# Patient Record
Sex: FEMALE | Race: WHITE | NOT HISPANIC OR LATINO | Employment: FULL TIME | URBAN - METROPOLITAN AREA
[De-identification: names, ages, dates, MRNs, and addresses within clinical notes are randomized per-mention and may not be internally consistent; named-entity substitution may affect disease eponyms.]

---

## 2017-01-16 ENCOUNTER — TELEPHONE (OUTPATIENT)
Dept: RHEUMATOLOGY | Facility: CLINIC | Age: 37
End: 2017-01-16

## 2017-01-16 NOTE — TELEPHONE ENCOUNTER
Left VM for Suzy Fernando, Access Consultant at [phone] stating per Dr Cross patient should have PCP fill out the paperwork.  Rosanne Shields, CMA

## 2017-01-18 ENCOUNTER — MYC MEDICAL ADVICE (OUTPATIENT)
Dept: FAMILY MEDICINE | Facility: CLINIC | Age: 37
End: 2017-01-18

## 2017-01-18 NOTE — TELEPHONE ENCOUNTER
Routing to PCP.    Karena-Please review.  Would you like patient to schedule office visit to discuss accommodations?    Thank you!  ARYAN SheikhN, RN

## 2017-01-23 ENCOUNTER — OFFICE VISIT (OUTPATIENT)
Dept: FAMILY MEDICINE | Facility: CLINIC | Age: 37
End: 2017-01-23
Payer: COMMERCIAL

## 2017-01-23 VITALS
DIASTOLIC BLOOD PRESSURE: 86 MMHG | HEIGHT: 65 IN | HEART RATE: 81 BPM | TEMPERATURE: 97.9 F | BODY MASS INDEX: 48.67 KG/M2 | SYSTOLIC BLOOD PRESSURE: 120 MMHG | WEIGHT: 292.13 LBS | OXYGEN SATURATION: 97 % | RESPIRATION RATE: 18 BRPM

## 2017-01-23 DIAGNOSIS — E66.01 MORBID OBESITY DUE TO EXCESS CALORIES (H): ICD-10-CM

## 2017-01-23 DIAGNOSIS — M79.7 FIBROMYALGIA: Primary | ICD-10-CM

## 2017-01-23 DIAGNOSIS — F33.41 RECURRENT MAJOR DEPRESSIVE DISORDER, IN PARTIAL REMISSION (H): ICD-10-CM

## 2017-01-23 DIAGNOSIS — J01.90 ACUTE SINUSITIS WITH SYMPTOMS GREATER THAN 10 DAYS: ICD-10-CM

## 2017-01-23 PROCEDURE — 99214 OFFICE O/P EST MOD 30 MIN: CPT | Performed by: NURSE PRACTITIONER

## 2017-01-23 RX ORDER — BUPROPION HYDROCHLORIDE 300 MG/1
300 TABLET ORAL EVERY MORNING
Qty: 90 TABLET | Refills: 1 | Status: SHIPPED | OUTPATIENT
Start: 2017-01-23 | End: 2017-04-03

## 2017-01-23 NOTE — PROGRESS NOTES
SUBJECTIVE:                                                    Ashli Gamboa is a 37 year old female who presents to clinic today for the following health issues:    1. Patient stated she was diagnosed with fibromyalgia in December, and would like to discuss the medications that she's taking.     Overall body aches, joint pains, coinciding with depression, worried it is all in her head.  Thinking losing weight would help the pains but the weight and the pains are flaring her depression.  SAD.      Also c/o cough, runny nose, congestion malaise x 2 weeks      Problem list and histories reviewed & adjusted, as indicated.  Additional history: as documented    Patient Active Problem List   Diagnosis     Morbid obesity (H)     post traumatic Arthritis of big toe     Major depression, recurrent (H)     CARDIOVASCULAR SCREENING; LDL GOAL LESS THAN 160     Endometriosis     Fibromyalgia     Past Surgical History   Procedure Laterality Date     Abdomen surgery       laparoscopy X2     Laser co2 laparoscopic vaporization endometrium  7/10/2014     Procedure: LASER CO2 LAPAROSCOPIC VAPORIZATION ENDOMETRIUM;  Surgeon: Sabas Patel MD;  Location: [facility]     Laparoscopic lysis adhesions  7/10/2014     Procedure: LAPAROSCOPIC LYSIS ADHESIONS;  Surgeon: Sabas Patel MD;  Location: [facility]     Laser co2 laparoscopic vaporization endometrium N/A 6/30/2015     Procedure: LASER CO2 LAPAROSCOPIC VAPORIZATION ENDOMETRIUM;  Surgeon: Sabas Patel MD;  Location: [facility]     Laser co2 laparoscopy diagnostic, lysis adhesions, combined N/A 6/30/2015     Procedure: COMBINED LASER CO2 LAPAROSCOPY DIAGNOSTIC, LYSIS ADHESIONS;  Surgeon: Sabas Patel MD;  Location: [facility]     Dilate cervix, hysteroscopy, ablate endometrium novasure, combined N/A 11/17/2016     Procedure: COMBINED DILATE CERVIX, HYSTEROSCOPY, ABLATE ENDOMETRIUM NOVASURE;  Surgeon: Sabas Patel MD;  Location: [facility]       Social History   Substance Use  "Topics     Smoking status: Former Smoker     Smokeless tobacco: Never Used      Comment: stopped for about 6 months now as of 5/23/13     Alcohol Use: 0.0 oz/week     0 Standard drinks or equivalent per week      Comment: occas     Family History   Problem Relation Age of Onset     Depression Mother      CANCER Mother      Cervical      Other - See Comments Mother      Fibromyalgia     Arthritis Father      Rheumatoid Arthritis Father      CEREBROVASCULAR DISEASE Paternal Grandmother          Current Outpatient Prescriptions   Medication Sig Dispense Refill     buPROPion (WELLBUTRIN XL) 300 MG 24 hr tablet Take 1 tablet (300 mg) by mouth every morning 90 tablet 1     FLUoxetine (PROZAC) 20 MG capsule Take 1 capsule (20 mg) by mouth daily 90 capsule 1     amoxicillin-clavulanate (AUGMENTIN) 875-125 MG per tablet Take 1 tablet by mouth 2 times daily 20 tablet 0     gabapentin (NEURONTIN) 300 MG capsule Take 2 capsules (600 mg) by mouth 3 times daily 180 capsule 0     cyclobenzaprine (FLEXERIL) 5 MG tablet Take 1 tablet (5 mg) by mouth 3 times daily as needed for muscle spasms .  Initially only take at night. 90 tablet 2     ASPIRIN PO Take 325 mg by mouth daily as needed for moderate pain       traZODone (DESYREL) 50 MG tablet Take 1 tablet (50 mg) by mouth nightly as needed for sleep 30 tablet 0     etonogestrel-ethinyl estradiol (NUVARING) 0.12-0.015 MG/24HR vaginal ring Place 1 each vaginally every 28 days 4 each 4     No Known Allergies    ROS:  Constitutional, HEENT, cardiovascular, pulmonary, GI, , musculoskeletal, neuro, skin, endocrine and psych systems are negative, except as otherwise noted.    OBJECTIVE:                                                    /86 mmHg  Pulse 81  Temp(Src) 97.9  F (36.6  C) (Oral)  Resp 18  Ht 5' 4.5\" (1.638 m)  Wt 292 lb 2 oz (132.507 kg)  BMI 49.39 kg/m2  SpO2 97%  LMP  (LMP Unknown)  Breastfeeding? No  Body mass index is 49.39 kg/(m^2).  GENERAL: healthy, " alert and no distress  EYES: Eyes grossly normal to inspection, PERRL and conjunctivae and sclerae normal  HENT: Bilateral frontal and maxillary sinus tenderness, ear canals and TM's normal, nose and mouth without ulcers or lesions  NECK: no adenopathy, no asymmetry, masses, or scars and thyroid normal to palpation  RESP: lungs clear to auscultation - no rales, rhonchi or wheezes  CV: regular rate and rhythm, normal S1 S2, no S3 or S4, no murmur, click or rub, no peripheral edema and peripheral pulses strong  ABDOMEN: soft, nontender, no hepatosplenomegaly, no masses and bowel sounds normal  MS: no gross musculoskeletal defects noted, no edema  SKIN: no suspicious lesions or rashes  PSYCH: sad, tearful, mentation normal.    PHQ-9 SCORE 6/12/2015 12/30/2015 6/6/2016   Total Score 4 - -   Total Score MyChart - 2 (Minimal depression) -   Total Score - 2 4    today +13/27    Diagnostic Test Results:  none      ASSESSMENT/PLAN:                                                        ICD-10-CM    1. Fibromyalgia M79.7 PAIN MANAGEMENT EXTERNAL REFERRAL   2. Recurrent major depressive disorder, in partial remission (H) F33.41 buPROPion (WELLBUTRIN XL) 300 MG 24 hr tablet     FLUoxetine (PROZAC) 20 MG capsule   3. Acute sinusitis with symptoms greater than 10 days J01.90 amoxicillin-clavulanate (AUGMENTIN) 875-125 MG per tablet   4. Morbid obesity due to excess calories (H) E66.01      Will refer to pain team for eval and management of chronic pain    I discussed the potential side effects of antidepressant medications as well as the likelihood of worsening before improvement over the next few weeks.  I reemphasized the importance of a multi-armed approach towards the treatment of depression including regular exercise, adequate sleep, and a well-rounded, low-glycemic diet.  In addition, I emphasized the importance of ongoing development of her support network. If new or worsening symptoms, she will seek help immediately.    I  discussed the pathophysiology of sinus pressure/sinusitis and treatment options with emphasis on healty lifestyle and opening up natural drainage passages.  I discussed the risks and benefits of various over the counter and prescription treatments including short courses of decongestant nasal sprays.  Recheck if not improving as expected  push fluids, rest as able.  Elevate HOB, lots of handwashing.  Toss your toothbrush after 24 hours on the antibiotics.    Encouraged to work on diet and exercise.    See Patient Instructions    DANIELE Osorio CNP  [facility]

## 2017-01-23 NOTE — NURSING NOTE
"Chief Complaint   Patient presents with     Other     discuss fibromyalgia medications       Initial /86 mmHg  Pulse 81  Temp(Src) 97.9  F (36.6  C) (Oral)  Resp 18  Ht 5' 4.5\" (1.638 m)  Wt 292 lb 2 oz (132.507 kg)  BMI 49.39 kg/m2  SpO2 97%  LMP  (LMP Unknown)  Breastfeeding? No Estimated body mass index is 49.39 kg/(m^2) as calculated from the following:    Height as of this encounter: 5' 4.5\" (1.638 m).    Weight as of this encounter: 292 lb 2 oz (132.507 kg).  BP completed using cuff size: X-large    Mora Edwards MA      "

## 2017-02-01 ENCOUNTER — THERAPY VISIT (OUTPATIENT)
Dept: PHYSICAL THERAPY | Facility: CLINIC | Age: 37
End: 2017-02-01
Payer: COMMERCIAL

## 2017-02-01 DIAGNOSIS — R29.898 MUSCULAR DECONDITIONING: ICD-10-CM

## 2017-02-01 DIAGNOSIS — M79.7 FIBROMYALGIA: Primary | ICD-10-CM

## 2017-02-01 PROCEDURE — 97163 PT EVAL HIGH COMPLEX 45 MIN: CPT | Mod: GP | Performed by: PHYSICAL THERAPIST

## 2017-02-01 PROCEDURE — 97112 NEUROMUSCULAR REEDUCATION: CPT | Mod: GP | Performed by: PHYSICAL THERAPIST

## 2017-02-01 PROCEDURE — 97530 THERAPEUTIC ACTIVITIES: CPT | Mod: GP | Performed by: PHYSICAL THERAPIST

## 2017-02-01 NOTE — LETTER
[facility]  [address] [address]  [phone]    2017    Re: Ashli Gamboa   :   1980  MRN:  [id number]   REFERRING PHYSICIAN:   Radha Palm CNP    [facility]    Date of Initial Evaluation:  2017  Visits:  1  Rxs Used: 1  Reason for Referral:     Fibromyalgia  Muscular deconditioning    EVALUATION SUMMARY    Subjective:  Ashli Gamboa is a 37 year old female with deconditioning condition which occurred with other.      This is a chronic condition  Date of orders 17  PMH: Diagnosed with fibromyalgia in December, working on pain management and energy levels. Understands that weight management can help. 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 Endometriosis with 4 surgeries since 2013.  Greatest complaint is low back and upper back pain. General aching, migraines and fatigue.   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 Likes walking elliptical, swimming (not often though). Walks 10 minutes to office from car. Getting a sit>stand desk at work  Initially on gabapentin (6 wks no change), just started Lyrica this week. Flexeril at night.  Patient reports pain:  Cervical spine and lumbar spine.  Radiates to:  Low back and upper back.  Pain is described as aching and is constant and reported as 8/10.  Associated symptoms:  Loss of strength and loss of motion. Pain is worse in the P.M..  Exacerbated by: decreased activity, overactivity. Not enough sleep. and relieved by heat (epsom salt bath, relaxation techniques).  Since onset symptoms are unchanged.        General health as reported by patient is good.                               Objective:   Hip Evaluation  Hip Strength:  : 3+/5 glute med; 4/5 glute max B hip.  General Evaluation:  AROM:    Cervical/Thoracic:  Significant findings:  Pain with cervical flex and ext  Lumbopelvic:  Significant  findings: pain with low back ext, mod - SB B  Upper Extremity:  Significant findings:  Soreness with shoulder Abd B  Lower Extremity Strength:  Strength wnl lower extremity general: overactive glute/hip flexor with TrA set.  Re: Ashli Gamboa   :   1980    Posture:    Standing:   Rounded shoulders, forward head, lordosis increased, genu recurvatum and pes planus  Sitting:  Rounded shoulders and forward head    Assessment/Plan:    Patient is a 37 year old female with deconditioning d/t fibromyalgia.    Patient has the following significant findings with corresponding treatment plan.                Diagnosis 1:  Fibromyalgia  Pain -  self management, education and home program  Decreased ROM/flexibility - manual therapy, therapeutic exercise and home program  Decreased joint mobility - manual therapy, therapeutic exercise and home program  Decreased strength - therapeutic exercise, therapeutic activities and home program  Decreased proprioception - neuro re-education, gait training, therapeutic activities and home program  Impaired posture - neuro re-education, therapeutic activities and home program    Therapy Evaluation Codes:   1) History comprised of:   Personal factors that impact the plan of care:      Gender and Time since onset of symptoms.    Comorbidity factors that impact the plan of care are:      Depression, Fibromyalgia, Migraines/headaches and Overweight.     Medications impacting care: Anti-depressant and Muscle relaxant.  2) Examination of Body Systems comprised of:   Body structures and functions that impact the plan of care:      Lumbar spine, Pelvis and General conditioning.   Activity limitations that impact the plan of care are:      Bending, Driving, Stairs, Standing and Walking.  3) Clinical presentation characteristics are:   Unstable/Unpredictable.  4) Decision-Making    High complexity using standardized patient assessment instrument and/or measureable assessment of functional  outcome.  Cumulative Therapy Evaluation is: High complexity.  Previous and current functional limitations:  (See Goal Flow Sheet for this information)    Short term and Long term goals: (See Goal Flow Sheet for this information)   Communication ability:  Patient appears to be able to clearly communicate and understand verbal and written communication and follow directions correctly.  Treatment Explanation - The following has been discussed with the patient:   RX ordered/plan of care  Anticipated outcomes  Possible risks and side effects  This patient would benefit from PT intervention to resume normal activities.   Rehab potential is good.  Frequency:  1 X week, once daily  Duration:  for 8 weeks    Re: Ashli Gamboa   :   1980    Discharge Plan:  Achieve all LTG.  Independent in home treatment program.  Reach maximal therapeutic benefit.                  Thank you for your referral.    INQUIRIES  Therapist: Mili Browning, PT  [facility] PH[facility] THERAP[facility]  [address] [place] [address]  Phone: [phone]  Fax: [phone]

## 2017-02-06 PROBLEM — R29.898 MUSCULAR DECONDITIONING: Status: ACTIVE | Noted: 2017-02-06

## 2017-02-13 ENCOUNTER — PRE VISIT (OUTPATIENT)
Dept: SURGERY | Facility: CLINIC | Age: 37
End: 2017-02-13

## 2017-02-13 ASSESSMENT — ENCOUNTER SYMPTOMS
FEVER: 0
RESPIRATORY PAIN: 0
HOT FLASHES: 0
FATIGUE: 1
SNORES LOUDLY: 1
NIGHT SWEATS: 0
CHILLS: 0
HALLUCINATIONS: 0
DECREASED LIBIDO: 1
COUGH DISTURBING SLEEP: 0
WEIGHT GAIN: 0
DYSPNEA ON EXERTION: 0
POSTURAL DYSPNEA: 0
SHORTNESS OF BREATH: 0
COUGH: 0
WHEEZING: 0
HEMOPTYSIS: 0
WEIGHT LOSS: 0
POLYPHAGIA: 0
POLYDIPSIA: 0
ALTERED TEMPERATURE REGULATION: 0
DECREASED APPETITE: 0
INCREASED ENERGY: 0

## 2017-02-13 NOTE — TELEPHONE ENCOUNTER
1.  Date/reason for appt: 2/22/17 NBS   2.  Referring provider: CRISTIAN IRVIN   3.  Call to patient (Yes / No - short description): no  4.  Previous care at / records requested from: [facility]  5.  Other: Received email from referrals. Per email patient would like to be contacted through email. Sent email to patient to check if she has any outside records.

## 2017-02-16 NOTE — TELEPHONE ENCOUNTER
Patient emailed back, only seen at [facility]. [facility] received, will fax request.

## 2017-02-17 ENCOUNTER — TRANSFERRED RECORDS (OUTPATIENT)
Dept: HEALTH INFORMATION MANAGEMENT | Facility: CLINIC | Age: 37
End: 2017-02-17

## 2017-02-17 NOTE — TELEPHONE ENCOUNTER
Records received from [facility], will forward to clinic.  Included:   MRI Cspine on 12/10/12 unrelated to visit   Office notes on 2/14/13, 1/28/13, 12/10/12  Sleep study on 2/6/13- [facility]

## 2017-02-22 ENCOUNTER — OFFICE VISIT (OUTPATIENT)
Dept: SURGERY | Facility: CLINIC | Age: 37
End: 2017-02-22

## 2017-02-22 ENCOUNTER — ALLIED HEALTH/NURSE VISIT (OUTPATIENT)
Dept: SURGERY | Facility: CLINIC | Age: 37
End: 2017-02-22

## 2017-02-22 VITALS
WEIGHT: 291.8 LBS | HEART RATE: 104 BPM | HEIGHT: 64 IN | TEMPERATURE: 98.7 F | SYSTOLIC BLOOD PRESSURE: 154 MMHG | DIASTOLIC BLOOD PRESSURE: 88 MMHG | OXYGEN SATURATION: 97 % | BODY MASS INDEX: 49.82 KG/M2

## 2017-02-22 DIAGNOSIS — E66.01 MORBID OBESITY, UNSPECIFIED OBESITY TYPE (H): Primary | ICD-10-CM

## 2017-02-22 LAB
ALBUMIN SERPL-MCNC: 3 G/DL (ref 3.4–5)
ALP SERPL-CCNC: 72 U/L (ref 40–150)
ALT SERPL W P-5'-P-CCNC: 16 U/L (ref 0–50)
ANION GAP SERPL CALCULATED.3IONS-SCNC: 8 MMOL/L (ref 3–14)
AST SERPL W P-5'-P-CCNC: 11 U/L (ref 0–45)
BILIRUB SERPL-MCNC: 0.2 MG/DL (ref 0.2–1.3)
BUN SERPL-MCNC: 13 MG/DL (ref 7–30)
CALCIUM SERPL-MCNC: 8.5 MG/DL (ref 8.5–10.1)
CHLORIDE SERPL-SCNC: 108 MMOL/L (ref 94–109)
CO2 SERPL-SCNC: 25 MMOL/L (ref 20–32)
CREAT SERPL-MCNC: 0.82 MG/DL (ref 0.52–1.04)
DEPRECATED CALCIDIOL+CALCIFEROL SERPL-MC: 35 UG/L (ref 20–75)
ERYTHROCYTE [DISTWIDTH] IN BLOOD BY AUTOMATED COUNT: 13.6 % (ref 10–15)
GFR SERPL CREATININE-BSD FRML MDRD: 79 ML/MIN/1.7M2
GLUCOSE SERPL-MCNC: 114 MG/DL (ref 70–99)
HCT VFR BLD AUTO: 45.5 % (ref 35–47)
HGB BLD-MCNC: 14.2 G/DL (ref 11.7–15.7)
MCH RBC QN AUTO: 27.4 PG (ref 26.5–33)
MCHC RBC AUTO-ENTMCNC: 31.2 G/DL (ref 31.5–36.5)
MCV RBC AUTO: 88 FL (ref 78–100)
PLATELET # BLD AUTO: 328 10E9/L (ref 150–450)
POTASSIUM SERPL-SCNC: 4 MMOL/L (ref 3.4–5.3)
PROT SERPL-MCNC: 6.9 G/DL (ref 6.8–8.8)
PTH-INTACT SERPL-MCNC: 67 PG/ML (ref 12–72)
RBC # BLD AUTO: 5.19 10E12/L (ref 3.8–5.2)
SODIUM SERPL-SCNC: 141 MMOL/L (ref 133–144)
WBC # BLD AUTO: 7.3 10E9/L (ref 4–11)

## 2017-02-22 RX ORDER — PREGABALIN 100 MG
CAPSULE ORAL
Refills: 0 | COMMUNITY
Start: 2017-02-17 | End: 2017-03-24

## 2017-02-22 RX ORDER — PREGABALIN 75 MG
CAPSULE ORAL
Refills: 0 | COMMUNITY
Start: 2017-01-27 | End: 2017-03-24

## 2017-02-22 ASSESSMENT — ENCOUNTER SYMPTOMS
HYPERTENSION: 0
SWOLLEN GLANDS: 0
DEPRESSION: 0
CHILLS: 0
TREMORS: 0
JAUNDICE: 0
HEARTBURN: 0
POOR WOUND HEALING: 0
EYE WATERING: 0
SKIN CHANGES: 0
EYE PAIN: 0
BACK PAIN: 0
DIFFICULTY URINATING: 0
DYSPNEA ON EXERTION: 0
NUMBNESS: 0
BOWEL INCONTINENCE: 0
HALLUCINATIONS: 0
CONSTIPATION: 0
HOARSE VOICE: 0
DOUBLE VISION: 0
NAIL CHANGES: 0
MUSCLE CRAMPS: 0
NIGHT SWEATS: 0
BREAST MASS: 0
DIARRHEA: 0
LOSS OF CONSCIOUSNESS: 0
RECTAL PAIN: 0
SINUS CONGESTION: 0
TACHYCARDIA: 0
WEIGHT LOSS: 0
WEIGHT GAIN: 0
INCREASED ENERGY: 0
SNORES LOUDLY: 1
VOMITING: 0
POLYPHAGIA: 0
EXERCISE INTOLERANCE: 0
BREAST PAIN: 0
LIGHT-HEADEDNESS: 0
TROUBLE SWALLOWING: 0
ARTHRALGIAS: 0
JOINT SWELLING: 0
HEMATURIA: 0
NERVOUS/ANXIOUS: 0
SPEECH CHANGE: 0
MUSCLE WEAKNESS: 0
PARALYSIS: 0
INSOMNIA: 0
BLOATING: 0
BRUISES/BLEEDS EASILY: 0
COUGH DISTURBING SLEEP: 0
POSTURAL DYSPNEA: 0
DIZZINESS: 0
MEMORY LOSS: 0
DECREASED LIBIDO: 1
FATIGUE: 1
TINGLING: 0
CLAUDICATION: 0
SHORTNESS OF BREATH: 0
LEG PAIN: 0
PALPITATIONS: 0
SYNCOPE: 0
FEVER: 0
HYPOTENSION: 0
ORTHOPNEA: 0
NAUSEA: 0
DECREASED APPETITE: 0
SORE THROAT: 0
STIFFNESS: 0
SMELL DISTURBANCE: 0
FLANK PAIN: 0
RESPIRATORY PAIN: 0
LEG SWELLING: 0
SINUS PAIN: 0
NECK MASS: 0
POLYDIPSIA: 0
RECTAL BLEEDING: 0
DECREASED CONCENTRATION: 0
SEIZURES: 0
TASTE DISTURBANCE: 0
PANIC: 0
WEAKNESS: 0
ABDOMINAL PAIN: 0
EYE REDNESS: 0
DYSURIA: 0
WHEEZING: 0
SLEEP DISTURBANCES DUE TO BREATHING: 0
MYALGIAS: 0
EXTREMITY NUMBNESS: 0
DISTURBANCES IN COORDINATION: 0
ALTERED TEMPERATURE REGULATION: 0
COUGH: 0
EYE IRRITATION: 0
HEMOPTYSIS: 0
NECK PAIN: 0
HEADACHES: 0
HOT FLASHES: 0
BLOOD IN STOOL: 0

## 2017-02-22 NOTE — LETTER
"2017       RE: Ashli Gamboa  [address]  [place] [address]     Dear Colleague,    Thank you for referring your patient, Ashli Gamboa, to the [facility] SURGICAL WEIGHT MANAGEMENT at [facility]. Please see a copy of my visit note below.    New Bariatric Surgery Consultation Note    RE: Ashli Gamboa  MR#: [id number]  : 1980      Requesting provider: Karena Mo    Chief Complaint/Reason for visit: evaluation for possible weight loss surgery    Dear Chepe, Karena August,    I had the pleasure of seeing your patient, Ashli Gamboa, to evaluate her obesity and consider her for possible weight loss surgery. As you know, Ashli Gamboa is 37 year old.  She has a height of 5' 3.976\", a weight of 291 lbs 12.8 oz, and calculated Body mass index is 50.12 kg/(m^2)..      HISTORY OF PRESENT ILLNESS:  Weight Loss History Reviewed with Patient 2017   How long have you been overweight? Since early childhood   What is the most that you have ever weighed? 292   What is the most weight you have lost? 50   I have tried the following methods to lose weight: Watching Portions or Calories, Exercise, Atkins-type Diet (Low Carb/High Protein), Medications     CO-MORBIDITIES OF OBESITY INCLUDE:  I Have Reviewed The Following Co-Morbidities With The Patient 2017   I have the following co-morbidities associated with obesity: Back Pain   I have the following co-morbidities associated with obesity: Knee Pain     PAST MEDICAL HISTORY:  Past Medical History   Diagnosis Date     Arthritis      BIG TOE     Depressive disorder      I have been on and off medication for the last several years     Endometriosis      Obese      PAST SURGICAL HISTORY:  Past Surgical History   Procedure Laterality Date     Abdomen surgery       laparoscopy X2     Laser co2 laparoscopic vaporization endometrium  7/10/2014     Procedure: LASER CO2 LAPAROSCOPIC " VAPORIZATION ENDOMETRIUM;  Surgeon: Sabas Patel MD;  Location: [facility]     Laparoscopic lysis adhesions  7/10/2014     Procedure: LAPAROSCOPIC LYSIS ADHESIONS;  Surgeon: Sabas Patel MD;  Location: [facility]     Laser co2 laparoscopic vaporization endometrium N/A 6/30/2015     Procedure: LASER CO2 LAPAROSCOPIC VAPORIZATION ENDOMETRIUM;  Surgeon: Sabas Patel MD;  Location: [facility]     Laser co2 laparoscopy diagnostic, lysis adhesions, combined N/A 6/30/2015     Procedure: COMBINED LASER CO2 LAPAROSCOPY DIAGNOSTIC, LYSIS ADHESIONS;  Surgeon: Sabas Patel MD;  Location: [facility]     Dilate cervix, hysteroscopy, ablate endometrium novasure, combined N/A 11/17/2016     Procedure: COMBINED DILATE CERVIX, HYSTEROSCOPY, ABLATE ENDOMETRIUM NOVASURE;  Surgeon: Sabas Patel MD;  Location: [facility]     Gyn surgery  dates above     myomectomy x2, laparoscopy x1 (soon to be 2)       FAMILY HISTORY:   Family History   Problem Relation Age of Onset     Depression Mother      CANCER Mother      Cervical      Other - See Comments Mother      Fibromyalgia     Crohn Disease Mother      Obesity Mother      Ovarian Cancer Mother      Cervical Cancer     Arthritis Father      Rheumatoid Arthritis Father      Depression Father      CEREBROVASCULAR DISEASE Paternal Grandmother      Breast Cancer Maternal Grandmother      Depression Brother      Depression Sister      MENTAL ILLNESS Brother      schizophrenia and bipolar       SOCIAL HISTORY:   Social History Questions Reviewed With Patient 2/13/2017   Which best describes your employment status (select all that apply) I work full-time   If you work, 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 some college    Which best describes your marital status:    Use of some drugs can affect your ability to lose or gain weight.  Have you used any of the following in the past? prescription narcotics   Have you used any of the following in the past?  cigarettes   If you answered yes to the use of nicotine products above, what year did you quit?  2013   How often do you drink alcohol? Monthly or less   If you do drink alcohol, how many drinks might you have in a day? (one drink = 5 oz. wine, 1 can/bottle of beer, 1 shot liquor) 1 or 2   No hx of drug or alcohol abuse    PSYCHOLOGICAL HISTORY:   Psychological History Reviewed With Patient 2/13/2017   Have you ever attempted suicide? Never.   Have you had thoughts of suicide in the past year? No   Have you ever been hospitalized for mental illness or a suicide attempt? Never.   Do you have a history of chronic pain? Yes   Have you ever been diagnosed with fibromyalgia? Yes   Do you feel you have adequate support from family and/or friends before and after surgery? Yes   Diagnosed with fibromyalgia by rheumatologist in December 2016.  She tried gapapentin and didn't work.  She is now on lyrica which is helping.  Chronic abdominal pain due to endometriosis, resolved since last surgery in November 2016.    Review of Systems     Constitutional:  Positive for fatigue. Negative for fever, chills, weight loss, weight gain, decreased appetite, night sweats, recent stressors, height loss, post-operative complications, incisional pain, hallucinations, increased energy, hyperactivity and confused.   HENT:  Negative for ear pain, hearing loss, tinnitus, nosebleeds, trouble swallowing, hoarse voice, mouth sores, sore throat, ear discharge, tooth pain, gum tenderness, taste disturbance, smell disturbance, hearing aid, bleeding gums, dry mouth, sinus pain, sinus congestion and neck mass.    Eyes:  Negative for double vision, pain, redness, eye pain, decreased vision, eye watering, eye bulging, eye dryness, flashing lights, spots, floaters, strabismus, tunnel vision, jaundice and eye irritation.   Respiratory:   Positive for snores loudly. Negative for cough, hemoptysis, shortness of breath, wheezing, sleep disturbances due to  breathing, respiratory pain, dyspnea on exertion, cough disturbing sleep and postural dyspnea.    Cardiovascular:  Negative for chest pain, dyspnea on exertion, palpitations, orthopnea, claudication, leg swelling, fingers/toes turn blue, hypertension, hypotension, syncope, history of heart murmur, chest pain on exertion, chest pain at rest, pacemaker, few scattered varicosities, leg pain, sleep disturbances due to breathing, tachycardia, light-headedness, exercise intolerance and edema.   Gastrointestinal:  Negative for heartburn, nausea, vomiting, abdominal pain, diarrhea, constipation, blood in stool, melena, rectal pain, bloating, hemorrhoids, bowel incontinence, jaundice, rectal bleeding, coffee ground emesis and change in stool.   Genitourinary:  Positive for decreased libido. Negative for bladder incontinence, dysuria, urgency, hematuria, flank pain, vaginal discharge, difficulty urinating, genital sores, dyspareunia, nocturia, voiding less frequently, arousal difficulty, abnormal vaginal bleeding, excessive menstruation, menstrual changes, hot flashes, vaginal dryness and postmenopausal bleeding.   Musculoskeletal:  Negative for myalgias, back pain, joint swelling, arthralgias, stiffness, muscle cramps, neck pain, bone pain, muscle weakness and fracture.   Skin:  Negative for nail changes, itching, poor wound healing, rash, hair changes, skin changes, acne, warts, poor wound healing, scarring, flaky skin, Raynaud's phenomenon, sensitivity to sunlight and skin thickening.   Neurological:  Negative for dizziness, tingling, tremors, speech change, seizures, loss of consciousness, weakness, light-headedness, numbness, headaches, disturbances in coordination, extremity numbness, memory loss, difficulty walking and paralysis.   Endo/Heme:  Negative for anemia, swollen glands and bruises/bleeds easily.   Psychiatric/Behavioral:  Negative for depression, hallucinations, memory loss, decreased concentration, mood  "swings and panic attacks.    Breast:  Negative for breast discharge, breast mass, breast pain and nipple retraction.   Endocrine:  Negative for altered temperature regulation, polyphagia, polydipsia, unwanted hair growth and change in facial hair.    MEDICATIONS:  Current Outpatient Prescriptions   Medication     LYRICA 75 MG capsule     LYRICA 100 MG capsule     buPROPion (WELLBUTRIN XL) 300 MG 24 hr tablet     FLUoxetine (PROZAC) 20 MG capsule     cyclobenzaprine (FLEXERIL) 5 MG tablet     ASPIRIN PO     traZODone (DESYREL) 50 MG tablet     etonogestrel-ethinyl estradiol (NUVARING) 0.12-0.015 MG/24HR vaginal ring     No current facility-administered medications for this visit.      ALLERGIES:  No Known Allergies    /88  Pulse 104  Temp 98.7  F (37.1  C) (Oral)  Ht 5' 3.98\"  Wt 291 lb 12.8 oz  LMP  (LMP Unknown)  SpO2 97%  BMI 50.12 kg/m2    PHYSICAL EXAM:  Neurological: A & O x 3  Eyes: PERRL  ENT: mucous membranes moist  Skin: warm and dry  Musculoskeletal: gait intact  Respiratory: Respirations unlabored  Abdomen: obese soft NT ND    In summary, Ashli Gamboa is a morbidly obese 37 year old female, whose Body mass index is 50.12 kg/(m^2)..  She is interested in Gastric Sleeve with Dr. Schmidt.  She appears to be a possible candidate for weight loss surgery pending completion of the following pre-requisites.      See Dr Schmidt in 1 month for meet and greet visit due to chronic pain(fibromyalgia) and chronic abdominal pain(endometriosis-pain resolved since November 2016)    PRE-OPERATIVE WEIGHT LOSS SURGERY TASKLIST  Call Kaylee at [phone] for any questions regarding tasks on this list  Tentative surgery: Sleeve  Approximate Month and Year: May 2017    Surgeon: Roxana  Tentative Insurance Coverage: [employer]  Exclusions: none  _x___Seminar viewed.   _x___Research Consent Form signed.    _x___Registered on [employer].    _x___Received Decision Making Handout to read.   _x___Received information about " Support Group, 1st Wednesday of the month at the [facility], 6:30p - 8:00pm.  ____Letter of support from primary care provider.   Please complete before your 2nd dietician visit if possible.  ____Labs to be ordered today.  ____Lose 10 lbs prior to surgery from the initial consult weight of 291 lbs   ____Exercise goal: 20 mins., 5 days a week; increase as tolerated.   _x___Dietician visit at initial consult           ____Dietician visit month 2   ____Dietician visit month 3   ____Psychologist evaluation (If you would like to be scheduled with Dr Casanova, call [facility] at [phone])  ____Rheumatology clearance due to fibromyalgia.  The week before you meet with the surgeon, watch the following online classes and call DIANNA Page at [phone] to inform her that you watched them and to get any questions answered:  _____Before Your Weight Loss Surgery Online Class at [email or web address]  _____After Your Weight Loss Surgery Online Class at [email or web address]  _____See your surgeon Dr Schmidt in 1 month for meet and greet visit due to chronic pain(fibromyalgia) and chronic abdominal pain(endometriosis-pain resolved since November 2016)  _____After your visit with the surgeon, call [facility] at [phone] to finalize the surgery date and schedule your final appointments to be 3 weeks before your surgery date.     FINAL APPOINTMENTS  _____Weigh-in/Nurse Visit at Clinic 1E, 3 weeks before surgery.    Plan to be at your goal weight for this visit.  This can be done the same day you meet the surgeon if you are at your goal weight.   _____Pre-assessment Clinic, to be done 3 weeks before surgery. Meet with the anesthesia team. Your pre-operative history and physical can be done at this visit.    DISCLAIMER: Based on the evaluation results, the bariatric team decides whether and which bariatric surgery is the best option for you. Sometimes, even if you meet all of the pre-operative criteria, the bariatric team may still  determine that in their medical judgement surgery is not recommended because of the risks to you.    CONTACT INFORMATION  ______If questions prior to surgery, call Kaylee RN at [phone].   ______If questions about insurance or scheduling surgery, call [facility] at        [phone].  ______If questions after surgery and to schedule 1E clinic appts, please call our Call Center at [phone].  ______Fax: [phone] (preoperative documents, FMLA or return to work forms).          Sincerely,    Yu Evans PA-C    I spent a total of 30 minutes face to face with Ashli Gamboa during today's office visit.  Over 50% of this time was spent counseling the patient and/or coordinating care.

## 2017-02-22 NOTE — PROGRESS NOTES
"  New Bariatric Nutrition Consultation Note    Reason For Visit: Nutrition Assessment    Crystal VILMA Gamboa is a 37 year-old presenting today for new bariatric nutrition consult.  Pt is interested in laparoscopic sleeve gastrectomy.  This is pt's first of 3 RD visits required.      She is interested in having weight loss surgery for the following reasons:  Pt would like to improve overall health, physical abilities (fibromyalgia), and wellbeing.     Support System Reviewed With Patient 2/13/2017   Do you feel you have adequate support from family and/or friends before and after surgery? Yes       ANTHROPOMETRICS:    Height as of an earlier encounter on 2/22/17: 1.625 m (5' 3.98\").    Weight as of an earlier encounter on 2/22/17: 132.4 kg (291.8 lbs) with BMI of 50.12.    Required weight loss goal pre-op: -10 lbs from initial consult weight (goal weight 281.8 lbs or less before surgery)    Past Diet Attempts History: a self-imposed calorie restriction and Atkins and a Keto diet    Weight Loss Questions Reviewed With Patient 2/13/2017   How long have you been overweight? Since early childhood   What is the most weight you have lost? 50   Which of the following vitamin supplements do you take? (check all that apply) Multivitamin once per day, tumeric        NUTRITION HISTORY:  Food Allergies/Intolerances: none    Cravings: sugar    Triggers/cues causing extra eating: nothing in particular    Recall Diet Questions Reviewed With Patient 2/13/2017   Describe what you typically consume for breakfast (typical or most recent): oatmeal, cereal or yogurt   Describe what you typically consume for lunch (typical or most recent): soup, leftovers from dinner the night before   Describe what you typically consume for supper (typical or most recent): meat and veggie   Describe what you typically consume as snacks (typical or most recent): cheese stick, piece of fruit   In a typical day, how many glasses of water do you drink? (8 " oz. = 1 glass) 6   In a typical day, how many glasses of Juice do you drink? (8 oz. = 1 glass) 0   In a typical day, how many glasses of SUGAR pop/soda/tea/sports drink/protein shakes/drink mix drinks do you drink? (8 oz. = 1 glass) 0   In a typical day, how many glasses of DIET pop/soda/tea/sports drink/protein shakes/drink mix drinks do you drink? (8 oz. = 1 glass) 1   How often do you drink alcohol? Monthly or less   If you do drink alcohol, how many drinks might you have in a day? (one drink = 5 oz. wine, 1 can/bottle of beer, 1 shot liquor) 1 or 2   *Pt is willing to abstain from alcohol after surgery.     Dining Out History Reviewed With Patient 2/13/2017   How often do you dine out? Around once a week.   Where do you dine out? (select all that apply) sit-down restaurants   What types of food do you order when you dine out? burger or sandwich       Physical Activity Reviewed With Patient 2/13/2017   What types of exercise do you do? gym membership   How often do you exercise? 1 to 2 times per week   How much time do you spend exercising on the days you exercise? 1 to 14 minutes.   *Pt continues to work on building up her exercise, currently at 15 min 1-2 days/week.     NUTRITION DIAGNOSIS:  Obesity r/t long history of self-monitoring deficit and excessive energy intake aeb BMI >30.    INTERVENTION:  Intervention Provided/Education Provided on post-op diet guidelines, vitamins/minerals essential post-operatively, GI anatomy of bariatric surgeries, ways to help prepare for post-op diet guidelines pre-operatively, portion/calorie-control, mindful eating.  Provided pt with list of goals.      Questions Reviewed With Patient 2/13/2017   How ready are you to make changes regarding your weight? Number 1 = Not ready at all to make changes up to 10 = very ready. 9   How confident are you that you can change? 1 = Not confident that you will be successful making changes up to 10 = very confident. 8       Patient  "Understanding: good  Expected Compliance: good      GOALS:  Relating To Eating:   Eat slowly (20-30 minutes per meal), chewing foods well (25 chews per bite/applesauce consistency)   Focus on lean protein and non-starchy vegetables/whole fruit at each meal with no more than 1 cup of carbs or 2 slices of bread  (9\" plate method: 1/2 non-starchy vegetables and 1/4 lean protein and 1/4 carbs - no more than 1 c carbs/meal).   Record food intake prior to eating throughout the day.  May use MyFitnessPal.com or other phone application or write it in a notebook (food and amount).     Relating to beverages:  Separate fluids from meals by 30 minutes during and after eating.     Relating to activity:  Increase activity level.  Exercise 5 days/week for 20 minutes    Follow-Up: 1 month or PRN    Time spent with patient: 30 minutes.  Veronika Morrison RD, LD, CLT    "

## 2017-02-22 NOTE — PATIENT INSTRUCTIONS
See dietitian in 1 month  Labs today on first floor    PRE-OPERATIVE WEIGHT LOSS SURGERY TASKLIST  Call Kaylee at [phone] for any questions regarding tasks on this list  Tentative surgery: Sleeve  Approximate Month and Year: May 2017    Surgeon: Roxana Felipe Insurance Coverage: [employer]  Exclusions: none  _x___Seminar viewed.   _x___Research Consent Form signed.    _x___Registered on [employer].    _x___Received Decision Making Handout to read.   _x___Received information about Support Group, 1st Wednesday of the month at the [facility], 6:30p - 8:00pm.  ____Letter of support from primary care provider.   Please complete before your 2nd dietician visit if possible.  ____Labs to be ordered today.  ____Lose 10 lbs prior to surgery from the initial consult weight of 291 lbs   ____Exercise goal: 20 mins., 5 days a week; increase as tolerated.   _x___Dietician visit at initial consult           ____Dietician visit month 2   ____Dietician visit month 3   ____Psychologist evaluation (If you would like to be scheduled with Dr Casanova, nydia Cast at [phone])  ____Rheumatology clearance due to fibromyalgia.  The week before you meet with the surgeon, watch the following online classes and call DIANNA Page at [phone] to inform her that you watched them and to get any questions answered:  _____Before Your Weight Loss Surgery Online Class at [email or web address]  _____After Your Weight Loss Surgery Online Class at [email or web address]  _____See your surgeon Dr Schmidt in 1 month for meet and greet visit due to chronic pain(fibromyalgia) and chronic abdominal pain(endometriosis-pain resolved since November 2016)  _____After your visit with the surgeon, call [facility] at [phone] to finalize the surgery date and schedule your final appointments to be 3 weeks before your surgery date.     FINAL APPOINTMENTS  _____Weigh-in/Nurse Visit at Clinic 1E, 3 weeks before surgery.    Plan to be at your goal weight for  this visit.  This can be done the same day you meet the surgeon if you are at your goal weight.   _____Pre-assessment Clinic, to be done 3 weeks before surgery. Meet with the anesthesia team. Your pre-operative history and physical can be done at this visit.    DISCLAIMER: Based on the evaluation results, the bariatric team decides whether and which bariatric surgery is the best option for you. Sometimes, even if you meet all of the pre-operative criteria, the bariatric team may still determine that in their medical judgement surgery is not recommended because of the risks to you.    CONTACT INFORMATION  ______If questions prior to surgery, call DIANNA Page at [phone].   ______If questions about insurance or scheduling surgery, call Segun at        [phone].  ______If questions after surgery and to schedule 1E clinic appts, please call our Call Center at [phone].  ______Fax: [phone] (preoperative documents, FMLA or return to work forms).

## 2017-02-22 NOTE — PATIENT INSTRUCTIONS
"GOALS:  Relating To Eating:   Eat slowly (20-30 minutes per meal), chewing foods well (25 chews per bite/applesauce consistency)   Focus on lean protein and non-starchy vegetables/whole fruit at each meal with no more than 1 cup of carbs or 2 slices of bread  (9\" plate method: 1/2 non-starchy vegetables and 1/4 lean protein and 1/4 carbs - no more than 1 c carbs/meal).   Record food intake prior to eating throughout the day.  May use MyFitnessPal.com or other phone application or write it in a notebook (food and amount).     Relating to beverages:  Separate fluids from meals by 30 minutes during and after eating.     Relating to activity:  Increase activity level.  Exercise 5 days/week for 20 minutes  "

## 2017-02-22 NOTE — MR AVS SNAPSHOT
"                  MRN:[id number]                      After Visit Summary   2/22/2017    Ashli Gamboa    MRN: [id number]           Visit Information        Provider Department      2/22/2017 10:00 AM Veronika Morrison RD [facility]        Your next 10 appointments already scheduled     Feb 23, 2017  3:00 PM CST   New Sleep Patient with Uziel Salazar MD   [facility] ([facility])    [address]  [place] [address]   [phone]            Mar 01, 2017  9:15 AM CST   SHORT with Sabas Patel MD   [facility] ([facility])    [address]  [place] [address] [phone]              Care Instructions    GOALS:  Relating To Eating:   Eat slowly (20-30 minutes per meal), chewing foods well (25 chews per bite/applesauce consistency)   Focus on lean protein and non-starchy vegetables/whole fruit at each meal with no more than 1 cup of carbs or 2 slices of bread  (9\" plate method: 1/2 non-starchy vegetables and 1/4 lean protein and 1/4 carbs - no more than 1 c carbs/meal).   Record food intake prior to eating throughout the day.  May use MyFitnessPal.com or other phone application or write it in a notebook (food and amount).     Relating to beverages:  Separate fluids from meals by 30 minutes during and after eating.     Relating to activity:  Increase activity level.  Exercise 5 days/week for 20 minutes       [employer] Information     [employer] gives you secure access to your electronic health record. If you see a primary care provider, you can also send messages to your care team and make appointments. If you have questions, please call your primary care clinic.  If you do not have a primary care provider, please call [phone] and they will assist you.      [employer] is an electronic gateway that provides easy, online access to your medical records. With " Glenn, you can request a clinic appointment, read your test results, renew a prescription or communicate with your care team.     To access your existing account, please contact your [facility] or call [phone] for assistance.        Care EveryWhere ID     This is your Care EveryWhere ID. This could be used by other organizations to access your [place] medical records  [id number]

## 2017-02-22 NOTE — PROGRESS NOTES
"New Bariatric Surgery Consultation Note    RE: Ashli Gamboa  MR#: [id number]  : 1980      Requesting provider: Karena Mo    Chief Complaint/Reason for visit: evaluation for possible weight loss surgery    Dear Chepe, Karena August,    I had the pleasure of seeing your patient, Ashli Gamboa, to evaluate her obesity and consider her for possible weight loss surgery. As you know, Ashli Gamboa is 37 year old.  She has a height of 5' 3.976\", a weight of 291 lbs 12.8 oz, and calculated Body mass index is 50.12 kg/(m^2)..      HISTORY OF PRESENT ILLNESS:  Weight Loss History Reviewed with Patient 2017   How long have you been overweight? Since early childhood   What is the most that you have ever weighed? 292   What is the most weight you have lost? 50   I have tried the following methods to lose weight: Watching Portions or Calories, Exercise, Atkins-type Diet (Low Carb/High Protein), Medications     CO-MORBIDITIES OF OBESITY INCLUDE:  I Have Reviewed The Following Co-Morbidities With The Patient 2017   I have the following co-morbidities associated with obesity: Back Pain   I have the following co-morbidities associated with obesity: Knee Pain     PAST MEDICAL HISTORY:  Past Medical History   Diagnosis Date     Arthritis      BIG TOE     Depressive disorder      I have been on and off medication for the last several years     Endometriosis      Obese      PAST SURGICAL HISTORY:  Past Surgical History   Procedure Laterality Date     Abdomen surgery       laparoscopy X2     Laser co2 laparoscopic vaporization endometrium  7/10/2014     Procedure: LASER CO2 LAPAROSCOPIC VAPORIZATION ENDOMETRIUM;  Surgeon: Sabas Patel MD;  Location: [facility]     Laparoscopic lysis adhesions  7/10/2014     Procedure: LAPAROSCOPIC LYSIS ADHESIONS;  Surgeon: Sabas Patel MD;  Location: [facility]     Laser co2 laparoscopic vaporization endometrium N/A 2015     Procedure: LASER CO2 " LAPAROSCOPIC VAPORIZATION ENDOMETRIUM;  Surgeon: Sabas Patel MD;  Location: [facility]     Laser co2 laparoscopy diagnostic, lysis adhesions, combined N/A 6/30/2015     Procedure: COMBINED LASER CO2 LAPAROSCOPY DIAGNOSTIC, LYSIS ADHESIONS;  Surgeon: Sabas Patel MD;  Location: [facility]     Dilate cervix, hysteroscopy, ablate endometrium novasure, combined N/A 11/17/2016     Procedure: COMBINED DILATE CERVIX, HYSTEROSCOPY, ABLATE ENDOMETRIUM NOVASURE;  Surgeon: Sabas Patel MD;  Location: [facility]     Gyn surgery  dates above     myomectomy x2, laparoscopy x1 (soon to be 2)       FAMILY HISTORY:   Family History   Problem Relation Age of Onset     Depression Mother      CANCER Mother      Cervical      Other - See Comments Mother      Fibromyalgia     Crohn Disease Mother      Obesity Mother      Ovarian Cancer Mother      Cervical Cancer     Arthritis Father      Rheumatoid Arthritis Father      Depression Father      CEREBROVASCULAR DISEASE Paternal Grandmother      Breast Cancer Maternal Grandmother      Depression Brother      Depression Sister      MENTAL ILLNESS Brother      schizophrenia and bipolar       SOCIAL HISTORY:   Social History Questions Reviewed With Patient 2/13/2017   Which best describes your employment status (select all that apply) I work full-time   If you work, 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 some college    Which best describes your marital status:    Use of some drugs can affect your ability to lose or gain weight.  Have you used any of the following in the past? prescription narcotics   Have you used any of the following in the past? cigarettes   If you answered yes to the use of nicotine products above, what year did you quit?  2013   How often do you drink alcohol? Monthly or less   If you do drink alcohol, how many drinks might you have in a day? (one drink = 5 oz. wine, 1 can/bottle of beer, 1 shot liquor) 1 or 2   No hx of drug  or alcohol abuse    PSYCHOLOGICAL HISTORY:   Psychological History Reviewed With Patient 2/13/2017   Have you ever attempted suicide? Never.   Have you had thoughts of suicide in the past year? No   Have you ever been hospitalized for mental illness or a suicide attempt? Never.   Do you have a history of chronic pain? Yes   Have you ever been diagnosed with fibromyalgia? Yes   Do you feel you have adequate support from family and/or friends before and after surgery? Yes   Diagnosed with fibromyalgia by rheumatologist in December 2016.  She tried gapapentin and didn't work.  She is now on lyrica which is helping.  Chronic abdominal pain due to endometriosis, resolved since last surgery in November 2016.    Review of Systems     Constitutional:  Positive for fatigue. Negative for fever, chills, weight loss, weight gain, decreased appetite, night sweats, recent stressors, height loss, post-operative complications, incisional pain, hallucinations, increased energy, hyperactivity and confused.   HENT:  Negative for ear pain, hearing loss, tinnitus, nosebleeds, trouble swallowing, hoarse voice, mouth sores, sore throat, ear discharge, tooth pain, gum tenderness, taste disturbance, smell disturbance, hearing aid, bleeding gums, dry mouth, sinus pain, sinus congestion and neck mass.    Eyes:  Negative for double vision, pain, redness, eye pain, decreased vision, eye watering, eye bulging, eye dryness, flashing lights, spots, floaters, strabismus, tunnel vision, jaundice and eye irritation.   Respiratory:   Positive for snores loudly. Negative for cough, hemoptysis, shortness of breath, wheezing, sleep disturbances due to breathing, respiratory pain, dyspnea on exertion, cough disturbing sleep and postural dyspnea.    Cardiovascular:  Negative for chest pain, dyspnea on exertion, palpitations, orthopnea, claudication, leg swelling, fingers/toes turn blue, hypertension, hypotension, syncope, history of heart murmur, chest  pain on exertion, chest pain at rest, pacemaker, few scattered varicosities, leg pain, sleep disturbances due to breathing, tachycardia, light-headedness, exercise intolerance and edema.   Gastrointestinal:  Negative for heartburn, nausea, vomiting, abdominal pain, diarrhea, constipation, blood in stool, melena, rectal pain, bloating, hemorrhoids, bowel incontinence, jaundice, rectal bleeding, coffee ground emesis and change in stool.   Genitourinary:  Positive for decreased libido. Negative for bladder incontinence, dysuria, urgency, hematuria, flank pain, vaginal discharge, difficulty urinating, genital sores, dyspareunia, nocturia, voiding less frequently, arousal difficulty, abnormal vaginal bleeding, excessive menstruation, menstrual changes, hot flashes, vaginal dryness and postmenopausal bleeding.   Musculoskeletal:  Negative for myalgias, back pain, joint swelling, arthralgias, stiffness, muscle cramps, neck pain, bone pain, muscle weakness and fracture.   Skin:  Negative for nail changes, itching, poor wound healing, rash, hair changes, skin changes, acne, warts, poor wound healing, scarring, flaky skin, Raynaud's phenomenon, sensitivity to sunlight and skin thickening.   Neurological:  Negative for dizziness, tingling, tremors, speech change, seizures, loss of consciousness, weakness, light-headedness, numbness, headaches, disturbances in coordination, extremity numbness, memory loss, difficulty walking and paralysis.   Endo/Heme:  Negative for anemia, swollen glands and bruises/bleeds easily.   Psychiatric/Behavioral:  Negative for depression, hallucinations, memory loss, decreased concentration, mood swings and panic attacks.    Breast:  Negative for breast discharge, breast mass, breast pain and nipple retraction.   Endocrine:  Negative for altered temperature regulation, polyphagia, polydipsia, unwanted hair growth and change in facial hair.    MEDICATIONS:  Current Outpatient Prescriptions  "  Medication     LYRICA 75 MG capsule     LYRICA 100 MG capsule     buPROPion (WELLBUTRIN XL) 300 MG 24 hr tablet     FLUoxetine (PROZAC) 20 MG capsule     cyclobenzaprine (FLEXERIL) 5 MG tablet     ASPIRIN PO     traZODone (DESYREL) 50 MG tablet     etonogestrel-ethinyl estradiol (NUVARING) 0.12-0.015 MG/24HR vaginal ring     No current facility-administered medications for this visit.      ALLERGIES:  No Known Allergies    /88  Pulse 104  Temp 98.7  F (37.1  C) (Oral)  Ht 5' 3.98\"  Wt 291 lb 12.8 oz  LMP  (LMP Unknown)  SpO2 97%  BMI 50.12 kg/m2    PHYSICAL EXAM:  Neurological: A & O x 3  Eyes: PERRL  ENT: mucous membranes moist  Skin: warm and dry  Musculoskeletal: gait intact  Respiratory: Respirations unlabored  Abdomen: obese soft NT ND    In summary, Ashli Gamboa is a morbidly obese 37 year old female, whose Body mass index is 50.12 kg/(m^2)..  She is interested in Gastric Sleeve with Dr. Schmidt.  She appears to be a possible candidate for weight loss surgery pending completion of the following pre-requisites.      See Dr Schmidt in 1 month for meet and greet visit due to chronic pain(fibromyalgia) and chronic abdominal pain(endometriosis-pain resolved since November 2016)    PRE-OPERATIVE WEIGHT LOSS SURGERY TASKLIST  Call Kaylee at [phone] for any questions regarding tasks on this list  Tentative surgery: Sleeve  Approximate Month and Year: May 2017    Surgeon: Roxana  Tentative Insurance Coverage: [employer]  Exclusions: none  _x___Seminar viewed.   _x___Research Consent Form signed.    _x___Registered on [employer].    _x___Received Decision Making Handout to read.   _x___Received information about Support Group, 1st Wednesday of the month at the [facility], 6:30p - 8:00pm.  ____Letter of support from primary care provider.   Please complete before your 2nd dietician visit if possible.  ____Labs to be ordered today.  ____Lose 10 lbs prior to surgery from the initial consult weight of 291 lbs "   ____Exercise goal: 20 mins., 5 days a week; increase as tolerated.   _x___Dietician visit at initial consult           ____Dietician visit month 2   ____Dietician visit month 3   ____Psychologist evaluation (If you would like to be scheduled with Dr Casanova, call [facility] at [phone])  ____Rheumatology clearance due to fibromyalgia.  The week before you meet with the surgeon, watch the following online classes and call DIANNA Page at [phone] to inform her that you watched them and to get any questions answered:  _____Before Your Weight Loss Surgery Online Class at [employer]/[email or web address]  _____After Your Weight Loss Surgery Online Class at [employer]/afterwlsclass  _____See your surgeon Dr Schmidt in 1 month for meet and greet visit due to chronic pain(fibromyalgia) and chronic abdominal pain(endometriosis-pain resolved since November 2016)  _____After your visit with the surgeon, call Segun at [phone] to finalize the surgery date and schedule your final appointments to be 3 weeks before your surgery date.     FINAL APPOINTMENTS  _____Weigh-in/Nurse Visit at Clinic 1E, 3 weeks before surgery.    Plan to be at your goal weight for this visit.  This can be done the same day you meet the surgeon if you are at your goal weight.   _____Pre-assessment Clinic, to be done 3 weeks before surgery. Meet with the anesthesia team. Your pre-operative history and physical can be done at this visit.    DISCLAIMER: Based on the evaluation results, the bariatric team decides whether and which bariatric surgery is the best option for you. Sometimes, even if you meet all of the pre-operative criteria, the bariatric team may still determine that in their medical judgement surgery is not recommended because of the risks to you.    CONTACT INFORMATION  ______If questions prior to surgery, call DIANNA Page at [phone].   ______If questions about insurance or scheduling surgery, call Segun at        [phone].  ______If  questions after surgery and to schedule 1E clinic appts, please call our Call Center at [phone].  ______Fax: [phone] (preoperative documents, FMLA or return to work forms).          Sincerely,    Yu Evans PA-C    I spent a total of 30 minutes face to face with Ashli Gamboa during today's office visit.  Over 50% of this time was spent counseling the patient and/or coordinating care.

## 2017-02-22 NOTE — MR AVS SNAPSHOT
After Visit Summary   2/22/2017    Ashli Gamboa    MRN: [id number]           Patient Information     Date Of Birth          1980        Visit Information        Provider Department      2/22/2017 9:30 AM Yu Evans PA-C [facility]        Today's Diagnoses     Morbid obesity, unspecified obesity type (H)    -  1      Care Instructions    See dietitian in 1 month  Labs today on first floor    PRE-OPERATIVE WEIGHT LOSS SURGERY TASKLIST  Call Kaylee at [phone] for any questions regarding tasks on this list  Tentative surgery: Sleeve  Approximate Month and Year: May 2017    Surgeon: Roxana Felipe Insurance Coverage: [employer]  Exclusions: none  _x___Seminar viewed.   _x___Research Consent Form signed.    _x___Registered on [employer].    _x___Received Decision Making Handout to read.   _x___Received information about Support Group, 1st Wednesday of the month at the [facility], 6:30p - 8:00pm.  ____Letter of support from primary care provider.   Please complete before your 2nd dietician visit if possible.  ____Labs to be ordered today.  ____Lose 10 lbs prior to surgery from the initial consult weight of 291 lbs   ____Exercise goal: 20 mins., 5 days a week; increase as tolerated.   _x___Dietician visit at initial consult           ____Dietician visit month 2   ____Dietician visit month 3   ____Psychologist evaluation (If you would like to be scheduled with Dr Casanova, nydia Cast at [phone])  ____Rheumatology clearance due to fibromyalgia.  The week before you meet with the surgeon, watch the following online classes and call DIANNA Page at [phone] to inform her that you watched them and to get any questions answered:  _____Before Your Weight Loss Surgery Online Class at [email or web address]  _____After Your Weight Loss Surgery Online Class at [email or web address]  _____See your surgeon Dr Schmidt in 1 month for meet and greet visit due to  chronic pain(fibromyalgia) and chronic abdominal pain(endometriosis-pain resolved since November 2016)  _____After your visit with the surgeon, call Segun at [phone] to finalize the surgery date and schedule your final appointments to be 3 weeks before your surgery date.     FINAL APPOINTMENTS  _____Weigh-in/Nurse Visit at Clinic 1E, 3 weeks before surgery.    Plan to be at your goal weight for this visit.  This can be done the same day you meet the surgeon if you are at your goal weight.   _____Pre-assessment Clinic, to be done 3 weeks before surgery. Meet with the anesthesia team. Your pre-operative history and physical can be done at this visit.    DISCLAIMER: Based on the evaluation results, the bariatric team decides whether and which bariatric surgery is the best option for you. Sometimes, even if you meet all of the pre-operative criteria, the bariatric team may still determine that in their medical judgement surgery is not recommended because of the risks to you.    CONTACT INFORMATION  ______If questions prior to surgery, call DIANNA Page at [phone].   ______If questions about insurance or scheduling surgery, call Segun at        [phone].  ______If questions after surgery and to schedule 1E clinic appts, please call our Call Center at [phone].  ______Fax: [phone] (preoperative documents, FMLA or return to work forms).          Follow-ups after your visit        Your next 10 appointments already scheduled     Feb 22, 2017 10:00 AM CST   NUTRITION VISIT with Veronika Morrison RD   [facility] ([facility])    [address] [address] [phone]            Feb 23, 2017  3:00 PM CST   New Sleep Patient with Uziel Salazar MD   [facility] ([facility])    [address] [place] [address]   [phone]            Mar 01, 2017  9:15 AM CST  "  SHORT with Sabas Patel MD   [facility] ([facility])    [address] [address] [phone]              Who to contact     Please call your clinic at [phone] to:    Ask questions about your health    Make or cancel appointments    Discuss your medicines    Learn about your test results    Speak to your doctor   If you have compliments or concerns about an experience at your clinic, or if you wish to file a complaint, please contact [facility] Patient Relations at [phone] or email us at [email or web address]         Additional Information About Your Visit        [employer] Information     [employer] gives you secure access to your electronic health record. If you see a primary care provider, you can also send messages to your care team and make appointments. If you have questions, please call your primary care clinic.  If you do not have a primary care provider, please call [phone] and they will assist you.      [employer] is an electronic gateway that provides easy, online access to your medical records. With [employer], you can request a clinic appointment, read your test results, renew a prescription or communicate with your care team.     To access your existing account, please contact your [facility] or call [phone] for assistance.        Care EveryWhere ID     This is your Care EveryWhere ID. This could be used by other organizations to access your [place] medical records  [id number]        Your Vitals Were     Pulse Temperature Height Last Period Pulse Oximetry BMI (Body Mass Index)    104 98.7  F (37.1  C) (Oral) 5' 3.98\" (LMP Unknown) 97% 50.12 kg/m2       Blood Pressure from Last 3 Encounters:   02/22/17 154/88   01/23/17 120/86   12/07/16 130/86    Weight from Last 3 Encounters:   02/22/17 291 lb 12.8 oz   01/23/17 292 lb 2 oz   12/07/16 275 lb " 3.2 oz              We Performed the Following     CBC with platelets     Comprehensive metabolic panel     Parathyroid Hormone Intact     Vitamin D Deficiency        Primary Care Provider Office Phone # Fax #    DANIELE Osorio [facility] [phone]       [facility] [address]  [facility] [address]        Thank you!     Thank you for choosing [facility]  for your care. Our goal is always to provide you with excellent care. Hearing back from our patients is one way we can continue to improve our services. Please take a few minutes to complete the written survey that you may receive in the mail after your visit with us. Thank you!             Your Updated Medication List - Protect others around you: Learn how to safely use, store and throw away your medicines at www.disposemymeds.org.          This list is accurate as of: 2/22/17  9:39 AM.  Always use your most recent med list.                   Brand Name Dispense Instructions for use    ASPIRIN PO      Take 325 mg by mouth daily as needed for moderate pain       buPROPion 300 MG 24 hr tablet    WELLBUTRIN XL    90 tablet    Take 1 tablet (300 mg) by mouth every morning       cyclobenzaprine 5 MG tablet    FLEXERIL    90 tablet    Take 1 tablet (5 mg) by mouth 3 times daily as needed for muscle spasms .  Initially only take at night.       etonogestrel-ethinyl estradiol 0.12-0.015 MG/24HR vaginal ring    NUVARING    4 each    Place 1 each vaginally every 28 days       FLUoxetine 20 MG capsule    PROzac    90 capsule    Take 1 capsule (20 mg) by mouth daily       * LYRICA 75 MG capsule   Generic drug:  pregabalin          * LYRICA 100 MG capsule   Generic drug:  pregabalin          traZODone 50 MG tablet    DESYREL    30 tablet    Take 1 tablet (50 mg) by mouth nightly as needed for sleep       * Notice:  This list has 2 medication(s) that are the same as other medications prescribed for you. Read the  directions carefully, and ask your doctor or other care provider to review them with you.

## 2017-02-22 NOTE — NURSING NOTE
"(   Chief Complaint   Patient presents with     Consult     NBS    )    ( Weight: 291 lb 12.8 oz )  ( Height: 5' 3.98\" )  ( BMI (Calculated): 50.23 )  ( Seminar Weight: 291 lb 12.8 oz )  ( Seminar Wt Minus Current Wt (lbs): 0 )  (   )  (   )  (   )  (   )    ( BP: 154/88 )  ( Site: Arm, upper left )  ( Temp: 98.7  F (37.1  C) )  ( Temp src: Oral )  ( Pulse: 104 )  (   )  ( SpO2: 97 % )    (   Patient Active Problem List   Diagnosis     Morbid obesity (H)     post traumatic Arthritis of big toe     Major depression, recurrent (H)     CARDIOVASCULAR SCREENING; LDL GOAL LESS THAN 160     Endometriosis     Fibromyalgia     Muscular deconditioning    )  (   Current Outpatient Prescriptions   Medication Sig Dispense Refill     LYRICA 75 MG capsule   0     LYRICA 100 MG capsule   0     buPROPion (WELLBUTRIN XL) 300 MG 24 hr tablet Take 1 tablet (300 mg) by mouth every morning 90 tablet 1     FLUoxetine (PROZAC) 20 MG capsule Take 1 capsule (20 mg) by mouth daily 90 capsule 1     cyclobenzaprine (FLEXERIL) 5 MG tablet Take 1 tablet (5 mg) by mouth 3 times daily as needed for muscle spasms .  Initially only take at night. 90 tablet 2     ASPIRIN PO Take 325 mg by mouth daily as needed for moderate pain       traZODone (DESYREL) 50 MG tablet Take 1 tablet (50 mg) by mouth nightly as needed for sleep 30 tablet 0     etonogestrel-ethinyl estradiol (NUVARING) 0.12-0.015 MG/24HR vaginal ring Place 1 each vaginally every 28 days 4 each 4    )  ( Diabetes Eval:    )    ( Pain Eval:  Data Unavailable )    ( Wound Eval:       )    (   History   Smoking Status     Former Smoker     Packs/day: 0.50     Years: 5.00     Types: Cigarettes     Start date: 1/2/2007     Quit date: 11/1/2013   Smokeless Tobacco     Never Used     Comment: stopped for about 6 months now as of 5/23/13    )    ( Signed By:  Freddie Yanez; February 22, 2017; 9:25 AM )    "

## 2017-02-24 ENCOUNTER — OFFICE VISIT (OUTPATIENT)
Dept: SLEEP MEDICINE | Facility: CLINIC | Age: 37
End: 2017-02-24
Payer: COMMERCIAL

## 2017-02-24 VITALS
HEIGHT: 64 IN | DIASTOLIC BLOOD PRESSURE: 78 MMHG | TEMPERATURE: 98.1 F | BODY MASS INDEX: 50.02 KG/M2 | OXYGEN SATURATION: 97 % | WEIGHT: 293 LBS | SYSTOLIC BLOOD PRESSURE: 128 MMHG | HEART RATE: 87 BPM

## 2017-02-24 DIAGNOSIS — R06.83 SNORING: ICD-10-CM

## 2017-02-24 DIAGNOSIS — E66.01 OBESITY, MORBID, BMI 50 OR HIGHER (H): ICD-10-CM

## 2017-02-24 DIAGNOSIS — G47.30 OBSERVED SLEEP APNEA: Primary | ICD-10-CM

## 2017-02-24 DIAGNOSIS — G47.19 EXCESSIVE DAYTIME SLEEPINESS: ICD-10-CM

## 2017-02-24 PROCEDURE — 99244 OFF/OP CNSLTJ NEW/EST MOD 40: CPT | Performed by: INTERNAL MEDICINE

## 2017-02-24 NOTE — PATIENT INSTRUCTIONS

## 2017-02-24 NOTE — MR AVS SNAPSHOT
After Visit Summary   2/24/2017    Ashli Gamboa    MRN: [id number]           Patient Information     Date Of Birth          1980        Visit Information        Provider Department      2/24/2017 1:00 PM Uziel Salazar MD [facility] [place]        Today's Diagnoses     Observed sleep apnea    -  1    Snoring        Excessive daytime sleepiness        Obesity, morbid, BMI 50 or higher (H)          Care Instructions      Your BMI is Body mass index is 50.91 kg/(m^2).  Weight management is a personal decision.  If you are interested in exploring weight loss strategies, the following discussion covers the approaches that may be successful. Body mass index (BMI) is one way to tell whether you are at a healthy weight, overweight, or obese. It measures your weight in relation to your height.  A BMI of 18.5 to 24.9 is in the healthy range. A person with a BMI of 25 to 29.9 is considered overweight, and someone with a BMI of 30 or greater is considered obese. More than two-thirds of American adults are considered overweight or obese.  Being overweight or obese increases the risk for further weight gain. Excess weight may lead to heart disease and diabetes.  Creating and following plans for healthy eating and physical activity may help you improve your health.  Weight control is part of healthy lifestyle and includes exercise, emotional health, and healthy eating habits. Careful eating habits lifelong are the mainstay of weight control. Though there are significant health benefits from weight loss, long-term weight loss with diet alone may be very difficult to achieve- studies show long-term success with dietary management in less than 10% of people. Attaining a healthy weight may be especially difficult to achieve in those with severe obesity. In some cases, medications, devices and surgical management might be considered.  What can you do?  If you are overweight or obese and are  interested in methods for weight loss, you should discuss this with your provider.     Consider reducing daily calorie intake by 500 calories.     Keep a food journal.     Avoiding skipping meals, consider cutting portions instead.    Diet combined with exercise helps maintain muscle while optimizing fat loss. Strength training is particularly important for building and maintaining muscle mass. Exercise helps reduce stress, increase energy, and improves fitness. Increasing exercise without diet control, however, may not burn enough calories to loose weight.       Start walking three days a week 10-20 minutes at a time    Work towards walking thirty minutes five days a week     Eventually, increase the speed of your walking for 1-2 minutes at time    In addition, we recommend that you review healthy lifestyles and methods for weight loss available through the National Institutes of Health patient information sites:  http://win.niddk.nih.gov/publications/index.htm    And look into health and wellness programs that may be available through your health insurance provider, employer, local community center, or sheela club.    Weight management plan: Patient was referred to their PCP to discuss a diet and exercise plan.            Follow-ups after your visit        Your next 10 appointments already scheduled     Mar 01, 2017  9:15 AM CST   SHORT with Sabas Patel MD   [facility] ([facility])    [address]  [place] [address]   [phone]            Mar 08, 2017  8:30 PM CST   PSG DIAGNOSTIC W/TCM with BED 5 SH SLEEP   [facility] ([facility])    [address]  [place] [address]   [phone]            Mar 24, 2017 12:30 PM CDT   Return Sleep Patient with Uziel Salazar MD   [facility] ([facility])    [address]  [place]  [address]   [phone]            Mar 27, 2017  8:30 AM CDT   NUTRITION VISIT with Veronika Morrison RD   [facility] ([facility])    [address] [place] [id number] [phone]            Mar 27, 2017  9:20 AM CDT   (Arrive by 9:05 AM)   Pre Bariatric Surgery with Sam Schmidt MD   [facility] ([facility])    [address] [place] [id number] [phone]              Future tests that were ordered for you today     Open Future Orders        Priority Expected Expires Ordered    Comprehensive Sleep Study Routine  8/23/2017 2/24/2017            Who to contact     If you have questions or need follow up information about today's clinic visit or your schedule please contact [facility] directly at [phone].  Normal or non-critical lab and imaging results will be communicated to you by Showkickerhart, letter or phone within 4 business days after the clinic has received the results. If you do not hear from us within 7 days, please contact the clinic through FloTime or phone. If you have a critical or abnormal lab result, we will notify you by phone as soon as possible.  Submit refill requests through FloTime or call your pharmacy and they will forward the refill request to us. Please allow 3 business days for your refill to be completed.          Additional Information About Your Visit        [email or web address] Information     FloTime gives you secure access to your electronic health record. If you see a primary care provider, you can also send messages to your care team and make appointments. If you have questions, please call your primary care clinic.  If you do not have a primary care provider, please call [phone] and they will assist you.        Care EveryWhere ID     This is your Care EveryWhere ID. This could be used by other  "organizations to access your [facility] medical records  [id number]        Your Vitals Were     Pulse Temperature Height Pulse Oximetry BMI (Body Mass Index)       87 98.1  F (36.7  C) (Oral) 1.626 m (5' 4\") 97% 50.91 kg/m2        Blood Pressure from Last 3 Encounters:   02/24/17 128/78   02/22/17 154/88   01/23/17 120/86    Weight from Last 3 Encounters:   02/24/17 134.5 kg (296 lb 9.6 oz)   02/22/17 132.4 kg (291 lb 12.8 oz)   01/23/17 132.5 kg (292 lb 2 oz)               Primary Care Provider Office Phone # Fax #    Karena August DANIELE Mo [facility] [phone]       [address] [address]        Thank you!     Thank you for choosing [facility]  for your care. Our goal is always to provide you with excellent care. Hearing back from our patients is one way we can continue to improve our services. Please take a few minutes to complete the written survey that you may receive in the mail after your visit with us. Thank you!             Your Updated Medication List - Protect others around you: Learn how to safely use, store and throw away your medicines at www.disposemymeds.org.          This list is accurate as of: 2/24/17  2:04 PM.  Always use your most recent med list.                   Brand Name Dispense Instructions for use    ASPIRIN PO      Take 325 mg by mouth daily as needed for moderate pain       buPROPion 300 MG 24 hr tablet    WELLBUTRIN XL    90 tablet    Take 1 tablet (300 mg) by mouth every morning       cyclobenzaprine 5 MG tablet    FLEXERIL    90 tablet    Take 1 tablet (5 mg) by mouth 3 times daily as needed for muscle spasms .  Initially only take at night.       etonogestrel-ethinyl estradiol 0.12-0.015 MG/24HR vaginal ring    NUVARING    4 each    Place 1 each vaginally every 28 days       FLUoxetine 20 MG capsule    PROzac    90 capsule    Take 1 capsule (20 mg) by mouth daily       * LYRICA 75 MG capsule   Generic drug:  " pregabalin          * LYRICA 100 MG capsule   Generic drug:  pregabalin          traZODone 50 MG tablet    DESYREL    30 tablet    Take 1 tablet (50 mg) by mouth nightly as needed for sleep       * Notice:  This list has 2 medication(s) that are the same as other medications prescribed for you. Read the directions carefully, and ask your doctor or other care provider to review them with you.

## 2017-02-24 NOTE — NURSING NOTE
"Chief Complaint   Patient presents with     Sleep Problem     consult       Initial /78 (BP Location: Right arm, Patient Position: Chair, Cuff Size: Adult Large)  Pulse 87  Temp 98.1  F (36.7  C) (Oral)  Ht 1.626 m (5' 4\")  Wt 134.5 kg (296 lb 9.6 oz)  SpO2 97%  BMI 50.91 kg/m2 Estimated body mass index is 50.91 kg/(m^2) as calculated from the following:    Height as of this encounter: 1.626 m (5' 4\").    Weight as of this encounter: 134.5 kg (296 lb 9.6 oz).  Medication Reconciliation: complete   Neck circumference:32 cm  Ess:16    Aniyah Khoury MA  [facility]        "

## 2017-02-24 NOTE — PROGRESS NOTES
Sleep Consultation:    Date on this visit: 2/24/2017    Ashli Gamboa is sent by  Radha Palm MD for a sleep consultation regarding sleep apnea.    Primary Physician: Karena Mo     Chief complaint:  Snoring, witnessed apneas    Presenting history:     Ashli Gamboa is a 37 years old female with medical history significant for fibromyalgia and depression who has been referred from her pain clinic for  Sleep consultation. Patient gives a history of snoring and witnessed apneas that started 5 months ago. She has had weight gain during the last 6 months which seems to have precipitated these symptoms. Patient thinks that the onset of her symptoms coincided with having hysterectomy and polypectomy in October last year. Her  has witnessed apneas in her sleep.     Patient has a long standing history of daytime fatigue and non restorative sleep. According to her, she used to cope with psychological stress in her childhood by sleeping. She has recurrent depression which is managed by a combination of Bupropion and Fluoxetine.     She can fall asleep easily in sedentary situations. She rated her [place] score at 16. She takes a nap at mid day for half an hour which helps her to get through the rest of her day. She has one cup of coffee in the morning. She denies any impairment while driving or with her work because of drowsiness.     She had previous sleep evaluation through [facility], she saw Dr. Carlos Hein.     Previous sleep studies were reviewed. PSG from 2/5/2013 showed apnea hypopnea index of 1.5 per hour and RDI of 3.5 per hour.  There was 409 minutes of sleep with REM latency of 67.5 minutes. Lowest O2 saturation was 81%. There was no significant hypoxemia with only 0.3 minutes with saturation less than 89%. Patient weighed 250 lbs. No PLMs were reported.     An MSLT on next day showed mean sleep latency of less than 2 minutes and one SOREM.      She did not  have any further sleep follow up.     Patient denies any difficulty initiating or maintaining sleep. She goes to bed at 9 pm and falls asleep within a few minutes. She estimates 2-3 arousals in the night when she changes position and immediately returns to sleep. On weekdays, she wakes up between 5:30 am to 6 am. On weekends, she sleeps until 7 am.  She feels tired on waking up and occasionally has morning headaches, which occurs once a week.     She denies restless leg symptoms. She has a habit of soothing herself by moving her legs but denies the restless urge to move legs characteristic of restless legs. However, her  has made her aware of excessive leg movements in sleep. There is no history of dream enactment behavior or other parasomnias.     She experiences hypnagogic hallucinations intemittently. This includes experience of an object moving closer to her at sleep onset. She denies cataplexy or sleep paralysis.     She has one cup of coffee daily.     She denies nay significant shortness of breath.       Allergies:    No Known Allergies    Medications:    Current Outpatient Prescriptions   Medication Sig Dispense Refill     LYRICA 75 MG capsule   0     LYRICA 100 MG capsule   0     buPROPion (WELLBUTRIN XL) 300 MG 24 hr tablet Take 1 tablet (300 mg) by mouth every morning 90 tablet 1     FLUoxetine (PROZAC) 20 MG capsule Take 1 capsule (20 mg) by mouth daily 90 capsule 1     cyclobenzaprine (FLEXERIL) 5 MG tablet Take 1 tablet (5 mg) by mouth 3 times daily as needed for muscle spasms .  Initially only take at night. 90 tablet 2     ASPIRIN PO Take 325 mg by mouth daily as needed for moderate pain       traZODone (DESYREL) 50 MG tablet Take 1 tablet (50 mg) by mouth nightly as needed for sleep 30 tablet 0     etonogestrel-ethinyl estradiol (NUVARING) 0.12-0.015 MG/24HR vaginal ring Place 1 each vaginally every 28 days 4 each 4       Problem List:  Patient Active Problem List    Diagnosis Date Noted      Muscular deconditioning 02/06/2017     Priority: Medium     Fibromyalgia 12/07/2016     Priority: Medium     Endometriosis 06/10/2015     Priority: Medium     CARDIOVASCULAR SCREENING; LDL GOAL LESS THAN 160 12/26/2014     Priority: Medium     Major depression, recurrent (H) 11/26/2014     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     Morbid obesity (H) 11/27/2012        Past Medical/Surgical History:  Past Medical History   Diagnosis Date     Arthritis      BIG TOE     Depressive disorder 2007     I have been on and off medication for the last several years     Endometriosis      Obese      Past Surgical History   Procedure Laterality Date     Abdomen surgery       laparoscopy X2     Laser co2 laparoscopic vaporization endometrium  7/10/2014     Procedure: LASER CO2 LAPAROSCOPIC VAPORIZATION ENDOMETRIUM;  Surgeon: Sabas Patel MD;  Location: [facility]     Laparoscopic lysis adhesions  7/10/2014     Procedure: LAPAROSCOPIC LYSIS ADHESIONS;  Surgeon: Sabas Patel MD;  Location: [facility]     Laser co2 laparoscopic vaporization endometrium N/A 6/30/2015     Procedure: LASER CO2 LAPAROSCOPIC VAPORIZATION ENDOMETRIUM;  Surgeon: Sabas Patel MD;  Location: [facility]     Laser co2 laparoscopy diagnostic, lysis adhesions, combined N/A 6/30/2015     Procedure: COMBINED LASER CO2 LAPAROSCOPY DIAGNOSTIC, LYSIS ADHESIONS;  Surgeon: Sabas Patel MD;  Location: [facility]     Dilate cervix, hysteroscopy, ablate endometrium novasure, combined N/A 11/17/2016     Procedure: COMBINED DILATE CERVIX, HYSTEROSCOPY, ABLATE ENDOMETRIUM NOVASURE;  Surgeon: Sabas Patel MD;  Location: [facility]     Gyn surgery  dates above     myomectomy x2, laparoscopy x1 (soon to be 2)       Social History:  Social History     Social History     Marital status:      Spouse name: N/A     Number of children: N/A     Years of education: N/A     Occupational History           [employer]     Social History Main Topics      Smoking status: Former Smoker     Packs/day: 0.50     Years: 5.00     Types: Cigarettes     Start date: 1/2/2007     Quit date: 11/1/2013     Smokeless tobacco: Never Used      Comment: stopped for about 6 months now as of 5/23/13     Alcohol use 0.0 oz/week     0 Standard drinks or equivalent per week      Comment: occas     Drug use: No     Sexual activity: Yes     Partners: Male     Birth control/ protection: Inserts/Ring     Other Topics Concern     Parent/Sibling W/ Cabg, Mi Or Angioplasty Before 65f 55m? No     Social History Narrative       Family History:  Family History   Problem Relation Age of Onset     Depression Mother      CANCER Mother      Cervical      Other - See Comments Mother      Fibromyalgia     Crohn Disease Mother      Obesity Mother      Ovarian Cancer Mother      Cervical Cancer     Arthritis Father      Rheumatoid Arthritis Father      Depression Father      CEREBROVASCULAR DISEASE Paternal Grandmother      Breast Cancer Maternal Grandmother      Depression Brother      Depression Sister      MENTAL ILLNESS Brother      schizophrenia and bipolar       Review of Systems:  A complete review of systems reviewed by me is negative with the exeption of what has been mentioned in the history of present illness.  CONSTITUTIONAL: NEGATIVE for weight gain/loss, fever, chills, sweats or night sweats, drug allergies.  EYES: NEGATIVE for changes in vision, blind spots, double vision.  ENT: NEGATIVE for ear pain, sore throat, sinus pain, post-nasal drip, runny nose, bloody nose  CARDIAC: NEGATIVE for fast heartbeats or fluttering in chest, chest pain or pressure, breathlessness when lying flat, swollen legs or swollen feet.  NEUROLOGIC: NEGATIVE weakness or numbness in the arms or legs.  NEUROLOGIC:  POSITIVE for  headaches  DERMATOLOGIC: NEGATIVE for rashes, new moles or change in mole(s)  PULMONARY: NEGATIVE SOB at rest, SOB with activity, dry cough, productive cough, coughing up blood, wheezing or  "whistling when breathing.    GASTROINTESTINAL: NEGATIVE for nausea or vomitting, loose or watery stools, fat or grease in stools, constipation, abdominal pain, bowel movements black in color or blood noted.  GENITOURINARY: NEGATIVE for pain during urination, blood in urine, urinating more frequently than usual, irregular menstrual periods.  MUSCULOSKELETAL: NEGATIVE for muscle pain, bone or joint pain, swollen joints.  ENDOCRINE: NEGATIVE for increased thirst or urination, diabetes.  LYMPHATIC: NEGATIVE for swollen lymph nodes, lumps or bumps in the breasts or nipple discharge.    Physical Examination:  Vitals: /78 (BP Location: Right arm, Patient Position: Chair, Cuff Size: Adult Large)  Pulse 87  Temp 98.1  F (36.7  C) (Oral)  Ht 1.626 m (5' 4\")  Wt 134.5 kg (296 lb 9.6 oz)  SpO2 97%  BMI 50.91 kg/m2  BMI= Body mass index is 50.91 kg/(m^2).    Neck Cir (cm): 32 cm    [place] Total Score 2/24/2017   Total score - [place] 16       GENERAL APPEARANCE: healthy, alert and no distress  EYES: Eyes grossly normal to inspection, PERRL and conjunctivae and sclerae normal  HENT: ear canals and TM's normal and nose and mouth without ulcers or lesions  NECK: no adenopathy, no asymmetry, masses, or scars and thyroid normal to palpation  RESP: lungs clear to auscultation - no rales, rhonchi or wheezes  CV: regular rates and rhythm, normal S1 S2, no S3 or S4 and no murmur, click or rub  MS: extremities normal- no gross deformities noted  NEURO: Normal strength and tone, mentation intact and speech normal  PSYCH: mentation appears normal and affect normal/bright  Mallampati Class: IV.  Tonsillar Stage: 1  hidden by pillars.    Impression/Plan:    1. Probable  obstructive sleep apnea   2. To rule out sleep related hypoventilation   3. Excessive daytime sleepiness  4. Obesity   5. Depression     - Patient presents with syptoms of loud, frequent snoring, witnessed apneas, non restorative sleep and daytime sleepiness. Oropharynx " is narrow on examination. Although her previous PSG was negative, clinical situation has changed with 46 lbs weight gain since last study and emergence of snoring and witnessed apneas since last PSG.     - Patient is at risk for sleep related hypoventilation considering her weight. Baseline awake O2 saturation is normal today at 97% and last bicarbonate level was 25 on 2/22/2017. She has intermittent morning headache.    - An overnight PSG is recommended for evaluation of sleep disordered breathing. Transcutaneous CO2 monitoring is recommended for assessment of hypoventilation.    - If sleep disordered breathing or another sleep fragmenting condition like periodic limb movements is absent, will reassess her excessive daytime sleepiness. Depression can be a reason for fatigue and sleepiness. Her history indicates regular sleep wake hours and adequate nocturnal sleep. Possible sedating medications in her case includes Flexeril and pregabalin. There is no cataplexy or sleep paralysis. She has intermittent hypnagogic hallucinations.  A central diosrder of hypersomnia is a possibility but further testing with MSLT will need atleast 4 week period without Fluoxetine prior to the test. We will discuss appropriateness of further investigation depending on her PSG result.      Plan:    1. Split night PSG with TCM  2. Follow up after sleep study     Literature provided regarding sleep apnea.      She will follow up with me in approximately two weeks after her sleep study has been competed to review the results and discuss plan of care.       Polysomnography reviewed.  Obstructive sleep apnea reviewed.  Complications of untreated sleep apnea were reviewed.    I spent a total of 60 minutes with patient with chelsie Zhou in counseling regarding sleep apnea and daytime sleepiness.     CC: No ref. provider found

## 2017-03-05 ENCOUNTER — OFFICE VISIT (OUTPATIENT)
Dept: URGENT CARE | Facility: URGENT CARE | Age: 37
End: 2017-03-05
Payer: COMMERCIAL

## 2017-03-05 VITALS
BODY MASS INDEX: 47.8 KG/M2 | TEMPERATURE: 98.3 F | DIASTOLIC BLOOD PRESSURE: 82 MMHG | SYSTOLIC BLOOD PRESSURE: 138 MMHG | WEIGHT: 280 LBS | HEART RATE: 91 BPM | HEIGHT: 64 IN

## 2017-03-05 DIAGNOSIS — R07.0 THROAT PAIN: ICD-10-CM

## 2017-03-05 DIAGNOSIS — J11.1 INFLUENZA-LIKE ILLNESS: Primary | ICD-10-CM

## 2017-03-05 LAB
DEPRECATED S PYO AG THROAT QL EIA: NORMAL
FLUAV+FLUBV AG SPEC QL: NEGATIVE
FLUAV+FLUBV AG SPEC QL: NORMAL
MICRO REPORT STATUS: NORMAL
SPECIMEN SOURCE: NORMAL
SPECIMEN SOURCE: NORMAL

## 2017-03-05 PROCEDURE — 87804 INFLUENZA ASSAY W/OPTIC: CPT | Performed by: FAMILY MEDICINE

## 2017-03-05 PROCEDURE — 87880 STREP A ASSAY W/OPTIC: CPT | Performed by: FAMILY MEDICINE

## 2017-03-05 PROCEDURE — 87081 CULTURE SCREEN ONLY: CPT | Performed by: FAMILY MEDICINE

## 2017-03-05 PROCEDURE — 99213 OFFICE O/P EST LOW 20 MIN: CPT | Performed by: FAMILY MEDICINE

## 2017-03-05 RX ORDER — OSELTAMIVIR PHOSPHATE 75 MG/1
75 CAPSULE ORAL 2 TIMES DAILY
Qty: 10 CAPSULE | Refills: 0 | Status: SHIPPED | OUTPATIENT
Start: 2017-03-05 | End: 2017-03-24

## 2017-03-05 NOTE — MR AVS SNAPSHOT
After Visit Summary   3/5/2017    Ashli Gamboa    MRN: [id number]           Patient Information     Date Of Birth          1980        Visit Information        Provider Department      3/5/2017 10:45 AM Farhan Nam MD [facility]        Today's Diagnoses     Throat pain    -  1    Influenza-like illness          Care Instructions    Take full course of Tamiflu for presumed influenza.  We will contact you if the throat culture is positive for strep.  Okay to take ibuprofen 200 mg - 4 tablets (800 mg) every 8 hours as needed.  Okay to take tylenol 500 mg - 2 tablets (1000 mg) every 6-8 hours as needed, do not exceed 3000 mg in 24 hours.      Viral Pharyngitis (Sore Throat)    You (or your child, if your child is the patient) have pharyngitis (sore throat). This infection is caused by a virus. It can cause throat pain that is worse when swallowing, aching all over, headache, and fever. The infection may be spread by coughing, kissing, or touching others after touching your mouth or nose. Antibiotic medications do not work against viruses, so they are not used for treating this condition.  Home care    If your symptoms are severe, rest at home. Return to work or school when you feel well enough.     Drink plenty of fluids to avoid dehydration.    For children: Use acetaminophen for fever, fussiness or discomfort. In infants over six months of age, you may use ibuprofen instead of acetaminophen. (NOTE: If your child has chronic liver or kidney disease or ever had a stomach ulcer or GI bleeding, talk with your doctor before using these medicines.) (NOTE: Aspirin should never be used in anyone under 18 years of age who is ill with a fever. It may cause severe liver damage.)     For adults: You may use acetaminophen or ibuprofen to control pain or fever, unless another medicine was prescribed for this. (NOTE: If you have chronic liver or kidney disease or ever had a stomach  ulcer or GI bleeding, talk with your doctor before using these medicines.)    Throat lozenges or numbing throat sprays can help reduce pain. Gargling with warm salt water will also help reduce throat pain. For this, dissolve 1/2 teaspoon of salt in 1 glass of warm water. To help soothe a sore throat, children can sip on juice or a popsicle. Children 5 years and older can also suck on a lollipop or hard candy.    Avoid salty or spicy foods, which can be irritating to the throat.  Follow-up care  Follow up with your healthcare provider or our staff if you are not improving over the next week.  When to seek medical advice  Call your healthcare provider right away if any of these occur:    Fever as directed by your doctor.  For children, seek care if:    Your child is of any age and has repeated fevers above 104 F (40 C).    Your child is younger than 2 years of age and has a fever of 100.4 F (38 C) that continues for more than 1 day.    Your child is 2 years old or older and has a fever of 100.4 F (38 C) that continues for more than 3 days.    New or worsening ear pain, sinus pain, or headache    Painful lumps in the back of neck    Stiff neck    Lymph nodes are getting larger    Inability to swallow liquids, excessive drooling, or inability to open mouth wide due to throat pain    Signs of dehydration (very dark urine or no urine, sunken eyes, dizziness)    Trouble breathing or noisy breathing    Muffled voice    New rash    Child appears to be getting sicker    6015-7442 [employer]. [address] [place] [address]. All rights reserved. This information is not intended as a substitute for professional medical care. Always follow your healthcare professional's instructions.        Influenza  Influenza ( the flu ) is an infection that affects your respiratory tract (the mouth, nose, and lungs, and the passages between them). Unlike a cold, the flu can make you very ill. And it can lead to  pneumonia, a serious lung infection. For some people, especially older adults, young children, and people with certain chronic conditions, the flu can have serious complications and even be fatal.  What Are the Risk Factors for the Flu?     Viruses that cause influenza spread through the air in droplets when someone who has the flu coughs, sneezes, laughs, or talks.   Anyone can get the flu. But you re more likely to become infected if you:    Have a weakened immune system.    Work in a health care setting where you may be exposed to flu germs.    Live or work with someone who has the flu.    Haven t received an annual flu shot.  How Does the Flu Spread?  The flu is caused by viruses. The viruses spread through the air in droplets when someone who has the flu coughs, sneezes, laughs, or talks. You can become infected when you inhale these viruses directly. You can also become infected when you touch a surface on which the droplets have landed and then transfer the germs to your eyes, nose, or mouth. Touching used tissues, or sharing utensils, drinking glasses, or a toothbrush with an infected person can expose you to flu viruses, too.  What Are the Symptoms of the Flu?  Flu symptoms tend to come on quickly and may last a few days to a few weeks. They include:    Fever usually higher than 101 F  (38.3 C) and chills    Sore throat and headache    Dry cough    Runny nose    Tiredness and weakness    Muscle aches  Factors That Can Make Flu Worse  For some people, the flu can be very serious. The risk of complications is greater for:    Children under age 5.    Adults 65 years of age and older.    People with a chronic illness, such as diabetes or heart, kidney, or lung disease.    People who live in a nursing home or long-term care facility.   How Is the Flu Treated?  Influenza usually improves after 7 days or so. In some cases, your health care provider may prescribe an antiviral medication. This may help you get well  sooner. For the medication to help, you need to take it as soon as possible (ideally within 48 hours) after your symptoms start. If you develop pneumonia or other serious illness, hospital care may be needed.  Easing Flu Symptoms    Drink lots of fluids such as water, juice, and warm soup. A good rule is to drink enough so that you urinate your normal amount.    Get plenty of rest.    Ask your health care provider what to take for fever and pain.    Call your provider if your fever rises over 101 F (38.3 C) or you become dizzy, lightheaded, or short of breath.  Taking Steps to Protect Others    Wash your hands often, especially after coughing or sneezing. Or, clean your hands with an alcohol-based hand  containing at least 60 percent alcohol.    Cough or sneeze into a tissue. Then throw the tissue away and wash your hands. If you don t have a tissue, cough and sneeze into the crook of your elbow.    Stay home until at least 24 hours after you no longer have a fever or chills. Be sure the fever isn t being hidden by fever-reducing medication.    Don t share food, utensils, drinking glasses, or a toothbrush with others.    Ask your health care provider if others in your household should receive antiviral medication to help them avoid infection.  How Can the Flu Be Prevented?    One of the best ways to avoid the flu is to get a flu vaccination each year. Viruses that cause the flu change from year to year. For that reason, doctors recommend getting the flu vaccine each year, as soon as it's available in your area. The vaccine may be given as a shot or as a nasal spray. Your health care provider can tell you which vaccine is right for you.    Wash your hands often. Frequent handwashing is a proven way to help prevent infection.    Carry an alcohol-based hand gel containing at least 60 percent alcohol. Use it when you don t have access to soap and water. Then wash your hands as soon as you can.    Avoid touching  your eyes, nose, and mouth.    At home and work, clean phones, computer keyboards, and toys often with disinfectant wipes.    If possible, avoid close contact with others who have the flu or symptoms of the flu.  Handwashing Tips  Handwashing is one of the best ways to prevent many common infections. If you re caring for or visiting someone with the flu, wash your hands each time you enter and leave the room. Follow these steps:    Use warm water and plenty of soap. Rub your hands together well.    Clean the whole hand, under your nails, between your fingers, and up the wrists.    Wash for at least 15 seconds.    Rinse, letting the water run down your fingers, not up your wrists.    Dry your hands well. Use a paper towel to turn off the faucet and open the door.  Using Alcohol-Based Hand   Alcohol-based hand  are also a good choice. Use them when you don t have access to soap and water. Follow these steps:    Squeeze about a tablespoon of gel into the palm of one hand.    Rub your hands together briskly, cleaning the backs of your hands, the palms, between your fingers, and up the wrists.    Rub until the gel is gone and your hands are completely dry.  Preventing Influenza in Healthcare Settings  The flu is a special concern for people in hospitals and long-term care facilities. To help prevent the spread of flu, many hospitals and nursing homes take these steps:    Health care providers wash their hands or use an alcohol-based hand  before and after treating each patient.    People with the flu have private rooms and bathrooms or share a room with someone with the same infection.    High-risk patients who don t have the flu are encouraged to get the flu and pneumonia vaccines.    All health care workers are encouraged or required to get flu shots.        8027-6418 [employer]. [address] [place] [address], [place] [address]. All rights reserved. This information is not intended as a  substitute for professional medical care. Always follow your healthcare professional's instructions.              Follow-ups after your visit        Your next 10 appointments already scheduled     Mar 08, 2017  8:30 PM CST   PSG DIAGNOSTIC W/TCM with BED 5 SH SLEEP   [facility] [place] ([facility])    [address]  [facility] [address]   [phone]            Mar 24, 2017 12:30 PM CDT   Return Sleep Patient with Uziel Salazar MD   [facility] [place] ([facility])    [address]  [facility] [address]   [phone]            Mar 27, 2017  8:30 AM CDT   NUTRITION VISIT with Veronika Morrison RD   [facility] ([facility])    [address] [place] [address] [id number] [phone]            Mar 27, 2017  9:20 AM CDT   (Arrive by 9:05 AM)   Pre Bariatric Surgery with Sam Schmidt MD   [facility] ([facility])    [address] [place] [id number] [phone]              Who to contact     If you have questions or need follow up information about today's clinic visit or your schedule please contact [facility] directly at [phone].  Normal or non-critical lab and imaging results will be communicated to you by MyChart, letter or phone within 4 business days after the clinic has received the results. If you do not hear from us within 7 days, please contact the clinic through MyChart or phone. If you have a critical or abnormal lab result, we will notify you by phone as soon as possible.  Submit refill requests through [email or web address] or call your pharmacy and they will forward the refill request to us. Please allow 3 business days for your refill to be completed.          Additional Information About Your Visit        MyChart Information      "[employer] gives you secure access to your electronic health record. If you see a primary care provider, you can also send messages to your care team and make appointments. If you have questions, please call your primary care clinic.  If you do not have a primary care provider, please call [phone] and they will assist you.        Care EveryWhere ID     This is your Care EveryWhere ID. This could be used by other organizations to access your [place] medical records  [id number]        Your Vitals Were     Pulse Temperature Height BMI (Body Mass Index)          91 98.3  F (36.8  C) (Tympanic) 5' 4\" (1.626 m) 48.06 kg/m2         Blood Pressure from Last 3 Encounters:   03/05/17 138/82   02/24/17 128/78   02/22/17 154/88    Weight from Last 3 Encounters:   03/05/17 280 lb (127 kg)   02/24/17 296 lb 9.6 oz (134.5 kg)   02/22/17 291 lb 12.8 oz (132.4 kg)              We Performed the Following     Beta strep group A culture     Influenza A/B antigen     Strep, Rapid Screen          Today's Medication Changes          These changes are accurate as of: 3/5/17 12:44 PM.  If you have any questions, ask your nurse or doctor.               Start taking these medicines.        Dose/Directions    oseltamivir 75 MG capsule   Commonly known as:  TAMIFLU   Used for:  Influenza-like illness   Started by:  Farhan Nam MD        Dose:  75 mg   Take 1 capsule (75 mg) by mouth 2 times daily   Quantity:  10 capsule   Refills:  0            Where to get your medicines      These medications were sent to [id number] - [place], [place] - [address] AT [facility] [address]  [address], [place] [address]     Phone:  [phone]     oseltamivir 75 MG capsule                Primary Care Provider Office Phone # Fax #    Karena DANIELE Palmer [phone]       [facility] [address] [facility] [address]        Thank you!     Thank you for choosing [facility]  for your care. Our goal is always to provide you with excellent care. Hearing back from our patients is one way we can continue to improve our services. Please take a few minutes to complete the written survey that you may receive in the mail after your visit with us. Thank you!             Your Updated Medication List - Protect others around you: Learn how to safely use, store and throw away your medicines at www.disposemymeds.org.          This list is accurate as of: 3/5/17 12:44 PM.  Always use your most recent med list.                   Brand Name Dispense Instructions for use    ASPIRIN PO      Take 325 mg by mouth daily as needed for moderate pain       buPROPion 300 MG 24 hr tablet    WELLBUTRIN XL    90 tablet    Take 1 tablet (300 mg) by mouth every morning       cyclobenzaprine 5 MG tablet    FLEXERIL    90 tablet    Take 1 tablet (5 mg) by mouth 3 times daily as needed for muscle spasms .  Initially only take at night.       etonogestrel-ethinyl estradiol 0.12-0.015 MG/24HR vaginal ring    NUVARING    4 each    Place 1 each vaginally every 28 days       FLUoxetine 20 MG capsule    PROzac    90 capsule    Take 1 capsule (20 mg) by mouth daily       * LYRICA 75 MG capsule   Generic drug:  pregabalin          * LYRICA 100 MG capsule   Generic drug:  pregabalin          oseltamivir 75 MG capsule    TAMIFLU    10 capsule    Take 1 capsule (75 mg) by mouth 2 times daily       traZODone 50 MG tablet    DESYREL    30 tablet    Take 1 tablet (50 mg) by mouth nightly as needed for sleep       * Notice:  This list has 2 medication(s) that are the same as other medications prescribed for you. Read the directions carefully, and ask your doctor or other care provider to review them with you.

## 2017-03-05 NOTE — PROGRESS NOTES
SUBJECTIVE:   Ashli Gamboa is a 37 year old female presenting with a chief complaint of sore throat.  Woke up with sore throat, headache.  Mild cough, endorsed congestion.  Felt hot, cold, sweaty.  Patient did obtain flu vaccination this year.  Onset of symptoms was 2 day(s) ago.  Course of illness is worsening.    Severity moderate  Current and Associated symptoms: fatigue, headache, dizziness  Treatment measures tried include Fluids, OTC meds - theraflu, dayquil and Rest.  Predisposing factors include : close ill contacts.    Past Medical History   Diagnosis Date     Arthritis      BIG TOE     Depressive disorder 2007     I have been on and off medication for the last several years     Endometriosis      Obese      Current Outpatient Prescriptions   Medication Sig Dispense Refill     LYRICA 75 MG capsule   0     LYRICA 100 MG capsule   0     buPROPion (WELLBUTRIN XL) 300 MG 24 hr tablet Take 1 tablet (300 mg) by mouth every morning 90 tablet 1     FLUoxetine (PROZAC) 20 MG capsule Take 1 capsule (20 mg) by mouth daily 90 capsule 1     cyclobenzaprine (FLEXERIL) 5 MG tablet Take 1 tablet (5 mg) by mouth 3 times daily as needed for muscle spasms .  Initially only take at night. 90 tablet 2     ASPIRIN PO Take 325 mg by mouth daily as needed for moderate pain       traZODone (DESYREL) 50 MG tablet Take 1 tablet (50 mg) by mouth nightly as needed for sleep 30 tablet 0     etonogestrel-ethinyl estradiol (NUVARING) 0.12-0.015 MG/24HR vaginal ring Place 1 each vaginally every 28 days 4 each 4     Social History   Substance Use Topics     Smoking status: Former Smoker     Packs/day: 0.50     Years: 5.00     Types: Cigarettes     Start date: 1/2/2007     Quit date: 11/1/2013     Smokeless tobacco: Never Used      Comment: stopped for about 6 months now as of 5/23/13     Alcohol use 0.0 oz/week     0 Standard drinks or equivalent per week      Comment: occas       ROS:  CONSTITUTIONAL:POSITIVE  for chills, malaise,  "myalgias and sweats  INTEGUMENTARY/SKIN: NEGATIVE for worrisome rashes, moles or lesions  ENT/MOUTH: POSITIVE for nasal congestion, sinus pressure and sore throat  RESP:POSITIVE for cough-non productive  CV: NEGATIVE for chest pain, palpitations or peripheral edema  GI: NEGATIVE for nausea, abdominal pain, heartburn, or change in bowel habits  MUSCULOSKELETAL: NEGATIVE for significant arthralgias or myalgia    OBJECTIVE  :/82 (BP Location: Right arm, Patient Position: Chair, Cuff Size: Adult Large)  Pulse 91  Temp 98.3  F (36.8  C) (Tympanic)  Ht 5' 4\" (1.626 m)  Wt 280 lb (127 kg)  BMI 48.06 kg/m2  GENERAL APPEARANCE: healthy, alert and no distress  EYES: EOMI,  PERRL, conjunctiva clear  HENT: ear canals and TM's normal.  Nose and mouth without ulcers, erythema or lesions.  Mild sinus tenderness on percussion  NECK: supple, nontender, no lymphadenopathy  RESP: lungs clear to auscultation - no rales, rhonchi or wheezes  CV: regular rates and rhythm, normal S1 S2, no murmur noted  NEURO: Normal strength and tone, sensory exam grossly normal,  normal speech and mentation  SKIN: no suspicious lesions or rashes  PSYCH: mentation appears normal and affect normal/bright    Results for orders placed or performed in visit on 03/05/17   Strep, Rapid Screen   Result Value Ref Range    Specimen Description Throat     Rapid Strep A Screen       NEGATIVE: No Group A streptococcal antigen detected by immunoassay, await   culture report.      Micro Report Status FINAL 03/05/2017    Influenza A/B antigen   Result Value Ref Range    Influenza A/B Agn Specimen Nasopharyngeal     Influenza A Negative NEG    Influenza B  NEG     Negative   Test results must be correlated with clinical data. If necessary, results   should be confirmed by a molecular assay or viral culture.         ASSESSMENT/PLAN:  (R69) Influenza-like illness  (primary encounter diagnosis)  Plan: oseltamivir (TAMIFLU) 75 MG capsule            (R07.0) Throat " pain  Comment: viral  Plan: Strep, Rapid Screen, Beta strep group A         culture, Influenza A/B antigen            Reviewed labs, discussed symptomatic treatment, plenty of fluids and rest.  Discussed limitation of influenza screening, RX for Tamiflu given for possible influenza infection.  Will follow up on throat culture and treat if positive for strep.    Work excuse note given.  Return to clinic if no resolution of symptoms.    Farhan Nam MD  March 5, 2017 1:22 PM

## 2017-03-05 NOTE — LETTER
[facility]  [address] [facility] [address]  [phone]  Dept: [phone]      3/5/2017    Re: Ashli Gamboa      TO WHOM IT MAY CONCERN:    Ashli Gamboa  was seen on 3/5/2017.  Please excuse her from work 3/5 -3/7 due to illness.      Cordially        Farhan Nam MD  [facility]

## 2017-03-05 NOTE — NURSING NOTE
"Chief Complaint   Patient presents with     Urgent Care     Pharyngitis     c/o sore throat,HA,cough and congestion for 2 days       Initial /82 (BP Location: Right arm, Patient Position: Chair, Cuff Size: Adult Large)  Pulse 91  Temp 98.3  F (36.8  C) (Tympanic)  Ht 5' 4\" (1.626 m)  Wt 280 lb (127 kg)  BMI 48.06 kg/m2 Estimated body mass index is 48.06 kg/(m^2) as calculated from the following:    Height as of this encounter: 5' 4\" (1.626 m).    Weight as of this encounter: 280 lb (127 kg).  Medication Reconciliation: complete   Molly Montez MA    "

## 2017-03-05 NOTE — PATIENT INSTRUCTIONS
Take full course of Tamiflu for presumed influenza.  We will contact you if the throat culture is positive for strep.  Okay to take ibuprofen 200 mg - 4 tablets (800 mg) every 8 hours as needed.  Okay to take tylenol 500 mg - 2 tablets (1000 mg) every 6-8 hours as needed, do not exceed 3000 mg in 24 hours.      Viral Pharyngitis (Sore Throat)    You (or your child, if your child is the patient) have pharyngitis (sore throat). This infection is caused by a virus. It can cause throat pain that is worse when swallowing, aching all over, headache, and fever. The infection may be spread by coughing, kissing, or touching others after touching your mouth or nose. Antibiotic medications do not work against viruses, so they are not used for treating this condition.  Home care    If your symptoms are severe, rest at home. Return to work or school when you feel well enough.     Drink plenty of fluids to avoid dehydration.    For children: Use acetaminophen for fever, fussiness or discomfort. In infants over six months of age, you may use ibuprofen instead of acetaminophen. (NOTE: If your child has chronic liver or kidney disease or ever had a stomach ulcer or GI bleeding, talk with your doctor before using these medicines.) (NOTE: Aspirin should never be used in anyone under 18 years of age who is ill with a fever. It may cause severe liver damage.)     For adults: You may use acetaminophen or ibuprofen to control pain or fever, unless another medicine was prescribed for this. (NOTE: If you have chronic liver or kidney disease or ever had a stomach ulcer or GI bleeding, talk with your doctor before using these medicines.)    Throat lozenges or numbing throat sprays can help reduce pain. Gargling with warm salt water will also help reduce throat pain. For this, dissolve 1/2 teaspoon of salt in 1 glass of warm water. To help soothe a sore throat, children can sip on juice or a popsicle. Children 5 years and older can also suck  on a lollipop or hard candy.    Avoid salty or spicy foods, which can be irritating to the throat.  Follow-up care  Follow up with your healthcare provider or our staff if you are not improving over the next week.  When to seek medical advice  Call your healthcare provider right away if any of these occur:    Fever as directed by your doctor.  For children, seek care if:    Your child is of any age and has repeated fevers above 104 F (40 C).    Your child is younger than 2 years of age and has a fever of 100.4 F (38 C) that continues for more than 1 day.    Your child is 2 years old or older and has a fever of 100.4 F (38 C) that continues for more than 3 days.    New or worsening ear pain, sinus pain, or headache    Painful lumps in the back of neck    Stiff neck    Lymph nodes are getting larger    Inability to swallow liquids, excessive drooling, or inability to open mouth wide due to throat pain    Signs of dehydration (very dark urine or no urine, sunken eyes, dizziness)    Trouble breathing or noisy breathing    Muffled voice    New rash    Child appears to be getting sicker    1690-6892 [employer]. [address] [place] [address]. All rights reserved. This information is not intended as a substitute for professional medical care. Always follow your healthcare professional's instructions.        Influenza  Influenza ( the flu ) is an infection that affects your respiratory tract (the mouth, nose, and lungs, and the passages between them). Unlike a cold, the flu can make you very ill. And it can lead to pneumonia, a serious lung infection. For some people, especially older adults, young children, and people with certain chronic conditions, the flu can have serious complications and even be fatal.  What Are the Risk Factors for the Flu?     Viruses that cause influenza spread through the air in droplets when someone who has the flu coughs, sneezes, laughs, or talks.   Anyone can get the  flu. But you re more likely to become infected if you:    Have a weakened immune system.    Work in a health care setting where you may be exposed to flu germs.    Live or work with someone who has the flu.    Haven t received an annual flu shot.  How Does the Flu Spread?  The flu is caused by viruses. The viruses spread through the air in droplets when someone who has the flu coughs, sneezes, laughs, or talks. You can become infected when you inhale these viruses directly. You can also become infected when you touch a surface on which the droplets have landed and then transfer the germs to your eyes, nose, or mouth. Touching used tissues, or sharing utensils, drinking glasses, or a toothbrush with an infected person can expose you to flu viruses, too.  What Are the Symptoms of the Flu?  Flu symptoms tend to come on quickly and may last a few days to a few weeks. They include:    Fever usually higher than 101 F  (38.3 C) and chills    Sore throat and headache    Dry cough    Runny nose    Tiredness and weakness    Muscle aches  Factors That Can Make Flu Worse  For some people, the flu can be very serious. The risk of complications is greater for:    Children under age 5.    Adults 65 years of age and older.    People with a chronic illness, such as diabetes or heart, kidney, or lung disease.    People who live in a nursing home or long-term care facility.   How Is the Flu Treated?  Influenza usually improves after 7 days or so. In some cases, your health care provider may prescribe an antiviral medication. This may help you get well sooner. For the medication to help, you need to take it as soon as possible (ideally within 48 hours) after your symptoms start. If you develop pneumonia or other serious illness, hospital care may be needed.  Easing Flu Symptoms    Drink lots of fluids such as water, juice, and warm soup. A good rule is to drink enough so that you urinate your normal amount.    Get plenty of  rest.    Ask your health care provider what to take for fever and pain.    Call your provider if your fever rises over 101 F (38.3 C) or you become dizzy, lightheaded, or short of breath.  Taking Steps to Protect Others    Wash your hands often, especially after coughing or sneezing. Or, clean your hands with an alcohol-based hand  containing at least 60 percent alcohol.    Cough or sneeze into a tissue. Then throw the tissue away and wash your hands. If you don t have a tissue, cough and sneeze into the crook of your elbow.    Stay home until at least 24 hours after you no longer have a fever or chills. Be sure the fever isn t being hidden by fever-reducing medication.    Don t share food, utensils, drinking glasses, or a toothbrush with others.    Ask your health care provider if others in your household should receive antiviral medication to help them avoid infection.  How Can the Flu Be Prevented?    One of the best ways to avoid the flu is to get a flu vaccination each year. Viruses that cause the flu change from year to year. For that reason, doctors recommend getting the flu vaccine each year, as soon as it's available in your area. The vaccine may be given as a shot or as a nasal spray. Your health care provider can tell you which vaccine is right for you.    Wash your hands often. Frequent handwashing is a proven way to help prevent infection.    Carry an alcohol-based hand gel containing at least 60 percent alcohol. Use it when you don t have access to soap and water. Then wash your hands as soon as you can.    Avoid touching your eyes, nose, and mouth.    At home and work, clean phones, computer keyboards, and toys often with disinfectant wipes.    If possible, avoid close contact with others who have the flu or symptoms of the flu.  Handwashing Tips  Handwashing is one of the best ways to prevent many common infections. If you re caring for or visiting someone with the flu, wash your hands each  time you enter and leave the room. Follow these steps:    Use warm water and plenty of soap. Rub your hands together well.    Clean the whole hand, under your nails, between your fingers, and up the wrists.    Wash for at least 15 seconds.    Rinse, letting the water run down your fingers, not up your wrists.    Dry your hands well. Use a paper towel to turn off the faucet and open the door.  Using Alcohol-Based Hand   Alcohol-based hand  are also a good choice. Use them when you don t have access to soap and water. Follow these steps:    Squeeze about a tablespoon of gel into the palm of one hand.    Rub your hands together briskly, cleaning the backs of your hands, the palms, between your fingers, and up the wrists.    Rub until the gel is gone and your hands are completely dry.  Preventing Influenza in Healthcare Settings  The flu is a special concern for people in hospitals and long-term care facilities. To help prevent the spread of flu, many hospitals and nursing homes take these steps:    Health care providers wash their hands or use an alcohol-based hand  before and after treating each patient.    People with the flu have private rooms and bathrooms or share a room with someone with the same infection.    High-risk patients who don t have the flu are encouraged to get the flu and pneumonia vaccines.    All health care workers are encouraged or required to get flu shots.        5062-2068 [employer]. [address] [place] [address], [place] [address]. All rights reserved. This information is not intended as a substitute for professional medical care. Always follow your healthcare professional's instructions.

## 2017-03-07 LAB
BACTERIA SPEC CULT: NORMAL
MICRO REPORT STATUS: NORMAL
SPECIMEN SOURCE: NORMAL

## 2017-03-08 ENCOUNTER — THERAPY VISIT (OUTPATIENT)
Dept: SLEEP MEDICINE | Facility: CLINIC | Age: 37
End: 2017-03-08
Payer: COMMERCIAL

## 2017-03-08 DIAGNOSIS — E66.01 OBESITY, MORBID, BMI 50 OR HIGHER (H): ICD-10-CM

## 2017-03-08 DIAGNOSIS — G47.30 OBSERVED SLEEP APNEA: ICD-10-CM

## 2017-03-08 DIAGNOSIS — R06.83 SNORING: ICD-10-CM

## 2017-03-08 DIAGNOSIS — G47.19 EXCESSIVE DAYTIME SLEEPINESS: ICD-10-CM

## 2017-03-08 PROCEDURE — 95810 POLYSOM 6/> YRS 4/> PARAM: CPT | Performed by: INTERNAL MEDICINE

## 2017-03-08 NOTE — MR AVS SNAPSHOT
After Visit Summary   3/8/2017    Ashli Gamboa    MRN: [id number]           Patient Information     Date Of Birth          1980        Visit Information        Provider Department      3/8/2017 8:30 PM BED 5  SLEEP [facility]        Today's Diagnoses     Obesity, morbid, BMI 50 or higher (H)        Excessive daytime sleepiness        Observed sleep apnea        Snoring           Follow-ups after your visit        Your next 10 appointments already scheduled     Mar 24, 2017 12:30 PM CDT   Return Sleep Patient with Uziel Salazar MD   [facility] ([facility])    [address] [id number]   [phone]            Mar 27, 2017  8:30 AM CDT   NUTRITION VISIT with Veronika Morrison RD   [facility] ([facility])    [address] [place] [address] [id number] [phone]            Mar 27, 2017  9:20 AM CDT   (Arrive by 9:05 AM)   Pre Bariatric Surgery with Sam Schmidt MD   [facility] ([facility])    [address] [place] [address] [id number] [phone]              Who to contact     If you have questions or need follow up information about today's clinic visit or your schedule please contact [facility] directly at [phone].  Normal or non-critical lab and imaging results will be communicated to you by MyChart, letter or phone within 4 business days after the clinic has received the results. If you do not hear from us within 7 days, please contact the clinic through MyChart or phone. If you have a critical or abnormal lab result, we will notify you by phone as soon as possible.  Submit refill requests through [employer] or call your pharmacy and they will forward the refill request to us. Please allow 3 business days for your refill to be  completed.          Additional Information About Your Visit        e Health Accesshart Information     [employer] gives you secure access to your electronic health record. If you see a primary care provider, you can also send messages to your care team and make appointments. If you have questions, please call your primary care clinic.  If you do not have a primary care provider, please call [phone] and they will assist you.        Care EveryWhere ID     This is your Care EveryWhere ID. This could be used by other organizations to access your [facility] medical records  [id number]         Blood Pressure from Last 3 Encounters:   03/05/17 138/82   02/24/17 128/78   02/22/17 154/88    Weight from Last 3 Encounters:   03/05/17 127 kg (280 lb)   02/24/17 134.5 kg (296 lb 9.6 oz)   02/22/17 132.4 kg (291 lb 12.8 oz)              We Performed the Following     Comprehensive Sleep Study        Primary Care Provider Office Phone # Fax #    Karena DANIELE [facility] [phone]       [address] [address]        Thank you!     Thank you for choosing [facility]  for your care. Our goal is always to provide you with excellent care. Hearing back from our patients is one way we can continue to improve our services. Please take a few minutes to complete the written survey that you may receive in the mail after your visit with us. Thank you!             Your Updated Medication List - Protect others around you: Learn how to safely use, store and throw away your medicines at www.disposemymeds.org.          This list is accurate as of: 3/8/17 11:59 PM.  Always use your most recent med list.                   Brand Name Dispense Instructions for use    ASPIRIN PO      Take 325 mg by mouth daily as needed for moderate pain       buPROPion 300 MG 24 hr tablet    WELLBUTRIN XL    90 tablet    Take 1 tablet (300 mg) by mouth every morning       cyclobenzaprine 5 MG  tablet    FLEXERIL    90 tablet    Take 1 tablet (5 mg) by mouth 3 times daily as needed for muscle spasms .  Initially only take at night.       etonogestrel-ethinyl estradiol 0.12-0.015 MG/24HR vaginal ring    NUVARING    4 each    Place 1 each vaginally every 28 days       FLUoxetine 20 MG capsule    PROzac    90 capsule    Take 1 capsule (20 mg) by mouth daily       * LYRICA 75 MG capsule   Generic drug:  pregabalin          * LYRICA 100 MG capsule   Generic drug:  pregabalin          oseltamivir 75 MG capsule    TAMIFLU    10 capsule    Take 1 capsule (75 mg) by mouth 2 times daily       traZODone 50 MG tablet    DESYREL    30 tablet    Take 1 tablet (50 mg) by mouth nightly as needed for sleep       * Notice:  This list has 2 medication(s) that are the same as other medications prescribed for you. Read the directions carefully, and ask your doctor or other care provider to review them with you.

## 2017-03-09 NOTE — PROGRESS NOTES
Diagnostic PSG completed per provider order.  Patient did not meet the lab inital criteria for PAP therapy.

## 2017-03-13 NOTE — PROCEDURES
"SLEEP STUDY INTERPRETATION  POLYSOMNOGRAPHY REPORT      Patient: Ashli Gamboa  YOB: 1980  Study Date: 3/8/2017  MRN: [id number]  Referring Provider: MD Mo Sarah  Ordering Provider: MD Salazar Rakesh    Indications for Polysomnography: The patient is a 37 y old Female who is 5' 4\" and weighs 296.0 lbs.  Her BMI is 51.2, [place] sleepiness scale 16.0 and neck size is 39 cm.  Relevant medical history includes depression, fibromyalgia. A diagnostic polysomnogram was performed to evaluate for sleep apnea.    Polysomnogram Data:  A full night polysomnogram recorded the standard physiologic parameters including EEG, EOG, EMG, ECG, nasal and oral airflow.  Respiratory parameters of chest and abdominal movements were recorded with respiratory inductance plethysmography.  Oxygen saturation was recorded by pulse oximetry.      Sleep Architecture: Sleep was fragmented. Percentage of stage n3 and REM were low.   The total recording time of the polysomnogram was 432.6 minutes.  The total sleep time was 372.5 minutes.  Sleep latency was normal at 13.0 minutes without the use of a sleep aid.  REM latency was 289.0 minutes.  Arousal index was increased at 42.8 arousals per hour.  Sleep efficiency was normal at 86.1%.  Wake after sleep onset was 41.5 minutes.  The patient spent 19.2% of total sleep time in Stage N1, 60.3% in Stage N2, 10.1% in Stages N3, and 10.5% in REM.  Time in REM supine was 30.5 minutes.    Respiration: negative for sleep apnea, hypoxemia or hypoventilation.     Events - The polysomnogram revealed a presence of - obstructive, 3 central, and - mixed apneas resulting in an apnea index of 0.5 events per hour.  There were 10 hypopneas resulting in a hypopnea index of 1.6 events per hour.  The combined apnea/hypopnea index was 2.1 events per hour.  The REM AHI was 4.6 events per hour.  The supine AHI was 2.8 events per hour.  The RERA index was 2.7 events per hour.   The RDI was 4.8 events " per hour.    Snoring - was reported as moderate.    Respiratory rate and pattern - was notable for normal respiratory rate and pattern.    Sustained Sleep Associated Hypoventilation - Transcutaneous carbon dioxide monitoring was used; however significant hypoventilation was not present with a maximum change of 4 mmHg from baseline awake level of 38 mm Hg.    Sleep Associated Hypoxemia - (Greater than 5 minutes O2 sat below 89%) was not present.  Baseline oxygen saturation was 94.5%. Lowest oxygen saturation was 84.4%.  Time spent less than or equal to 88% was 0.1 minutes.  Time spent less than or equal to 89% was 0.1 minutes.  4.8 2.7 2.1     Movement Activity: There was no significant movement abnormality.     Periodic Limb Activity - There were 54 PLMs during the entire study. The PLM index was 8.7 movements per hour.  The PLM Arousal Index was 2.9 per hour.    REM EMG Activity - Excessive transient / sustained muscle activity was not present.    Nocturnal Behavior - Abnormal sleep related behaviors were not noted during / arising out of NREM / REM sleep.      Bruxism - None apparent.    Cardiac Summary: normal sinus rhythm.   The average pulse rate was 85.0 bpm.  The minimum pulse rate was 68.9 bpm while the maximum pulse rate was 119.7 bpm. The rhythm is normal sinus. Arrhythmias were not noted.    Assessment:     This sleep study is negative for clinically relevant sleep apnea, hypoxemia or hypoventilation. There was an adequate evaluation in both supine and non-supine sleep including supine REM.     There was an increased frequency of spontaneous arousals contributing to sleep fragmentation.      Recommendations:    Suggest optimizing sleep schedule and avoiding sleep deprivation.    Weight management.   Diagnostic Codes:     Repetitive Intrusions Into Sleep F51.8     _________________________________   chelsie cason MD 03/13/2017

## 2017-03-21 ENCOUNTER — MYC MEDICAL ADVICE (OUTPATIENT)
Dept: FAMILY MEDICINE | Facility: CLINIC | Age: 37
End: 2017-03-21

## 2017-03-22 ENCOUNTER — TRANSFERRED RECORDS (OUTPATIENT)
Dept: HEALTH INFORMATION MANAGEMENT | Facility: CLINIC | Age: 37
End: 2017-03-22

## 2017-03-24 ENCOUNTER — OFFICE VISIT (OUTPATIENT)
Dept: SLEEP MEDICINE | Facility: CLINIC | Age: 37
End: 2017-03-24
Payer: COMMERCIAL

## 2017-03-24 VITALS
OXYGEN SATURATION: 99 % | TEMPERATURE: 98.5 F | WEIGHT: 293 LBS | SYSTOLIC BLOOD PRESSURE: 138 MMHG | BODY MASS INDEX: 50.02 KG/M2 | DIASTOLIC BLOOD PRESSURE: 79 MMHG | HEART RATE: 79 BPM | HEIGHT: 64 IN

## 2017-03-24 DIAGNOSIS — G47.12 IDIOPATHIC HYPERSOMNIA WITHOUT LONG SLEEP TIME: Primary | ICD-10-CM

## 2017-03-24 PROCEDURE — 99214 OFFICE O/P EST MOD 30 MIN: CPT | Performed by: INTERNAL MEDICINE

## 2017-03-24 RX ORDER — MODAFINIL 200 MG/1
200 TABLET ORAL DAILY
Qty: 30 TABLET | Refills: 0 | Status: SHIPPED | OUTPATIENT
Start: 2017-03-24 | End: 2017-03-27

## 2017-03-24 ASSESSMENT — PAIN SCALES - GENERAL: PAINLEVEL: SEVERE PAIN (6)

## 2017-03-24 NOTE — NURSING NOTE
"Chief Complaint   Patient presents with     Sleep Problem     follow up       Initial /79 (BP Location: Right arm, Patient Position: Chair, Cuff Size: Adult Regular)  Pulse 79  Temp 98.5  F (36.9  C) (Oral)  Ht 1.626 m (5' 4\")  Wt 133.8 kg (295 lb)  SpO2 99%  BMI 50.64 kg/m2 Estimated body mass index is 50.64 kg/(m^2) as calculated from the following:    Height as of this encounter: 1.626 m (5' 4\").    Weight as of this encounter: 133.8 kg (295 lb).  Medication Reconciliation: complete     Aniyah Khoury MA  [facility]      "

## 2017-03-24 NOTE — PROGRESS NOTES
Sleep Study Follow-Up Visit:    Date on this visit: 3/24/2017    Ashli Gamboa comes in today for follow-up of her sleep study done on 3/8/2017 at the [facility] for possible sleep apnea and hypersomnia.    Sleep latency 13 minutes without Ambien.  REM achieved.   REM latency 289 minutes.  Sleep efficiency 86%. Total sleep time 372.5 minutes.    Sleep architecture:  Stage 1, 19.2% (5%), stage 2, 60.3% (45-55%), stage 3, 10% (15-20%), stage REM, 10.5% (20-25%).  AHI was 2.1, without significant desaturations. RDI 4.8.  REM RDI 4.6, consistent with no REM YESICA.  Supine RDI 2.8, consistent with no SUPINE YESICA.  Periodic Limb Movement Index 8.7/hour.  Arousal frequency was 42.8 per hour with spontaneous arousals at 31.1 per hour.     These findings were reviewed with patient.     Past medical/surgical history, family history, social history, medications and allergies were reviewed.      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 Hypersomnia   2. Sleep study negative for sleep apnea or movement disorders   3. Repetitive intrusions into sleep     - Patient presented with a histroy of excessive daytime sleepiness and previous MSLT in 2013 showing mean sleep latency less than 2 minutes and one SOREM.     - Patient's medical comorbidity includes depression and fibromyalgia. She is on Bupropion 300 mg and recently discontinued Fluoxetine.     - Patient maintains regular sleep schedules and adequate nocturnal sleep. Sleep schedules are from 9 pm to 5:30 am on weekdays and from  11 pm until 7:30 am on weekends. She reports excessive sleepiness despite adequate nocturnal sleep hours. [place] score is 16.     - We discussed management of daytime sleepiness. Depression and fibromyalgia can be contributing factors to poor sleep quality and tiredness, but the abnormal sleep latency in 2013 indicates to me tat a central disorder of hypersomnia is likley.     We discussed stimulant therapy including Modafinil/ Armodafinil or traditional stimulants. Side effects and adverse effects were discussed including but not limited to drug interaction with hormonal contraceptive, cardiac risk, arlette Johnsons' syndrome with Modafinil. She is on Nuvaring but will use barrier methods. We decided to start Modafinil and assess response.     Plan:     1. Modafinil 200 mg daily   2. Follow up faraz month to assess progress     She will follow up with me in about 1 month(s).     Twenty-five minutes spent with patient, all of which were spent face-to-face counseling, consulting, coordinating plan of care.      Uziel Salazar MD     CC: Karena Mo

## 2017-03-24 NOTE — MR AVS SNAPSHOT
After Visit Summary   3/24/2017    Ashli Gamboa    MRN: [id number]           Patient Information     Date Of Birth          1980        Visit Information        Provider Department      3/24/2017 12:30 PM Uziel Salazar MD [facility] [place]        Today's Diagnoses     Idiopathic hypersomnia without long sleep time    -  1      Care Instructions      Your BMI is Body mass index is 50.64 kg/(m^2).  Weight management is a personal decision.  If you are interested in exploring weight loss strategies, the following discussion covers the approaches that may be successful. Body mass index (BMI) is one way to tell whether you are at a healthy weight, overweight, or obese. It measures your weight in relation to your height.  A BMI of 18.5 to 24.9 is in the healthy range. A person with a BMI of 25 to 29.9 is considered overweight, and someone with a BMI of 30 or greater is considered obese. More than two-thirds of American adults are considered overweight or obese.  Being overweight or obese increases the risk for further weight gain. Excess weight may lead to heart disease and diabetes.  Creating and following plans for healthy eating and physical activity may help you improve your health.  Weight control is part of healthy lifestyle and includes exercise, emotional health, and healthy eating habits. Careful eating habits lifelong are the mainstay of weight control. Though there are significant health benefits from weight loss, long-term weight loss with diet alone may be very difficult to achieve- studies show long-term success with dietary management in less than 10% of people. Attaining a healthy weight may be especially difficult to achieve in those with severe obesity. In some cases, medications, devices and surgical management might be considered.  What can you do?  If you are overweight or obese and are interested in methods for weight loss, you should discuss this with your  provider.     Consider reducing daily calorie intake by 500 calories.     Keep a food journal.     Avoiding skipping meals, consider cutting portions instead.    Diet combined with exercise helps maintain muscle while optimizing fat loss. Strength training is particularly important for building and maintaining muscle mass. Exercise helps reduce stress, increase energy, and improves fitness. Increasing exercise without diet control, however, may not burn enough calories to loose weight.       Start walking three days a week 10-20 minutes at a time    Work towards walking thirty minutes five days a week     Eventually, increase the speed of your walking for 1-2 minutes at time    In addition, we recommend that you review healthy lifestyles and methods for weight loss available through the National Institutes of Health patient information sites:  http://win.niddk.nih.gov/publications/index.htm    And look into health and wellness programs that may be available through your health insurance provider, employer, local community center, or sheela club.    Weight management plan: Patient was referred to their PCP to discuss a diet and exercise plan.            Follow-ups after your visit        Your next 10 appointments already scheduled     Mar 27, 2017  8:30 AM CDT   NUTRITION VISIT with Veronika Morrison RD   [facility] ([facility] [place])    [address] [place] [address]   [phone]            Mar 27, 2017  9:20 AM CDT   (Arrive by 9:05 AM)   Pre Bariatric Surgery with Sam Schmidt MD   [facility] ([facility])    [address] [place] [address]   [phone]            Apr 08, 2017  9:30 AM CDT   (Arrive by 9:15 AM)   PSYCH EVALUATION with Suki Casanova, PhD [facility] ([facility])    [address] [place] [address]  "Se  3rd Floor  [facility] [address]   [phone]            Apr 24, 2017  9:30 AM CDT   Return Sleep Patient with Uziel Salazar MD   [facility] ([facility])    [address]  [facility] [id number] [phone]              Who to contact     If you have questions or need follow up information about today's clinic visit or your schedule please contact [facility] directly at [phone].  Normal or non-critical lab and imaging results will be communicated to you by Worksharehart, letter or phone within 4 business days after the clinic has received the results. If you do not hear from us within 7 days, please contact the clinic through [email or web address] or phone. If you have a critical or abnormal lab result, we will notify you by phone as soon as possible.  Submit refill requests through [employer] or call your pharmacy and they will forward the refill request to us. Please allow 3 business days for your refill to be completed.          Additional Information About Your Visit        Worksharehart Information     [employer] gives you secure access to your electronic health record. If you see a primary care provider, you can also send messages to your care team and make appointments. If you have questions, please call your primary care clinic.  If you do not have a primary care provider, please call [phone] and they will assist you.        Care EveryWhere ID     This is your Care EveryWhere ID. This could be used by other organizations to access your [place] medical records  [id number]        Your Vitals Were     Pulse Temperature Height Pulse Oximetry BMI (Body Mass Index)       79 98.5  F (36.9  C) (Oral) 1.626 m (5' 4\") 99% 50.64 kg/m2        Blood Pressure from Last 3 Encounters:   03/24/17 138/79   03/05/17 138/82   02/24/17 128/78    Weight from Last 3 Encounters:   03/24/17 133.8 kg (295 lb)   03/05/17 127 kg (280 lb)   02/24/17 134.5 kg (296 lb " 9.6 oz)              Today, you had the following     No orders found for display         Today's Medication Changes          These changes are accurate as of: 3/24/17  1:33 PM.  If you have any questions, ask your nurse or doctor.               Start taking these medicines.        Dose/Directions    modafinil 200 MG tablet   Commonly known as:  PROVIGIL   Used for:  Idiopathic hypersomnia without long sleep time        Dose:  200 mg   Take 1 tablet (200 mg) by mouth daily   Quantity:  30 tablet   Refills:  0         Stop taking these medicines if you haven't already. Please contact your care team if you have questions.     cyclobenzaprine 5 MG tablet   Commonly known as:  FLEXERIL           FLUoxetine 20 MG capsule   Commonly known as:  PROzac           LYRICA 100 MG capsule   Generic drug:  pregabalin           LYRICA 75 MG capsule   Generic drug:  pregabalin           oseltamivir 75 MG capsule   Commonly known as:  TAMIFLU           traZODone 50 MG tablet   Commonly known as:  DESYREL                Where to get your medicines      Some of these will need a paper prescription and others can be bought over the counter.  Ask your nurse if you have questions.     Bring a paper prescription for each of these medications     modafinil 200 MG tablet                Primary Care Provider Office Phone # Fax #    Karena DANIELE [facility] [phone]       [address] [address]        Thank you!     Thank you for choosing [facility]  for your care. Our goal is always to provide you with excellent care. Hearing back from our patients is one way we can continue to improve our services. Please take a few minutes to complete the written survey that you may receive in the mail after your visit with us. Thank you!             Your Updated Medication List - Protect others around you: Learn how to safely use, store and throw away your medicines at  www.disposemymeds.org.          This list is accurate as of: 3/24/17  1:33 PM.  Always use your most recent med list.                   Brand Name Dispense Instructions for use    ASPIRIN PO      Take 325 mg by mouth daily as needed for moderate pain       buPROPion 300 MG 24 hr tablet    WELLBUTRIN XL    90 tablet    Take 1 tablet (300 mg) by mouth every morning       etonogestrel-ethinyl estradiol 0.12-0.015 MG/24HR vaginal ring    NUVARING    4 each    Place 1 each vaginally every 28 days       modafinil 200 MG tablet    PROVIGIL    30 tablet    Take 1 tablet (200 mg) by mouth daily       TOPAMAX PO      Take 25 mg by mouth

## 2017-03-24 NOTE — PATIENT INSTRUCTIONS

## 2017-03-27 ENCOUNTER — TELEPHONE (OUTPATIENT)
Dept: SLEEP MEDICINE | Facility: CLINIC | Age: 37
End: 2017-03-27

## 2017-03-27 ENCOUNTER — ALLIED HEALTH/NURSE VISIT (OUTPATIENT)
Dept: SURGERY | Facility: CLINIC | Age: 37
End: 2017-03-27

## 2017-03-27 ENCOUNTER — OFFICE VISIT (OUTPATIENT)
Dept: SURGERY | Facility: CLINIC | Age: 37
End: 2017-03-27

## 2017-03-27 ENCOUNTER — CARE COORDINATION (OUTPATIENT)
Dept: SURGERY | Facility: CLINIC | Age: 37
End: 2017-03-27

## 2017-03-27 VITALS
WEIGHT: 292.6 LBS | BODY MASS INDEX: 49.95 KG/M2 | HEIGHT: 64 IN | OXYGEN SATURATION: 98 % | HEART RATE: 86 BPM | TEMPERATURE: 98.8 F | DIASTOLIC BLOOD PRESSURE: 87 MMHG | SYSTOLIC BLOOD PRESSURE: 141 MMHG

## 2017-03-27 VITALS — HEIGHT: 64 IN | WEIGHT: 292.7 LBS | BODY MASS INDEX: 49.97 KG/M2

## 2017-03-27 DIAGNOSIS — E66.01 MORBID OBESITY, UNSPECIFIED OBESITY TYPE (H): Primary | ICD-10-CM

## 2017-03-27 DIAGNOSIS — G47.12 IDIOPATHIC HYPERSOMNIA WITHOUT LONG SLEEP TIME: ICD-10-CM

## 2017-03-27 RX ORDER — MODAFINIL 200 MG/1
200 TABLET ORAL DAILY
Qty: 30 TABLET | Refills: 0 | Status: SHIPPED | OUTPATIENT
Start: 2017-03-27 | End: 2017-04-03

## 2017-03-27 ASSESSMENT — ENCOUNTER SYMPTOMS
SINUS CONGESTION: 0
TROUBLE SWALLOWING: 0
BREAST MASS: 0
TREMORS: 0
INSOMNIA: 0
BLOATING: 0
BACK PAIN: 0
DIFFICULTY URINATING: 0
SORE THROAT: 0
POOR WOUND HEALING: 0
SEIZURES: 0
SNORES LOUDLY: 0
DECREASED CONCENTRATION: 0
NUMBNESS: 0
DEPRESSION: 0
LEG SWELLING: 0
HALLUCINATIONS: 0
TACHYCARDIA: 0
VOMITING: 0
EYE PAIN: 0
CLAUDICATION: 0
DISTURBANCES IN COORDINATION: 0
PANIC: 0
TASTE DISTURBANCE: 0
ORTHOPNEA: 0
CHILLS: 0
SPEECH CHANGE: 0
SINUS PAIN: 0
COUGH DISTURBING SLEEP: 0
NECK PAIN: 0
POSTURAL DYSPNEA: 0
NAIL CHANGES: 0
ALTERED TEMPERATURE REGULATION: 0
LEG PAIN: 0
JAUNDICE: 0
CONSTIPATION: 0
MEMORY LOSS: 0
MYALGIAS: 0
FATIGUE: 0
SMELL DISTURBANCE: 0
EYE WATERING: 0
NECK MASS: 0
BREAST PAIN: 0
POLYDIPSIA: 0
HEMOPTYSIS: 0
COUGH: 0
EXTREMITY NUMBNESS: 0
DIARRHEA: 0
DYSPNEA ON EXERTION: 0
HYPERTENSION: 0
HYPOTENSION: 0
TINGLING: 0
DOUBLE VISION: 0
HEMATURIA: 0
DYSURIA: 0
NIGHT SWEATS: 0
HEARTBURN: 0
SKIN CHANGES: 0
WEAKNESS: 0
POLYPHAGIA: 0
NERVOUS/ANXIOUS: 0
FEVER: 0
PARALYSIS: 0
FLANK PAIN: 0
BLOOD IN STOOL: 0
SLEEP DISTURBANCES DUE TO BREATHING: 0
WHEEZING: 0
RECTAL PAIN: 0
DIZZINESS: 0
LIGHT-HEADEDNESS: 0
NAUSEA: 0
RECTAL BLEEDING: 0
DECREASED APPETITE: 0
MUSCLE WEAKNESS: 0
DECREASED LIBIDO: 0
BOWEL INCONTINENCE: 0
WEIGHT GAIN: 0
INCREASED ENERGY: 0
LOSS OF CONSCIOUSNESS: 0
JOINT SWELLING: 0
SWOLLEN GLANDS: 0
SPUTUM PRODUCTION: 0
RESPIRATORY PAIN: 0
EYE IRRITATION: 0
EYE REDNESS: 0
SYNCOPE: 0
HEADACHES: 0
HOARSE VOICE: 0
ABDOMINAL PAIN: 0
MUSCLE CRAMPS: 0
HOT FLASHES: 0
WEIGHT LOSS: 0
PALPITATIONS: 0
SHORTNESS OF BREATH: 0
STIFFNESS: 0
BRUISES/BLEEDS EASILY: 0
EXERCISE INTOLERANCE: 0
ARTHRALGIAS: 0

## 2017-03-27 ASSESSMENT — PAIN SCALES - GENERAL: PAINLEVEL: NO PAIN (0)

## 2017-03-27 NOTE — PROGRESS NOTES
"  Bariatric Nutrition Consultation Note    Reason For Visit: Nutrition Reassessment    Ashli Gamboa is a 37 year-old presenting today for bariatric nutrition consult.  Pt is interested in laparoscopic sleeve gastrectomy.  This is pt's second of 3 RD visits required.  Pt was referred by Yu THORNE) (2/22/17).    She is interested in having weight loss surgery for the following reasons:  Pt would like to improve overall health, physical abilities (fibromyalgia), and wellbeing.     ANTHROPOMETRICS:    Height as of an earlier encounter on 2/22/17: 1.625 m (5' 3.98\").    Weight as of an earlier encounter on 2/22/17: 132.4 kg (291.8 lbs) with BMI of 50.12.  Current Weight: 292.7 lbs     (Pt reported that she is weaning off of Lyrica which she had been on for fibromyalgia and also has the side effect of weight gain.  She will be starting topamax soon which can help both her fibromyalgia and weight issue.)    Required weight loss goal pre-op: -10 lbs from initial consult weight (goal weight 281.8 lbs or less before surgery)    Past Diet Attempts History: a self-imposed calorie restriction and Atkins and a Keto diet    NUTRITION HISTORY:  Food Allergies/Intolerances: none  Cravings: sugar  Triggers/cues causing extra eating: nothing in particular  Supplementation: MVI daily, tumeric    Progress with Previous Goals:   Relating To Eating:   Eat slowly (20-30 minutes per meal), chewing foods well (25 chews per bite/applesauce consistency)- Meeting; still working on making it a habit.    Focus on lean protein and non-starchy vegetables/whole fruit at each meal with no more than 1 cup of carbs or 2 slices of bread- Meeting.   (9\" plate method: 1/2 non-starchy vegetables and 1/4 lean protein and 1/4 carbs - no more than 1 c carbs/meal).   Record food intake prior to eating throughout the day.  May use MyFitnessPal.com or other phone application or write it in a notebook (food and amount). - Meeting most of the time " "via a phone ap.     Relating to beverages:  Separate fluids from meals by 30 minutes during and after eating. - Pt started to work on this.     Relating to activity:  Increase activity level.  Exercise 5 days/week for 20 minutes- Meeting all for but one week when she was sick.   She walks or does the elliptical at the gym 20-25 min 5 days/week.     NUTRITION DIAGNOSIS:  Obesity r/t long history of self-monitoring deficit and excessive energy intake aeb BMI >30.    INTERVENTION:  Intervention Provided/Education Provided:  Discussed potential reasoning for lack of weight loss. Reviewed previous goals, ways to help prepare for post-op diet guidelines pre-operatively, portion/calorie-control, and mindful eating.  Provided pt with list of goals.  Gave encouragement and support.       Patient Understanding: good  Expected Compliance: good      GOALS:  Relating To Eating:   Eat slowly (20-30 minutes per meal), chewing foods well (25 chews per bite/applesauce consistency)   Focus on lean protein and non-starchy vegetables/whole fruit at each meal with no more than 1 cup of carbs or 2 slices of bread  (9\" plate method: 1/2 non-starchy vegetables and 1/4 lean protein and 1/4 carbs - no more than 1 c carbs/meal).   Record food intake prior to eating throughout the day.  May use MyFitnessPal.com or other phone application.  Target of no more than 1600 Adarsh/day.      Relating to beverages:  Separate fluids from meals by 30 minutes during and after eating.     Relating to activity:  Increase activity level.  Exercise 5 days/week for 25 minutes (walking or elliptical).     Follow-Up: 1 month or PRN    Time spent with patient: 30 minutes.  Veronika Morrison, ASHISH, LD, CLT    "

## 2017-03-27 NOTE — PROGRESS NOTES
Reviewed tasklist with client.    PRE-OPERATIVE WEIGHT LOSS SURGERY TASKLIST  Call Kaylee at [phone] for any questions regarding tasks on this list  Tentative surgery: Sleeve  Approximate Month and Year: May 2017 or when all tasks are completed.  Surgeon: Roxana Felipe Insurance Coverage: [employer]  Exclusions: none  _x___Seminar viewed.   _x___Research Consent Form signed.   _x___Registered on [email or web address].   _x___Received Decision Making Handout to read.   _x___Received information about Support Group, 1st Wednesday of the month at [facility], 6:30p - 8:00pm.  ____Letter of support from primary care provider. 3/27/17 To call PCP to schedule an appointment.    _x__Labs to be ordered today. 2/22/17 in Epic.  ____Lose 10 lbs prior to surgery from the initial consult weight of 291 lbs. Goal = 281 lbs.   ____Exercise goal: 20 mins., 5 days a week; increase as tolerated.   _x__Dietician visit at initial consult - 2/27/17  _x__Dietician visit month 2 - 3/27/17  ____Dietician visit month 3   ____Psychologist evaluation - 4/8/17 appt with Dr Casanova.  _x___Rheumatology clearance due to fibromyalgia.  _x___PSG shows no sleep apnea. Beign followed for daytime sleepiness.  The week before you meet with the surgeon, watch the following online classes and call DIANNA Page at [phone] to inform her that you watched them and to get any questions answered:  3/27/17 Given instructions to watch online classes.  _____Before Your Weight Loss Surgery Online Class at [email or web address]  _____After Your Weight Loss Surgery Online Class at [email or web address]  _x____See your surgeon Dr Schmidt in 1 month for meet and greet visit due to chronic pain(fibromyalgia) and chronic abdominal pain(endometriosis-pain resolved since November 2016). 3/27/27 Cleared for sleep gastrectomy by Dr Schmidt. Informed she did not need to have another visit prior to the surgery date.  _____After your visit with the surgeon, call [facility] at  [phone] to finalize the surgery date and schedule your final appointments to be 3 weeks before your surgery date.      FINAL APPOINTMENTS  _____Weigh-in/Nurse Visit at Clinic 1E, 3 weeks before surgery.   Plan to be at your goal weight for this visit. This can be done the same day you meet the surgeon if you are at your goal weight.  _____Pre-assessment Clinic, to be done 3 weeks before surgery. Meet with the anesthesia team. Your pre-operative history and physical can be done at this visit.     DISCLAIMER: Based on the evaluation results, the bariatric team decides whether and which bariatric surgery is the best option for you. Sometimes, even if you meet all of the pre-operative criteria, the bariatric team may still determine that in their medical judgement surgery is not recommended because of the risks to you.     CONTACT INFORMATION  ______If questions prior to surgery, call DIANNA Page at [phone].   ______If questions about insurance or scheduling surgery, call [facility] at [phone].  ______If questions after surgery and to schedule 1E clinic appts, please call our Call Center at [phone].  ______Fax: [phone] (preoperative documents, FMLA or return to work forms).

## 2017-03-27 NOTE — NURSING NOTE
"(   Chief Complaint   Patient presents with     Follow Up For     bariatric surgery meet and grecathleen    )    ( Weight: 292 lb 9.6 oz )  ( Height: 5' 4\" )  ( BMI (Calculated): 50.33 )  (   )  (   )  (   )  (   )  (   )  (   )    ( BP: 141/87 )  (   )  ( Temp: 98.8  F (37.1  C) )  ( Temp src: Oral )  ( Pulse: 86 )  (   )  ( SpO2: 98 % )    (   Patient Active Problem List   Diagnosis     Morbid obesity (H)     post traumatic Arthritis of big toe     Major depression, recurrent (H)     CARDIOVASCULAR SCREENING; LDL GOAL LESS THAN 160     Endometriosis     Fibromyalgia     Muscular deconditioning    )  (   Current Outpatient Prescriptions   Medication Sig Dispense Refill     Topiramate (TOPAMAX PO) Take 25 mg by mouth       modafinil (PROVIGIL) 200 MG tablet Take 1 tablet (200 mg) by mouth daily 30 tablet 0     buPROPion (WELLBUTRIN XL) 300 MG 24 hr tablet Take 1 tablet (300 mg) by mouth every morning 90 tablet 1     ASPIRIN PO Take 325 mg by mouth daily as needed for moderate pain       etonogestrel-ethinyl estradiol (NUVARING) 0.12-0.015 MG/24HR vaginal ring Place 1 each vaginally every 28 days 4 each 4    )  ( Diabetes Eval:    )    ( Pain Eval:  No Pain (0) )    (  History   Smoking Status     Former Smoker     Packs/day: 0.50     Years: 5.00     Types: Cigarettes     Start date: 1/2/2007     Quit date: 11/1/2013   Smokeless Tobacco     Never Used     Comment: stopped for about 6 months now as of 5/23/13    )    ( Signed By:  Jazzy Tate; March 27, 2017; 9:35 AM )  "

## 2017-03-27 NOTE — PATIENT INSTRUCTIONS
"GOALS:  Relating To Eating:   Eat slowly (20-30 minutes per meal), chewing foods well (25 chews per bite/applesauce consistency)   Focus on lean protein and non-starchy vegetables/whole fruit at each meal with no more than 1 cup of carbs or 2 slices of bread  (9\" plate method: 1/2 non-starchy vegetables and 1/4 lean protein and 1/4 carbs - no more than 1 c carbs/meal).   Record food intake prior to eating throughout the day.  May use MyFitnessPal.com or other phone application.  Target of no more than 1600 Adarsh/day.      Relating to beverages:  Separate fluids from meals by 30 minutes during and after eating.     Relating to activity:  Increase activity level.  Exercise 5 days/week for 25 minutes (walking or elliptical).     Follow-up with Veronika (dietitian) in 1 month.   "

## 2017-03-27 NOTE — MR AVS SNAPSHOT
"                  MRN:[id number]                      After Visit Summary   3/27/2017    Ashli Gamboa    MRN: [id number]           Visit Information        Provider Department      3/27/2017 8:30 AM Veronika Morrison RD [facility]        Your next 10 appointments already scheduled     Mar 27, 2017  9:20 AM CDT   (Arrive by 9:05 AM)   Pre Bariatric Surgery with Sam Schmidt MD   [facility] ([facility])    [address] [place] [address]   [phone]            Apr 08, 2017  9:30 AM CDT   (Arrive by 9:15 AM)   PSYCH EVALUATION with Suki Casanova, PhD [facility]ology ([facility])    [address] [place] [address]   [phone]            Apr 24, 2017  9:30 AM CDT   Return Sleep Patient with Uziel Salazar MD   [facility] ([facility])    [address] [place] [address] [phone]              Care Instructions    GOALS:  Relating To Eating:   Eat slowly (20-30 minutes per meal), chewing foods well (25 chews per bite/applesauce consistency)   Focus on lean protein and non-starchy vegetables/whole fruit at each meal with no more than 1 cup of carbs or 2 slices of bread  (9\" plate method: 1/2 non-starchy vegetables and 1/4 lean protein and 1/4 carbs - no more than 1 c carbs/meal).   Record food intake prior to eating throughout the day.  May use MyFitnessPal.com or other phone application.  Target of no more than 1600 Adarsh/day.      Relating to beverages:  Separate fluids from meals by 30 minutes during and after eating.     Relating to activity:  Increase activity level.  Exercise 5 days/week for 25 minutes (walking or elliptical).     Follow-up with Veronika (dietitian) in 1 month.        Owtwarehart Information     [employer] gives you secure access to your " electronic health record. If you see a primary care provider, you can also send messages to your care team and make appointments. If you have questions, please call your primary care clinic.  If you do not have a primary care provider, please call [phone] and they will assist you.      [employer] is an electronic gateway that provides easy, online access to your medical records. With [employer], you can request a clinic appointment, read your test results, renew a prescription or communicate with your care team.     To access your existing account, please contact your [facility] or call [phone] for assistance.        Care EveryWhere ID     This is your Care EveryWhere ID. This could be used by other organizations to access your [place] medical records  [id number]

## 2017-03-27 NOTE — TELEPHONE ENCOUNTER
Called Pharmacy regarding a PA request for  MODAFINIL 200 MG TAB     Her Insurance has not gone through yet.

## 2017-03-27 NOTE — LETTER
3/27/2017       RE: Ashli Gamboa  [address] [place] [address]     Dear Colleague,    Thank you for referring your patient, Ashli Gamboa, to the [facility] at [facility]. Please see a copy of my visit note below.          Pre-Bariatric Surgery Note    Karena Mo    Date: 3/27/2017     RE: Ashli Gamboa    MR#: [id number]   : 1980   Date of Visit: Mar 27, 2017    REASON FOR VISIT: Preoperative evaluation for possible weight loss surgery    Dear Dr. Mo,    I had the pleasure of seeing your patient, Ashli Gamboa, in my preoperative bariatric clinic.    As you know, she is morbidly obese and considering weight loss surgery to treat obesity in association with her medical conditions of obesity.  Her consult weight was 292.  She has lost 0 pounds since her consult weight. She has not met her required pre-surgery weight.    Most recent weights:  Wt Readings from Last 4 Encounters:   17 292 lb 9.6 oz   17 292 lb 11.2 oz   17 295 lb   17 280 lb       Please refer to initial consult note from date 2017 for patient's weight history and co-morbidities.    Review of Systems     Constitutional:  Negative for fever, chills, weight loss, weight gain, fatigue, decreased appetite, night sweats, recent stressors, height gain, height loss, post-operative complications, incisional pain, hallucinations, increased energy, hyperactivity and confused.   HENT:  Negative for ear pain, hearing loss, tinnitus, nosebleeds, trouble swallowing, hoarse voice, mouth sores, sore throat, ear discharge, tooth pain, gum tenderness, taste disturbance, smell disturbance, hearing aid, bleeding gums, dry mouth, sinus pain, sinus congestion and neck mass.    Eyes:  Negative for double vision, pain, redness, eye pain, decreased vision, eye watering, eye bulging, eye dryness, flashing lights, spots, floaters,  strabismus, tunnel vision, jaundice and eye irritation.   Respiratory:   Negative for cough, hemoptysis, sputum production, shortness of breath, wheezing, sleep disturbances due to breathing, snores loudly, respiratory pain, dyspnea on exertion, cough disturbing sleep and postural dyspnea.    Cardiovascular:  Negative for chest pain, dyspnea on exertion, palpitations, orthopnea, claudication, leg swelling, fingers/toes turn blue, hypertension, hypotension, syncope, history of heart murmur, chest pain on exertion, chest pain at rest, pacemaker, few scattered varicosities, leg pain, sleep disturbances due to breathing, tachycardia, light-headedness, exercise intolerance and edema.   Gastrointestinal:  Negative for heartburn, nausea, vomiting, abdominal pain, diarrhea, constipation, blood in stool, melena, rectal pain, bloating, hemorrhoids, bowel incontinence, jaundice, rectal bleeding, coffee ground emesis and change in stool.   Genitourinary:  Negative for bladder incontinence, dysuria, urgency, hematuria, flank pain, vaginal discharge, difficulty urinating, genital sores, dyspareunia, decreased libido, nocturia, voiding less frequently, arousal difficulty, abnormal vaginal bleeding, excessive menstruation, menstrual changes, hot flashes, vaginal dryness and postmenopausal bleeding.   Musculoskeletal:  Negative for myalgias, back pain, joint swelling, arthralgias, stiffness, muscle cramps, neck pain, bone pain, muscle weakness and fracture.   Skin:  Negative for nail changes, itching, poor wound healing, rash, hair changes, skin changes, acne, warts, poor wound healing, scarring, flaky skin, Raynaud's phenomenon, sensitivity to sunlight and skin thickening.   Neurological:  Negative for dizziness, tingling, tremors, speech change, seizures, loss of consciousness, weakness, light-headedness, numbness, headaches, disturbances in coordination, extremity numbness, memory loss, difficulty walking and paralysis.    Endo/Heme:  Negative for anemia, swollen glands and bruises/bleeds easily.   Psychiatric/Behavioral:  Negative for depression, hallucinations, memory loss, decreased concentration, mood swings and panic attacks.    Breast:  Negative for breast discharge, breast mass, breast pain and nipple retraction.   Endocrine:  Negative for altered temperature regulation, polyphagia, polydipsia, unwanted hair growth and change in facial hair.      Past Medical History:   Diagnosis Date     Arthritis     BIG TOE     Depressive disorder 2007    I have been on and off medication for the last several years     Endometriosis      Obese        Past Surgical History:   Procedure Laterality Date     ABDOMEN SURGERY      laparoscopy X2     DILATE CERVIX, HYSTEROSCOPY, ABLATE ENDOMETRIUM NOVASURE, COMBINED N/A 11/17/2016    Procedure: COMBINED DILATE CERVIX, HYSTEROSCOPY, ABLATE ENDOMETRIUM NOVASURE;  Surgeon: Sabas Patel MD;  Location: [facility]     GYN SURGERY  dates above    myomectomy x2, laparoscopy x1 (soon to be 2)     LAPAROSCOPIC LYSIS ADHESIONS  7/10/2014    Procedure: LAPAROSCOPIC LYSIS ADHESIONS;  Surgeon: Sabas Patel MD;  Location: [facility]     LASER CO2 LAPAROSCOPIC VAPORIZATION ENDOMETRIUM  7/10/2014    Procedure: LASER CO2 LAPAROSCOPIC VAPORIZATION ENDOMETRIUM;  Surgeon: Sabas Patel MD;  Location: [facility]     LASER CO2 LAPAROSCOPIC VAPORIZATION ENDOMETRIUM N/A 6/30/2015    Procedure: LASER CO2 LAPAROSCOPIC VAPORIZATION ENDOMETRIUM;  Surgeon: Sabas Patel MD;  Location: [facility]     LASER CO2 LAPAROSCOPY DIAGNOSTIC, LYSIS ADHESIONS, COMBINED N/A 6/30/2015    Procedure: COMBINED LASER CO2 LAPAROSCOPY DIAGNOSTIC, LYSIS ADHESIONS;  Surgeon: Sabas Patel MD;  Location: [facility]       Current Outpatient Prescriptions   Medication     Topiramate (TOPAMAX PO)     modafinil (PROVIGIL) 200 MG tablet     buPROPion (WELLBUTRIN XL) 300 MG 24 hr tablet     ASPIRIN PO     etonogestrel-ethinyl estradiol (NUVARING)  "0.12-0.015 MG/24HR vaginal ring     No current facility-administered medications for this visit.        No Known Allergies    PHYSICAL EXAMINATION:  /87 (BP Location: Left arm, Patient Position: Chair, Cuff Size: Adult Large)  Pulse 86  Temp 98.8  F (37.1  C) (Oral)  Ht 5' 4\"  Wt 292 lb 9.6 oz  SpO2 98%  BMI 50.22 kg/m2   Body mass index is 50.22 kg/(m^2).   NAD NCAT  Respiratory: breathing unlabored  Abdomen: obese, soft/nt/nd    Special testing: not indicated    I reviewed the choice of surgery and she would like to undergo laparoscopic sleeve gastrectomy surgery.    I reviewed the risks of surgery related to the procedure.    Sleeve Gastrectomy: Risks and Side Effects    The complications or risks of surgery include but are not limited to: death, heart attack, infection in the surgical site (wound infection), abdomen (abscess), bladder (urinary tract infection), lungs (pneumonia), clots in legs (deep vein thrombosis) or lungs (pulmonary emboli),  injury to the bowels or other organs, bowel obstruction, hernia at the incision and gastrointestional bleeding.    More specific risks related to vertical sleeve gastrectomy were detailed at the bariatric informational seminar and include the following: leak at the vertical sleeve staple line, leak at the anastomoses,  nausea, vomiting, and dehydration for several months,  adhesions causing bowel obstruction, rapid weight loss causing a higher rate of gallstone formation during the first 6 months after surgery, decreased absorption of vitamins and protein because of the reduced stomach size, weight regain if inappropriate food intake occurs, stricture, injury to other organs, hernia,  and ulcers.       Side effects of bariatric surgery include but are not limited to: abdominal pain, cramping, bloating, constipation, nausea, vomiting, diarrhea, difficulty swallowing,  dehydration, hair loss, excess skin, protein, iron and vitamin deficiencies, heartburn, " transfer of addictions, increased anxiety and worsening depression.       I emphasized exercise and activity behavior along with appropriate food choice as the main foundation for weight loss with surgery providing surgical reinforcement of the appropriate behavior set.    PLAN:  No orders of the defined types were placed in this encounter.        Updated task list given to patient:        Review of general surgery weight loss process    1. Complete preoperative requirements, including weight loss.  Final weight check to confirm MANDATORY weight loss requirement must be documented on a clinic scale.    2. Discuss prior authorization with .    3. History and physical evaluation by PCP of PAC clinic within 30 days of surgery date, preoperative class, and weight check (weigh-in visit) to be scheduled by patient.  Pre-anesthesia clinic for risk evaluation to be scheduled by anesthesia clinic.    4. We cannot guarantee that patient will qualify for surgery unless all preoperative requirements are met, prior authorization from primary insurance company is granted, and insurance changes do not occur.    5. It is possible for patients to regain all weight after weight loss surgery unless they follow guidelines prescribed by our bariatric center.    6. All patients with gastrointestinal complaints after weight loss surgery must have complaints conveyed to the bariatric team for appropriate treatment.    7. Vitamin deficiencies may develop post-bariatric surgery and annual laboratory testing should be performed.    8. Persistent nausea/vomiting after bariatric surgery entails risk of thiamine deficiency and should be treated early.  Vitamin B12 deficiency may develop, especially after gastric bypass surgery and must be recognized.        If you have any questions about our plans please don't hesitate to contact me.    Sincerely,    Sam Schmidt MD    I spent a total of 15 minutes face to face with  Ashli Gamboa during today's office visit.  Over 50% of this time was spent counseling the patient and/or coordinating care.        Again, thank you for allowing me to participate in the care of your patient.      Sincerely,    Sam Schmidt MD

## 2017-03-27 NOTE — PROGRESS NOTES
Pre-Bariatric Surgery Note    Karena Mo    Date: 3/27/2017     RE: Ashli Gamboa    MR#: [id number]   : 1980   Date of Visit: Mar 27, 2017    REASON FOR VISIT: Preoperative evaluation for possible weight loss surgery    Dear Dr. Mo,    I had the pleasure of seeing your patient, Ashli Gamboa, in my preoperative bariatric clinic.    As you know, she is morbidly obese and considering weight loss surgery to treat obesity in association with her medical conditions of obesity.  Her consult weight was 292.  She has lost 0 pounds since her consult weight. She has not met her required pre-surgery weight.    Most recent weights:  Wt Readings from Last 4 Encounters:   17 292 lb 9.6 oz   17 292 lb 11.2 oz   17 295 lb   17 280 lb       Please refer to initial consult note from date 2017 for patient's weight history and co-morbidities.    Review of Systems     Constitutional:  Negative for fever, chills, weight loss, weight gain, fatigue, decreased appetite, night sweats, recent stressors, height gain, height loss, post-operative complications, incisional pain, hallucinations, increased energy, hyperactivity and confused.   HENT:  Negative for ear pain, hearing loss, tinnitus, nosebleeds, trouble swallowing, hoarse voice, mouth sores, sore throat, ear discharge, tooth pain, gum tenderness, taste disturbance, smell disturbance, hearing aid, bleeding gums, dry mouth, sinus pain, sinus congestion and neck mass.    Eyes:  Negative for double vision, pain, redness, eye pain, decreased vision, eye watering, eye bulging, eye dryness, flashing lights, spots, floaters, strabismus, tunnel vision, jaundice and eye irritation.   Respiratory:   Negative for cough, hemoptysis, sputum production, shortness of breath, wheezing, sleep disturbances due to breathing, snores loudly, respiratory pain, dyspnea on exertion, cough disturbing sleep and postural dyspnea.     Cardiovascular:  Negative for chest pain, dyspnea on exertion, palpitations, orthopnea, claudication, leg swelling, fingers/toes turn blue, hypertension, hypotension, syncope, history of heart murmur, chest pain on exertion, chest pain at rest, pacemaker, few scattered varicosities, leg pain, sleep disturbances due to breathing, tachycardia, light-headedness, exercise intolerance and edema.   Gastrointestinal:  Negative for heartburn, nausea, vomiting, abdominal pain, diarrhea, constipation, blood in stool, melena, rectal pain, bloating, hemorrhoids, bowel incontinence, jaundice, rectal bleeding, coffee ground emesis and change in stool.   Genitourinary:  Negative for bladder incontinence, dysuria, urgency, hematuria, flank pain, vaginal discharge, difficulty urinating, genital sores, dyspareunia, decreased libido, nocturia, voiding less frequently, arousal difficulty, abnormal vaginal bleeding, excessive menstruation, menstrual changes, hot flashes, vaginal dryness and postmenopausal bleeding.   Musculoskeletal:  Negative for myalgias, back pain, joint swelling, arthralgias, stiffness, muscle cramps, neck pain, bone pain, muscle weakness and fracture.   Skin:  Negative for nail changes, itching, poor wound healing, rash, hair changes, skin changes, acne, warts, poor wound healing, scarring, flaky skin, Raynaud's phenomenon, sensitivity to sunlight and skin thickening.   Neurological:  Negative for dizziness, tingling, tremors, speech change, seizures, loss of consciousness, weakness, light-headedness, numbness, headaches, disturbances in coordination, extremity numbness, memory loss, difficulty walking and paralysis.   Endo/Heme:  Negative for anemia, swollen glands and bruises/bleeds easily.   Psychiatric/Behavioral:  Negative for depression, hallucinations, memory loss, decreased concentration, mood swings and panic attacks.    Breast:  Negative for breast discharge, breast mass, breast pain and nipple  "retraction.   Endocrine:  Negative for altered temperature regulation, polyphagia, polydipsia, unwanted hair growth and change in facial hair.      Past Medical History:   Diagnosis Date     Arthritis     BIG TOE     Depressive disorder 2007    I have been on and off medication for the last several years     Endometriosis      Obese        Past Surgical History:   Procedure Laterality Date     ABDOMEN SURGERY      laparoscopy X2     DILATE CERVIX, HYSTEROSCOPY, ABLATE ENDOMETRIUM NOVASURE, COMBINED N/A 11/17/2016    Procedure: COMBINED DILATE CERVIX, HYSTEROSCOPY, ABLATE ENDOMETRIUM NOVASURE;  Surgeon: Sabas Patel MD;  Location: [facility]     GYN SURGERY  dates above    myomectomy x2, laparoscopy x1 (soon to be 2)     LAPAROSCOPIC LYSIS ADHESIONS  7/10/2014    Procedure: LAPAROSCOPIC LYSIS ADHESIONS;  Surgeon: Sabas Patel MD;  Location: [facility]     LASER CO2 LAPAROSCOPIC VAPORIZATION ENDOMETRIUM  7/10/2014    Procedure: LASER CO2 LAPAROSCOPIC VAPORIZATION ENDOMETRIUM;  Surgeon: Sabas Patel MD;  Location: [facility]     LASER CO2 LAPAROSCOPIC VAPORIZATION ENDOMETRIUM N/A 6/30/2015    Procedure: LASER CO2 LAPAROSCOPIC VAPORIZATION ENDOMETRIUM;  Surgeon: Sabas Patel MD;  Location: [facility]     LASER CO2 LAPAROSCOPY DIAGNOSTIC, LYSIS ADHESIONS, COMBINED N/A 6/30/2015    Procedure: COMBINED LASER CO2 LAPAROSCOPY DIAGNOSTIC, LYSIS ADHESIONS;  Surgeon: Sabas Patel MD;  Location: [facility]       Current Outpatient Prescriptions   Medication     Topiramate (TOPAMAX PO)     modafinil (PROVIGIL) 200 MG tablet     buPROPion (WELLBUTRIN XL) 300 MG 24 hr tablet     ASPIRIN PO     etonogestrel-ethinyl estradiol (NUVARING) 0.12-0.015 MG/24HR vaginal ring     No current facility-administered medications for this visit.        No Known Allergies    PHYSICAL EXAMINATION:  /87 (BP Location: Left arm, Patient Position: Chair, Cuff Size: Adult Large)  Pulse 86  Temp 98.8  F (37.1  C) (Oral)  Ht 5' 4\"  Wt 292 lb 9.6 " oz  SpO2 98%  BMI 50.22 kg/m2   Body mass index is 50.22 kg/(m^2).   NAD NCAT  Respiratory: breathing unlabored  Abdomen: obese, soft/nt/nd    Special testing: not indicated    I reviewed the choice of surgery and she would like to undergo laparoscopic sleeve gastrectomy surgery.    I reviewed the risks of surgery related to the procedure.    Sleeve Gastrectomy: Risks and Side Effects    The complications or risks of surgery include but are not limited to: death, heart attack, infection in the surgical site (wound infection), abdomen (abscess), bladder (urinary tract infection), lungs (pneumonia), clots in legs (deep vein thrombosis) or lungs (pulmonary emboli),  injury to the bowels or other organs, bowel obstruction, hernia at the incision and gastrointestional bleeding.    More specific risks related to vertical sleeve gastrectomy were detailed at the bariatric informational seminar and include the following: leak at the vertical sleeve staple line, leak at the anastomoses,  nausea, vomiting, and dehydration for several months,  adhesions causing bowel obstruction, rapid weight loss causing a higher rate of gallstone formation during the first 6 months after surgery, decreased absorption of vitamins and protein because of the reduced stomach size, weight regain if inappropriate food intake occurs, stricture, injury to other organs, hernia,  and ulcers.       Side effects of bariatric surgery include but are not limited to: abdominal pain, cramping, bloating, constipation, nausea, vomiting, diarrhea, difficulty swallowing,  dehydration, hair loss, excess skin, protein, iron and vitamin deficiencies, heartburn, transfer of addictions, increased anxiety and worsening depression.       I emphasized exercise and activity behavior along with appropriate food choice as the main foundation for weight loss with surgery providing surgical reinforcement of the appropriate behavior set.    PLAN:  No orders of the defined  types were placed in this encounter.        Updated task list given to patient:        Review of general surgery weight loss process    1. Complete preoperative requirements, including weight loss.  Final weight check to confirm MANDATORY weight loss requirement must be documented on a clinic scale.    2. Discuss prior authorization with .    3. History and physical evaluation by PCP of PAC clinic within 30 days of surgery date, preoperative class, and weight check (weigh-in visit) to be scheduled by patient.  Pre-anesthesia clinic for risk evaluation to be scheduled by anesthesia clinic.    4. We cannot guarantee that patient will qualify for surgery unless all preoperative requirements are met, prior authorization from primary insurance company is granted, and insurance changes do not occur.    5. It is possible for patients to regain all weight after weight loss surgery unless they follow guidelines prescribed by our bariatric center.    6. All patients with gastrointestinal complaints after weight loss surgery must have complaints conveyed to the bariatric team for appropriate treatment.    7. Vitamin deficiencies may develop post-bariatric surgery and annual laboratory testing should be performed.    8. Persistent nausea/vomiting after bariatric surgery entails risk of thiamine deficiency and should be treated early.  Vitamin B12 deficiency may develop, especially after gastric bypass surgery and must be recognized.        If you have any questions about our plans please don't hesitate to contact me.    Sincerely,    Sam Schmidt MD    I spent a total of 15 minutes face to face with Ashli Gamboa during today's office visit.  Over 50% of this time was spent counseling the patient and/or coordinating care.

## 2017-03-29 NOTE — TELEPHONE ENCOUNTER
Pt called wanting to know the status of the PA for the Modafinil 200 MG tab, she stated she spoke with the pharmacy and they stated they re-faxed the information to FAUSTO Castaneda. I did review the message below with patient, she stated she never received an updating phone call regarding the status of the PA. She [facility]BR at [phone]

## 2017-03-29 NOTE — TELEPHONE ENCOUNTER
Left a message for patient to contact sleep center. PA has been initiated and can take up to 3 days to process.

## 2017-03-29 NOTE — TELEPHONE ENCOUNTER
PA has been initiated, can take up to 3 business days for authorization. Call is out to patient with this information.

## 2017-04-03 ENCOUNTER — OFFICE VISIT (OUTPATIENT)
Dept: FAMILY MEDICINE | Facility: CLINIC | Age: 37
End: 2017-04-03
Payer: COMMERCIAL

## 2017-04-03 VITALS
HEART RATE: 98 BPM | OXYGEN SATURATION: 97 % | RESPIRATION RATE: 18 BRPM | HEIGHT: 64 IN | WEIGHT: 293 LBS | SYSTOLIC BLOOD PRESSURE: 113 MMHG | DIASTOLIC BLOOD PRESSURE: 75 MMHG | BODY MASS INDEX: 50.02 KG/M2 | TEMPERATURE: 98.4 F

## 2017-04-03 DIAGNOSIS — M79.7 FIBROMYALGIA: ICD-10-CM

## 2017-04-03 DIAGNOSIS — F33.41 RECURRENT MAJOR DEPRESSIVE DISORDER, IN PARTIAL REMISSION (H): ICD-10-CM

## 2017-04-03 DIAGNOSIS — E66.01 MORBID OBESITY DUE TO EXCESS CALORIES (H): Primary | ICD-10-CM

## 2017-04-03 PROCEDURE — 99214 OFFICE O/P EST MOD 30 MIN: CPT | Performed by: NURSE PRACTITIONER

## 2017-04-03 RX ORDER — BUPROPION HYDROCHLORIDE 300 MG/1
300 TABLET ORAL EVERY MORNING
Qty: 90 TABLET | Refills: 1 | Status: ON HOLD | OUTPATIENT
Start: 2017-04-03 | End: 2017-07-26

## 2017-04-03 NOTE — PROGRESS NOTES
SUBJECTIVE:                                                    Ashli Gamboa is a 37 year old female who presents to clinic today for the following health issues:  1. Patient state that she needs an approval letter for surgery for 06/2017 - bariatric surgery at the [facility]       Problem list and histories reviewed & adjusted, as indicated.  Additional history: as documented    Patient Active Problem List   Diagnosis     Morbid obesity (H)     post traumatic Arthritis of big toe     Major depression, recurrent (H)     CARDIOVASCULAR SCREENING; LDL GOAL LESS THAN 160     Endometriosis     Fibromyalgia     Muscular deconditioning     Past Surgical History:   Procedure Laterality Date     ABDOMEN SURGERY      laparoscopy X2     DILATE CERVIX, HYSTEROSCOPY, ABLATE ENDOMETRIUM NOVASURE, COMBINED N/A 11/17/2016    Procedure: COMBINED DILATE CERVIX, HYSTEROSCOPY, ABLATE ENDOMETRIUM NOVASURE;  Surgeon: Sabas Patel MD;  Location: [facility]     GYN SURGERY  dates above    myomectomy x2, laparoscopy x1 (soon to be 2)     LAPAROSCOPIC LYSIS ADHESIONS  7/10/2014    Procedure: LAPAROSCOPIC LYSIS ADHESIONS;  Surgeon: Sabas Patel MD;  Location: [facility]     LASER CO2 LAPAROSCOPIC VAPORIZATION ENDOMETRIUM  7/10/2014    Procedure: LASER CO2 LAPAROSCOPIC VAPORIZATION ENDOMETRIUM;  Surgeon: Sabas Patel MD;  Location: [facility]     LASER CO2 LAPAROSCOPIC VAPORIZATION ENDOMETRIUM N/A 6/30/2015    Procedure: LASER CO2 LAPAROSCOPIC VAPORIZATION ENDOMETRIUM;  Surgeon: Sabas Patel MD;  Location: [facility]     LASER CO2 LAPAROSCOPY DIAGNOSTIC, LYSIS ADHESIONS, COMBINED N/A 6/30/2015    Procedure: COMBINED LASER CO2 LAPAROSCOPY DIAGNOSTIC, LYSIS ADHESIONS;  Surgeon: Sabas Patel MD;  Location: [facility]       Social History   Substance Use Topics     Smoking status: Former Smoker     Packs/day: 0.50     Years: 5.00     Types: Cigarettes     Start date: 1/2/2007     Quit date: 11/1/2013     Smokeless tobacco:  "Never Used      Comment: stopped for about 6 months now as of 5/23/13     Alcohol use 0.0 oz/week     0 Standard drinks or equivalent per week      Comment: occas     Family History   Problem Relation Age of Onset     Depression Mother      CANCER Mother      Cervical      Other - See Comments Mother      Fibromyalgia     Crohn Disease Mother      Obesity Mother      Ovarian Cancer Mother      Cervical Cancer     Arthritis Father      Rheumatoid Arthritis Father      Depression Father      CEREBROVASCULAR DISEASE Paternal Grandmother      Breast Cancer Maternal Grandmother      Depression Brother      Depression Sister      MENTAL ILLNESS Brother      schizophrenia and bipolar         Current Outpatient Prescriptions   Medication Sig Dispense Refill     buPROPion (WELLBUTRIN XL) 300 MG 24 hr tablet Take 1 tablet (300 mg) by mouth every morning 90 tablet 1     Topiramate (TOPAMAX PO) Take 25 mg by mouth       ASPIRIN PO Take 325 mg by mouth daily as needed for moderate pain       etonogestrel-ethinyl estradiol (NUVARING) 0.12-0.015 MG/24HR vaginal ring Place 1 each vaginally every 28 days 4 each 4     [DISCONTINUED] buPROPion (WELLBUTRIN XL) 300 MG 24 hr tablet Take 1 tablet (300 mg) by mouth every morning 90 tablet 1     No Known Allergies    Reviewed and updated as needed this visit by clinical staff  Tobacco  Allergies  Meds  Problems  Med Hx  Surg Hx  Fam Hx  Soc Hx        Reviewed and updated as needed this visit by Provider  Allergies  Meds  Problems  Med Hx  Surg Hx  Fam Hx         ROS:  CONSTITUTIONAL:weight gain  E/M: NEGATIVE for ear, mouth and throat problems  R: NEGATIVE for significant cough or SOB  CV: NEGATIVE for chest pain, palpitations or peripheral edema    OBJECTIVE:                                                    /75 (BP Location: Left arm, Patient Position: Chair, Cuff Size: Adult Large)  Pulse 98  Temp 98.4  F (36.9  C) (Oral)  Resp 18  Ht 5' 4\" (1.626 m)  Wt 293 lb 6 " "oz (133.1 kg)  LMP  (LMP Unknown)  SpO2 97%  Breastfeeding? No  BMI 50.36 kg/m2  Body mass index is 50.36 kg/(m^2).  GENERAL: healthy, alert and no distress  SKIN: no suspicious lesions or rashes  PSYCH:   PHQ-9 SCORE 6/12/2015 12/30/2015 6/6/2016   Total Score 4 - -   Total Score MyChart - 2 (Minimal depression) -   Total Score - 2 4          ASSESSMENT/PLAN:                                                    BMI:   Estimated body mass index is 50.36 kg/(m^2) as calculated from the following:    Height as of this encounter: 5' 4\" (1.626 m).    Weight as of this encounter: 293 lb 6 oz (133.1 kg).   Weight management plan: Patient referred to endocrine and/or weight management specialty        ICD-10-CM    1. Morbid obesity due to excess calories (H) E66.01    2. Fibromyalgia M79.7    3. Recurrent major depressive disorder, in partial remission (H) F33.41 buPROPion (WELLBUTRIN XL) 300 MG 24 hr tablet     Letter written.  I support the plan for bariatric surgery.    I discussed the potential side effects of antidepressant medications as well as the likelihood of worsening before improvement over the next few weeks.  I reemphasized the importance of a multi-armed approach towards the treatment of depression including regular exercise, adequate sleep, and a well-rounded, low-glycemic diet.  In addition, I emphasized the importance of ongoing development of her support network. If new or worsening symptoms, she will seek help immediately.    See Patient Instructions    DANIELE Osorio [facility]  "

## 2017-04-03 NOTE — LETTER
[address] [address]  Phone: [phone]    April 3, 2017        Ashli Gamboa  [address]  [place] [address]          To whom it may concern:    RE: Ashli Gamboa    Patient was seen and treated today at our clinic.  She has been coming to [place] since 3/2013 and has always been morbidly obese.  She has been actively trying to lose weight since then and has tried weight watchers, atkins, keto diet, phentermine and topamax in addition to increasing activity.  In addition to obesity she has been treated for depression, fibromyalgia, and endometriosis.  She is current with all appropriate screenings.  She would benefit from bariatric surgery to aid in weight loss.  Please consider coverage for this procedure.      Please contact me for questions or concerns.      Sincerely,        DANIELE Osorio CNP

## 2017-04-03 NOTE — MR AVS SNAPSHOT
After Visit Summary   4/3/2017    Ashli Gamboa    MRN: [id number]           Patient Information     Date Of Birth          1980        Visit Information        Provider Department      4/3/2017 12:40 PM Karena Mo APRN CNP [facility]        Today's Diagnoses     Morbid obesity due to excess calories (H)    -  1    Fibromyalgia        Recurrent major depressive disorder, in partial remission (H)           Follow-ups after your visit        Your next 10 appointments already scheduled     Apr 05, 2017  1:15 PM CDT   (Arrive by 1:00 PM)   Pre Bariatric Surgery with Yu Evans PA-C   [facility] ([facility])    [address] [place] [id number]   [phone]            Apr 08, 2017  9:30 AM CDT   (Arrive by 9:15 AM)   PSYCH EVALUATION with Suki Casanova, PhD [facility] Neuropsychology ([facility])    [address] [place] [address]   [phone]            Apr 28, 2017 11:30 AM CDT   NUTRITION VISIT with Veronika Morrison RD   [facility] ([facility])    [address] [place] [id number] [phone]              Who to contact     If you have questions or need follow up information about today's clinic visit or your schedule please contact [facility] directly at [phone].  Normal or non-critical lab and imaging results will be communicated to you by MyChart, letter or phone within 4 business days after the clinic has received the results. If you do not hear from us within 7 days, please contact the clinic through MyChart or phone. If you have a critical or abnormal lab result, we will notify you by phone as soon as possible.  Submit refill requests through [employer] or call your pharmacy and they will forward the  "refill request to us. Please allow 3 business days for your refill to be completed.          Additional Information About Your Visit        [employer] Information     [employer] gives you secure access to your electronic health record. If you see a primary care provider, you can also send messages to your care team and make appointments. If you have questions, please call your primary care clinic.  If you do not have a primary care provider, please call [phone] and they will assist you.        Care EveryWhere ID     This is your Care EveryWhere ID. This could be used by other organizations to access your Junior medical records  [id number]        Your Vitals Were     Pulse Temperature Respirations Height Last Period Pulse Oximetry    98 98.4  F (36.9  C) (Oral) 18 5' 4\" (1.626 m) (LMP Unknown) 97%    Breastfeeding? BMI (Body Mass Index)                No 50.36 kg/m2           Blood Pressure from Last 3 Encounters:   04/03/17 113/75   03/27/17 141/87   03/24/17 138/79    Weight from Last 3 Encounters:   04/03/17 293 lb 6 oz (133.1 kg)   03/27/17 292 lb 9.6 oz (132.7 kg)   03/27/17 292 lb 11.2 oz (132.8 kg)              Today, you had the following     No orders found for display         Today's Medication Changes          These changes are accurate as of: 4/3/17  1:28 PM.  If you have any questions, ask your nurse or doctor.               Stop taking these medicines if you haven't already. Please contact your care team if you have questions.     modafinil 200 MG tablet   Commonly known as:  PROVIGIL   Stopped by:  Karena Mo APRN CNP                Where to get your medicines      These medications were sent to [id number] - [place], [place] - [address] AT [facility]  [address] [place] [address]     Phone:  [phone]     buPROPion 300 MG 24 hr tablet                Primary Care Provider Office Phone # Fax #    DANIELE Osorio [phone] "       [facility] [address]  [facility] [address]        Thank you!     Thank you for choosing [facility]  for your care. Our goal is always to provide you with excellent care. Hearing back from our patients is one way we can continue to improve our services. Please take a few minutes to complete the written survey that you may receive in the mail after your visit with us. Thank you!             Your Updated Medication List - Protect others around you: Learn how to safely use, store and throw away your medicines at www.disposemymeds.org.          This list is accurate as of: 4/3/17  1:28 PM.  Always use your most recent med list.                   Brand Name Dispense Instructions for use    ASPIRIN PO      Take 325 mg by mouth daily as needed for moderate pain       buPROPion 300 MG 24 hr tablet    WELLBUTRIN XL    90 tablet    Take 1 tablet (300 mg) by mouth every morning       etonogestrel-ethinyl estradiol 0.12-0.015 MG/24HR vaginal ring    NUVARING    4 each    Place 1 each vaginally every 28 days       TOPAMAX PO      Take 25 mg by mouth

## 2017-04-03 NOTE — LETTER
[address] [address]  Phone: [phone]    April 3, 2017        Ashli Gamboa  [address]  [place] [address]          To whom it may concern:    RE: Ashli Gamboa    Patient was seen and treated today at our clinic.  She is morbidly obesity and has been for several years.  She also has a diagnosis of fibromyalgia which limits her ability to exercise.  She would benefit from bariatric surgery to aid in her weight loss.  Please consider covering this in her treatment plan.      Please contact me for questions or concerns.      Sincerely,        DANIELE Osorio CNP

## 2017-04-03 NOTE — NURSING NOTE
"Chief Complaint   Patient presents with     Letter Request     need approval letter for surgery       Initial /75 (BP Location: Left arm, Patient Position: Chair, Cuff Size: Adult Large)  Pulse 98  Temp 98.4  F (36.9  C) (Oral)  Resp 18  Ht 5' 4\" (1.626 m)  Wt 293 lb 6 oz (133.1 kg)  LMP  (LMP Unknown)  SpO2 97%  Breastfeeding? No  BMI 50.36 kg/m2 Estimated body mass index is 50.36 kg/(m^2) as calculated from the following:    Height as of this encounter: 5' 4\" (1.626 m).    Weight as of this encounter: 293 lb 6 oz (133.1 kg).  Medication Reconciliation: complete     Mora Edwards MA      "

## 2017-04-04 ASSESSMENT — PATIENT HEALTH QUESTIONNAIRE - PHQ9: SUM OF ALL RESPONSES TO PHQ QUESTIONS 1-9: 7

## 2017-04-05 ENCOUNTER — OFFICE VISIT (OUTPATIENT)
Dept: SURGERY | Facility: CLINIC | Age: 37
End: 2017-04-05

## 2017-04-05 VITALS
TEMPERATURE: 99.1 F | HEART RATE: 87 BPM | BODY MASS INDEX: 49.83 KG/M2 | SYSTOLIC BLOOD PRESSURE: 151 MMHG | DIASTOLIC BLOOD PRESSURE: 98 MMHG | HEIGHT: 64 IN | OXYGEN SATURATION: 97 % | WEIGHT: 291.9 LBS

## 2017-04-05 DIAGNOSIS — E66.01 MORBID OBESITY, UNSPECIFIED OBESITY TYPE (H): Primary | ICD-10-CM

## 2017-04-05 RX ORDER — PHENTERMINE HYDROCHLORIDE 37.5 MG/1
0.5 TABLET ORAL EVERY MORNING
Qty: 15 TABLET | Refills: 1 | Status: SHIPPED | OUTPATIENT
Start: 2017-04-05 | End: 2017-06-29

## 2017-04-05 NOTE — PATIENT INSTRUCTIONS
See RD in April  See Yu Evans in 1 month for f/u phentermine  Blood pressure check in 1 week  Added pain clinic clearance to task list    PRE-OPERATIVE WEIGHT LOSS SURGERY TASKLIST  Call Kaylee at [phone] for any questions regarding tasks on this list  Tentative surgery: Sleeve  Approximate Month and Year: May 2017 or when all tasks are completed.  Surgeon: Roxana Fitzgeraldative Insurance Coverage: [employer]  Exclusions: none  _x___Seminar viewed.   _x___Research Consent Form signed.   _x___Registered on [employer].   _x___Received Decision Making Handout to read.   _x___Received information about Support Group, 1st Wednesday of the month at the [facility], 6:30p - 8:00pm.  _x___Letter of support from primary care provider. 3/27/17 To call PCP to schedule an appointment.     _x__Labs to be ordered today. 2/22/17 in Epic.  ____Lose 10 lbs prior to surgery from the initial consult weight of 291 lbs. Goal = 281 lbs.   ____Exercise goal: 20 mins., 5 days a week; increase as tolerated.   _x__Dietician visit at initial consult - 2/27/17  _x__Dietician visit month 2 - 3/27/17  ___Dietician visit month 3   ____Psychologist evaluation - 4/8/17 appt with Dr Casanova.  _x___Rheumatology clearance due to fibromyalgia.  _x___PSG shows no sleep apnea. Beign followed for daytime sleepiness.  ____Letter of clearance from Pain clinic  The week before you meet with the surgeon, watch the following online classes and call DIANNA Page at [phone] to inform her that you watched them and to get any questions answered: 3/27/17 Given instructions to watch online classes.  _____Before Your Weight Loss Surgery Online Class at [email or web address]  _____After Your Weight Loss Surgery Online Class at [email or web address]  _x____See your surgeon Dr Schmidt in 1 month for meet and greet visit due to chronic pain(fibromyalgia) and chronic abdominal pain(endometriosis-pain resolved since November 2016). 3/27/27 Cleared for sleep  gastrectomy by Dr Schmidt. Informed she did not need to have another visit prior to the surgery date.  _____Final PBS appt with Dr Schmidt when weight loss goal made.  _____After your visit with the surgeon, call [facility] at [phone] to finalize the surgery date and schedule your final appointments to be 3 weeks before your surgery date.       FINAL APPOINTMENTS  _____Weigh-in/Nurse Visit at Clinic 1E, 3 weeks before surgery.   Plan to be at your goal weight for this visit. This can be done the same day you meet the surgeon if you are at your goal weight.  _____Pre-assessment Clinic, to be done 3 weeks before surgery. Meet with the anesthesia team. Your pre-operative history and physical can be done at this visit.      DISCLAIMER: Based on the evaluation results, the bariatric team decides whether and which bariatric surgery is the best option for you. Sometimes, even if you meet all of the pre-operative criteria, the bariatric team may still determine that in their medical judgement surgery is not recommended because of the risks to you.      CONTACT INFORMATION  ______If questions prior to surgery, call DIANNA Page at [phone].   ______If questions about insurance or scheduling surgery, call [facility] at [phone].  ______If questions after surgery and to schedule 1E clinic appts, please call our Call Center at [phone].  ______Fax: [phone] (preoperative documents, FMLA or return to work forms).

## 2017-04-05 NOTE — PROGRESS NOTES
Pre-Bariatric Surgery Note    Karena Mo    Date: 2017     RE: Ashli Gamboa    MR#: [id number]   : 1980   Date of Visit: 2017    REASON FOR VISIT: Preoperative evaluation for possible weight loss surgery    Dear Norberto Dasah Mariangel,    I had the pleasure of seeing your patient, Ashli Gamboa, in my preoperative bariatric clinic.    As you know, she is morbidly obese and considering weight loss surgery to treat obesity in association with her medical conditions of obesity.  Her consult weight was 291.  She has lost 0 pounds since her consult weight. She has not met her required pre-surgery weight.    Started Topiramate last month by pain clinic and is taking 50mg BID.  She is tolerating it.      She is interested in starting phentermine.  She has tolerated this in the past.     Most recent weights:  Wt Readings from Last 4 Encounters:   17 132.4 kg (291 lb 14.4 oz)   17 133.1 kg (293 lb 6 oz)   17 132.7 kg (292 lb 9.6 oz)   17 132.8 kg (292 lb 11.2 oz)       Please refer to initial consult note from date 17 for patient's weight history and co-morbidities.    ROS    Past Medical History:   Diagnosis Date     Arthritis     BIG TOE     Depressive disorder     I have been on and off medication for the last several years     Endometriosis      Obese        Past Surgical History:   Procedure Laterality Date     ABDOMEN SURGERY      laparoscopy X2     DILATE CERVIX, HYSTEROSCOPY, ABLATE ENDOMETRIUM NOVASURE, COMBINED N/A 2016    Procedure: COMBINED DILATE CERVIX, HYSTEROSCOPY, ABLATE ENDOMETRIUM NOVASURE;  Surgeon: Sabas Patel MD;  Location: [facility]     GYN SURGERY  dates above    myomectomy x2, laparoscopy x1 (soon to be 2)     LAPAROSCOPIC LYSIS ADHESIONS  7/10/2014    Procedure: LAPAROSCOPIC LYSIS ADHESIONS;  Surgeon: Sabas Patel MD;  Location: [facility]     LASER CO2 LAPAROSCOPIC VAPORIZATION ENDOMETRIUM  7/10/2014    Procedure: LASER  "CO2 LAPAROSCOPIC VAPORIZATION ENDOMETRIUM;  Surgeon: Sabas Paetl MD;  Location: [facility]     LASER CO2 LAPAROSCOPIC VAPORIZATION ENDOMETRIUM N/A 6/30/2015    Procedure: LASER CO2 LAPAROSCOPIC VAPORIZATION ENDOMETRIUM;  Surgeon: Sabas Patel MD;  Location: [facility]     LASER CO2 LAPAROSCOPY DIAGNOSTIC, LYSIS ADHESIONS, COMBINED N/A 6/30/2015    Procedure: COMBINED LASER CO2 LAPAROSCOPY DIAGNOSTIC, LYSIS ADHESIONS;  Surgeon: Sabas Patel MD;  Location: [facility]       Current Outpatient Prescriptions   Medication     buPROPion (WELLBUTRIN XL) 300 MG 24 hr tablet     Topiramate (TOPAMAX PO)     ASPIRIN PO     etonogestrel-ethinyl estradiol (NUVARING) 0.12-0.015 MG/24HR vaginal ring     No current facility-administered medications for this visit.        No Known Allergies    PHYSICAL EXAMINATION:  BP (!) 153/91  Pulse 86  Temp 99.1  F (37.3  C) (Oral)  Ht 1.626 m (5' 4\")  Wt 132.4 kg (291 lb 14.4 oz)  LMP  (LMP Unknown)  SpO2 97%  BMI 50.1 kg/m2   Body mass index is 50.1 kg/(m^2).   NAD NCAT  Respiratory: breathing unlabored  Abdomen: obese, soft/nt/nd    I emphasized exercise and activity behavior along with appropriate food choice as the main foundation for weight loss with surgery providing surgical reinforcement of the appropriate behavior set.    PLAN:    Planning of sleeve gastrectomy.  Working on losing 10 lbs.  Tolerating topiramate.  Would like to start phentermine.   Blood pressure check in 1 week after starting phentermine.    MEDICATION STARTED AT THIS APPOINTMENT    We are starting Phentermine. Take one tablet in the morning.  Call the nurse at [phone] if you have any questions or concerns. (Do not stop taking it if you don't think it's working. For some people it works without them knowing it.)    Phentermine is being prescribed because you identified hunger as one of the main causes for your extra weight.      Our patients on Phentermine find that they:    >feel less hunger    >find it " easier to push the plate away   >have an easier time eating less    For some of our patients, these feelings are very real and immediate. For other patients, the feelings are less obvious. They don't feel much of a change but find they've lost weight. Like all weight loss medications, Phentermine  works best when you help it work. This means:  1. Having less tempting high calorie (fattening) food around the house or office. (For people with strong cravings this is very important.)   2. Staying away from situations or people that may trigger your cravings .   3. Eating out only one time or less each week.  4. Eating your meals at a table with the TV or computer off.    Side-effects. Phentermine is generally well tolerated. The main side-effects we see are feelings of racing pulse or rapid heart beat. Some people can get an elevated blood pressure. Because of this we may have you come back within a week or so of starting the medication for a blood pressure check.         In order to get refills of this or any medication we prescribe you must be seen in the medical weight mgmt clinic every 2-3 months. Please have your pharmacy fax a refill request to [phone].    PRE-OPERATIVE WEIGHT LOSS SURGERY TASKLIST  Call Kaylee at [phone] for any questions regarding tasks on this list  Tentative surgery: Sleeve  Approximate Month and Year: May 2017 or when all tasks are completed.  Surgeon: Roxana Felipe Insurance Coverage: [employer]  Exclusions: none  _x___Seminar viewed.   _x___Research Consent Form signed.   _x___Registered on [employer].   _x___Received Decision Making Handout to read.   _x___Received information about Support Group, 1st Wednesday of the month at the [facility], 6:30p - 8:00pm.  _x___Letter of support from primary care provider. 3/27/17 To call PCP to schedule an appointment.     _x__Labs to be ordered today. 2/22/17 in Epic.  ____Lose 10 lbs prior to surgery from the initial consult weight of 291  lbs. Goal = 281 lbs.   ____Exercise goal: 20 mins., 5 days a week; increase as tolerated.   _x__Dietician visit at initial consult - 2/27/17  _x__Dietician visit month 2 - 3/27/17  ___Dietician visit month 3   ____Psychologist evaluation - 4/8/17 appt with Dr Casanova.  _x___Rheumatology clearance due to fibromyalgia.  _x___PSG shows no sleep apnea. Beign followed for daytime sleepiness.  ____Letter of clearance from pain clinic  The week before you meet with the surgeon, watch the following online classes and call DIANNA Page at [phone] to inform her that you watched them and to get any questions answered: 3/27/17 Given instructions to watch online classes.  _____Before Your Weight Loss Surgery Online Class at [facility]/beforewlsclass  _____After Your Weight Loss Surgery Online Class at [facility]/afterwlsclass  _x____See your surgeon Dr Schmidt in 1 month for meet and greet visit due to chronic pain(fibromyalgia) and chronic abdominal pain(endometriosis-pain resolved since November 2016). 3/27/27 Cleared for sleep gastrectomy by Dr Schmidt. Informed she did not need to have another visit prior to the surgery date.  _____Final PBS appt with Dr Schmidt when weight loss goal made.  _____After your visit with the surgeon, call [facility] at [phone] to finalize the surgery date and schedule your final appointments to be 3 weeks before your surgery date.       FINAL APPOINTMENTS  _____Weigh-in/Nurse Visit at Clinic 1E, 3 weeks before surgery.   Plan to be at your goal weight for this visit. This can be done the same day you meet the surgeon if you are at your goal weight.  _____Pre-assessment Clinic, to be done 3 weeks before surgery. Meet with the anesthesia team. Your pre-operative history and physical can be done at this visit.      DISCLAIMER: Based on the evaluation results, the bariatric team decides whether and which bariatric surgery is the best option for you. Sometimes, even if you meet all of the pre-operative  criteria, the bariatric team may still determine that in their medical judgement surgery is not recommended because of the risks to you.      CONTACT INFORMATION  ______If questions prior to surgery, call DIANNA Page at [phone].   ______If questions about insurance or scheduling surgery, call [facility] at [phone].  ______If questions after surgery and to schedule 1E clinic appts, please call our Call Center at [phone].  ______Fax: [phone] (preoperative documents, FMLA or return to work forms).        If you have any questions about our plans please don't hesitate to contact me.    Sincerely,    Yu Evans PA-C    I spent a total of 20 minutes face to face with Ashli Gamboa during today's office visit.  Over 50% of this time was spent counseling the patient and/or coordinating care.

## 2017-04-05 NOTE — NURSING NOTE
"(   Chief Complaint   Patient presents with     RECHECK     Pt having problems losing weight    )    ( Weight: 291 lb 14.4 oz )  ( Height: 5' 4\" )  ( BMI (Calculated): 50.21 )  ( Initial Weight: 291 lb 12.8 oz )  ( Cumulative weight loss (lbs): -0.1 )  ( Last Visits Weight: 292 lb 9.6 oz )  ( Wt change since last visit (lbs): -0.7 )  (   )  (   )    ( BP: (!) 153/91 )  ( Site: Arm, upper left )  ( Temp: 99.1  F (37.3  C) )  ( Temp src: Oral )  ( Pulse: 86 )  (   )  ( SpO2: 97 % )    (   Patient Active Problem List   Diagnosis     Morbid obesity (H)     post traumatic Arthritis of big toe     Major depression, recurrent (H)     CARDIOVASCULAR SCREENING; LDL GOAL LESS THAN 160     Endometriosis     Fibromyalgia     Muscular deconditioning    )  (   Current Outpatient Prescriptions   Medication Sig Dispense Refill     buPROPion (WELLBUTRIN XL) 300 MG 24 hr tablet Take 1 tablet (300 mg) by mouth every morning 90 tablet 1     Topiramate (TOPAMAX PO) Take 25 mg by mouth       ASPIRIN PO Take 325 mg by mouth daily as needed for moderate pain       etonogestrel-ethinyl estradiol (NUVARING) 0.12-0.015 MG/24HR vaginal ring Place 1 each vaginally every 28 days 4 each 4    )  ( Diabetes Eval:    )    ( Pain Eval:  Data Unavailable )    ( Wound Eval:       )    (   History   Smoking Status     Former Smoker     Packs/day: 0.50     Years: 5.00     Types: Cigarettes     Start date: 1/2/2007     Quit date: 11/1/2013   Smokeless Tobacco     Never Used     Comment: stopped for about 6 months now as of 5/23/13    )    ( Signed By:  Freddie Yanez; April 5, 2017; 1:19 PM )    "

## 2017-04-05 NOTE — MR AVS SNAPSHOT
After Visit Summary   4/5/2017    Ashli Gamboa    MRN: [id number]           Patient Information     Date Of Birth          1980        Visit Information        Provider Department      4/5/2017 1:15 PM Yu Evans PA-C [facility]        Today's Diagnoses     Morbid obesity, unspecified obesity type (H)    -  1      Care Instructions    See RD in April  See Yu Evans in 1 month for f/u phentermine  Blood pressure check in 1 week  Added pain clinic clearance to task list    PRE-OPERATIVE WEIGHT LOSS SURGERY TASKLIST  Call Kaylee at [phone] for any questions regarding tasks on this list  Tentative surgery: Sleeve  Approximate Month and Year: May 2017 or when all tasks are completed.  Surgeon: Roxana Felipe Insurance Coverage: [employer]  Exclusions: none  _x___Seminar viewed.   _x___Research Consent Form signed.   _x___Registered on [employer].   _x___Received Decision Making Handout to read.   _x___Received information about Support Group, 1st Wednesday of the month at the [facility], 6:30p - 8:00pm.  _x___Letter of support from primary care provider. 3/27/17 To call PCP to schedule an appointment.     _x__Labs to be ordered today. 2/22/17 in Epic.  ____Lose 10 lbs prior to surgery from the initial consult weight of 291 lbs. Goal = 281 lbs.   ____Exercise goal: 20 mins., 5 days a week; increase as tolerated.   _x__Dietician visit at initial consult - 2/27/17  _x__Dietician visit month 2 - 3/27/17  ___Dietician visit month 3   ____Psychologist evaluation - 4/8/17 appt with Dr Casanova.  _x___Rheumatology clearance due to fibromyalgia.  _x___PSG shows no sleep apnea. Beign followed for daytime sleepiness.  ____Letter of clearance from Pain clinic  The week before you meet with the surgeon, watch the following online classes and call DIANNA Page at [phone] to inform her that you watched them and to get any questions answered: 3/27/17 Given  instructions to watch online classes.  _____Before Your Weight Loss Surgery Online Class at [email or web address]  _____After Your Weight Loss Surgery Online Class at [email or web address]  _x____See your surgeon Dr Schmidt in 1 month for meet and greet visit due to chronic pain(fibromyalgia) and chronic abdominal pain(endometriosis-pain resolved since November 2016). 3/27/27 Cleared for sleep gastrectomy by Dr Schmidt. Informed she did not need to have another visit prior to the surgery date.  _____Final PBS appt with Dr Schmidt when weight loss goal made.  _____After your visit with the surgeon, call [facility] at [phone] to finalize the surgery date and schedule your final appointments to be 3 weeks before your surgery date.       FINAL APPOINTMENTS  _____Weigh-in/Nurse Visit at Clinic 1E, 3 weeks before surgery.   Plan to be at your goal weight for this visit. This can be done the same day you meet the surgeon if you are at your goal weight.  _____Pre-assessment Clinic, to be done 3 weeks before surgery. Meet with the anesthesia team. Your pre-operative history and physical can be done at this visit.      DISCLAIMER: Based on the evaluation results, the bariatric team decides whether and which bariatric surgery is the best option for you. Sometimes, even if you meet all of the pre-operative criteria, the bariatric team may still determine that in their medical judgement surgery is not recommended because of the risks to you.      CONTACT INFORMATION  ______If questions prior to surgery, call DIANNA Page at [phone].   ______If questions about insurance or scheduling surgery, call [facility] at [phone].  ______If questions after surgery and to schedule 1E clinic appts, please call our Call Center at [phone].  ______Fax: [phone] (preoperative documents, FMLA or return to work forms).                Follow-ups after your visit        Follow-up notes from your care team     Return in 4 weeks (on  5/3/2017).      Your next 10 appointments already scheduled     Apr 08, 2017  9:30 AM CDT   (Arrive by 9:15 AM)   PSYCH EVALUATION with Suki Casanova, PhD [facility] ([facility])    [address] [facility] [address] [phone]            Apr 28, 2017 11:30 AM CDT   NUTRITION VISIT with Veronika Morrison RD   [facility] ([facility])    [address]  [address] [facility] [address] [phone]              Who to contact     Please call your clinic at [phone] to:    Ask questions about your health    Make or cancel appointments    Discuss your medicines    Learn about your test results    Speak to your doctor   If you have compliments or concerns about an experience at your clinic, or if you wish to file a complaint, please contact [facility] Patient Relations at [phone] or email us at [email or web address]         Additional Information About Your Visit        [email or web address] Information     Tagged gives you secure access to your electronic health record. If you see a primary care provider, you can also send messages to your care team and make appointments. If you have questions, please call your primary care clinic.  If you do not have a primary care provider, please call [phone] and they will assist you.      Tagged is an electronic gateway that provides easy, online access to your medical records. With Tagged, you can request a clinic appointment, read your test results, renew a prescription or communicate with your care team.     To access your existing account, please contact your [facility] or call [phone] for assistance.        Care EveryWhere ID     This is your Care EveryWhere ID. This could be used by other organizations to access your [place] medical records  [id number]    "     Your Vitals Were     Pulse Temperature Height Last Period Pulse Oximetry BMI (Body Mass Index)    86 99.1  F (37.3  C) (Oral) 5' 4\" (LMP Unknown) 97% 50.1 kg/m2       Blood Pressure from Last 3 Encounters:   04/05/17 (!) 153/91   04/03/17 113/75   03/27/17 141/87    Weight from Last 3 Encounters:   04/05/17 291 lb 14.4 oz   04/03/17 293 lb 6 oz   03/27/17 292 lb 9.6 oz              Today, you had the following     No orders found for display         Today's Medication Changes          These changes are accurate as of: 4/5/17  1:57 PM.  If you have any questions, ask your nurse or doctor.               Start taking these medicines.        Dose/Directions    phentermine 37.5 MG tablet   Commonly known as:  ADIPEX-P   Used for:  Morbid obesity, unspecified obesity type (H)   Started by:  Yu Evans PA-C        Dose:  0.5 tablet   Take 0.5 tablets (18.75 mg) by mouth every morning   Quantity:  15 tablet   Refills:  1            Where to get your medicines      Some of these will need a paper prescription and others can be bought over the counter.  Ask your nurse if you have questions.     Bring a paper prescription for each of these medications     phentermine 37.5 MG tablet                Primary Care Provider Office Phone # Fax #    Karena August DANIELE Mo [facility] [phone]       [address] [address]        Thank you!     Thank you for choosing [facility]  for your care. Our goal is always to provide you with excellent care. Hearing back from our patients is one way we can continue to improve our services. Please take a few minutes to complete the written survey that you may receive in the mail after your visit with us. Thank you!             Your Updated Medication List - Protect others around you: Learn how to safely use, store and throw away your medicines at www.disposemymeds.org.          This list is accurate as " of: 4/5/17  1:57 PM.  Always use your most recent med list.                   Brand Name Dispense Instructions for use    ASPIRIN PO      Take 325 mg by mouth daily as needed for moderate pain       buPROPion 300 MG 24 hr tablet    WELLBUTRIN XL    90 tablet    Take 1 tablet (300 mg) by mouth every morning       etonogestrel-ethinyl estradiol 0.12-0.015 MG/24HR vaginal ring    NUVARING    4 each    Place 1 each vaginally every 28 days       phentermine 37.5 MG tablet    ADIPEX-P    15 tablet    Take 0.5 tablets (18.75 mg) by mouth every morning       TOPAMAX PO      Take 25 mg by mouth

## 2017-04-05 NOTE — LETTER
2017       RE: Ashli Gamboa  [address]  [place] [address]     Dear Colleague,    Thank you for referring your patient, Ashli Gamboa, to the [facility] SURGICAL WEIGHT MANAGEMENT at [facility]. Please see a copy of my visit note below.    Pre-Bariatric Surgery Note    Karena Mo    Date: 2017     RE: Ashli Gamboa    MR#: [id number]   : 1980   Date of Visit: 2017    REASON FOR VISIT: Preoperative evaluation for possible weight loss surgery    Dear Karena Das,    I had the pleasure of seeing your patient, Ashli Gamboa, in my preoperative bariatric clinic.    As you know, she is morbidly obese and considering weight loss surgery to treat obesity in association with her medical conditions of obesity.  Her consult weight was 291.  She has lost 0 pounds since her consult weight. She has not met her required pre-surgery weight.    Started Topiramate last month by pain clinic and is taking 50mg BID.  She is tolerating it.      She is interested in starting phentermine.  She has tolerated this in the past.     Most recent weights:  Wt Readings from Last 4 Encounters:   17 132.4 kg (291 lb 14.4 oz)   17 133.1 kg (293 lb 6 oz)   17 132.7 kg (292 lb 9.6 oz)   17 132.8 kg (292 lb 11.2 oz)       Please refer to initial consult note from date 17 for patient's weight history and co-morbidities.    ROS    Past Medical History:   Diagnosis Date     Arthritis     BIG TOE     Depressive disorder     I have been on and off medication for the last several years     Endometriosis      Obese        Past Surgical History:   Procedure Laterality Date     ABDOMEN SURGERY      laparoscopy X2     DILATE CERVIX, HYSTEROSCOPY, ABLATE ENDOMETRIUM NOVASURE, COMBINED N/A 2016    Procedure: COMBINED DILATE CERVIX, HYSTEROSCOPY, ABLATE ENDOMETRIUM NOVASURE;  Surgeon: Sabas Patel MD;  Location:  "[facility]     GYN SURGERY  dates above    myomectomy x2, laparoscopy x1 (soon to be 2)     LAPAROSCOPIC LYSIS ADHESIONS  7/10/2014    Procedure: LAPAROSCOPIC LYSIS ADHESIONS;  Surgeon: Sabas Patel MD;  Location: [facility]     LASER CO2 LAPAROSCOPIC VAPORIZATION ENDOMETRIUM  7/10/2014    Procedure: LASER CO2 LAPAROSCOPIC VAPORIZATION ENDOMETRIUM;  Surgeon: Sabas Patel MD;  Location: [facility]     LASER CO2 LAPAROSCOPIC VAPORIZATION ENDOMETRIUM N/A 6/30/2015    Procedure: LASER CO2 LAPAROSCOPIC VAPORIZATION ENDOMETRIUM;  Surgeon: Sabas Patel MD;  Location: [facility]     LASER CO2 LAPAROSCOPY DIAGNOSTIC, LYSIS ADHESIONS, COMBINED N/A 6/30/2015    Procedure: COMBINED LASER CO2 LAPAROSCOPY DIAGNOSTIC, LYSIS ADHESIONS;  Surgeon: Sabas Patel MD;  Location: [facility]       Current Outpatient Prescriptions   Medication     buPROPion (WELLBUTRIN XL) 300 MG 24 hr tablet     Topiramate (TOPAMAX PO)     ASPIRIN PO     etonogestrel-ethinyl estradiol (NUVARING) 0.12-0.015 MG/24HR vaginal ring     No current facility-administered medications for this visit.        No Known Allergies    PHYSICAL EXAMINATION:  BP (!) 153/91  Pulse 86  Temp 99.1  F (37.3  C) (Oral)  Ht 1.626 m (5' 4\")  Wt 132.4 kg (291 lb 14.4 oz)  LMP  (LMP Unknown)  SpO2 97%  BMI 50.1 kg/m2   Body mass index is 50.1 kg/(m^2).   NAD NCAT  Respiratory: breathing unlabored  Abdomen: obese, soft/nt/nd    I emphasized exercise and activity behavior along with appropriate food choice as the main foundation for weight loss with surgery providing surgical reinforcement of the appropriate behavior set.    PLAN:    Planning of sleeve gastrectomy.  Working on losing 10 lbs.  Tolerating topiramate.  Would like to start phentermine.   Blood pressure check in 1 week after starting phentermine.    MEDICATION STARTED AT THIS APPOINTMENT    We are starting Phentermine. Take one tablet in the morning.  Call the nurse at [phone] if you have any questions or concerns. " (Do not stop taking it if you don't think it's working. For some people it works without them knowing it.)    Phentermine is being prescribed because you identified hunger as one of the main causes for your extra weight.      Our patients on Phentermine find that they:    >feel less hunger    >find it easier to push the plate away   >have an easier time eating less    For some of our patients, these feelings are very real and immediate. For other patients, the feelings are less obvious. They don't feel much of a change but find they've lost weight. Like all weight loss medications, Phentermine  works best when you help it work. This means:  1. Having less tempting high calorie (fattening) food around the house or office. (For people with strong cravings this is very important.)   2. Staying away from situations or people that may trigger your cravings .   3. Eating out only one time or less each week.  4. Eating your meals at a table with the TV or computer off.    Side-effects. Phentermine is generally well tolerated. The main side-effects we see are feelings of racing pulse or rapid heart beat. Some people can get an elevated blood pressure. Because of this we may have you come back within a week or so of starting the medication for a blood pressure check.         In order to get refills of this or any medication we prescribe you must be seen in the medical weight mgmt clinic every 2-3 months. Please have your pharmacy fax a refill request to [phone].    PRE-OPERATIVE WEIGHT LOSS SURGERY TASKLIST  Call Kaylee at [phone] for any questions regarding tasks on this list  Tentative surgery: Sleeve  Approximate Month and Year: May 2017 or when all tasks are completed.  Surgeon: Roxana Felipe Insurance Coverage: [employer]  Exclusions: none  _x___Seminar viewed.   _x___Research Consent Form signed.   _x___Registered on [employer]   _x___Received Decision Making Handout to read.   _x___Received information about  Support Group, 1st Wednesday of the month at the [facility], 6:30p - 8:00pm.  _x___Letter of support from primary care provider. 3/27/17 To call PCP to schedule an appointment.     _x__Labs to be ordered today. 2/22/17 in Epic.  ____Lose 10 lbs prior to surgery from the initial consult weight of 291 lbs. Goal = 281 lbs.   ____Exercise goal: 20 mins., 5 days a week; increase as tolerated.   _x__Dietician visit at initial consult - 2/27/17  _x__Dietician visit month 2 - 3/27/17  ___Dietician visit month 3   ____Psychologist evaluation - 4/8/17 appt with Dr Casanova.  _x___Rheumatology clearance due to fibromyalgia.  _x___PSG shows no sleep apnea. Beign followed for daytime sleepiness.  ____Letter of clearance from pain clinic  The week before you meet with the surgeon, watch the following online classes and call DIANNA Page at [phone] to inform her that you watched them and to get any questions answered: 3/27/17 Given instructions to watch online classes.  _____Before Your Weight Loss Surgery Online Class at [email or web address]  _____After Your Weight Loss Surgery Online Class at [email or web address]  _x____See your surgeon Dr Schmidt in 1 month for meet and greet visit due to chronic pain(fibromyalgia) and chronic abdominal pain(endometriosis-pain resolved since November 2016). 3/27/27 Cleared for sleep gastrectomy by Dr Schmidt. Informed she did not need to have another visit prior to the surgery date.  _____Final PBS appt with Dr Schmidt when weight loss goal made.  _____After your visit with the surgeon, call Segun at [phone] to finalize the surgery date and schedule your final appointments to be 3 weeks before your surgery date.       FINAL APPOINTMENTS  _____Weigh-in/Nurse Visit at Clinic 1E, 3 weeks before surgery.   Plan to be at your goal weight for this visit. This can be done the same day you meet the surgeon if you are at your goal weight.  _____Pre-assessment Clinic, to be done 3 weeks  before surgery. Meet with the anesthesia team. Your pre-operative history and physical can be done at this visit.      DISCLAIMER: Based on the evaluation results, the bariatric team decides whether and which bariatric surgery is the best option for you. Sometimes, even if you meet all of the pre-operative criteria, the bariatric team may still determine that in their medical judgement surgery is not recommended because of the risks to you.      CONTACT INFORMATION  ______If questions prior to surgery, call DIANNA Page at [phone].   ______If questions about insurance or scheduling surgery, call Segun at [phone].  ______If questions after surgery and to schedule 1E clinic appts, please call our Call Center at [phone].  ______Fax: [phone] (preoperative documents, FMLA or return to work forms).        If you have any questions about our plans please don't hesitate to contact me.    Sincerely,    Yu Evans PA-C    I spent a total of 20 minutes face to face with Ashli Gamboa during today's office visit.  Over 50% of this time was spent counseling the patient and/or coordinating care.

## 2017-04-14 ENCOUNTER — TELEPHONE (OUTPATIENT)
Dept: SLEEP MEDICINE | Facility: CLINIC | Age: 37
End: 2017-04-14

## 2017-04-14 NOTE — TELEPHONE ENCOUNTER
Insurance coverage is denied for Modafinil. Voice mail left with patient to discuss further plans.

## 2017-04-21 ENCOUNTER — TRANSFERRED RECORDS (OUTPATIENT)
Dept: HEALTH INFORMATION MANAGEMENT | Facility: CLINIC | Age: 37
End: 2017-04-21

## 2017-04-25 ENCOUNTER — CARE COORDINATION (OUTPATIENT)
Dept: SURGERY | Facility: CLINIC | Age: 37
End: 2017-04-25

## 2017-04-25 NOTE — PROGRESS NOTES
Tasklist reviewed.  PRE-OPERATIVE WEIGHT LOSS SURGERY TASKLIST  Call Kaylee at [phone] for any questions regarding tasks on this list  Tentative surgery: Sleeve  Approximate Month and Year: June 2017 or when all tasks are completed.  Surgeon: Roxana Felipe Insurance Coverage: [employer]  Exclusions: none  _x___Seminar viewed.   _x___Research Consent Form signed.   _x___Registered on [email or web address].   _x___Received Decision Making Handout to read.   _x___Received information about Support Group, 1st Wednesday of the month at [facility], 6:30p - 8:00pm.  _x___Letter of support from primary care provider. 4/3/17 Letter in Epic from DANIELE Osorio CNP     _x__Labs to be ordered today. 2/22/17 in Epic.  ____Lose 10 lbs prior to surgery from the initial consult weight of 291 lbs. Goal = 281 lbs.   ____Exercise goal: 20 mins., 5 days a week; increase as tolerated.   _x__Dietician visit at initial consult - 2/27/17  _x__Dietician visit month 2 - 3/27/17  ____Dietician visit month 3 - 4/28/17  ____Dietician visits ongoing until at goal weight.  ____Psychologist evaluation - 5/2//17 appt with Dr Casanova.  _x__Rheumatology clearance due to fibromyalgia.  _x__PSG shows no sleep apnea. Being followed for daytime sleepiness.  ____Pain Clinic (added 4/5/17)  ____Meet with Yu Evans PA-C for medical weight management. Appt 4/28/17.  The week before you meet with the surgeon, watch the following online classes and call DIANNA Page at [phone] to inform her that you watched them and to get any questions answered: 3/27/17 Given instructions to watch online classes.  _____Before Your Weight Loss Surgery Online Class at [email or web address]  _____After Your Weight Loss Surgery Online Class at [email or web address]  _x____See your surgeon Dr Schmidt in 1 month for meet and greet visit due to chronic pain(fibromyalgia) and chronic abdominal pain(endometriosis-pain resolved since November 2016). 3/27/27 Cleared  for sleep gastrectomy by Dr Schmidt. Informed she did not need to have another visit prior to the surgery date.  _____After your visit with the surgeon, call Segun at [phone] to finalize the surgery date and schedule your final appointments to be 3 weeks before your surgery date.       FINAL APPOINTMENTS  _____Weigh-in/Nurse Visit at Clinic 1E, 3 weeks before surgery.   Plan to be at your goal weight for this visit. This can be done the same day you meet the surgeon if you are at your goal weight.  _____Pre-assessment Clinic, to be done 3 weeks before surgery. Meet with the anesthesia team. Your pre-operative history and physical can be done at this visit.      DISCLAIMER: Based on the evaluation results, the bariatric team decides whether and which bariatric surgery is the best option for you. Sometimes, even if you meet all of the pre-operative criteria, the bariatric team may still determine that in their medical judgement surgery is not recommended because of the risks to you.      CONTACT INFORMATION  ______If questions prior to surgery, call DIANNA Page at [phone].   ______If questions about insurance or scheduling surgery, call Segun at [phone].  ______If questions after surgery and to schedule 1E clinic appts, please call our Call Center at [phone].  ______Fax: [phone] (preoperative documents, FMLA or return to work forms).

## 2017-05-01 ENCOUNTER — MEDICAL CORRESPONDENCE (OUTPATIENT)
Dept: HEALTH INFORMATION MANAGEMENT | Facility: CLINIC | Age: 37
End: 2017-05-01

## 2017-05-02 ENCOUNTER — OFFICE VISIT (OUTPATIENT)
Dept: NEUROPSYCHOLOGY | Facility: CLINIC | Age: 37
End: 2017-05-02

## 2017-05-02 DIAGNOSIS — E66.01 MORBID OBESITY DUE TO EXCESS CALORIES (H): ICD-10-CM

## 2017-05-02 DIAGNOSIS — F54 PSYCHOLOGICAL FACTORS AFFECTING MEDICAL CONDITION: Primary | ICD-10-CM

## 2017-05-02 NOTE — MR AVS SNAPSHOT
After Visit Summary   5/2/2017    Ashli Gamboa    MRN: [id number]           Patient Information     Date Of Birth          1980        Visit Information        Provider Department      5/2/2017 1:00 PM Suki Casanova, PhD [facility] Neuropsychology        Today's Diagnoses     Psychological factors affecting medical condition    -  1    Morbid obesity due to excess calories (H)           Follow-ups after your visit        Your next 10 appointments already scheduled     Jun 05, 2017  8:30 AM CDT   NUTRITION VISIT with Veronika Morrison RD   [facility] ([facility])    [address] [place] [id number] [phone]              Who to contact     Please call your clinic at [phone] to:    Ask questions about your health    Make or cancel appointments    Discuss your medicines    Learn about your test results    Speak to your doctor   If you have compliments or concerns about an experience at your clinic, or if you wish to file a complaint, please contact [facility] Patient Relations at [phone] or email us at [email or web address]         Additional Information About Your Visit        MyChart Information     [employer] gives you secure access to your electronic health record. If you see a primary care provider, you can also send messages to your care team and make appointments. If you have questions, please call your primary care clinic.  If you do not have a primary care provider, please call [phone] and they will assist you.      [employer] is an electronic gateway that provides easy, online access to your medical records. With [employer], you can request a clinic appointment, read your test results, renew a prescription or communicate with your care team.     To access your existing account, please contact your [facility] or call  [phone] for assistance.        Care EveryWhere ID     This is your Care EveryWhere ID. This could be used by other organizations to access your [facility] medical records  [id number]        Your Vitals Were     Last Period                   (LMP Unknown)            Blood Pressure from Last 3 Encounters:   04/05/17 (!) 151/98   04/03/17 113/75   03/27/17 141/87    Weight from Last 3 Encounters:   04/05/17 132.4 kg (291 lb 14.4 oz)   04/03/17 133.1 kg (293 lb 6 oz)   03/27/17 132.7 kg (292 lb 9.6 oz)              We Performed the Following     [id number] TEST BY PSYCHOLOGIST/MD, PER HR     C PSYCHIATRIC DIAGNOSTIC EVALUATION        Primary Care Provider Office Phone # Fax #    Karena DANIELE Palmer [phone]       [address] [address]        Thank you!     Thank you for choosing [facility] NEUROPSYCHOLOGY  for your care. Our goal is always to provide you with excellent care. Hearing back from our patients is one way we can continue to improve our services. Please take a few minutes to complete the written survey that you may receive in the mail after your visit with us. Thank you!             Your Updated Medication List - Protect others around you: Learn how to safely use, store and throw away your medicines at www.disposemymeds.org.          This list is accurate as of: 5/2/17 11:59 PM.  Always use your most recent med list.                   Brand Name Dispense Instructions for use    ASPIRIN PO      Take 325 mg by mouth daily as needed for moderate pain       buPROPion 300 MG 24 hr tablet    WELLBUTRIN XL    90 tablet    Take 1 tablet (300 mg) by mouth every morning       etonogestrel-ethinyl estradiol 0.12-0.015 MG/24HR vaginal ring    NUVARING    4 each    Place 1 each vaginally every 28 days       phentermine 37.5 MG tablet    ADIPEX-P    15 tablet    Take 0.5 tablets (18.75 mg) by mouth every morning       TOPAMAX PO      Take 25  mg by mouth
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
## 2017-06-08 ENCOUNTER — CARE COORDINATION (OUTPATIENT)
Dept: SURGERY | Facility: CLINIC | Age: 37
End: 2017-06-08

## 2017-06-08 NOTE — PROGRESS NOTES
Left message for client to call coordinator to review tasklist.    PRE-OPERATIVE WEIGHT LOSS SURGERY TASKLIST  Call Kaylee at [phone] for any questions regarding tasks on this list  Tentative surgery: Sleeve  Approximate Month and Year: June 2017 or when all tasks are completed.  Surgeon: Roxana Felipe Insurance Coverage: [employer]  Exclusions: none  _x___Seminar viewed.   _x___Research Consent Form signed.   _x___Registered on [employer].   _x___Received Decision Making Handout to read.   _x___Received information about Support Group, 1st Wednesday of the month at the [facility], 6:30p - 8:00pm.  _x___Letter of support from primary care provider. 4/3/17 Letter in Epic from DANIELE Osorio CNP      _x__Labs to be ordered today. 2/22/17 in Epic.  ____Lose 10 lbs prior to surgery from the initial consult weight of 291 lbs. Goal = 281 lbs.   ____Exercise goal: 20 mins., 5 days a week; increase as tolerated.   _x__Dietician visit at initial consult - 2/27/17  _x__Dietician visit month 2 - 3/27/17  ____Dietician visit month 3 - 4/28/17  ____Dietician visits ongoing until at goal weight.  _x___Psychologist evaluation - 5/2/17 appt with Dr Casanova - cleared.  _x__Rheumatology clearance due to fibromyalgia.  _x__PSG shows no sleep apnea. Being followed for daytime sleepiness.  ____Pain Clinic (added 4/5/17) - 4/21/17 pain clinic note from Radha Palm CNP stating she will send a letter after she gets a letter from patient indicating what we need from her.  ____Meet with Yu Evans PA-C for medical weight management. Appt 4/28/17.  The week before you meet with the surgeon, watch the following online classes and call DIANNA Paeg at [phone] to inform her that you watched them and to get any questions answered: 3/27/17 Given instructions to watch online classes.  _____Before Your Weight Loss Surgery Online Class at [email or web address]  _____After Your Weight Loss Surgery Online Class at  [email or web address]  _x____See your surgeon Dr Schmidt in 1 month for meet and greet visit due to chronic pain(fibromyalgia) and chronic abdominal pain(endometriosis-pain resolved since November 2016). 3/27/27 Cleared for sleep gastrectomy by Dr Schmidt. Informed she did not need to have another visit prior to the surgery date.  _____After your visit with the surgeon, call Segun at [phone] to finalize the surgery date and schedule your final appointments to be 3 weeks before your surgery date.       FINAL APPOINTMENTS  _____Weigh-in/Nurse Visit at Clinic 1E, 3 weeks before surgery.   Plan to be at your goal weight for this visit. This can be done the same day you meet the surgeon if you are at your goal weight.  _____Pre-assessment Clinic, to be done 3 weeks before surgery. Meet with the anesthesia team. Your pre-operative history and physical can be done at this visit.      DISCLAIMER: Based on the evaluation results, the bariatric team decides whether and which bariatric surgery is the best option for you. Sometimes, even if you meet all of the pre-operative criteria, the bariatric team may still determine that in their medical judgement surgery is not recommended because of the risks to you.      CONTACT INFORMATION  ______If questions prior to surgery, call DIANNA Page at [phone].   ______If questions about insurance or scheduling surgery, call Segun at [phone].  ______If questions after surgery and to schedule 1E clinic appts, please call our Call Center at [phone].  ______Fax: [phone] (preoperative documents, FMLA or return to work forms).

## 2017-06-15 ENCOUNTER — CARE COORDINATION (OUTPATIENT)
Dept: SURGERY | Facility: CLINIC | Age: 37
End: 2017-06-15

## 2017-06-15 NOTE — PROGRESS NOTES
Reviewed tasklist, scheduled PBS and RD visits.     PRE-OPERATIVE WEIGHT LOSS SURGERY TASKLIST  Call Kaylee at [phone] for any questions regarding tasks on this list  Tentative surgery: Sleeve  Approximate Month and Year: July 2017  Surgeon: Roxana Felipe Insurance Coverage: [employer]  Exclusions: none  _x___Seminar viewed.   _x___Research Consent Form signed.   _x___Registered on [employer].   _x___Received Decision Making Handout to read.   _x___Received information about Support Group, 1st Wednesday of the month at [facility], 6:30p - 8:00pm.  _x___Letter of support from primary care provider. 4/3/17 Letter in Epic from DANIELE Osorio CNP      _x__Labs to be ordered today. 2/22/17 in Epic.  ____Lose 10 lbs prior to surgery from the initial consult weight of 291 lbs. Goal = 281 lbs.   Exercise goal: 20 mins., 5 days a week; increase as tolerated.   _x__Dietician visit at initial consult - 2/27/17  _x__Dietician visit month 2 - 3/27/17  ____Dietician visit month 3 - 6/29/17 appt  ____Dietician visits ongoing until at goal weight.  _x___Psychologist evaluation - 5/2/17 appt with Dr Casanova - cleared.  _x__Rheumatology clearance due to fibromyalgia.  _x__PSG shows no sleep apnea. Being followed for daytime sleepiness.  _x___Pain Clinic (added 4/5/17) - 4/21/17 pain clinic note from Radha Plam CNP stating she will send a letter after she gets a letter from patient indicating what we need from her. Letter dated 5/1/17.  _x___Meet with Yu Evans PA-C for medical weight management. Appt 4/5/17.  The week before you meet with the surgeon, watch the following online classes and call DIANNA Page at [phone] to inform her that you watched them and to get any questions answered: 3/27/17 & 6/15/17 Given instructions to watch online classes.  _x____Before Your Weight Loss Surgery Online Class at [email or web address]  _x____After Your Weight Loss Surgery Online Class at  [email or web address]  _x____See your surgeon Dr Schmidt in 1 month for meet and greet visit due to chronic pain(fibromyalgia) and chronic abdominal pain(endometriosis-pain resolved since November 2016). 3/27/27 Cleared for sleep gastrectomy by Dr Schmidt. Informed she did not need to have another visit prior to the surgery date.  Set up another PBS visit to review pain clinic note and have nurse teaching done.  _____After your visit with the surgeon, call Segun at [phone] to finalize the surgery date and schedule your final appointments to be 3 weeks before your surgery date.       FINAL APPOINTMENTS  _____Weigh-in/Nurse Visit at Clinic 1E, 3 weeks before surgery.   Plan to be at your goal weight for this visit. This can be done the same day you meet the surgeon if you are at your goal weight.  _____Pre-assessment Clinic, to be done 3 weeks before surgery. Meet with the anesthesia team. Your pre-operative history and physical can be done at this visit.      DISCLAIMER: Based on the evaluation results, the bariatric team decides whether and which bariatric surgery is the best option for you. Sometimes, even if you meet all of the pre-operative criteria, the bariatric team may still determine that in their medical judgement surgery is not recommended because of the risks to you.      CONTACT INFORMATION  ______If questions prior to surgery, call DIANNA Page at [phone].   ______If questions about insurance or scheduling surgery, call Segun at [phone].  ______If questions after surgery and to schedule 1E clinic appts, please call our Call Center at [phone].  ______Fax: [phone] (preoperative documents, FMLA or return to work forms).

## 2017-06-29 ENCOUNTER — ALLIED HEALTH/NURSE VISIT (OUTPATIENT)
Dept: SURGERY | Facility: CLINIC | Age: 37
End: 2017-06-29

## 2017-06-29 ENCOUNTER — OFFICE VISIT (OUTPATIENT)
Dept: SURGERY | Facility: CLINIC | Age: 37
End: 2017-06-29

## 2017-06-29 VITALS
HEIGHT: 64 IN | HEART RATE: 87 BPM | DIASTOLIC BLOOD PRESSURE: 89 MMHG | OXYGEN SATURATION: 99 % | WEIGHT: 281.7 LBS | TEMPERATURE: 98.8 F | BODY MASS INDEX: 48.09 KG/M2 | SYSTOLIC BLOOD PRESSURE: 139 MMHG

## 2017-06-29 DIAGNOSIS — E66.01 MORBID OBESITY DUE TO EXCESS CALORIES (H): Primary | ICD-10-CM

## 2017-06-29 NOTE — NURSING NOTE
This patient is having Sleeve by Dr. Schmidt.    The following handouts were reviewed with the patient :  Before Your Surgery, Patient Checklist, Weight Loss Surgery Pre-operative Class, Preop Recommendations Quick Reference Guide, History and Physical, Medications to Avoid, Shower or Bathing Before Surgery, Bowel Preparation, Powerful Choices and [place] Advance Health Care Directive.  Questions were addressed and understanding of content was verbalized.  Contact information was provided.    Advised to contact Segun for appt with PAC and surgery date

## 2017-06-29 NOTE — PATIENT INSTRUCTIONS
GOALS:    1. Follow the bariatric post-op diet advancement schedule:  - Bariatric clear liquid diet through post-op day 1 (while in the hospital).  - Bariatric low-fat full liquid diet on post-op days 2 through 13 (2 weeks).  2. Sip on 48-64 oz (or greater) fluids daily, recording intake to help stay on-track.  - Drink at least 2 oz of fluid every 30 min.  Protein shakes:  200 calorie or less  At least 20 gm protein  Less than 10 gm sugar    Yareli Ibrahim RD, LD

## 2017-06-29 NOTE — NURSING NOTE
"(   Chief Complaint   Patient presents with     RECHECK     PBS, needs nurse teaching    )    ( Weight: 281 lb 11.2 oz )  ( Height: 5' 3.98\" )  ( BMI (Calculated): 48.49 )  ( Initial Weight: 291 lb 12.8 oz )  ( Cumulative weight loss (lbs): 10.1 )  ( Last Visits Weight: 291 lb 14.4 oz )  ( Wt change since last visit (lbs): -10.2 )  (   )  (   )    ( BP: 139/89 )  (   )  ( Temp: 98.8  F (37.1  C) )  ( Temp src: Oral )  ( Pulse: 87 )  (   )  ( SpO2: 99 % )    (   Patient Active Problem List   Diagnosis     Morbid obesity (H)     post traumatic Arthritis of big toe     Major depression, recurrent (H)     CARDIOVASCULAR SCREENING; LDL GOAL LESS THAN 160     Endometriosis     Fibromyalgia     Muscular deconditioning    )  (   Current Outpatient Prescriptions   Medication Sig Dispense Refill     NUVARING 0.12-0.015 MG/24HR vaginal ring PLACE 1 RING VAGINALLY EVERY 28 DAYS AS DIRECTED 1 each 0     buPROPion (WELLBUTRIN XL) 300 MG 24 hr tablet Take 1 tablet (300 mg) by mouth every morning 90 tablet 1     Topiramate (TOPAMAX PO) Take 200 mg by mouth daily        ASPIRIN PO Take 325 mg by mouth daily as needed for moderate pain      )  ( Diabetes Eval:    )    ( Pain Eval:  Data Unavailable )    ( Wound Eval:       )    (   History   Smoking Status     Former Smoker     Packs/day: 0.50     Years: 5.00     Types: Cigarettes     Start date: 1/2/2007     Quit date: 11/1/2013   Smokeless Tobacco     Never Used     Comment: stopped for about 6 months now as of 5/23/13    )    ( Signed By:  Freddie Yanez; June 29, 2017; 1:18 PM )    "

## 2017-06-29 NOTE — LETTER
"6/29/2017       RE: Ashli Gamboa  [address] [place] [address]     Dear Colleague,    Thank you for referring your patient, Ashli Gamboa, to the [facility] SURGICAL WEIGHT MANAGEMENT at [facility]. Please see a copy of my visit note below.    Dear Karena Das,    Referring provider: Karena Mo  I was asked to see the patient regarding obesity by the referring provider above.    I had the pleasure of meeting with your patient Ashli Gamboa in our weight loss surgery office.  This patient is a 37 year old year old female who has been undergoing our thorough preoperative screening process in anticipation of potential bariatric surgery.    At initial evaluation we recorded Ashli Gamboa's   and Initial Weight: 291 lb 12.8 oz.  The patient has been unsuccessful with other methods of permanent weight loss and suffers from multiple weight related medical conditions.  Due to lack of success in achieving weight loss through other methods, she is interested in undergoing bariatric surgery.    PREVIOUS WEIGHT LOSS ATTEMPTS:  Previously noted.  Watching Portions or Calories, Exercise, Atkins-type Diet (Low Carb/High Protein), Medications    Has used topiramate during this preoperative timeframe.    CO-MORBIDITIES OF OBESITY INCLUDE:     2/13/2017   I have the following co-morbidities associated with obesity: Back Pain       VITALS:  /89  Pulse 87  Temp 98.8  F (37.1  C) (Oral)  Ht 5' 3.98\"  Wt 281 lb 11.2 oz  SpO2 99%  BMI 48.38 kg/m2    PE:  GENERAL: Alert and oriented x3. NAD  HEENT exam: Sclerae not icteric. Hearing good. Head normocephalic and atraumatic.   CARDIOVASCULAR: No JVD  RESPIRATORY: Breathing unlabored  GASTROINTESTINAL: Obese  LOWER EXTREMITIES: no deformities  MUSCULOSKELETAL: Normal gait, Moves all 4 extremities equal and strong  NEUROLOGIC: no gross defect  SKIN: warm and dry to touch     In summary, she has " undergone an appropriate medical evaluation, dietitian evaluation, as well as psychologic screening. The patient appears to be an appropriate candidate for bariatric surgery.    In the office today, I discussed the vertical sleeve gastrectomy surgery.  Risks, benefits and anticipated outcomes were outlined including the risk of death, staple line leak (1-2%), PE, DVT, ulcer, worsening GERD, N/V, stricture, hernia, wound infection, weight regain, and vitamin deficiencies. This patient has a good chance of sustaining permanent weight loss due to this procedure.  This should also allow improvement if not resolution of his/her weight related medical conditions.    At present we are going to present your patient's file for prior authorization to insurance.  Pending prior authorization, I anticipate a surgical date in the near future.  We will keep you updated on any progress.  If you have questions regarding care please feel free to contact me.       Sincerely,  Sam Schmidt    Please route or send letter to:  Primary Care Provider (PCP) and Referring Provider    ROS

## 2017-06-29 NOTE — MR AVS SNAPSHOT
MRN:[id number]                      After Visit Summary   6/29/2017    Ashli Gamboa    MRN: [id number]           Visit Information        Provider Department      6/29/2017 2:00 PM Essie Jon [facility] Surgical Weight Management        Your next 10 appointments already scheduled     Jul 05, 2017  1:00 PM CDT   PHYSICAL with Sabas Patel MD   [facility] ([facility])    [address] [place] [id number] [phone]              Care Instructions    GOALS:    1. Follow the bariatric post-op diet advancement schedule:  - Bariatric clear liquid diet through post-op day 1 (while in the hospital).  - Bariatric low-fat full liquid diet on post-op days 2 through 13 (2 weeks).  2. Sip on 48-64 oz (or greater) fluids daily, recording intake to help stay on-track.  - Drink at least 2 oz of fluid every 30 min.  Protein shakes:  200 calorie or less  At least 20 gm protein  Less than 10 gm sugar    Yareli Ibrahim RD, LD           [employer] Information     [employer] gives you secure access to your electronic health record. If you see a primary care provider, you can also send messages to your care team and make appointments. If you have questions, please call your primary care clinic.  If you do not have a primary care provider, please call [phone] and they will assist you.      [employer] is an electronic gateway that provides easy, online access to your medical records. With [employer], you can request a clinic appointment, read your test results, renew a prescription or communicate with your care team.     To access your existing account, please contact your [facility] or call [phone] for assistance.        Care EveryWhere ID     This is your Care EveryWhere ID. This could be used by other organizations to access your [place] medical records  [id number]        Equal Access to Services     FRANCO  GAAR : Hadii catina Wong, waaxda luqadaha, qaybta kaalmada sung, lynne parmar. So [facility] [phone].    ATENCIÓN: Si habla español, tiene a flynn disposición servicios gratuitos de asistencia lingüística. Llame al [phone].    We comply with applicable federal civil rights laws and [place] laws. We do not discriminate on the basis of race, color, national origin, age, disability sex, sexual orientation or gender identity.

## 2017-06-29 NOTE — PROGRESS NOTES
"  Bariatric Nutrition Consultation Note    Reason For Visit: Nutrition Reassessment    Ashli Gamboa is a 37 year-old presenting today for bariatric nutrition consult.  Pt is interested in laparoscopic sleeve gastrectomy.  This is pt's third of 3 RD visits required.  Pt was referred by Dr Schmidt(6/29/17).    She is interested in having weight loss surgery for the following reasons:  Pt would like to improve overall health, physical abilities (fibromyalgia), and wellbeing.     ANTHROPOMETRICS:    Height as of an earlier encounter on 2/22/17: 1.625 m (5' 3.98\").    Weight as of an earlier encounter on 2/22/17: 132.4 kg (291.8 lbs) with BMI of 50.12.  Current Weight: 292.7 lbs 281.7 lbs (-10.1 lbs from initial consult, - 11 lbs from last RD visit)      (Pt reported that she is weaning off of Lyrica which she had been on for fibromyalgia and also has the side effect of weight gain.  She will be starting topamax soon which can help both her fibromyalgia and weight issue.)    Required weight loss goal pre-op: -10 lbs from initial consult weight (goal weight 281.8 lbs or less before surgery)    Past Diet Attempts History: a self-imposed calorie restriction and Atkins and a Keto diet    NUTRITION HISTORY:  Food Allergies/Intolerances: none  Cravings: sugar  Triggers/cues causing extra eating: nothing in particular  Supplementation: MVI daily, tumeric    Progress with Previous Goals:   Relating To Eating:   Eat slowly (20-30 minutes per meal), chewing foods well (25 chews per bite/applesauce consistency)- Meeting   Focus on lean protein and non-starchy vegetables/whole fruit at each meal with no more than 1 cup of carbs or 2 slices of bread- Meeting.   (9\" plate method: 1/2 non-starchy vegetables and 1/4 lean protein and 1/4 carbs - no more than 1 c carbs/meal).   Record food intake prior to eating throughout the day.  May use MyFitnessPal.com or other phone application or write it in a notebook (food and amount). - " Meeting    Relating to beverages:  Separate fluids from meals by 30 minutes during and after eating. - Meeting     Relating to activity:  Increase activity level.  Exercise 5 days/week for 20 minutes- Not able to exercise everyday but most days.    NUTRITION DIAGNOSIS:  Food- and Nutrition-related knowledge deficit r/t lack of prior exposure to information AEB pt unable to verbalize main points of bariatric clear and low-fat full liquid diet.    INTERVENTION:  Provided instruction on bariatric clear and low-fat full liquid diets.  Provided the following handouts: Diet Guidelines for Bariatric Surgery, Sources of Protein, Keeping Track of Your Fluids, list of recommended vitamin/mineral supplementation after SG surgery and RD contact information.    Patient Understanding: good  Expected Compliance: good      GOALS:    1. Follow the bariatric post-op diet advancement schedule:  - Bariatric clear liquid diet through post-op day 1 (while in the hospital).  - Bariatric low-fat full liquid diet on post-op days 2 through 13 (2 weeks).  2. Sip on 48-64 oz (or greater) fluids daily, recording intake to help stay on-track.  - Drink at least 2 oz of fluid every 30 min.  Protein shakes:  200 calorie or less  At least 20 gm protein  Less than 10 gm sugar    Follow-Up: 1 month or PRN    Time spent with patient: 15 minutes.  Yareli Ibrahim, RD, LD

## 2017-06-29 NOTE — MR AVS SNAPSHOT
After Visit Summary   6/29/2017    Ashli Gamboa    MRN: [id number]           Patient Information     Date Of Birth          1980        Visit Information        Provider Department      6/29/2017 1:20 PM Sam Schmidt MD [facility]        Today's Diagnoses     Morbid obesity due to excess calories (H)    -  1       Follow-ups after your visit        Additional Services     PAC Visit Referral (For [facility] Only)       Does this visit require an Anesthesia consult?  Yes - Evaluate for medical necessity related to one of the following conditions:  Morbid Obesity    H&P done by:  PAC      Please be aware that coverage of these services is subject to the terms and limitations of your health insurance plan.  Call member services at your health plan with any benefit or coverage questions.      Please bring the following to your appointment:  >>   Any x-rays, CTs or MRIs which have been performed.  Contact the facility where they were done to arrange for  prior to your scheduled appointment.  Any new CT, MRI or other procedures ordered by your specialist must be performed at a [facility] facility or coordinated by your clinic's referral office.    >>   List of current medications  >>   This referral request   >>   Any documents/labs given to you for this referral                  Follow-up notes from your care team     Return if symptoms worsen or fail to improve.      Your next 10 appointments already scheduled     Jul 05, 2017  1:00 PM CDT   PHYSICAL with Sabas Patel MD   [facility] ([facility])    [address] [id number] [phone]              Who to contact     Please call your clinic at [phone] to:    Ask questions about your health    Make or cancel appointments    Discuss your medicines    Learn about your test results    Speak to your doctor   If you have compliments  "or concerns about an experience at your clinic, or if you wish to file a complaint, please contact [facility] Patient Relations at [phone] or email us at [email or web address]         Additional Information About Your Visit        MyChart Information     getupphart gives you secure access to your electronic health record. If you see a primary care provider, you can also send messages to your care team and make appointments. If you have questions, please call your primary care clinic.  If you do not have a primary care provider, please call [phone] and they will assist you.      Pinocular is an electronic gateway that provides easy, online access to your medical records. With Pinocular, you can request a clinic appointment, read your test results, renew a prescription or communicate with your care team.     To access your existing account, please contact your [facility] Clinic or call [phone] for assistance.        Care EveryWhere ID     This is your Care EveryWhere ID. This could be used by other organizations to access your [facility] medical records  [id number]        Your Vitals Were     Pulse Temperature Height Pulse Oximetry BMI (Body Mass Index)       87 98.8  F (37.1  C) (Oral) 5' 3.98\" 99% 48.38 kg/m2        Blood Pressure from Last 3 Encounters:   06/29/17 139/89   04/05/17 (!) 151/98   04/03/17 113/75    Weight from Last 3 Encounters:   06/29/17 281 lb 11.2 oz   04/05/17 291 lb 14.4 oz   04/03/17 293 lb 6 oz              We Performed the Following     PAC Visit Referral (For [facility] Only)          Today's Medication Changes          These changes are accurate as of: 6/29/17  2:27 PM.  If you have any questions, ask your nurse or doctor.               Stop taking these medicines if you haven't already. Please contact your care team if you have questions.     phentermine 37.5 MG tablet   Commonly known as:  ADIPEX-P   Stopped by:  Roxana, " Sam Jacobs MD                    Primary Care Provider Office Phone # Fax #    Karena Mo, DANIELE [phone]       [facility] [address] [facility] [address]        Equal Access to Services     FRANCO OLMSTEAD : Hadii catina moura hadmaryo Soomaali, waaxda luqadaha, qaybta kaalmada adeegyada, waxay idiin hayrachn fernie barcenaspatreynaldo parmar. So [facility] [phone].    ATENCIÓN: Si habla español, tiene a flynn disposición servicios gratuitos de asistencia lingüística. Llame al [phone].    We comply with applicable federal civil rights laws and [place] laws. We do not discriminate on the basis of race, color, national origin, age, disability sex, sexual orientation or gender identity.            Thank you!     Thank you for choosing [facility]  for your care. Our goal is always to provide you with excellent care. Hearing back from our patients is one way we can continue to improve our services. Please take a few minutes to complete the written survey that you may receive in the mail after your visit with us. Thank you!             Your Updated Medication List - Protect others around you: Learn how to safely use, store and throw away your medicines at www.disposemymeds.org.          This list is accurate as of: 6/29/17  2:27 PM.  Always use your most recent med list.                   Brand Name Dispense Instructions for use Diagnosis    ASPIRIN PO      Take 325 mg by mouth daily as needed for moderate pain        buPROPion 300 MG 24 hr tablet    WELLBUTRIN XL    90 tablet    Take 1 tablet (300 mg) by mouth every morning    Recurrent major depressive disorder, in partial remission (H)       NUVARING 0.12-0.015 MG/24HR vaginal ring   Generic drug:  etonogestrel-ethinyl estradiol     1 each    PLACE 1 RING VAGINALLY EVERY 28 DAYS AS DIRECTED    Encounter for surveillance of contraceptives       TOPAMAX PO      Take 200 mg by mouth daily

## 2017-06-30 DIAGNOSIS — Z98.84 BARIATRIC SURGERY STATUS: Primary | ICD-10-CM

## 2017-07-05 ENCOUNTER — RESULT FOLLOW UP (OUTPATIENT)
Dept: OBGYN | Facility: CLINIC | Age: 37
End: 2017-07-05

## 2017-07-05 ENCOUNTER — OFFICE VISIT (OUTPATIENT)
Dept: OBGYN | Facility: CLINIC | Age: 37
End: 2017-07-05
Payer: COMMERCIAL

## 2017-07-05 VITALS
HEIGHT: 64 IN | BODY MASS INDEX: 48.28 KG/M2 | SYSTOLIC BLOOD PRESSURE: 128 MMHG | WEIGHT: 282.8 LBS | DIASTOLIC BLOOD PRESSURE: 82 MMHG | HEART RATE: 84 BPM

## 2017-07-05 DIAGNOSIS — Z30.44 ENCOUNTER FOR SURVEILLANCE OF VAGINAL RING HORMONAL CONTRACEPTIVE DEVICE: ICD-10-CM

## 2017-07-05 DIAGNOSIS — Z01.419 ENCOUNTER FOR GYNECOLOGICAL EXAMINATION WITHOUT ABNORMAL FINDING: Primary | ICD-10-CM

## 2017-07-05 DIAGNOSIS — Z11.51 SCREENING FOR HUMAN PAPILLOMAVIRUS: ICD-10-CM

## 2017-07-05 DIAGNOSIS — R87.810 CERVICAL HIGH RISK HPV (HUMAN PAPILLOMAVIRUS) TEST POSITIVE: ICD-10-CM

## 2017-07-05 PROCEDURE — 88175 CYTOPATH C/V AUTO FLUID REDO: CPT | Performed by: OBSTETRICS & GYNECOLOGY

## 2017-07-05 PROCEDURE — 99395 PREV VISIT EST AGE 18-39: CPT | Performed by: OBSTETRICS & GYNECOLOGY

## 2017-07-05 RX ORDER — ETONOGESTREL AND ETHINYL ESTRADIOL VAGINAL RING .015; .12 MG/D; MG/D
RING VAGINAL
Qty: 1 EACH | Refills: 3 | Status: SHIPPED | OUTPATIENT
Start: 2017-07-05 | End: 2017-11-28

## 2017-07-05 ASSESSMENT — ANXIETY QUESTIONNAIRES
6. BECOMING EASILY ANNOYED OR IRRITABLE: SEVERAL DAYS
IF YOU CHECKED OFF ANY PROBLEMS ON THIS QUESTIONNAIRE, HOW DIFFICULT HAVE THESE PROBLEMS MADE IT FOR YOU TO DO YOUR WORK, TAKE CARE OF THINGS AT HOME, OR GET ALONG WITH OTHER PEOPLE: NOT DIFFICULT AT ALL
1. FEELING NERVOUS, ANXIOUS, OR ON EDGE: SEVERAL DAYS
2. NOT BEING ABLE TO STOP OR CONTROL WORRYING: NOT AT ALL
3. WORRYING TOO MUCH ABOUT DIFFERENT THINGS: NOT AT ALL
GAD7 TOTAL SCORE: 3
7. FEELING AFRAID AS IF SOMETHING AWFUL MIGHT HAPPEN: NOT AT ALL
5. BEING SO RESTLESS THAT IT IS HARD TO SIT STILL: NOT AT ALL

## 2017-07-05 ASSESSMENT — PATIENT HEALTH QUESTIONNAIRE - PHQ9: 5. POOR APPETITE OR OVEREATING: SEVERAL DAYS

## 2017-07-05 NOTE — LETTER
October 9, 2018      Ashli Gamboa  [address] [place] [address]    Dear ,      At [facility], your health and wellness is our primary concern. That is why we are following up on a colposcopy from 10/18/17. Your provider had recommended that you have a Pap smear and HPV test completed by 10/18/18. Our records do not show that this has been scheduled.    It is important to complete the follow up that your provider has suggested for you to ensure that there are no worsening changes which may, over time, develop into cancer.      Please contact our office at  [phone] to schedule an appointment for a Pap smear and HPV test at your earliest convenience. If you have questions or concerns, please call the clinic and we will be happy to assist you.    If you have completed the tests outside of [facility], please have the results forwarded to our office. We will update the chart for your primary Physician to review before your next annual physical.     Thank you for choosing [facility]!    Sincerely,      Sabas Patel MD/elza

## 2017-07-05 NOTE — LETTER
October 6, 2017      Ashli Bahenajimbo  [address] [place] [address]    Dear MsPriteshKimberlydominick,      At [facility], your health and wellness is our primary concern. That is why we are following up on a positive high risk HPV test from 07/05/17. Your provider had recommended that you have a Colposcopy completed by 10/05/17. Our records do not show that this has been done.      It is important to complete the follow up that your provider has suggested for you to ensure that there are no worsening changes which may, over time, develop into cancer.      Please contact our office at  [phone] to schedule an appointment for a Colposcopy at your earliest convenience. If you have questions or concerns, please call the clinic and we will be happy to assist you.    If you have completed the tests outside of [facility], please have the results forwarded to our office. We will update the chart for your primary Physician to review before your next annual physical.     Thank you for choosing [facility]!    Sincerely,      Sabas Patel MD/[facility]

## 2017-07-05 NOTE — MR AVS SNAPSHOT
After Visit Summary   7/5/2017    Ashli Gamboa    MRN: [id number]           Patient Information     Date Of Birth          1980        Visit Information        Provider Department      7/5/2017 1:00 PM Sabas Patel MD [facility] [place]        Today's Diagnoses     Encounter for gynecological examination without abnormal finding    -  1    Encounter for surveillance of vaginal ring hormonal contraceptive device           Follow-ups after your visit        Your next 10 appointments already scheduled     Jul 19, 2017  7:30 AM CDT   (Arrive by 7:15 AM)   PAC EVALUATION with U[facility] Pac [facility] 5   [facility] ([facility])    [address] [address]   [phone]            Jul 19, 2017  8:30 AM CDT   (Arrive by 8:15 AM)   PAC RN ASSESSMENT with Uc Pac Rn   [facility] ([facility])    [address] [place] [address]   [phone]            Jul 19, 2017  9:00 AM CDT   (Arrive by 8:45 AM)   PAC Anesthesia Consult with  Pac Anesthesiologist   [facility] ([facility])    [address] [place] [id number] [phone]            Jul 25, 2017   Procedure with Sam Schmidt MD   [facility] [place], [facility] Day [facility] (--)    [address] [facility] [address]   [phone]            Aug 03, 2017  7:00 AM CDT   NUTRITION VISIT with Meaghan Glass RD   [facility] ([facility])    [address] [place] [address]   [phone]            Aug 03, 2017  7:40 AM CDT   (Arrive by 7:25 AM)   RETURN BARIATRIC SURGERY with Sam Schmidt MD   [facility] ([facility])    [address] [place] [address]  [phone]            Aug 31, 2017  7:00 AM CDT   NUTRITION VISIT with Meaghan Glass RD   [facility] ([facility])    [address] [place] [address]   [phone]            Aug 31, 2017  7:40 AM CDT   (Arrive by 7:25 AM)   RETURN BARIATRIC SURGERY with Sam Schmidt MD   [facility] ([facility])    [address] [place] [id number] [phone]              Who to contact     If you have questions or need follow up information about today's clinic visit or your schedule please contact [facility] directly at [phone].  Normal or non-critical lab and imaging results will be communicated to you by [employer], letter or phone within 4 business days after the clinic has received the results. If you do not hear from us within 7 days, please contact the clinic through [employer] or phone. If you have a critical or abnormal lab result, we will notify you by phone as soon as possible.  Submit refill requests through [employer] or call your pharmacy and they will forward the refill request to us. Please allow 3 business days for your refill to be completed.          Additional Information About Your Visit        [employer] Information     [employer] gives you secure access to your electronic health record. If you see a primary care provider, you can also send messages to your care team and make appointments. If you have questions, please call your primary care clinic.  If you do not have a primary care provider, please call [phone] and they will assist you.        Care EveryWhere ID     This is your Care EveryWhere ID. This could be used by other organizations to access your [facility] medical records  [id number]        Your Vitals Were     Pulse Height BMI (Body Mass Index)             84 5' 4\" (1.626 m) 48.54 kg/m2          Blood " Pressure from Last 3 Encounters:   07/05/17 128/82   06/29/17 139/89   04/05/17 (!) 151/98    Weight from Last 3 Encounters:   07/05/17 282 lb 12.8 oz (128.3 kg)   06/29/17 281 lb 11.2 oz (127.8 kg)   04/05/17 291 lb 14.4 oz (132.4 kg)              We Performed the Following     Pap imaged thin layer screen reflex to HPV if ASCUS - recommended age 25 - 29 years          Today's Medication Changes          These changes are accurate as of: 7/5/17  1:33 PM.  If you have any questions, ask your nurse or doctor.               These medicines have changed or have updated prescriptions.        Dose/Directions    etonogestrel-ethinyl estradiol 0.12-0.015 MG/24HR vaginal ring   Commonly known as:  NUVARING   This may have changed:  See the new instructions.   Used for:  Encounter for surveillance of vaginal ring hormonal contraceptive device   Changed by:  Sabas Patel MD        PLACE 1 RING VAGINALLY EVERY 28 DAYS AS DIRECTED   Quantity:  1 each   Refills:  3            Where to get your medicines      These medications were sent to [id number] - [place], [place] - [address] AT [facility]  [address], [place] [address]     Phone:  [phone]     etonogestrel-ethinyl estradiol 0.12-0.015 MG/24HR vaginal ring                Primary Care Provider Office Phone # Fax #    Karena DANIELE [facility] [phone]       [facility] [address] [place] [address]        Equal Access to Services     [facility].S. [facility] HospitalOLEGARIO AH: Hadii catina moura hadasho Soomaali, waaxda luqadaha, qaybta kaalmada adeegyada, lynne parmar. So [facility] [phone].    ATENCIÓN: Si habla español, tiene a flynn disposición servicios gratuitos de asistencia lingüística. Llame al [phone].    We comply with applicable federal civil rights laws and [place] laws. We do not discriminate on the basis of race, color, national origin, age, disability sex, sexual  orientation or gender identity.            Thank you!     Thank you for choosing [facility]  for your care. Our goal is always to provide you with excellent care. Hearing back from our patients is one way we can continue to improve our services. Please take a few minutes to complete the written survey that you may receive in the mail after your visit with us. Thank you!             Your Updated Medication List - Protect others around you: Learn how to safely use, store and throw away your medicines at www.disposemymeds.org.          This list is accurate as of: 7/5/17  1:33 PM.  Always use your most recent med list.                   Brand Name Dispense Instructions for use Diagnosis    ASPIRIN PO      Take 325 mg by mouth daily as needed for moderate pain        buPROPion 300 MG 24 hr tablet    WELLBUTRIN XL    90 tablet    Take 1 tablet (300 mg) by mouth every morning    Recurrent major depressive disorder, in partial remission (H)       etonogestrel-ethinyl estradiol 0.12-0.015 MG/24HR vaginal ring    NUVARING    1 each    PLACE 1 RING VAGINALLY EVERY 28 DAYS AS DIRECTED    Encounter for surveillance of vaginal ring hormonal contraceptive device       TOPAMAX PO      Take 200 mg by mouth daily

## 2017-07-05 NOTE — PROGRESS NOTES
Ashli is a 37 year old No obstetric history on file. female who presents for annual exam.     Besides routine health maintenance, she has no other health concerns today .    HPI: The patient is seen for annual exam. She has done very well since her surgery in December and has no pelvic pain. She is stable on maneuvering suppression.  The patient's PCP is DANIELE Osorio CNP.        GYNECOLOGIC HISTORY:    No LMP recorded. Patient is not currently having periods (Reason: Birth Control).  Her current contraception method is: Nuvaring.  She  reports that she quit smoking about 3 years ago. Her smoking use included Cigarettes. She started smoking about 10 years ago. She has a 2.50 pack-year smoking history. She has never used smokeless tobacco.    Patient is sexually active.  STD testing offered?  Declined  Last PHQ-9 score on record =   PHQ-9 SCORE 7/5/2017   Total Score MyChart -   Total Score 3     Last GAD7 score on record =   ALIDA-7 SCORE 7/5/2017   Total Score -   Total Score 3     Alcohol Score = 0    HEALTH MAINTENANCE:  Cholesterol: 11/26/14   Total= 202, Triglycerides=148, HDL=51, FTL=438, TSH=12/7/16-2.07 -has done with pcp  Last Mammo: NA  Pap: 6/10/15 neg, HPV HR +  Colonoscopy: NA  Dexa:  never    Health maintenance updated:  yes    HISTORY:  Obstetric History     No data available          Patient Active Problem List   Diagnosis     Morbid obesity (H)     post traumatic Arthritis of big toe     Major depression, recurrent (H)     CARDIOVASCULAR SCREENING; LDL GOAL LESS THAN 160     Endometriosis     Fibromyalgia     Muscular deconditioning     Past Surgical History:   Procedure Laterality Date     ABDOMEN SURGERY      laparoscopy X2     DILATE CERVIX, HYSTEROSCOPY, ABLATE ENDOMETRIUM NOVASURE, COMBINED N/A 11/17/2016    Procedure: COMBINED DILATE CERVIX, HYSTEROSCOPY, ABLATE ENDOMETRIUM NOVASURE;  Surgeon: Sabas Patel MD;  Location: [facility]     GYN SURGERY  dates above    myomectomy x2,  laparoscopy x1 (soon to be 2)     LAPAROSCOPIC LYSIS ADHESIONS  7/10/2014    Procedure: LAPAROSCOPIC LYSIS ADHESIONS;  Surgeon: Sabas Patel MD;  Location: [facility]     LASER CO2 LAPAROSCOPIC VAPORIZATION ENDOMETRIUM  7/10/2014    Procedure: LASER CO2 LAPAROSCOPIC VAPORIZATION ENDOMETRIUM;  Surgeon: Sabas Patel MD;  Location: [facility]     LASER CO2 LAPAROSCOPIC VAPORIZATION ENDOMETRIUM N/A 6/30/2015    Procedure: LASER CO2 LAPAROSCOPIC VAPORIZATION ENDOMETRIUM;  Surgeon: Sabas Patel MD;  Location: [facility]     LASER CO2 LAPAROSCOPY DIAGNOSTIC, LYSIS ADHESIONS, COMBINED N/A 6/30/2015    Procedure: COMBINED LASER CO2 LAPAROSCOPY DIAGNOSTIC, LYSIS ADHESIONS;  Surgeon: Sabas Paetl MD;  Location: [facility]      Social History   Substance Use Topics     Smoking status: Former Smoker     Packs/day: 0.50     Years: 5.00     Types: Cigarettes     Start date: 1/2/2007     Quit date: 11/1/2013     Smokeless tobacco: Never Used      Comment: stopped for about 6 months now as of 5/23/13     Alcohol use 0.0 oz/week     0 Standard drinks or equivalent per week      Comment: occas      Problem (# of Occurrences) Relation (Name,Age of Onset)    Arthritis (1) Father    Breast Cancer (1) Maternal Grandmother    CANCER (1) Mother: Cervical     CEREBROVASCULAR DISEASE (1) Paternal Grandmother    Crohn Disease (1) Mother    Depression (4) Mother, Father, Brother, Sister    MENTAL ILLNESS (1) Brother: schizophrenia and bipolar    Obesity (1) Mother    Other - See Comments (1) Mother: Fibromyalgia    Ovarian Cancer (1) Mother: Cervical Cancer    Rheumatoid Arthritis (1) Father            Current Outpatient Prescriptions   Medication Sig     NUVARING 0.12-0.015 MG/24HR vaginal ring PLACE 1 RING VAGINALLY EVERY 28 DAYS AS DIRECTED     buPROPion (WELLBUTRIN XL) 300 MG 24 hr tablet Take 1 tablet (300 mg) by mouth every morning     Topiramate (TOPAMAX PO) Take 200 mg by mouth daily      ASPIRIN PO Take 325 mg by mouth daily as needed  "for moderate pain     No current facility-administered medications for this visit.      No Known Allergies    Past medical, surgical, social and family histories were reviewed and updated in EPIC.    ROS:   12 point review of systems negative other than symptoms noted below.    EXAM:  /82  Pulse 84  Ht 5' 4\" (1.626 m)  Wt 282 lb 12.8 oz (128.3 kg)  BMI 48.54 kg/m2   BMI: Body mass index is 48.54 kg/(m^2).    PHYSICAL EXAM:  Constitutional:  Appearance: Well nourished, well developed, alert, in no acute distress  Neck:  Lymph Nodes:  No lymphadenopathy present    Thyroid:  Gland size normal, nontender, no nodules or masses present  on palpation  Chest:  Respiratory Effort:  Breathing unlabored  Cardiovascular:    Heart: Auscultation:  Regular rate, normal rhythm, no murmurs present  Breasts: Inspection of Breasts:  No lymphadenopathy present    Palpation of Breasts and Axillae:  No masses present on palpation, no  breast tenderness    Axillary Lymph Nodes:  No lymphadenopathy present  Gastrointestinal:   Abdominal Examination:  Abdomen nontender to palpation, tone normal without rigidity or guarding, no masses present, umbilicus without lesions   Liver and Spleen:  No hepatomegaly present, liver nontender to palpation    Hernias:  No hernias present  Lymphatic: Lymph Nodes:  No other lymphadenopathy present  Skin:  General Inspection:  No rashes present, no lesions present, no areas of  discoloration    Genitalia and Groin:  No rashes present, no lesions present, no areas of  discoloration, no masses present  Neurologic/Psychiatric:    Mental Status:  Oriented X3     Pelvic Exam:  External Genitalia:     Normal appearance for age, no discharge present, no tenderness present, no inflammatory lesions present, color normal  Vagina:     Normal vaginal vault without central or paravaginal defects, no discharge present, no inflammatory lesions present, no masses present  Bladder:     Nontender to " palpation  Urethra:   Urethral Body:  Urethra palpation normal, urethra structural support normal   Urethral Meatus:  No erythema or lesions present  Cervix:     Appearance healthy, no lesions present, nontender to palpation, no bleeding present  Uterus:     Uterus: firm, normal sized and nontender, midplane in position.   Adnexa:     No adnexal tenderness present, no adnexal masses present  Perineum:     Perineum within normal limits, no evidence of trauma, no rashes or skin lesions present  Anus:     Anus within normal limits, no hemorrhoids present  Inguinal Lymph Nodes:     No lymphadenopathy present  Pubic Hair:     Normal pubic hair distribution for age  Genitalia and Groin:     No rashes present, no lesions present, no areas of discoloration, no masses present    COUNSELING:   Reviewed preventive health counseling, as reflected in patient instructions       Regular exercise       Healthy diet/nutrition    BMI: Body mass index is 48.54 kg/(m^2).  Weight management plan: Discussed healthy diet and exercise guidelines and patient will follow up in 12 months in clinic to re-evaluate.    ASSESSMENT:  37 year old female with satisfactory annual exam.    ICD-10-CM    1. Encounter for gynecological examination without abnormal finding Z01.419 Pap imaged thin layer screen reflex to HPV if ASCUS - recommended age 25 - 29 years       PLAN: We will convey the patient's Pap smear results. If she has no further pain problems we will see her in 1 year. We refilled her NuvaRing.      Sabas Patel MD

## 2017-07-06 ASSESSMENT — PATIENT HEALTH QUESTIONNAIRE - PHQ9: SUM OF ALL RESPONSES TO PHQ QUESTIONS 1-9: 3

## 2017-07-06 ASSESSMENT — ANXIETY QUESTIONNAIRES: GAD7 TOTAL SCORE: 3

## 2017-07-07 LAB
COPATH REPORT: NORMAL
PAP: NORMAL

## 2017-07-17 PROCEDURE — 87624 HPV HI-RISK TYP POOLED RSLT: CPT | Performed by: OBSTETRICS & GYNECOLOGY

## 2017-07-18 LAB
FINAL DIAGNOSIS: ABNORMAL
HPV HR 12 DNA CVX QL NAA+PROBE: POSITIVE
HPV16 DNA SPEC QL NAA+PROBE: NEGATIVE
HPV18 DNA SPEC QL NAA+PROBE: NEGATIVE
SPECIMEN DESCRIPTION: ABNORMAL

## 2017-07-19 ENCOUNTER — ANESTHESIA EVENT (OUTPATIENT)
Dept: SURGERY | Facility: CLINIC | Age: 37
DRG: 620 | End: 2017-07-19
Payer: COMMERCIAL

## 2017-07-19 ENCOUNTER — OFFICE VISIT (OUTPATIENT)
Dept: SURGERY | Facility: CLINIC | Age: 37
End: 2017-07-19

## 2017-07-19 ENCOUNTER — ALLIED HEALTH/NURSE VISIT (OUTPATIENT)
Dept: SURGERY | Facility: CLINIC | Age: 37
End: 2017-07-19

## 2017-07-19 VITALS
WEIGHT: 284.5 LBS | TEMPERATURE: 98.2 F | BODY MASS INDEX: 48.57 KG/M2 | SYSTOLIC BLOOD PRESSURE: 139 MMHG | OXYGEN SATURATION: 98 % | HEIGHT: 64 IN | HEART RATE: 72 BPM | DIASTOLIC BLOOD PRESSURE: 82 MMHG | RESPIRATION RATE: 16 BRPM

## 2017-07-19 DIAGNOSIS — Z01.818 PRE-OP EXAMINATION: Primary | ICD-10-CM

## 2017-07-19 DIAGNOSIS — E66.01 MORBID OBESITY, UNSPECIFIED OBESITY TYPE (H): ICD-10-CM

## 2017-07-19 LAB
ALBUMIN UR-MCNC: NEGATIVE MG/DL
ANION GAP SERPL CALCULATED.3IONS-SCNC: 7 MMOL/L (ref 3–14)
APPEARANCE UR: ABNORMAL
BILIRUB UR QL STRIP: NEGATIVE
BUN SERPL-MCNC: 12 MG/DL (ref 7–30)
CALCIUM SERPL-MCNC: 8.6 MG/DL (ref 8.5–10.1)
CHLORIDE SERPL-SCNC: 112 MMOL/L (ref 94–109)
CO2 SERPL-SCNC: 23 MMOL/L (ref 20–32)
COLOR UR AUTO: YELLOW
CREAT SERPL-MCNC: 1.06 MG/DL (ref 0.52–1.04)
ERYTHROCYTE [DISTWIDTH] IN BLOOD BY AUTOMATED COUNT: 14 % (ref 10–15)
GFR SERPL CREATININE-BSD FRML MDRD: 58 ML/MIN/1.7M2
GLUCOSE SERPL-MCNC: 103 MG/DL (ref 70–99)
GLUCOSE UR STRIP-MCNC: NEGATIVE MG/DL
HCT VFR BLD AUTO: 44.3 % (ref 35–47)
HGB BLD-MCNC: 13.7 G/DL (ref 11.7–15.7)
HGB UR QL STRIP: NEGATIVE
KETONES UR STRIP-MCNC: NEGATIVE MG/DL
LEUKOCYTE ESTERASE UR QL STRIP: ABNORMAL
MCH RBC QN AUTO: 27.7 PG (ref 26.5–33)
MCHC RBC AUTO-ENTMCNC: 30.9 G/DL (ref 31.5–36.5)
MCV RBC AUTO: 90 FL (ref 78–100)
MUCOUS THREADS #/AREA URNS LPF: PRESENT /LPF
NITRATE UR QL: NEGATIVE
PH UR STRIP: 6 PH (ref 5–7)
PLATELET # BLD AUTO: 288 10E9/L (ref 150–450)
POTASSIUM SERPL-SCNC: 3.7 MMOL/L (ref 3.4–5.3)
RBC # BLD AUTO: 4.95 10E12/L (ref 3.8–5.2)
RBC #/AREA URNS AUTO: 2 /HPF (ref 0–2)
SODIUM SERPL-SCNC: 142 MMOL/L (ref 133–144)
SP GR UR STRIP: 1.02 (ref 1–1.03)
SQUAMOUS #/AREA URNS AUTO: 2 /HPF (ref 0–1)
URN SPEC COLLECT METH UR: ABNORMAL
UROBILINOGEN UR STRIP-MCNC: 0 MG/DL (ref 0–2)
WBC # BLD AUTO: 8.4 10E9/L (ref 4–11)
WBC #/AREA URNS AUTO: 2 /HPF (ref 0–2)

## 2017-07-19 ASSESSMENT — LIFESTYLE VARIABLES: TOBACCO_USE: 1

## 2017-07-19 NOTE — H&P
Pre-Operative H & P     CC:  Preoperative exam to assess for increased cardiopulmonary risk while undergoing surgery and anesthesia.    Date of Encounter: 7/19/2017  Primary Care Physician:  Karena Mo  Ashli Gamboa is a 37 year old female who presents for pre-operative H & P in preparation for  Laparoscopic Sleeve Gastrectomy on 7/25/2017 with Dr. Schmidt at [facility] under general anesthesia.  Ms. Gamboa was seen in consultation the past February in the Bariatric Surgery Clinic with goal weight 281 pounds.  The above procedure was recommended.    PAC referral for risk assessment and optimization of ansthessia with comorbid conditions of:  fibromyalgia, obesity, endometriosis, arthritis and depression.     Ms. Gamboa presents to PAC clinic and denies any cardiopulmonary history or symptoms.  She deneis any recent fever, chills, cough or sore throat or any change in her bowel or bladder functions.  She would like to proceed with above surgical intervention.    History is obtained from the patient and electronic health record.     Past Medical History  Past Medical History:   Diagnosis Date     Arthritis     BIG TOE     Depressive disorder 2007    I have been on and off medication for the last several years     Endometriosis      Obese        Past Surgical History  Past Surgical History:   Procedure Laterality Date     ABDOMEN SURGERY      laparoscopy X2     DILATE CERVIX, HYSTEROSCOPY, ABLATE ENDOMETRIUM NOVASURE, COMBINED N/A 11/17/2016    Procedure: COMBINED DILATE CERVIX, HYSTEROSCOPY, ABLATE ENDOMETRIUM NOVASURE;  Surgeon: Sabas Patel MD;  Location: [facility]     GYN SURGERY  dates above    myomectomy x2, laparoscopy x1 (soon to be 2)     LAPAROSCOPIC LYSIS ADHESIONS  7/10/2014    Procedure: LAPAROSCOPIC LYSIS ADHESIONS;  Surgeon: Sabas Patel MD;  Location: [facility]     LASER CO2 LAPAROSCOPIC VAPORIZATION ENDOMETRIUM  7/10/2014     Procedure: LASER CO2 LAPAROSCOPIC VAPORIZATION ENDOMETRIUM;  Surgeon: Sabas Patel MD;  Location: [facility]     LASER CO2 LAPAROSCOPIC VAPORIZATION ENDOMETRIUM N/A 6/30/2015    Procedure: LASER CO2 LAPAROSCOPIC VAPORIZATION ENDOMETRIUM;  Surgeon: Sabas Patel MD;  Location: [facility]     LASER CO2 LAPAROSCOPY DIAGNOSTIC, LYSIS ADHESIONS, COMBINED N/A 6/30/2015    Procedure: COMBINED LASER CO2 LAPAROSCOPY DIAGNOSTIC, LYSIS ADHESIONS;  Surgeon: Sabas Patel MD;  Location: [facility]       Hx of Blood transfusions/reactions: denies     Hx of abnormal bleeding or anti-platelet use: denies    Menstrual history: No LMP recorded. Patient is not currently having periods (Reason: Birth Control).    Steroid use in the last year: injection into right great toe    Personal or FH with difficulty with Anesthesia:  denies    Prior to Admission Medications  Current Outpatient Prescriptions   Medication Sig Dispense Refill     etonogestrel-ethinyl estradiol (NUVARING) 0.12-0.015 MG/24HR vaginal ring PLACE 1 RING VAGINALLY EVERY 28 DAYS AS DIRECTED 1 each 3     buPROPion (WELLBUTRIN XL) 300 MG 24 hr tablet Take 1 tablet (300 mg) by mouth every morning 90 tablet 1     Topiramate (TOPAMAX PO) Take 200 mg by mouth every morning          Allergies  No Known Allergies    Social History  Social History     Social History     Marital status:      Spouse name: Sid     Number of children: 0     Years of education: N/A     Occupational History           [employer]     Social History Main Topics     Smoking status: Former Smoker     Packs/day: 0.50     Years: 5.00     Types: Cigarettes     Start date: 1/2/2007     Quit date: 11/1/2013     Smokeless tobacco: Never Used      Comment: stopped for about 6 months now as of 5/23/13     Alcohol use 0.0 oz/week     0 Standard drinks or equivalent per week      Comment: occas     Drug use: No     Sexual activity: Yes     Partners: Male     Birth control/ protection:  "Inserts/Ring     Other Topics Concern     Parent/Sibling W/ Cabg, Mi Or Angioplasty Before 65f 55m? No     Social History Narrative       Family History  Family History   Problem Relation Age of Onset     Depression Mother      CANCER Mother      Cervical      Other - See Comments Mother      Fibromyalgia     Crohn Disease Mother      Obesity Mother      Ovarian Cancer Mother      Cervical Cancer     Arthritis Father      Rheumatoid Arthritis Father      Depression Father      CEREBROVASCULAR DISEASE Paternal Grandmother      Breast Cancer Maternal Grandmother      Depression Brother      Depression Sister      MENTAL ILLNESS Brother      schizophrenia and bipolar       ROS/MED HX    ENT/Pulmonary:     (+)YESICA risk factors obese, tobacco use (Quit 2013.  Smoked from 8257-1727 <1 ppd), Past use asthma , . .   (-) sleep apnea   Neurologic:     (+)other neuro Fibromyalgia >>> using topamax    Cardiovascular:  - neg cardiovascular ROS   (+) ----. : . . . :. . No previous cardiac testing       METS/Exercise Tolerance: Comment: Work-out 2 times per week.   >4 METS   Hematologic:         Musculoskeletal: Comment: H/o right great toe fracture with subsequent arthritis.  Has had steroid injections.        GI/Hepatic:  - neg GI/hepatic ROS       Renal/Genitourinary:  - ROS Renal section negative       Endo:     (+) Obesity (BMI 48.64), .      Psychiatric:     (+) psychiatric history depression      Infectious Disease:         Malignancy:      - no malignancy   Other: Comment: Endometriosis, s/p multiple lap laser surgeries.    Chronic pain r/t fibromyalgia.   (+) Possibly pregnant LMP: Nuvaring - does not get period., C-spine cleared: N/A, H/O Chronic Pain,no other significant disability        Temp: 98.2  F (36.8  C) Temp src: Oral BP: 139/82 Pulse: 72   Resp: 16 SpO2: 98 %         284 lbs 8 oz  5' 4\"   Body mass index is 48.83 kg/(m^2).       Physical Exam  Constitutional: Awake, alert, cooperative, no apparent distress, and " appears stated age.  Eyes: Pupils equal, round and reactive to light, extra ocular muscles intact, sclera clear, conjunctiva normal.  HENT: Normocephalic, oral pharynx with moist mucus membranes,Dentition in fair repair with missing left bottom second molar.   . No goiter appreciated.   Respiratory: Clear to auscultation bilaterally, no crackles or wheezing.  Cardiovascular: Regular rate and rhythm, normal S1 and S2, and no murmur noted.  Carotids +2, no bruits. No edema. Palpable pulses to radial  DP and PT arteries.   GI: Normal bowel sounds, soft, non-distended, non-tender, no masses palpated, no hepatosplenomegaly.  Difficult exam given obese abdomen.  Lymph/Hematologic: No cervical lymphadenopathy and no supraclavicular lymphadenopathy.  Genitourinary:  na  Skin: Warm and dry.  No rashes at anticipated surgical site.   Musculoskeletal: Full ROM of neck. There is no redness, warmth, or swelling of the joints. Gross motor strength is normal.    Neurologic: Awake, alert, oriented to name, place and time. Cranial nerves II-XII are grossly intact. Gait is normal.   Neuropsychiatric: Calm, cooperative. Normal affect.     Labs: (personally reviewed)  Lab Results   Component Value Date    WBC 8.4 07/19/2017     Lab Results   Component Value Date    RBC 4.95 07/19/2017     Lab Results   Component Value Date    HGB 13.7 07/19/2017     Lab Results   Component Value Date    HCT 44.3 07/19/2017     Lab Results   Component Value Date    MCV 90 07/19/2017     Lab Results   Component Value Date    MCH 27.7 07/19/2017     Lab Results   Component Value Date    MCHC 30.9 07/19/2017     Lab Results   Component Value Date    RDW 14.0 07/19/2017     Lab Results   Component Value Date     07/19/2017       Last Basic Metabolic Panel:  Lab Results   Component Value Date     07/19/2017      Lab Results   Component Value Date    POTASSIUM 3.7 07/19/2017     Lab Results   Component Value Date    CHLORIDE 112 07/19/2017      Lab Results   Component Value Date    KWABENA 8.6 07/19/2017     Lab Results   Component Value Date    CO2 23 07/19/2017     Lab Results   Component Value Date    BUN 12 07/19/2017     Lab Results   Component Value Date    CR 1.06 07/19/2017     Lab Results   Component Value Date     07/19/2017       Color Urine (no units)   Date Value   07/19/2017 Yellow     Appearance Urine (no units)   Date Value   07/19/2017 Slightly Cloudy     Glucose Urine (mg/dL)   Date Value   07/19/2017 Negative     Bilirubin Urine (no units)   Date Value   07/19/2017 Negative     Ketones Urine (mg/dL)   Date Value   07/19/2017 Negative     Specific Gravity Urine (no units)   Date Value   07/19/2017 1.017     pH Urine (pH)   Date Value   07/19/2017 6.0     Protein Albumin Urine (mg/dL)   Date Value   07/19/2017 Negative     Nitrite Urine (no units)   Date Value   07/19/2017 Negative     Leukocyte Esterase Urine (no units)   Date Value   07/19/2017 Trace (A)         ASSESSMENT and PLAN  Ashli Gamboa is a 37 year old female scheduled to undergo Laparoscopic Sleeve Gastrectomy on 7/25/2017 with Dr. Schmidt at [facility] under general anesthesia.     She has the following specific operative considerations:     Cardiology - RCRI : No serious cardiac risks.  0.4 % risk of major adverse cardiac event. METS >4.  Denies any cardiopulmonary history or sympotms.  Pulmonary - Quit smoking 2013.  Smoked for ~5 years < 1 PPD.         - YESICA # of risks 2/8 = low risk  Hematology - VTE risk:  0.5%  GI - Risk of PONV score = 2.  If > 2, anti-emetic intervention recommended.        - Morbid obesity, BMI >45.  Goal weight 281.  Today 284 lbs.  Spoke with Yu Evans from bariatrics and they will have patient come back this Friday for weigh in.  Renal - Cr 1.06 and GFR 58 Recommend close monitoring of fluid balance and electrolytes throughout the perioperative period.  Avoid nephrotoxic  medication.  Musculoskeletal - Recommend careful positioning given body habitus       - arthritis in right great toe, last steroid injection > 9 month ago.  Neuro - Fibromyalgia, take Topamax DOS       - Depression, take Wellbutrin DOS  Gyn - endometriosis, s/p multiple lap laser surgeries.  Nuvaring      - Anesthesia considerations:  Refer to PAC assessment in anesthesia records  - Airway:  Appears feasible    Arrival time, NPO, shower and medication instructions provided by nursing staff today.  Preparing For Your Surgery handout given.  Patient was discussed with Dr Lamb. I spent 20 minutes face to face with patient, assessing, examining, and educating.      DANIELE Thibodeaux CNP  Preoperative Assessment Center  [facility]  Clinic and Surgery Center  Phone: [phone]  Fax: [phone]

## 2017-07-19 NOTE — MR AVS SNAPSHOT
After Visit Summary   2017    Ashli Gamboa    MRN: [id number]           Patient Information     Date Of Birth          1980        Visit Information        Provider Department      2017 8:30 AM Rn, [facility]        Care Instructions    Preparing for Your Surgery      Name:  Ashli Gamboa   MRN:  [id number]   :  1980   Today's Date:  2017     Arriving for surgery:  Surgery date:  17  Surgery time:  10:40 a.m.  Arrival time:  08:40 a.m.  Please come to:       [facility] [address]  [address] [place]  [address]    -   parking is available in front of the hospital from 5:15 am to 8:00 pm    -  Stop at the Information Desk in the lobby    -   Inform the information person that you are here for surgery. An escort to 3c will be provided. If you would not like an escort, please proceed to 3C on the 3rd floor. [phone]     What can I eat or drink?  -  Follow Bowel Prep per Dr. Schmidt's Office    Which medicines can I take? (only with sips of water with pills 3 hours prior to surgery)  Last sip at 0740 a.m.  -  Do NOT take these medications in the morning, the day of surgery:          -  Please take these medications the day of surgery:      Wellbutrin, Topamax    How do I prepare myself?  -  Take two showers: one the night before surgery; and one the morning of surgery.         Use Scrubcare or Hibiclens to wash from neck down.  You may use your own shampoo and conditioner. No other hair products.   -  Do NOT use lotion, powder, deodorant, or antiperspirant the day of your surgery.  -  Do NOT wear any makeup, fingernail polish or jewelry.  -Do not bring your own medications to the hospital, except for inhalers and eye drops.  -  Bring your ID and insurance card.    Questions or Concerns:  If you have questions or concerns, please call the  Preoperative Assessment Center,  Monday-Friday 7AM-7PM:  [phone]          AFTER YOUR SURGERY  Breathing exercises   Breathing exercises help you recover faster. Take deep breaths and let the air out slowly. This will:     Help you wake up after surgery.    Help prevent complications like pneumonia.  Preventing complications will help you go home sooner.   We may give you a breathing device (incentive spirometer) to encourage you to breathe deeply.   Nausea and vomiting   You may feel sick to your stomach after surgery; if so, let your nurse know.    Pain control:  After surgery, you may have pain. Our goal is to help you manage your pain. Pain medicine will help you feel comfortable enough to do activities that will help you heal.  These activities may include breathing exercises, walking and physical therapy.   To help your health care team treat your pain we will ask: 1) If you have pain  2) where it is located 3) describe your pain in your words  Methods of pain control include medications given by mouth, vein or by nerve block for some surgeries.  We may give you a pain control pump that will:  1) Deliver the medicine through a tube placed in your vein  2) Control the amount of medicine you receive  3) Allow you to push a button to deliver a dose of pain medicine  Sequential Compression Device (SCD) or Pneumo Boots:  You may need to wear SCD S on your legs or feet. These are wraps connected to a machine that pumps in air and releases it. The repeated pumping helps prevent blood clots from forming.           Follow-ups after your visit        Your next 10 appointments already scheduled     Jul 19, 2017  8:30 AM CDT   (Arrive by 8:15 AM)   PAC RN ASSESSMENT with Suman Pac Rn   [facility] ([facility])    [address] [place] [id number] [phone]            Jul 19, 2017  9:00 AM CDT   (Arrive by 8:45 AM)   PAC Anesthesia Consult with Suman Pac Anesthesiologist   [facility] ([facility])    [address] [place] [address]  4th [facility] [address]   [phone]            Jul 19, 2017  9:15 AM CDT   LAB with  LAB   [facility] ([facility])    [address] [place] [address]   [phone]           Patient must bring picture ID.  Patient should be prepared to give a urine specimen  Please do not eat 10-12 hours before your appointment if you are coming in fasting for labs on lipids, cholesterol, or glucose (sugar).  Pregnant women should follow their Care Team instructions. Water with medications is okay. Do not drink coffee or other fluids.   If you have concerns about taking  your medications, please ask at office or if scheduling via [employer], send a message by clicking on Secure Messaging, Message Your Care Team.            Jul 25, 2017   Procedure with Sam Schmidt MD   [facility], [place], Same Day Surgery (--)    [address] [facility] [address]   [phone]            Aug 03, 2017  7:00 AM CDT   NUTRITION VISIT with Meaghan Glass RD   [facility] ([facility])    [address] [place] [address]   [phone]            Aug 03, 2017  7:40 AM CDT   (Arrive by 7:25 AM)   RETURN BARIATRIC SURGERY with Sam Schmidt MD   [facility] ([facility])    [address] [place] [address]   [phone]            Aug 31, 2017  7:00 AM CDT   NUTRITION VISIT with Meaghan Glass RD   [facility] ([facility])    [address] [place] [address]   [phone]            Aug 31, 2017  7:40 AM CDT   (Arrive by 7:25 AM)   RETURN BARIATRIC SURGERY with Sam Schmidt MD   [facility] ([facility]  [place])    [address] [facility]  [address] [facility] [address] [phone]              Future tests that were ordered for you today     Open Future Orders        Priority Expected Expires Ordered    UA reflex to Microscopic and Culture Routine 7/19/2017 8/18/2017 7/19/2017    ABO/Rh type and screen Routine 7/19/2017 8/18/2017 7/19/2017    Basic metabolic panel Routine 7/19/2017 8/18/2017 7/19/2017    CBC with platelets Routine 7/19/2017 8/18/2017 7/19/2017            Who to contact     Please call your clinic at [phone] to:    Ask questions about your health    Make or cancel appointments    Discuss your medicines    Learn about your test results    Speak to your doctor   If you have compliments or concerns about an experience at your clinic, or if you wish to file a complaint, please contact [facility] Patient Relations at [phone] or email us at [email or web address]         Additional Information About Your Visit        [employer] Information     [employer] gives you secure access to your electronic health record. If you see a primary care provider, you can also send messages to your care team and make appointments. If you have questions, please call your primary care clinic.  If you do not have a primary care provider, please call [phone] and they will assist you.      [employer] is an electronic gateway that provides easy, online access to your medical records. With [employer], you can request a clinic appointment, read your test results, renew a prescription or communicate with your care team.     To access your existing account, please contact your [facility] or call [phone] for assistance.        Care EveryWhere ID     This is your Care EveryWhere ID. This could be used by other organizations to access your [place] medical records  [id number]         Blood Pressure from Last 3 Encounters:   07/19/17 139/82   07/05/17  128/82   06/29/17 139/89    Weight from Last 3 Encounters:   07/19/17 129 kg (284 lb 8 oz)   07/05/17 128.3 kg (282 lb 12.8 oz)   06/29/17 127.8 kg (281 lb 11.2 oz)              Today, you had the following     No orders found for display       Primary Care Provider Office Phone # Fax #    Karena Mo, APRN [facility] [phone]       [facility] [address] [place] [address]        Equal Access to Services     [facility] Center[facility]LEGARIO : Hadii aad ku hadasho Soomaali, waaxda luqadaha, qaybta kaalmada adesherice, lynne warren . So [facility] [phone].    ATENCIÓN: Si habla español, tiene a flynn disposición servicios gratuitos de asistencia lingüística. [facility] [phone].    We comply with applicable federal civil rights laws and [place] laws. We do not discriminate on the basis of race, color, national origin, age, disability sex, sexual orientation or gender identity.            Thank you!     Thank you for choosing [facility]  for your care. Our goal is always to provide you with excellent care. Hearing back from our patients is one way we can continue to improve our services. Please take a few minutes to complete the written survey that you may receive in the mail after your visit with us. Thank you!             Your Updated Medication List - Protect others around you: Learn how to safely use, store and throw away your medicines at www.disposemymeds.org.          This list is accurate as of: 7/19/17  8:20 AM.  Always use your most recent med list.                   Brand Name Dispense Instructions for use Diagnosis    buPROPion 300 MG 24 hr tablet    WELLBUTRIN XL    90 tablet    Take 1 tablet (300 mg) by mouth every morning    Recurrent major depressive disorder, in partial remission (H)       etonogestrel-ethinyl estradiol 0.12-0.015 MG/24HR vaginal ring    NUVARING    1 each    PLACE 1 RING VAGINALLY EVERY 28 DAYS AS DIRECTED     Encounter for surveillance of vaginal ring hormonal contraceptive device       TOPAMAX PO      Take 200 mg by mouth every morning

## 2017-07-19 NOTE — ANESTHESIA PREPROCEDURE EVALUATION
Anesthesia Evaluation     . Pt has had prior anesthetic. Type: General    No history of anesthetic complications          ROS/MED HX    ENT/Pulmonary:     (+)YESICA risk factors obese, tobacco use (Quit 2013.  Smoked from 2302-3191 <1 ppd), Past use asthma , . .   (-) sleep apnea   Neurologic:     (+)other neuro Fibromyalgia >>> using topamax    Cardiovascular:  - neg cardiovascular ROS   (+) ----. : . . . :. . No previous cardiac testing       METS/Exercise Tolerance: Comment: Work-out 2 times per week.   >4 METS   Hematologic:         Musculoskeletal: Comment: H/o right great toe fracture with subsequent arthritis.  Has had steroid injections.        GI/Hepatic:  - neg GI/hepatic ROS       Renal/Genitourinary:  - ROS Renal section negative       Endo:     (+) Obesity (BMI 48.64), .      Psychiatric:     (+) psychiatric history depression      Infectious Disease:         Malignancy:      - no malignancy   Other: Comment: Endometriosis, s/p multiple lap laser surgeries.    Chronic pain r/t fibromyalgia.   (+) Possibly pregnant LMP: Nuvaring - does not get period., C-spine cleared: N/A, H/O Chronic Pain,no other significant disability                    Physical Exam      Airway   Mallampati: I  TM distance: >3 FB  Neck ROM: full    Dental   (+) missing  Comment: Dentition in fair repair with missing left bottom second molar.    Cardiovascular   Rhythm and rate: regular and normal      Pulmonary    breath sounds clear to auscultation    Other findings: Lab Results      Component                Value               Date                      WBC                      8.4                 07/19/2017            Lab Results      Component                Value               Date                      RBC                      4.95                07/19/2017            Lab Results      Component                Value               Date                      HGB                      13.7                07/19/2017            Lab  Results      Component                Value               Date                      HCT                      44.3                07/19/2017            Lab Results      Component                Value               Date                      MCV                      90                  07/19/2017            Lab Results      Component                Value               Date                      MCH                      27.7                07/19/2017            Lab Results      Component                Value               Date                      MCHC                     30.9                07/19/2017            Lab Results      Component                Value               Date                      RDW                      14.0                07/19/2017            Lab Results      Component                Value               Date                      PLT                      288                 07/19/2017              Last Basic Metabolic Panel:  Lab Results      Component                Value               Date                      NA                       142                 07/19/2017             Lab Results      Component                Value               Date                      POTASSIUM                3.7                 07/19/2017            Lab Results      Component                Value               Date                      CHLORIDE                 112                 07/19/2017            Lab Results      Component                Value               Date                      KWABENA                      8.6                 07/19/2017            Lab Results      Component                Value               Date                      CO2                      23                  07/19/2017            Lab Results      Component                Value               Date                      BUN                      12                  07/19/2017            Lab Results      Component                Value                Date                      CR                       1.06                07/19/2017            Lab Results      Component                Value               Date                      GLC                      103                 07/19/2017              Color Urine (no units)       Date                     Value                 07/19/2017               Yellow           ----------  Appearance Urine (no units)       Date                     Value                 07/19/2017               Slightly Cloudy  ----------  Glucose Urine (mg/dL)       Date                     Value                 07/19/2017               Negative         ----------  Bilirubin Urine (no units)       Date                     Value                 07/19/2017               Negative         ----------  Ketones Urine (mg/dL)       Date                     Value                 07/19/2017               Negative         ----------  Specific Gravity Urine (no units)       Date                     Value                 07/19/2017               1.017            ----------  pH Urine (pH)       Date                     Value                 07/19/2017               6.0              ----------  Protein Albumin Urine (mg/dL)       Date                     Value                 07/19/2017               Negative         ----------  Nitrite Urine (no units)       Date                     Value                 07/19/2017               Negative         ----------  Leukocyte Esterase Urine (no units)       Date                     Value                 07/19/2017               Trace (A)        ----------      For further details of assessment, testing, and physical exam please see H and P completed on same date.           PAC Discussion and Assessment    ASA Classification: 3  Case is suitable for: [place]  Anesthetic techniques and relevant risks discussed: GA  Invasive monitoring and risk discussed:   Types:   Possibility and Risk of blood transfusion discussed:  Yes  NPO instructions given:   Additional anesthetic preparation and risks discussed:   Needs early admission to pre-op area:   Other:     PAC Resident/NP Anesthesia Assessment:  Ashli Gamboa is a 37 year old female scheduled for Laparoscopic Sleeve Gastrectomy on 7/25/2017 with Dr. Schmidt at [facility] under general anesthesia.  Ms. Gamboa was seen in consultation the past February in the Bariatric Surgery Clinic with goal weight 281 pounds.  The above procedure was recommended.    PAC referral for risk assessment and optimization of ansthessia with comorbid conditions of:  fibromyalgia, obesity, endometriosis, arthritis and depression.     Ms. Gamboa presents to PAC clinic and denies any cardiopulmonary history or symptoms.  She deneis any recent fever, chills, cough or sore throat or any change in her bowel or bladder functions.  She would like to proceed with above surgical intervention.    She has the following specific operative considerations:     Cardiology - RCRI : No serious cardiac risks.  0.4 % risk of major adverse cardiac event. METS >4.  Denies any cardiopulmonary history or sympotms.  Pulmonary - Quit smoking 2013.  Smoked for ~5 years < 1 PPD.         - YESICA # of risks 2/8 = low risk  Hematology - VTE risk:  0.5%  GI - Risk of PONV score = 2.  If > 2, anti-emetic intervention recommended.        - Morbid obesity, BMI >45.  Goal weight 281.  Today 284 lbs.  Spoke with Yu Evans from bariatrics and they will have patient come back this Friday for weigh in.  Renal - Cr 1.06 and GFR 58 Recommend close monitoring of fluid balance and electrolytes throughout the perioperative period.  Avoid nephrotoxic medication.  Musculoskeletal - Recommend careful positioning given body habitus       - arthritis in right great toe, last steroid injection > 9 month ago.  Neuro - Fibromyalgia, take Topamax DOS       - Depression, take Wellbutrin DOS  Gyn -  "endometriosis, s/p multiple lap laser surgeries.  Nuvaring      - Anesthesia considerations:  Refer to PAC assessment in anesthesia records  - Airway:  Prior anesthesia records indicate \"easy\" intubation.    Arrival time, NPO, shower and medication instructions provided by nursing staff today.  Preparing For Your Surgery handout given.  Patient was discussed with Dr Lamb. I spent 20 minutes face to face with patient, assessing, examining, and educating.      Reviewed and Signed by PAC Mid-Level Provider/Resident  Mid-Level Provider/Resident: Molly SANABRIA CNP  Date: 7/19/2017  Time: 8:05    Attending Anesthesiologist Anesthesia Assessment:  I saw the patient and discussed with the LAUREN as above.  Patient currently medically optimized for the proposed procedure.   Final anesthetic plan and recommendations to be made by the attending anesthesiologist on the day of surgery.   Multiple previous anesthetics without any reported issues. Airway appears feasible and she has been intubated without difficulty per the previous anesthetic records. Questions answered.     Reviewed and Signed by PAC Anesthesiologist  Anesthesiologist: Nirmal  Date: 7/19/17  Time: 0844  Pass/Fail: Pass  Disposition:     PAC Pharmacist Assessment:        Pharmacist:   Date:   Time:      Anesthesia Plan      History & Physical Review  History and physical reviewed and following examination; no interval change.    ASA Status:  3 .    NPO Status:  > 8 hours    Plan for General and ETT with Propofol induction. Maintenance will be Balanced.    PONV prophylaxis:  Ondansetron (or other 5HT-3), Aprepitant, Dexamethasone or Solumedrol and Scopolamine patch     Procedure:   LAPAROSCOPIC GASTRIC SLEEVE (N/A Abdomen) Laparoscopic Sleeve Gastrectomy      Anesthesia type: General    Pre-op diagnosis: Obesity     [facility]    Surgeon: Sam Schmidt MD    284 lbs 8 oz  5' 4\"   Body mass index is 48.83 kg/(m^2).    NPO after " "MN    Plan:MICHELLE Nieves notes.      Postoperative Care  Postoperative pain management:  IV analgesics.      Consents  Anesthetic plan, risks, benefits and alternatives discussed with:  Patient..        Ashli Gamboa [[id number]]  Female - 37 year old - 80  LAPAROSCOPIC GASTRIC SLEEVE (N/A Abdomen)       Preprocedure Vitals      BP:   132/85   Pulse:      Resp:   16   SpO2:   98   Temp:   37.2  C (99  F)   Height:   1.626 m (5' 4\")  (17)   Weight:   125 kg (275 lb 9.2 oz)  (17)   BMI:   47.4   IBW:   54.7 kg (120 lb 10.7 oz)   Last edited 17 08 by RAI   Currently displaying vitals information from multiple entries within 15 minutes of most recent vitals.          Allergies      No Known Allergies       Prescription Medications         Last Taken Last Updated     etonogestrel-ethinyl estradiol (NUVARING) 0.12-0.015 MG/24HR vaginal ring    17 0918     buPROPion (WELLBUTRIN XL) 300 MG 24 hr tablet    17 0918     Topiramate (TOPAMAX PO)    17 0918       Hematology Labs        WBC        8.4 17 0902       Electrolyte Labs        K+    Na+        3.7 17 0902 142 17 0902       Other Labs        No relevant labs found        Substance History      Smoking Status: Former Smoker - 2.5 pack years     Quit Smokin13     Smokeless Tobacco Status: Never Used     Alcohol use: Yes; 0.0 oz per week     Drug use: No       Facility Administered Medications      enoxaparin (LOVENOX) injection 40 mg    ceFAZolin (ANCEF) intermittent infusion 3 g (pre-mix)        ceFAZolin (ANCEF) 1 g vial to attach to  ml bag for ADULT or 50 ml bag for PEDS    scopolamine (TRANSDERM) 1 MG/3DAYS 72 hr patch 1 patch        lidocaine 1 % 1 mL    lidocaine (LMX4) kit        sodium chloride (PF) 0.9% PF flush 3 mL    sodium chloride (PF) 0.9% PF flush 3 mL        lactated ringers infusion    aprepitant (EMEND) capsule 40 mg          Anesthesia Family " History      Arthritis     Father     Breast Cancer     Maternal Grandmother     CANCER     Mother     CEREBROVASCULAR DISEASE     Paternal Grandmother     Crohn Disease     Mother     Depression     Mother, Father, Sister, Brother     MENTAL ILLNESS     Brother     Obesity     Mother     Other - See Comments     Mother     Ovarian Cancer     Mother     Rheumatoid Arthritis     Father       Problem List      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     Major depression, recurrent (H) CARDIOVASCULAR SCREENING; LDL GOAL LESS THAN 160     Endometriosis Fibromyalgia     Muscular deconditioning Cervical high risk HPV (human papillomavirus) test positive       Medical History        Endometriosis Obese     Arthritis Depressive disorder     Cervical high risk HPV (human papillomavirus) test positive Cervical high risk HPV (human papillomavirus) test positive       Surgical History      ABDOMEN SURGERY LASER CO2 LAPAROSCOPIC VAPORIZATION ENDOMETRIUM     LAPAROSCOPIC LYSIS ADHESIONS LASER CO2 LAPAROSCOPIC VAPORIZATION ENDOMETRIUM     LASER CO2 LAPAROSCOPY DIAGNOSTIC, LYSIS ADHESIONS, COMBINED DILATE CERVIX, HYSTEROSCOPY, ABLATE ENDOMETRIUM NOVASURE, COMBINED     GYN SURGERY        Obstetric History      The patient has not been asked about pregnancy.              H&P  Encounter Date: 7/19/2017  Molly Vicente, APRN CNP   Nurse Practitioner   Expand All Collapse All    []Hide copied text    Pre-Operative H & P      CC:  Preoperative exam to assess for increased cardiopulmonary risk while undergoing surgery and anesthesia.     Date of Encounter: 7/19/2017  Primary Care Physician:  Karena Mo     HPI  Ashli Gamboa is a 37 year old female who presents for pre-operative H & P in preparation for  Laparoscopic Sleeve Gastrectomy on 7/25/2017 with Dr. Schmidt at [facility] under general anesthesia.  Ms. Gamboa was seen in consultation the past February in  the Bariatric Surgery Clinic with goal weight 281 pounds.  The above procedure was recommended.     PAC referral for risk assessment and optimization of ansthessia with comorbid conditions of:  fibromyalgia, obesity, endometriosis, arthritis and depression.      Ms. Gamboa presents to PAC clinic and denies any cardiopulmonary history or symptoms.  She deneis any recent fever, chills, cough or sore throat or any change in her bowel or bladder functions.  She would like to proceed with above surgical intervention.     History is obtained from the patient and electronic health record.      Past Medical History   Past Medical History         Past Medical History:   Diagnosis Date     Arthritis       BIG TOE     Depressive disorder 2007     I have been on and off medication for the last several years     Endometriosis       Obese              Past Surgical History   Past Surgical History          Past Surgical History:   Procedure Laterality Date     ABDOMEN SURGERY         laparoscopy X2     DILATE CERVIX, HYSTEROSCOPY, ABLATE ENDOMETRIUM NOVASURE, COMBINED N/A 11/17/2016     Procedure: COMBINED DILATE CERVIX, HYSTEROSCOPY, ABLATE ENDOMETRIUM NOVASURE;  Surgeon: Sabas Patel MD;  Location: [facility]     GYN SURGERY   dates above     myomectomy x2, laparoscopy x1 (soon to be 2)     LAPAROSCOPIC LYSIS ADHESIONS   7/10/2014     Procedure: LAPAROSCOPIC LYSIS ADHESIONS;  Surgeon: Sabas Patel MD;  Location: [facility]     LASER CO2 LAPAROSCOPIC VAPORIZATION ENDOMETRIUM   7/10/2014     Procedure: LASER CO2 LAPAROSCOPIC VAPORIZATION ENDOMETRIUM;  Surgeon: Sabas Patel MD;  Location: [facility]     LASER CO2 LAPAROSCOPIC VAPORIZATION ENDOMETRIUM N/A 6/30/2015     Procedure: LASER CO2 LAPAROSCOPIC VAPORIZATION ENDOMETRIUM;  Surgeon: Sabas Patel MD;  Location: [facility]     LASER CO2 LAPAROSCOPY DIAGNOSTIC, LYSIS ADHESIONS, COMBINED N/A 6/30/2015     Procedure: COMBINED LASER CO2 LAPAROSCOPY DIAGNOSTIC, LYSIS ADHESIONS;   Surgeon: Sabas Patel MD;  Location: [facility]            Hx of Blood transfusions/reactions: denies      Hx of abnormal bleeding or anti-platelet use: denies     Menstrual history: No LMP recorded. Patient is not currently having periods (Reason: Birth Control).     Steroid use in the last year: injection into right great toe     Personal or FH with difficulty with Anesthesia:  denies     Prior to Admission Medications   Active Medications           Current Outpatient Prescriptions   Medication Sig Dispense Refill     etonogestrel-ethinyl estradiol (NUVARING) 0.12-0.015 MG/24HR vaginal ring PLACE 1 RING VAGINALLY EVERY 28 DAYS AS DIRECTED 1 each 3     buPROPion (WELLBUTRIN XL) 300 MG 24 hr tablet Take 1 tablet (300 mg) by mouth every morning 90 tablet 1     Topiramate (TOPAMAX PO) Take 200 mg by mouth every morning                 Allergies  No Known Allergies     Social History   Social History    Social History            Social History     Marital status:        Spouse name: Sid     Number of children: 0     Years of education: N/A            Occupational History               [employer]              Social History Main Topics     Smoking status: Former Smoker       Packs/day: 0.50       Years: 5.00       Types: Cigarettes       Start date: 1/2/2007       Quit date: 11/1/2013     Smokeless tobacco: Never Used         Comment: stopped for about 6 months now as of 5/23/13     Alcohol use 0.0 oz/week        0 Standard drinks or equivalent per week          Comment: occas     Drug use: No     Sexual activity: Yes       Partners: Male       Birth control/ protection: Inserts/Ring           Other Topics Concern     Parent/Sibling W/ Cabg, Mi Or Angioplasty Before 65f 55m? No      Social History Narrative            Family History   Family History           Family History   Problem Relation Age of Onset     Depression Mother       CANCER Mother         Cervical      Other - See Comments  "Mother         Fibromyalgia     Crohn Disease Mother       Obesity Mother       Ovarian Cancer Mother         Cervical Cancer     Arthritis Father       Rheumatoid Arthritis Father       Depression Father       CEREBROVASCULAR DISEASE Paternal Grandmother       Breast Cancer Maternal Grandmother       Depression Brother       Depression Sister       MENTAL ILLNESS Brother         schizophrenia and bipolar            ROS/MED HX     ENT/Pulmonary:     (+)YESICA risk factors obese, tobacco use (Quit 2013.  Smoked from 3888-0035 <1 ppd), Past use asthma , . .   (-) sleep apnea   Neurologic:     (+)other neuro Fibromyalgia >>> using topamax    Cardiovascular:  - neg cardiovascular ROS   (+) ----. : . . . :. . No previous cardiac testing     METS/Exercise Tolerance: Comment: Work-out 2 times per week.   >4 METS   Hematologic:       Musculoskeletal: Comment: H/o right great toe fracture with subsequent arthritis.  Has had steroid injections.      GI/Hepatic:  - neg GI/hepatic ROS     Renal/Genitourinary:  - ROS Renal section negative     Endo:     (+) Obesity (BMI 48.64), .    Psychiatric:     (+) psychiatric history depression    Infectious Disease:       Malignancy:       - no malignancy   Other: Comment: Endometriosis, s/p multiple lap laser surgeries.    Chronic pain r/t fibromyalgia.   (+) Possibly pregnant LMP: Nuvaring - does not get period., C-spine cleared: N/A, H/O Chronic Pain,no other significant disability       Temp: 98.2  F (36.8  C) Temp src: Oral BP: 139/82 Pulse: 72   Resp: 16 SpO2: 98 %          284 lbs 8 oz  5' 4\"   Body mass index is 48.83 kg/(m^2).         Physical Exam  Constitutional: Awake, alert, cooperative, no apparent distress, and appears stated age.  Eyes: Pupils equal, round and reactive to light, extra ocular muscles intact, sclera clear, conjunctiva normal.  HENT: Normocephalic, oral pharynx with moist mucus membranes,Dentition in fair repair with missing left bottom second molar.   . No " goiter appreciated.   Respiratory: Clear to auscultation bilaterally, no crackles or wheezing.  Cardiovascular: Regular rate and rhythm, normal S1 and S2, and no murmur noted.  Carotids +2, no bruits. No edema. Palpable pulses to radial  DP and PT arteries.   GI: Normal bowel sounds, soft, non-distended, non-tender, no masses palpated, no hepatosplenomegaly.  Difficult exam given obese abdomen.  Lymph/Hematologic: No cervical lymphadenopathy and no supraclavicular lymphadenopathy.  Genitourinary:  na  Skin: Warm and dry.  No rashes at anticipated surgical site.   Musculoskeletal: Full ROM of neck. There is no redness, warmth, or swelling of the joints. Gross motor strength is normal.    Neurologic: Awake, alert, oriented to name, place and time. Cranial nerves II-XII are grossly intact. Gait is normal.   Neuropsychiatric: Calm, cooperative. Normal affect.      Labs: (personally reviewed)        Lab Results   Component Value Date     WBC 8.4 07/19/2017            Lab Results   Component Value Date     RBC 4.95 07/19/2017            Lab Results   Component Value Date     HGB 13.7 07/19/2017      Lab Results   Component Value Date     HCT 44.3 07/19/2017            Lab Results   Component Value Date     MCV 90 07/19/2017            Lab Results   Component Value Date     MCH 27.7 07/19/2017            Lab Results   Component Value Date     MCHC 30.9 07/19/2017            Lab Results   Component Value Date     RDW 14.0 07/19/2017            Lab Results   Component Value Date      07/19/2017         Last Basic Metabolic Panel:        Lab Results   Component Value Date      07/19/2017             Lab Results   Component Value Date     POTASSIUM 3.7 07/19/2017            Lab Results   Component Value Date     CHLORIDE 112 07/19/2017            Lab Results   Component Value Date     KWABENA 8.6 07/19/2017            Lab Results   Component Value Date     CO2 23 07/19/2017            Lab Results   Component Value  Date     BUN 12 07/19/2017            Lab Results   Component Value Date     CR 1.06 07/19/2017            Lab Results   Component Value Date      07/19/2017             Color Urine (no units)   Date Value   07/19/2017 Yellow          Appearance Urine (no units)   Date Value   07/19/2017 Slightly Cloudy          Glucose Urine (mg/dL)   Date Value   07/19/2017 Negative          Bilirubin Urine (no units)   Date Value   07/19/2017 Negative          Ketones Urine (mg/dL)   Date Value   07/19/2017 Negative          Specific Gravity Urine (no units)   Date Value   07/19/2017 1.017          pH Urine (pH)   Date Value   07/19/2017 6.0          Protein Albumin Urine (mg/dL)   Date Value   07/19/2017 Negative          Nitrite Urine (no units)   Date Value   07/19/2017 Negative          Leukocyte Esterase Urine (no units)   Date Value   07/19/2017 Trace (A)            ASSESSMENT and PLAN  Ashli Gamboa is a 37 year old female scheduled to undergo Laparoscopic Sleeve Gastrectomy on 7/25/2017 with Dr. Schmidt at [facility] under general anesthesia.      She has the following specific operative considerations:      Cardiology - RCRI : No serious cardiac risks.  0.4 % risk of major adverse cardiac event. METS >4.  Denies any cardiopulmonary history or sympotms.  Pulmonary - Quit smoking 2013.  Smoked for ~5 years < 1 PPD.         - YESICA # of risks 2/8 = low risk  Hematology - VTE risk:  0.5%  GI - Risk of PONV score = 2.  If > 2, anti-emetic intervention recommended.        - Morbid obesity, BMI >45.  Goal weight 281.  Today 284 lbs.  Spoke with Yu Evans from bariatrics and they will have patient come back this Friday for weigh in.  Renal - Cr 1.06 and GFR 58 Recommend close monitoring of fluid balance and electrolytes throughout the perioperative period.  Avoid nephrotoxic medication.  Musculoskeletal - Recommend careful positioning given body habitus       - arthritis in  right great toe, last steroid injection > 9 month ago.  Neuro - Fibromyalgia, take Topamax DOS       - Depression, take Wellbutrin DOS  Gyn - endometriosis, s/p multiple lap laser surgeries.  Nuvaring       - Anesthesia considerations:  Refer to PAC assessment in anesthesia records  - Airway:  Appears feasible     Arrival time, NPO, shower and medication instructions provided by nursing staff today.  Preparing For Your Surgery handout given.  Patient was discussed with Dr Lamb. I spent 20 minutes face to face with patient, assessing, examining, and educating.        DANIELE Thibodeaux CNP  Preoperative Assessment Center  [facility]  Clinic and Surgery Center  Phone: [phone]  Fax: [phone]      Electronically signed by Molly Vicente APRN CNP at 7/19/2017 11:21 AM                     .

## 2017-07-19 NOTE — PATIENT INSTRUCTIONS
Preparing for Your Surgery      Name:  Ashli Gamboa   MRN:  [id number]   :  1980   Today's Date:  2017     Arriving for surgery:  Surgery date:  17  Surgery time:  10:40 a.m.  Arrival time:  08:40 a.m.  Please come to:       [facility] [address]  [address] [place]  [address]    -   parking is available in front of the hospital from 5:15 am to 8:00 pm    -  Stop at the Information Desk in the lobby    -   Inform the information person that you are here for surgery. An escort to 3c will be provided. If you would not like an escort, please proceed to 3C on the 3rd floor. [phone]     What can I eat or drink?  -  Follow Bowel Prep per Dr. Schmidt's Office    Which medicines can I take? (only with sips of water with pills 3 hours prior to surgery)  Last sip at 0740 a.m.  -  Do NOT take these medications in the morning, the day of surgery:          -  Please take these medications the day of surgery:      Wellbutrin, Topamax    How do I prepare myself?  -  Take two showers: one the night before surgery; and one the morning of surgery.         Use Scrubcare or Hibiclens to wash from neck down.  You may use your own shampoo and conditioner. No other hair products.   -  Do NOT use lotion, powder, deodorant, or antiperspirant the day of your surgery.  -  Do NOT wear any makeup, fingernail polish or jewelry.  -Do not bring your own medications to the hospital, except for inhalers and eye drops.  -  Bring your ID and insurance card.    Questions or Concerns:  If you have questions or concerns, please call the  Preoperative Assessment Center, Monday-Friday 7AM-7PM:  [phone]          AFTER YOUR SURGERY  Breathing exercises   Breathing exercises help you recover faster. Take deep breaths and let the air out slowly. This will:     Help you wake up after surgery.    Help prevent complications like pneumonia.  Preventing complications will help you go  home sooner.   We may give you a breathing device (incentive spirometer) to encourage you to breathe deeply.   Nausea and vomiting   You may feel sick to your stomach after surgery; if so, let your nurse know.    Pain control:  After surgery, you may have pain. Our goal is to help you manage your pain. Pain medicine will help you feel comfortable enough to do activities that will help you heal.  These activities may include breathing exercises, walking and physical therapy.   To help your health care team treat your pain we will ask: 1) If you have pain  2) where it is located 3) describe your pain in your words  Methods of pain control include medications given by mouth, vein or by nerve block for some surgeries.  We may give you a pain control pump that will:  1) Deliver the medicine through a tube placed in your vein  2) Control the amount of medicine you receive  3) Allow you to push a button to deliver a dose of pain medicine  Sequential Compression Device (SCD) or Pneumo Boots:  You may need to wear SCD S on your legs or feet. These are wraps connected to a machine that pumps in air and releases it. The repeated pumping helps prevent blood clots from forming.

## 2017-07-19 NOTE — OR NURSING
PAC visit Date 7/19/2017   Patient Goal Weight 281.8  Actual Weight 284.8   Met goal weight  No      Inbasket Message sent to Kaylee Turpin RN on 7/19/17.  Patient is aware that she will need a repeat visit for weight.

## 2017-07-21 ENCOUNTER — CARE COORDINATION (OUTPATIENT)
Dept: SURGERY | Facility: CLINIC | Age: 37
End: 2017-07-21

## 2017-07-21 ENCOUNTER — ALLIED HEALTH/NURSE VISIT (OUTPATIENT)
Dept: SURGERY | Facility: CLINIC | Age: 37
End: 2017-07-21

## 2017-07-21 VITALS — BODY MASS INDEX: 47.72 KG/M2 | WEIGHT: 279.5 LBS | HEIGHT: 64 IN

## 2017-07-21 DIAGNOSIS — E66.01 MORBID OBESITY (H): Primary | ICD-10-CM

## 2017-07-21 NOTE — MR AVS SNAPSHOT
After Visit Summary   7/21/2017    Ashli Gamboa    MRN: [id number]           Patient Information     Date Of Birth          1980        Visit Information        Provider Department      7/21/2017 7:30 AM Nurse,  Surgery General Surgery        Today's Diagnoses     Morbid obesity (H)    -  1       Follow-ups after your visit        Your next 10 appointments already scheduled     Jul 25, 2017   Procedure with Sam Schmidt MD   [facility], [place], Same Day Surgery (--)    [address] [facility] [address]   [phone]            Aug 03, 2017  7:00 AM CDT   NUTRITION VISIT with Meaghan Glass RD   [facility] Surgical Weight Management ([facility])    [address] [id number] [phone]            Aug 03, 2017  7:40 AM CDT   (Arrive by 7:25 AM)   RETURN BARIATRIC SURGERY with Sam Schmidt MD   [facility] Surgical Weight Management ([facility])    [address] [place] [id number] [phone]            Aug 31, 2017  7:00 AM CDT   NUTRITION VISIT with Meaghan Glass RD   [facility] Surgical Weight Management ([facility])    [address] [place] [id number] [phone]            Aug 31, 2017  7:40 AM CDT   (Arrive by 7:25 AM)   RETURN BARIATRIC SURGERY with Sam Schmidt MD   [facility] Surgical Weight Management ([facility])    [address] [place] [id number] [phone]              Who to contact     Please call your clinic at [phone] to:    Ask questions about your health    Make or cancel appointments    Discuss your medicines    Learn about your test results    Speak to your doctor   If you have compliments or concerns about an experience at your clinic, or if you wish to file a complaint, please contact [facility] Patient  "Relations at [phone] or email us at [email or web address]         Additional Information About Your Visit        Envest[employer]art Information     Sherpany gives you secure access to your electronic health record. If you see a primary care provider, you can also send messages to your care team and make appointments. If you have questions, please call your primary care clinic.  If you do not have a primary care provider, please call [phone] and they will assist you.      Sherpany is an electronic gateway that provides easy, online access to your medical records. With [employer], you can request a clinic appointment, read your test results, renew a prescription or communicate with your care team.     To access your existing account, please contact your [facility] or call [phone] for assistance.        Care EveryWhere ID     This is your Care EveryWhere ID. This could be used by other organizations to access your [facility] medical records  [id number]        Your Vitals Were     Height BMI (Body Mass Index)                5' 4\" 47.98 kg/m2           Blood Pressure from Last 3 Encounters:   07/19/17 139/82   07/05/17 128/82   06/29/17 139/89    Weight from Last 3 Encounters:   07/21/17 279 lb 8 oz   07/19/17 284 lb 8 oz   07/05/17 282 lb 12.8 oz              Today, you had the following     No orders found for display       Primary Care Provider Office Phone # Fax #    Karena August DANIELE Mo [facility] [phone]       [address] [address]        Equal Access to Services     FRANCO OLMSTEAD : Hadii aad ku hadasho Soomaali, waaxda luqadaha, qaybta kaalmada adeegyalynne barcenas . So [facility] [phone].    ATENCIÓN: Si habla español, tiene a flynn disposición servicios gratuitos de asistencia lingüística. Llame al [phone].    We comply with applicable federal civil rights laws and [place] laws. " We do not discriminate on the basis of race, color, national origin, age, disability sex, sexual orientation or gender identity.            Thank you!     Thank you for choosing GENERAL SURGERY  for your care. Our goal is always to provide you with excellent care. Hearing back from our patients is one way we can continue to improve our services. Please take a few minutes to complete the written survey that you may receive in the mail after your visit with us. Thank you!             Your Updated Medication List - Protect others around you: Learn how to safely use, store and throw away your medicines at www.disposemymeds.org.          This list is accurate as of: 7/21/17  8:19 AM.  Always use your most recent med list.                   Brand Name Dispense Instructions for use Diagnosis    buPROPion 300 MG 24 hr tablet    WELLBUTRIN XL    90 tablet    Take 1 tablet (300 mg) by mouth every morning    Recurrent major depressive disorder, in partial remission (H)       etonogestrel-ethinyl estradiol 0.12-0.015 MG/24HR vaginal ring    NUVARING    1 each    PLACE 1 RING VAGINALLY EVERY 28 DAYS AS DIRECTED    Encounter for surveillance of vaginal ring hormonal contraceptive device       TOPAMAX PO      Take 200 mg by mouth every morning

## 2017-07-24 PROBLEM — R87.810 CERVICAL HIGH RISK HPV (HUMAN PAPILLOMAVIRUS) TEST POSITIVE: Status: ACTIVE | Noted: 2017-07-05

## 2017-07-24 NOTE — PROGRESS NOTES
2012, 2013, 2014 all NIL paps.  6/10/15 NIL pap, + HR HPV (not 16 or 18). Plan: cotest in 1 year.  7/5/17 NIL pap, + HR HPV (not 16 or 18). Plan: colp  10/06/17 [place] reminder letter sent. ([facility])   10/18/17 Colpo.  Bx-normal. NIL pap.  Plan: cotest in 1 year (s)  10/9/18 Cotest reminder letter sent ([facility])  11/12/18 Pt had hysterectomy 1/4/18. Routed to RN to review and advise f/u. ([facility])  1/4/18 total hysterectomy including cervix.  Benign.  Pap no longer indicated.

## 2017-07-25 ENCOUNTER — ANESTHESIA (OUTPATIENT)
Dept: SURGERY | Facility: CLINIC | Age: 37
DRG: 620 | End: 2017-07-25
Payer: COMMERCIAL

## 2017-07-25 ENCOUNTER — HOSPITAL ENCOUNTER (INPATIENT)
Facility: CLINIC | Age: 37
LOS: 1 days | Discharge: HOME | DRG: 620 | End: 2017-07-26
Attending: SURGERY | Admitting: SURGERY
Payer: COMMERCIAL

## 2017-07-25 DIAGNOSIS — Z98.84 S/P LAPAROSCOPIC SLEEVE GASTRECTOMY: Primary | ICD-10-CM

## 2017-07-25 LAB
ABO + RH BLD: NORMAL
ABO + RH BLD: NORMAL
BLD GP AB SCN SERPL QL: NORMAL
BLOOD BANK CMNT PATIENT-IMP: NORMAL
BLOOD BANK CMNT PATIENT-IMP: NORMAL
HCG UR QL: NEGATIVE
SPECIMEN EXP DATE BLD: NORMAL

## 2017-07-25 PROCEDURE — 0DB64Z3 EXCISION OF STOMACH, PERCUTANEOUS ENDOSCOPIC APPROACH, VERTICAL: ICD-10-PCS | Performed by: SURGERY

## 2017-07-25 PROCEDURE — 25000128 H RX IP 250 OP 636: Performed by: PHYSICIAN ASSISTANT

## 2017-07-25 PROCEDURE — 36000062 ZZH SURGERY LEVEL 4 1ST 30 MIN - UMMC: Performed by: SURGERY

## 2017-07-25 PROCEDURE — 27210794 ZZH OR GENERAL SUPPLY STERILE: Performed by: SURGERY

## 2017-07-25 PROCEDURE — 25000128 H RX IP 250 OP 636

## 2017-07-25 PROCEDURE — 25000125 ZZHC RX 250: Performed by: ANESTHESIOLOGY

## 2017-07-25 PROCEDURE — 12000008 ZZH R&B INTERMEDIATE UMMC

## 2017-07-25 PROCEDURE — 40000170 ZZH STATISTIC PRE-PROCEDURE ASSESSMENT II: Performed by: SURGERY

## 2017-07-25 PROCEDURE — 37000008 ZZH ANESTHESIA TECHNICAL FEE, 1ST 30 MIN: Performed by: SURGERY

## 2017-07-25 PROCEDURE — 25000132 ZZH RX MED GY IP 250 OP 250 PS 637

## 2017-07-25 PROCEDURE — 71000014 ZZH RECOVERY PHASE 1 LEVEL 2 FIRST HR: Performed by: SURGERY

## 2017-07-25 PROCEDURE — 36000064 ZZH SURGERY LEVEL 4 EA 15 ADDTL MIN - UMMC: Performed by: SURGERY

## 2017-07-25 PROCEDURE — 25000125 ZZHC RX 250: Performed by: SURGERY

## 2017-07-25 PROCEDURE — 37000009 ZZH ANESTHESIA TECHNICAL FEE, EACH ADDTL 15 MIN: Performed by: SURGERY

## 2017-07-25 PROCEDURE — 25000131 ZZH RX MED GY IP 250 OP 636 PS 637: Performed by: ANESTHESIOLOGY

## 2017-07-25 PROCEDURE — C9399 UNCLASSIFIED DRUGS OR BIOLOG: HCPCS | Performed by: NURSE ANESTHETIST, CERTIFIED REGISTERED

## 2017-07-25 PROCEDURE — 25000128 H RX IP 250 OP 636: Performed by: NURSE ANESTHETIST, CERTIFIED REGISTERED

## 2017-07-25 PROCEDURE — 40000141 ZZH STATISTIC PERIPHERAL IV START W/O US GUIDANCE

## 2017-07-25 PROCEDURE — 25000128 H RX IP 250 OP 636: Performed by: ANESTHESIOLOGY

## 2017-07-25 PROCEDURE — 81025 URINE PREGNANCY TEST: CPT | Performed by: ANESTHESIOLOGY

## 2017-07-25 PROCEDURE — S0020 INJECTION, BUPIVICAINE HYDRO: HCPCS | Performed by: SURGERY

## 2017-07-25 PROCEDURE — 88305 TISSUE EXAM BY PATHOLOGIST: CPT | Performed by: SURGERY

## 2017-07-25 PROCEDURE — 25000125 ZZHC RX 250: Performed by: NURSE ANESTHETIST, CERTIFIED REGISTERED

## 2017-07-25 PROCEDURE — 71000015 ZZH RECOVERY PHASE 1 LEVEL 2 EA ADDTL HR: Performed by: SURGERY

## 2017-07-25 PROCEDURE — 25000564 ZZH DESFLURANE, EA 15 MIN: Performed by: SURGERY

## 2017-07-25 RX ORDER — ACETAMINOPHEN 325 MG/1
975 TABLET ORAL EVERY 6 HOURS
Status: DISCONTINUED | OUTPATIENT
Start: 2017-07-25 | End: 2017-07-25

## 2017-07-25 RX ORDER — FENTANYL CITRATE 50 UG/ML
INJECTION, SOLUTION INTRAMUSCULAR; INTRAVENOUS PRN
Status: DISCONTINUED | OUTPATIENT
Start: 2017-07-25 | End: 2017-07-25

## 2017-07-25 RX ORDER — FENTANYL CITRATE 50 UG/ML
25-50 INJECTION, SOLUTION INTRAMUSCULAR; INTRAVENOUS
Status: DISCONTINUED | OUTPATIENT
Start: 2017-07-25 | End: 2017-07-25 | Stop reason: HOSPADM

## 2017-07-25 RX ORDER — HYDROMORPHONE HYDROCHLORIDE 1 MG/ML
.3-.5 INJECTION, SOLUTION INTRAMUSCULAR; INTRAVENOUS; SUBCUTANEOUS EVERY 10 MIN PRN
Status: DISCONTINUED | OUTPATIENT
Start: 2017-07-25 | End: 2017-07-25 | Stop reason: HOSPADM

## 2017-07-25 RX ORDER — SODIUM CHLORIDE, SODIUM LACTATE, POTASSIUM CHLORIDE, CALCIUM CHLORIDE 600; 310; 30; 20 MG/100ML; MG/100ML; MG/100ML; MG/100ML
INJECTION, SOLUTION INTRAVENOUS CONTINUOUS
Status: DISCONTINUED | OUTPATIENT
Start: 2017-07-25 | End: 2017-07-25 | Stop reason: HOSPADM

## 2017-07-25 RX ORDER — CEFAZOLIN SODIUM 1 G/3ML
1 INJECTION, POWDER, FOR SOLUTION INTRAMUSCULAR; INTRAVENOUS SEE ADMIN INSTRUCTIONS
Status: DISCONTINUED | OUTPATIENT
Start: 2017-07-25 | End: 2017-07-25 | Stop reason: HOSPADM

## 2017-07-25 RX ORDER — LIDOCAINE HYDROCHLORIDE 20 MG/ML
INJECTION, SOLUTION INFILTRATION; PERINEURAL PRN
Status: DISCONTINUED | OUTPATIENT
Start: 2017-07-25 | End: 2017-07-25

## 2017-07-25 RX ORDER — ONDANSETRON 2 MG/ML
INJECTION INTRAMUSCULAR; INTRAVENOUS PRN
Status: DISCONTINUED | OUTPATIENT
Start: 2017-07-25 | End: 2017-07-25

## 2017-07-25 RX ORDER — HYDROMORPHONE HCL/0.9% NACL/PF 0.2MG/0.2
.2-.4 SYRINGE (ML) INTRAVENOUS
Status: DISCONTINUED | OUTPATIENT
Start: 2017-07-25 | End: 2017-07-26

## 2017-07-25 RX ORDER — ONDANSETRON 4 MG/1
4 TABLET, ORALLY DISINTEGRATING ORAL EVERY 30 MIN PRN
Status: DISCONTINUED | OUTPATIENT
Start: 2017-07-25 | End: 2017-07-25 | Stop reason: HOSPADM

## 2017-07-25 RX ORDER — SCOLOPAMINE TRANSDERMAL SYSTEM 1 MG/1
1 PATCH, EXTENDED RELEASE TRANSDERMAL ONCE
Status: COMPLETED | OUTPATIENT
Start: 2017-07-25 | End: 2017-07-25

## 2017-07-25 RX ORDER — NALOXONE HYDROCHLORIDE 0.4 MG/ML
.1-.4 INJECTION, SOLUTION INTRAMUSCULAR; INTRAVENOUS; SUBCUTANEOUS
Status: DISCONTINUED | OUTPATIENT
Start: 2017-07-25 | End: 2017-07-26 | Stop reason: HOSPADM

## 2017-07-25 RX ORDER — SODIUM CHLORIDE, SODIUM LACTATE, POTASSIUM CHLORIDE, CALCIUM CHLORIDE 600; 310; 30; 20 MG/100ML; MG/100ML; MG/100ML; MG/100ML
INJECTION, SOLUTION INTRAVENOUS CONTINUOUS
Status: DISCONTINUED | OUTPATIENT
Start: 2017-07-25 | End: 2017-07-26

## 2017-07-25 RX ORDER — PROCHLORPERAZINE MALEATE 5 MG
5-10 TABLET ORAL EVERY 6 HOURS PRN
Status: DISCONTINUED | OUTPATIENT
Start: 2017-07-25 | End: 2017-07-26 | Stop reason: HOSPADM

## 2017-07-25 RX ORDER — MEPERIDINE HYDROCHLORIDE 25 MG/ML
12.5 INJECTION INTRAMUSCULAR; INTRAVENOUS; SUBCUTANEOUS
Status: DISCONTINUED | OUTPATIENT
Start: 2017-07-25 | End: 2017-07-25 | Stop reason: HOSPADM

## 2017-07-25 RX ORDER — LIDOCAINE 40 MG/G
CREAM TOPICAL
Status: DISCONTINUED | OUTPATIENT
Start: 2017-07-25 | End: 2017-07-25 | Stop reason: HOSPADM

## 2017-07-25 RX ORDER — BUPROPION HYDROCHLORIDE 300 MG/1
300 TABLET ORAL EVERY MORNING
Status: DISCONTINUED | OUTPATIENT
Start: 2017-07-26 | End: 2017-07-25

## 2017-07-25 RX ORDER — CEFAZOLIN SODIUM 1 G/50ML
3 SOLUTION INTRAVENOUS
Status: COMPLETED | OUTPATIENT
Start: 2017-07-25 | End: 2017-07-25

## 2017-07-25 RX ORDER — LIDOCAINE 40 MG/G
CREAM TOPICAL
Status: DISCONTINUED | OUTPATIENT
Start: 2017-07-25 | End: 2017-07-26 | Stop reason: HOSPADM

## 2017-07-25 RX ORDER — AMOXICILLIN 250 MG
1-2 CAPSULE ORAL 2 TIMES DAILY
Status: DISCONTINUED | OUTPATIENT
Start: 2017-07-25 | End: 2017-07-26 | Stop reason: HOSPADM

## 2017-07-25 RX ORDER — ONDANSETRON 4 MG/1
4 TABLET, ORALLY DISINTEGRATING ORAL EVERY 6 HOURS PRN
Status: DISCONTINUED | OUTPATIENT
Start: 2017-07-25 | End: 2017-07-26 | Stop reason: HOSPADM

## 2017-07-25 RX ORDER — DEXAMETHASONE SODIUM PHOSPHATE 4 MG/ML
INJECTION, SOLUTION INTRA-ARTICULAR; INTRALESIONAL; INTRAMUSCULAR; INTRAVENOUS; SOFT TISSUE PRN
Status: DISCONTINUED | OUTPATIENT
Start: 2017-07-25 | End: 2017-07-25

## 2017-07-25 RX ORDER — DIPHENHYDRAMINE HYDROCHLORIDE 50 MG/ML
12.5 INJECTION INTRAMUSCULAR; INTRAVENOUS ONCE
Status: COMPLETED | OUTPATIENT
Start: 2017-07-25 | End: 2017-07-25

## 2017-07-25 RX ORDER — SODIUM CHLORIDE, SODIUM LACTATE, POTASSIUM CHLORIDE, CALCIUM CHLORIDE 600; 310; 30; 20 MG/100ML; MG/100ML; MG/100ML; MG/100ML
INJECTION, SOLUTION INTRAVENOUS CONTINUOUS PRN
Status: DISCONTINUED | OUTPATIENT
Start: 2017-07-25 | End: 2017-07-25

## 2017-07-25 RX ORDER — NALOXONE HYDROCHLORIDE 0.4 MG/ML
.1-.4 INJECTION, SOLUTION INTRAMUSCULAR; INTRAVENOUS; SUBCUTANEOUS
Status: DISCONTINUED | OUTPATIENT
Start: 2017-07-25 | End: 2017-07-25 | Stop reason: HOSPADM

## 2017-07-25 RX ORDER — TOPIRAMATE 200 MG/1
200 TABLET, FILM COATED ORAL EVERY MORNING
Status: DISCONTINUED | OUTPATIENT
Start: 2017-07-26 | End: 2017-07-26

## 2017-07-25 RX ORDER — BUPIVACAINE HYDROCHLORIDE 2.5 MG/ML
INJECTION, SOLUTION EPIDURAL; INFILTRATION; INTRACAUDAL PRN
Status: DISCONTINUED | OUTPATIENT
Start: 2017-07-25 | End: 2017-07-25 | Stop reason: HOSPADM

## 2017-07-25 RX ORDER — ONDANSETRON 2 MG/ML
4 INJECTION INTRAMUSCULAR; INTRAVENOUS EVERY 6 HOURS PRN
Status: DISCONTINUED | OUTPATIENT
Start: 2017-07-25 | End: 2017-07-26 | Stop reason: HOSPADM

## 2017-07-25 RX ORDER — PROPOFOL 10 MG/ML
INJECTION, EMULSION INTRAVENOUS PRN
Status: DISCONTINUED | OUTPATIENT
Start: 2017-07-25 | End: 2017-07-25

## 2017-07-25 RX ORDER — ONDANSETRON 2 MG/ML
4 INJECTION INTRAMUSCULAR; INTRAVENOUS EVERY 30 MIN PRN
Status: DISCONTINUED | OUTPATIENT
Start: 2017-07-25 | End: 2017-07-25 | Stop reason: HOSPADM

## 2017-07-25 RX ORDER — APREPITANT 40 MG/1
40 CAPSULE ORAL
Status: COMPLETED | OUTPATIENT
Start: 2017-07-25 | End: 2017-07-25

## 2017-07-25 RX ORDER — BUPROPION HYDROCHLORIDE 100 MG/1
100 TABLET ORAL 3 TIMES DAILY
Status: DISCONTINUED | OUTPATIENT
Start: 2017-07-26 | End: 2017-07-26 | Stop reason: HOSPADM

## 2017-07-25 RX ADMIN — ONDANSETRON 4 MG: 2 INJECTION INTRAMUSCULAR; INTRAVENOUS at 13:16

## 2017-07-25 RX ADMIN — ONDANSETRON 4 MG: 2 INJECTION INTRAMUSCULAR; INTRAVENOUS at 12:35

## 2017-07-25 RX ADMIN — SODIUM CHLORIDE, POTASSIUM CHLORIDE, SODIUM LACTATE AND CALCIUM CHLORIDE: 600; 310; 30; 20 INJECTION, SOLUTION INTRAVENOUS at 13:40

## 2017-07-25 RX ADMIN — ACETAMINOPHEN 975 MG: 325 SOLUTION ORAL at 15:05

## 2017-07-25 RX ADMIN — FENTANYL CITRATE 25 MCG: 50 INJECTION, SOLUTION INTRAMUSCULAR; INTRAVENOUS at 13:14

## 2017-07-25 RX ADMIN — PROCHLORPERAZINE EDISYLATE 5 MG: 5 INJECTION INTRAMUSCULAR; INTRAVENOUS at 13:49

## 2017-07-25 RX ADMIN — MIDAZOLAM HYDROCHLORIDE 2 MG: 1 INJECTION, SOLUTION INTRAMUSCULAR; INTRAVENOUS at 11:11

## 2017-07-25 RX ADMIN — SUGAMMADEX 200 MG: 100 INJECTION, SOLUTION INTRAVENOUS at 12:41

## 2017-07-25 RX ADMIN — APREPITANT 40 MG: 40 CAPSULE ORAL at 10:00

## 2017-07-25 RX ADMIN — Medication 0.4 MG: at 17:52

## 2017-07-25 RX ADMIN — ROCURONIUM BROMIDE 10 MG: 10 INJECTION INTRAVENOUS at 12:16

## 2017-07-25 RX ADMIN — ACETAMINOPHEN 975 MG: 325 SOLUTION ORAL at 20:21

## 2017-07-25 RX ADMIN — DEXAMETHASONE SODIUM PHOSPHATE 6 MG: 4 INJECTION, SOLUTION INTRA-ARTICULAR; INTRALESIONAL; INTRAMUSCULAR; INTRAVENOUS; SOFT TISSUE at 11:46

## 2017-07-25 RX ADMIN — FENTANYL CITRATE 50 MCG: 50 INJECTION, SOLUTION INTRAMUSCULAR; INTRAVENOUS at 12:45

## 2017-07-25 RX ADMIN — FENTANYL CITRATE 50 MCG: 50 INJECTION, SOLUTION INTRAMUSCULAR; INTRAVENOUS at 12:02

## 2017-07-25 RX ADMIN — SODIUM CHLORIDE, POTASSIUM CHLORIDE, SODIUM LACTATE AND CALCIUM CHLORIDE: 600; 310; 30; 20 INJECTION, SOLUTION INTRAVENOUS at 11:11

## 2017-07-25 RX ADMIN — SENNOSIDES AND DOCUSATE SODIUM 1 TABLET: 8.6; 5 TABLET ORAL at 20:21

## 2017-07-25 RX ADMIN — SCOPOLAMINE 1 PATCH: 1 PATCH, EXTENDED RELEASE TRANSDERMAL at 10:00

## 2017-07-25 RX ADMIN — ROCURONIUM BROMIDE 50 MG: 10 INJECTION INTRAVENOUS at 11:24

## 2017-07-25 RX ADMIN — LIDOCAINE HYDROCHLORIDE 100 MG: 20 INJECTION, SOLUTION INFILTRATION; PERINEURAL at 11:24

## 2017-07-25 RX ADMIN — PROCHLORPERAZINE EDISYLATE 5 MG: 5 INJECTION INTRAMUSCULAR; INTRAVENOUS at 13:33

## 2017-07-25 RX ADMIN — PROPOFOL 200 MG: 10 INJECTION, EMULSION INTRAVENOUS at 11:24

## 2017-07-25 RX ADMIN — Medication 3 G: at 11:38

## 2017-07-25 RX ADMIN — FENTANYL CITRATE 100 MCG: 50 INJECTION, SOLUTION INTRAMUSCULAR; INTRAVENOUS at 11:21

## 2017-07-25 RX ADMIN — DIPHENHYDRAMINE HYDROCHLORIDE 12.5 MG: 50 INJECTION, SOLUTION INTRAMUSCULAR; INTRAVENOUS at 14:08

## 2017-07-25 NOTE — OP NOTE
[facility], [place]    Operative Note    Pre-operative diagnosis: Obesity    Post-operative diagnosis Same   Procedure: Procedure(s):  Laparoscopic Sleeve Gastrectomy - Wound Class: II-Clean Contaminated   Surgeon: Surgeon(s) and Role:     * Sam Schmidt MD - Primary     * Whit Kraft MD - Resident - Assisting   Anesthesia: General    Estimated blood loss: 10cc   Drains: None   Specimens: * No specimens in log *   Findings: None.   Complications: None.   Implants: None.         BOUGIE SIZE: 40 FR  DISTANCE FROM PYLORUS: 10 CM  STAPLE LINE REINFORCEMENT: Clips applied for hemostasis  COMORBIDITIES:   Past Medical History:   Diagnosis Date     Arthritis     BIG TOE     Cervical high risk HPV (human papillomavirus) test positive 2017     Cervical high risk HPV (human papillomavirus) test positive 06/10/2015     Depressive disorder     I have been on and off medication for the last several years     Endometriosis      Obese        INDICATIONS FOR PROCEDURE  Crystal VILMA Gamboa is a 37 year old female who is morbidly obese.  Numerous weight loss attempts without surgery have been without success.     After understanding the risks and benefits of proceeding with a laparoscopic vertical sleeve gastrectomy, she agreed to an operation as outlined by me.    I reviewed the risks of surgery with Ashli Gamboa.    These include, but are not limited to, death, myocardial infarction, pneumonia, urinary tract infection, deep venous thrombosis with or without pulmonary embolus, abdominal infection from bowel injury or abscess, bowel obstruction, wound infection, and bleeding.    More specific risks related to vertical sleeve gastrectomy were detailed at the bariatric informational seminar and include the followin.) leak at the vertical sleeve staple line, 2.) stricture in the sleeve, 3.) nausea, vomiting, and dehydration for several months, 4.) adhesions causing  "bowel obstruction, 5.) rapid weight loss causing a higher rate of gallstone formation during the first 6 months after surgery, 6.) decreased absorption of vitamins because of the reduced stomach size, 7.) weight regain if inappropriate food intake occurs.    The BMI that we are treating this patient for was measured at the initial consultation visit in our bariatric program and it was: 50.1 kg/m2 (as calculated just below).      The initial consult height, weight, and BMI are as follows:    Height: 5' 4''  Weight: 292 lb  BMI: 50.1       Our weight loss surgery program requires weight loss prior to bariatric surgery and currently the height, weight, and BMI are as follows:    Height: 162.6 cm (5' 4\"), Weight: 125 kg (275 lb 9.2 oz), and currently the Body mass index is 47.3 kg/(m^2).        OPERATIVE PROCEDURE:     Ashli Gamboa was brought to the operating room and prepared in routine fashion. Under the benefits of general anesthesia, a left upper quadrant Veress needle was inserted and pneumoperitoneum was established using carbon dioxide gas to a maximum pressure of 15 mmHg. A total of five ports were placed into the abdomen.     A liver retractor was placed through the rightmost port and this provided a view of the upper stomach. The operation was started by dividing the short gastric vessels off the greater curvature of the stomach. This dissection was carried up to the angle of His, and ultrasonic dissector was used for hemostasis.     A bougie (size noted above) was passed into the stomach and I used four purple loads of the Endo HARRY stapler device to create a vertical sleeve gastrectomy with the bougie as a template. The bougie was removed.    The sleeve gastrectomy specimen (partial gastrectomy) was now removed from the abdomen through the 15 mm port.     Hemostasis was secured, and the liver retractor and all ports were removed from the abdomen under direct visualization.     All needle and sponge " counts were correct x2 at the end of the operation, and I was present for all critical components of the procedure.     Skin incisions were closed using skin staples, and sterile dressings were placed.           Attending Surgeon:  Sam Schmidt MD  Surgery  [phone] (hospital )  [phone] (clinic nurses)

## 2017-07-25 NOTE — PROGRESS NOTES
Dr. Villatoro at bedside. Gave verbal order for 4mg of decadron for pt's nausea. Gave dose at 1308.

## 2017-07-25 NOTE — ANESTHESIA POSTPROCEDURE EVALUATION
Patient: Crystal A Scepaniak    Procedure(s):  Laparoscopic Sleeve Gastrectomy - Wound Class: II-Clean Contaminated    Diagnosis:Obesity   Diagnosis Additional Information: No value filed.    Anesthesia Type:  General, ETT    Note:  Anesthesia Post Evaluation    Patient location during evaluation: PACU  Patient participation: Able to fully participate in evaluation  Level of consciousness: awake and alert  Pain management: adequate  Airway patency: patent  Cardiovascular status: acceptable  Respiratory status: acceptable  Hydration status: acceptable  PONV: none     Anesthetic complications: None          Last vitals:  Vitals:    07/25/17 1300 07/25/17 1315 07/25/17 1330   BP: 148/90 (!) 151/98 (!) 159/97   Resp: 16 16 17   Temp: 36.5  C (97.7  F) 36.6  C (97.8  F) 36.6  C (97.8  F)   SpO2: 100% 99% 99%         Electronically Signed By: Toan Iniguez MD  July 25, 2017  1:39 PM

## 2017-07-25 NOTE — PHARMACY-CONSULT NOTE
Bariatric Consult    Medications evaluated as requested per Bariatric Consult. Medications available as liquid formulations have been dispensed when possible.    The following changes have been made based on the Bariatric Medication Management Policy. bupropion  mg tablet switched to bupropion 100 mg immediate release tablets po three times daily.    The pharmacist will continue to follow as new medications are ordered.

## 2017-07-25 NOTE — ANESTHESIA CARE TRANSFER NOTE
Patient: Crystal A Scepaniak    Procedure(s):  Laparoscopic Sleeve Gastrectomy - Wound Class: II-Clean Contaminated    Diagnosis: Obesity   Diagnosis Additional Information: No value filed.    Anesthesia Type:   General, ETT     Note:  Airway :Face Mask  Patient transferred to:PACU  Comments: VSS. Denies pain. Report given to PACU RN.      Vitals: (Last set prior to Anesthesia Care Transfer)    CRNA VITALS  7/25/2017 1219 - 7/25/2017 1253      7/25/2017             Pulse: 103    Ht Rate: (!)  0    SpO2: 98 %    Resp Rate (set): 10                Electronically Signed By: DANIELE Pillai CRNA  July 25, 2017  12:53 PM

## 2017-07-25 NOTE — PLAN OF CARE
Problem: Goal Outcome Summary  Goal: Goal Outcome Summary  Outcome: No Change  Vitals:     07/25/17 1415 07/25/17 1432 07/25/17 1445 07/25/17 1500   BP: 139/86   139/67 116/56   BP Location:     Left arm Left arm   Resp: 16 19 20   Temp:     96.8  F (36  C)     TempSrc:     Oral     SpO2: 97% 97% 97% 96%   Weight:           Height:             Post-op lap sleeve arrived to floor at 1440. O2 sats in 90s on 1L NC. Pt denies pain and nausea. Lap sites CDI. Lung sounds diminished. Bowel sounds hypoactive. Up to bathroom with assist of 1. Voided 350 ml. LR running at 75 cc/hr.  at bedside. Continue with POC.

## 2017-07-25 NOTE — IP AVS SNAPSHOT
[address] [address]    Phone:  [phone]                                       After Visit Summary   7/25/2017    Ashli Gamboa    MRN: [id number]           After Visit Summary Signature Page     I have received my discharge instructions, and my questions have been answered. I have discussed any challenges I see with this plan with the nurse or doctor.    ..........................................................................................................................................  Patient/Patient Representative Signature      ..........................................................................................................................................  Patient Representative Print Name and Relationship to Patient    ..................................................               ................................................  Date                                            Time    ..........................................................................................................................................  Reviewed by Signature/Title    ...................................................              ..............................................  Date                                                            Time

## 2017-07-25 NOTE — PROGRESS NOTES
Called [facility] because of pt's nausea. Pt has been given compazine, zofran, decadron and IV fluid bolus. (see flowsheets and MAR) 12.5 mg of benadryl IV given per verbal order.

## 2017-07-25 NOTE — IP AVS SNAPSHOT
MRN:[id number]                      After Visit Summary   7/25/2017    Ashli Gamboa    MRN: [id number]           Thank you!     Thank you for choosing [facility] for your care. Our goal is always to provide you with excellent care. Hearing back from our patients is one way we can continue to improve our services. Please take a few minutes to complete the written survey that you may receive in the mail after you visit with us. Thank you!        Patient Information     Date Of Birth          1980        Designated Caregiver       Most Recent Value    Caregiver    Will someone help with your care after discharge? yes    Name of designated caregiver Sid    Phone number of caregiver see chart    Caregiver address see chart      About your hospital stay     You were admitted on:  July 25, 2017 You last received care in the:  [address] [facility] [facility]    You were discharged on:  July 26, 2017        Reason for your hospital stay       Weight loss surgery ( lap sleeve gastrectomy)                  Who to Call     For medical emergencies, please call 911.  For non-urgent questions about your medical care, please call your primary care provider or clinic, [phone]  For questions related to your surgery, please call your surgery clinic        Attending Provider     Provider Specialty    Sam Schmidt MD Surgery       Primary Care Provider Office Phone # Fax #    Karena August DANIELE Mo [facility] [phone]      After Care Instructions     Discharge Instructions       Diet on discharge  full liquids two weeks after surgery.    No lifting >10 pounds for 4-6 weeks. Discuss with your surgeon at follow up appointment  May shower starting postoperative day #1 but no scrubbing incisions. No bathing or soaking incisions for 2 weeks or until incisions completely healed.  Wound care: Keep clean and dry. Steri strips or glue will fall off on their own.   After surgery it is ok to swallow  medications smaller than 1/4 inch(size of pencil eraser) for all procedures.  If medication is larger than 1/4 inch then it will need to be crushed, cut or in liquid form for 1-2 months after surgery. This can be discussed with surgeon team at the 1 month follow up appointment and will depend on patient tolerance.  If you still have your gallbladder you will be given a prescription called Actigall or Ursodiol in a capsule form to prevent gallstones during rapid weight loss.  This capsule can be opened and put into your liquids or food (when your diet progresses). You will need to take this medications for 6 months after surgery.  Follow-up with your surgeon team in 1-2 weeks. If this appointment was not previous made for you please call [phone] and choose option #1 to schedule your follow-up appointment.   You will receive a call from our clinic nurse after surgery.  If you have any questions and would like to speak with a nurse please call [phone] and choose option #3 to speak with a triage nurse.  Call [phone] and ask to speak with surgery resident if you are having troubles in the evenings, at night, or on weekends. Please call if you experience increasing abdominal pain, nausea, vomiting, increasing drainage from your wounds, chills, or fever >101.5  Take stool softener while taking narcotic pain medication.  No driving for at least 12 hours after taking narcotic pain medication.  Appointments located at [facility] clinic:  [address]  [place] [address]                  Follow-up Appointments     Adult [facility] Follow-up and recommended labs and tests       Follow up with Dr. Schmidt , at (location with clinic name or city) Bariatric Surgery clinic, within 1-2 weeks  to evaluate after surgery. No follow up labs or test are needed.    Appointments on [place] and/or [facility] (with [facility] or [facility] provider or service). Call [phone] if you haven't heard regarding  these appointments within 7 days of discharge.                  Your next 10 appointments already scheduled     Aug 03, 2017  7:00 AM CDT   NUTRITION VISIT with Meaghan Glass RD   [facility] Surgical Weight Management ([facility])    [address] [place] [address]   [phone]            Aug 03, 2017  7:40 AM CDT   (Arrive by 7:25 AM)   RETURN BARIATRIC SURGERY with Sam Schmidt MD   [facility] Surgical [facility] Management ([facility])    [address] [place] [address]   [phone]            Aug 31, 2017  7:00 AM CDT   NUTRITION VISIT with Meaghan Glass RD   [facility] Surgical Weight Management ([facility])    [address] [place] [address]   [phone]            Aug 31, 2017  7:40 AM CDT   (Arrive by 7:25 AM)   RETURN BARIATRIC SURGERY with Sam Schmidt MD   [facility] Surgical [facility] Management ([facility])    [address] [place] [address]   [phone]              Additional Information     If you use hormonal birth control (such as the pill, patch, ring or implants): You'll need a second form of birth control for 7 days (condoms, a diaphragm or contraceptive foam). While in the hospital, you received a medicine called Bridion. Your normal birth control will not work as well for a week after taking this medicine.          Pending Results     Date and Time Order Name Status Description    7/26/2017 0904 EKG 12-lead, complete Preliminary     7/25/2017 1238 Surgical pathology exam In process             Statement of Approval     Ordered          07/26/17 1328  I have reviewed and agree with all the recommendations and orders detailed in this document.  EFFECTIVE NOW     Approved and electronically signed by:  Joseph Moore MD             Admission Information     Date & Time Provider  "Department Dept. Phone    7/25/2017 Sam Schmidt MD [address] [facility] [phone]      Your Vitals Were     Blood Pressure Temperature Respirations Height Weight Pulse Oximetry    154/60 (BP Location: Left arm) 98.4  F (36.9  C) (Oral) 16 1.626 m (5' 4\") 125 kg (275 lb 9.2 oz) 96%    BMI (Body Mass Index)                   47.3 kg/m2           [employer] Information     [employer] gives you secure access to your electronic health record. If you see a primary care provider, you can also send messages to your care team and make appointments. If you have questions, please call your primary care clinic.  If you do not have a primary care provider, please call [phone] and they will assist you.        Care EveryWhere ID     This is your Care EveryWhere ID. This could be used by other organizations to access your [facility] medical records  [id number]        Equal Access to Services     FRANCO OLMSTEAD AH: Hanna kelly Socarlos, wacarine lamb, qaybta kaalmada sung, lynne parmar. So [facility] [phone].    ATENCIÓN: Si habla español, tiene a flynn disposición servicios gratuitos de asistencia lingüística. Llame al [phone].    We comply with applicable federal civil rights laws and [place] laws. We do not discriminate on the basis of race, color, national origin, age, disability sex, sexual orientation or gender identity.               Review of your medicines      START taking        Dose / Directions    acetaminophen 325 MG tablet   Commonly known as:  TYLENOL        Dose:  325 mg   Take 1 tablet (325 mg) by mouth every 6 hours as needed for mild pain   Quantity:  30 tablet   Refills:  0       buPROPion 100 MG tablet   Commonly known as:  WELLBUTRIN   Replaces:  buPROPion 300 MG 24 hr tablet        Dose:  100 mg   Take 1 tablet (100 mg) by mouth 3 times daily   Quantity:  90 tablet   Refills:  1       ondansetron 4 MG ODT tab   Commonly known as:  ZOFRAN-ODT        " Dose:  4 mg   Take 1 tablet (4 mg) by mouth every 6 hours as needed for nausea or vomiting   Quantity:  20 tablet   Refills:  0       oxyCODONE 5 MG IR tablet   Commonly known as:  ROXICODONE        Dose:  5-10 mg   Take 1-2 tablets (5-10 mg) by mouth every 4 hours as needed for moderate to severe pain   Quantity:  30 tablet   Refills:  0         CONTINUE these medicines which have NOT CHANGED        Dose / Directions    etonogestrel-ethinyl estradiol 0.12-0.015 MG/24HR vaginal ring   Commonly known as:  NUVARING   Used for:  Encounter for surveillance of vaginal ring hormonal contraceptive device        PLACE 1 RING VAGINALLY EVERY 28 DAYS AS DIRECTED   Quantity:  1 each   Refills:  3         STOP taking     buPROPion 300 MG 24 hr tablet   Commonly known as:  WELLBUTRIN XL   Replaced by:  buPROPion 100 MG tablet           TOPAMAX PO                Where to get your medicines      These medications were sent to [facility] [place], [place] - [address]  [address] [address]     Phone:  [phone]     ondansetron 4 MG ODT tab         Some of these will need a paper prescription and others can be bought over the counter. Ask your nurse if you have questions.     Bring a paper prescription for each of these medications     buPROPion 100 MG tablet    oxyCODONE 5 MG IR tablet       You don't need a prescription for these medications     acetaminophen 325 MG tablet                Protect others around you: Learn how to safely use, store and throw away your medicines at www.disposemymeds.org.             Medication List: This is a list of all your medications and when to take them. Check marks below indicate your daily home schedule. Keep this list as a reference.      Medications           Morning Afternoon Evening Bedtime As Needed    acetaminophen 325 MG tablet   Commonly known as:  TYLENOL   Take 1 tablet (325 mg) by mouth every 6 hours as needed for mild pain                                 buPROPion 100 MG tablet   Commonly known as:  WELLBUTRIN   Take 1 tablet (100 mg) by mouth 3 times daily   Last time this was given:  100 mg on 7/26/2017  1:25 PM                                etonogestrel-ethinyl estradiol 0.12-0.015 MG/24HR vaginal ring   Commonly known as:  NUVARING   PLACE 1 RING VAGINALLY EVERY 28 DAYS AS DIRECTED                                ondansetron 4 MG ODT tab   Commonly known as:  ZOFRAN-ODT   Take 1 tablet (4 mg) by mouth every 6 hours as needed for nausea or vomiting                                oxyCODONE 5 MG IR tablet   Commonly known as:  ROXICODONE   Take 1-2 tablets (5-10 mg) by mouth every 4 hours as needed for moderate to severe pain

## 2017-07-25 NOTE — PROGRESS NOTES
Clinical Nutrition Services: Nutrition education: To evaluate and educate patient as clinically indicated post-op gastric surgery (POD 1)    Nutrition consult received for evaluation and education post-bariatric surgery. Pt already received bariatric clear and low-fat full liquid diet education at pre-op clinic appointment. Will follow-up with further diet progression education at pt's one week post-op clinic appointment or prn.     RD will continue to follow per nutrition protocols.    Barbie Gonzalez RD, LD   7B RD Pager: [phone]

## 2017-07-25 NOTE — LETTER
Transition Communication Hand-off for Care Transitions to Next Level of Care Provider    Name: Ashli Gamboa  MRN #: [id number]  Primary Care Provider: Karena Mo     Primary Clinic: [address] [address]     Reason for Hospitalization:  Obesity   S/P laparoscopic sleeve gastrectomy  Admit Date/Time: 7/25/2017  8:29 AM  Discharge Date: 7/26/2017  Payor Source: Payor: MEDICA / Plan: MEDICA ESSENTIAL / Product Type: Indemnity /          Reason for Communication Hand-off Referral: Other continuity of care    Discharge Plan:  Discharged to home with plan for clinic f/u       Follow-up plan:  Future Appointments  Date Time Provider Department Center   8/3/2017 7:00 AM Meaghan Glass RD [facility]   8/3/2017 7:40 AM Sam Schmidt MD [facility]   8/31/2017 7:00 AM Meaghan Glass RD [facility]   8/31/2017 7:40 AM Sam Schmidt MD [facility]       Ashley Tavera, RNCC  [phone]    AVS/Discharge Summary is the source of truth; this is a helpful guide for improved communication of patient story

## 2017-07-26 VITALS
OXYGEN SATURATION: 96 % | RESPIRATION RATE: 16 BRPM | HEIGHT: 64 IN | DIASTOLIC BLOOD PRESSURE: 60 MMHG | BODY MASS INDEX: 47.05 KG/M2 | WEIGHT: 275.57 LBS | SYSTOLIC BLOOD PRESSURE: 154 MMHG | TEMPERATURE: 98.4 F

## 2017-07-26 LAB
COPATH REPORT: NORMAL
GLUCOSE BLDC GLUCOMTR-MCNC: 95 MG/DL (ref 70–99)

## 2017-07-26 PROCEDURE — 93005 ELECTROCARDIOGRAM TRACING: CPT

## 2017-07-26 PROCEDURE — 25000132 ZZH RX MED GY IP 250 OP 250 PS 637

## 2017-07-26 PROCEDURE — 25000128 H RX IP 250 OP 636

## 2017-07-26 PROCEDURE — 93010 ELECTROCARDIOGRAM REPORT: CPT | Performed by: INTERNAL MEDICINE

## 2017-07-26 PROCEDURE — 00000146 ZZHCL STATISTIC GLUCOSE BY METER IP

## 2017-07-26 PROCEDURE — 25000132 ZZH RX MED GY IP 250 OP 250 PS 637: Performed by: SURGERY

## 2017-07-26 RX ORDER — OXYCODONE HYDROCHLORIDE 5 MG/1
5 TABLET ORAL EVERY 4 HOURS PRN
Qty: 30 TABLET | Refills: 0 | Status: SHIPPED | OUTPATIENT
Start: 2017-07-26 | End: 2017-07-26

## 2017-07-26 RX ORDER — BUPROPION HYDROCHLORIDE 100 MG/1
100 TABLET ORAL 3 TIMES DAILY
Qty: 90 TABLET | Refills: 1 | Status: SHIPPED | OUTPATIENT
Start: 2017-07-26 | End: 2017-07-26

## 2017-07-26 RX ORDER — ONDANSETRON 4 MG/1
4 TABLET, ORALLY DISINTEGRATING ORAL EVERY 6 HOURS PRN
Qty: 20 TABLET | Refills: 0 | Status: SHIPPED | OUTPATIENT
Start: 2017-07-26 | End: 2017-10-18

## 2017-07-26 RX ORDER — ACETAMINOPHEN 325 MG/1
325 TABLET ORAL EVERY 6 HOURS PRN
Qty: 30 TABLET | Refills: 0 | COMMUNITY
Start: 2017-07-26 | End: 2017-10-18

## 2017-07-26 RX ORDER — OXYCODONE HYDROCHLORIDE 5 MG/1
5-10 TABLET ORAL EVERY 4 HOURS PRN
Status: DISCONTINUED | OUTPATIENT
Start: 2017-07-26 | End: 2017-07-26 | Stop reason: HOSPADM

## 2017-07-26 RX ORDER — OXYCODONE HYDROCHLORIDE 5 MG/1
5-10 TABLET ORAL EVERY 4 HOURS PRN
Qty: 30 TABLET | Refills: 0 | Status: SHIPPED | OUTPATIENT
Start: 2017-07-26 | End: 2017-10-18

## 2017-07-26 RX ORDER — BUPROPION HYDROCHLORIDE 100 MG/1
100 TABLET ORAL 3 TIMES DAILY
Qty: 90 TABLET | Refills: 2 | Status: ON HOLD | OUTPATIENT
Start: 2017-07-26 | End: 2018-01-04

## 2017-07-26 RX ADMIN — SENNOSIDES AND DOCUSATE SODIUM 2 TABLET: 8.6; 5 TABLET ORAL at 08:05

## 2017-07-26 RX ADMIN — BUPROPION HYDROCHLORIDE 100 MG: 100 TABLET, FILM COATED ORAL at 08:05

## 2017-07-26 RX ADMIN — SODIUM CHLORIDE, POTASSIUM CHLORIDE, SODIUM LACTATE AND CALCIUM CHLORIDE: 600; 310; 30; 20 INJECTION, SOLUTION INTRAVENOUS at 01:43

## 2017-07-26 RX ADMIN — ACETAMINOPHEN 975 MG: 325 SOLUTION ORAL at 08:06

## 2017-07-26 RX ADMIN — ACETAMINOPHEN 975 MG: 325 SOLUTION ORAL at 13:27

## 2017-07-26 RX ADMIN — ACETAMINOPHEN 975 MG: 325 SOLUTION ORAL at 01:43

## 2017-07-26 RX ADMIN — TOPIRAMATE 200 MG: 200 TABLET ORAL at 08:06

## 2017-07-26 RX ADMIN — BUPROPION HYDROCHLORIDE 100 MG: 100 TABLET, FILM COATED ORAL at 13:25

## 2017-07-26 ASSESSMENT — PAIN DESCRIPTION - DESCRIPTORS: DESCRIPTORS: PRESSURE

## 2017-07-26 NOTE — PLAN OF CARE
Problem: Goal Outcome Summary  Goal: Goal Outcome Summary  Outcome: No Change  Afebrile,VSS,Sats 95-98% 1l. Iv dilaudid x1 for pain.  Abdomen rounded, soft, tender. +BS -flatus. Denies nausea tolerating clears. Lap sites with primapore cdi.  Voiding spont. Ambulating on unit. Cont with poc

## 2017-07-26 NOTE — DISCHARGE SUMMARY
MIS Surgery Discharge Summary    Ashli Gamboa MRN# [id number]   YOB: 1980 Age: 37 year old     Date of Admission:  7/25/2017  Date of Discharge::  7/26/17  Admitting Physician:  Sam Schmidt MD  Discharge Physician:  [unfilled]  Primary Care Physician:        Karena Mo          Admission Diagnoses:   Obesity   S/P laparoscopic sleeve gastrectomy  Patient Active Problem List   Diagnosis     Morbid obesity (H)     post traumatic Arthritis of big toe     Major depression, recurrent (H)     CARDIOVASCULAR SCREENING; LDL GOAL LESS THAN 160     Endometriosis     Fibromyalgia     Muscular deconditioning     Cervical high risk HPV (human papillomavirus) test positive     S/P laparoscopic sleeve gastrectomy             Discharge Diagnosis:     Patient Active Problem List   Diagnosis     Morbid obesity (H)     post traumatic Arthritis of big toe     Major depression, recurrent (H)     CARDIOVASCULAR SCREENING; LDL GOAL LESS THAN 160     Endometriosis     Fibromyalgia     Muscular deconditioning     Cervical high risk HPV (human papillomavirus) test positive     S/P laparoscopic sleeve gastrectomy          Procedures:   laparoscopic sleeve gastrectomy (7/25/17)        Non-operative procedures:   None performed          Consultations:   NUTRITION SERVICES ADULT IP CONSULT  PHARMACY BARIATRIC IP CONSULT  RESPIRATORY CARE IP CONSULT         Medications Prior to Admission:     Prescriptions Prior to Admission   Medication Sig Dispense Refill Last Dose     buPROPion (WELLBUTRIN XL) 300 MG 24 hr tablet Take 1 tablet (300 mg) by mouth every morning 90 tablet 1 7/25/2017 at 0630     Topiramate (TOPAMAX PO) Take 200 mg by mouth every morning    7/25/2017 at 0630     etonogestrel-ethinyl estradiol (NUVARING) 0.12-0.015 MG/24HR vaginal ring PLACE 1 RING VAGINALLY EVERY 28 DAYS AS DIRECTED 1 each 3 7/21/2017 at 0630            Discharge Medications:      Ashli Gamboa   Home Medication  "Instructions MARIBEL:[id number]    Printed on:07/26/17 1010   Medication Information                      buPROPion (WELLBUTRIN XL) 300 MG 24 hr tablet  Take 1 tablet (300 mg) by mouth every morning             etonogestrel-ethinyl estradiol (NUVARING) 0.12-0.015 MG/24HR vaginal ring  PLACE 1 RING VAGINALLY EVERY 28 DAYS AS DIRECTED             Topiramate (TOPAMAX PO)  Take 200 mg by mouth every morning                        Day of Discharge Exam   /71 (BP Location: Left arm)  Temp 97.3  F (36.3  C) (Oral)  Resp 8  Ht 1.626 m (5' 4\")  Wt 125 kg (275 lb 9.2 oz)  SpO2 97%  BMI 47.3 kg/m2  General: Awake, alert, NAD  Cardio: RRR  Chest: NLB on RA  Abd: Soft, non-distended, appropriately TTP, port hole incisions c/d/i, approximated with steri strips  Ext: WWP          Brief History of Illness:   Ashli Gamboa is a 37 year old female who is morbidly obese.  Numerous weight loss attempts without surgery have been without success. She presented for laparoscopic sleeve gastrectomy which was performed on 7/25/17           Hospital Course:   Postoperatively the patient was transferred to the general floor for further cares. On 7/26/17 (POD1), they were felt to meet discharge criteria and were discharged to home with appropriate instructions and follow up. They were tolerating a bariatric full liquid diet, had full return of bowel and bladder function, and were ambulating independently. The patient acknowledged understanding and were in agreement with the plan.         Antibiotics Prescribed at Discharge:   None prescribed           Imaging Studies:   None performed         Final Pathology Result:   Pending at time of discharge         Discharge Instructions and Follow-Up:   No discharge procedures on file.        Home Health Care:   Not needed           Discharge Disposition:   Discharged to home      Condition at discharge: Stable    Joseph Moore MD  General Surgery Resident, PGY1  Pager # " [phone]  10:13 AM, 7/26/2017

## 2017-07-26 NOTE — PLAN OF CARE
Problem: Goal Outcome Summary  Goal: Goal Outcome Summary  Vitals:     07/25/17 1831 07/25/17 1931 07/25/17 2201 07/26/17 0351   BP: 119/67 135/68 123/56 117/56   BP Location: Left arm Left arm Left arm Left arm   Resp:   20 20 20   Temp:   97.5  F (36.4  C) 97.5  F (36.4  C) 97.9  F (36.6  C)   TempSrc:   Oral Oral Oral   SpO2: 93% 93% 94% 96%   Weight:           Height:           Patient is on capnograghy monitoring; VSS. Weaned form O2. Clear LS, used IS. Hypoactive BS, (-) flatus, abdomen is soft non tender. 5x lap sites covered with primapore, marked blood shadow. Voiding spontaneously. Tolerates clear. MIVF at 75 ml/hr. Pain is managed with scheduled Tylenol 975 mg po, have not asked for any PRN pain med. Is able to sleep. Continue poc.

## 2017-07-26 NOTE — PLAN OF CARE
"Problem: Goal Outcome Summary  Goal: Goal Outcome Summary  Outcome: Adequate for Discharge Date Met:  07/26/17  /60 (BP Location: Left arm)  Temp 98.4  F (36.9  C) (Oral)  Resp 16  Ht 1.626 m (5' 4\")  Wt 125 kg (275 lb 9.2 oz)  SpO2 96%  BMI 47.3 kg/m2     A&Ox4. HTN, EKG Sinus Bradley with short NC, checked for slow HR, OVSS on RA. Denies pain, pressure to upper chest, which is expected after this procedure. Patient has been cleared for discharge. Ambulating in halls today. Tolerating a full liquid diet. Diet and activity orders are in Gastric Sleeve procedural teaching information. Pleasant and cooperative with care.       "

## 2017-07-27 ENCOUNTER — CARE COORDINATION (OUTPATIENT)
Dept: CARE COORDINATION | Facility: CLINIC | Age: 37
End: 2017-07-27

## 2017-07-27 ENCOUNTER — TELEPHONE (OUTPATIENT)
Dept: FAMILY MEDICINE | Facility: CLINIC | Age: 37
End: 2017-07-27

## 2017-07-27 ENCOUNTER — CARE COORDINATION (OUTPATIENT)
Dept: SURGERY | Facility: CLINIC | Age: 37
End: 2017-07-27

## 2017-07-27 LAB — INTERPRETATION ECG - MUSE: NORMAL

## 2017-07-27 NOTE — TELEPHONE ENCOUNTER
Please call for In Patient follow up  Chief Complaint: Obesity , S/P Laparoscopic Sleeve Gastrectomy

## 2017-07-27 NOTE — PROGRESS NOTES
Bariatric Surgery Post op Call      72 Hour Post-Op Follow Up:     Ms. Ashli Gamboa is a 37 year old female who underwent laparoscopic gastric sleeve on 7/25/17 with Dr. Schmidt for a history of Obesity.  Spoke with Patient.    Support  Patient able to care for self independently    Coughing/Deep Breathing: yes  Pain rating: no pain medications needed, just tylenol.    (If patient needs additional pain medication and Tylenol is not contraindicated, then ok  advise patient to take ES Tylenol 2 tablets every 6 hours while symptoms last, not to exceed 6 caplets in 24 hours).     Leg swelling: no  Nausea/Vomiting: some nausea, no vomiting  Passing flatus: yes  Having BM s: yes  Are you taking your chewable multivitamin: yes this am  Activity Level: short walks  Fluid Intake: 48-64 oz  Concerns: no.     Staples removed prior to discharge: yes     Taking any weight loss medications prior to surgery? no     Activity/Restrictions  Patient following lifting restrictions.    Activity/Restrictions  Patient following lifting restrictions. and Following restrictions outlined by surgeon.     Whom and When to Call  Patient acknowledges understanding of how to manage any medication changes and when to seek medical care.      Patient advised that if after hour medical concerns arise to please call [phone] and choose option 4 to speak to the physician on call.      Confirm with patient their follow-up appointment date and time. 8/3     Time spent with patient: 10 min

## 2017-07-27 NOTE — TELEPHONE ENCOUNTER
ED / Discharge Outreach Protocol    Patient Contact    Attempt # 1    Was call answered?  No.  Left message on voicemail with information to call me back.  Rosanne Garcia RN

## 2017-07-27 NOTE — PROGRESS NOTES
"[facility]  \"Hello, my name is Veronika Franks , and I am calling from the [facility].  I want to check in and see how you are doing, after leaving the hospital.  You may also receive a call from your Care Coordinator (care team), but I want to make sure you don t have any urgent needs.  I have a couple questions to review with you:     Post-Discharge Outreach                                                    Ashli Gamboa is a 37 year old female     Follow-up Appointments           Adult [facility] Follow-up and recommended labs and tests         Follow up with Dr. Schmidt , at (location with clinic name or city) Bariatric Surgery clinic, within 1-2 weeks  to evaluate after surgery. No follow up labs or test are needed.     Appointments on [place] and/or [place] campus (with [facility] or [facility] provider or service). Call [phone] if you haven't heard regarding these appointments within 7 days of discharge.                        Your next 10 appointments already scheduled            Aug 03, 2017  7:00 AM CDT   NUTRITION VISIT with Meaghan Glass RD   [facility] [place])     [address] [place] [address]   [phone]                  Aug 03, 2017  7:40 AM CDT   (Arrive by 7:25 AM)   RETURN BARIATRIC SURGERY with Sam Schmidt MD   [facility] [place])     [address] [place] [address]   [phone]                  Aug 31, 2017  7:00 AM CDT   NUTRITION VISIT with Meaghan Glass RD   [facility] [place])     [address] [place] [address]   [phone]                  Aug 31, 2017  7:40 AM CDT   (Arrive by 7:25 AM)   RETURN BARIATRIC SURGERY with Sam Schmidt MD   [facility] ([facility])     [address]  4th Floor  [facility] [id number] [phone]              Care Team:    Patient Care Team       Relationship Specialty Notifications Start End    Karena Mo APRN CNP PCP - General Nurse Practitioner  1/30/15     Phone: [phone] Fax: [phone]         [address] [address]    Yu Evans PA-C Physician Assistant Physician Assistant  2/10/17     Phone: [phone] Fax: [phone]         [address] [address]    Veronika Morrison RD Registered Dietitian Dietitian, Registered  2/10/17     Phone: [phone] Fax: [phone]         [address] [address]    Nay Turpin RN Nurse Coordinator Bariatric  2/28/17     Comment:  fax [phone]    Phone: [phone] Pager: [phone]         [facility] [address]    Sam Schmidt MD Surgeon Surgery  4/7/17     Phone: [phone] Fax: [phone]         [address] [address]            Transition of Care Review                                                      Did you have a surgery or procedure during your hospital visit? Yes   If yes, do you have any of the following:     Signs of infection:  NO    Pain:  No     Pain Scale (0-10) 0/10     Location: NA    Wound/incision concerns? NO    Do you have all of your medications/refills?  Yes    Are you having any side effects or questions about your medication(s)? No    Do you have any new or worsening symptoms?  No    Do you have any future appointments scheduled?   Yes             Plan                                                      Thanks for your time.  Your Care Coordinator may follow-up within the next couple days.  In the meantime if you have questions, concerns or problems call your care team.        Veronika Franks

## 2017-07-27 NOTE — PROGRESS NOTES
Clinic Care Coordination Contact  [facility]/Voicemail    Referral Source: CTS  Clinical Data: Care Coordinator Outreach  Outreach attempted x 1.  Left message on voicemail with call back information and requested return call.  Plan:  Care Coordinator will try to reach patient again in 1-2 business days.  Love Dave RN Clinic Care Coordinator  [facility] [phone]

## 2017-07-28 NOTE — PROGRESS NOTES
Clinic Care Coordination Contact  OUTREACH    Referral Information:  Referral Source: CTS  Reason for Contact: post op   Care Conference: No     Universal Utilization:   ED Visits in last year: 0  Hospital visits in last year: 2  Last PCP appointment: 04/03/17        Multiple Providers or Specialists: Bariatric medicine, surgery, GYN, Sleep Medicine    Clinical Concerns:  Current Medical Concerns: pt states that she is doing well and is watching what she is drinking and using bariatric jerome for reminders every half hour to drink 2 oz  Pt has understanding of what is normal and usual post op symptoms   Pt has an RN CC that works closely with her at the Bariatric clinic and she is an excellent resource for her  Pt is only taking tylenol for pain and is neg for fever or chills  Pt is following the full liquid diet as recommended   Pt has no questions at this time   Current Behavioral Concerns: none at this time  Education Provided to patient: pt to call Bariatric Clinic if further questions or concerns     Clinical Pathway: None    Medication Management:  Current Outpatient Prescriptions   Medication     ondansetron (ZOFRAN-ODT) 4 MG ODT tab     acetaminophen (TYLENOL) 325 MG tablet     oxyCODONE (ROXICODONE) 5 MG IR tablet     buPROPion (WELLBUTRIN) 100 MG tablet     etonogestrel-ethinyl estradiol (NUVARING) 0.12-0.015 MG/24HR vaginal ring     No current facility-administered medications for this visit.           Functional Status:  Mobility Status: Independent     Transportation: pt is able to drive independently without difficulty           Psychosocial:  Current living arrangement:: I live in a private home  Financial/Insurance: [employer]       Resources and Interventions:  Current Resources:  ;  Family and friends        Advanced Care Plans/Directives on file:: No        Goals:                  Barriers: none at this time  Strengths: pt has very positive attitude and is eager to do well  Patient/Caregiver  understanding: yes           Plan:   RN CC reviewed EMR and Care Coordination is no longer needed at this time  Pt closed to RN CC at this time  Love Dave RN Clinic Care Coordinator  [facility] [facility] [phone]                  Discharge Summaries  Cosign Needed   Date of Service: 7/26/2017 10:13 AM  Creation Time: 7/26/2017 10:10 AM  Joseph Moore MD   Surgery   Expand All Collapse All    []Hide copied text  MIS Surgery Discharge Summary     Ashli Gamboa MRN# [id number]   YOB: 1980 Age: 37 year old      Date of Admission:                                      7/25/2017  Date of Discharge::                                     7/26/17  Admitting Physician:                                   Sam Schmidt MD  Discharge Physician:                                  [unfilled]  Primary Care Physician:        Karena Mo           Admission Diagnoses:   Obesity   S/P laparoscopic sleeve gastrectomy      Patient Active Problem List   Diagnosis     Morbid obesity (H)     post traumatic Arthritis of big toe     Major depression, recurrent (H)     CARDIOVASCULAR SCREENING; LDL GOAL LESS THAN 160     Endometriosis     Fibromyalgia     Muscular deconditioning     Cervical high risk HPV (human papillomavirus) test positive     S/P laparoscopic sleeve gastrectomy              Discharge Diagnosis:          Patient Active Problem List   Diagnosis     Morbid obesity (H)     post traumatic Arthritis of big toe     Major depression, recurrent (H)     CARDIOVASCULAR SCREENING; LDL GOAL LESS THAN 160     Endometriosis     Fibromyalgia     Muscular deconditioning     Cervical high risk HPV (human papillomavirus) test positive     S/P laparoscopic sleeve gastrectomy           Procedures:   laparoscopic sleeve gastrectomy (7/25/17)         Non-operative procedures:   None performed           Consultations:   NUTRITION SERVICES ADULT IP  "CONSULT  PHARMACY BARIATRIC IP CONSULT  RESPIRATORY CARE IP CONSULT          Medications Prior to Admission:       Prescriptions Prior to Admission    Prescriptions Prior to Admission   Medication Sig Dispense Refill Last Dose     buPROPion (WELLBUTRIN XL) 300 MG 24 hr tablet Take 1 tablet (300 mg) by mouth every morning 90 tablet 1 7/25/2017 at 0630     Topiramate (TOPAMAX PO) Take 200 mg by mouth every morning      7/25/2017 at 0630     etonogestrel-ethinyl estradiol (NUVARING) 0.12-0.015 MG/24HR vaginal ring PLACE 1 RING VAGINALLY EVERY 28 DAYS AS DIRECTED 1 each 3 7/21/2017 at 0630                 Discharge Medications:                    [place], Crystal A   Home Medication Instructions MARIBEL:[id number]     Printed on:07/26/17 1010   Medication Information                                       buPROPion (WELLBUTRIN XL) 300 MG 24 hr tablet  Take 1 tablet (300 mg) by mouth every morning                   etonogestrel-ethinyl estradiol (NUVARING) 0.12-0.015 MG/24HR vaginal ring  PLACE 1 RING VAGINALLY EVERY 28 DAYS AS DIRECTED                   Topiramate (TOPAMAX PO)  Take 200 mg by mouth every morning                                Day of Discharge Exam   /71 (BP Location: Left arm)  Temp 97.3  F (36.3  C) (Oral)  Resp 8  Ht 1.626 m (5' 4\")  Wt 125 kg (275 lb 9.2 oz)  SpO2 97%  BMI 47.3 kg/m2  General: Awake, alert, NAD  Cardio: RRR  Chest: NLB on RA  Abd: Soft, non-distended, appropriately TTP, port hole incisions c/d/i, approximated with steri strips  Ext: WWP           Brief History of Illness:   Ashli Gamboa is a 37 year old female who is morbidly obese.  Numerous weight loss attempts without surgery have been without success. She presented for laparoscopic sleeve gastrectomy which was performed on 7/25/17           Hospital Course:   Postoperatively the patient was transferred to the general floor for further cares. On 7/26/17 (POD1), they were felt to meet discharge criteria and were " discharged to home with appropriate instructions and follow up. They were tolerating a bariatric full liquid diet, had full return of bowel and bladder function, and were ambulating independently. The patient acknowledged understanding and were in agreement with the plan.          Antibiotics Prescribed at Discharge:   None prescribed             Imaging Studies:   None performed          Final Pathology Result:   Pending at time of discharge          Discharge Instructions and Follow-Up:   No discharge procedures on file.             Home Health Care:    Not needed               Discharge Disposition:    Discharged to home      Condition at discharge: Stable     Joseph Moore MD  General Surgery Resident, PGY1  Pager # [phone]  10:13 AM, 7/26/2017

## 2017-07-28 NOTE — TELEPHONE ENCOUNTER
ED / Discharge Outreach Protocol    Patient Contact    Attempt # 2    Was call answered?  No.  Left message on voicemail with information to call me back.    Jacquelin Chen RN

## 2017-08-02 ENCOUNTER — OFFICE VISIT (OUTPATIENT)
Dept: FAMILY MEDICINE | Facility: CLINIC | Age: 37
End: 2017-08-02
Payer: COMMERCIAL

## 2017-08-02 VITALS
WEIGHT: 267 LBS | OXYGEN SATURATION: 95 % | HEART RATE: 99 BPM | TEMPERATURE: 98.6 F | RESPIRATION RATE: 18 BRPM | BODY MASS INDEX: 45.83 KG/M2

## 2017-08-02 DIAGNOSIS — Z98.84 STATUS POST BARIATRIC SURGERY: Primary | ICD-10-CM

## 2017-08-02 PROCEDURE — 99213 OFFICE O/P EST LOW 20 MIN: CPT | Performed by: NURSE PRACTITIONER

## 2017-08-02 NOTE — NURSING NOTE
"Chief Complaint   Patient presents with     Surgical Followup     vertical sleeve       Initial Pulse 99  Temp 98.6  F (37  C) (Oral)  Resp 18  Wt 267 lb (121.1 kg)  SpO2 95%  BMI 45.83 kg/m2 Estimated body mass index is 45.83 kg/(m^2) as calculated from the following:    Height as of 7/25/17: 5' 4\" (1.626 m).    Weight as of this encounter: 267 lb (121.1 kg).  Medication Reconciliation: complete     Ashleigh Pires CMA      "

## 2017-08-02 NOTE — PROGRESS NOTES
SUBJECTIVE:                                                    Ashli Gamboa is a 37 year old female who presents to clinic today for the following health issues:          Hospital Follow-up Visit:    Hospital/Nursing Home/IP Rehab Facility: [facility]  Date of Admission: 07/25/17  Date of Discharge: 07/26/17  Reason(s) for Admission: S/P laparoscopic sleeve gastrectomy               Problems taking medications regularly:  None       Medication changes since discharge: pain medication d/c       Problems adhering to non-medication therapy:  None    Summary of hospitalization:  [facility] discharge summary reviewed  Diagnostic Tests/Treatments reviewed.  Follow up needed: PCP in 1 week and surgeon 2 weeks  Other Healthcare Providers Involved in Patient s Care:         None  Update since discharge: improved.     Post Discharge Medication Reconciliation: discharge medications reconciled, continue medications without change.  Plan of care communicated with patient     Coding guidelines for this visit:  Type of Medical   Decision Making Face-to-Face Visit       within 7 Days of discharge Face-to-Face Visit        within 14 days of discharge   Moderate Complexity 60068 [id number]   High Complexity 65448 01120          Problem list and histories reviewed & adjusted, as indicated.  Additional history: as documented    Reviewed and updated as needed this visit by clinical staff  Tobacco  Allergies  Meds  Problems  Med Hx  Surg Hx  Fam Hx  Soc Hx        Reviewed and updated as needed this visit by Provider  Tobacco  Allergies  Meds  Problems  Med Hx  Surg Hx  Fam Hx  Soc Hx          ROS:  Constitutional, HEENT, cardiovascular, pulmonary, gi and gu systems are negative, except as otherwise noted.      OBJECTIVE:   Pulse 99  Temp 98.6  F (37  C) (Oral)  Resp 18  Wt 267 lb (121.1 kg)  SpO2 95%  BMI 45.83 kg/m2  Body mass index is 45.83 kg/(m^2).  GENERAL: healthy, alert and no  distress  RESP: lungs clear to auscultation - no rales, rhonchi or wheezes  CV: regular rate and rhythm, normal S1 S2, no S3 or S4, no murmur, click or rub, no peripheral edema and peripheral pulses strong  ABDOMEN: 5 well approximated incisions. soft, nontender, no hepatosplenomegaly, no masses and bowel sounds normal  MS: no gross musculoskeletal defects noted, no edema  SKIN: no suspicious lesions or rashes    Diagnostic Test Results:  none     ASSESSMENT/PLAN:       ICD-10-CM    1. Status post bariatric surgery Z98.84      Doing well. No pain.  Incisions approximating.   Tolerating 2 ounces liquids every 1/2 hour.  still following full liquid diet.  F/u with surgeon tomorrow.    On preop creatinine was slightly elevated.  Will plan to recheck this when she is eating more food and drinking more liquids.      See Patient Instructions    DANIELE Osorio [facility]

## 2017-08-02 NOTE — MR AVS SNAPSHOT
After Visit Summary   8/2/2017    Ashli Gamboa    MRN: [id number]           Patient Information     Date Of Birth          1980        Visit Information        Provider Department      8/2/2017 2:00 PM Karena Mo APRN CNP [facility]        Today's Diagnoses     Status post bariatric surgery    -  1       Follow-ups after your visit        Your next 10 appointments already scheduled     Aug 03, 2017  7:00 AM CDT   NUTRITION VISIT with Meaghan Glass RD   [facility] ([facility])    [address] [place] [address]   [phone]            Aug 03, 2017  7:40 AM CDT   (Arrive by 7:25 AM)   RETURN BARIATRIC SURGERY with Sam Schmidt MD   [facility] ([facility])    [address] [place] [address]   [phone]            Aug 31, 2017  7:00 AM CDT   NUTRITION VISIT with Meaghan Glass RD   [facility] ([facility])    [address] [place] [address]   [phone]            Aug 31, 2017  7:40 AM CDT   (Arrive by 7:25 AM)   RETURN BARIATRIC SURGERY with Sam Schmidt MD   [facility] ([facility])    [address] [place] [address] [phone]              Who to contact     If you have questions or need follow up information about today's clinic visit or your schedule please contact [facility] directly at [phone].  Normal or non-critical lab and imaging results will be communicated to you by MyChart, letter or phone within 4 business days after the clinic has received the results. If you do not hear from us within 7 days, please contact the clinic through MyChart or phone. If you have a critical or abnormal lab result, we  will notify you by phone as soon as possible.  Submit refill requests through [employer] or call your pharmacy and they will forward the refill request to us. Please allow 3 business days for your refill to be completed.          Additional Information About Your Visit        [employer] Information     [employer] gives you secure access to your electronic health record. If you see a primary care provider, you can also send messages to your care team and make appointments. If you have questions, please call your primary care clinic.  If you do not have a primary care provider, please call [phone] and they will assist you.        Care EveryWhere ID     This is your Care EveryWhere ID. This could be used by other organizations to access your [facility] medical records  [id number]        Your Vitals Were     Pulse Temperature Respirations Pulse Oximetry BMI (Body Mass Index)       99 98.6  F (37  C) (Oral) 18 95% 45.83 kg/m2        Blood Pressure from Last 3 Encounters:   07/26/17 154/60   07/19/17 139/82   07/05/17 128/82    Weight from Last 3 Encounters:   08/02/17 267 lb (121.1 kg)   07/25/17 275 lb 9.2 oz (125 kg)   07/21/17 279 lb 8 oz (126.8 kg)              Today, you had the following     No orders found for display       Primary Care Provider Office Phone # Fax #    Karena Mo APRGWEN [facility] [phone]       [facility] [address] [facility] [address] [facility] [address]        Equal Access to Services     FRANCO OLMSTEAD : Hadii catina ku hadasho Soomaali, waaxda luqadaha, qaybta kaalmada adeegyada, lynne moran adegricel warren . So [facility] [phone].    ATENCIÓN: Si habla marcus, tiene a flynn disposición servicios gratuitos de asistencia lingüística. Llame al [phone].    We comply with applicable federal civil rights laws and [place] laws. We do not discriminate on the basis of race, color, national origin, age, disability sex, sexual orientation or gender identity.             Thank you!     Thank you for choosing [facility]  for your care. Our goal is always to provide you with excellent care. Hearing back from our patients is one way we can continue to improve our services. Please take a few minutes to complete the written survey that you may receive in the mail after your visit with us. Thank you!             Your Updated Medication List - Protect others around you: Learn how to safely use, store and throw away your medicines at www.disposemymeds.org.          This list is accurate as of: 8/2/17  2:51 PM.  Always use your most recent med list.                   Brand Name Dispense Instructions for use Diagnosis    acetaminophen 325 MG tablet    TYLENOL    30 tablet    Take 1 tablet (325 mg) by mouth every 6 hours as needed for mild pain    S/P laparoscopic sleeve gastrectomy       buPROPion 100 MG tablet    WELLBUTRIN    90 tablet    Take 1 tablet (100 mg) by mouth 3 times daily    S/P laparoscopic sleeve gastrectomy       etonogestrel-ethinyl estradiol 0.12-0.015 MG/24HR vaginal ring    NUVARING    1 each    PLACE 1 RING VAGINALLY EVERY 28 DAYS AS DIRECTED    Encounter for surveillance of vaginal ring hormonal contraceptive device       ondansetron 4 MG ODT tab    ZOFRAN-ODT    20 tablet    Take 1 tablet (4 mg) by mouth every 6 hours as needed for nausea or vomiting    S/P laparoscopic sleeve gastrectomy       oxyCODONE 5 MG IR tablet    ROXICODONE    30 tablet    Take 1-2 tablets (5-10 mg) by mouth every 4 hours as needed for moderate to severe pain    S/P laparoscopic sleeve gastrectomy

## 2017-08-03 ENCOUNTER — OFFICE VISIT (OUTPATIENT)
Dept: SURGERY | Facility: CLINIC | Age: 37
End: 2017-08-03

## 2017-08-03 ENCOUNTER — ALLIED HEALTH/NURSE VISIT (OUTPATIENT)
Dept: SURGERY | Facility: CLINIC | Age: 37
End: 2017-08-03

## 2017-08-03 VITALS
TEMPERATURE: 98.7 F | HEIGHT: 64 IN | DIASTOLIC BLOOD PRESSURE: 74 MMHG | BODY MASS INDEX: 45.48 KG/M2 | SYSTOLIC BLOOD PRESSURE: 126 MMHG | OXYGEN SATURATION: 98 % | HEART RATE: 67 BPM | WEIGHT: 266.4 LBS

## 2017-08-03 DIAGNOSIS — K31.83 ACHLORHYDRIA, GASTRIC: Primary | ICD-10-CM

## 2017-08-03 NOTE — PROGRESS NOTES
"Nutrition Assessment  Reason For Visit:  Ashli Gamboa is a 37 year old female presenting today for nutrition follow-up, 1 week s/p SG with Dr. Schmidt. Pt referred by Dr. Schmidt (8/3/17).    Anthropometrics  Initial Consult Weight: 291.8 lbs  Day of Surgery Weight (7/25/17): 275.6 lbs  Current Weight: 266.4 lbs  Weight loss: -25.4 lbs from initial consult; -9.2 lbs from day of surgery    Nutrition History  Current Vitamins/Minerals: MVI with iron    Pt reports consuming and tolerating bariatric clear and low-fat full liquid diets. Fluid intake appears adequate, consuming 48-64 oz/day. Pt is consuming some protein shakes (2 shakes since surgery).    Nutrition Prescription:  Grams Protein: 60 (minimum)  Amount of Fluid: 48-64 oz    Nutrition Diagnosis  Food and nutrition-related knowledge deficit r/t lack of prior exposure to diet advancements beyond bariatric low-fat full liquid diet aeb pt unable to verbalize understanding of bariatric pureed and soft diets.    Intervention  Intervention At Appointment:  Materials/education provided on bariatric pureed and soft diets, protein intake, fluid intake, eating pace, portion control, avoiding excess sugar and fat, and recommended vitamin/mineral supplements. Gave encouragement and support. Provided pt with \"Pureed Pleasures\" handout, list of goals, and RD contact information.     Patient Understanding: Good  Expected Compliance: Good    Goals:  1) Follow bariatric low-fat full liquid diet through day 13 post-op, then to progress to pureed diet x 2 weeks (start on August 8th).  If tolerating, may advance on day 29 post-op to bariatric soft diet (start on August 22nd).   2) Work towards 60 gm protein/day.  3) Consume 48-64 oz fluids daily- between meals.  4) Eat slowly (>20 min/meal), chewing well to smooth consistency once on the bariatric soft diet.  5) Limit portions to 1/2 cup/meal.  6) Continue chewable/liquid multivitamin/minerals twice daily.    Follow-Up: 3 " weeks or prn    Time spent with patient: 30 minutes    Meaghan Glass RD, LD  Pager: [phone]

## 2017-08-03 NOTE — NURSING NOTE
"(   Chief Complaint   Patient presents with     Surgical Followup     1 wk post op LSG 7/25/17    )    ( Weight: 266 lb 6.4 oz )  ( Height: 5' 3.98\" )  ( BMI (Calculated): 45.86 )  ( Initial Weight: 291 lb 12.8 oz )  ( Cumulative weight loss (lbs): 25.4 )  ( Last Visits Weight: 281 lb 11.2 oz )  ( Wt change since last visit (lbs): -15.3 )  (   )  (   )    ( BP: 126/74 )  (   )  ( Temp: 98.7  F (37.1  C) )  ( Temp src: Oral )  ( Pulse: 67 )  (   )  ( SpO2: 98 % )    (   Patient Active Problem List   Diagnosis     Morbid obesity (H)     post traumatic Arthritis of big toe     Major depression, recurrent (H)     CARDIOVASCULAR SCREENING; LDL GOAL LESS THAN 160     Endometriosis     Fibromyalgia     Muscular deconditioning     Cervical high risk HPV (human papillomavirus) test positive     S/P laparoscopic sleeve gastrectomy    )  (   Current Outpatient Prescriptions   Medication Sig Dispense Refill     Topiramate (TOPAMAX PO) Take 200 mg by mouth daily       acetaminophen (TYLENOL) 325 MG tablet Take 1 tablet (325 mg) by mouth every 6 hours as needed for mild pain 30 tablet 0     ondansetron (ZOFRAN-ODT) 4 MG ODT tab Take 1 tablet (4 mg) by mouth every 6 hours as needed for nausea or vomiting 20 tablet 0     oxyCODONE (ROXICODONE) 5 MG IR tablet Take 1-2 tablets (5-10 mg) by mouth every 4 hours as needed for moderate to severe pain (Patient not taking: Reported on 8/3/2017) 30 tablet 0     buPROPion (WELLBUTRIN) 100 MG tablet Take 1 tablet (100 mg) by mouth 3 times daily 90 tablet 2     etonogestrel-ethinyl estradiol (NUVARING) 0.12-0.015 MG/24HR vaginal ring PLACE 1 RING VAGINALLY EVERY 28 DAYS AS DIRECTED 1 each 3    )  ( Diabetes Eval:    )    ( Pain Eval:  Data Unavailable )    ( Wound Eval:       )    (   History   Smoking Status     Former Smoker     Packs/day: 0.50     Years: 5.00     Types: Cigarettes     Start date: 1/2/2007     Quit date: 11/1/2013   Smokeless Tobacco     Never Used     Comment: stopped for " about 6 months now as of 5/23/13    )    ( Signed By:  Freddie Yanez; August 3, 2017; 7:49 AM )

## 2017-08-03 NOTE — PATIENT INSTRUCTIONS
Goals:  1) Follow bariatric low-fat full liquid diet through day 13 post-op, then to progress to pureed diet x 2 weeks (start on August 8th).  If tolerating, may advance on day 29 post-op to bariatric soft diet (start on August 22nd).   2) Work towards 60 gm protein/day.  3) Consume 48-64 oz fluids daily- between meals.  4) Eat slowly (>20 min/meal), chewing well to smooth consistency once on the bariatric soft diet.  5) Limit portions to 1/2 cup/meal.  6) Continue chewable/liquid multivitamin/minerals twice daily.    Stephani Glass RD, LD  Voicemail: [phone]  Appointments: [phone]

## 2017-08-03 NOTE — MR AVS SNAPSHOT
After Visit Summary   8/3/2017    Ashli Gamboa    MRN: [id number]           Patient Information     Date Of Birth          1980        Visit Information        Provider Department      8/3/2017 7:40 AM Sam Schmidt MD [facility]        Today's Diagnoses     Achlorhydria, gastric    -  1       Follow-ups after your visit        Follow-up notes from your care team     Return in about 4 weeks (around 8/31/2017).      Your next 10 appointments already scheduled     Aug 31, 2017  7:00 AM CDT   NUTRITION VISIT with Meaghan Glass RD   [facility] ([facility])    [address] [place] [id number] [phone]            Aug 31, 2017  7:40 AM CDT   (Arrive by 7:25 AM)   RETURN BARIATRIC SURGERY with Sam Schmidt MD   [facility] ([facility])    [address] [place] [id number] [phone]              Who to contact     Please call your clinic at [phone] to:    Ask questions about your health    Make or cancel appointments    Discuss your medicines    Learn about your test results    Speak to your doctor   If you have compliments or concerns about an experience at your clinic, or if you wish to file a complaint, please contact [facility] Patient Relations at [phone] or email us at [email or web address]         Additional Information About Your Visit        Contractor [place] Information     [employer] gives you secure access to your electronic health record. If you see a primary care provider, you can also send messages to your care team and make appointments. If you have questions, please call your primary care clinic.  If you do not have a primary care provider, please call [phone] and they will assist you.      [employer] is an electronic gateway that provides easy,  "online access to your medical records. With [employer], you can request a clinic appointment, read your test results, renew a prescription or communicate with your care team.     To access your existing account, please contact your [facility] or call [phone] for assistance.        Care EveryWhere ID     This is your Care EveryWhere ID. This could be used by other organizations to access your [place] medical records  [id number]        Your Vitals Were     Pulse Temperature Height Pulse Oximetry BMI (Body Mass Index)       67 98.7  F (37.1  C) (Oral) 5' 3.98\" 98% 45.76 kg/m2        Blood Pressure from Last 3 Encounters:   08/03/17 126/74   07/26/17 154/60   07/19/17 139/82    Weight from Last 3 Encounters:   08/03/17 266 lb 6.4 oz   08/02/17 267 lb   07/25/17 275 lb 9.2 oz              Today, you had the following     No orders found for display       Primary Care Provider Office Phone # Fax #    Karena DANIELE Palmer [facility] [phone]       [facility] [address] [facility] [address]        Equal Access to Services     SHILA OLMSTEAD : Hadii aad ku hadasho Sodanielleali, waaxda luqadaha, qaybta kaalmada adeegyada, lynne garcian fernie warren . So [facility] [phone].    ATENCIÓN: Si habla español, tiene a flynn disposición servicios gratuitos de asistencia lingüística. Llame al [phone].    We comply with applicable federal civil rights laws and [place] laws. We do not discriminate on the basis of race, color, national origin, age, disability sex, sexual orientation or gender identity.            Thank you!     Thank you for choosing [facility] SURGICAL WEIGHT MANAGEMENT  for your care. Our goal is always to provide you with excellent care. Hearing back from our patients is one way we can continue to improve our services. Please take a few minutes to complete the written survey that you may receive in the mail after your visit with us. " Thank you!             Your Updated Medication List - Protect others around you: Learn how to safely use, store and throw away your medicines at www.disposemymeds.org.          This list is accurate as of: 8/3/17  8:35 AM.  Always use your most recent med list.                   Brand Name Dispense Instructions for use Diagnosis    acetaminophen 325 MG tablet    TYLENOL    30 tablet    Take 1 tablet (325 mg) by mouth every 6 hours as needed for mild pain    S/P laparoscopic sleeve gastrectomy       buPROPion 100 MG tablet    WELLBUTRIN    90 tablet    Take 1 tablet (100 mg) by mouth 3 times daily    S/P laparoscopic sleeve gastrectomy       etonogestrel-ethinyl estradiol 0.12-0.015 MG/24HR vaginal ring    NUVARING    1 each    PLACE 1 RING VAGINALLY EVERY 28 DAYS AS DIRECTED    Encounter for surveillance of vaginal ring hormonal contraceptive device       ondansetron 4 MG ODT tab    ZOFRAN-ODT    20 tablet    Take 1 tablet (4 mg) by mouth every 6 hours as needed for nausea or vomiting    S/P laparoscopic sleeve gastrectomy       oxyCODONE 5 MG IR tablet    ROXICODONE    30 tablet    Take 1-2 tablets (5-10 mg) by mouth every 4 hours as needed for moderate to severe pain    S/P laparoscopic sleeve gastrectomy       TOPAMAX PO      Take 200 mg by mouth daily

## 2017-08-03 NOTE — PROGRESS NOTES
"Postoperative bariatric surgery visit.    Patient underwent sleeve gastrectomy 9 days ago.      Tolerating liquids: Yes, tolerating bariatric full liquid diet well  Lightheadedness: None other than once when she pushed herself too hard while exercising  Abdominal pain: No pain, some nausea relieved with zofran, no vomiting  Bowel movements: Once every other day which is less frequent than before surgery but she does not feel constipated, stools are somewhat loose  Fevers/shakes/chills: None    /74  Pulse 67  Temp 98.7  F (37.1  C) (Oral)  Ht 1.625 m (5' 3.98\")  Wt 120.8 kg (266 lb 6.4 oz)  SpO2 98%  BMI 45.76 kg/m2  NAD  Overall looks well; feels she is recovering well from surgery and is returning to work this week.  Incisions c/d/i; scabbed over, no warmth or erythema    Plan:  1. RD visit today.  2. Start vitamin supplements per RD directions.  3. Advance diet per RD directions.  4. Follow-up: 4 weeks.    Scribe Disclosure:   I, Elizabeth Boyce, am serving as a scribe; to document services personally performed by Dr. Schmidt- -based on data collection and the provider's statements to me.     Provider Disclosure:  I agree with above History, Review of Systems, Physical exam and Plan.  I have reviewed the content of the documentation and have edited it as needed. I have personally performed the services documented here and the documentation accurately represents those services and the decisions I have made.      Electronically signed by:    Sam Schmidt MD  Surgery  [phone] (hospital )  [phone] (clinic nurses)                  Sam Schmidt MD  Surgery  [phone] (hospital )  [phone] (clinic nurses)                "

## 2017-08-03 NOTE — MR AVS SNAPSHOT
MRN:[id number]                      After Visit Summary   8/3/2017    Ashli Gamboa    MRN: [id number]           Visit Information        Provider Department      8/3/2017 7:00 AM Meaghan Glass RD [facility]        Your next 10 appointments already scheduled     Aug 31, 2017  7:00 AM CDT   NUTRITION VISIT with Meaghan Glass RD   [facility] ([facility] [place])    [address] [place] [id number] [phone]            Aug 31, 2017  7:40 AM CDT   (Arrive by 7:25 AM)   RETURN BARIATRIC SURGERY with Sam Schmidt MD   [facility] ([facility])    [address] [place] [id number] [phone]              Care Instructions    Goals:  1) Follow bariatric low-fat full liquid diet through day 13 post-op, then to progress to pureed diet x 2 weeks (start on August 8th).  If tolerating, may advance on day 29 post-op to bariatric soft diet (start on August 22nd).   2) Work towards 60 gm protein/day.  3) Consume 48-64 oz fluids daily- between meals.  4) Eat slowly (>20 min/meal), chewing well to smooth consistency once on the bariatric soft diet.  5) Limit portions to 1/2 cup/meal.  6) Continue chewable/liquid multivitamin/minerals twice daily.    Stephani Glass RD, LD  Voicemail: [phone]  Appointments: [phone]           [employer] Information     [employer] gives you secure access to your electronic health record. If you see a primary care provider, you can also send messages to your care team and make appointments. If you have questions, please call your primary care clinic.  If you do not have a primary care provider, please call [phone] and they will assist you.      [employer] is an electronic gateway that provides easy, online access to your medical records. With [employer], you can request a clinic appointment,  read your test results, renew a prescription or communicate with your care team.     To access your existing account, please contact your [facility] or call [phone] for assistance.        Care EveryWhere ID     This is your Care EveryWhere ID. This could be used by other organizations to access your [place] medical records  [id number]        Equal Access to Services     FRANCO OLMSTEAD : Hanna Wong, horace lamb, lynne venegas. So [facility] [phone].    ATENCIÓN: Si habla español, tiene a flynn disposición servicios gratuitos de asistencia lingüística. Llame al [phone].    We comply with applicable federal civil rights laws and [place] laws. We do not discriminate on the basis of race, color, national origin, age, disability sex, sexual orientation or gender identity.

## 2017-08-03 NOTE — LETTER
"8/3/2017       RE: Ashli Gamboa  [address] [place] [address]     Dear Colleague,    Thank you for referring your patient, Ashli Gamboa, to the [facility] SURGICAL WEIGHT MANAGEMENT at [facility]. Please see a copy of my visit note below.    Postoperative bariatric surgery visit.    Patient underwent sleeve gastrectomy 9 days ago.      Tolerating liquids: Yes, tolerating bariatric full liquid diet well  Lightheadedness: None other than once when she pushed herself too hard while exercising  Abdominal pain: No pain, some nausea relieved with zofran, no vomiting  Bowel movements: Once every other day which is less frequent than before surgery but she does not feel constipated, stools are somewhat loose  Fevers/shakes/chills: None    /74  Pulse 67  Temp 98.7  F (37.1  C) (Oral)  Ht 1.625 m (5' 3.98\")  Wt 120.8 kg (266 lb 6.4 oz)  SpO2 98%  BMI 45.76 kg/m2  NAD  Overall looks well; feels she is recovering well from surgery and is returning to work this week.  Incisions c/d/i; scabbed over, no warmth or erythema    Plan:  1. RD visit today.  2. Start vitamin supplements per RD directions.  3. Advance diet per RD directions.  4. Follow-up: 4 weeks.    Scribe Disclosure:   I, Elizabeth Boyce, am serving as a scribe; to document services personally performed by Dr. Schmidt- -based on data collection and the provider's statements to me.     Provider Disclosure:  I agree with above History, Review of Systems, Physical exam and Plan.  I have reviewed the content of the documentation and have edited it as needed. I have personally performed the services documented here and the documentation accurately represents those services and the decisions I have made.      Electronically signed by:    Sam Schmidt MD  Surgery  [phone] (hospital )  [phone] (clinic nurses)                  Sam Schmidt MD  Surgery  [phone] (hospital " )  [phone] (clinic nurses)

## 2017-08-31 ENCOUNTER — ALLIED HEALTH/NURSE VISIT (OUTPATIENT)
Dept: SURGERY | Facility: CLINIC | Age: 37
End: 2017-08-31

## 2017-08-31 ENCOUNTER — OFFICE VISIT (OUTPATIENT)
Dept: SURGERY | Facility: CLINIC | Age: 37
End: 2017-08-31

## 2017-08-31 VITALS
OXYGEN SATURATION: 97 % | BODY MASS INDEX: 43.24 KG/M2 | HEART RATE: 87 BPM | WEIGHT: 253.3 LBS | HEIGHT: 64 IN | SYSTOLIC BLOOD PRESSURE: 131 MMHG | TEMPERATURE: 98.5 F | DIASTOLIC BLOOD PRESSURE: 87 MMHG

## 2017-08-31 DIAGNOSIS — K31.83 ACHLORHYDRIA, GASTRIC: Primary | ICD-10-CM

## 2017-08-31 ASSESSMENT — PAIN SCALES - GENERAL: PAINLEVEL: NO PAIN (0)

## 2017-08-31 NOTE — PATIENT INSTRUCTIONS
Goals:  1) Follow soft diet for 3 weeks, then to progress to bariatric regular diet.   2) Consume 60 grams of protein/day.  3) Sip on 48-64 oz of fluids/day- between meals only.  4) Eat slowly (>20 min/meal), chewing foods well (to applesauce-like consistency).  5) Limit portions to 1/2 cup/meal.  6)Take the following after a Sleeve Gastrectomy:    Multivitamin/minerals: adult dose 2 times daily    Calcium Citrate containing vitamin D: 500 mg 3 times daily or 600 mg 2 times daily    Vitamin B12: sublingual form of at least 500 mcg daily or injection of 1000 mcg monthly     Vitamin D3: 2,000 International Units     Stephani Glass RD, LD  Voicemail: [phone]  Appointments: [phone]

## 2017-08-31 NOTE — PROGRESS NOTES
"Postoperative bariatric surgery visit.    Patient underwent sleeve gastrectomy 4 weeks ago.      Tolerating liquids: yes  Lightheadedness: none  Abdominal pain: none  Bowel movements: normal  Fevers/shakes/chills: none    /87 (BP Location: Left arm, Patient Position: Chair, Cuff Size: Adult Large)  Pulse 87  Temp 98.5  F (36.9  C)  Ht 5' 4\"  Wt 253 lb 4.8 oz  SpO2 97%  BMI 43.48 kg/m2  NAD  Overall looks great  Incisions c/d/i    Plan:  1. RD visit today.  2. Start vitamin supplements per RD directions.  3. Advance diet per RD directions.  4. Follow-up: 8-12 weeks.    Sam Schmidt MD  Surgery  [phone] (hospital )  [phone] (clinic nurses)                "

## 2017-08-31 NOTE — NURSING NOTE
"(   Chief Complaint   Patient presents with     RECHECK     bariatric    )    ( Weight: 253 lb 4.8 oz )  ( Height: 5' 4\" )  ( BMI (Calculated): 43.57 )  ( Initial Weight: 253 lb 4.8 oz )  (   )  (   )  (   )  ( Waist Circumference (cm): 51 cm )  (   )    ( BP: 131/87 )  (   )  ( Temp: 98.5  F (36.9  C) )  (   )  ( Pulse: 87 )  (   )  ( SpO2: 97 % )    (   Patient Active Problem List   Diagnosis     Morbid obesity (H)     post traumatic Arthritis of big toe     Major depression, recurrent (H)     CARDIOVASCULAR SCREENING; LDL GOAL LESS THAN 160     Endometriosis     Fibromyalgia     Muscular deconditioning     Cervical high risk HPV (human papillomavirus) test positive     S/P laparoscopic sleeve gastrectomy    )  (   Current Outpatient Prescriptions   Medication Sig Dispense Refill     Topiramate (TOPAMAX PO) Take 200 mg by mouth daily       ondansetron (ZOFRAN-ODT) 4 MG ODT tab Take 1 tablet (4 mg) by mouth every 6 hours as needed for nausea or vomiting 20 tablet 0     acetaminophen (TYLENOL) 325 MG tablet Take 1 tablet (325 mg) by mouth every 6 hours as needed for mild pain 30 tablet 0     oxyCODONE (ROXICODONE) 5 MG IR tablet Take 1-2 tablets (5-10 mg) by mouth every 4 hours as needed for moderate to severe pain (Patient not taking: Reported on 8/3/2017) 30 tablet 0     buPROPion (WELLBUTRIN) 100 MG tablet Take 1 tablet (100 mg) by mouth 3 times daily 90 tablet 2     etonogestrel-ethinyl estradiol (NUVARING) 0.12-0.015 MG/24HR vaginal ring PLACE 1 RING VAGINALLY EVERY 28 DAYS AS DIRECTED 1 each 3    )  ( Diabetes Eval:    )    ( Pain Eval:  No Pain (0) )    ( Wound Eval:       )    (   History   Smoking Status     Former Smoker     Packs/day: 0.50     Years: 5.00     Types: Cigarettes     Start date: 1/2/2007     Quit date: 11/1/2013   Smokeless Tobacco     Never Used     Comment: stopped for about 6 months now as of 5/23/13    )    ( Signed By:  Luana Antonio; August 31, 2017; 7:25 AM )  "

## 2017-08-31 NOTE — LETTER
"8/31/2017     RE: Ashli Gamboa  [address] [place] [address]     Dear Colleague,    Thank you for referring your patient, Ashli Gamboa, to the [facility] SURG[facility] MANAGEMENT at [facility]. Please see a copy of my visit note below.    Postoperative bariatric surgery visit.    Patient underwent sleeve gastrectomy 4 weeks ago.      Tolerating liquids: yes  Lightheadedness: none  Abdominal pain: none  Bowel movements: normal  Fevers/shakes/chills: none    /87 (BP Location: Left arm, Patient Position: Chair, Cuff Size: Adult Large)  Pulse 87  Temp 98.5  F (36.9  C)  Ht 5' 4\"  Wt 253 lb 4.8 oz  SpO2 97%  BMI 43.48 kg/m2  NAD  Overall looks great  Incisions c/d/i    Plan:  1. RD visit today.  2. Start vitamin supplements per RD directions.  3. Advance diet per RD directions.  4. Follow-up: 8-12 weeks.    Sam Schmidt MD  Surgery  [phone] (hospital )  [phone] (clinic nurses)  "

## 2017-08-31 NOTE — MR AVS SNAPSHOT
MRN:[id number]                      After Visit Summary   8/31/2017    Ashli Gamboa    MRN: [id number]           Visit Information        Provider Department      8/31/2017 7:00 AM Meaghan Glass RD [facility]        Care Instructions    Goals:  1) Follow soft diet for 3 weeks, then to progress to bariatric regular diet.   2) Consume 60 grams of protein/day.  3) Sip on 48-64 oz of fluids/day- between meals only.  4) Eat slowly (>20 min/meal), chewing foods well (to applesauce-like consistency).  5) Limit portions to 1/2 cup/meal.  6)Take the following after a Sleeve Gastrectomy:    Multivitamin/minerals: adult dose 2 times daily    Calcium Citrate containing vitamin D: 500 mg 3 times daily or 600 mg 2 times daily    Vitamin B12: sublingual form of at least 500 mcg daily or injection of 1000 mcg monthly     Vitamin D3: 2,000 International Units     Stephani Glass RD, LD  Voicemail: [phone]  Appointments: [phone]           [employer] Information     [employer] gives you secure access to your electronic health record. If you see a primary care provider, you can also send messages to your care team and make appointments. If you have questions, please call your primary care clinic.  If you do not have a primary care provider, please call [phone] and they will assist you.      [employer] is an electronic gateway that provides easy, online access to your medical records. With [employer], you can request a clinic appointment, read your test results, renew a prescription or communicate with your care team.     To access your existing account, please contact your [facility] or call [phone] for assistance.        Care EveryWhere ID     This is your Care EveryWhere ID. This could be used by other organizations to access your [place] medical records  [id number]        Equal Access to Services     FRANCO OLMSTEAD : Hanna kelly  Marvin, horace lamb, pasquale kaalmanarinder almaraz, lynne parmar. So [facility] [phone].    ATENCIÓN: Si habla español, tiene a flynn disposición servicios gratuitos de asistencia lingüística. Llame al [phone].    We comply with applicable federal civil rights laws and [place] laws. We do not discriminate on the basis of race, color, national origin, age, disability sex, sexual orientation or gender identity.

## 2017-08-31 NOTE — MR AVS SNAPSHOT
After Visit Summary   8/31/2017    Ashli Gamboa    MRN: [id number]           Patient Information     Date Of Birth          1980        Visit Information        Provider Department      8/31/2017 7:40 AM Sam Schmidt MD [facility]        Today's Diagnoses     Achlorhydria, gastric    -  1       Follow-ups after your visit        Your next 10 appointments already scheduled     Nov 30, 2017 12:40 PM CST   (Arrive by 12:25 PM)   RETURN BARIATRIC SURGERY with Sam Schmidt MD   [facility] ([facility])    [address] [place] [id number] [phone]            Nov 30, 2017  1:30 PM CST   NUTRITION VISIT with Meaghan Glass RD   [facility] ([facility])    [address] [place] [address] [phone]              Who to contact     Please call your clinic at [phone] to:    Ask questions about your health    Make or cancel appointments    Discuss your medicines    Learn about your test results    Speak to your doctor   If you have compliments or concerns about an experience at your clinic, or if you wish to file a complaint, please contact [facility] Patient Relations at [phone] or email us at [email or web address]         Additional Information About Your Visit        MyChart Information     Wave - Private Location App gives you secure access to your electronic health record. If you see a primary care provider, you can also send messages to your care team and make appointments. If you have questions, please call your primary care clinic.  If you do not have a primary care provider, please call [phone] and they will assist you.      Wave - Private Location App is an electronic gateway that provides easy, online access to your medical records. With Wave - Private Location App, you can request a clinic  "appointment, read your test results, renew a prescription or communicate with your care team.     To access your existing account, please contact your [facility] or call [phone] for assistance.        Care EveryWhere ID     This is your Care EveryWhere ID. This could be used by other organizations to access your [place] medical records  [id number]        Your Vitals Were     Pulse Temperature Height Pulse Oximetry BMI (Body Mass Index)       87 98.5  F (36.9  C) 5' 4\" 97% 43.48 kg/m2        Blood Pressure from Last 3 Encounters:   08/31/17 131/87   08/03/17 126/74   07/26/17 154/60    Weight from Last 3 Encounters:   08/31/17 253 lb 4.8 oz   08/03/17 266 lb 6.4 oz   08/02/17 267 lb              We Performed the Following     Comprehensive metabolic panel [LAB17]     Ferritin [LAB68]     Hemoglobin      Parathyroid Hormone Intact [ERM765]     Vitamin B12 [LAB67]     Vitamin D [ORR110]        Primary Care Provider Office Phone # Fax #    Karena DANIELE [facility] [phone] [address] [facility] [address]        Equal Access to Services     FRANCO OLMSTEAD : Hadii aad ku hadasho Sodanielleali, waaxda luqadaha, qaybta kaalmada adegricelyada, lynne parmar. So [facility] [phone].    ATENCIÓN: Si habla español, tiene a flynn disposición servicios gratuitos de asistencia lingüística. Llame al [phone].    We comply with applicable federal civil rights laws and [place] laws. We do not discriminate on the basis of race, color, national origin, age, disability sex, sexual orientation or gender identity.            Thank you!     Thank you for choosing [facility]  for your care. Our goal is always to provide you with excellent care. Hearing back from our patients is one way we can continue to improve our services. Please take a few minutes to complete the written survey that you may receive in the mail after " your visit with us. Thank you!             Your Updated Medication List - Protect others around you: Learn how to safely use, store and throw away your medicines at www.disposemymeds.org.          This list is accurate as of: 8/31/17  8:08 AM.  Always use your most recent med list.                   Brand Name Dispense Instructions for use Diagnosis    acetaminophen 325 MG tablet    TYLENOL    30 tablet    Take 1 tablet (325 mg) by mouth every 6 hours as needed for mild pain    S/P laparoscopic sleeve gastrectomy       buPROPion 100 MG tablet    WELLBUTRIN    90 tablet    Take 1 tablet (100 mg) by mouth 3 times daily    S/P laparoscopic sleeve gastrectomy       etonogestrel-ethinyl estradiol 0.12-0.015 MG/24HR vaginal ring    NUVARING    1 each    PLACE 1 RING VAGINALLY EVERY 28 DAYS AS DIRECTED    Encounter for surveillance of vaginal ring hormonal contraceptive device       ondansetron 4 MG ODT tab    ZOFRAN-ODT    20 tablet    Take 1 tablet (4 mg) by mouth every 6 hours as needed for nausea or vomiting    S/P laparoscopic sleeve gastrectomy       oxyCODONE 5 MG IR tablet    ROXICODONE    30 tablet    Take 1-2 tablets (5-10 mg) by mouth every 4 hours as needed for moderate to severe pain    S/P laparoscopic sleeve gastrectomy       TOPAMAX PO      Take 200 mg by mouth daily

## 2017-08-31 NOTE — PROGRESS NOTES
Nutrition Reassessment  Reason For Visit:  Ashli Gamboa is a 37 year old female presenting today for nutrition follow-up, 1 month s/p SG. Pt referred by Dr. Schmidt (8/31/17).    Anthropometrics:  Initial Consult Weight: 291.8 lbs  Day of Surgery Weight (7/25/17): 275.6 lbs  Current Weight: 253.3 lbs  Weight loss: 38.5 lbs from initial consult; -22.3 lbs from day of surgery    Nutrition History:  Current Vitamins/Minerals: MVI/minerals BID    Progress with Previous Goals:  1) Follow bariatric low-fat full liquid diet through day 13 post-op, then to progress to pureed diet x 2 weeks (start on August 8th).  If tolerating, may advance on day 29 post-op to bariatric soft diet (start on August 22nd). - Met.   2) Work towards 60 gm protein/day. - Met and continues. Pt is using protein shakes on day when protein intake is low.   3) Consume 48-64 oz fluids daily- between meals. - Met and continues.   4) Eat slowly (>20 min/meal), chewing well to smooth consistency once on the bariatric soft diet. - Met and continues.   5) Limit portions to 1/2 cup/meal. - Met and continues.   6) Continue chewable/liquid multivitamin/minerals twice daily. - Met and continues.     Nutrition Prescription:  Grams Protein: 60 (minimum)  Amount of Fluid: 48-64 oz    Nutrition Diagnosis  Previous: Food and nutrition-related knowledge deficit r/t lack of prior exposure to diet advancements beyond bariatric low-fat full liquid diet aeb pt unable to verbalize understanding of bariatric pureed and soft diets. - resolved.    Current: Food and nutrition-related knowledge deficit r/t lack of prior exposure to diet advancements beyond bariatric pureed diet aeb pt unable to verbalize full understanding of bariatric soft and regular consistency diets.    Intervention  Materials/Education provided on bariatric soft and regular consistency diets, protein intake, fluid intake, eating pace, chewing foods well, portion control, sugar/fat intake, and  recommended vitamin/mineral supplements. Gave encouragement and support. Provided pt with list of goals and RD contact information.     Patient Understanding: Good  Expected Compliance: Good    Goals:  1) Follow soft diet for 3 weeks, then to progress to bariatric regular diet.   2) Consume 60 grams of protein/day.  3) Sip on 48-64 oz of fluids/day- between meals only.  4) Eat slowly (>20 min/meal), chewing foods well (to applesauce-like consistency).  5) Limit portions to 1/2 cup/meal.  6)Take the following after a Sleeve Gastrectomy:    Multivitamin/minerals: adult dose 2 times daily    Calcium Citrate containing vitamin D: 500 mg 3 times daily or 600 mg 2 times daily    Vitamin B12: sublingual form of at least 500 mcg daily or injection of 1000 mcg monthly     Vitamin D3: 2,000 International Units          Follow-Up: prn    Time spent with patient: 30 minutes    Meaghan Glass RD, LD  Pager: [phone]

## 2017-09-07 ENCOUNTER — TELEPHONE (OUTPATIENT)
Dept: FAMILY MEDICINE | Facility: CLINIC | Age: 37
End: 2017-09-07

## 2017-09-07 ENCOUNTER — OFFICE VISIT (OUTPATIENT)
Dept: PODIATRY | Facility: CLINIC | Age: 37
End: 2017-09-07
Payer: COMMERCIAL

## 2017-09-07 VITALS — WEIGHT: 253 LBS | HEIGHT: 64 IN | RESPIRATION RATE: 18 BRPM | BODY MASS INDEX: 43.19 KG/M2

## 2017-09-07 DIAGNOSIS — M25.571 ARTHRALGIA OF RIGHT FOOT: Primary | ICD-10-CM

## 2017-09-07 DIAGNOSIS — M20.5X1 HALLUX LIMITUS, RIGHT: ICD-10-CM

## 2017-09-07 PROCEDURE — 99213 OFFICE O/P EST LOW 20 MIN: CPT | Performed by: PODIATRIST

## 2017-09-07 NOTE — PATIENT INSTRUCTIONS
[facility] LOCATIONS     MONDAY / FRIDAY - [place] WEDNESDAY - [place]   [address] [place] [address] [place] [address]   [phone] / [phone] / [phone]       THURSDAY - [place] SCHEDULE SURGERY: [phone]   [address] APPOINTMENTS: [phone]   [place] [address] AFTER HOURS: [phone]   [phone] / [phone] BILLING QUESTIONS: [phone]      [place] [facility] LOCATIONS  [facility]  [address]  [place] [address]  Phone: [phone]  Fax: [phone] [facility]  [address]  [place] [address]  Phone: [phone]   Fax: [phone]   [facility]  [address]  [place] [address]  Phone: [phone]  Fax: [phone] [facility]  [address]  [place] [address]  Phone: [phone]   Fax: [phone]   [facility]   [address]  [place] [address]  Phone: [phone]   Fax: [phone] * Please call any location listed to make an appointment for a casting/fitting. Your referral was sent to their central office and they will all have the order on file.     [place] [facility] SCHEDULING  They should be calling you within 24 hours to schedule your scan.  If not, please call the location discussed at your appointment.    1) [facility]:       [phone]      [address] [place] [place] [address]    2) [facility]:      [phone] [address]      [place] [address]    3) [facility]:       [phone]      [facility] [address] [address]    * We will call you with the results. Please allow 24-48 hours for the results to be read and final.            FLAT FEET     Flatfoot is often a complex disorder, with diverse symptoms and varying degrees of  deformity and disability. There are several types of flatfoot, all of which have one characteristic in common: partial or total collapse (loss) of the arch.  Other characteristics shared by most types of flatfoot include:   Toe drift,  in which the toes and front part of the foot point outward   The heel tilts toward the outside and the ankle appears to turn in   A tight Achilles tendon, which causes the heel to lift off the ground earlier when walking and may make the problem worse   Bunions and hammertoes may develop as a result of a flatfoot.   Flexible Flatfoot  Flexible flatfoot is one of the most common types of flatfoot. It typically begins in childhood or adolescence and continues into adulthood. It usually occurs in both feet and progresses in severity throughout the adult years. As the deformity worsens, the soft tissues (tendons and ligaments) of the arch may stretch or tear and can become inflamed.  The term  flexible  means that while the foot is flat when standing (weight-bearing), the arch returns when not standing.  Symptoms  Pain in the heel, arch, ankle, or along the outside of the foot    Rolled-in  ankle (over-pronation)   Pain along the shin bone (shin splint)   General aching or fatigue in the foot or leg   Low back, hip or knee pain.   Diagnosis  In diagnosing flatfoot, the foot and ankle surgeon examines the foot and observes how it looks when you stand and sit. X-rays are usually taken to determine the severity of the disorder. If you are diagnosed with flexible flatfoot but you don t have any symptoms, your surgeon will explain what you might expect in the future.  Non-surgical Treatment  If you experience symptoms with flexible flatfoot, the surgeon may recommend non-surgical treatment options, including:  Activity modifications. Cut down on activities that bring you pain and avoid prolonged walking and standing to give your arches a rest.   Weight loss. If you are overweight, try to lose  weight. Putting too much weight on your arches may aggravate your symptoms.   Orthotic devices. Your foot and ankle surgeon can provide you with custom orthotic devices for your shoes to give more support to the arches.   Immobilization. In some cases, it may be necessary to use a walking cast or to completely avoid weight-bearing.   Medications. Nonsteroidal anti-inflammatory drugs (NSAIDs), such as ibuprofen, help reduce pain and inflammation.   Physical therapy. Ultrasound therapy or other physical therapy modalities may be used to provide temporary relief.   Shoe modifications. Wearing shoes that support the arches is important for anyone who has flatfoot.   When is Surgery Necessary?  In some patients whose pain is not adequately relieved by other treatments, surgery may be considered. A variety of surgical techniques is available to correct flexible flatfoot, and one or a combination of procedures may be required to relieve the symptoms and improve foot function.  In selecting the procedure or combination of procedures for your particular case, the foot and ankle surgeon will take into consideration the extent of your deformity based on the x-ray findings, your age, your activity level, and other factors. The length of the recovery period will vary, depending on the procedure or procedures performed.    DEGENERATIVE ARTHRITIS OF THE BIG TOE JOINT   (hallux limitus/hallux rigidus)   Arthritis of the joint at the base of the big toe (metatarsophalangeal joint) has several causes. Usually it results from repetitive trauma to the joint, secondary to abnormal foot mechanics. Often it is hereditary. However, a one-time traumatic event can lead to arthritis. The condition doesn't improve with time, and even with treatment, can worsen. The cartilage wears out, joint surfaces are no longer smooth, bone rubs on bone, inflammation occurs with pain, and eventually bone spurs and loose fragments might develop.   The joint  "is often painful with activity, worse with flimsy shoes or walking barefoot, and it slowly progresses over time. A person might notice the toe \"locking up\" with walking. There often is an obvious, and irritating, bony bump on top of the foot. Shoes might be uncomfortable. In some people the pain is so bothersome that recreational activities sometimes even normal daily activities are difficult to perform.   The pain from this arthritis is likely a combination of joint jamming, cartilage loss and inflammation, and irritation from shoes rubbing on the bump. Sometimes other parts of the foot, leg, or back hurt from altering one's walk to compensate for the painful jOint.     Ways to help a person live with the discomfort include wearing a good, supportive shoe with a rigid, rocker-type bottom. An example is a hiking boot. A rigid sole minimizes bending of the joint, and therefore, joint motion and pain. Shoes with a high toe box allow for less rubbing on the bump. Avoiding barefoot walking, sandals, flip-flops and slippers usually helps.   Sometimes an insert or orthotic provides symptom relief. This might make shoe fit more difficult. Pads over the bump and occasionally injections into the joint provide relief.   Surgery for this condition is aimed towards alleviating pain. It does not cure the arthritis nor does it guarantee better joint motion. Depending on the condition of the metatarsophalangeal joint, there are several surgiqal options:    1.  Cutting off the bony bump(s) and cleaning the joint    2.  Loosening the joint up by making cuts in the first metatarsal bone or the big toe bone and removing a small section of bone.    3.  Repositioning bone to minimize jamming of the joint.    4.  In severe cases, the joint is fused. By fusing the joint, it will never bend again. This resolves the pain, because it's the movement of a worn out jOint that causes pain. Oftentimes the operation involves a combination of these " procedures and. requires the use of screws, pins, and/or a small surgical plate.     Healing after surgery requires about six weeks of protection. This allows the bone to heal. Maximum recovery takes about one year. The scar tissue and joint structures require this amount of time to finish the healing process. Expect stiffness, swelling and numbness during that time frame.   Surgery for arthritis of the metatarsophalangeal joint does involve side effects. Some side effects are predictable and others are less common but do occur. A scar will be visible and could be irritated by shoes. The shoe may rub on the screw or internal pin requiring surgical removal of these fixation devices. The screw and pin would likely be left in place for a full year. The first toe may remain stiff after surgery. The amount of stiffness is variable. Most people never regain normal motion of the first toe. This is due to scar tissue inherent to any surgery, in addition to the cumUlative effects of arthritis. Sometimes the big toe drifts to one side or the other. Joint fusion is one option to correct an unstable, drifting toe. This procedure straightens the toe, however, no motion remains.   All surgical procedures involve risk of infection, numbness, pain, delayed bone healing, osteotomy (bone cut) dislocation, blood clots, continued foot pain, etc. Arthritic joint surgery is quite complex and should not be taken lightly.    Any skin incision can lead to infection. Deep infection might involve the bone and thus repeat surgery and six weeks of IV antibiotics. Scar tissue can cause nerve pain or numbness. his is generally temporary but can be permanent. We do not have treatments that cure nerve problems. Second toe pain could be related to altered mechanics and pressure transferred to the second toe. Delayed bone healing would lengthen the healing time. Some bones simply do not heal. This requires repeat surgery, electronic bone stimulation  and/or extended protection. Smokers have an approximate 20% chance of poor bone healing. This is double that of a non-smoker. The bone cut may displace. This may need to be repaired with a second operation. Displacement can cause joint malalignment. Immobility after surgery can cause a blood clot in the legs and lungs. This could result in death.   Foot pain is complex. Most feet hurt for more than one reason. Operating on the arthritic   big toe joint will not necessarily create a pain free foot. Appropriate shoes, healthy body weight, avoidance of bare foot walking and moderation of activity will always be   necessary to enjoy foot comfort. Arthritis is incurable even with surgery.     Surgery for this type of arthritis is nevertheless quite successful. Most surgical patients are pleased with their foot following surgery. Many of the issues described above can be controlled by taking proper care of your foot during the healing process.   Cosmetic bump surgery is discouraged for the reasons listed above. A bump and joint that is comfortable when wearing appropriate shoes should simply be treated with appropriate shoes.   Your surgeon would be happy to fully describe any of the above issues. You should pursue a full understanding of the operation, recovery process and any potential problems that could develop.       Body Mass Index (BMI)  Many things can cause foot and ankle problems. Foot structure, activity level, foot mechanics and injuries are common causes of pain.  One very important issue that often goes unmentioned, is body weight.  Extra weight can cause increased stress on muscles, ligaments, bones and tendons.  Sometimes just a few extra pounds is all it takes to put one over her/his threshold. Without reducing that stress, it can be difficult to alleviate pain. Some people are uncomfortable addressing this issue, but we feel it is important for you to think about it. As Foot &  Ankle specialists, our job  is addressing the lower extremity problem and possible causes. Regarding extra body weight, we encourage patients to discuss diet and weight management plans with their primary care doctors. It is this team approach that gives you the best opportunity for pain relief and getting you back on your feet.

## 2017-09-07 NOTE — MR AVS SNAPSHOT
After Visit Summary   9/7/2017    Ashli Gamboa    MRN: [id number]           Patient Information     Date Of Birth          1980        Visit Information        Provider Department      9/7/2017 8:30 AM Kian Gilmore DPM [facility]        Care Instructions    DR. GILMORE'S CLINIC LOCATIONS     [facility] W[facility]   [address] [place] [address] [place] [address]   [phone] / [phone] / [phone]       [facility]: [phone]   [address] APPOINTMENTS: [phone]   [place] [address] AFTER HOURS: [phone]   [phone] / [phone] BILLING QUESTIONS: [phone]      [facility] LOCATIONS  [facility]  [address]  [place] [address]  Phone: [phone]  Fax: [phone] [facility]  [address]  [place] [address]  Phone: [phone]   Fax: [phone]   [facility]  [address]  [place] [address]  Phone: [phone]  Fax: [phone] [facility]  [address]  [place] [address]  Phone: [phone]   Fax: [phone]   [facility]   [address]  [place] [address]  Phone: [phone]   Fax: [phone] * Please call any location listed to make an appointment for a casting/fitting. Your referral was sent to their central office and they will all have the order on file.     [facility] SCHEDULING  They should be calling you within 24 hours to schedule your scan.  If not, please call the location discussed at your appointment.    1) [facility]:       [phone]      [address] [place] [address]    2) [facility]:      [phone] [address] [address]    3)  [facility]:       [phone]      [address] [place] [address]    * We will call you with the results. Please allow 24-48 hours for the results to be read and final.            FLAT FEET     Flatfoot is often a complex disorder, with diverse symptoms and varying degrees of deformity and disability. There are several types of flatfoot, all of which have one characteristic in common: partial or total collapse (loss) of the arch.  Other characteristics shared by most types of flatfoot include:   Toe drift,  in which the toes and front part of the foot point outward   The heel tilts toward the outside and the ankle appears to turn in   A tight Achilles tendon, which causes the heel to lift off the ground earlier when walking and may make the problem worse   Bunions and hammertoes may develop as a result of a flatfoot.   Flexible Flatfoot  Flexible flatfoot is one of the most common types of flatfoot. It typically begins in childhood or adolescence and continues into adulthood. It usually occurs in both feet and progresses in severity throughout the adult years. As the deformity worsens, the soft tissues (tendons and ligaments) of the arch may stretch or tear and can become inflamed.  The term  flexible  means that while the foot is flat when standing (weight-bearing), the arch returns when not standing.  Symptoms  Pain in the heel, arch, ankle, or along the outside of the foot    Rolled-in  ankle (over-pronation)   Pain along the shin bone (shin splint)   General aching or fatigue in the foot or leg   Low back, hip or knee pain.   Diagnosis  In diagnosing flatfoot, the foot and ankle surgeon examines the foot and observes how it looks when you stand and sit. X-rays are usually taken to determine the severity of the disorder. If you are diagnosed with flexible flatfoot but you don t have any symptoms, your surgeon will explain what you might expect in the future.  Non-surgical  Treatment  If you experience symptoms with flexible flatfoot, the surgeon may recommend non-surgical treatment options, including:  Activity modifications. Cut down on activities that bring you pain and avoid prolonged walking and standing to give your arches a rest.   Weight loss. If you are overweight, try to lose weight. Putting too much weight on your arches may aggravate your symptoms.   Orthotic devices. Your foot and ankle surgeon can provide you with custom orthotic devices for your shoes to give more support to the arches.   Immobilization. In some cases, it may be necessary to use a walking cast or to completely avoid weight-bearing.   Medications. Nonsteroidal anti-inflammatory drugs (NSAIDs), such as ibuprofen, help reduce pain and inflammation.   Physical therapy. Ultrasound therapy or other physical therapy modalities may be used to provide temporary relief.   Shoe modifications. Wearing shoes that support the arches is important for anyone who has flatfoot.   When is Surgery Necessary?  In some patients whose pain is not adequately relieved by other treatments, surgery may be considered. A variety of surgical techniques is available to correct flexible flatfoot, and one or a combination of procedures may be required to relieve the symptoms and improve foot function.  In selecting the procedure or combination of procedures for your particular case, the foot and ankle surgeon will take into consideration the extent of your deformity based on the x-ray findings, your age, your activity level, and other factors. The length of the recovery period will vary, depending on the procedure or procedures performed.    DEGENERATIVE ARTHRITIS OF THE BIG TOE JOINT   (hallux limitus/hallux rigidus)   Arthritis of the joint at the base of the big toe (metatarsophalangeal joint) has several causes. Usually it results from repetitive trauma to the joint, secondary to abnormal foot mechanics. Often it is hereditary.  "However, a one-time traumatic event can lead to arthritis. The condition doesn't improve with time, and even with treatment, can worsen. The cartilage wears out, joint surfaces are no longer smooth, bone rubs on bone, inflammation occurs with pain, and eventually bone spurs and loose fragments might develop.   The joint is often painful with activity, worse with flimsy shoes or walking barefoot, and it slowly progresses over time. A person might notice the toe \"locking up\" with walking. There often is an obvious, and irritating, bony bump on top of the foot. Shoes might be uncomfortable. In some people the pain is so bothersome that recreational activities sometimes even normal daily activities are difficult to perform.   The pain from this arthritis is likely a combination of joint jamming, cartilage loss and inflammation, and irritation from shoes rubbing on the bump. Sometimes other parts of the foot, leg, or back hurt from altering one's walk to compensate for the painful jOint.     Ways to help a person live with the discomfort include wearing a good, supportive shoe with a rigid, rocker-type bottom. An example is a hiking boot. A rigid sole minimizes bending of the joint, and therefore, joint motion and pain. Shoes with a high toe box allow for less rubbing on the bump. Avoiding barefoot walking, sandals, flip-flops and slippers usually helps.   Sometimes an insert or orthotic provides symptom relief. This might make shoe fit more difficult. Pads over the bump and occasionally injections into the joint provide relief.   Surgery for this condition is aimed towards alleviating pain. It does not cure the arthritis nor does it guarantee better joint motion. Depending on the condition of the metatarsophalangeal joint, there are several surgiqal options:    1.  Cutting off the bony bump(s) and cleaning the joint    2.  Loosening the joint up by making cuts in the first metatarsal bone or the big toe bone and " removing a small section of bone.    3.  Repositioning bone to minimize jamming of the joint.    4.  In severe cases, the joint is fused. By fusing the joint, it will never bend again. This resolves the pain, because it's the movement of a worn out jOint that causes pain. Oftentimes the operation involves a combination of these procedures and. requires the use of screws, pins, and/or a small surgical plate.     Healing after surgery requires about six weeks of protection. This allows the bone to heal. Maximum recovery takes about one year. The scar tissue and joint structures require this amount of time to finish the healing process. Expect stiffness, swelling and numbness during that time frame.   Surgery for arthritis of the metatarsophalangeal joint does involve side effects. Some side effects are predictable and others are less common but do occur. A scar will be visible and could be irritated by shoes. The shoe may rub on the screw or internal pin requiring surgical removal of these fixation devices. The screw and pin would likely be left in place for a full year. The first toe may remain stiff after surgery. The amount of stiffness is variable. Most people never regain normal motion of the first toe. This is due to scar tissue inherent to any surgery, in addition to the cumUlative effects of arthritis. Sometimes the big toe drifts to one side or the other. Joint fusion is one option to correct an unstable, drifting toe. This procedure straightens the toe, however, no motion remains.   All surgical procedures involve risk of infection, numbness, pain, delayed bone healing, osteotomy (bone cut) dislocation, blood clots, continued foot pain, etc. Arthritic joint surgery is quite complex and should not be taken lightly.    Any skin incision can lead to infection. Deep infection might involve the bone and thus repeat surgery and six weeks of IV antibiotics. Scar tissue can cause nerve pain or numbness. his is  generally temporary but can be permanent. We do not have treatments that cure nerve problems. Second toe pain could be related to altered mechanics and pressure transferred to the second toe. Delayed bone healing would lengthen the healing time. Some bones simply do not heal. This requires repeat surgery, electronic bone stimulation and/or extended protection. Smokers have an approximate 20% chance of poor bone healing. This is double that of a non-smoker. The bone cut may displace. This may need to be repaired with a second operation. Displacement can cause joint malalignment. Immobility after surgery can cause a blood clot in the legs and lungs. This could result in death.   Foot pain is complex. Most feet hurt for more than one reason. Operating on the arthritic   big toe joint will not necessarily create a pain free foot. Appropriate shoes, healthy body weight, avoidance of bare foot walking and moderation of activity will always be   necessary to enjoy foot comfort. Arthritis is incurable even with surgery.     Surgery for this type of arthritis is nevertheless quite successful. Most surgical patients are pleased with their foot following surgery. Many of the issues described above can be controlled by taking proper care of your foot during the healing process.   Cosmetic bump surgery is discouraged for the reasons listed above. A bump and joint that is comfortable when wearing appropriate shoes should simply be treated with appropriate shoes.   Your surgeon would be happy to fully describe any of the above issues. You should pursue a full understanding of the operation, recovery process and any potential problems that could develop.       Body Mass Index (BMI)  Many things can cause foot and ankle problems. Foot structure, activity level, foot mechanics and injuries are common causes of pain.  One very important issue that often goes unmentioned, is body weight.  Extra weight can cause increased stress on  muscles, ligaments, bones and tendons.  Sometimes just a few extra pounds is all it takes to put one over her/his threshold. Without reducing that stress, it can be difficult to alleviate pain. Some people are uncomfortable addressing this issue, but we feel it is important for you to think about it. As Foot &  Ankle specialists, our job is addressing the lower extremity problem and possible causes. Regarding extra body weight, we encourage patients to discuss diet and weight management plans with their primary care doctors. It is this team approach that gives you the best opportunity for pain relief and getting you back on your feet.                Follow-ups after your visit        Your next 10 appointments already scheduled     Nov 30, 2017 12:40 PM CST   (Arrive by 12:25 PM)   RETURN BARIATRIC SURGERY with Sam Schmidt MD   [facility] ([facility])    [address] [place] [address] [phone]            Nov 30, 2017  1:30 PM CST   NUTRITION VISIT with Meaghan Glass RD   [facility] ([facility])    [address] [place] [address] [phone]              Who to contact     If you have questions or need follow up information about today's clinic visit or your schedule please contact [facility] directly at [phone].  Normal or non-critical lab and imaging results will be communicated to you by MyChart, letter or phone within 4 business days after the clinic has received the results. If you do not hear from us within 7 days, please contact the clinic through MyChart or phone. If you have a critical or abnormal lab result, we will notify you by phone as soon as possible.  Submit refill requests through [employer] or call your pharmacy and they will forward the refill request to us. Please allow 3 business days for your refill to be  "completed.          Additional Information About Your Visit        [facility] Information     [facility] gives you secure access to your electronic health record. If you see a primary care provider, you can also send messages to your care team and make appointments. If you have questions, please call your primary care clinic.  If you do not have a primary care provider, please call [phone] and they will assist you.        Care EveryWhere ID     This is your Care EveryWhere ID. This could be used by other organizations to access your [place] medical records  [id number]        Your Vitals Were     Respirations Height BMI (Body Mass Index)             18 5' 4\" (1.626 m) 43.43 kg/m2          Blood Pressure from Last 3 Encounters:   08/31/17 131/87   08/03/17 126/74   07/26/17 154/60    Weight from Last 3 Encounters:   09/07/17 253 lb (114.8 kg)   08/31/17 253 lb 4.8 oz (114.9 kg)   08/03/17 266 lb 6.4 oz (120.8 kg)              Today, you had the following     No orders found for display       Primary Care Provider Office Phone # Fax #    Karena August DANIELE [facility] [phone] [address] [address]        Equal Access to Services     FRANCO OLMSTEAD AH: Hadii aad ku hadasho Soomaali, waaxda luqadaha, qaybta kaalmada adeegyada, waxay idiin hayrachn fernie medina lashala . So [facility] [phone].    ATENCIÓN: Si habla español, tiene a flynn disposición servicios gratuitos de asistencia lingüística. Llame al [phone].    We comply with applicable federal civil rights laws and [place] laws. We do not discriminate on the basis of race, color, national origin, age, disability sex, sexual orientation or gender identity.            Thank you!     Thank you for choosing [facility]  for your care. Our goal is always to provide you with excellent care. Hearing back from our patients is one way we can continue to improve our services. Please take a few minutes to complete the " written survey that you may receive in the mail after your visit with us. Thank you!             Your Updated Medication List - Protect others around you: Learn how to safely use, store and throw away your medicines at www.disposemymeds.org.          This list is accurate as of: 9/7/17  8:46 AM.  Always use your most recent med list.                   Brand Name Dispense Instructions for use Diagnosis    acetaminophen 325 MG tablet    TYLENOL    30 tablet    Take 1 tablet (325 mg) by mouth every 6 hours as needed for mild pain    S/P laparoscopic sleeve gastrectomy       buPROPion 100 MG tablet    WELLBUTRIN    90 tablet    Take 1 tablet (100 mg) by mouth 3 times daily    S/P laparoscopic sleeve gastrectomy       etonogestrel-ethinyl estradiol 0.12-0.015 MG/24HR vaginal ring    NUVARING    1 each    PLACE 1 RING VAGINALLY EVERY 28 DAYS AS DIRECTED    Encounter for surveillance of vaginal ring hormonal contraceptive device       ondansetron 4 MG ODT tab    ZOFRAN-ODT    20 tablet    Take 1 tablet (4 mg) by mouth every 6 hours as needed for nausea or vomiting    S/P laparoscopic sleeve gastrectomy       oxyCODONE 5 MG IR tablet    ROXICODONE    30 tablet    Take 1-2 tablets (5-10 mg) by mouth every 4 hours as needed for moderate to severe pain    S/P laparoscopic sleeve gastrectomy       TOPAMAX PO      Take 200 mg by mouth daily

## 2017-09-07 NOTE — NURSING NOTE
"Chief Complaint   Patient presents with     Foot Problems     Right hallux joint pain x 5 years, seen for this last year, swelling       Initial Resp 18  Ht 5' 4\" (1.626 m)  Wt 253 lb (114.8 kg)  BMI 43.43 kg/m2 Estimated body mass index is 43.43 kg/(m^2) as calculated from the following:    Height as of this encounter: 5' 4\" (1.626 m).    Weight as of this encounter: 253 lb (114.8 kg).  Medication Reconciliation: complete  "

## 2017-09-07 NOTE — PROGRESS NOTES
"  ASSESSMENT/PLAN:  Encounter Diagnoses   Name Primary?     Arthralgia of right foot Yes     Hallux limitus, right      (M25.571) Arthralgia of right foot  (primary encounter diagnosis)  Plan: XR Joint Injection Small Right    The cause and nature of hallux limitus/1st metatarsophalangeal joint degenerative joint disease was discussed.  An informational handout was provided.      Conservative cares were reviewed:  1) Rigid-soled, supportive shoes to externally splint the joint;  2)  Avoidance of barefoot walking   3)  Activity modification  4) antiinflammatory measures such as ice, NSAIDS, and injection therapy.    We also reviewed surgical intervention. If pain continues to progress and limits activities, a 1st metatarsophalangeal joint fusion is an option.     Body mass index is 43.43 kg/(m^2).    Weight management plan: Patient was referred to their PCP to discuss a diet and exercise plan.      Kian Clement DPM, FACFAS, MS    [place] [facility] of Podiatry/Foot & Ankle Surgery      ____________________________________________________________________    HPI:         Chief Complaint: worsening pain, right big toe  Onset of problem: 5 years  Pain/ discomfort is described as:  Deep ache, throbbing  Ratin/10   Frequency:  constant    The pain is made worse with \"lots of walking\"  Previous treatment:I evaluated her for this a year ago. 1st metatarsophalangeal joint arthritis,  thought to be secondary to a fracture.  An image guided steroid injection provided almost a year of relief.    Recent bariatric surgery and 40# weight loss to date.  Increased activity and more pain. She hoped that weight loss would relieve her symptoms.    *  Past Medical History:   Diagnosis Date     Arthritis     BIG TOE     Cervical high risk HPV (human papillomavirus) test positive 2017     Cervical high risk HPV (human papillomavirus) test positive 06/10/2015     Depressive disorder     I have been on and off medication for " "the last several years     Endometriosis      Obese      EXAM:    Vitals: Resp 18  Ht 5' 4\" (1.626 m)  Wt 253 lb (114.8 kg)  BMI 43.43 kg/m2  BMI: Body mass index is 43.43 kg/(m^2).  Height: 5' 4\"    Constitutional/ general:  Pt is in no apparent distress, appears well-nourished.  Cooperative with history and physical exam.      Vascular:  Pedal pulses are palpable bilaterally for both the DP and PT arteries.  CFT < 3 sec.  No edema.  Pedal hair growth noted.      Neuro:  Alert and oriented x 3. Coordinated gait.  Light touch sensation is intact to the L4, L5, S1 distributions. No obvious deficits.  No evidence of neurological-based weakness, spasticity, or contracture in the lower extremities.      Derm: Normal texture and turgor.  No erythema, ecchymosis, or cyanosis.  No open lesions.      Musculoskeletal:    Lower extremity muscle strength is normal.  Patient is ambulatory without an assistive device or brace .  No gross deformities.  Pain on palpation: around the right hallux interphalangeal joint.  Pain with ROM. Today this is some note limitation in right hallux dorsiflexion.     Previous Radiographic Exam:  X-Ray Findings:  I personally reviewed the films.  Appears to be an old oblique fracture through the proximal phalange, right hallux.  This likely allowed for some displacement involving some of the head of the bone/articular surface.    Kian Clement DPM, FACMADISON, MS    [place] Department of Podiatry/Foot & Ankle Surgery              "

## 2017-09-07 NOTE — LETTER
"    2017         RE: Ashli Gamboa  [address] [place] [address]        Dear Colleague,    Thank you for referring your patient, Ashli Gamboa, to the [facility]. Please see a copy of my visit note below.      ASSESSMENT/PLAN:  Encounter Diagnoses   Name Primary?     Arthralgia of right foot Yes     Hallux limitus, right      (M25.571) Arthralgia of right foot  (primary encounter diagnosis)  Plan: XR Joint Injection Small Right    The cause and nature of hallux limitus/1st metatarsophalangeal joint degenerative joint disease was discussed.  An informational handout was provided.      Conservative cares were reviewed:  1) Rigid-soled, supportive shoes to externally splint the joint;  2)  Avoidance of barefoot walking   3)  Activity modification  4) antiinflammatory measures such as ice, NSAIDS, and injection therapy.    We also reviewed surgical intervention. If pain continues to progress and limits activities, a 1st metatarsophalangeal joint fusion is an option.     Body mass index is 43.43 kg/(m^2).    Weight management plan: Patient was referred to their PCP to discuss a diet and exercise plan.      Kian Clement DPM, FACFAS, MS    [place] [facility]/Foot & Ankle Surgery      ____________________________________________________________________    HPI:         Chief Complaint: worsening pain, right big toe  Onset of problem: 5 years  Pain/ discomfort is described as:  Deep ache, throbbing  Ratin/10   Frequency:  constant    The pain is made worse with \"lots of walking\"  Previous treatment:I evaluated her for this a year ago. 1st metatarsophalangeal joint arthritis,  thought to be secondary to a fracture.  An image guided steroid injection provided almost a year of relief.    Recent bariatric surgery and 40# weight loss to date.  Increased activity and more pain. She hoped that weight loss would relieve her symptoms.    *  Past Medical History:   Diagnosis " "Date     Arthritis     BIG TOE     Cervical high risk HPV (human papillomavirus) test positive 07/05/2017     Cervical high risk HPV (human papillomavirus) test positive 06/10/2015     Depressive disorder 2007    I have been on and off medication for the last several years     Endometriosis      Obese      EXAM:    Vitals: Resp 18  Ht 5' 4\" (1.626 m)  Wt 253 lb (114.8 kg)  BMI 43.43 kg/m2  BMI: Body mass index is 43.43 kg/(m^2).  Height: 5' 4\"    Constitutional/ general:  Pt is in no apparent distress, appears well-nourished.  Cooperative with history and physical exam.      Vascular:  Pedal pulses are palpable bilaterally for both the DP and PT arteries.  CFT < 3 sec.  No edema.  Pedal hair growth noted.      Neuro:  Alert and oriented x 3. Coordinated gait.  Light touch sensation is intact to the L4, L5, S1 distributions. No obvious deficits.  No evidence of neurological-based weakness, spasticity, or contracture in the lower extremities.      Derm: Normal texture and turgor.  No erythema, ecchymosis, or cyanosis.  No open lesions.      Musculoskeletal:    Lower extremity muscle strength is normal.  Patient is ambulatory without an assistive device or brace .  No gross deformities.  Pain on palpation: around the right hallux interphalangeal joint.  Pain with ROM. Today this is some note limitation in right hallux dorsiflexion.     Previous Radiographic Exam:  X-Ray Findings:  I personally reviewed the films.  Appears to be an old oblique fracture through the proximal phalange, right hallux.  This likely allowed for some displacement involving some of the head of the bone/articular surface.    Kian Clement DPM, FACFAS, MS    [place] [facility] Pod[facility]/Foot & Ankle Surgery                Again, thank you for allowing me to participate in the care of your patient.        Sincerely,        Kian Clement DPM    "

## 2017-09-07 NOTE — TELEPHONE ENCOUNTER
Reason for Call:  Other     Detailed comments: Patient is trying schedule an appt at the [facility]. They need orders faxed to them, will call back with fax number.    Phone Number Patient can be reached at: Home number on file [phone] (home)    Best Time: anytime    Can we leave a detailed message on this number? Not Applicable    Call taken on 9/7/2017 at 10:14 AM by Jeanie Whitley

## 2017-10-18 ENCOUNTER — APPOINTMENT (OUTPATIENT)
Dept: OBGYN | Facility: CLINIC | Age: 37
End: 2017-10-18
Payer: COMMERCIAL

## 2017-10-18 ENCOUNTER — OFFICE VISIT (OUTPATIENT)
Dept: OBGYN | Facility: CLINIC | Age: 37
End: 2017-10-18
Payer: COMMERCIAL

## 2017-10-18 ENCOUNTER — DOCUMENTATION ONLY (OUTPATIENT)
Dept: ORTHOPEDICS | Facility: CLINIC | Age: 37
End: 2017-10-18

## 2017-10-18 VITALS
SYSTOLIC BLOOD PRESSURE: 110 MMHG | BODY MASS INDEX: 38.93 KG/M2 | HEART RATE: 82 BPM | HEIGHT: 64 IN | DIASTOLIC BLOOD PRESSURE: 70 MMHG | WEIGHT: 228 LBS

## 2017-10-18 DIAGNOSIS — R87.810 CERVICAL HIGH RISK HPV (HUMAN PAPILLOMAVIRUS) TEST POSITIVE: Primary | ICD-10-CM

## 2017-10-18 PROCEDURE — 88305 TISSUE EXAM BY PATHOLOGIST: CPT | Performed by: OBSTETRICS & GYNECOLOGY

## 2017-10-18 PROCEDURE — 88175 CYTOPATH C/V AUTO FLUID REDO: CPT | Performed by: OBSTETRICS & GYNECOLOGY

## 2017-10-18 PROCEDURE — 57455 BIOPSY OF CERVIX W/SCOPE: CPT | Performed by: OBSTETRICS & GYNECOLOGY

## 2017-10-18 NOTE — PROGRESS NOTES
INDICATIONS:                                                    Is a pregnancy test required: No.  Was a consent obtained?  Yes    Ashli Gamboa, is a 37 year old female, who had a recent wnl pap.  HPV positive.  Yes prior history of abnormal pap. Here today for colposcopy. Discussed indication, risks of infection and bleeding.    Her last pap was   Lab Results   Component Value Date    PAP NIL 07/05/2017    .    PROCEDURE:                                                      Cervix is stained with acetic acid and viewed colposcopically. Squamocolumnar junction is visualized in it's entirity. visible lesion(s) at 6 o'clock . Biopsy done Yes. Endocervical curretage Not Done         POST PROCEDURE:                                                      IMPRESSION: jerri    PLAN : Await the results of the biopsies. She  tolerated the procedure well. There were no complications. Patient was discharged in stable condition.    Patient advised to call the clinic if excessive bleeding, pelvic pain, or fever.     Follow-up plan based on pathology results.    Sbaas Patel MD

## 2017-10-18 NOTE — MR AVS SNAPSHOT
After Visit Summary   10/18/2017    Ashli Gamboa    MRN: [id number]           Patient Information     Date Of Birth          1980        Visit Information        Provider Department      10/18/2017 10:45 AM Sabas Patel MD [facility]        Today's Diagnoses     Cervical high risk HPV (human papillomavirus) test positive    -  1       Follow-ups after your visit        Your next 10 appointments already scheduled     Nov 30, 2017 12:40 PM CST   (Arrive by 12:25 PM)   RETURN BARIATRIC SURGERY with Sam Schmidt MD   [facility] ([facility])    [address] [place] [id number] [phone]            Nov 30, 2017  1:30 PM CST   NUTRITION VISIT with Meaghan Glass RD   [facility] ([facility])    [address] [place] [id number] [phone]              Who to contact     If you have questions or need follow up information about today's clinic visit or your schedule please contact [facility] directly at [phone].  Normal or non-critical lab and imaging results will be communicated to you by MyChart, letter or phone within 4 business days after the clinic has received the results. If you do not hear from us within 7 days, please contact the clinic through [email or web address] or phone. If you have a critical or abnormal lab result, we will notify you by phone as soon as possible.  Submit refill requests through [employer] or call your pharmacy and they will forward the refill request to us. Please allow 3 business days for your refill to be completed.          Additional Information About Your Visit        MyChart Information     [employer] gives you secure access to your electronic health record. If you see a primary care provider, you can also send messages to your care team and make appointments. If  "you have questions, please call your primary care clinic.  If you do not have a primary care provider, please call [phone] and they will assist you.        Care EveryWhere ID     This is your Care EveryWhere ID. This could be used by other organizations to access your [place] medical records  [id number]        Your Vitals Were     Pulse Height BMI (Body Mass Index)             82 5' 4\" (1.626 m) 39.14 kg/m2          Blood Pressure from Last 3 Encounters:   10/18/17 110/70   08/31/17 131/87   08/03/17 126/74    Weight from Last 3 Encounters:   10/18/17 228 lb (103.4 kg)   09/07/17 253 lb (114.8 kg)   08/31/17 253 lb 4.8 oz (114.9 kg)              We Performed the Following     COLPOSCOPY CERVIX/UPPER VAGINA W BX CERVIX     PAP imaged thin layer, diagnostic     Surgical pathology exam        Primary Care Provider Office Phone # Fax #    Karena Aragonlett, APRN [facility] [phone] [address] [facility] [address]        Equal Access to Services     [facility] AYSHA : Hadii aad ku hadasho Soomaali, waaxda luqadaha, qaybta kaalmada sung, lynne warren . So [facility] [phone].    ATENCIÓN: Si habla español, tiene a flynn disposición servicios gratuitos de asistencia lingüística. [facility] [phone].    We comply with applicable federal civil rights laws and [place] laws. We do not discriminate on the basis of race, color, national origin, age, disability, sex, sexual orientation, or gender identity.            Thank you!     Thank you for choosing [facility]  for your care. Our goal is always to provide you with excellent care. Hearing back from our patients is one way we can continue to improve our services. Please take a few minutes to complete the written survey that you may receive in the mail after your visit with us. Thank you!             Your Updated Medication List - Protect others around you: Learn how to safely use, store and " throw away your medicines at www.disposemymeds.org.          This list is accurate as of: 10/18/17 11:10 AM.  Always use your most recent med list.                   Brand Name Dispense Instructions for use Diagnosis    buPROPion 100 MG tablet    WELLBUTRIN    90 tablet    Take 1 tablet (100 mg) by mouth 3 times daily    S/P laparoscopic sleeve gastrectomy       etonogestrel-ethinyl estradiol 0.12-0.015 MG/24HR vaginal ring    NUVARING    1 each    PLACE 1 RING VAGINALLY EVERY 28 DAYS AS DIRECTED    Encounter for surveillance of vaginal ring hormonal contraceptive device       TOPAMAX PO      Take 200 mg by mouth daily

## 2017-10-20 LAB
COPATH REPORT: NORMAL
COPATH REPORT: NORMAL
PAP: NORMAL

## 2017-10-25 ENCOUNTER — TELEPHONE (OUTPATIENT)
Dept: NURSING | Facility: CLINIC | Age: 37
End: 2017-10-25

## 2017-10-25 NOTE — TELEPHONE ENCOUNTER
Pt requested surgical path results. Pt informed. Pt also states she is having some brownish discharge. Has to wear a pad, changing once per day. Pt informed this is normal.

## 2017-11-05 DIAGNOSIS — Z30.44 ENCOUNTER FOR SURVEILLANCE OF VAGINAL RING HORMONAL CONTRACEPTIVE DEVICE: ICD-10-CM

## 2017-11-07 RX ORDER — ETONOGESTREL/ETHINYL ESTRADIOL .12-.015MG
RING, VAGINAL VAGINAL
Refills: 0 | OUTPATIENT
Start: 2017-11-07

## 2017-11-07 NOTE — TELEPHONE ENCOUNTER
nuvaring 0.12-0.015      Last Written Prescription Date:  7/5/17  Last Fill Quantity: 1,   # refills: 3  Last Office Visit with [facility] primary care provider:  7/5/17  Future Office visit: none    Refill request too soon, Rx denied.

## 2017-11-16 DIAGNOSIS — Z30.44 ENCOUNTER FOR SURVEILLANCE OF VAGINAL RING HORMONAL CONTRACEPTIVE DEVICE: ICD-10-CM

## 2017-11-16 RX ORDER — ETONOGESTREL AND ETHINYL ESTRADIOL VAGINAL RING .015; .12 MG/D; MG/D
RING VAGINAL
Qty: 1 EACH | Refills: 3 | OUTPATIENT
Start: 2017-11-16

## 2017-11-16 NOTE — TELEPHONE ENCOUNTER
Fax received from pharmacy: [id number] - [place], [place] - [address] AT [facility] [address]    etonogestrel-ethinyl estradiol (NUVARING) 0.12-0.015 MG/24HR vaginal ring        Last Written Prescription Date: 7/5/17  Last Fill Quantity: 1,  # refills: 3   Last Office Visit with G, P or [facility] prescribing provider: 7/5/17 (Annual)

## 2017-11-27 DIAGNOSIS — Z30.44 ENCOUNTER FOR SURVEILLANCE OF VAGINAL RING HORMONAL CONTRACEPTIVE DEVICE: ICD-10-CM

## 2017-11-27 RX ORDER — ETONOGESTREL/ETHINYL ESTRADIOL .12-.015MG
RING, VAGINAL VAGINAL
Refills: 0 | Status: CANCELLED | OUTPATIENT
Start: 2017-11-27

## 2017-11-28 ENCOUNTER — TELEPHONE (OUTPATIENT)
Dept: OBGYN | Facility: CLINIC | Age: 37
End: 2017-11-28

## 2017-11-28 ENCOUNTER — NURSE TRIAGE (OUTPATIENT)
Dept: NURSING | Facility: CLINIC | Age: 37
End: 2017-11-28

## 2017-11-28 DIAGNOSIS — Z30.44 ENCOUNTER FOR SURVEILLANCE OF VAGINAL RING HORMONAL CONTRACEPTIVE DEVICE: ICD-10-CM

## 2017-11-28 RX ORDER — ETONOGESTREL AND ETHINYL ESTRADIOL VAGINAL RING .015; .12 MG/D; MG/D
RING VAGINAL
Qty: 3 EACH | Refills: 2 | Status: ON HOLD | OUTPATIENT
Start: 2017-11-28 | End: 2018-01-04

## 2017-11-28 NOTE — TELEPHONE ENCOUNTER
Ashli is requesting a refill for Nuva Ring and has been pending for three weeks.    Ashli states that she use to receive 3 at a time.  Please phone Ashli at   [phone].

## 2017-11-28 NOTE — TELEPHONE ENCOUNTER
Clinic Action Needed:  Yes, callback  [facility] Triage Call  Presenting Problem:    Ashli is requesting a refill for Nuva Ring and has been pending for three weeks.    Ashli states that she use to receive 3 at a time.  Please phone Ashli at   [phone].      Routed to:  RN Pool  Please be sure to close this encounter once this patient's issue/question has been addressed.    Leonora Hernandez RN/[facility]

## 2017-11-28 NOTE — TELEPHONE ENCOUNTER
Rx was sent for year supply on 7/5/17, pharmacy was only dispensing one month at a time. Rx sent with dispense amount of 3 for 3 rings instead of one box to avoid confusion.   LM informing pt.

## 2017-11-30 ENCOUNTER — ALLIED HEALTH/NURSE VISIT (OUTPATIENT)
Dept: SURGERY | Facility: CLINIC | Age: 37
End: 2017-11-30

## 2017-11-30 ENCOUNTER — OFFICE VISIT (OUTPATIENT)
Dept: SURGERY | Facility: CLINIC | Age: 37
End: 2017-11-30

## 2017-11-30 VITALS
SYSTOLIC BLOOD PRESSURE: 146 MMHG | HEART RATE: 81 BPM | WEIGHT: 203.7 LBS | OXYGEN SATURATION: 99 % | HEIGHT: 64 IN | BODY MASS INDEX: 34.78 KG/M2 | DIASTOLIC BLOOD PRESSURE: 85 MMHG

## 2017-11-30 DIAGNOSIS — Z98.84 S/P LAPAROSCOPIC SLEEVE GASTRECTOMY: Primary | ICD-10-CM

## 2017-11-30 DIAGNOSIS — K31.83 ACHLORHYDRIA: ICD-10-CM

## 2017-11-30 LAB
ALBUMIN SERPL-MCNC: 3.3 G/DL (ref 3.4–5)
ALP SERPL-CCNC: 66 U/L (ref 40–150)
ALT SERPL W P-5'-P-CCNC: 98 U/L (ref 0–50)
ANION GAP SERPL CALCULATED.3IONS-SCNC: 13 MMOL/L (ref 3–14)
AST SERPL W P-5'-P-CCNC: 38 U/L (ref 0–45)
BILIRUB SERPL-MCNC: 0.5 MG/DL (ref 0.2–1.3)
BUN SERPL-MCNC: 9 MG/DL (ref 7–30)
CALCIUM SERPL-MCNC: 9 MG/DL (ref 8.5–10.1)
CHLORIDE SERPL-SCNC: 104 MMOL/L (ref 94–109)
CHOLEST SERPL-MCNC: 167 MG/DL
CO2 SERPL-SCNC: 22 MMOL/L (ref 20–32)
CREAT SERPL-MCNC: 0.81 MG/DL (ref 0.52–1.04)
DEPRECATED CALCIDIOL+CALCIFEROL SERPL-MC: 38 UG/L (ref 20–75)
FERRITIN SERPL-MCNC: 90 NG/ML (ref 12–150)
GFR SERPL CREATININE-BSD FRML MDRD: 79 ML/MIN/1.7M2
GLUCOSE SERPL-MCNC: 69 MG/DL (ref 70–99)
HBA1C MFR BLD: 4.6 % (ref 4.3–6)
HDLC SERPL-MCNC: 47 MG/DL
HGB BLD-MCNC: 14.4 G/DL (ref 11.7–15.7)
LDLC SERPL CALC-MCNC: 97 MG/DL
NONHDLC SERPL-MCNC: 120 MG/DL
POTASSIUM SERPL-SCNC: 3.3 MMOL/L (ref 3.4–5.3)
PREALB SERPL IA-MCNC: 15 MG/DL (ref 15–45)
PROT SERPL-MCNC: 6.6 G/DL (ref 6.8–8.8)
PTH-INTACT SERPL-MCNC: 20 PG/ML (ref 12–72)
SODIUM SERPL-SCNC: 139 MMOL/L (ref 133–144)
TRIGL SERPL-MCNC: 118 MG/DL
TSH SERPL DL<=0.005 MIU/L-ACNC: 1.58 MU/L (ref 0.4–4)
VIT B12 SERPL-MCNC: 1096 PG/ML (ref 193–986)

## 2017-11-30 ASSESSMENT — PAIN SCALES - GENERAL: PAINLEVEL: NO PAIN (0)

## 2017-11-30 NOTE — PROGRESS NOTES
Nutrition Reassessment  Reason For Visit:  Ashli Gamboa is a 37 year old female presenting today for nutrition follow-up, ~4 month s/p SG with Dr. Schmidt. Pt referred by Dr. Schmidt (11/30/17).    Anthropometrics:  Initial Consult Weight: 291.8 lbs  Day of Surgery Weight (7/25/17): 275.6 lbs  Current Weight: 203.7 lbs   Weight loss: -49.6 lbs since 1 month post-op; -88.1 lbs from initial consult; -71.9 lbs from day of surgery    Nutrition History:  Current Vitamins/Minerals: MVI/minerals BID    *Pt reports feeling fatigued and cold.     Progress with Previous Goals:  1) Follow soft diet for 3 weeks, then to progress to bariatric regular diet. - Met.   2) Consume 60 grams of protein/day. - Met and continues.   3) Sip on 48-64 oz of fluids/day- between meals only. - Met and continues.   4) Eat slowly (>20 min/meal), chewing foods well (to applesauce-like consistency). - Met and continues.   5) Limit portions to 1/2 cup/meal. - Met and continues.   6)Take the following after a Sleeve Gastrectomy: - Continues. Pt is having a hard time with taking vitamins because she gets to full even when taking them several times a day. She is interested in trying vitamin patches.     Multivitamin/minerals: adult dose 2 times daily    Calcium Citrate containing vitamin D: 500 mg 3 times daily or 600 mg 2 times daily    Vitamin B12: sublingual form of at least 500 mcg daily or injection of 1000 mcg monthly     Vitamin D3: 2,000 International Units     Nutrition Prescription:  Grams Protein: 60 (minimum)  Amount of Fluid: 48-64 oz    Nutrition Diagnosis  Previous: Food and nutrition-related knowledge deficit r/t lack of prior exposure to diet advancements beyond bariatric pureed diet aeb pt unable to verbalize full understanding of bariatric soft and regular consistency diets. - resolved.    Current: Obesity r/t long history of self-monitoring deficit and excessive energy intake aeb BMI >30.    Intervention  Materials/Education  provided: Reviewed goals. Praised pt on progress with goals and weight loss. Discussed vitamin patches and warned pt that there is very little evidence on vitamin patches keeping vitamin/mineral levels at normal levels after bariatric surgery. Pt is willing to see if vitamin patches work for her because she prefers not to take her vitamins orally. Pt is having labs drawn today and would like labs drawn at 6 month post-op visit as well. Gave encouragement and support. Provided pt with list of goals and RD contact information.     Patient Understanding: Good  Expected Compliance: Good    Goals:  1) Follow soft diet for 3 weeks, then to progress to bariatric regular diet.   2) Consume 60 grams of protein/day.  3) Sip on 48-64 oz of fluids/day- between meals only.  4) Eat slowly (>20 min/meal), chewing foods well (to applesauce-like consistency).  5) Limit portions to 1/2 cup/meal.  6)Take the following after a Sleeve Gastrectomy:    Multivitamin/minerals: adult dose 2 times daily    Calcium Citrate containing vitamin D: 500 mg 3 times daily or 600 mg 2 times daily    Vitamin B12: sublingual form of at least 500 mcg daily or injection of 1000 mcg monthly     Vitamin D3: 2,000 International Units   *Okay to try vitamin Patches.        Follow-Up: prn    Time spent with patient: 30 minutes    Meaghan Glass RD, LD  Pager: [phone]

## 2017-11-30 NOTE — PROGRESS NOTES
Return Bariatric Surgery Note    RE: Ashli Gamboa  MR#: [id number]  : 1980  VISIT DATE: 2017      I had the pleasure of seeing your patient, Ashli Gamboa, in my post-bariatric surgery assessment clinic.    CHIEF COMPLAINT: Post-bariatric surgery follow-up    HISTORY OF PRESENT ILLNESS:  Questions Regarding Prior Weight Loss Surgery Reviewed With Patient 2017   I had the following weight loss procedure: Sleeve Gastrectomy   What year was your surgery? 2017   How has your weight changed since your last visit? I have lost weight   Are you currently taking any weight loss medications? No   Do you currently have any of the following: None of the above   Have you been to the Emergency room since your last visit with us? No   Were you in the hospital since your last visit with us? No   Do you have any concerns today? cold and loose skin       Weight History:     2017   What is your highest lifetime weight? 290   What is your lowest weight since surgery? (In pounds) 201     Initial Weight: 114.9 kg (253 lb 4.8 oz)  Current Weight: Weight: 92.4 kg (203 lb 11.2 oz)  Cumulative weight loss (lbs): 49.6  Last Visits Weight: 114.9 kg (253 lb 4.8 oz)    Questions Regarding Co-Morbidities and Health Concerns Reviewed With Patient 2017   Pre-diabetes: Never   Diabetes II: Never   High Blood Pressure: Never   High cholesterol: Never   Heartburn/Reflux: Never   Are you taking daily medication for heartburn, acid reflux, or GERD (acid reflux disease)? -   Sleep apnea: Never   PCOS: Never   Back pain: Improved   Joint pain: Improved   Lower leg swelling: Never       Eating Habits 2017   How many meals do you eat per day? 3   Do you snack between meals? No   How much food are you eating at each meal? 1/2 cup to 1 cup   Are you able to separate your meals and liquids by at least 30 minutes? Yes   Are you able to avoid liquid calories? Sometimes       Exercise Questions Reviewed With  "Patient 11/30/2017   How often do you exercise? 1 to 2 times per week   What is the duration of your exercise (in minutes)? 15 Minutes   What types of exercise do you do? gym membership   What keeps you from being more active?  Lack of Time       Social History:      11/30/2017   Are you smoking? No   Are you drinking alcohol? No       Medications:  Current Outpatient Prescriptions   Medication     etonogestrel-ethinyl estradiol (NUVARING) 0.12-0.015 MG/24HR vaginal ring     Topiramate (TOPAMAX PO)     buPROPion (WELLBUTRIN) 100 MG tablet     No current facility-administered medications for this visit.          11/30/2017   Do you avoid NSAIDs such as (Ibuprofen, Aleve, Naproxen, Advil)?   Yes       ROS:  GI:      11/30/2017   Vomiting: No   Diarrhea: No   Constipation: No   Swallowing trouble: No   Abdominal pain: No   Heartburn: No   Rash in skin folds: Yes   Depression: No   Stress urinary incontinence No     Skin:   BAR RBS ROS - SKIN 11/30/2017   Rash in skin folds: Yes     Psych:      11/30/2017   Depression: No   Anxiety: No     Female Only:   BAR RBS ROS - FEMALE ONLY 11/30/2017   Female only: Loss of menstrual cycles, Irregular menstrual cycles       LABS/IMAGING/MEDICAL RECORDS REVIEW: N/A    PHYSICAL EXAMINATION:  /85 (BP Location: Left arm, Patient Position: Chair, Cuff Size: Adult Regular)  Pulse 81  Ht 1.626 m (5' 4\")  Wt 92.4 kg (203 lb 11.2 oz)  SpO2 99%  BMI 34.97 kg/m2   Abd benign    ASSESSMENT AND PLAN:      1. 3 months status post laparoscopic gastric sleeve  2. Obesity class II current BMI: Body mass index is 34.97 kg/(m^2).  3. Post surgical malabsorption:   Labs ordered per protocol.   Follow food plan per dietitian recommendations.   Continue taking recommended post-op vitamins.  4. Return to clinic in 3 months.    Sincerely,    Sam Schmidt MD  Surgery  [phone] (hospital )  [phone] (clinic nurses)    I spent a total of 15 minutes face to face with Ashli " during today's office visit. Over 50% of this time was spent counseling the patient and/or coordinating care.

## 2017-11-30 NOTE — PATIENT INSTRUCTIONS
Goals:  1) Follow soft diet for 3 weeks, then to progress to bariatric regular diet.   2) Consume 60 grams of protein/day.  3) Sip on 48-64 oz of fluids/day- between meals only.  4) Eat slowly (>20 min/meal), chewing foods well (to applesauce-like consistency).  5) Limit portions to 1/2 cup/meal.  6)Take the following after a Sleeve Gastrectomy:    Multivitamin/minerals: adult dose 2 times daily    Calcium Citrate containing vitamin D: 500 mg 3 times daily or 600 mg 2 times daily    Vitamin B12: sublingual form of at least 500 mcg daily or injection of 1000 mcg monthly     Vitamin D3: 2,000 International Units   *Okay to try vitamin Patches.     Stephani Glass RD, LD  Voicemail: [phone]  Appointments: [phone]

## 2017-11-30 NOTE — NURSING NOTE
"Chief Complaint   Patient presents with     RECHECK     F/u       Vitals:    11/30/17 1233   BP: 146/85   BP Location: Left arm   Patient Position: Chair   Cuff Size: Adult Regular   Pulse: 81   SpO2: 99%   Weight: 203 lb 11.2 oz   Height: 5' 4\"       Body mass index is 34.97 kg/(m^2).      Chasity Singh                          "

## 2017-11-30 NOTE — MR AVS SNAPSHOT
MRN:[id number]                      After Visit Summary   11/30/2017    Ashli Gamboa    MRN: [id number]           Visit Information        Provider Department      11/30/2017 1:30 PM Meaghan Glass RD [facility]        Your next 10 appointments already scheduled     Dec 06, 2017  4:30 PM CST   SHORT with Sabas Patel MD   [facility] ([facility])    [address] [place] [id number] [phone]              Care Instructions    Goals:  1) Follow soft diet for 3 weeks, then to progress to bariatric regular diet.   2) Consume 60 grams of protein/day.  3) Sip on 48-64 oz of fluids/day- between meals only.  4) Eat slowly (>20 min/meal), chewing foods well (to applesauce-like consistency).  5) Limit portions to 1/2 cup/meal.  6)Take the following after a Sleeve Gastrectomy:    Multivitamin/minerals: adult dose 2 times daily    Calcium Citrate containing vitamin D: 500 mg 3 times daily or 600 mg 2 times daily    Vitamin B12: sublingual form of at least 500 mcg daily or injection of 1000 mcg monthly     Vitamin D3: 2,000 International Units   *Okay to try vitamin Patches.     Stephani Glass RD, LD  Voicemail: [phone]  Appointments: [phone]           [employer] Information     [employer] gives you secure access to your electronic health record. If you see a primary care provider, you can also send messages to your care team and make appointments. If you have questions, please call your primary care clinic.  If you do not have a primary care provider, please call [phone] and they will assist you.      [employer] is an electronic gateway that provides easy, online access to your medical records. With [employer], you can request a clinic appointment, read your test results, renew a prescription or communicate with your care team.     To access your existing account, please contact your [facility]  [place] [facility] or call [phone] for assistance.        Care EveryWhere ID     This is your Care EveryWhere ID. This could be used by other organizations to access your [place] medical records  [id number]        Equal Access to Services     FRANCO OLMSTEAD : Hanna Wong, wacarine lamb, qanigel kaalmanarinder almaraz, lynne parmar. So [facility] [phone].    ATENCIÓN: Si habla español, tiene a flynn disposición servicios gratuitos de asistencia lingüística. Llame al [phone].    We comply with applicable federal civil rights laws and [place] laws. We do not discriminate on the basis of race, color, national origin, age, disability, sex, sexual orientation, or gender identity.

## 2017-11-30 NOTE — LETTER
2017       RE: Ashli Gamboa  [address] [place] [address]     Dear Colleague,    Thank you for referring your patient, Ashli Gamboa, to the [facility] SURGICAL WEIGHT MANAGEMENT at [facility]. Please see a copy of my visit note below.        Return Bariatric Surgery Note    RE: Ashli Gamboa  MR#: [id number]  : 1980  VISIT DATE: 2017      I had the pleasure of seeing your patient, Ashli Gamboa, in my post-bariatric surgery assessment clinic.    CHIEF COMPLAINT: Post-bariatric surgery follow-up    HISTORY OF PRESENT ILLNESS:  Questions Regarding Prior Weight Loss Surgery Reviewed With Patient 2017   I had the following weight loss procedure: Sleeve Gastrectomy   What year was your surgery? 2017   How has your weight changed since your last visit? I have lost weight   Are you currently taking any weight loss medications? No   Do you currently have any of the following: None of the above   Have you been to the Emergency room since your last visit with us? No   Were you in the hospital since your last visit with us? No   Do you have any concerns today? cold and loose skin       Weight History:     2017   What is your highest lifetime weight? 290   What is your lowest weight since surgery? (In pounds) 201     Initial Weight: 114.9 kg (253 lb 4.8 oz)  Current Weight: Weight: 92.4 kg (203 lb 11.2 oz)  Cumulative weight loss (lbs): 49.6  Last Visits Weight: 114.9 kg (253 lb 4.8 oz)    Questions Regarding Co-Morbidities and Health Concerns Reviewed With Patient 2017   Pre-diabetes: Never   Diabetes II: Never   High Blood Pressure: Never   High cholesterol: Never   Heartburn/Reflux: Never   Are you taking daily medication for heartburn, acid reflux, or GERD (acid reflux disease)? -   Sleep apnea: Never   PCOS: Never   Back pain: Improved   Joint pain: Improved   Lower leg swelling: Never       Eating Habits  "11/30/2017   How many meals do you eat per day? 3   Do you snack between meals? No   How much food are you eating at each meal? 1/2 cup to 1 cup   Are you able to separate your meals and liquids by at least 30 minutes? Yes   Are you able to avoid liquid calories? Sometimes       Exercise Questions Reviewed With Patient 11/30/2017   How often do you exercise? 1 to 2 times per week   What is the duration of your exercise (in minutes)? 15 Minutes   What types of exercise do you do? gym membership   What keeps you from being more active?  Lack of Time       Social History:      11/30/2017   Are you smoking? No   Are you drinking alcohol? No       Medications:  Current Outpatient Prescriptions   Medication     etonogestrel-ethinyl estradiol (NUVARING) 0.12-0.015 MG/24HR vaginal ring     Topiramate (TOPAMAX PO)     buPROPion (WELLBUTRIN) 100 MG tablet     No current facility-administered medications for this visit.          11/30/2017   Do you avoid NSAIDs such as (Ibuprofen, Aleve, Naproxen, Advil)?   Yes       ROS:  GI:      11/30/2017   Vomiting: No   Diarrhea: No   Constipation: No   Swallowing trouble: No   Abdominal pain: No   Heartburn: No   Rash in skin folds: Yes   Depression: No   Stress urinary incontinence No     Skin:   BAR RBS ROS - SKIN 11/30/2017   Rash in skin folds: Yes     Psych:      11/30/2017   Depression: No   Anxiety: No     Female Only:   BAR RBS ROS - FEMALE ONLY 11/30/2017   Female only: Loss of menstrual cycles, Irregular menstrual cycles       LABS/IMAGING/MEDICAL RECORDS REVIEW: N/A    PHYSICAL EXAMINATION:  /85 (BP Location: Left arm, Patient Position: Chair, Cuff Size: Adult Regular)  Pulse 81  Ht 1.626 m (5' 4\")  Wt 92.4 kg (203 lb 11.2 oz)  SpO2 99%  BMI 34.97 kg/m2   Abd benign    ASSESSMENT AND PLAN:      1. 3 months status post laparoscopic gastric sleeve  2. Obesity class II current BMI: Body mass index is 34.97 kg/(m^2).  3. Post surgical malabsorption:   Labs ordered per " protocol.   Follow food plan per dietitian recommendations.   Continue taking recommended post-op vitamins.  4. Return to clinic in 3 months.    Sincerely,    Sam Schmidt MD  Surgery  [phone] (hospital )  [phone] (clinic nurses)    I spent a total of 15 minutes face to face with Crystal during today's office visit. Over 50% of this time was spent counseling the patient and/or coordinating care.    Again, thank you for allowing me to participate in the care of your patient.      Sincerely,    Sam Schmidt MD

## 2017-11-30 NOTE — MR AVS SNAPSHOT
After Visit Summary   11/30/2017    Ashli Gamboa    MRN: [id number]           Patient Information     Date Of Birth          1980        Visit Information        Provider Department      11/30/2017 12:40 PM Sam Schmidt MD [facility] Surgical Weight Management        Today's Diagnoses     S/P laparoscopic sleeve gastrectomy    -  1    Achlorhydria           Follow-ups after your visit        Follow-up notes from your care team     Return in about 3 months (around 2/28/2018).      Your next 10 appointments already scheduled     Nov 30, 2017  1:30 PM CST   NUTRITION VISIT with ASHISH Almendarez [facility] Surgical Weight Management ([facility])    [address] [address] [phone]              Who to contact     Please call your clinic at [phone] to:    Ask questions about your health    Make or cancel appointments    Discuss your medicines    Learn about your test results    Speak to your doctor   If you have compliments or concerns about an experience at your clinic, or if you wish to file a complaint, please contact [facility] Patient Relations at [phone] or email us at [email or web address]         Additional Information About Your Visit        MyChart Information     DoubleUp gives you secure access to your electronic health record. If you see a primary care provider, you can also send messages to your care team and make appointments. If you have questions, please call your primary care clinic.  If you do not have a primary care provider, please call [phone] and they will assist you.      DoubleUp is an electronic gateway that provides easy, online access to your medical records. With DoubleUp, you can request a clinic appointment, read your test results, renew a prescription or communicate with your care team.     To access your existing account, please contact your  "[facility] or call [phone] for assistance.        Care EveryWhere ID     This is your Care EveryWhere ID. This could be used by other organizations to access your [place] medical records  [id number]        Your Vitals Were     Pulse Height Pulse Oximetry BMI (Body Mass Index)          81 5' 4\" 99% 34.97 kg/m2         Blood Pressure from Last 3 Encounters:   11/30/17 146/85   10/18/17 110/70   08/31/17 131/87    Weight from Last 3 Encounters:   11/30/17 203 lb 11.2 oz   10/18/17 228 lb   09/07/17 253 lb              We Performed the Following     Comprehensive metabolic panel [LAB17]     Ferritin [LAB68]     Hemoglobin A1c [LAB90]     Hemoglobin [ECQ911]     Lipid Profile [LAB18]     Parathyroid Hormone Intact [MVF034]     Prealbumin [BHQ318]     TSH     Vitamin A [CGS886]     Vitamin B1 (Thiamine) [DUQ271]     Vitamin B12 [LAB67]     Vitamin D [BGK358]        Primary Care Provider Office Phone # Fax #    Karena DANIELE [facility] [phone] [address] [facility] [address]        Equal Access to Services     FRANCO OLMSTEAD : Hadii aad ku hadasho Soomaali, waaxda luqadaha, qaybta kaalmada adeegyada, lynne parmar. So [facility] [phone].    ATENCIÓN: Si habla español, tiene a flynn disposición servicios gratuitos de asistencia lingüística. Genaro al [phone].    We comply with applicable federal civil rights laws and [place] laws. We do not discriminate on the basis of race, color, national origin, age, disability, sex, sexual orientation, or gender identity.            Thank you!     Thank you for choosing [facility] SURGICAL WEIGHT MANAGEMENT  for your care. Our goal is always to provide you with excellent care. Hearing back from our patients is one way we can continue to improve our services. Please take a few minutes to complete the written survey that you may receive in the mail after your visit with us. Thank you!   "           Your Updated Medication List - Protect others around you: Learn how to safely use, store and throw away your medicines at www.disposemymeds.org.          This list is accurate as of: 11/30/17  1:13 PM.  Always use your most recent med list.                   Brand Name Dispense Instructions for use Diagnosis    buPROPion 100 MG tablet    WELLBUTRIN    90 tablet    Take 1 tablet (100 mg) by mouth 3 times daily    S/P laparoscopic sleeve gastrectomy       etonogestrel-ethinyl estradiol 0.12-0.015 MG/24HR vaginal ring    NUVARING    3 each    PLACE 1 RING VAGINALLY EVERY 28 DAYS AS DIRECTED    Encounter for surveillance of vaginal ring hormonal contraceptive device       TOPAMAX PO      Take 200 mg by mouth daily

## 2017-12-01 ENCOUNTER — TRANSFERRED RECORDS (OUTPATIENT)
Dept: HEALTH INFORMATION MANAGEMENT | Facility: CLINIC | Age: 37
End: 2017-12-01

## 2017-12-04 LAB
ANNOTATION COMMENT IMP: NORMAL
RETINYL PALMITATE SERPL-MCNC: <0.02 MG/L (ref 0–0.1)
VIT A SERPL-MCNC: 0.39 MG/L (ref 0.3–1.2)
VIT B1 BLD-MCNC: 69 NMOL/L (ref 70–180)

## 2017-12-06 ENCOUNTER — OFFICE VISIT (OUTPATIENT)
Dept: OBGYN | Facility: CLINIC | Age: 37
End: 2017-12-06
Payer: COMMERCIAL

## 2017-12-06 ENCOUNTER — CARE COORDINATION (OUTPATIENT)
Dept: SURGERY | Facility: CLINIC | Age: 37
End: 2017-12-06

## 2017-12-06 VITALS
DIASTOLIC BLOOD PRESSURE: 70 MMHG | WEIGHT: 201.8 LBS | SYSTOLIC BLOOD PRESSURE: 112 MMHG | BODY MASS INDEX: 34.45 KG/M2 | HEIGHT: 64 IN

## 2017-12-06 DIAGNOSIS — M25.559 PAIN IN JOINT INVOLVING PELVIC REGION AND THIGH, UNSPECIFIED LATERALITY: Primary | ICD-10-CM

## 2017-12-06 PROCEDURE — 99213 OFFICE O/P EST LOW 20 MIN: CPT | Performed by: OBSTETRICS & GYNECOLOGY

## 2017-12-06 NOTE — MR AVS SNAPSHOT
After Visit Summary   12/6/2017    Ashli Gamboa    MRN: [id number]           Patient Information     Date Of Birth          1980        Visit Information        Provider Department      12/6/2017 4:30 PM Sabas Patel MD [facility]        Today's Diagnoses     Pain in joint involving pelvic region and thigh, unspecified laterality    -  1       Follow-ups after your visit        Future tests that were ordered for you today     Open Future Orders        Priority Expected Expires Ordered    US Transvaginal Non OB Routine  12/7/2018 12/6/2017            Who to contact     If you have questions or need follow up information about today's clinic visit or your schedule please contact [facility] directly at [phone].  Normal or non-critical lab and imaging results will be communicated to you by MedAvailhart, letter or phone within 4 business days after the clinic has received the results. If you do not hear from us within 7 days, please contact the clinic through MedAvailhart or phone. If you have a critical or abnormal lab result, we will notify you by phone as soon as possible.  Submit refill requests through [employer] or call your pharmacy and they will forward the refill request to us. Please allow 3 business days for your refill to be completed.          Additional Information About Your Visit        MyChart Information     [employer] gives you secure access to your electronic health record. If you see a primary care provider, you can also send messages to your care team and make appointments. If you have questions, please call your primary care clinic.  If you do not have a primary care provider, please call [phone] and they will assist you.        Care EveryWhere ID     This is your Care EveryWhere ID. This could be used by other organizations to access your [place] medical records  [id number]        Your Vitals Were     Height BMI (Body Mass  "Index)                5' 4\" (1.626 m) 34.64 kg/m2           Blood Pressure from Last 3 Encounters:   12/06/17 112/70   11/30/17 146/85   10/18/17 110/70    Weight from Last 3 Encounters:   12/06/17 201 lb 12.8 oz (91.5 kg)   11/30/17 203 lb 11.2 oz (92.4 kg)   10/18/17 228 lb (103.4 kg)               Primary Care Provider Office Phone # Fax #    Karena Mo, APRN [facility] [phone] [address] [facility] [address]        Equal Access to Services     FRANCO OLMSTEAD : Hadii catina Wong, waaxda adonis, qaybta kaalmada sung, lynne warren . So [facility] [phone].    ATENCIÓN: Si habla español, tiene a flynn disposición servicios gratuitos de asistencia lingüística. Llame al [phone].    We comply with applicable federal civil rights laws and [place] laws. We do not discriminate on the basis of race, color, national origin, age, disability, sex, sexual orientation, or gender identity.            Thank you!     Thank you for choosing [facility] [place]  for your care. Our goal is always to provide you with excellent care. Hearing back from our patients is one way we can continue to improve our services. Please take a few minutes to complete the written survey that you may receive in the mail after your visit with us. Thank you!             Your Updated Medication List - Protect others around you: Learn how to safely use, store and throw away your medicines at www.disposemymeds.org.          This list is accurate as of: 12/6/17  4:52 PM.  Always use your most recent med list.                   Brand Name Dispense Instructions for use Diagnosis    buPROPion 100 MG tablet    WELLBUTRIN    90 tablet    Take 1 tablet (100 mg) by mouth 3 times daily    S/P laparoscopic sleeve gastrectomy       etonogestrel-ethinyl estradiol 0.12-0.015 MG/24HR vaginal ring    NUVARING    3 each    PLACE 1 RING VAGINALLY EVERY 28 DAYS AS DIRECTED    " Encounter for surveillance of vaginal ring hormonal contraceptive device       MULTIPLE VITAMIN PO           TOPAMAX PO      Take 200 mg by mouth daily

## 2017-12-06 NOTE — PROGRESS NOTES
Pt wondering about results, there are some highs and lows that are concerning to her. Wondering what to due. Has been dealing with some light headiness. Some times feels like she needs a little sugar. Has been eating 3 meals and drinking plenty of water.     She is going to start vitamin patch next few days. She also wanted to let you know she is always cooled.     Patient notified per Yu DUFFY -     B12 high is ok.  Did she just have B12 injection? - she did not due injection but she tool her sublingual    B1 mild low. Start taking B Complex vitamin daily.  Is she vomiting? - no voimting    Protein level mildly low: increase protein intake in diet.     ALT mild elevation: Just recheck with PCP in 1-2 months.     Pt will follow up with primary. And started patch vitamin today.

## 2017-12-06 NOTE — PROGRESS NOTES
SUBJECTIVE:                                                   Ashli Gamboa is a 37 year old female who presents to clinic today for the following health issue(s):  Patient presents with:  Abdominal Pain: patient states in the last few months she has been having painful intercourse as well as abdominal pain and pressure        HPI: The patient is seen today for persistent and severe pelvic pain with intercourse. She is on the NuvaRing for continuous suppression. She had a gastric bypass earlier this year and has lost 90 pounds. She does not cycle because of the NuvaRing. There is no position that she can have intercourse and will without having severe pain.      No LMP recorded. Patient is not currently having periods (Reason: Birth Control)..   Patient is sexually active, No obstetric history on file..  Using Nuvaring for contraception.    reports that she quit smoking about 4 years ago. Her smoking use included Cigarettes. She started smoking about 10 years ago. She has a 2.50 pack-year smoking history. She has never used smokeless tobacco.      STD testing offered?  Declined    Health maintenance updated:  yes    Today's PHQ-2 Score:   PHQ-2 ( 1999 Pf[employer]) 4/3/2017   Q1: Little interest or pleasure in doing things 1   Q2: Feeling down, depressed or hopeless 1   PHQ-2 Score 2   Q1: Little interest or pleasure in doing things -   Q2: Feeling down, depressed or hopeless -   PHQ-2 Score -     Today's PHQ-9 Score:   PHQ-9 SCORE 7/5/2017   Total Score -   Total Score MyChart -   Total Score 3     Today's ALIDA-7 Score:   ALIDA-7 SCORE 7/5/2017   Total Score -   Total Score 3       Problem list and histories reviewed & adjusted, as indicated.  Additional history: as documented.    Patient Active Problem List   Diagnosis     Morbid obesity (H)     post traumatic Arthritis of big toe     Major depression, recurrent (H)     CARDIOVASCULAR SCREENING; LDL GOAL LESS THAN 160     Endometriosis     Fibromyalgia      Muscular deconditioning     Cervical high risk HPV (human papillomavirus) test positive     S/P laparoscopic sleeve gastrectomy     Past Surgical History:   Procedure Laterality Date     ABDOMEN SURGERY      laparoscopy X2     DILATE CERVIX, HYSTEROSCOPY, ABLATE ENDOMETRIUM NOVASURE, COMBINED N/A 11/17/2016    Procedure: COMBINED DILATE CERVIX, HYSTEROSCOPY, ABLATE ENDOMETRIUM NOVASURE;  Surgeon: Sabas Patel MD;  Location: [facility]     GYN SURGERY  dates above    myomectomy x2, laparoscopy x1 (soon to be 2)     LAPAROSCOPIC GASTRIC SLEEVE N/A 7/25/2017    Procedure: LAPAROSCOPIC GASTRIC SLEEVE;  Laparoscopic Sleeve Gastrectomy;  Surgeon: Sam Schmidt MD;  Location: [facility] OR     LAPAROSCOPIC LYSIS ADHESIONS  7/10/2014    Procedure: LAPAROSCOPIC LYSIS ADHESIONS;  Surgeon: Sabas Patel MD;  Location: [facility]     LASER CO2 LAPAROSCOPIC VAPORIZATION ENDOMETRIUM  7/10/2014    Procedure: LASER CO2 LAPAROSCOPIC VAPORIZATION ENDOMETRIUM;  Surgeon: Sabas Patel MD;  Location: [facility]     LASER CO2 LAPAROSCOPIC VAPORIZATION ENDOMETRIUM N/A 6/30/2015    Procedure: LASER CO2 LAPAROSCOPIC VAPORIZATION ENDOMETRIUM;  Surgeon: Sabas Patel MD;  Location: [facility]     LASER CO2 LAPAROSCOPY DIAGNOSTIC, LYSIS ADHESIONS, COMBINED N/A 6/30/2015    Procedure: COMBINED LASER CO2 LAPAROSCOPY DIAGNOSTIC, LYSIS ADHESIONS;  Surgeon: Sabas Patel MD;  Location: [facility]      Social History   Substance Use Topics     Smoking status: Former Smoker     Packs/day: 0.50     Years: 5.00     Types: Cigarettes     Start date: 1/2/2007     Quit date: 11/1/2013     Smokeless tobacco: Never Used      Comment: stopped for about 6 months now as of 5/23/13     Alcohol use 0.0 oz/week     0 Standard drinks or equivalent per week      Comment: occas      Problem (# of Occurrences) Relation (Name,Age of Onset)    Arthritis (1) Father    Breast Cancer (1) Maternal Grandmother    CANCER (1) Mother: Cervical     CEREBROVASCULAR DISEASE (1)  "Paternal Grandmother    Crohn Disease (1) Mother    Depression (4) Mother, Father, Brother, Sister    MENTAL ILLNESS (1) Brother: schizophrenia and bipolar    Obesity (1) Mother    Other - See Comments (1) Mother: Fibromyalgia    Ovarian Cancer (1) Mother: Cervical Cancer    Rheumatoid Arthritis (1) Father            Current Outpatient Prescriptions   Medication Sig     MULTIPLE VITAMIN PO      etonogestrel-ethinyl estradiol (NUVARING) 0.12-0.015 MG/24HR vaginal ring PLACE 1 RING VAGINALLY EVERY 28 DAYS AS DIRECTED     Topiramate (TOPAMAX PO) Take 200 mg by mouth daily     buPROPion (WELLBUTRIN) 100 MG tablet Take 1 tablet (100 mg) by mouth 3 times daily     No current facility-administered medications for this visit.      No Known Allergies    ROS:  12 point review of systems negative other than symptoms noted below.  Genitourinary: Painful Urination and Pelvic Pain    OBJECTIVE:     /70  Ht 5' 4\" (1.626 m)  Wt 201 lb 12.8 oz (91.5 kg)  BMI 34.64 kg/m2  Body mass index is 34.64 kg/(m^2).    Exam:  Constitutional:  Appearance: Well nourished, well developed alert, in no acute distress  Gastrointestinal:  Abdominal Examination:  Abdomen nontender to palpation, tone normal without rigidity or guarding, no masses present, umbilicus without lesions; Liver/Spleen:  No hepatomegaly present, liver nontender to palpation; Hernias:  No hernias present  Lymphatic: Lymph Nodes:  No other lymphadenopathy present  Skin:General Inspection:  No rashes present, no lesions present, no areas of discoloration; Genitalia and Groin:  No rashes present, no lesions present, no areas of discoloration, no masses present.  Neurologic/Psychiatric:  Mental Status:  Oriented X3   Pelvic Exam:  External Genitalia:     Normal appearance for age, no discharge present, no tenderness present, no inflammatory lesions present, color normal  Vagina:     Normal vaginal vault without central or paravaginal defects, no discharge present, no " inflammatory lesions present, no masses present  Bladder:     Nontender to palpation  Urethra:   Urethral Body:  Urethra palpation normal, urethra structural support normal   Urethral Meatus:  No erythema or lesions present  Cervix:     Appearance healthy, no lesions present, nontender to palpation, no bleeding present  Uterus:     Uterus: firm, normal sized and nontender, midplane in position.   Adnexa:     right adnexal tenderness present, no adnexal masses present  Perineum:     Perineum within normal limits, no evidence of trauma, no rashes or skin lesions present  Anus:     Anus within normal limits, no hemorrhoids present  Inguinal Lymph Nodes:     No lymphadenopathy present  Pubic Hair:     Normal pubic hair distribution for age  Genitalia and Groin:     No rashes present, no lesions present, no areas of discoloration, no masses present       In-Clinic Test Results:      ASSESSMENT/PLAN:                                                        37-year-old patient with pelvic pain and dyspareunia. I do not feel any masses or nodularity behind her cervix. Her NuvaRing is correctly placed. She doubly does have some tenderness. We will order an ultrasound and see her back to same day to discuss workup.          Sabas Patel MD  [facility] [place]

## 2017-12-07 ENCOUNTER — OFFICE VISIT (OUTPATIENT)
Dept: OBGYN | Facility: CLINIC | Age: 37
End: 2017-12-07
Payer: COMMERCIAL

## 2017-12-07 ENCOUNTER — RADIANT APPOINTMENT (OUTPATIENT)
Dept: ULTRASOUND IMAGING | Facility: CLINIC | Age: 37
End: 2017-12-07
Payer: COMMERCIAL

## 2017-12-07 VITALS — WEIGHT: 201 LBS | BODY MASS INDEX: 34.5 KG/M2

## 2017-12-07 DIAGNOSIS — M25.559 PAIN IN JOINT INVOLVING PELVIC REGION AND THIGH, UNSPECIFIED LATERALITY: ICD-10-CM

## 2017-12-07 DIAGNOSIS — M25.559 PAIN IN JOINT INVOLVING PELVIC REGION AND THIGH, UNSPECIFIED LATERALITY: Primary | ICD-10-CM

## 2017-12-07 PROCEDURE — 76830 TRANSVAGINAL US NON-OB: CPT | Performed by: OBSTETRICS & GYNECOLOGY

## 2017-12-07 PROCEDURE — 99214 OFFICE O/P EST MOD 30 MIN: CPT | Performed by: OBSTETRICS & GYNECOLOGY

## 2017-12-07 NOTE — PROGRESS NOTES
SUBJECTIVE:                                                   Ashli Gamboa is a 37 year old female who presents to clinic today for the following health issue(s):  Patient presents with:  Ultrasound: follow up ultrasound        HPI: The patient is a 37-year-old who is seen in follow-up of severe dyspareunia to the point of total avoidance.  She has a past history of endometriosis with at least 3 surgical procedures.  She has had an endometrial ablation and has no childbearing desire at this time.  She is here for ultrasound today.      No LMP recorded. Patient is not currently having periods (Reason: Birth Control)..   Patient is sexually active, No obstetric history on file..  Using NuvaRing for contraception.    reports that she quit smoking about 4 years ago. Her smoking use included Cigarettes. She started smoking about 10 years ago. She has a 2.50 pack-year smoking history. She has never used smokeless tobacco.      STD testing offered?  Declined    Health maintenance updated:  yes    Today's PHQ-2 Score:   PHQ-2 ( 1999 Pfizer) 4/3/2017   Q1: Little interest or pleasure in doing things 1   Q2: Feeling down, depressed or hopeless 1   PHQ-2 Score 2   Q1: Little interest or pleasure in doing things -   Q2: Feeling down, depressed or hopeless -   PHQ-2 Score -     Today's PHQ-9 Score:   PHQ-9 SCORE 7/5/2017   Total Score -   Total Score MyChart -   Total Score 3     Today's ALIDA-7 Score:   ALIDA-7 SCORE 7/5/2017   Total Score -   Total Score 3       Problem list and histories reviewed & adjusted, as indicated.  Additional history: as documented.    Patient Active Problem List   Diagnosis     Morbid obesity (H)     post traumatic Arthritis of big toe     Major depression, recurrent (H)     CARDIOVASCULAR SCREENING; LDL GOAL LESS THAN 160     Endometriosis     Fibromyalgia     Muscular deconditioning     Cervical high risk HPV (human papillomavirus) test positive     S/P laparoscopic sleeve gastrectomy      Past Surgical History:   Procedure Laterality Date     ABDOMEN SURGERY      laparoscopy X2     DILATE CERVIX, HYSTEROSCOPY, ABLATE ENDOMETRIUM NOVASURE, COMBINED N/A 11/17/2016    Procedure: COMBINED DILATE CERVIX, HYSTEROSCOPY, ABLATE ENDOMETRIUM NOVASURE;  Surgeon: Sabas Patel MD;  Location: [facility]     GYN SURGERY  dates above    myomectomy x2, laparoscopy x1 (soon to be 2)     LAPAROSCOPIC GASTRIC SLEEVE N/A 7/25/2017    Procedure: LAPAROSCOPIC GASTRIC SLEEVE;  Laparoscopic Sleeve Gastrectomy;  Surgeon: Sam Schmidt MD;  Location[facility]     LAPAROSCOPIC LYSIS ADHESIONS  7/10/2014    Procedure: LAPAROSCOPIC LYSIS ADHESIONS;  Surgeon: Sabas Patel MD;  Location: [facility]     LASER CO2 LAPAROSCOPIC VAPORIZATION ENDOMETRIUM  7/10/2014    Procedure: LASER CO2 LAPAROSCOPIC VAPORIZATION ENDOMETRIUM;  Surgeon: Sabas Patel MD;  Location: [facility]     LASER CO2 LAPAROSCOPIC VAPORIZATION ENDOMETRIUM N/A 6/30/2015    Procedure: LASER CO2 LAPAROSCOPIC VAPORIZATION ENDOMETRIUM;  Surgeon: Sabas Patel MD;  Location: [facility]     LASER CO2 LAPAROSCOPY DIAGNOSTIC, LYSIS ADHESIONS, COMBINED N/A 6/30/2015    Procedure: COMBINED LASER CO2 LAPAROSCOPY DIAGNOSTIC, LYSIS ADHESIONS;  Surgeon: Sabas Patel MD;  Location: [facility]      Social History   Substance Use Topics     Smoking status: Former Smoker     Packs/day: 0.50     Years: 5.00     Types: Cigarettes     Start date: 1/2/2007     Quit date: 11/1/2013     Smokeless tobacco: Never Used     Alcohol use 0.0 oz/week     0 Standard drinks or equivalent per week      Comment: occas      Problem (# of Occurrences) Relation (Name,Age of Onset)    Arthritis (1) Father    Breast Cancer (1) Maternal Grandmother    CANCER (1) Mother: Cervical     CEREBROVASCULAR DISEASE (1) Paternal Grandmother    Crohn Disease (1) Mother    Depression (4) Mother, Father, Brother, Sister    MENTAL ILLNESS (1) Brother: schizophrenia and bipolar    Obesity (1) Mother    Other - See  Comments (1) Mother: Fibromyalgia    Ovarian Cancer (1) Mother: Cervical Cancer    Rheumatoid Arthritis (1) Father            Current Outpatient Prescriptions   Medication Sig     MULTIPLE VITAMIN PO      etonogestrel-ethinyl estradiol (NUVARING) 0.12-0.015 MG/24HR vaginal ring PLACE 1 RING VAGINALLY EVERY 28 DAYS AS DIRECTED     Topiramate (TOPAMAX PO) Take 200 mg by mouth daily     buPROPion (WELLBUTRIN) 100 MG tablet Take 1 tablet (100 mg) by mouth 3 times daily     No current facility-administered medications for this visit.      No Known Allergies    ROS:  12 point review of systems negative other than symptoms noted below.    OBJECTIVE:     Breastfeeding? No  There is no height or weight on file to calculate BMI.    Exam:  Constitutional:  Appearance: Well nourished, well developed alert, in no acute distress  No Pelvic Exam performed     In-Clinic Test Results:  Results for orders placed or performed in visit on 12/07/17   US Transvaginal Non OB    Narrative    US Transvaginal Non OB    Order #: [id number] Accession #: [id number]         Study Notes        Sharan Woodall on 12/7/2017  2:38 PM     Gynecological Ultrasound Report  Pelvic U/S - Transvaginal    [facility]  Referring Provider: DR HERNANDEZ  Sonographer:  SHARAN WOODALL  Indication: painful intercourse  LMP: No LMP recorded. Patient is not currently having periods (Reason:   Birth Control).  History: myomectomies and polypectomy, endometrial ablation  Gynecological Ultrasonography:   Uterus: mildly retroverted  Size: 5.98 x 4.15 x 4.23cm  Findings: Appears normal.   Endometrium: Cavity Thickness: 9.16 (a/p)   Findings: Minimal intracavity free fluid with no mass identified. Clear   free fluid at cervix but no mass noted. Vaginal canal appears normal.  Right Ovary: 2.44 x 0.86 x 1.31cm   Left Ovary: 2.12 x 0.89 x 1.13cm  Cul de Sac/Pouch of Reese: no ff      Impression: Relatively normal pelvic architecture.     SONOGRAPHER  NOTES TO READING PROVIDER:   Normal pelvic ultrasound.                Discussed here       ASSESSMENT/PLAN:                                                        The patient most likely has adenomyosis and recurrent endometriosis with pelvic adhesive disease.  We have discussed the surgical intervention and at this time I would strongly recommend an LAVH after cleaning up the pelvis.  We would try to retain both ovaries but at least one to retain hormonal support.  The patient is well aware of the risks and complications of the procedure and the disability from work.          Sabas Patel MD  [facility] [place]

## 2017-12-07 NOTE — MR AVS SNAPSHOT
After Visit Summary   12/7/2017    Ashli Gamboa    MRN: [id number]           Patient Information     Date Of Birth          1980        Visit Information        Provider Department      12/7/2017 3:00 PM Sabas Patel MD [facility] [place]        Today's Diagnoses     Pain in joint involving pelvic region and thigh, unspecified laterality    -  1       Follow-ups after your visit        Who to contact     If you have questions or need follow up information about today's clinic visit or your schedule please contact [facility] [place] directly at [phone].  Normal or non-critical lab and imaging results will be communicated to you by [employer], letter or phone within 4 business days after the clinic has received the results. If you do not hear from us within 7 days, please contact the clinic through [employer] or phone. If you have a critical or abnormal lab result, we will notify you by phone as soon as possible.  Submit refill requests through [employer] or call your pharmacy and they will forward the refill request to us. Please allow 3 business days for your refill to be completed.          Additional Information About Your Visit        MyChart Information     [employer] gives you secure access to your electronic health record. If you see a primary care provider, you can also send messages to your care team and make appointments. If you have questions, please call your primary care clinic.  If you do not have a primary care provider, please call [phone] and they will assist you.        Care EveryWhere ID     This is your Care EveryWhere ID. This could be used by other organizations to access your [place] medical records  [id number]        Your Vitals Were     Breastfeeding? BMI (Body Mass Index)                No 34.5 kg/m2           Blood Pressure from Last 3 Encounters:   12/06/17 112/70   11/30/17 146/85   10/18/17 110/70    Weight from Last 3 Encounters:    12/07/17 201 lb (91.2 kg)   12/06/17 201 lb 12.8 oz (91.5 kg)   11/30/17 203 lb 11.2 oz (92.4 kg)              Today, you had the following     No orders found for display       Primary Care Provider Office Phone # Fax #    DANIELE Osorio [phone] [address] [facility] [address]        Equal Access to Services     FRANCO OLMSTEAD : Hadii aad ku hadasho Soomaali, waaxda luqadaha, qaybta kaalmada adeegyada, waxay idiin hayaan adeeg kharash la'giancarlo . So [facility] [phone].    ATENCIÓN: Si habla español, tiene a flynn disposición servicios gratuitos de asistencia lingüística. [facility] [phone].    We comply with applicable federal civil rights laws and [place] laws. We do not discriminate on the basis of race, color, national origin, age, disability, sex, sexual orientation, or gender identity.            Thank you!     Thank you for choosing [facility] [place]  for your care. Our goal is always to provide you with excellent care. Hearing back from our patients is one way we can continue to improve our services. Please take a few minutes to complete the written survey that you may receive in the mail after your visit with us. Thank you!             Your Updated Medication List - Protect others around you: Learn how to safely use, store and throw away your medicines at www.disposemymeds.org.          This list is accurate as of: 12/7/17  3:18 PM.  Always use your most recent med list.                   Brand Name Dispense Instructions for use Diagnosis    buPROPion 100 MG tablet    WELLBUTRIN    90 tablet    Take 1 tablet (100 mg) by mouth 3 times daily    S/P laparoscopic sleeve gastrectomy       etonogestrel-ethinyl estradiol 0.12-0.015 MG/24HR vaginal ring    NUVARING    3 each    PLACE 1 RING VAGINALLY EVERY 28 DAYS AS DIRECTED    Encounter for surveillance of vaginal ring hormonal contraceptive device       MULTIPLE VITAMIN PO           TOPAMAX PO       Take 200 mg by mouth daily

## 2017-12-08 ENCOUNTER — TELEPHONE (OUTPATIENT)
Dept: OBGYN | Facility: CLINIC | Age: 37
End: 2017-12-08

## 2017-12-08 NOTE — TELEPHONE ENCOUNTER
FROM [facility] AS NO SX REQUEST IN PT CHART    LSC C02 LASER LAVH BS  DX Dyspareunia HX Endometriosis    Patient surgery scheduled on 1/4/2018 at 7:20am Check in 5:30am  Location for surgery to performed:   Surgery Outpatient in a bed  Scheduled by Johanna 12/8/2017     Information Packet given :Yes: MAILED 12/8/2017    CPT codes given: Yes    42743   78388     Consents signed? N/A  Rep Informed :N/A    PREOP DATE :  12/26/2017  In Epic :Yes    On Spreadsheet :Yes    On Calendar EB  :Yes    In [place] Calendar RADHA  :No    Assist NONE   Assist in Epic NA  Assist Notified as needed :No     Aniyah Oropeza  Surgery Scheduler

## 2017-12-08 NOTE — TELEPHONE ENCOUNTER
LVM for pt that time has changed.    SX TIME 10:10am ARRIVAL TIME 8:10AM    LVM for pt requested CB to confirm    Aniyah Oropeza  Surgery Scheduler

## 2017-12-22 DIAGNOSIS — F33.41 RECURRENT MAJOR DEPRESSIVE DISORDER, IN PARTIAL REMISSION (H): ICD-10-CM

## 2017-12-22 NOTE — TELEPHONE ENCOUNTER
Requested Prescriptions   Pending Prescriptions Disp Refills     buPROPion (WELLBUTRIN XL) 300 MG 24 hr tablet [Pharmacy Med Name: BUPROPION XL 300MG TABLETS]  Last Written Prescription Date:  7/26/17  Last Fill Quantity: 90 tablet,  # refills: 2   Last Office Visit with [facility], [facility] or [facility] prescribing provider:  8/2/17   Future Office Visit:    Next 5 appointments (look out 90 days)     Dec 26, 2017  2:15 PM CST   SHORT with Sabas Patel MD   [facility]    [address] [address]   [phone]                90 tablet 0     Sig: TAKE 1 TABLET(300 MG) BY MOUTH EVERY MORNING    SSRIs Protocol Failed    12/22/2017 11:02 AM  PHQ-9 SCORE 6/6/2016 4/3/2017 7/5/2017   Total Score - - -   Total Score MyChart - - -   Total Score 4 7 3     ALIDA-7 SCORE 12/30/2015 7/5/2017   Total Score 1 (minimal anxiety) -   Total Score 1 3          Failed - Medication is NOT Bupropion    If the medication is Bupropion (Wellbutrin), and the patient is taking for smoking cessation; OK to refill.         Passed - PHQ-9 score less than 5 in past 6 months    The PHQ-9 criteria is meant to fail. It requires a PHQ-9 score review         Passed - Patient is age 18 or older       Passed - No active pregnancy on record       Passed - No positive pregnancy test in last 12 months       Passed - Recent (6 mo) or future visit with authorizing provider's specialty    Patient had office visit in the last 6 months or has a visit in the next 30 days with authorizing provider.  See chart review.

## 2017-12-26 ENCOUNTER — OFFICE VISIT (OUTPATIENT)
Dept: OBGYN | Facility: CLINIC | Age: 37
End: 2017-12-26
Payer: COMMERCIAL

## 2017-12-26 VITALS — SYSTOLIC BLOOD PRESSURE: 108 MMHG | WEIGHT: 190 LBS | BODY MASS INDEX: 32.61 KG/M2 | DIASTOLIC BLOOD PRESSURE: 74 MMHG

## 2017-12-26 DIAGNOSIS — N80.9 ENDOMETRIOSIS: Primary | ICD-10-CM

## 2017-12-26 PROCEDURE — 99214 OFFICE O/P EST MOD 30 MIN: CPT | Performed by: OBSTETRICS & GYNECOLOGY

## 2017-12-26 NOTE — PROGRESS NOTES
SUBJECTIVE:                                                   Ashli Gamboa is a 37 year old female who presents to clinic today for the following health issue(s):  Patient presents with:  Pre-Op Exam      HPI: The patient is seen at this time for preoperative clearance for laparoscopy with CO2 laser, laparoscopic-assisted vaginal hysterectomy, possible bilateral salpingectomy, possible right oophorectomy.  The patient is healthy at this time and has had no colds or flus.  She is well appraised of the risks and complications of the surgery including general anesthesia blood loss infection injury to bowel bladder ureters and major vessels.      No LMP recorded. Patient is not currently having periods (Reason: Birth Control)..   Patient is sexually active, No obstetric history on file..  Using NuvaRing for contraception.    reports that she quit smoking about 4 years ago. Her smoking use included Cigarettes. She started smoking about 10 years ago. She has a 2.50 pack-year smoking history. She has never used smokeless tobacco.      STD testing offered?  Declined    Health maintenance updated:  yes    Today's PHQ-2 Score:   PHQ-2 ( 1999 Pfizer) 4/3/2017   Q1: Little interest or pleasure in doing things 1   Q2: Feeling down, depressed or hopeless 1   PHQ-2 Score 2   Q1: Little interest or pleasure in doing things -   Q2: Feeling down, depressed or hopeless -   PHQ-2 Score -     Today's PHQ-9 Score:   PHQ-9 SCORE 7/5/2017   Total Score -   Total Score MyChart -   Total Score 3     Today's ALIDA-7 Score:   ALIDA-7 SCORE 7/5/2017   Total Score -   Total Score 3       Problem list and histories reviewed & adjusted, as indicated.  Additional history: as documented.    Patient Active Problem List   Diagnosis     Morbid obesity (H)     post traumatic Arthritis of big toe     Major depression, recurrent (H)     CARDIOVASCULAR SCREENING; LDL GOAL LESS THAN 160     Endometriosis     Fibromyalgia     Muscular deconditioning      Cervical high risk HPV (human papillomavirus) test positive     S/P laparoscopic sleeve gastrectomy     Past Surgical History:   Procedure Laterality Date     ABDOMEN SURGERY      laparoscopy X2     DILATE CERVIX, HYSTEROSCOPY, ABLATE ENDOMETRIUM NOVASURE, COMBINED N/A 11/17/2016    Procedure: COMBINED DILATE CERVIX, HYSTEROSCOPY, ABLATE ENDOMETRIUM NOVASURE;  Surgeon: Sabas Patel MD;  Location: [facility]     GYN SURGERY  dates above    myomectomy x2, laparoscopy x1 (soon to be 2)     LAPAROSCOPIC GASTRIC SLEEVE N/A 7/25/2017    Procedure: LAPAROSCOPIC GASTRIC SLEEVE;  Laparoscopic Sleeve Gastrectomy;  Surgeon: Sam Schmidt MD;  Location: [facility]     LAPAROSCOPIC LYSIS ADHESIONS  7/10/2014    Procedure: LAPAROSCOPIC LYSIS ADHESIONS;  Surgeon: Sabas Patel MD;  Location: [facility]     LASER CO2 LAPAROSCOPIC VAPORIZATION ENDOMETRIUM  7/10/2014    Procedure: LASER CO2 LAPAROSCOPIC VAPORIZATION ENDOMETRIUM;  Surgeon: Sabas Patel MD;  Location: [facility]     LASER CO2 LAPAROSCOPIC VAPORIZATION ENDOMETRIUM N/A 6/30/2015    Procedure: LASER CO2 LAPAROSCOPIC VAPORIZATION ENDOMETRIUM;  Surgeon: Sabas Patel MD;  Location: [facility]     LASER CO2 LAPAROSCOPY DIAGNOSTIC, LYSIS ADHESIONS, COMBINED N/A 6/30/2015    Procedure: COMBINED LASER CO2 LAPAROSCOPY DIAGNOSTIC, LYSIS ADHESIONS;  Surgeon: Sabas Patel MD;  Location: [facility]      Social History   Substance Use Topics     Smoking status: Former Smoker     Packs/day: 0.50     Years: 5.00     Types: Cigarettes     Start date: 1/2/2007     Quit date: 11/1/2013     Smokeless tobacco: Never Used     Alcohol use 0.0 oz/week     0 Standard drinks or equivalent per week      Comment: occas      Problem (# of Occurrences) Relation (Name,Age of Onset)    Arthritis (1) Father    Breast Cancer (1) Maternal Grandmother    CANCER (1) Mother: Cervical     CEREBROVASCULAR DISEASE (1) Paternal Grandmother    Crohn Disease (1) Mother    Depression (4) Mother, Father,  Brother, Sister    MENTAL ILLNESS (1) Brother: schizophrenia and bipolar    Obesity (1) Mother    Other - See Comments (1) Mother: Fibromyalgia    Ovarian Cancer (1) Mother: Cervical Cancer    Rheumatoid Arthritis (1) Father            Current Outpatient Prescriptions   Medication Sig     vitamin B complex with vitamin C (VITAMIN  B COMPLEX) TABS tablet Take 1 tablet by mouth daily     MULTIPLE VITAMIN PO      etonogestrel-ethinyl estradiol (NUVARING) 0.12-0.015 MG/24HR vaginal ring PLACE 1 RING VAGINALLY EVERY 28 DAYS AS DIRECTED     Topiramate (TOPAMAX PO) Take 200 mg by mouth daily     buPROPion (WELLBUTRIN) 100 MG tablet Take 1 tablet (100 mg) by mouth 3 times daily     No current facility-administered medications for this visit.      No Known Allergies    ROS:  12 point review of systems negative other than symptoms noted below.  Genitourinary: Painful [place] and Pelvic Pain    OBJECTIVE:     Breastfeeding? No  There is no height or weight on file to calculate BMI.    Exam:  Constitutional:  Appearance: Well nourished, well developed alert, in no acute distress  Chest:  Respiratory Effort:  Breathing unlabored  Cardiovascular: Heart: Auscultation:  Regular rate, normal rhythm, no murmurs present  Gastrointestinal:  Abdominal Examination:  Abdomen nontender to palpation, tone normal without rigidity or guarding, no masses present, umbilicus without lesions; Liver/Spleen:  No hepatomegaly present, liver nontender to palpation; Hernias:  No hernias present  Lymphatic: Lymph Nodes:  No other lymphadenopathy present  Skin:General Inspection:  No rashes present, no lesions present, no areas of discoloration; Genitalia and Groin:  No rashes present, no lesions present, no areas of discoloration, no masses present.  Neurologic/Psychiatric:  Mental Status:  Oriented X3   No Pelvic Exam performed     In-Clinic Test Results:      ASSESSMENT/PLAN:                                                      The patient is  cleared for general anesthesia for laparoscopy with CO2 laser, lap assisted vaginal hysterectomy, possible bilateral salpingectomy, possible right oophorectomy.          Sabas Patel MD  [facility] [place]

## 2017-12-26 NOTE — MR AVS SNAPSHOT
After Visit Summary   12/26/2017    Ashli Gamboa    MRN: [id number]           Patient Information     Date Of Birth          1980        Visit Information        Provider Department      12/26/2017 2:15 PM Sabas Patel MD [facility] [place]        Today's Diagnoses     Endometriosis    -  1       Follow-ups after your visit        Your next 10 appointments already scheduled     Jan 04, 2018   Procedure with Sabas Patel MD   [facility] (--)    [address]  [place] [address] [phone]              Who to contact     If you have questions or need follow up information about today's clinic visit or your schedule please [facility] at [phone].  Normal or non-critical lab and imaging results will be communicated to you by MyChart, letter or phone within 4 business days after the clinic has received the results. If you do not hear from us within 7 days, please contact the clinic through [email or web address] or phone. If you have a critical or abnormal lab result, we will notify you by phone as soon as possible.  Submit refill requests through [employer] or call your pharmacy and they will forward the refill request to us. Please allow 3 business days for your refill to be completed.          Additional Information About Your Visit        MyChart Information     [employer] gives you secure access to your electronic health record. If you see a primary care provider, you can also send messages to your care team and make appointments. If you have questions, please call your primary care clinic.  If you do not have a primary care provider, please call [phone] and they will assist you.        Care EveryWhere ID     This is your Care EveryWhere ID. This could be used by other organizations to access your [place] medical records  [id number]        Your Vitals Were     Breastfeeding? BMI (Body Mass Index)                 No 32.61 kg/m2           Blood Pressure from Last 3 Encounters:   12/26/17 108/74   12/06/17 112/70   11/30/17 146/85    Weight from Last 3 Encounters:   12/26/17 190 lb (86.2 kg)   12/07/17 201 lb (91.2 kg)   12/06/17 201 lb 12.8 oz (91.5 kg)              Today, you had the following     No orders found for display       Primary Care Provider Office Phone # Fax #    Karena Mo, APRN [phone] [address] [facility] [address]        Equal Access to Services     [facility] Hospital[facility]IO : Hadii catina Wong, wachinada adonis, qaybta kaalmada sung, lynne warren . So [facility] [phone].    ATENCIÓN: Si habla español, tiene a flynn disposición servicios gratuitos de asistencia lingüística. [facility] [phone].    We comply with applicable federal civil rights laws and [place] laws. We do not discriminate on the basis of race, color, national origin, age, disability, sex, sexual orientation, or gender identity.            Thank you!     Thank you for choosing [facility] [place]  for your care. Our goal is always to provide you with excellent care. Hearing back from our patients is one way we can continue to improve our services. Please take a few minutes to complete the written survey that you may receive in the mail after your visit with us. Thank you!             Your Updated Medication List - Protect others around you: Learn how to safely use, store and throw away your medicines at www.disposemymeds.org.          This list is accurate as of: 12/26/17  2:34 PM.  Always use your most recent med list.                   Brand Name Dispense Instructions for use Diagnosis    buPROPion 100 MG tablet    WELLBUTRIN    90 tablet    Take 1 tablet (100 mg) by mouth 3 times daily    S/P laparoscopic sleeve gastrectomy       etonogestrel-ethinyl estradiol 0.12-0.015 MG/24HR vaginal ring    NUVARING    3 each    PLACE 1 RING  VAGINALLY EVERY 28 DAYS AS DIRECTED    Encounter for surveillance of vaginal ring hormonal contraceptive device       MULTIPLE VITAMIN PO           TOPAMAX PO      Take 200 mg by mouth daily        vitamin B complex with vitamin C Tabs tablet      Take 1 tablet by mouth daily

## 2017-12-27 ENCOUNTER — TELEPHONE (OUTPATIENT)
Dept: NURSING | Facility: CLINIC | Age: 37
End: 2017-12-27

## 2017-12-27 RX ORDER — BUPROPION HYDROCHLORIDE 300 MG/1
TABLET ORAL
Qty: 90 TABLET | Refills: 0 | Status: ON HOLD | OUTPATIENT
Start: 2017-12-27 | End: 2018-01-04

## 2017-12-27 NOTE — TELEPHONE ENCOUNTER
Cinthia GUZMAN from [facility] called and stated that they last question on the FMLA paper work was not completed. They are looking for an answer to the last  Question that addresses frequency and duration of time off if pt should have another flare up. Cinthia will fax copy of [facility] paper work and needs only the last question answered. Routing to Tamara Villagran. Please review

## 2017-12-27 NOTE — TELEPHONE ENCOUNTER
Routing refill request to provider for review/approval because:  Medication last prescribed by provider writer does not recognize as [facility] provider, defer to PCP for authorization    Karena-Please sign if agree.    MA-Please contact patient to complete PHQ-9.    Thank you!  ARYAN BunchN, RN                PHQ-9 SCORE 6/6/2016 4/3/2017 7/5/2017   Total Score - - -   Total Score MyChart - - -   Total Score 4 7 3

## 2017-12-27 NOTE — TELEPHONE ENCOUNTER
Sent RightSignature[employer] message to pt with attached questionnaire. Also lm on  for call back.       Norbert Millan MA

## 2017-12-29 NOTE — H&P (VIEW-ONLY)
SUBJECTIVE:                                                   Ashli Gamboa is a 37 year old female who presents to clinic today for the following health issue(s):  Patient presents with:  Pre-Op Exam      HPI: The patient is seen at this time for preoperative clearance for laparoscopy with CO2 laser, laparoscopic-assisted vaginal hysterectomy, possible bilateral salpingectomy, possible right oophorectomy.  The patient is healthy at this time and has had no colds or flus.  She is well appraised of the risks and complications of the surgery including general anesthesia blood loss infection injury to bowel bladder ureters and major vessels.      No LMP recorded. Patient is not currently having periods (Reason: Birth Control)..   Patient is sexually active, No obstetric history on file..  Using NuvaRing for contraception.    reports that she quit smoking about 4 years ago. Her smoking use included Cigarettes. She started smoking about 10 years ago. She has a 2.50 pack-year smoking history. She has never used smokeless tobacco.      STD testing offered?  Declined    Health maintenance updated:  yes    Today's PHQ-2 Score:   PHQ-2 ( 1999 [employer]) 4/3/2017   Q1: Little interest or pleasure in doing things 1   Q2: Feeling down, depressed or hopeless 1   PHQ-2 Score 2   Q1: Little interest or pleasure in doing things -   Q2: Feeling down, depressed or hopeless -   PHQ-2 Score -     Today's PHQ-9 Score:   PHQ-9 SCORE 7/5/2017   Total Score -   Total Score MyChart -   Total Score 3     Today's ALIDA-7 Score:   ALIDA-7 SCORE 7/5/2017   Total Score -   Total Score 3       Problem list and histories reviewed & adjusted, as indicated.  Additional history: as documented.    Patient Active Problem List   Diagnosis     Morbid obesity (H)     post traumatic Arthritis of big toe     Major depression, recurrent (H)     CARDIOVASCULAR SCREENING; LDL GOAL LESS THAN 160     Endometriosis     Fibromyalgia     Muscular deconditioning      Cervical high risk HPV (human papillomavirus) test positive     S/P laparoscopic sleeve gastrectomy     Past Surgical History:   Procedure Laterality Date     ABDOMEN SURGERY      laparoscopy X2     DILATE CERVIX, HYSTEROSCOPY, ABLATE ENDOMETRIUM NOVASURE, COMBINED N/A 11/17/2016    Procedure: COMBINED DILATE CERVIX, HYSTEROSCOPY, ABLATE ENDOMETRIUM NOVASURE;  Surgeon: Sabas Patel MD;  Location: [facility]     GYN SURGERY  dates above    myomectomy x2, laparoscopy x1 (soon to be 2)     LAPAROSCOPIC GASTRIC SLEEVE N/A 7/25/2017    Procedure: LAPAROSCOPIC GASTRIC SLEEVE;  Laparoscopic Sleeve Gastrectomy;  Surgeon: Sam Schmidt MD;  Location: [facility]     LAPAROSCOPIC LYSIS ADHESIONS  7/10/2014    Procedure: LAPAROSCOPIC LYSIS ADHESIONS;  Surgeon: Sabas Patel MD;  Location: [facility]     LASER CO2 LAPAROSCOPIC VAPORIZATION ENDOMETRIUM  7/10/2014    Procedure: LASER CO2 LAPAROSCOPIC VAPORIZATION ENDOMETRIUM;  Surgeon: Sabas Patel MD;  Location: [facility]     LASER CO2 LAPAROSCOPIC VAPORIZATION ENDOMETRIUM N/A 6/30/2015    Procedure: LASER CO2 LAPAROSCOPIC VAPORIZATION ENDOMETRIUM;  Surgeon: Sabas Patel MD;  Location: [facility]     LASER CO2 LAPAROSCOPY DIAGNOSTIC, LYSIS ADHESIONS, COMBINED N/A 6/30/2015    Procedure: COMBINED LASER CO2 LAPAROSCOPY DIAGNOSTIC, LYSIS ADHESIONS;  Surgeon: Sabas Patel MD;  Location: [facility]      Social History   Substance Use Topics     Smoking status: Former Smoker     Packs/day: 0.50     Years: 5.00     Types: Cigarettes     Start date: 1/2/2007     Quit date: 11/1/2013     Smokeless tobacco: Never Used     Alcohol use 0.0 oz/week     0 Standard drinks or equivalent per week      Comment: occas      Problem (# of Occurrences) Relation (Name,Age of Onset)    Arthritis (1) Father    Breast Cancer (1) Maternal Grandmother    CANCER (1) Mother: Cervical     CEREBROVASCULAR DISEASE (1) Paternal Grandmother    Crohn Disease (1) Mother    Depression (4) Mother, Father,  Brother, Sister    MENTAL ILLNESS (1) Brother: schizophrenia and bipolar    Obesity (1) Mother    Other - See Comments (1) Mother: Fibromyalgia    Ovarian Cancer (1) Mother: Cervical Cancer    Rheumatoid Arthritis (1) Father            Current Outpatient Prescriptions   Medication Sig     vitamin B complex with vitamin C (VITAMIN  B COMPLEX) TABS tablet Take 1 tablet by mouth daily     MULTIPLE VITAMIN PO      etonogestrel-ethinyl estradiol (NUVARING) 0.12-0.015 MG/24HR vaginal ring PLACE 1 RING VAGINALLY EVERY 28 DAYS AS DIRECTED     Topiramate (TOPAMAX PO) Take 200 mg by mouth daily     buPROPion (WELLBUTRIN) 100 MG tablet Take 1 tablet (100 mg) by mouth 3 times daily     No current facility-administered medications for this visit.      No Known Allergies    ROS:  12 point review of systems negative other than symptoms noted below.  Genitourinary: Painful [place] and Pelvic Pain    OBJECTIVE:     Breastfeeding? No  There is no height or weight on file to calculate BMI.    Exam:  Constitutional:  Appearance: Well nourished, well developed alert, in no acute distress  Chest:  Respiratory Effort:  Breathing unlabored  Cardiovascular: Heart: Auscultation:  Regular rate, normal rhythm, no murmurs present  Gastrointestinal:  Abdominal Examination:  Abdomen nontender to palpation, tone normal without rigidity or guarding, no masses present, umbilicus without lesions; Liver/Spleen:  No hepatomegaly present, liver nontender to palpation; Hernias:  No hernias present  Lymphatic: Lymph Nodes:  No other lymphadenopathy present  Skin:General Inspection:  No rashes present, no lesions present, no areas of discoloration; Genitalia and Groin:  No rashes present, no lesions present, no areas of discoloration, no masses present.  Neurologic/Psychiatric:  Mental Status:  Oriented X3   No Pelvic Exam performed     In-Clinic Test Results:      ASSESSMENT/PLAN:                                                      The patient is  cleared for general anesthesia for laparoscopy with CO2 laser, lap assisted vaginal hysterectomy, possible bilateral salpingectomy, possible right oophorectomy.          Sabas Patel MD  [facility] [place]

## 2018-01-02 NOTE — TELEPHONE ENCOUNTER
Called and spoke with pt. Requests to have sent through Simplebooklet and will complete that way.     Sent with attached questionnaire.      Norbert Millan MA

## 2018-01-04 ENCOUNTER — ANESTHESIA (OUTPATIENT)
Dept: SURGERY | Facility: CLINIC | Age: 38
End: 2018-01-04
Payer: COMMERCIAL

## 2018-01-04 ENCOUNTER — SURGERY (OUTPATIENT)
Age: 38
End: 2018-01-04
Payer: COMMERCIAL

## 2018-01-04 ENCOUNTER — HOSPITAL ENCOUNTER (OUTPATIENT)
Facility: CLINIC | Age: 38
Discharge: HOME | End: 2018-01-06
Attending: OBSTETRICS & GYNECOLOGY | Admitting: OBSTETRICS & GYNECOLOGY
Payer: COMMERCIAL

## 2018-01-04 ENCOUNTER — ANESTHESIA EVENT (OUTPATIENT)
Dept: SURGERY | Facility: CLINIC | Age: 38
End: 2018-01-04
Payer: COMMERCIAL

## 2018-01-04 DIAGNOSIS — N80.9 ENDOMETRIOSIS: Primary | ICD-10-CM

## 2018-01-04 LAB — B-HCG SERPL-ACNC: <1 IU/L (ref 0–5)

## 2018-01-04 PROCEDURE — 71000013 ZZH RECOVERY PHASE 1 LEVEL 1 EA ADDTL HR: Performed by: OBSTETRICS & GYNECOLOGY

## 2018-01-04 PROCEDURE — 27210995 ZZH RX 272: Performed by: OBSTETRICS & GYNECOLOGY

## 2018-01-04 PROCEDURE — 58662 LAPAROSCOPY EXCISE LESIONS: CPT | Performed by: OBSTETRICS & GYNECOLOGY

## 2018-01-04 PROCEDURE — 40000936 ZZH STATISTIC OUTPATIENT (NON-OBS) NIGHT

## 2018-01-04 PROCEDURE — 27210794 ZZH OR GENERAL SUPPLY STERILE: Performed by: OBSTETRICS & GYNECOLOGY

## 2018-01-04 PROCEDURE — 37000009 ZZH ANESTHESIA TECHNICAL FEE, EACH ADDTL 15 MIN: Performed by: OBSTETRICS & GYNECOLOGY

## 2018-01-04 PROCEDURE — 25000128 H RX IP 250 OP 636: Performed by: OBSTETRICS & GYNECOLOGY

## 2018-01-04 PROCEDURE — 25000125 ZZHC RX 250: Performed by: OBSTETRICS & GYNECOLOGY

## 2018-01-04 PROCEDURE — 36000093 ZZH SURGERY LEVEL 4 1ST 30 MIN: Performed by: OBSTETRICS & GYNECOLOGY

## 2018-01-04 PROCEDURE — 84702 CHORIONIC GONADOTROPIN TEST: CPT | Performed by: OBSTETRICS & GYNECOLOGY

## 2018-01-04 PROCEDURE — 25000128 H RX IP 250 OP 636: Performed by: ANESTHESIOLOGY

## 2018-01-04 PROCEDURE — 25000132 ZZH RX MED GY IP 250 OP 250 PS 637: Performed by: OBSTETRICS & GYNECOLOGY

## 2018-01-04 PROCEDURE — 88307 TISSUE EXAM BY PATHOLOGIST: CPT | Mod: 26 | Performed by: OBSTETRICS & GYNECOLOGY

## 2018-01-04 PROCEDURE — 36415 COLL VENOUS BLD VENIPUNCTURE: CPT | Performed by: OBSTETRICS & GYNECOLOGY

## 2018-01-04 PROCEDURE — 71000012 ZZH RECOVERY PHASE 1 LEVEL 1 FIRST HR: Performed by: OBSTETRICS & GYNECOLOGY

## 2018-01-04 PROCEDURE — 37000008 ZZH ANESTHESIA TECHNICAL FEE, 1ST 30 MIN: Performed by: OBSTETRICS & GYNECOLOGY

## 2018-01-04 PROCEDURE — 25800025 ZZH RX 258: Performed by: OBSTETRICS & GYNECOLOGY

## 2018-01-04 PROCEDURE — 40000935 ZZH STATISTIC OUTPATIENT (NON-OBS) EVE

## 2018-01-04 PROCEDURE — 25000566 ZZH SEVOFLURANE, EA 15 MIN: Performed by: OBSTETRICS & GYNECOLOGY

## 2018-01-04 PROCEDURE — 58552 LAPARO-VAG HYST INCL T/O: CPT | Performed by: OBSTETRICS & GYNECOLOGY

## 2018-01-04 PROCEDURE — 25000125 ZZHC RX 250: Performed by: NURSE ANESTHETIST, CERTIFIED REGISTERED

## 2018-01-04 PROCEDURE — 88307 TISSUE EXAM BY PATHOLOGIST: CPT | Performed by: OBSTETRICS & GYNECOLOGY

## 2018-01-04 PROCEDURE — 25000128 H RX IP 250 OP 636: Performed by: NURSE ANESTHETIST, CERTIFIED REGISTERED

## 2018-01-04 PROCEDURE — 40000934 ZZH STATISTIC OUTPATIENT (NON-OBS) DAY

## 2018-01-04 PROCEDURE — 36000063 ZZH SURGERY LEVEL 4 EA 15 ADDTL MIN: Performed by: OBSTETRICS & GYNECOLOGY

## 2018-01-04 PROCEDURE — 40000170 ZZH STATISTIC PRE-PROCEDURE ASSESSMENT II: Performed by: OBSTETRICS & GYNECOLOGY

## 2018-01-04 RX ORDER — GLYCOPYRROLATE 0.2 MG/ML
INJECTION, SOLUTION INTRAMUSCULAR; INTRAVENOUS PRN
Status: DISCONTINUED | OUTPATIENT
Start: 2018-01-04 | End: 2018-01-04

## 2018-01-04 RX ORDER — ONDANSETRON 2 MG/ML
4 INJECTION INTRAMUSCULAR; INTRAVENOUS EVERY 6 HOURS PRN
Status: DISCONTINUED | OUTPATIENT
Start: 2018-01-04 | End: 2018-01-06 | Stop reason: HOSPADM

## 2018-01-04 RX ORDER — SODIUM CHLORIDE, SODIUM LACTATE, POTASSIUM CHLORIDE, CALCIUM CHLORIDE 600; 310; 30; 20 MG/100ML; MG/100ML; MG/100ML; MG/100ML
INJECTION, SOLUTION INTRAVENOUS CONTINUOUS PRN
Status: DISCONTINUED | OUTPATIENT
Start: 2018-01-04 | End: 2018-01-04

## 2018-01-04 RX ORDER — DEXAMETHASONE SODIUM PHOSPHATE 4 MG/ML
INJECTION, SOLUTION INTRA-ARTICULAR; INTRALESIONAL; INTRAMUSCULAR; INTRAVENOUS; SOFT TISSUE PRN
Status: DISCONTINUED | OUTPATIENT
Start: 2018-01-04 | End: 2018-01-04

## 2018-01-04 RX ORDER — SODIUM CHLORIDE, SODIUM LACTATE, POTASSIUM CHLORIDE, CALCIUM CHLORIDE 600; 310; 30; 20 MG/100ML; MG/100ML; MG/100ML; MG/100ML
INJECTION, SOLUTION INTRAVENOUS CONTINUOUS
Status: DISCONTINUED | OUTPATIENT
Start: 2018-01-04 | End: 2018-01-04 | Stop reason: HOSPADM

## 2018-01-04 RX ORDER — PROPOFOL 10 MG/ML
INJECTION, EMULSION INTRAVENOUS PRN
Status: DISCONTINUED | OUTPATIENT
Start: 2018-01-04 | End: 2018-01-04

## 2018-01-04 RX ORDER — LIDOCAINE HYDROCHLORIDE 20 MG/ML
INJECTION, SOLUTION INFILTRATION; PERINEURAL PRN
Status: DISCONTINUED | OUTPATIENT
Start: 2018-01-04 | End: 2018-01-04

## 2018-01-04 RX ORDER — CEFAZOLIN SODIUM 1 G
1 VIAL (EA) INJECTION SEE ADMIN INSTRUCTIONS
Status: DISCONTINUED | OUTPATIENT
Start: 2018-01-04 | End: 2018-01-04 | Stop reason: HOSPADM

## 2018-01-04 RX ORDER — NALOXONE HYDROCHLORIDE 0.4 MG/ML
.1-.4 INJECTION, SOLUTION INTRAMUSCULAR; INTRAVENOUS; SUBCUTANEOUS
Status: DISCONTINUED | OUTPATIENT
Start: 2018-01-04 | End: 2018-01-04 | Stop reason: HOSPADM

## 2018-01-04 RX ORDER — ONDANSETRON 2 MG/ML
4 INJECTION INTRAMUSCULAR; INTRAVENOUS EVERY 30 MIN PRN
Status: DISCONTINUED | OUTPATIENT
Start: 2018-01-04 | End: 2018-01-04 | Stop reason: HOSPADM

## 2018-01-04 RX ORDER — CEFAZOLIN SODIUM 2 G/100ML
2 INJECTION, SOLUTION INTRAVENOUS EVERY 8 HOURS
Status: COMPLETED | OUTPATIENT
Start: 2018-01-04 | End: 2018-01-05

## 2018-01-04 RX ORDER — BUPIVACAINE HYDROCHLORIDE 2.5 MG/ML
INJECTION, SOLUTION INFILTRATION; PERINEURAL PRN
Status: DISCONTINUED | OUTPATIENT
Start: 2018-01-04 | End: 2018-01-04 | Stop reason: HOSPADM

## 2018-01-04 RX ORDER — SENNOSIDES 8.6 MG
8.6 TABLET ORAL 2 TIMES DAILY PRN
Status: DISCONTINUED | OUTPATIENT
Start: 2018-01-04 | End: 2018-01-06 | Stop reason: HOSPADM

## 2018-01-04 RX ORDER — ETONOGESTREL AND ETHINYL ESTRADIOL VAGINAL RING .015; .12 MG/D; MG/D
1 RING VAGINAL
COMMUNITY
End: 2018-03-21

## 2018-01-04 RX ORDER — ONDANSETRON 4 MG/1
4 TABLET, ORALLY DISINTEGRATING ORAL EVERY 6 HOURS PRN
Status: DISCONTINUED | OUTPATIENT
Start: 2018-01-04 | End: 2018-01-06 | Stop reason: HOSPADM

## 2018-01-04 RX ORDER — PROPOFOL 10 MG/ML
INJECTION, EMULSION INTRAVENOUS CONTINUOUS PRN
Status: DISCONTINUED | OUTPATIENT
Start: 2018-01-04 | End: 2018-01-04

## 2018-01-04 RX ORDER — ONDANSETRON 2 MG/ML
INJECTION INTRAMUSCULAR; INTRAVENOUS PRN
Status: DISCONTINUED | OUTPATIENT
Start: 2018-01-04 | End: 2018-01-04

## 2018-01-04 RX ORDER — NALOXONE HYDROCHLORIDE 0.4 MG/ML
.1-.4 INJECTION, SOLUTION INTRAMUSCULAR; INTRAVENOUS; SUBCUTANEOUS
Status: DISCONTINUED | OUTPATIENT
Start: 2018-01-04 | End: 2018-01-06 | Stop reason: HOSPADM

## 2018-01-04 RX ORDER — OXYCODONE AND ACETAMINOPHEN 5; 325 MG/1; MG/1
1-2 TABLET ORAL EVERY 4 HOURS PRN
Qty: 36 TABLET | Refills: 0 | Status: SHIPPED | OUTPATIENT
Start: 2018-01-04 | End: 2018-01-18

## 2018-01-04 RX ORDER — ONDANSETRON 4 MG/1
4 TABLET, ORALLY DISINTEGRATING ORAL EVERY 30 MIN PRN
Status: DISCONTINUED | OUTPATIENT
Start: 2018-01-04 | End: 2018-01-04 | Stop reason: HOSPADM

## 2018-01-04 RX ORDER — LIDOCAINE HYDROCHLORIDE AND EPINEPHRINE 10; 10 MG/ML; UG/ML
INJECTION, SOLUTION INFILTRATION; PERINEURAL
Status: DISCONTINUED
Start: 2018-01-04 | End: 2018-01-04 | Stop reason: HOSPADM

## 2018-01-04 RX ORDER — CEFAZOLIN SODIUM 2 G/100ML
2 INJECTION, SOLUTION INTRAVENOUS
Status: COMPLETED | OUTPATIENT
Start: 2018-01-04 | End: 2018-01-04

## 2018-01-04 RX ORDER — OXYCODONE AND ACETAMINOPHEN 5; 325 MG/1; MG/1
1-2 TABLET ORAL EVERY 4 HOURS PRN
Status: DISCONTINUED | OUTPATIENT
Start: 2018-01-04 | End: 2018-01-06 | Stop reason: HOSPADM

## 2018-01-04 RX ORDER — ACETAMINOPHEN 325 MG/1
650 TABLET ORAL EVERY 6 HOURS PRN
Status: DISCONTINUED | OUTPATIENT
Start: 2018-01-04 | End: 2018-01-06 | Stop reason: HOSPADM

## 2018-01-04 RX ORDER — LIDOCAINE HYDROCHLORIDE AND EPINEPHRINE 10; 10 MG/ML; UG/ML
INJECTION, SOLUTION INFILTRATION; PERINEURAL PRN
Status: DISCONTINUED | OUTPATIENT
Start: 2018-01-04 | End: 2018-01-04 | Stop reason: HOSPADM

## 2018-01-04 RX ORDER — FENTANYL CITRATE 50 UG/ML
25-50 INJECTION, SOLUTION INTRAMUSCULAR; INTRAVENOUS
Status: DISCONTINUED | OUTPATIENT
Start: 2018-01-04 | End: 2018-01-04 | Stop reason: HOSPADM

## 2018-01-04 RX ORDER — LABETALOL HYDROCHLORIDE 5 MG/ML
INJECTION, SOLUTION INTRAVENOUS PRN
Status: DISCONTINUED | OUTPATIENT
Start: 2018-01-04 | End: 2018-01-04

## 2018-01-04 RX ORDER — NEOSTIGMINE METHYLSULFATE 1 MG/ML
VIAL (ML) INJECTION PRN
Status: DISCONTINUED | OUTPATIENT
Start: 2018-01-04 | End: 2018-01-04

## 2018-01-04 RX ORDER — HYDRALAZINE HYDROCHLORIDE 20 MG/ML
INJECTION INTRAMUSCULAR; INTRAVENOUS PRN
Status: DISCONTINUED | OUTPATIENT
Start: 2018-01-04 | End: 2018-01-04

## 2018-01-04 RX ORDER — KETOROLAC TROMETHAMINE 30 MG/ML
INJECTION, SOLUTION INTRAMUSCULAR; INTRAVENOUS PRN
Status: DISCONTINUED | OUTPATIENT
Start: 2018-01-04 | End: 2018-01-04

## 2018-01-04 RX ORDER — HYDROMORPHONE HYDROCHLORIDE 1 MG/ML
.3-.5 INJECTION, SOLUTION INTRAMUSCULAR; INTRAVENOUS; SUBCUTANEOUS EVERY 10 MIN PRN
Status: DISCONTINUED | OUTPATIENT
Start: 2018-01-04 | End: 2018-01-04 | Stop reason: HOSPADM

## 2018-01-04 RX ORDER — MAGNESIUM HYDROXIDE 1200 MG/15ML
LIQUID ORAL PRN
Status: DISCONTINUED | OUTPATIENT
Start: 2018-01-04 | End: 2018-01-04 | Stop reason: HOSPADM

## 2018-01-04 RX ORDER — FENTANYL CITRATE 50 UG/ML
INJECTION, SOLUTION INTRAMUSCULAR; INTRAVENOUS PRN
Status: DISCONTINUED | OUTPATIENT
Start: 2018-01-04 | End: 2018-01-04

## 2018-01-04 RX ORDER — PHYSOSTIGMINE SALICYLATE 1 MG/ML
1.2 INJECTION INTRAVENOUS
Status: DISCONTINUED | OUTPATIENT
Start: 2018-01-04 | End: 2018-01-04 | Stop reason: HOSPADM

## 2018-01-04 RX ORDER — FENTANYL CITRATE 50 UG/ML
25-50 INJECTION, SOLUTION INTRAMUSCULAR; INTRAVENOUS EVERY 5 MIN PRN
Status: DISCONTINUED | OUTPATIENT
Start: 2018-01-04 | End: 2018-01-04 | Stop reason: HOSPADM

## 2018-01-04 RX ORDER — MEPERIDINE HYDROCHLORIDE 25 MG/ML
12.5 INJECTION INTRAMUSCULAR; INTRAVENOUS; SUBCUTANEOUS
Status: DISCONTINUED | OUTPATIENT
Start: 2018-01-04 | End: 2018-01-04 | Stop reason: HOSPADM

## 2018-01-04 RX ORDER — PROCHLORPERAZINE MALEATE 5 MG
10 TABLET ORAL EVERY 6 HOURS PRN
Status: DISCONTINUED | OUTPATIENT
Start: 2018-01-04 | End: 2018-01-06 | Stop reason: HOSPADM

## 2018-01-04 RX ORDER — HYDROMORPHONE HYDROCHLORIDE 1 MG/ML
0.2 INJECTION, SOLUTION INTRAMUSCULAR; INTRAVENOUS; SUBCUTANEOUS
Status: DISCONTINUED | OUTPATIENT
Start: 2018-01-04 | End: 2018-01-06 | Stop reason: HOSPADM

## 2018-01-04 RX ADMIN — PROPOFOL 200 MCG/KG/MIN: 10 INJECTION, EMULSION INTRAVENOUS at 10:02

## 2018-01-04 RX ADMIN — DEXTROSE AND SODIUM CHLORIDE: 5; 450 INJECTION, SOLUTION INTRAVENOUS at 13:33

## 2018-01-04 RX ADMIN — Medication 0.2 MG: at 21:41

## 2018-01-04 RX ADMIN — FENTANYL CITRATE 50 MCG: 50 INJECTION, SOLUTION INTRAMUSCULAR; INTRAVENOUS at 10:02

## 2018-01-04 RX ADMIN — DEXAMETHASONE SODIUM PHOSPHATE 4 MG: 4 INJECTION, SOLUTION INTRA-ARTICULAR; INTRALESIONAL; INTRAMUSCULAR; INTRAVENOUS; SOFT TISSUE at 10:11

## 2018-01-04 RX ADMIN — OXYCODONE HYDROCHLORIDE AND ACETAMINOPHEN 2 TABLET: 5; 325 TABLET ORAL at 18:06

## 2018-01-04 RX ADMIN — LABETALOL HYDROCHLORIDE 10 MG: 5 INJECTION, SOLUTION INTRAVENOUS at 10:33

## 2018-01-04 RX ADMIN — CEFAZOLIN SODIUM 2 G: 2 INJECTION, SOLUTION INTRAVENOUS at 17:17

## 2018-01-04 RX ADMIN — HYDRALAZINE HYDROCHLORIDE 20 MG: 20 INJECTION INTRAMUSCULAR; INTRAVENOUS at 10:35

## 2018-01-04 RX ADMIN — LIDOCAINE HYDROCHLORIDE,EPINEPHRINE BITARTRATE 10 ML: 10; .01 INJECTION, SOLUTION INFILTRATION; PERINEURAL at 10:57

## 2018-01-04 RX ADMIN — CEFAZOLIN SODIUM 2 G: 2 INJECTION, SOLUTION INTRAVENOUS at 10:07

## 2018-01-04 RX ADMIN — SODIUM CHLORIDE 50 ML: 900 IRRIGANT IRRIGATION at 11:18

## 2018-01-04 RX ADMIN — HEPARIN SODIUM 1000 ML: 1000 INJECTION, SOLUTION INTRAVENOUS; SUBCUTANEOUS at 11:43

## 2018-01-04 RX ADMIN — SODIUM CHLORIDE, POTASSIUM CHLORIDE, SODIUM LACTATE AND CALCIUM CHLORIDE: 600; 310; 30; 20 INJECTION, SOLUTION INTRAVENOUS at 10:01

## 2018-01-04 RX ADMIN — LIDOCAINE HYDROCHLORIDE 70 MG: 20 INJECTION, SOLUTION INFILTRATION; PERINEURAL at 10:02

## 2018-01-04 RX ADMIN — NEOSTIGMINE METHYLSULFATE 4 MG: 1 INJECTION INTRAMUSCULAR; INTRAVENOUS; SUBCUTANEOUS at 11:32

## 2018-01-04 RX ADMIN — FENTANYL CITRATE 50 MCG: 50 INJECTION, SOLUTION INTRAMUSCULAR; INTRAVENOUS at 10:22

## 2018-01-04 RX ADMIN — MIDAZOLAM 2 MG: 1 INJECTION INTRAMUSCULAR; INTRAVENOUS at 10:02

## 2018-01-04 RX ADMIN — ONDANSETRON 4 MG: 2 INJECTION INTRAMUSCULAR; INTRAVENOUS at 11:16

## 2018-01-04 RX ADMIN — KETOROLAC TROMETHAMINE 30 MG: 30 INJECTION, SOLUTION INTRAMUSCULAR at 11:32

## 2018-01-04 RX ADMIN — PHENYLEPHRINE HYDROCHLORIDE 50 MCG: 10 INJECTION INTRAVENOUS at 11:14

## 2018-01-04 RX ADMIN — LABETALOL HYDROCHLORIDE 20 MG: 5 INJECTION, SOLUTION INTRAVENOUS at 10:35

## 2018-01-04 RX ADMIN — Medication 0.2 MG: at 15:08

## 2018-01-04 RX ADMIN — ONDANSETRON 4 MG: 2 INJECTION INTRAMUSCULAR; INTRAVENOUS at 12:32

## 2018-01-04 RX ADMIN — DEXMEDETOMIDINE HYDROCHLORIDE 8 MCG: 100 INJECTION, SOLUTION INTRAVENOUS at 10:12

## 2018-01-04 RX ADMIN — ROCURONIUM BROMIDE 30 MG: 10 INJECTION INTRAVENOUS at 10:02

## 2018-01-04 RX ADMIN — ROCURONIUM BROMIDE 10 MG: 10 INJECTION INTRAVENOUS at 10:33

## 2018-01-04 RX ADMIN — GLYCOPYRROLATE 0.6 MG: 0.2 INJECTION, SOLUTION INTRAMUSCULAR; INTRAVENOUS at 11:32

## 2018-01-04 RX ADMIN — OXYCODONE HYDROCHLORIDE AND ACETAMINOPHEN 2 TABLET: 5; 325 TABLET ORAL at 22:28

## 2018-01-04 RX ADMIN — Medication 0.2 MG: at 17:15

## 2018-01-04 RX ADMIN — PHENYLEPHRINE HYDROCHLORIDE 50 MCG: 10 INJECTION INTRAVENOUS at 11:10

## 2018-01-04 RX ADMIN — FENTANYL CITRATE 50 MCG: 50 INJECTION, SOLUTION INTRAMUSCULAR; INTRAVENOUS at 10:09

## 2018-01-04 RX ADMIN — PROPOFOL 200 MG: 10 INJECTION, EMULSION INTRAVENOUS at 10:02

## 2018-01-04 RX ADMIN — SODIUM CHLORIDE, POTASSIUM CHLORIDE, SODIUM LACTATE AND CALCIUM CHLORIDE: 600; 310; 30; 20 INJECTION, SOLUTION INTRAVENOUS at 11:21

## 2018-01-04 RX ADMIN — FENTANYL CITRATE 50 MCG: 50 INJECTION, SOLUTION INTRAMUSCULAR; INTRAVENOUS at 10:26

## 2018-01-04 RX ADMIN — DEXMEDETOMIDINE HYDROCHLORIDE 12 MCG: 100 INJECTION, SOLUTION INTRAVENOUS at 10:17

## 2018-01-04 RX ADMIN — Medication 0.5 MG: at 12:57

## 2018-01-04 RX ADMIN — Medication 0.2 MG: at 19:24

## 2018-01-04 RX ADMIN — BUPIVACAINE HYDROCHLORIDE 10 ML: 2.5 INJECTION, SOLUTION EPIDURAL; INFILTRATION; INTRACAUDAL; PERINEURAL at 11:43

## 2018-01-04 RX ADMIN — PHENYLEPHRINE HYDROCHLORIDE 100 MCG: 10 INJECTION INTRAVENOUS at 11:39

## 2018-01-04 RX ADMIN — LABETALOL HYDROCHLORIDE 10 MG: 5 INJECTION, SOLUTION INTRAVENOUS at 10:29

## 2018-01-04 RX ADMIN — OXYCODONE HYDROCHLORIDE AND ACETAMINOPHEN 1 TABLET: 5; 325 TABLET ORAL at 13:46

## 2018-01-04 NOTE — PLAN OF CARE
Problem: Patient Care Overview  Goal: Plan of Care/Patient Progress Review  Outcome: Improving  A&Ox4, drowsy. VSS on RA. Capno machine did not come to unit w/ pt, on continuous pulse ox. Pain 6/10, managed w/ 1 tab Percocet, declined ice. CLD ADAT. Denies nausea, n/t, dizziness, SOB. CMS intact. Bedrest and dangle feet at bedside until POD1. Ray patent, d/c POD1 at 0600.  in room w/ pt. IVF infusing. Continue to monitor.

## 2018-01-04 NOTE — IP AVS SNAPSHOT
[facility]    [address] [place] [id number]    Phone:  [phone]                                       After Visit Summary   1/4/2018    Ashli Gamboa    MRN: [id number]           After Visit Summary Signature Page     I have received my discharge instructions, and my questions have been answered. I have discussed any challenges I see with this plan with the nurse or doctor.    ..........................................................................................................................................  Patient/Patient Representative Signature      ..........................................................................................................................................  Patient Representative Print Name and Relationship to Patient    ..................................................               ................................................  Date                                            Time    ..........................................................................................................................................  Reviewed by Signature/Title    ...................................................              ..............................................  Date                                                            Time

## 2018-01-04 NOTE — ANESTHESIA CARE TRANSFER NOTE
Patient: Ashli Gamboa    Procedure(s):  LAPAROSCOPY WITH CO2 LASER LYSIS OF ADHESIONS AND VAPORIZATION OF ENDOMETRIOSIS, CYSTOTOMY LEFT OVARY, LAPAROSCOPIC VAGINAL HYSTERECTOMY WITH BILATERAL SALPINGECTOMY  - Wound Class: II-Clean Contaminated   - Wound Class: II-Clean Contaminated   - Wound Class: II-Clean Contaminated    Diagnosis: DYPURNIA AND HISTORY OF ENDOMETRIOSIS   Diagnosis Additional Information: No value filed.    Anesthesia Type:   General     Note:  Airway :Face Mask and Oral Airway  Patient transferred to:PACU  Comments: Pt exhibits spont resps, all monitors and alarms on in pacu, report given to RN, vss.Handoff Report: Identifed the Patient, Identified the Reponsible Provider, Reviewed the pertinent medical history, Discussed the surgical course, Reviewed Intra-OP anesthesia mangement and issues during anesthesia, Set expectations for post-procedure period and Allowed opportunity for questions and acknowledgement of understanding      Vitals: (Last set prior to Anesthesia Care Transfer)    CRNA VITALS  1/4/2018 1118 - 1/4/2018 1153      1/4/2018             Pulse: 73    SpO2: 98 %    Resp Rate (observed): 11    Resp Rate (set): 10                Electronically Signed By: DANIELE Chapin CRNA  January 4, 2018  11:53 AM

## 2018-01-04 NOTE — PROGRESS NOTES
Admission medication history interview status for the 1/4/2018  admission is complete. See EPIC admission navigator for prior to admission medications     Medication history source reliability:Good    Medication history interview source(s):Patient    Medication history resources (including written lists, pill bottles, clinic record):None    Primary pharmacy.[facility]    Additional medication history information not noted on PTA med list :None    Time spent in this activity: 45 minutes    Prior to Admission medications    Medication Sig Last Dose Taking? Auth Provider   BuPROPion HCl (WELLBUTRIN XL PO) Take 300 mg by mouth every morning 1/3/2018 at am Yes Reported, Patient   etonogestrel-ethinyl estradiol (NUVARING) 0.12-0.015 MG/24HR vaginal ring Place 1 each vaginally every 28 days IN PLACE Yes Reported, Patient   Misc Natural Products (DANDELION ROOT PO) Take 400 mg by mouth daily 1/3/2018 at AM Yes Reported, Patient   vitamin B complex with vitamin C (VITAMIN  B COMPLEX) TABS tablet Take 1 tablet by mouth daily 1/3/2018 at AM Yes Reported, Patient   MULTIPLE VITAMIN PO Take 1 patch by mouth daily PatchMD - Multivitamin Patch 1/3/2018 at Unknown time Yes Reported, Patient   Topiramate (TOPAMAX PO) Take 100 mg by mouth 2 times daily  1/3/2018 at PM Yes Reported, Patient

## 2018-01-04 NOTE — IP AVS SNAPSHOT
MRN:[id number]                      After Visit Summary   1/4/2018    Ashli Gamboa    MRN: [id number]           Thank you!     Thank you for choosing [facility] for your care. Our goal is always to provide you with excellent care. Hearing back from our patients is one way we can continue to improve our services. Please take a few minutes to complete the written survey that you may receive in the mail after you visit with us. Thank you!        Patient Information     Date Of Birth          1980        Designated Caregiver       Most Recent Value    Caregiver    Will someone help with your care after discharge? yes    Name of designated caregiver Sid/    Phone number of caregiver see demographics    Caregiver address see demographics      About your hospital stay     You were admitted on:  January 4, 2018 You last received care in the:  [facility]    You were discharged on:  January 6, 2018       Who to Call     For medical emergencies, please call 911.  For non-urgent questions about your medical care, please call your primary care provider or clinic, [phone]  For questions related to your surgery, please call your surgery clinic        Attending Provider     Provider Specialty    Sabas Patel MD OB/Gyn       Primary Care Provider Office Phone # Fax #    Karena MoDANIELE [facility] [phone]      After Care Instructions     Discharge Instructions       Patient to follow up with appointment in 2 weeks                  Further instructions from your care team       Same Day Surgery Discharge Instructions for  Sedation and General Anesthesia       It's not unusual to feel dizzy, light-headed or faint for up to 24 hours after surgery or while taking pain medication.  If you have these symptoms: sit for a few minutes before standing and have someone assist you when you get up to walk or use the bathroom.      You should rest and relax for  "the next 24 hours. We recommend you make arrangements to have an adult stay with you for at least 24 hours after your discharge.  Avoid hazardous and strenuous activity.      DO NOT DRIVE any vehicle or operate mechanical equipment for 24 hours following the end of your surgery.  Even though you may feel normal, your reactions may be affected by the medication you have received.      Do not drink alcoholic beverages for 24 hours following surgery.       Slowly progress to your regular diet as you feel able. It's not unusual to feel nauseated and/or vomit after receiving anesthesia.  If you develop these symptoms, drink clear liquids (apple juice, ginger ale, broth, 7-up, etc. ) until you feel better.  If your nausea and vomiting persists for 24 hours, please notify your surgeon.        All narcotic pain medications, along with inactivity and anesthesia, can cause constipation. Drinking plenty of liquids and increasing fiber intake will help.      For any questions of a medical nature, call your surgeon.      Do not make important decisions for 24 hours.      If you had general anesthesia, you may have a sore throat for a couple of days related to the breathing tube used during surgery.  You may use Cepacol lozenges to help with this discomfort.  If it worsens or if you develop a fever, contact your surgeon.       If you feel your pain is not well managed with the pain medications prescribed by your surgeon, please contact your surgeon's office to let them know so they can address your concerns.           Pending Results     No orders found from 1/2/2018 to 1/5/2018.            Admission Information     Date & Time Provider Department Dept. Phone    1/4/2018 Sabas Patel MD [facility] [phone]      Your Vitals Were     Blood Pressure Temperature Respirations Height Weight       118/69 (BP Location: Right arm) 98  F (36.7  C) (Oral) 16 1.626 m (5' 4\") 85 kg (187 lb 4.8 oz)     Pulse Oximetry " BMI (Body Mass Index)                98% 32.15 kg/m2          [employer] Information     [facility] gives you secure access to your electronic health record. If you see a primary care provider, you can also send messages to your care team and make appointments. If you have questions, please call your primary care clinic.  If you do not have a primary care provider, please call [phone] and they will assist you.        Care EveryWhere ID     This is your Care EveryWhere ID. This could be used by other organizations to access your [facility] medical records  [id number]        Equal Access to Services     [facility]: Hadmargo Wong, horace lamb, pasquale almaraz, lynne warren . So [facility] [phone].    ATENCIÓN: Si habla español, tiene a flynn disposición servicios gratuitos de asistencia lingüística. [facility] [phone].    We comply with applicable federal civil rights laws and [place] laws. We do not discriminate on the basis of race, color, national origin, age, disability, sex, sexual orientation, or gender identity.               Review of your medicines      START taking        Dose / Directions    oxyCODONE-acetaminophen 5-325 MG per tablet   Commonly known as:  PERCOCET   Used for:  Endometriosis        Dose:  1-2 tablet   Take 1-2 tablets by mouth every 4 hours as needed for pain (moderate to severe)   Quantity:  36 tablet   Refills:  0         CONTINUE these medicines which have NOT CHANGED        Dose / Directions    DANDELION ROOT PO        Dose:  400 mg   Take 400 mg by mouth daily   Refills:  0       etonogestrel-ethinyl estradiol 0.12-0.015 MG/24HR vaginal ring   Commonly known as:  NUVARING        Dose:  1 each   Place 1 each vaginally every 28 days   Refills:  0       MULTIPLE VITAMIN PO        Dose:  1 patch   Take 1 patch by mouth daily PatchMD - Multivitamin Patch   Refills:  0       TOPAMAX PO        Dose:  100 mg   Take 100 mg by mouth 2  times daily   Refills:  0       vitamin B complex with vitamin C Tabs tablet        Dose:  1 tablet   Take 1 tablet by mouth daily   Refills:  0       WELLBUTRIN XL PO        Dose:  300 mg   Take 300 mg by mouth every morning   Refills:  0            Where to get your medicines      Some of these will need a paper prescription and others can be bought over the counter. Ask your nurse if you have questions.     Bring a paper prescription for each of these medications     oxyCODONE-acetaminophen 5-325 MG per tablet                Protect others around you: Learn how to safely use, store and throw away your medicines at www.disposemymeds.org.             Medication List: This is a list of all your medications and when to take them. Check marks below indicate your daily home schedule. Keep this list as a reference.      Medications           Morning Afternoon Evening Bedtime As Needed    DANDELION ROOT PO   Take 400 mg by mouth daily                                etonogestrel-ethinyl estradiol 0.12-0.015 MG/24HR vaginal ring   Commonly known as:  NUVARING   Place 1 each vaginally every 28 days                                MULTIPLE VITAMIN PO   Take 1 patch by mouth daily PatchMD - Multivitamin Patch                                   oxyCODONE-acetaminophen 5-325 MG per tablet   Commonly known as:  PERCOCET   Take 1-2 tablets by mouth every 4 hours as needed for pain (moderate to severe)   Last time this was given:  2 tablets on 1/6/2018  8:08 AM                            Able to take next dose on 1/6 at 12 PM       TOPAMAX PO   Take 100 mg by mouth 2 times daily   Last time this was given:  100 mg on 1/6/2018  7:51 AM                                      vitamin B complex with vitamin C Tabs tablet   Take 1 tablet by mouth daily   Last time this was given:  1 tablet on 1/6/2018  7:51 AM                                WELLBUTRIN XL PO   Take 300 mg by mouth every morning   Last time this was given:  300 mg on  1/6/2018  7:51 AM

## 2018-01-04 NOTE — BRIEF OP NOTE
OP NOTE:   Laparoscopy, Co2 laser lysis of extensive pelvic adhesions, laser vaporization of widespread pelvic endometriosis, laparoscopic assisted vaginal hysterectomy, bilateral salpingectomy, enterocele repair     ebl 20 cc    [place]

## 2018-01-04 NOTE — ANESTHESIA PREPROCEDURE EVALUATION
Anesthesia Evaluation     . Pt has had prior anesthetic. Type: General           ROS/MED HX    ENT/Pulmonary:       Neurologic:       Cardiovascular:         METS/Exercise Tolerance:     Hematologic:         Musculoskeletal:   (+) arthritis, , , -       GI/Hepatic:         Renal/Genitourinary:         Endo:     (+) Obesity, .      Psychiatric:     (+) psychiatric history depression      Infectious Disease:         Malignancy:         Other:                                    Anesthesia Plan      History & Physical Review  History and physical reviewed and following examination; no interval change.    ASA Status:  3 .        Plan for General with Intravenous induction. Maintenance will be TIVA.    PONV prophylaxis:  Ondansetron (or other 5HT-3) and Dexamethasone or Solumedrol  Propofol, Zofran, and decadron      Postoperative Care  Postoperative pain management:  IV analgesics and Oral pain medications.      Consents  Anesthetic plan, risks, benefits and alternatives discussed with:  Patient..                          .

## 2018-01-04 NOTE — OR NURSING
Report called to Cindy Matta RN.  Pt to room via cart with NA in escort.  All belongings sent with pt.  Family mumtaz notified of transfer.

## 2018-01-04 NOTE — ANESTHESIA POSTPROCEDURE EVALUATION
Patient: Ashli Gamboa    Procedure(s):  LAPAROSCOPY WITH CO2 LASER LYSIS OF ADHESIONS AND VAPORIZATION OF ENDOMETRIOSIS, CYSTOTOMY LEFT OVARY, LAPAROSCOPIC VAGINAL HYSTERECTOMY WITH BILATERAL SALPINGECTOMY  - Wound Class: II-Clean Contaminated   - Wound Class: II-Clean Contaminated   - Wound Class: II-Clean Contaminated    Diagnosis:DYPURNIA AND HISTORY OF ENDOMETRIOSIS   Diagnosis Additional Information: No value filed.    Anesthesia Type:  General    Note:  Anesthesia Post Evaluation    Patient location during evaluation: PACU  Patient participation: Able to fully participate in evaluation  Level of consciousness: awake  Pain management: adequate  Airway patency: patent  Cardiovascular status: acceptable  Respiratory status: acceptable  Hydration status: acceptable  PONV: controlled     Anesthetic complications: None          Last vitals:  Vitals:    01/04/18 0844 01/04/18 1150   BP: 138/65 125/74   Resp: 16 16   Temp: 36.4  C (97.5  F) 36.1  C (96.9  F)   SpO2: 98% 98%         Electronically Signed By: Rohit Mccallum MD  January 4, 2018  11:59 AM

## 2018-01-05 LAB
COPATH REPORT: NORMAL
GLUCOSE BLDC GLUCOMTR-MCNC: 76 MG/DL (ref 70–99)
HGB BLD-MCNC: 12.2 G/DL (ref 11.7–15.7)

## 2018-01-05 PROCEDURE — 82962 GLUCOSE BLOOD TEST: CPT

## 2018-01-05 PROCEDURE — 25000128 H RX IP 250 OP 636: Performed by: OBSTETRICS & GYNECOLOGY

## 2018-01-05 PROCEDURE — 36415 COLL VENOUS BLD VENIPUNCTURE: CPT | Performed by: OBSTETRICS & GYNECOLOGY

## 2018-01-05 PROCEDURE — 25800025 ZZH RX 258: Performed by: OBSTETRICS & GYNECOLOGY

## 2018-01-05 PROCEDURE — 40000935 ZZH STATISTIC OUTPATIENT (NON-OBS) EVE

## 2018-01-05 PROCEDURE — 85018 HEMOGLOBIN: CPT | Performed by: OBSTETRICS & GYNECOLOGY

## 2018-01-05 PROCEDURE — 25000132 ZZH RX MED GY IP 250 OP 250 PS 637: Performed by: OBSTETRICS & GYNECOLOGY

## 2018-01-05 PROCEDURE — 40000934 ZZH STATISTIC OUTPATIENT (NON-OBS) DAY

## 2018-01-05 RX ORDER — ETONOGESTREL AND ETHINYL ESTRADIOL VAGINAL RING .015; .12 MG/D; MG/D
1 RING VAGINAL
Status: DISCONTINUED | OUTPATIENT
Start: 2018-01-05 | End: 2018-01-06 | Stop reason: HOSPADM

## 2018-01-05 RX ORDER — BUPROPION HYDROCHLORIDE 150 MG/1
300 TABLET ORAL EVERY MORNING
Status: DISCONTINUED | OUTPATIENT
Start: 2018-01-05 | End: 2018-01-06 | Stop reason: HOSPADM

## 2018-01-05 RX ORDER — TOPIRAMATE 50 MG/1
100 TABLET, FILM COATED ORAL 2 TIMES DAILY
Status: DISCONTINUED | OUTPATIENT
Start: 2018-01-05 | End: 2018-01-06 | Stop reason: HOSPADM

## 2018-01-05 RX ADMIN — TOPIRAMATE 100 MG: 50 TABLET ORAL at 08:28

## 2018-01-05 RX ADMIN — B-COMPLEX W/ C & FOLIC ACID TAB 1 TABLET: TAB at 10:56

## 2018-01-05 RX ADMIN — BUPROPION HYDROCHLORIDE 300 MG: 150 TABLET, FILM COATED, EXTENDED RELEASE ORAL at 08:28

## 2018-01-05 RX ADMIN — SENNOSIDES 8.6 MG: 8.6 TABLET, FILM COATED ORAL at 20:04

## 2018-01-05 RX ADMIN — DEXTROSE AND SODIUM CHLORIDE: 5; 450 INJECTION, SOLUTION INTRAVENOUS at 10:20

## 2018-01-05 RX ADMIN — OXYCODONE HYDROCHLORIDE AND ACETAMINOPHEN 2 TABLET: 5; 325 TABLET ORAL at 06:02

## 2018-01-05 RX ADMIN — CEFAZOLIN SODIUM 2 G: 2 INJECTION, SOLUTION INTRAVENOUS at 02:35

## 2018-01-05 RX ADMIN — OXYCODONE HYDROCHLORIDE AND ACETAMINOPHEN 2 TABLET: 5; 325 TABLET ORAL at 18:52

## 2018-01-05 RX ADMIN — TOPIRAMATE 100 MG: 50 TABLET ORAL at 20:04

## 2018-01-05 RX ADMIN — OXYCODONE HYDROCHLORIDE AND ACETAMINOPHEN 2 TABLET: 5; 325 TABLET ORAL at 23:59

## 2018-01-05 RX ADMIN — OXYCODONE HYDROCHLORIDE AND ACETAMINOPHEN 2 TABLET: 5; 325 TABLET ORAL at 10:02

## 2018-01-05 RX ADMIN — Medication 0.2 MG: at 08:29

## 2018-01-05 RX ADMIN — DEXTROSE AND SODIUM CHLORIDE 100 ML/HR: 5; 450 INJECTION, SOLUTION INTRAVENOUS at 20:03

## 2018-01-05 RX ADMIN — Medication 0.2 MG: at 13:48

## 2018-01-05 RX ADMIN — Medication 0.2 MG: at 11:15

## 2018-01-05 RX ADMIN — OXYCODONE HYDROCHLORIDE AND ACETAMINOPHEN 2 TABLET: 5; 325 TABLET ORAL at 02:35

## 2018-01-05 RX ADMIN — OXYCODONE HYDROCHLORIDE AND ACETAMINOPHEN 2 TABLET: 5; 325 TABLET ORAL at 14:51

## 2018-01-05 RX ADMIN — Medication 0.2 MG: at 00:29

## 2018-01-05 NOTE — OP NOTE
DATE OF PROCEDURE:  01/04/2018      PREOPERATIVE DIAGNOSIS:  Chronic pelvic pain, pelvic adhesive disease, endometriosis.      POSTOPERATIVE DIAGNOSIS:  Chronic pelvic pain, pelvic adhesive disease, endometriosis.      PROCEDURE:  Diagnostic laparoscopy, laser lysis of extensive pelvic adhesions, laser vaporization of extensive endometriosis, bilateral salpingectomy, laparoscopic-assisted vaginal hysterectomy, enterocele repair.      OPERATIVE FINDINGS:  The patient had extensive scarring throughout the pelvis with obliteration of the entire posterior cul-de-sac with dense adherence of the sigmoid colon to the posterior aspect of the uterus.  There was endometriosis on both ovaries, but this was surface and was treated without difficulty.  There was anterior and posterior cul-de-sac and sidewall disease that was vaporized away.  Ureters were visualized throughout the case well inferior to the line of resection and vaporization.      OPERATIVE PROCEDURE:  After general anesthesia was induced, Ashli Harrispaniak was placed in the dorsal lithotomy position and prepped and draped in the usual fashion.  A Ray catheter was placed.  A uterine manipulator was placed.      Through a subumbilical incision, the Veress needle was placed and 3 liters of CO2 were insufflated.  The laparoscope, trocar and sheath were placed without incident.  Two 5 mm lower quadrant trocars were placed.  The laser scope was brought in and the extensive adherence of the bowel to both adnexae and the posterior cul-de-sac were carefully taken down with direct laser lysis  This allowed us to fully visualize both ovaries and treat the surface disease.  The mesosalpinx, round ligaments and broad ligaments on both sides were doubly cauterized and cut.  There was extensive anterior cul-de-sac scarring that was taken down with the laser scope at 15 medina.  This allowed us to lift the bladder off the cervix.      We then went below and circumscribed around  the cervix.  The posterior cul-de-sac was sharply entered.  The uterosacral ligaments were clamped, cut, tied and held for enterocele repair.  The uterus with tubes was removed and sent to the pathologist.  A modified Michael technique was used to cross the uterosacral ligaments and suspend the vagina.  The apex was closed with interrupted 2-0 Vicryl sutures.      Second-look laparoscopy from above showed no arterial bleeding.  There was some minor venous bleeding and 15 gram amp of Christopher was used.  Hemostasis was excellent.  At this time, all gas was exhausted and instruments were removed.  The incisions were closed with 4-0 Vicryl and Steri-Strips.  The estimated blood loss was 20 mL.  The patient will be observed overnight and discharged tomorrow if stable.         NAVEEN HERNANDEZ JR, MD             D: 2018 11:50   T: 2018 20:33   MT: AFIA#[id number]      Name:     BANDAR LEWIS   MRN:      [id number]        Account:        [id number]   :      1980           Procedure Date: 2018      Document: [id number]

## 2018-01-05 NOTE — PROGRESS NOTES
GYN !  Pt in considerable pain and distressed by nursing care this am. Incisions ok, hgb stable at 12.4  Plan- continue adequate IV pain control and switch to po when stable. Home later today or in am.  RTC 2 weeks   29-Jan-2017

## 2018-01-05 NOTE — PLAN OF CARE
Problem: Patient Care Overview  Goal: Plan of Care/Patient Progress Review  Outcome: Improving  A&O.  VSS, BP on soft side.  RA.  CMS intact.  Lap sites CDI.  Tolerating diet.  Dangled at bedside with assist of 1.  Ray patent and intact.  Pain managed with percocet and IV dilaudid.  Continue to monitor.

## 2018-01-05 NOTE — PLAN OF CARE
Problem: Patient Care Overview  Goal: Plan of Care/Patient Progress Review  Outcome: Improving  A&O.  VSS, BP on soft side.  RA.  CMS intact.  Lap sites CDI.  Tolerating diet.  Dangled at bedside with assist of 1 previous shift. Ray removed at 0600 in the morning. PRN Dilaudid and percocet administered for continuous pain 7/10. IV TKO. Continue to monitor.

## 2018-01-05 NOTE — PLAN OF CARE
Problem: Patient Care Overview  Goal: Plan of Care/Patient Progress Review  Outcome: Improving  A&Ox4. VSS. CMS intact. 3 lap sites CDI, steri strips and band aids on, ice applied. Tolerating regular diet. SBA to bathroom otherwise bedrest. Voiding adequately. Pain managed w/ IV dilaudid and percocet, 7/10 down to 5-6/10.  in room w/ pt. Denies n/t, nausea, dizziness, SOB. Continue to monitor.

## 2018-01-06 VITALS
OXYGEN SATURATION: 98 % | TEMPERATURE: 98 F | RESPIRATION RATE: 16 BRPM | BODY MASS INDEX: 31.98 KG/M2 | HEIGHT: 64 IN | WEIGHT: 187.3 LBS | DIASTOLIC BLOOD PRESSURE: 69 MMHG | SYSTOLIC BLOOD PRESSURE: 118 MMHG

## 2018-01-06 LAB — GLUCOSE BLDC GLUCOMTR-MCNC: 82 MG/DL (ref 70–99)

## 2018-01-06 PROCEDURE — 40000934 ZZH STATISTIC OUTPATIENT (NON-OBS) DAY

## 2018-01-06 PROCEDURE — 25000132 ZZH RX MED GY IP 250 OP 250 PS 637: Performed by: OBSTETRICS & GYNECOLOGY

## 2018-01-06 PROCEDURE — 82962 GLUCOSE BLOOD TEST: CPT

## 2018-01-06 RX ADMIN — TOPIRAMATE 100 MG: 50 TABLET ORAL at 07:51

## 2018-01-06 RX ADMIN — B-COMPLEX W/ C & FOLIC ACID TAB 1 TABLET: TAB at 07:51

## 2018-01-06 RX ADMIN — OXYCODONE HYDROCHLORIDE AND ACETAMINOPHEN 2 TABLET: 5; 325 TABLET ORAL at 08:08

## 2018-01-06 RX ADMIN — OXYCODONE HYDROCHLORIDE AND ACETAMINOPHEN 2 TABLET: 5; 325 TABLET ORAL at 04:08

## 2018-01-06 RX ADMIN — BUPROPION HYDROCHLORIDE 300 MG: 150 TABLET, FILM COATED, EXTENDED RELEASE ORAL at 07:51

## 2018-01-06 NOTE — PLAN OF CARE
Patient discharged at 10:30 AM to Discharged to home  IV was discontinued. Pain at time of discharge was 0/10. Belongings returned to patient.  Discharge instructions and medications reviewed with patient.  Patient verbalized understanding and all questions were answered.  Prescriptions given to patient.  At time of discharge, patient condition was stable and left the unit in a wheelchair escorted by GERSON.

## 2018-01-06 NOTE — PLAN OF CARE
Problem: Patient Care Overview  Goal: Discharge Needs Assessment  Outcome: Improving  Alert and oriented, vss. Stab sites dry and intact. Cont with pain but relieved with oral. Tolerating oral. Up with sba. Voiding without diff.  Bowel sounds active

## 2018-01-06 NOTE — PLAN OF CARE
Problem: Patient Care Overview  Goal: Plan of Care/Patient Progress Review  Outcome: Improving    A&O. CMS intact. Bowel sounds hypoactive but present. VSS. Dressing lap sites CDI. Up with SBA. Voiding adequately. C/o abdominal pain, decreased with Percocet. Discharge pending today.

## 2018-01-06 NOTE — PLAN OF CARE
Problem: Patient Care Overview  Goal: Plan of Care/Patient Progress Review  Outcome: Improving  A&Ox4, VSS on RA. C/o pain managed by PRN percocet q4 hours. 3 lap Incisions CDI. Hypoactive bowel sounds. Up SBA. Reg diet, voiding adequately. IV running at 25/hr. Plan to DC in the AM if pain better controlled; pt states it is much better than yesterday. Will continue to monitor.

## 2018-01-18 ENCOUNTER — OFFICE VISIT (OUTPATIENT)
Dept: OBGYN | Facility: CLINIC | Age: 38
End: 2018-01-18
Payer: COMMERCIAL

## 2018-01-18 VITALS
SYSTOLIC BLOOD PRESSURE: 102 MMHG | BODY MASS INDEX: 31.92 KG/M2 | DIASTOLIC BLOOD PRESSURE: 68 MMHG | HEIGHT: 64 IN | WEIGHT: 187 LBS | HEART RATE: 68 BPM

## 2018-01-18 DIAGNOSIS — Z48.816 AFTERCARE FOLLOWING SURGERY OF THE GENITOURINARY SYSTEM: Primary | ICD-10-CM

## 2018-01-18 DIAGNOSIS — R30.0 DYSURIA: Primary | ICD-10-CM

## 2018-01-18 LAB
ALBUMIN UR-MCNC: ABNORMAL MG/DL
APPEARANCE UR: CLEAR
BILIRUB UR QL STRIP: ABNORMAL
COLOR UR AUTO: YELLOW
GLUCOSE UR STRIP-MCNC: NEGATIVE MG/DL
HGB BLD-MCNC: 14.8 G/DL (ref 11.7–15.7)
HGB UR QL STRIP: ABNORMAL
KETONES UR STRIP-MCNC: ABNORMAL MG/DL
LEUKOCYTE ESTERASE UR QL STRIP: ABNORMAL
NITRATE UR QL: NEGATIVE
PH UR STRIP: 6 PH (ref 5–7)
SOURCE: ABNORMAL
SP GR UR STRIP: >1.03 (ref 1–1.03)
UROBILINOGEN UR STRIP-ACNC: 1 EU/DL (ref 0.2–1)

## 2018-01-18 PROCEDURE — 36415 COLL VENOUS BLD VENIPUNCTURE: CPT | Performed by: OBSTETRICS & GYNECOLOGY

## 2018-01-18 PROCEDURE — 87086 URINE CULTURE/COLONY COUNT: CPT | Performed by: OBSTETRICS & GYNECOLOGY

## 2018-01-18 PROCEDURE — 99024 POSTOP FOLLOW-UP VISIT: CPT | Performed by: OBSTETRICS & GYNECOLOGY

## 2018-01-18 PROCEDURE — 81003 URINALYSIS AUTO W/O SCOPE: CPT | Performed by: OBSTETRICS & GYNECOLOGY

## 2018-01-18 PROCEDURE — 85018 HEMOGLOBIN: CPT | Performed by: OBSTETRICS & GYNECOLOGY

## 2018-01-18 RX ORDER — NITROFURANTOIN 25; 75 MG/1; MG/1
100 CAPSULE ORAL 2 TIMES DAILY
Qty: 14 CAPSULE | Refills: 0 | Status: SHIPPED | OUTPATIENT
Start: 2018-01-18 | End: 2018-03-21

## 2018-01-18 NOTE — PROGRESS NOTES
The patient is seen at this time for postoperative check after laparoscopic right salpingo-oophorectomy vaginal hysterectomy with enterocele repair.  She had a fairly rough first 24 hours and stayed 1 extra day.  She is feeling well and has had no bleeding.  Her incision sites are healing well at this time.  She is concerned by some dysuria and we will look at a urine today.  Her pathology was benign with adenomyosis and endometriosis.

## 2018-01-18 NOTE — MR AVS SNAPSHOT
"              After Visit Summary   1/18/2018    Ashli Gamboa    MRN: [id number]           Patient Information     Date Of Birth          1980        Visit Information        Provider Department      1/18/2018 4:00 PM Sabas Patel MD [facility]        Today's Diagnoses     Dysuria    -  1       Follow-ups after your visit        Who to contact     If you have questions or need follow up information about today's clinic visit or your schedule please contact [facility] directly at [phone].  Normal or non-critical lab and imaging results will be communicated to you by AcademixDirecthart, letter or phone within 4 business days after the clinic has received the results. If you do not hear from us within 7 days, please contact the clinic through Objective Logisticst or phone. If you have a critical or abnormal lab result, we will notify you by phone as soon as possible.  Submit refill requests through Zipano or call your pharmacy and they will forward the refill request to us. Please allow 3 business days for your refill to be completed.          Additional Information About Your Visit        MyChart Information     Zipano gives you secure access to your electronic health record. If you see a primary care provider, you can also send messages to your care team and make appointments. If you have questions, please call your primary care clinic.  If you do not have a primary care provider, please call [phone] and they will assist you.        Care EveryWhere ID     This is your Care EveryWhere ID. This could be used by other organizations to access your [place] medical records  [id number]        Your Vitals Were     Pulse Height BMI (Body Mass Index)             68 5' 4\" (1.626 m) 32.1 kg/m2          Blood Pressure from Last 3 Encounters:   01/18/18 102/68   01/06/18 118/69   12/26/17 108/74    Weight from Last 3 Encounters:   01/18/18 187 lb (84.8 kg)   01/04/18 187 lb 4.8 " oz (85 kg)   12/26/17 190 lb (86.2 kg)              We Performed the Following     UA without Microscopic     Urine Culture Aerobic Bacterial          Today's Medication Changes          These changes are accurate as of: 1/18/18  4:33 PM.  If you have any questions, ask your nurse or doctor.               Start taking these medicines.        Dose/Directions    nitroFURantoin (macrocrystal-monohydrate) 100 MG capsule   Commonly known as:  MACROBID   Used for:  Dysuria   Started by:  Sabas Patel MD        Dose:  100 mg   Take 1 capsule (100 mg) by mouth 2 times daily   Quantity:  14 capsule   Refills:  0            Where to get your medicines      These medications were sent to [id number] - [place], [place] - [address] AT [facility]  [address], [place] [address]     Phone:  [phone]     nitroFURantoin (macrocrystal-monohydrate) 100 MG capsule                Primary Care Provider Office Phone # Fax #    Karena DANIELE [facility] [phone] [address] [facility] [address]        Equal Access to Services     [facility]: Hadii aad ku hadasho Soomaali, waaxda luqadaha, qaybta kaalmada adeegyada, lynne del rosario haygiancarlo warren . So [facility] [phone].    ATENCIÓN: Si habla español, tiene a flynn disposición servicios gratuitos de asistencia lingüística. Llame al [phone].    We comply with applicable federal civil rights laws and [place] laws. We do not discriminate on the basis of race, color, national origin, age, disability, sex, sexual orientation, or gender identity.            Thank you!     Thank you for choosing [facility]  for your care. Our goal is always to provide you with excellent care. Hearing back from our patients is one way we can continue to improve our services. Please take a few minutes to complete the written survey that you may receive in the mail after your visit with us. Thank  you!             Your Updated Medication List - Protect others around you: Learn how to safely use, store and throw away your medicines at www.disposemymeds.org.          This list is accurate as of: 1/18/18  4:33 PM.  Always use your most recent med list.                   Brand Name Dispense Instructions for use Diagnosis    DANDELION ROOT PO      Take 400 mg by mouth daily        etonogestrel-ethinyl estradiol 0.12-0.015 MG/24HR vaginal ring    NUVARING     Place 1 each vaginally every 28 days        MULTIPLE VITAMIN PO      Take 1 patch by mouth daily PatchMD - Multivitamin Patch        nitroFURantoin (macrocrystal-monohydrate) 100 MG capsule    MACROBID    14 capsule    Take 1 capsule (100 mg) by mouth 2 times daily    Dysuria       TOPAMAX PO      Take 100 mg by mouth 2 times daily        vitamin B complex with vitamin C Tabs tablet      Take 1 tablet by mouth daily        WELLBUTRIN XL PO      Take 300 mg by mouth every morning

## 2018-01-20 LAB
BACTERIA SPEC CULT: NORMAL
SPECIMEN SOURCE: NORMAL

## 2018-02-22 ENCOUNTER — MYC MEDICAL ADVICE (OUTPATIENT)
Dept: PODIATRY | Facility: CLINIC | Age: 38
End: 2018-02-22

## 2018-02-22 DIAGNOSIS — M20.5X1 HALLUX LIMITUS, RIGHT: Primary | ICD-10-CM

## 2018-02-22 DIAGNOSIS — M25.571 PAIN IN JOINT OF RIGHT FOOT: ICD-10-CM

## 2018-02-26 NOTE — TELEPHONE ENCOUNTER
Image-guided steroid injection ordered.  I was not able to specify location.  I will have my MA notify patient and provided contact phone number for [facility].     Dr. Clement

## 2018-03-09 ENCOUNTER — RADIANT APPOINTMENT (OUTPATIENT)
Dept: GENERAL RADIOLOGY | Facility: CLINIC | Age: 38
End: 2018-03-09
Attending: PODIATRIST
Payer: COMMERCIAL

## 2018-03-09 DIAGNOSIS — M20.5X1 HALLUX LIMITUS, RIGHT: ICD-10-CM

## 2018-03-09 DIAGNOSIS — M25.571 PAIN IN JOINT OF RIGHT FOOT: ICD-10-CM

## 2018-03-09 RX ORDER — LIDOCAINE HYDROCHLORIDE 10 MG/ML
30 INJECTION, SOLUTION EPIDURAL; INFILTRATION; INTRACAUDAL; PERINEURAL ONCE
Status: ACTIVE | OUTPATIENT
Start: 2018-03-09

## 2018-03-09 RX ORDER — IOPAMIDOL 408 MG/ML
20 INJECTION, SOLUTION INTRATHECAL ONCE
Status: ACTIVE | OUTPATIENT
Start: 2018-03-09

## 2018-03-09 RX ORDER — BUPIVACAINE HYDROCHLORIDE 2.5 MG/ML
10 INJECTION, SOLUTION EPIDURAL; INFILTRATION; INTRACAUDAL ONCE
Status: ACTIVE | OUTPATIENT
Start: 2018-03-09

## 2018-03-09 RX ORDER — TRIAMCINOLONE ACETONIDE 40 MG/ML
40 INJECTION, SUSPENSION INTRA-ARTICULAR; INTRAMUSCULAR ONCE
Status: ACTIVE | OUTPATIENT
Start: 2018-03-09

## 2018-03-16 ENCOUNTER — TRANSFERRED RECORDS (OUTPATIENT)
Dept: HEALTH INFORMATION MANAGEMENT | Facility: CLINIC | Age: 38
End: 2018-03-16

## 2018-03-16 LAB — PHQ9 SCORE: 2

## 2018-03-21 ENCOUNTER — OFFICE VISIT (OUTPATIENT)
Dept: OBGYN | Facility: CLINIC | Age: 38
End: 2018-03-21
Payer: COMMERCIAL

## 2018-03-21 VITALS
HEART RATE: 72 BPM | HEIGHT: 64 IN | BODY MASS INDEX: 31.92 KG/M2 | SYSTOLIC BLOOD PRESSURE: 110 MMHG | DIASTOLIC BLOOD PRESSURE: 74 MMHG | WEIGHT: 187 LBS

## 2018-03-21 DIAGNOSIS — N80.9 ENDOMETRIOSIS: Primary | ICD-10-CM

## 2018-03-21 PROCEDURE — 99024 POSTOP FOLLOW-UP VISIT: CPT | Performed by: OBSTETRICS & GYNECOLOGY

## 2018-03-21 RX ORDER — ETONOGESTREL AND ETHINYL ESTRADIOL VAGINAL RING .015; .12 MG/D; MG/D
1 RING VAGINAL
Qty: 4 EACH | Refills: 3 | Status: ON HOLD | OUTPATIENT
Start: 2018-03-21 | End: 2019-02-14

## 2018-03-21 NOTE — MR AVS SNAPSHOT
After Visit Summary   3/21/2018    Ashli Gamboa    MRN: [id number]           Patient Information     Date Of Birth          1980        Visit Information        Provider Department      3/21/2018 4:30 PM Sabas Patel MD [facility]        Today's Diagnoses     Endometriosis    -  1       Follow-ups after your visit        Your next 10 appointments already scheduled     Apr 12, 2018  2:00 PM CDT   (Arrive by 1:45 PM)   NUTRITION VISIT with Meaghan Glass RD   [facility] ([facility])    [address] [place] [id number] [phone]            Apr 12, 2018  2:20 PM CDT   (Arrive by 2:05 PM)   RETURN BARIATRIC SURGERY with Sam Schmidt MD   [facility] ([facility])    [address] [place] [address] [phone]              Who to contact     If you have questions or need follow up information about today's clinic visit or your schedule please contact [facility] directly at [phone].  Normal or non-critical lab and imaging results will be communicated to you by MyChart, letter or phone within 4 business days after the clinic has received the results. If you do not hear from us within 7 days, please contact the clinic through Cogniticshart or phone. If you have a critical or abnormal lab result, we will notify you by phone as soon as possible.  Submit refill requests through Dwolla or call your pharmacy and they will forward the refill request to us. Please allow 3 business days for your refill to be completed.          Additional Information About Your Visit        MyChart Information     Dwolla gives you secure access to your electronic health record. If you see a primary care provider, you can also send messages to your care team and make appointments. If you have questions, please  "call your primary care clinic.  If you do not have a primary care provider, please call [phone] and they will assist you.        Care EveryWhere ID     This is your Care EveryWhere ID. This could be used by other organizations to access your [place] medical records  [id number]        Your Vitals Were     Pulse Height BMI (Body Mass Index)             72 5' 4\" (1.626 m) 32.1 kg/m2          Blood Pressure from Last 3 Encounters:   03/21/18 110/74   01/18/18 102/68   01/06/18 118/69    Weight from Last 3 Encounters:   03/21/18 187 lb (84.8 kg)   01/18/18 187 lb (84.8 kg)   01/04/18 187 lb 4.8 oz (85 kg)              Today, you had the following     No orders found for display         Where to get your medicines      These medications were sent to [id number] - [place], [place] - [address] AT [facility]  [place]  [address], [place] [address]     Phone:  [phone]     etonogestrel-ethinyl estradiol 0.12-0.015 MG/24HR vaginal ring          Primary Care Provider Office Phone # Fax #    Karena August DANIELE [facility] [phone] [address] [facility] [address]        Equal Access to Services     [facility] AH: Hadii catina moura hadmaryo Socarlos, waaxda luqadaha, qaybta kaalmada lynne almaraz. So [facility] [phone].    ATENCIÓN: Si habla español, tiene a flynn disposición servicios gratuitos de asistencia lingüística. Genaro al [phone].    We comply with applicable federal civil rights laws and [place] laws. We do not discriminate on the basis of race, color, national origin, age, disability, sex, sexual orientation, or gender identity.            Thank you!     Thank you for choosing [facility]  for your care. Our goal is always to provide you with excellent care. Hearing back from our patients is one way we can continue to improve our services. Please take a few minutes to complete the written survey " that you may receive in the mail after your visit with us. Thank you!             Your Updated Medication List - Protect others around you: Learn how to safely use, store and throw away your medicines at www.disposemymeds.org.          This list is accurate as of 3/21/18  4:36 PM.  Always use your most recent med list.                   Brand Name Dispense Instructions for use Diagnosis    DANDELION ROOT PO      Take 400 mg by mouth daily        etonogestrel-ethinyl estradiol 0.12-0.015 MG/24HR vaginal ring    NUVARING    4 each    Place 1 each vaginally every 21 days    Endometriosis       MULTIPLE VITAMIN PO      Take 1 patch by mouth daily PatchMD - Multivitamin Patch        TOPAMAX PO      Take 100 mg by mouth 2 times daily        vitamin B complex with vitamin C Tabs tablet      Take 1 tablet by mouth daily        WELLBUTRIN XL PO      Take 300 mg by mouth every morning

## 2018-03-21 NOTE — PROGRESS NOTES
SUBJECTIVE:                                                   Ashli Gamboa is a 38 year old female who presents to clinic today for the following health issue(s):  Patient presents with:  Surgical Followup: 2 month recheck to [facility]      HPI: The patient is seen at this time in follow-up of an [facility] for severe pain and history of endometriosis.  She has no complaints at this time.  She has normal bowel and bladder function sexual function and no pain with physical activity.  She is using her nuvaring.      No LMP recorded. Patient is not currently having periods (Reason: Birth Control)..   Patient is sexually active, .  Using hysterectomy for contraception.    reports that she quit smoking about 4 years ago. Her smoking use included Cigarettes. She started smoking about 11 years ago. She has a 2.50 pack-year smoking history. She has never used smokeless tobacco.    STD testing offered?  Declined    Health maintenance updated:  yes    Today's PHQ-2 Score:   PHQ-2 (  [employer]) 4/3/2017   Q1: Little interest or pleasure in doing things 1   Q2: Feeling down, depressed or hopeless 1   PHQ-2 Score 2   Q1: Little interest or pleasure in doing things -   Q2: Feeling down, depressed or hopeless -   PHQ-2 Score -     Today's PHQ-9 Score:   PHQ-9 SCORE 2018   Total Score -   Total Score MyChart 2 (Minimal depression)   Total Score 2     Today's ALIDA-7 Score:   ALIDA-7 SCORE 2017   Total Score -   Total Score 3       Problem list and histories reviewed & adjusted, as indicated.  Additional history: as documented.    Patient Active Problem List   Diagnosis     Morbid obesity (H)     post traumatic Arthritis of big toe     Major depression, recurrent (H)     CARDIOVASCULAR SCREENING; LDL GOAL LESS THAN 160     Endometriosis     Fibromyalgia     Muscular deconditioning     Cervical high risk HPV (human papillomavirus) test positive     S/P laparoscopic sleeve gastrectomy     Past Surgical History:    Procedure Laterality Date     ABDOMEN SURGERY      laparoscopy X2     DILATE CERVIX, HYSTEROSCOPY, ABLATE ENDOMETRIUM NOVASURE, COMBINED N/A 11/17/2016    Procedure: COMBINED DILATE CERVIX, HYSTEROSCOPY, ABLATE ENDOMETRIUM NOVASURE;  Surgeon: Sabas Patel MD;  Location: [facility]     GYN SURGERY  dates above    myomectomy x2, laparoscopy x2     LAPAROSCOPIC ASSISTED HYSTERECTOMY VAGINAL N/A 1/4/2018    Procedure: LAPAROSCOPIC ASSISTED HYSTERECTOMY VAGINAL;;  Surgeon: Sabas Patel MD;  Location: [facility]     LAPAROSCOPIC GASTRIC SLEEVE N/A 7/25/2017    Procedure: LAPAROSCOPIC GASTRIC SLEEVE;  Laparoscopic Sleeve Gastrectomy;  Surgeon: Sam Schmidt MD;  Location: [facility]     LAPAROSCOPIC LYSIS ADHESIONS  7/10/2014    Procedure: LAPAROSCOPIC LYSIS ADHESIONS;  Surgeon: Sabas Patel MD;  Location: [facility]     LAPAROSCOPIC SALPINGECTOMY Bilateral 1/4/2018    Procedure: LAPAROSCOPIC SALPINGECTOMY;;  Surgeon: Sabas Patel MD;  Location: [facility]     LASER CO2 LAPAROSCOPIC VAPORIZATION ENDOMETRIUM  7/10/2014    Procedure: LASER CO2 LAPAROSCOPIC VAPORIZATION ENDOMETRIUM;  Surgeon: Sabas Patel MD;  Location: [facility]     LASER CO2 LAPAROSCOPIC VAPORIZATION ENDOMETRIUM N/A 6/30/2015    Procedure: LASER CO2 LAPAROSCOPIC VAPORIZATION ENDOMETRIUM;  Surgeon: Sabas Patel MD;  Location: [facility]     LASER CO2 LAPAROSCOPIC VAPORIZATION ENDOMETRIUM, LYSIS ADHESIONS N/A 1/4/2018    Procedure: LASER CO2 LAPAROSCOPIC VAPORIZATION ENDOMETRIUM, LYSIS ADHESIONS;  LAPAROSCOPY WITH CO2 LASER LYSIS OF ADHESIONS AND VAPORIZATION OF ENDOMETRIOSIS, CYSTOTOMY LEFT OVARY, LAPAROSCOPIC VAGINAL HYSTERECTOMY WITH BILATERAL SALPINGECTOMY ;  Surgeon: Sabas Patel MD;  Location: [facility]     LASER CO2 LAPAROSCOPY DIAGNOSTIC, LYSIS ADHESIONS, COMBINED N/A 6/30/2015    Procedure: COMBINED LASER CO2 LAPAROSCOPY DIAGNOSTIC, LYSIS ADHESIONS;  Surgeon: Sabas Patel MD;  Location: [facility]      Social History   Substance Use Topics      "Smoking status: Former Smoker     Packs/day: 0.50     Years: 5.00     Types: Cigarettes     Start date: 1/2/2007     Quit date: 11/1/2013     Smokeless tobacco: Never Used     Alcohol use 0.0 oz/week     0 Standard drinks or equivalent per week      Comment: occas      Problem (# of Occurrences) Relation (Name,Age of Onset)    Arthritis (1) Father    Breast Cancer (1) Maternal Grandmother    CANCER (1) Mother: Cervical     CEREBROVASCULAR DISEASE (1) Paternal Grandmother    Crohn Disease (1) Mother    Depression (4) Mother, Father, Brother, Sister    MENTAL ILLNESS (1) Brother: schizophrenia and bipolar    Obesity (1) Mother    Other - See Comments (1) Mother: Fibromyalgia    Ovarian Cancer (1) Mother: Cervical Cancer    Rheumatoid Arthritis (1) Father            Current Outpatient Prescriptions   Medication Sig     BuPROPion HCl (WELLBUTRIN XL PO) Take 300 mg by mouth every morning     etonogestrel-ethinyl estradiol (NUVARING) 0.12-0.015 MG/24HR vaginal ring Place 1 each vaginally every 28 days     Misc Natural Products (DANDELION ROOT PO) Take 400 mg by mouth daily     vitamin B complex with vitamin C (VITAMIN  B COMPLEX) TABS tablet Take 1 tablet by mouth daily     MULTIPLE VITAMIN PO Take 1 patch by mouth daily PatchMD - Multivitamin Patch     Topiramate (TOPAMAX PO) Take 100 mg by mouth 2 times daily      No current facility-administered medications for this visit.      Facility-Administered Medications Ordered in Other Visits   Medication     iopamidol (ISOVUE-M 200) solution 20 mL     lidocaine (PF) (XYLOCAINE) 1 % injection 30 mL     triamcinolone acetonide (KENALOG-40) injection 40 mg     BUPivacaine 0.25%     No Known Allergies    OBJECTIVE:     /74  Pulse 72  Ht 5' 4\" (1.626 m)  Wt 187 lb (84.8 kg)  BMI 32.1 kg/m2  Body mass index is 32.1 kg/(m^2).    Exam:  Constitutional:  Appearance: Well nourished, well developed alert, in no acute distress  Gastrointestinal:  Abdominal Examination:  Abdomen " nontender to palpation, tone normal without rigidity or guarding, no masses present, umbilicus without lesions; Liver/Spleen:  No hepatomegaly present, liver nontender to palpation; Hernias:  No hernias present  Lymphatic: Lymph Nodes:  No other lymphadenopathy present  Skin:General Inspection:  No rashes present, no lesions present, no areas of discoloration; Genitalia and Groin:  No rashes present, no lesions present, no areas of discoloration, no masses present.  Neurologic/Psychiatric:  Mental Status:  Oriented X3   Pelvic Exam:  External Genitalia:     Normal appearance for age, no discharge present, no tenderness present, no inflammatory lesions present, color normal  Vagina:     Normal vaginal vault without central or paravaginal defects, no discharge present, no inflammatory lesions present, no masses present  Bladder:     Nontender to palpation  Urethra:   Urethral Body:  Urethra palpation normal, urethra structural support normal   Urethral Meatus:  No erythema or lesions present  Cervix:     Surgically absent  Uterus:     Surgically absent  Adnexa:     Surgically absent  Perineum:     Perineum within normal limits, no evidence of trauma, no rashes or skin lesions present  Anus:     Anus within normal limits, no hemorrhoids present  Inguinal Lymph Nodes:     No lymphadenopathy present     In-Clinic Test Results:      ASSESSMENT/PLAN:                                                    Patient with excellent outcome of surgery.  We will modify her NuvaRing since she gets continuous suppression.      Sabas Patel MD  [facility]

## 2018-04-12 ENCOUNTER — OFFICE VISIT (OUTPATIENT)
Dept: SURGERY | Facility: CLINIC | Age: 38
End: 2018-04-12
Payer: COMMERCIAL

## 2018-04-12 ENCOUNTER — ALLIED HEALTH/NURSE VISIT (OUTPATIENT)
Dept: SURGERY | Facility: CLINIC | Age: 38
End: 2018-04-12
Payer: COMMERCIAL

## 2018-04-12 VITALS
HEIGHT: 64 IN | SYSTOLIC BLOOD PRESSURE: 126 MMHG | OXYGEN SATURATION: 93 % | WEIGHT: 153.9 LBS | BODY MASS INDEX: 26.27 KG/M2 | DIASTOLIC BLOOD PRESSURE: 76 MMHG | HEART RATE: 81 BPM

## 2018-04-12 DIAGNOSIS — K31.83 ACHLORHYDRIA, GASTRIC: Primary | ICD-10-CM

## 2018-04-12 DIAGNOSIS — K31.83 ACHLORHYDRIA, GASTRIC: ICD-10-CM

## 2018-04-12 LAB
ALBUMIN SERPL-MCNC: 3.4 G/DL (ref 3.4–5)
ALP SERPL-CCNC: 60 U/L (ref 40–150)
ALT SERPL W P-5'-P-CCNC: 28 U/L (ref 0–50)
ANION GAP SERPL CALCULATED.3IONS-SCNC: 7 MMOL/L (ref 3–14)
AST SERPL W P-5'-P-CCNC: 9 U/L (ref 0–45)
BILIRUB SERPL-MCNC: 0.3 MG/DL (ref 0.2–1.3)
BUN SERPL-MCNC: 13 MG/DL (ref 7–30)
CALCIUM SERPL-MCNC: 9 MG/DL (ref 8.5–10.1)
CHLORIDE SERPL-SCNC: 111 MMOL/L (ref 94–109)
CO2 SERPL-SCNC: 23 MMOL/L (ref 20–32)
CREAT SERPL-MCNC: 0.98 MG/DL (ref 0.52–1.04)
GFR SERPL CREATININE-BSD FRML MDRD: 64 ML/MIN/1.7M2
GLUCOSE SERPL-MCNC: 84 MG/DL (ref 70–99)
POTASSIUM SERPL-SCNC: 3.8 MMOL/L (ref 3.4–5.3)
PROT SERPL-MCNC: 6.4 G/DL (ref 6.8–8.8)
SODIUM SERPL-SCNC: 140 MMOL/L (ref 133–144)

## 2018-04-12 ASSESSMENT — PAIN SCALES - GENERAL: PAINLEVEL: NO PAIN (0)

## 2018-04-12 NOTE — PROGRESS NOTES
Return Bariatric Surgery Note    RE: Ashli Gamboa  MR#: [id number]  : 1980  VISIT DATE: 2018    Dear Dr. Mo,    I had the pleasure of seeing your patient, Ashli Gamboa, in my post-bariatric surgery assessment clinic.    CHIEF COMPLAINT: Post-bariatric surgery follow-up    HISTORY OF PRESENT ILLNESS:  Questions Regarding Prior Weight Loss Surgery Reviewed With Patient 2018   I had the following weight loss procedure: Sleeve Gastrectomy   What year was your surgery? 2017   How has your weight changed since your last visit? I have lost weight   Are you currently taking any weight loss medications? No   Do you currently have any of the following: None of the above   Have you been to the Emergency room since your last visit with us? No   Were you in the hospital since your last visit with us? Yes   Do you have any concerns today? no       Weight History:     2018   What is your highest lifetime weight? 295   What is your lowest weight since surgery? (In pounds) 150     Initial Weight: 253 lb 4.8 oz  Current Weight: Weight: 153 lb 14.4 oz  Cumulative weight loss (lbs): 99.4  Last Visits Weight: 187 lb    Questions Regarding Co-Morbidities and Health Concerns Reviewed With Patient 2018   Pre-diabetes: Never   Diabetes II: Never   High Blood Pressure: Never   High cholesterol: Never   Heartburn/Reflux: Never   Are you taking daily medication for heartburn, acid reflux, or GERD (acid reflux disease)? -   Sleep apnea: Never   PCOS: Never   Back pain: Improved   Joint pain: Stayed the same   Lower leg swelling: Never       Eating Habits 2018   How many meals do you eat per day? 3   Do you snack between meals? No   How much food are you eating at each meal? 1/2 cup to 1 cup   Are you able to separate your meals and liquids by at least 30 minutes? Yes   Are you able to avoid liquid calories? Yes       Exercise Questions Reviewed With Patient 2018   How often do you  "exercise? 1 to 2 times per week   What is the duration of your exercise (in minutes)? 30 Minutes   What types of exercise do you do? gym membership   What keeps you from being more active?  Lack of Time       Social History:      4/12/2018   Are you smoking? No   Are you drinking alcohol? No       Medications:  Current Outpatient Prescriptions   Medication     etonogestrel-ethinyl estradiol (NUVARING) 0.12-0.015 MG/24HR vaginal ring     BuPROPion HCl (WELLBUTRIN XL PO)     Misc Natural Products (DANDELION ROOT PO)     vitamin B complex with vitamin C (VITAMIN  B COMPLEX) TABS tablet     MULTIPLE VITAMIN PO     Topiramate (TOPAMAX PO)     No current facility-administered medications for this visit.      Facility-Administered Medications Ordered in Other Visits   Medication     iopamidol (ISOVUE-M 200) solution 20 mL     lidocaine (PF) (XYLOCAINE) 1 % injection 30 mL     triamcinolone acetonide (KENALOG-40) injection 40 mg     BUPivacaine 0.25%         4/12/2018   Do you avoid NSAIDs such as (Ibuprofen, Aleve, Naproxen, Advil)?   Yes       ROS:  GI:      4/12/2018   Vomiting: No   Diarrhea: No   Constipation: No   Swallowing trouble: No   Abdominal pain: No   Heartburn: No   Rash in skin folds: No   Depression: No   Stress urinary incontinence No     Skin:   BAR RBS ROS - SKIN 4/12/2018   Rash in skin folds: No     Psych:      4/12/2018   Depression: No   Anxiety: No     Female Only:   BAR RBS ROS - FEMALE ONLY 4/12/2018   Female only: None of the above       LABS/IMAGING/MEDICAL RECORDS REVIEW: reordered CMP/albumin check.    PHYSICAL EXAMINATION:  /76 (BP Location: Left arm, Patient Position: Chair, Cuff Size: Adult Regular)  Pulse 81  Ht 5' 4\"  Wt 153 lb 14.4 oz  SpO2 93%  BMI 26.42 kg/m2   NAD.  Breathing unlabored.    Some excess skin.    ASSESSMENT AND PLAN:      1. 8 months status laparoscopic gastric sleeve  2. Morbid Obesity historical now current BMI: Body mass index is 26.42 kg/(m^2).  3. Post " surgical malabsorption:   Labs ordered per protocol.   Follow food plan per dietitian recommendations.   Continue taking recommended post-op vitamins.  4. Return to clinic in 6 months.    Sincerely,    Sam Schmidt MD    I spent a total of 15 minutes face to face with Ashli during today's office visit. Over 50% of this time was spent counseling the patient and/or coordinating care.

## 2018-04-12 NOTE — NURSING NOTE
"(   Chief Complaint   Patient presents with     RECHECK     RBS    )    ( Weight: 153 lb 14.4 oz )  ( Height: 5' 4\" )  ( BMI (Calculated): 26.47 )  ( Initial Weight: 253 lb 4.8 oz )  ( Cumulative weight loss (lbs): 99.4 )  ( Last Visits Weight: 187 lb )  ( Wt change since last visit (lbs): -33.1 )  (   )  (   )    ( BP: 126/76 )  (   )  (   )  (   )  ( Pulse: 81 )  (   )  ( SpO2: 93 % )    (   Patient Active Problem List   Diagnosis     Morbid obesity (H)     post traumatic Arthritis of big toe     Major depression, recurrent (H)     CARDIOVASCULAR SCREENING; LDL GOAL LESS THAN 160     Endometriosis     Fibromyalgia     Muscular deconditioning     Cervical high risk HPV (human papillomavirus) test positive     S/P laparoscopic sleeve gastrectomy    )  (   Current Outpatient Prescriptions   Medication Sig Dispense Refill     etonogestrel-ethinyl estradiol (NUVARING) 0.12-0.015 MG/24HR vaginal ring Place 1 each vaginally every 21 days 4 each 3     BuPROPion HCl (WELLBUTRIN XL PO) Take 300 mg by mouth every morning       Misc Natural Products (DANDELION ROOT PO) Take 400 mg by mouth daily       vitamin B complex with vitamin C (VITAMIN  B COMPLEX) TABS tablet Take 1 tablet by mouth daily       MULTIPLE VITAMIN PO Take 1 patch by mouth daily PatchMD - Multivitamin Patch       Topiramate (TOPAMAX PO) Take 100 mg by mouth 2 times daily       )  ( Diabetes Eval:    )    ( Pain Eval:  No Pain (0) )    ( Wound Eval:       )    (   History   Smoking Status     Former Smoker     Packs/day: 0.50     Years: 5.00     Types: Cigarettes     Start date: 1/2/2007     Quit date: 11/1/2013   Smokeless Tobacco     Never Used    )    ( Signed By:  Chasity Singh; April 12, 2018; 2:10 PM )    "

## 2018-04-12 NOTE — PROGRESS NOTES
Nutrition Reassessment  Reason For Visit:  Ashli Gamboa is a 37 year old female presenting today for nutrition follow-up, ~4 month s/p SG with Dr. Schmidt. Pt referred by Dr. Schmidt (11/30/17).    Anthropometrics:  Initial Consult Weight: 291.8 lbs  Day of Surgery Weight (7/25/17): 275.6 lbs  Current Weight: 203.7 lbs   Weight loss: -49.6 lbs since 1 month post-op; -88.1 lbs from initial consult; -71.9 lbs from day of surgery    Nutrition History:  Current Vitamins/Minerals: MVI/minerals BID, dandelion root, B-complex     *Pt reports feeling fatigued and cold.     Progress with Previous Goals:  1) Follow soft diet for 3 weeks, then to progress to bariatric regular diet.   2) Consume 60 grams of protein/day. Meeting   3) Sip on 48-64 oz of fluids/day- between meals only.  4) Eat slowly (>20 min/meal), chewing foods well (to applesauce-like consistency).  5) Limit portions to 1/2 cup/meal. Meeting  Execising. No concerns.   6)Take the following after a Sleeve Gastrectomy:    Multivitamin/minerals: adult dose 2 times daily    Calcium Citrate containing vitamin D: 500 mg 3 times daily or 600 mg 2 times daily    Vitamin B12: sublingual form of at least 500 mcg daily or injection of 1000 mcg monthly     Vitamin D3: 2,000 International Units   *Okay to try vitamin Patches. - Was to the point of light headed without vitamins, so going well. Labs getting checked today. Patch MD (multivitamin plus)     Nutrition Prescription:  Grams Protein: 60 (minimum)  Amount of Fluid: 48-64 oz    Nutrition Diagnosis  Previous: Food and nutrition-related knowledge deficit r/t lack of prior exposure to diet advancements beyond bariatric pureed diet aeb pt unable to verbalize full understanding of bariatric soft and regular consistency diets. - resolved.    Current: Obesity r/t long history of self-monitoring deficit and excessive energy intake aeb BMI >30.    Intervention  Materials/Education provided: Reviewed goals. Praised pt on  progress with goals and weight loss. Discussed vitamin patches and warned pt that there is very little evidence on vitamin patches keeping vitamin/mineral levels at normal levels after bariatric surgery. Pt is willing to see if vitamin patches work for her because she prefers not to take her vitamins orally. Pt is having labs drawn today and would like labs drawn at 6 month post-op visit as well. Gave encouragement and support. Provided pt with list of goals and RD contact information.     Patient Understanding: Good  Expected Compliance: Good    Goals:  1) Follow soft diet for 3 weeks, then to progress to bariatric regular diet.   2) Consume 60 grams of protein/day.  3) Sip on 48-64 oz of fluids/day- between meals only.  4) Eat slowly (>20 min/meal), chewing foods well (to applesauce-like consistency).  5) Limit portions to 1/2 cup/meal.  6)Take the following after a Sleeve Gastrectomy:    Multivitamin/minerals: adult dose 2 times daily    Calcium Citrate containing vitamin D: 500 mg 3 times daily or 600 mg 2 times daily    Vitamin B12: sublingual form of at least 500 mcg daily or injection of 1000 mcg monthly     Vitamin D3: 2,000 International Units   *Okay to try vitamin Patches.        Follow-Up: prn    Time spent with patient: 30 minutes    Meaghan Glass RD, LD  Pager: [phone]

## 2018-04-12 NOTE — MR AVS SNAPSHOT
MRN:[id number]                      After Visit Summary   4/12/2018    Ahsli Gamboa    MRN: [id number]           Visit Information        Provider Department      4/12/2018 2:00 PM Yareli Ibrahim RD M [facility] Surgical Weight Management        Your next 10 appointments already scheduled     Sep 17, 2018  4:30 PM CDT   SHORT with Sabas Patel MD   [facility] ([facility])    [address] [place] [id number] [phone]              MyChart Information     [employer] gives you secure access to your electronic health record. If you see a primary care provider, you can also send messages to your care team and make appointments. If you have questions, please call your primary care clinic.  If you do not have a primary care provider, please call [phone] and they will assist you.      [employer] is an electronic gateway that provides easy, online access to your medical records. With [employer], you can request a clinic appointment, read your test results, renew a prescription or communicate with your care team.     To access your existing account, please contact your [facility] or call [phone] for assistance.        Care EveryWhere ID     This is your Care EveryWhere ID. This could be used by other organizations to access your [place] medical records  [id number]        Equal Access to Services     FRANCO OLMSTEAD : Hadmargo lainezo Socarlos, waaxda luqadaha, qaybta kaalmada adeegyada, lynne parmar. So [facility] [phone].    ATENCIÓN: Si habla español, tiene a flynn disposición servicios gratuitos de asistencia lingüística. Llame al [phone].    We comply with applicable federal civil rights laws and [place] laws. We do not discriminate on the basis of race, color, national origin, age, disability, sex, sexual orientation, or gender identity.

## 2018-04-12 NOTE — MR AVS SNAPSHOT
After Visit Summary   4/12/2018    Ashli Gamboa    MRN: [id number]           Patient Information     Date Of Birth          1980        Visit Information        Provider Department      4/12/2018 2:20 PM Sam Schmidt MD [facility] Surgical Weight Management        Today's Diagnoses     Achlorhydria, gastric    -  1       Follow-ups after your visit        Follow-up notes from your care team     Return in about 6 months (around 10/12/2018).      Your next 10 appointments already scheduled     Sep 17, 2018  4:30 PM CDT   [facility]   [facility]    [address] [address] [phone]              Who to contact     Please call your clinic at [phone] to:    Ask questions about your health    Make or cancel appointments    Discuss your medicines    Learn about your test results    Speak to your doctor            Additional Information About Your Visit        ZENThart Information     [employer] gives you secure access to your electronic health record. If you see a primary care provider, you can also send messages to your care team and make appointments. If you have questions, please call your primary care clinic.  If you do not have a primary care provider, please call [phone] and they will assist you.      [employer] is an electronic gateway that provides easy, online access to your medical records. With [employer], you can request a clinic appointment, read your test results, renew a prescription or communicate with your care team.     To access your existing account, please contact your [facility] or call [phone] for assistance.        Care EveryWhere ID     This is your Care EveryWhere ID. This could be used by other organizations to access your [place] medical records  [id number]        Your Vitals Were     Pulse Height Pulse Oximetry  "BMI (Body Mass Index)          81 5' 4\" 93% 26.42 kg/m2         Blood Pressure from Last 3 Encounters:   04/12/18 126/76   03/21/18 110/74   01/18/18 102/68    Weight from Last 3 Encounters:   04/12/18 153 lb 14.4 oz   03/21/18 187 lb   01/18/18 187 lb               Primary Care Provider Office Phone # Fax #    DANIELE Osorio [facility] [phone] [address] [facility] [address]        Equal Access to Services     [facility]: Hadii aad ku hadasho Soomaali, waaxda luqadaha, qaybta kaalmada adeegyanarinder, lynne warren . So [facility] [phone].    ATENCIÓN: Si habla español, tiene a flynn disposición servicios gratuitos de asistencia lingüística. [facility] [phone].    We comply with applicable federal civil rights laws and [place] laws. We do not discriminate on the basis of race, color, national origin, age, disability, sex, sexual orientation, or gender identity.            Thank you!     Thank you for choosing [facility] SURGICAL WEIGHT MANAGEMENT  for your care. Our goal is always to provide you with excellent care. Hearing back from our patients is one way we can continue to improve our services. Please take a few minutes to complete the written survey that you may receive in the mail after your visit with us. Thank you!             Your Updated Medication List - Protect others around you: Learn how to safely use, store and throw away your medicines at www.disposemymeds.org.          This list is accurate as of 4/12/18 11:59 PM.  Always use your most recent med list.                   Brand Name Dispense Instructions for use Diagnosis    DANDELION ROOT PO      Take 400 mg by mouth daily        etonogestrel-ethinyl estradiol 0.12-0.015 MG/24HR vaginal ring    NUVARING    4 each    Place 1 each vaginally every 21 days    Endometriosis       MULTIPLE VITAMIN PO      Take 1 patch by mouth daily PatchMD - Multivitamin Patch        TOPAMAX PO      Take 100 mg " by mouth 2 times daily        vitamin B complex with vitamin C Tabs tablet      Take 1 tablet by mouth daily        WELLBUTRIN XL PO      Take 300 mg by mouth every morning

## 2018-04-12 NOTE — PROGRESS NOTES
Nutrition Reassessment  Reason For Visit:  Ashli Gamboa is a 37 year old female presenting today for nutrition follow-up, ~6 month s/p SG with Dr. Schmidt. Pt referred by Dr. Schmidt (11/30/17).    Anthropometrics:  Initial Consult Weight: 291.8 lbs  Day of Surgery Weight (7/25/17): 275.6 lbs  Current Weight: 153.9 lbs   Weight loss: -137.9 lbs from initial consult; -121.7 lbs from day of surgery    Nutrition History:  Current Vitamins/Minerals: MVI/minerals BID    *Pt reports feeling fatigued and cold.     Progress with Previous Goals:  1) Follow soft diet for 3 weeks, then to progress to bariatric regular diet. - Met    2) Consume 60 grams of protein/day. - Met    3) Sip on 48-64 oz of fluids/day- between meals only. - Met    4) Eat slowly (>20 min/meal), chewing foods well (to applesauce-like consistency). - Met    5) Limit portions to 1/2 cup/meal. - Met    6)Take the following after a Sleeve Gastrectomy: - Using vitamin patches as opposed to oral supplements. Labs today pending.     Multivitamin/minerals: adult dose 2 times daily    Calcium Citrate containing vitamin D: 500 mg 3 times daily or 600 mg 2 times daily    Vitamin B12: sublingual form of at least 500 mcg daily or injection of 1000 mcg monthly     Vitamin D3: 2,000 International Units   *Okay to try vitamin Patches.     Nutrition Prescription:  Grams Protein: 60 (minimum)  Amount of Fluid: 48-64 oz     Nutrition Diagnosis  Previous: Food and nutrition-related knowledge deficit r/t lack of prior exposure to diet advancements beyond bariatric pureed diet aeb pt unable to verbalize full understanding of bariatric soft and regular consistency diets. - resolved.    Current: Obesity r/t long history of self-monitoring deficit and excessive energy intake aeb BMI >30.    Intervention  Materials/Education provided: Reviewed goals. Praised pt on progress with goals and weight loss. Discussed vitamin patches and warned pt that there is very little evidence  on vitamin patches keeping vitamin/mineral levels at normal levels after bariatric surgery. Pt is willing to see if vitamin patches work for her because she prefers not to take her vitamins orally. Pt is having labs drawn today.  Discussed herbal supplements, discouraged used d/t unknown effects on organs. Gave encouragement and support. Provided pt with list of goals and RD contact information.     Patient Understanding: Good  Expected Compliance: Good    Goals:  1) Follow soft diet for 3 weeks, then to progress to bariatric regular diet.   2) Consume 60 grams of protein/day.  3) Sip on 48-64 oz of fluids/day- between meals only.  4) Eat slowly (>20 min/meal), chewing foods well (to applesauce-like consistency).  5) Limit portions to 1/2 cup/meal.  6)Take the following after a Sleeve Gastrectomy:    Multivitamin/minerals: adult dose 2 times daily    Calcium Citrate containing vitamin D: 500 mg 3 times daily or 600 mg 2 times daily    Vitamin B12: sublingual form of at least 500 mcg daily or injection of 1000 mcg monthly     Vitamin D3: 2,000 International Units   *Okay to try vitamin Patches.        Follow-Up: prn    Time spent with patient: 15 minutes    Bettye Siddiqui RD, LD

## 2018-04-12 NOTE — LETTER
2018       RE: Ashli Gamboa  [address] [place] [address]     Dear Colleague,    Thank you for referring your patient, Ashli Gamboa, to the [facility] SURGICAL WEIGHT MANAGEMENT at [facility]. Please see a copy of my visit note below.        Return Bariatric Surgery Note    RE: Ashli Gamboa  MR#: [id number]  : 1980  VISIT DATE: 2018    Dear Dr. Mo,    I had the pleasure of seeing your patient, Ashli Gamboa, in my post-bariatric surgery assessment clinic.    CHIEF COMPLAINT: Post-bariatric surgery follow-up    HISTORY OF PRESENT ILLNESS:  Questions Regarding Prior Weight Loss Surgery Reviewed With Patient 2018   I had the following weight loss procedure: Sleeve Gastrectomy   What year was your surgery? 2017   How has your weight changed since your last visit? I have lost weight   Are you currently taking any weight loss medications? No   Do you currently have any of the following: None of the above   Have you been to the Emergency room since your last visit with us? No   Were you in the hospital since your last visit with us? Yes   Do you have any concerns today? no       Weight History:     2018   What is your highest lifetime weight? 295   What is your lowest weight since surgery? (In pounds) 150     Initial Weight: 253 lb 4.8 oz  Current Weight: Weight: 153 lb 14.4 oz  Cumulative weight loss (lbs): 99.4  Last Visits Weight: 187 lb    Questions Regarding Co-Morbidities and Health Concerns Reviewed With Patient 2018   Pre-diabetes: Never   Diabetes II: Never   High Blood Pressure: Never   High cholesterol: Never   Heartburn/Reflux: Never   Are you taking daily medication for heartburn, acid reflux, or GERD (acid reflux disease)? -   Sleep apnea: Never   PCOS: Never   Back pain: Improved   Joint pain: Stayed the same   Lower leg swelling: Never       Eating Habits 2018   How many meals do you  eat per day? 3   Do you snack between meals? No   How much food are you eating at each meal? 1/2 cup to 1 cup   Are you able to separate your meals and liquids by at least 30 minutes? Yes   Are you able to avoid liquid calories? Yes       Exercise Questions Reviewed With Patient 4/12/2018   How often do you exercise? 1 to 2 times per week   What is the duration of your exercise (in minutes)? 30 Minutes   What types of exercise do you do? gym membership   What keeps you from being more active?  Lack of Time       Social History:      4/12/2018   Are you smoking? No   Are you drinking alcohol? No       Medications:  Current Outpatient Prescriptions   Medication     etonogestrel-ethinyl estradiol (NUVARING) 0.12-0.015 MG/24HR vaginal ring     BuPROPion HCl (WELLBUTRIN XL PO)     Misc Natural Products (DANDELION ROOT PO)     vitamin B complex with vitamin C (VITAMIN  B COMPLEX) TABS tablet     MULTIPLE VITAMIN PO     Topiramate (TOPAMAX PO)     No current facility-administered medications for this visit.      Facility-Administered Medications Ordered in Other Visits   Medication     iopamidol (ISOVUE-M 200) solution 20 mL     lidocaine (PF) (XYLOCAINE) 1 % injection 30 mL     triamcinolone acetonide (KENALOG-40) injection 40 mg     BUPivacaine 0.25%         4/12/2018   Do you avoid NSAIDs such as (Ibuprofen, Aleve, Naproxen, Advil)?   Yes       ROS:  GI:      4/12/2018   Vomiting: No   Diarrhea: No   Constipation: No   Swallowing trouble: No   Abdominal pain: No   Heartburn: No   Rash in skin folds: No   Depression: No   Stress urinary incontinence No     Skin:   BAR RBS ROS - SKIN 4/12/2018   Rash in skin folds: No     Psych:      4/12/2018   Depression: No   Anxiety: No     Female Only:   BAR RBS ROS - FEMALE ONLY 4/12/2018   Female only: None of the above       LABS/IMAGING/MEDICAL RECORDS REVIEW: reordered CMP/albumin check.    PHYSICAL EXAMINATION:  /76 (BP Location: Left arm, Patient Position: Chair, Cuff  "Size: Adult Regular)  Pulse 81  Ht 5' 4\"  Wt 153 lb 14.4 oz  SpO2 93%  BMI 26.42 kg/m2   NAD.  Breathing unlabored.    Some excess skin.    ASSESSMENT AND PLAN:      1. 8 months status laparoscopic gastric sleeve  2. Morbid Obesity historical now current BMI: Body mass index is 26.42 kg/(m^2).  3. Post surgical malabsorption:   Labs ordered per protocol.   Follow food plan per dietitian recommendations.   Continue taking recommended post-op vitamins.  4. Return to clinic in 6 months.    I spent a total of 15 minutes face to face with Ashli during today's office visit. Over 50% of this time was spent counseling the patient and/or coordinating care.    Again, thank you for allowing me to participate in the care of your patient.      Sincerely,    Sam Schmidt MD      "

## 2018-06-06 DIAGNOSIS — F33.41 RECURRENT MAJOR DEPRESSIVE DISORDER, IN PARTIAL REMISSION (H): ICD-10-CM

## 2018-06-06 NOTE — TELEPHONE ENCOUNTER
"Requested Prescriptions   Pending Prescriptions Disp Refills     buPROPion (WELLBUTRIN XL) 300 MG 24 hr tablet [Pharmacy Med Name: BUPROPION XL 300MG TABLETS]  HISTORICAL  Last Office Visit: 8/2/2017   Future Office Visit:    Next 5 appointments (look out 90 days)     Jun 08, 2018  9:00 AM CDT   Glenn Kilgore with DANIELE Osorio CNP   [facility]    [address] [place] [id number] [phone]                90 tablet 0     Sig: TAKE 1 TABLET(300 MG) BY MOUTH EVERY MORNING    SSRIs Protocol Failed    6/6/2018  3:38 AM       Failed - No positive pregnancy test in last 12 months       Failed - Recent (6 mo) or future (30 days) visit within the authorizing provider's specialty    Patient had office visit in the last 6 months or has a visit in the next 30 days with authorizing provider or within the authorizing provider's specialty.  See \"Patient Info\" tab in inbasket, or \"Choose Columns\" in Meds & Orders section of the refill encounter.           Passed - PHQ-9 score less than 5 in past 6 months    Please review last PHQ-9 score.   PHQ-9 SCORE 4/3/2017 7/5/2017 1/2/2018   Total Score - - -   Total Score MyChart - - 2 (Minimal depression)   Total Score 7 3 2     ALIDA-7 SCORE 12/30/2015 7/5/2017   Total Score 1 (minimal anxiety) -   Total Score 1 3          Passed - Medication is Bupropion    If the medication is Bupropion (Wellbutrin), and the patient is taking for smoking cessation; OK to refill.         Passed - Patient is age 18 or older       Passed - No active pregnancy on record          "

## 2018-06-08 ENCOUNTER — OFFICE VISIT (OUTPATIENT)
Dept: FAMILY MEDICINE | Facility: CLINIC | Age: 38
End: 2018-06-08
Payer: COMMERCIAL

## 2018-06-08 ENCOUNTER — RADIANT APPOINTMENT (OUTPATIENT)
Dept: GENERAL RADIOLOGY | Facility: CLINIC | Age: 38
End: 2018-06-08
Attending: NURSE PRACTITIONER
Payer: COMMERCIAL

## 2018-06-08 VITALS
TEMPERATURE: 98.8 F | BODY MASS INDEX: 24.1 KG/M2 | DIASTOLIC BLOOD PRESSURE: 64 MMHG | RESPIRATION RATE: 18 BRPM | WEIGHT: 140.4 LBS | HEART RATE: 80 BPM | SYSTOLIC BLOOD PRESSURE: 104 MMHG

## 2018-06-08 DIAGNOSIS — M53.3 PAIN IN THE COCCYX: ICD-10-CM

## 2018-06-08 DIAGNOSIS — M53.3 PAIN IN THE COCCYX: Primary | ICD-10-CM

## 2018-06-08 DIAGNOSIS — F33.41 RECURRENT MAJOR DEPRESSIVE DISORDER, IN PARTIAL REMISSION (H): ICD-10-CM

## 2018-06-08 PROCEDURE — 99214 OFFICE O/P EST MOD 30 MIN: CPT | Performed by: NURSE PRACTITIONER

## 2018-06-08 PROCEDURE — 72220 X-RAY EXAM SACRUM TAILBONE: CPT

## 2018-06-08 RX ORDER — BUPROPION HYDROCHLORIDE 300 MG/1
300 TABLET ORAL EVERY MORNING
Qty: 90 TABLET | Refills: 3 | Status: SHIPPED | OUTPATIENT
Start: 2018-06-08 | End: 2019-06-27

## 2018-06-08 NOTE — LETTER
My Depression Action Plan  Name: Ashli Gamboa   Date of Birth 1980  Date: 6/8/2018    My doctor: Karena Mo   My clinic: [address] [id number] [phone]          GREEN    ZONE   Good Control    What it looks like:     Things are going generally well. You have normal up s and down s. You may even feel depressed from time to time, but bad moods usually last less than a day.   What you need to do:  1. Continue to care for yourself (see self care plan)  2. Check your depression survival kit and update it as needed  3. Follow your physician s recommendations including any medication.  4. Do not stop taking medication unless you consult with your physician first.           YELLOW         ZONE Getting Worse    What it looks like:     Depression is starting to interfere with your life.     It may be hard to get out of bed; you may be starting to isolate yourself from others.    Symptoms of depression are starting to last most all day and this has happened for several days.     You may have suicidal thoughts but they are not constant.   What you need to do:     1. Call your care team, your response to treatment will improve if you keep your care team informed of your progress. Yellow periods are signs an adjustment may need to be made.     2. Continue your self-care, even if you have to fake it!    3. Talk to someone in your support network    4. Open up your depression survival kit           RED    ZONE Medical Alert - Get Help    What it looks like:     Depression is seriously interfering with your life.     You may experience these or other symptoms: You can t get out of bed most days, can t work or engage in other necessary activities, you have trouble taking care of basic hygiene, or basic responsibilities, thoughts of suicide or death that will not go away, self-injurious behavior.     What you need to do:  1. Call your care  team and request a same-day appointment. If they are not available (weekends or after hours) call your local crisis line, emergency room or 911.            Depression Self Care Plan / Survival Kit    Self-Care for Depression  Here s the deal. Your body and mind are really not as separate as most people think.  What you do and think affects how you feel and how you feel influences what you do and think. This means if you do things that people who feel good do, it will help you feel better.  Sometimes this is all it takes.  There is also a place for medication and therapy depending on how severe your depression is, so be sure to consult with your medical provider and/ or Behavioral Health Consultant if your symptoms are worsening or not improving.     In order to better manage my stress, I will:    Exercise  Get some form of exercise, every day. This will help reduce pain and release endorphins, the  feel good  chemicals in your brain. This is almost as good as taking antidepressants!  This is not the same as joining a gym and then never going! (they count on that by the way ) It can be as simple as just going for a walk or doing some gardening, anything that will get you moving.      Hygiene   Maintain good hygiene (Get out of bed in the morning, Make your bed, Brush your teeth, Take a shower, and Get dressed like you were going to work, even if you are unemployed).  If your clothes don't fit try to get ones that do.    Diet  I will strive to eat foods that are good for me, drink plenty of water, and avoid excessive sugar, caffeine, alcohol, and other mood-altering substances.  Some foods that are helpful in depression are: complex carbohydrates, B vitamins, flaxseed, fish or fish oil, fresh fruits and vegetables.    Psychotherapy  I agree to participate in Individual Therapy (if recommended).    Medication  If prescribed medications, I agree to take them.  Missing doses can result in serious side effects.  I  understand that drinking alcohol, or other illicit drug use, may cause potential side effects.  I will not stop my medication abruptly without first discussing it with my provider.    Staying Connected With Others  I will stay in touch with my friends, family members, and my primary care provider/team.    Use your imagination  Be creative.  We all have a creative side; it doesn t matter if it s oil painting, sand castles, or mud pies! This will also kick up the endorphins.    Witness Beauty  (AKA stop and smell the roses) Take a look outside, even in mid-winter. Notice colors, textures. Watch the squirrels and birds.     Service to others  Be of service to others.  There is always someone else in need.  By helping others we can  get out of ourselves  and remember the really important things.  This also provides opportunities for practicing all the other parts of the program.    Humor  Laugh and be silly!  Adjust your TV habits for less news and crime-drama and more comedy.    Control your stress  Try breathing deep, massage therapy, biofeedback, and meditation. Find time to relax each day.     My support system    Clinic Contact:  Phone number:    Contact 1:  Phone number:    Contact 2:  Phone number:    Ep[employer]/:  Phone number:    Therapist:  Phone number:    Local crisis center:    Phone number:    Other community support:  Phone number:

## 2018-06-08 NOTE — MR AVS SNAPSHOT
After Visit Summary   6/8/2018    Ashli Gamboa    MRN: [id number]           Patient Information     Date Of Birth          1980        Visit Information        Provider Department      6/8/2018 9:00 AM Karena Mo APRN CNP [facility]        Today's Diagnoses     Pain in the coccyx    -  1    Recurrent major depressive disorder, in partial remission (H)           Follow-ups after your visit        Your next 10 appointments already scheduled     Sep 17, 2018  4:30 PM CDT   SHORT with Sabas Patel MD   [facility] ([facility])    [address] [address] [phone]              Who to contact     If you have questions or need follow up information about today's clinic visit or your schedule please contact [facility] directly at [phone].  Normal or non-critical lab and imaging results will be communicated to you by MyChart, letter or phone within 4 business days after the clinic has received the results. If you do not hear from us within 7 days, please contact the clinic through [employer] or phone. If you have a critical or abnormal lab result, we will notify you by phone as soon as possible.  Submit refill requests through [employer] or call your pharmacy and they will forward the refill request to us. Please allow 3 business days for your refill to be completed.          Additional Information About Your Visit        MyChart Information     [employer] gives you secure access to your electronic health record. If you see a primary care provider, you can also send messages to your care team and make appointments. If you have questions, please call your primary care clinic.  If you do not have a primary care provider, please call [phone] and they will assist you.        Care EveryWhere ID     This is your Care EveryWhere ID. This could be used by other  organizations to access your [place] medical records  [id number]        Your Vitals Were     Pulse Temperature Respirations BMI (Body Mass Index)          80 98.8  F (37.1  C) (Oral) 18 24.1 kg/m2         Blood Pressure from Last 3 Encounters:   06/08/18 104/64   04/12/18 126/76   03/21/18 110/74    Weight from Last 3 Encounters:   06/08/18 140 lb 6.4 oz (63.7 kg)   04/12/18 153 lb 14.4 oz (69.8 kg)   03/21/18 187 lb (84.8 kg)              We Performed the Following     DEPRESSION ACTION PLAN (DAP)          Today's Medication Changes          These changes are accurate as of 6/8/18  3:19 PM.  If you have any questions, ask your nurse or doctor.               These medicines have changed or have updated prescriptions.        Dose/Directions    buPROPion 300 MG 24 hr tablet   Commonly known as:  WELLBUTRIN XL   This may have changed:  medication strength   Used for:  Recurrent major depressive disorder, in partial remission (H)   Changed by:  Karena Mo APRN CNP        Dose:  300 mg   Take 1 tablet (300 mg) by mouth every morning   Quantity:  90 tablet   Refills:  3            Where to get your medicines      These medications were sent to [facility] - [place], [place] - [address] AT [facility]  [address], [place] [address]     Phone:  [phone]     buPROPion 300 MG 24 hr tablet                Primary Care Provider Office Phone # Fax #    DANIELE Osorio [phone] [address] [facility] [address]        Equal Access to Services     SHILA OLMSTEAD AH: Hadmargo Wong, wachinada luqadaha, qaybta kaalmalynne pleitez. So [facility] [phone].    ATENCIÓN: Si habla español, tiene a flynn disposición servicios gratuitos de asistencia lingüística. Genaro al [phone].    We comply with applicable federal civil rights laws and [place] laws. We do not discriminate on the basis of  race, color, national origin, age, disability, sex, sexual orientation, or gender identity.            Thank you!     Thank you for choosing [facility]  for your care. Our goal is always to provide you with excellent care. Hearing back from our patients is one way we can continue to improve our services. Please take a few minutes to complete the written survey that you may receive in the mail after your visit with us. Thank you!             Your Updated Medication List - Protect others around you: Learn how to safely use, store and throw away your medicines at www.disposemymeds.org.          This list is accurate as of 6/8/18  3:19 PM.  Always use your most recent med list.                   Brand Name Dispense Instructions for use Diagnosis    buPROPion 300 MG 24 hr tablet    WELLBUTRIN XL    90 tablet    Take 1 tablet (300 mg) by mouth every morning    Recurrent major depressive disorder, in partial remission (H)       DANDELION ROOT PO      Take 400 mg by mouth daily        etonogestrel-ethinyl estradiol 0.12-0.015 MG/24HR vaginal ring    NUVARING    4 each    Place 1 each vaginally every 21 days    Endometriosis       MULTIPLE VITAMIN PO      Take 1 patch by mouth daily PatchMD - Multivitamin Patch        TOPAMAX PO      Take 100 mg by mouth 2 times daily        vitamin B complex with vitamin C Tabs tablet      Take 1 tablet by mouth daily

## 2018-06-08 NOTE — PROGRESS NOTES
SUBJECTIVE:   Ashli Gamboa is a 38 year old female who presents to clinic today for the following health issues:      Tailbone       Duration: 6 weeks    Description  Location: tailbone    Intensity:  severe    Accompanying signs and symptoms: none    History  Previous similar problem: no   Previous evaluation:  none    Precipitating or alleviating factors:  Trauma or overuse: no   Aggravating factors include: sitting    Therapies tried and outcome: ice and Ibuprofen    No specific injury or trauma.    Rating pain +5-9/10.    Constant dull ache and severe sharp pains with specific movements.    Has lost 140# in the last year intentionally post gastric sleeve surgery.    Really working on eating healthy and being more active.    Feeling really good about herself and her body.    No bruising or redness.    No fevers.      Also needs a refill of wellbutrin.    Mood stable or even improved.    Getting better with weight loss and increased activity= less fibromyalgia pain.    Wants to stay on for now.        Reviewed and updated as needed this visit by clinical staff  Tobacco  Allergies  Meds  Problems  Med Hx  Surg Hx  Fam Hx  Soc Hx        Reviewed and updated as needed this visit by Provider  Tobacco  Allergies  Meds  Problems  Med Hx  Surg Hx  Fam Hx  Soc Hx          ROS:  Constitutional, HEENT, cardiovascular, pulmonary, GI, , musculoskeletal, neuro, skin, endocrine and psych systems are negative, except as otherwise noted.    OBJECTIVE:     /64  Pulse 80  Temp 98.8  F (37.1  C) (Oral)  Resp 18  Wt 140 lb 6.4 oz (63.7 kg)  BMI 24.1 kg/m2  Body mass index is 24.1 kg/(m^2).  GENERAL: healthy, alert and no distress  RESP: lungs clear to auscultation - no rales, rhonchi or wheezes  CV: regular rate and rhythm, normal S1 S2, no S3 or S4, no murmur, click or rub, no peripheral edema and peripheral pulses strong  ABDOMEN: soft, nontender, no hepatosplenomegaly, no masses and bowel  sounds normal  MS: coccyx pain.  no edema, peripheral pulses normal and spine exam shows ROM is normal  SKIN: no suspicious lesions or rashes  PHQ-9 SCORE 7/5/2017 1/2/2018 6/8/2018   Total Score - - -   Total Score MyChart - 2 (Minimal depression) -   Total Score 3 2 1         Xray interpreted as negative in the clinic.  Pending radiologist review.        ASSESSMENT/PLAN:       ICD-10-CM    1. Pain in the coccyx M53.3 XR Sacrum and Coccyx 2 Views   2. Recurrent major depressive disorder, in partial remission (H) F33.41 buPROPion (WELLBUTRIN XL) 300 MG 24 hr tablet     DEPRESSION ACTION PLAN (DAP)     nothing abnormal on xray.  Will trial using a wedge when she is sleeping.    If pains persist may trial chiropractic or orthopedics if worsening.      Refill wellbutrin.  Mood improving with improved lifestyle.    Will stay on for now.  Refill and f/u in 1 year.    Sooner if worsening.      See Patient Instructions    DANIELE Osorio CNP  [facility]

## 2018-06-09 ASSESSMENT — PATIENT HEALTH QUESTIONNAIRE - PHQ9: SUM OF ALL RESPONSES TO PHQ QUESTIONS 1-9: 1

## 2018-06-11 RX ORDER — BUPROPION HYDROCHLORIDE 300 MG/1
TABLET ORAL
Qty: 90 TABLET | Refills: 0 | OUTPATIENT
Start: 2018-06-11

## 2018-07-04 DIAGNOSIS — H91.90 HEARING LOSS: Primary | ICD-10-CM

## 2018-07-05 ENCOUNTER — MYC MEDICAL ADVICE (OUTPATIENT)
Dept: FAMILY MEDICINE | Facility: CLINIC | Age: 38
End: 2018-07-05

## 2018-07-05 DIAGNOSIS — H91.92 LEFT EAR HEARING LOSS: ICD-10-CM

## 2018-07-05 DIAGNOSIS — M53.3 PAIN IN THE COCCYX: ICD-10-CM

## 2018-07-05 DIAGNOSIS — H91.90 HEARING LOSS: Primary | ICD-10-CM

## 2018-07-05 NOTE — TELEPHONE ENCOUNTER
FUTURE VISIT INFORMATION      FUTURE VISIT INFORMATION:    Date: 7-9-18    Time:     Location:   REFERRAL INFORMATION:    Referring provider:  self    Referring providers clinic:      Reason for visit/diagnosis  hearing loss     RECORDS REQUESTED FROM:       Clinic name Comments Records Status Imaging Status   [facility]                                      RECORDS STATUS

## 2018-07-06 NOTE — TELEPHONE ENCOUNTER
Karena Mo review:  Has multiple requests.  PT outside of network?  Will send to Leonora Thompson to discuss with her.    Can you sign off on DME?     Will need to get a fax number .  Rosanne Garcia RN

## 2018-07-06 NOTE — TELEPHONE ENCOUNTER
Spoke with pt, whose insurance is [employer] and explained that [facility] is out of the [facility] network. Advised pt that Heather Heck,PT, DPT at [facility] specializes in pelvic floor pain. Please have Karena Mo place a physical therapy Order to [facility] for tailbone pain. Pt already has appointment number [phone].    Leonora Thompson [place] Referral Rep

## 2018-07-09 ENCOUNTER — OFFICE VISIT (OUTPATIENT)
Dept: AUDIOLOGY | Facility: CLINIC | Age: 38
End: 2018-07-09
Payer: COMMERCIAL

## 2018-07-09 ENCOUNTER — OFFICE VISIT (OUTPATIENT)
Dept: OTOLARYNGOLOGY | Facility: CLINIC | Age: 38
End: 2018-07-09
Payer: COMMERCIAL

## 2018-07-09 ENCOUNTER — PRE VISIT (OUTPATIENT)
Dept: OTOLARYNGOLOGY | Facility: CLINIC | Age: 38
End: 2018-07-09

## 2018-07-09 VITALS — BODY MASS INDEX: 23.56 KG/M2 | WEIGHT: 138 LBS | HEIGHT: 64 IN

## 2018-07-09 DIAGNOSIS — H90.3 ASYMMETRICAL SENSORINEURAL HEARING LOSS: Primary | ICD-10-CM

## 2018-07-09 DIAGNOSIS — H91.90 HEARING LOSS: ICD-10-CM

## 2018-07-09 DIAGNOSIS — H90.3 SENSORINEURAL HEARING LOSS, BILATERAL: Primary | ICD-10-CM

## 2018-07-09 ASSESSMENT — PAIN SCALES - GENERAL: PAINLEVEL: NO PAIN (0)

## 2018-07-09 NOTE — MR AVS SNAPSHOT
After Visit Summary   7/9/2018    Ashli Gamboa    MRN: [id number]           Patient Information     Date Of Birth          1980        Visit Information        Provider Department      7/9/2018 9:30 AM Mabel Astudillo, [facility]        Today's Diagnoses     Sensorineural hearing loss, bilateral    -  1    Hearing loss           Follow-ups after your visit        Your next 10 appointments already scheduled     Jul 13, 2018  7:45 PM CDT   (Arrive by 7:30 PM)   MR BRAIN W/O & W CONTRAST with [id number]   [facility] [place] [facility] ([facility] [place])    [address] [address] [phone]           Take your medicines as usual, unless your doctor tells you not to. Bring a list of your current medicines to your exam (including vitamins, minerals and over-the-counter drugs).  You may or may not receive intravenous (IV) contrast for this exam pending the discretion of the Radiologist.  You do not need to do anything special to prepare.  The MRI machine uses a strong magnet. Please wear clothes without metal (snaps, zippers). A sweatsuit works well, or we may give you a hospital gown.  Please remove any body piercings and hair extensions before you arrive. You will also remove watches, jewelry, hairpins, wallets, dentures, partial dental plates and hearing aids. You may wear contact lenses, and you may be able to wear your rings. We have a safe place to keep your personal items, but it is safer to leave them at home.  **IMPORTANT** THE INSTRUCTIONS BELOW ARE ONLY FOR THOSE PATIENTS WHO HAVE BEEN PRESCRIBED SEDATION OR GENERAL ANESTHESIA DURING THEIR MRI PROCEDURE:  IF YOUR DOCTOR PRESCRIBED ORAL SEDATION (take medicine to help you relax during your exam):   You must get the medicine from your doctor (oral medication) before you arrive. Bring the medicine to the exam. Do not take it at home. You ll be told when to take it upon  arriving for your exam.   Arrive one hour early. Bring someone who can take you home after the test. Your medicine will make you sleepy. After the exam, you may not drive, take a bus or take a taxi by yourself.  IF YOUR DOCTOR PRESCRIBED IV SEDATION:   Arrive one hour early. Bring someone who can take you home after the test. Your medicine will make you sleepy. After the exam, you may not drive, take a bus or take a taxi by yourself.   No eating 6 hours before your exam. You may have clear liquids up until 4 hours before your exam. (Clear liquids include water, clear tea, black coffee and fruit juice without pulp.)  IF YOUR DOCTOR PRESCRIBED ANESTHESIA (be asleep for your exam):   Arrive 1 1/2 hours early. Bring someone who can take you home after the test. You may not drive, take a bus or take a taxi by yourself.   No eating 8 hours before your exam. You may have clear liquids up until 4 hours before your exam. (Clear liquids include water, clear tea, black coffee and fruit juice without pulp.)   You will spend four to five hours in the recovery room.  Please call the Imaging Department at your exam site with any questions.            Jul 19, 2018  7:25 AM CDT   BERONICA For Women Only with Carolina Womack PT   [place] [facility]    [address] [place] [address] [address] [address]   [phone]            Jul 26, 2018  1:30 PM CDT   (Arrive by 1:15 PM)   NUTRITION VISIT with Yareli Ibrahim RD   [facility] ([facility] [place])    [address] [place] [id number] [phone]            Jul 26, 2018  2:20 PM CDT   (Arrive by 2:05 PM)   RETURN BARIATRIC SURGERY with Sam Schmidt MD   [facility] ([facility] [place])    [address] [place] [id number] [phone]            Jul 27, 2018  6:45 AM CDT   BERONICA For  Women Only with Gomez Do Alanna, PT   [place] [facility] [place] [facility] ([facility] [place]  )    [address]  [place] [address] [phone]            Aug 02, 2018  7:00 AM CDT   BERONICA For Women Only with Gomez Do Alanna, PT   [place] [facility] [place] [facility] ([facility] [place]  )    [address]  [place] [address] [phone]            Sep 17, 2018  4:30 PM CDT   SHORT with Sabas Patel MD   [facility] [place] ([facility] [place])    [address]  [address]  [place] [id number] [phone]              Future tests that were ordered for you today     Open Future Orders        Priority Expected Expires Ordered    MRI Temporal Bone/IAC With and W/O Contrast Routine  7/9/2019 7/9/2018            Who to contact     Please call your clinic at [phone] to:    Ask questions about your health    Make or cancel appointments    Discuss your medicines    Learn about your test results    Speak to your doctor            Additional Information About Your Visit        Paylocity Information     [employer]ity gives you secure access to your electronic health record. If you see a primary care provider, you can also send messages to your care team and make appointments. If you have questions, please call your primary care clinic.  If you do not have a primary care provider, please call [phone] and they will assist you.      [employer]ity is an electronic gateway that provides easy, online access to your medical records. With [employer], you can request a clinic appointment, read your test results, renew a prescription or communicate with your care team.     To access your existing account, please contact your [facility] or call [phone] for assistance.        Care EveryWhere ID     This is your Care EveryWhere ID. This could be used by other organizations to access your [place]  medical records  [id number]         Blood Pressure from Last 3 Encounters:   06/08/18 104/64   04/12/18 126/76   03/21/18 110/74    Weight from Last 3 Encounters:   07/09/18 62.6 kg (138 lb)   06/08/18 63.7 kg (140 lb 6.4 oz)   04/12/18 69.8 kg (153 lb 14.4 oz)              We Performed the Following     AUDIOGRAM/TYMPANOGRAM - INTERFACE     AUDIOLOGY ADULT REFERRAL     [facility] ([address])     Tymps / Reflex   (00004)        Primary Care Provider Office Phone # Fax #    DANIELE Osorio [facility] [phone] [address] [facility] [address] [facility] [address]        Equal Access to Services     FRANCO OLMSTEAD : Hadii aad ku hadasho Soomaali, waaxda luqadaha, qaybta kaalmada adeegyada, lynne warren . So [facility] [phone].    ATENCIÓN: Si habla español, tiene a flynn disposición servicios gratuitos de asistencia lingüística. Genaro al [phone].    We comply with applicable federal civil rights laws and [place] laws. We do not discriminate on the basis of race, color, national origin, age, disability, sex, sexual orientation, or gender identity.            Thank you!     Thank you for choosing [facility]UDIOLOG[facility]  for your care. Our goal is always to provide you with excellent care. Hearing back from our patients is one way we can continue to improve our services. Please take a few minutes to complete the written survey that you may receive in the mail after your visit with us. Thank you!             Your Updated Medication List - Protect others around you: Learn how to safely use, store and throw away your medicines at www.disposemymeds.org.          This list is accurate as of 7/9/18 11:59 PM.  Always use your most recent med list.                   Brand Name Dispense Instructions for use Diagnosis    buPROPion 300 MG 24 hr tablet    WELLBUTRIN XL    90 tablet    Take 1 tablet (300 mg) by mouth every morning    Recurrent major depressive  disorder, in partial remission (H)       DANDELION ROOT PO      Take 400 mg by mouth daily        etonogestrel-ethinyl estradiol 0.12-0.015 MG/24HR vaginal ring    NUVARING    4 each    Place 1 each vaginally every 21 days    Endometriosis       MULTIPLE VITAMIN PO      Take 1 patch by mouth daily PatchMD - Multivitamin Patch        TOPAMAX PO      Take 100 mg by mouth 2 times daily        vitamin B complex with vitamin C Tabs tablet      Take 1 tablet by mouth daily

## 2018-07-09 NOTE — PROGRESS NOTES
Ashli Gamboa is a new patient to our clinic.  She is a 38 year old female being seen for hearing loss.     Patient reports 1 year history of hearing loss. She first noticed difficulty discerning what  and coworkers were saying, sometimes misinterpreting their words. The left ear seemed to be more affected than the right. The patient feels that the hearing deficit has progressed mildly. Her hearing troubles are worsened when being in environments with high background noise. She is able to use a phone on both ears still. She reports some mild intermittent left ear pain. Due to her symptoms, patient did seek an audiology evaluation where she was told she had L sided hearing loss. She has tried on some hearing aids since then and had significant improvement with one of the devices.    The patient has fibromyalgia and therefore has a fair amount of chronic pain including headaches, but no significant changes in this. Her history is notable for a couple ear infections as a child, but no prior ear surgeries. No significant noise exposure or use of ototoxic medications. No family history of hearing loss.     She otherwise denies tinnitus, otorrhea, sinus infections, vision changes and dizziness.     Past Medical History:   Diagnosis Date     Arthritis     BIG TOE     Cervical high risk HPV (human papillomavirus) test positive 07/05/2017     Cervical high risk HPV (human papillomavirus) test positive 06/10/2015     Depressive disorder 2007    I have been on and off medication for the last several years     Endometriosis      Hearing problem      Obese      Sensorineural hearing loss        Past Surgical History:   Procedure Laterality Date     ABDOMEN SURGERY      laparoscopy X2     DILATE CERVIX, HYSTEROSCOPY, ABLATE ENDOMETRIUM NOVASURE, COMBINED N/A 11/17/2016    Procedure: COMBINED DILATE CERVIX, HYSTEROSCOPY, ABLATE ENDOMETRIUM NOVASURE;  Surgeon: Sabas Patel MD;  Location: [facility]     GYN SURGERY   dates above    myomectomy x2, laparoscopy x2     LAPAROSCOPIC ASSISTED HYSTERECTOMY VAGINAL N/A 1/4/2018    Procedure: LAPAROSCOPIC ASSISTED HYSTERECTOMY VAGINAL;;  Surgeon: Sabas Patel MD;  Location: [facility]     LAPAROSCOPIC GASTRIC SLEEVE N/A 7/25/2017    Procedure: LAPAROSCOPIC GASTRIC SLEEVE;  Laparoscopic Sleeve Gastrectomy;  Surgeon: Sam Schmidt MD;  Location: [facility]     LAPAROSCOPIC LYSIS ADHESIONS  7/10/2014    Procedure: LAPAROSCOPIC LYSIS ADHESIONS;  Surgeon: Sabas Patel MD;  Location: [facility]     LAPAROSCOPIC SALPINGECTOMY Bilateral 1/4/2018    Procedure: LAPAROSCOPIC SALPINGECTOMY;;  Surgeon: Sabas Patel MD;  Location: [facility]     LASER CO2 LAPAROSCOPIC VAPORIZATION ENDOMETRIUM  7/10/2014    Procedure: LASER CO2 LAPAROSCOPIC VAPORIZATION ENDOMETRIUM;  Surgeon: Sabas Patel MD;  Location: [facility]     LASER CO2 LAPAROSCOPIC VAPORIZATION ENDOMETRIUM N/A 6/30/2015    Procedure: LASER CO2 LAPAROSCOPIC VAPORIZATION ENDOMETRIUM;  Surgeon: Sabas Patel MD;  Location: [facility]     LASER CO2 LAPAROSCOPIC VAPORIZATION ENDOMETRIUM, LYSIS ADHESIONS N/A 1/4/2018    Procedure: LASER CO2 LAPAROSCOPIC VAPORIZATION ENDOMETRIUM, LYSIS ADHESIONS;  LAPAROSCOPY WITH CO2 LASER LYSIS OF ADHESIONS AND VAPORIZATION OF ENDOMETRIOSIS, CYSTOTOMY LEFT OVARY, LAPAROSCOPIC VAGINAL HYSTERECTOMY WITH BILATERAL SALPINGECTOMY ;  Surgeon: Sabas Patel MD;  Location: [facility]     LASER CO2 LAPAROSCOPY DIAGNOSTIC, LYSIS ADHESIONS, COMBINED N/A 6/30/2015    Procedure: COMBINED LASER CO2 LAPAROSCOPY DIAGNOSTIC, LYSIS ADHESIONS;  Surgeon: Sabas Patel MD;  Location: [facility]       Family History   Problem Relation Age of Onset     Depression Mother      Cancer Mother      Cervical      Other - See Comments Mother      Fibromyalgia     Crohn Disease Mother      Obesity Mother      Ovarian Cancer Mother      Cervical Cancer     Substance Abuse Mother      Stomach Problem Mother      Arthritis Father      Rheumatoid  Arthritis Father      Depression Father      Substance Abuse Father      Cerebrovascular Disease Paternal Grandmother      Breast Cancer Maternal Grandmother      Cancer Maternal Grandmother      Depression Brother      Depression Sister      Mental Illness Brother      schizophrenia and bipolar     Substance Abuse Brother      Stomach Problem Sister      Stomach Problem Brother        Social History   Substance Use Topics     Smoking status: Former Smoker     Packs/day: 0.50     Years: 5.00     Types: Cigarettes     Start date: 1/2/2007     Quit date: 11/1/2013     Smokeless tobacco: Never Used     Alcohol use 0.0 oz/week      Comment: occas   She works as a  on campus and is in the same building as the [facility] interpreters and transcription providers whom she asked for advice.    Patient Supplied Answers to Review of Systems   ENT ROS 7/3/2018   Ears, Nose, Throat Hearing loss, Ear pain   The remainder of the 10 point review of systems is otherwise negative.    Physical examination:  Constitutional:  In no acute distress, appears stated age  Eyes:  Extraocular movements intact, no spontaneous nystagmus  Ears:  Both ears examined under the microscope.  Ear canals are intact without debris bilaterally. TM's are clear without erythema, bulging or perforation, middle ears well aerated.  Respiratory:  No increased work of breathing, wheezing or stridor  Musculoskeletal:  Good upper extremity strength  Skin:  No rashes on the head and neck  Neurologic:  House Brackman 1/6 bilaterally, ambulating normally  Psychiatric:  Alert, normal affect, answering questions appropriately    Audiogram:    Mild SNHL right ear and mild/moderate SNHL left ear downsloping to moderate in the high frequencies, 92% speech discrimination bilaterally. Reflexes and tympanometry normal bilaterally.     Assessment and plan:    35 yo female with a 1-year history of mild SNHL left>right. Due to the asymmetry, will obtain MRI to  investigate for a retrocochlear loss. Her history is otherwise rather unremarkable without obvious causes of SNHL. If the MRI is normal, patient is cleared for hearing aid evaluation.    I, Fabien Torres, am acting as a scribe to document the services performed by Dr. Priya Rosa MD. All information contained within the note was discussed with the provider and reviewed prior to being entered into the medical record.     The documentation recorded by the medical student acting as scribe accurately reflects the services I personally performed and the decisions made by me.

## 2018-07-09 NOTE — TELEPHONE ENCOUNTER
"Messaged pt regarding hearing aide referral. MRI scheduled this Friday. \"Due to the asymmetry, will obtain MRI to investigate for schwannoma. Her history is otherwise rather unremarkable without obvious causes of SNHL. If the MRI is negative, patient will follow-up for hearing aid evaluation and she can determine whether or not she would like to start using a device.\"  "

## 2018-07-09 NOTE — PATIENT INSTRUCTIONS
You will have a MRI done, you will be called with the results.   Please call our clinic for any questions,concerns,or worsening symptoms.      Clinic #[phone]       Option 3  for the triage nurse.

## 2018-07-09 NOTE — LETTER
7/9/2018      RE: Ashli Gamboa  [address] [place] [address]       Ashli Gamboa is a new patient to our clinic.  She is a 38 year old female being seen for hearing loss.     Patient reports 1 year history of hearing loss. She first noticed difficulty discerning what  and coworkers were saying, sometimes misinterpreting their words. The left ear seemed to be more affected than the right. The patient feels that the hearing deficit has progressed mildly. Her hearing troubles are worsened when being in environments with high background noise. She is able to use a phone on both ears still. She reports some mild intermittent left ear pain. Due to her symptoms, patient did seek an audiology evaluation where she was told she had L sided hearing loss. She has tried on some hearing aids since then and had significant improvement with one of the devices.    The patient has fibromyalgia and therefore has a fair amount of chronic pain including headaches, but no significant changes in this. Her history is notable for a couple ear infections as a child, but no prior ear surgeries. No significant noise exposure or use of ototoxic medications. No family history of hearing loss.     She otherwise denies tinnitus, otorrhea, sinus infections, vision changes and dizziness.     Past Medical History:   Diagnosis Date     Arthritis     BIG TOE     Cervical high risk HPV (human papillomavirus) test positive 07/05/2017     Cervical high risk HPV (human papillomavirus) test positive 06/10/2015     Depressive disorder 2007    I have been on and off medication for the last several years     Endometriosis      Hearing problem      Obese      Sensorineural hearing loss        Past Surgical History:   Procedure Laterality Date     ABDOMEN SURGERY      laparoscopy X2     DILATE CERVIX, HYSTEROSCOPY, ABLATE ENDOMETRIUM [place], COMBINED N/A 11/17/2016    Procedure: COMBINED DILATE CERVIX, HYSTEROSCOPY, ABLATE  ENDOMETRIUM NOVASURE;  Surgeon: Sabas Patel MD;  Location: [facility]     GYN SURGERY  dates above    myomectomy x2, laparoscopy x2     LAPAROSCOPIC ASSISTED HYSTERECTOMY VAGINAL N/A 1/4/2018    Procedure: LAPAROSCOPIC ASSISTED HYSTERECTOMY VAGINAL;;  Surgeon: Sabas Patel MD;  Location: [facility]     LAPAROSCOPIC GASTRIC SLEEVE N/A 7/25/2017    Procedure: LAPAROSCOPIC GASTRIC SLEEVE;  Laparoscopic Sleeve Gastrectomy;  Surgeon: Sam Schmidt MD;  Location: [facility]     LAPAROSCOPIC LYSIS ADHESIONS  7/10/2014    Procedure: LAPAROSCOPIC LYSIS ADHESIONS;  Surgeon: Sabas Patel MD;  Location: [facility]     LAPAROSCOPIC SALPINGECTOMY Bilateral 1/4/2018    Procedure: LAPAROSCOPIC SALPINGECTOMY;;  Surgeon: Sabas Patel MD;  Location: [facility]     LASER CO2 LAPAROSCOPIC VAPORIZATION ENDOMETRIUM  7/10/2014    Procedure: LASER CO2 LAPAROSCOPIC VAPORIZATION ENDOMETRIUM;  Surgeon: Sabas Patel MD;  Location: [facility]     LASER CO2 LAPAROSCOPIC VAPORIZATION ENDOMETRIUM N/A 6/30/2015    Procedure: LASER CO2 LAPAROSCOPIC VAPORIZATION ENDOMETRIUM;  Surgeon: Sabas Patel MD;  Location: [facility]     LASER CO2 LAPAROSCOPIC VAPORIZATION ENDOMETRIUM, LYSIS ADHESIONS N/A 1/4/2018    Procedure: LASER CO2 LAPAROSCOPIC VAPORIZATION ENDOMETRIUM, LYSIS ADHESIONS;  LAPAROSCOPY WITH CO2 LASER LYSIS OF ADHESIONS AND VAPORIZATION OF ENDOMETRIOSIS, CYSTOTOMY LEFT OVARY, LAPAROSCOPIC VAGINAL HYSTERECTOMY WITH BILATERAL SALPINGECTOMY ;  Surgeon: Sabas Patel MD;  Location: [facility]     LASER CO2 LAPAROSCOPY DIAGNOSTIC, LYSIS ADHESIONS, COMBINED N/A 6/30/2015    Procedure: COMBINED LASER CO2 LAPAROSCOPY DIAGNOSTIC, LYSIS ADHESIONS;  Surgeon: Sabas Patel MD;  Location: [facility]       Family History   Problem Relation Age of Onset     Depression Mother      Cancer Mother      Cervical      Other - See Comments Mother      Fibromyalgia     Crohn Disease Mother      Obesity Mother      Ovarian Cancer Mother      Cervical Cancer      Substance Abuse Mother      Stomach Problem Mother      Arthritis Father      Rheumatoid Arthritis Father      Depression Father      Substance Abuse Father      Cerebrovascular Disease Paternal Grandmother      Breast Cancer Maternal Grandmother      Cancer Maternal Grandmother      Depression Brother      Depression Sister      Mental Illness Brother      schizophrenia and bipolar     Substance Abuse Brother      Stomach Problem Sister      Stomach Problem Brother        Social History   Substance Use Topics     Smoking status: Former Smoker     Packs/day: 0.50     Years: 5.00     Types: Cigarettes     Start date: 1/2/2007     Quit date: 11/1/2013     Smokeless tobacco: Never Used     Alcohol use 0.0 oz/week      Comment: occas   She works as a  on campus and is in the same building as the [employer] interpreters and transcription providers whom she asked for advice.    Patient Supplied Answers to Review of Systems   ENT ROS 7/3/2018   Ears, Nose, Throat Hearing loss, Ear pain   The remainder of the 10 point review of systems is otherwise negative.    Physical examination:  Constitutional:  In no acute distress, appears stated age  Eyes:  Extraocular movements intact, no spontaneous nystagmus  Ears:  Both ears examined under the microscope.  Ear canals are intact without debris bilaterally. TM's are clear without erythema, bulging or perforation, middle ears well aerated.  Respiratory:  No increased work of breathing, wheezing or stridor  Musculoskeletal:  Good upper extremity strength  Skin:  No rashes on the head and neck  Neurologic:  House Brackman 1/6 bilaterally, ambulating normally  Psychiatric:  Alert, normal affect, answering questions appropriately    Audiogram:    Mild SNHL right ear and mild/moderate SNHL left ear downsloping to moderate in the high frequencies, 92% speech discrimination bilaterally. Reflexes and tympanometry normal bilaterally.     Assessment and plan:    33 yo female  with a 1-year history of mild SNHL left>right. Due to the asymmetry, will obtain MRI to investigate for a retrocochlear loss. Her history is otherwise rather unremarkable without obvious causes of SNHL. If the MRI is normal, patient is cleared for hearing aid evaluation.    I, Fabien Torres, am acting as a scribe to document the services performed by Dr. Priya Rosa MD. All information contained within the note was discussed with the provider and reviewed prior to being entered into the medical record.     The documentation recorded by the medical student acting as scribe accurately reflects the services I personally performed and the decisions made by me.      Priya Rosa MD

## 2018-07-09 NOTE — MR AVS SNAPSHOT
After Visit Summary   7/9/2018    Ashli Gamboa    MRN: [id number]           Patient Information     Date Of Birth          1980        Visit Information        Provider Department      7/9/2018 10:00 AM Priya Rosa MD [facility] Ear Nose and Throat        Today's Diagnoses     Asymmetrical sensorineural hearing loss    -  1      Care Instructions    You will have a MRI done, you will be called with the results.   Please call our clinic for any questions,concerns,or worsening symptoms.      Clinic #[phone]       Option 3  for the triage nurse.          Follow-ups after your visit        Your next 10 appointments already scheduled     Jul 13, 2018  7:45 PM CDT   (Arrive by 7:30 PM)   MR BRAIN W/O & W CONTRAST with [id number]   [facility] MRI ([facility])    [address] [place] [id number] [phone]           Take your medicines as usual, unless your doctor tells you not to. Bring a list of your current medicines to your exam (including vitamins, minerals and over-the-counter drugs).  You may or may not receive intravenous (IV) contrast for this exam pending the discretion of the Radiologist.  You do not need to do anything special to prepare.  The MRI machine uses a strong magnet. Please wear clothes without metal (snaps, zippers). A sweatsuit works well, or we may give you a hospital gown.  Please remove any body piercings and hair extensions before you arrive. You will also remove watches, jewelry, hairpins, wallets, dentures, partial dental plates and hearing aids. You may wear contact lenses, and you may be able to wear your rings. We have a safe place to keep your personal items, but it is safer to leave them at home.  **IMPORTANT** THE INSTRUCTIONS BELOW ARE ONLY FOR THOSE PATIENTS WHO HAVE BEEN PRESCRIBED SEDATION OR GENERAL ANESTHESIA DURING THEIR MRI PROCEDURE:  IF YOUR DOCTOR PRESCRIBED ORAL SEDATION (take  medicine to help you relax during your exam):   You must get the medicine from your doctor (oral medication) before you arrive. Bring the medicine to the exam. Do not take it at home. You ll be told when to take it upon arriving for your exam.   Arrive one hour early. Bring someone who can take you home after the test. Your medicine will make you sleepy. After the exam, you may not drive, take a bus or take a taxi by yourself.  IF YOUR DOCTOR PRESCRIBED IV SEDATION:   Arrive one hour early. Bring someone who can take you home after the test. Your medicine will make you sleepy. After the exam, you may not drive, take a bus or take a taxi by yourself.   No eating 6 hours before your exam. You may have clear liquids up until 4 hours before your exam. (Clear liquids include water, clear tea, black coffee and fruit juice without pulp.)  IF YOUR DOCTOR PRESCRIBED ANESTHESIA (be asleep for your exam):   Arrive 1 1/2 hours early. Bring someone who can take you home after the test. You may not drive, take a bus or take a taxi by yourself.   No eating 8 hours before your exam. You may have clear liquids up until 4 hours before your exam. (Clear liquids include water, clear tea, black coffee and fruit juice without pulp.)   You will spend four to five hours in the recovery room.  Please call the Imaging Department at your exam site with any questions.            Jul 26, 2018  1:30 PM CDT   (Arrive by 1:15 PM)   NUTRITION VISIT with ASHISH Cervantes [facility] ([facility])    [address] [place] [address]   [phone]            Jul 26, 2018  2:20 PM CDT   (Arrive by 2:05 PM)   RETURN BARIATRIC SURGERY with MD SAADIA Ferro [facility] ([facility])    [address] [place] [address]   [phone]            Sep 17, 2018  4:30 PM CDT   SHORT with Sabas ZEE  "MD Amanda   [facility] ([facility])    [address] [address] [phone]              Future tests that were ordered for you today     Open Future Orders        Priority Expected Expires Ordered    MRI Temporal Bone/IAC With and W/O Contrast Routine  7/9/2019 7/9/2018            Who to contact     Please call your clinic at [phone] to:    Ask questions about your health    Make or cancel appointments    Discuss your medicines    Learn about your test results    Speak to your doctor            Additional Information About Your Visit        [employer] Information     [employer] gives you secure access to your electronic health record. If you see a primary care provider, you can also send messages to your care team and make appointments. If you have questions, please call your primary care clinic.  If you do not have a primary care provider, please call [phone] and they will assist you.      Web[employer]age is an electronic gateway that provides easy, online access to your medical records. With Web[employer], you can request a clinic appointment, read your test results, renew a prescription or communicate with your care team.     To access your existing account, please contact your [facility] or call [phone] for assistance.        Care EveryWhere ID     This is your Care EveryWhere ID. This could be used by other organizations to access your [place] medical records  [id number]        Your Vitals Were     Height BMI (Body Mass Index)                1.626 m (5' 4.02\") 23.68 kg/m2           Blood Pressure from Last 3 Encounters:   06/08/18 104/64   04/12/18 126/76   03/21/18 110/74    Weight from Last 3 Encounters:   07/09/18 62.6 kg (138 lb)   06/08/18 63.7 kg (140 lb 6.4 oz)   04/12/18 69.8 kg (153 lb 14.4 oz)               Primary Care Provider Office Phone # Fax #    DANIELE Osorio [phone] " [phone]       [address] [facility] [address]        Equal Access to Services     FRANCO OLMSTEAD : Hadii aad ku hadmaryraul Kimberlicarlos, wachinada sridevidannyha, qaangelicata kajuneda ferniekai, lynne del rosario josealia barcenaspatreynaldo parmar. So [facility] [phone].    ATENCIÓN: Si habla español, tiene a flynn disposición servicios gratuitos de asistencia lingüística. Llame al [phone].    We comply with applicable federal civil rights laws and [place] laws. We do not discriminate on the basis of race, color, national origin, age, disability, sex, sexual orientation, or gender identity.            Thank you!     Thank you for choosing [facility] E[facility]  for your care. Our goal is always to provide you with excellent care. Hearing back from our patients is one way we can continue to improve our services. Please take a few minutes to complete the written survey that you may receive in the mail after your visit with us. Thank you!             Your Updated Medication List - Protect others around you: Learn how to safely use, store and throw away your medicines at www.disposemymeds.org.          This list is accurate as of 7/9/18 11:00 AM.  Always use your most recent med list.                   Brand Name Dispense Instructions for use Diagnosis    buPROPion 300 MG 24 hr tablet    WELLBUTRIN XL    90 tablet    Take 1 tablet (300 mg) by mouth every morning    Recurrent major depressive disorder, in partial remission (H)       DANDELION ROOT PO      Take 400 mg by mouth daily        etonogestrel-ethinyl estradiol 0.12-0.015 MG/24HR vaginal ring    NUVARING    4 each    Place 1 each vaginally every 21 days    Endometriosis       MULTIPLE VITAMIN PO      Take 1 patch by mouth daily PatchMD - Multivitamin Patch        TOPAMAX PO      Take 100 mg by mouth 2 times daily        vitamin B complex with vitamin C Tabs tablet      Take 1 tablet by mouth daily

## 2018-07-10 ENCOUNTER — THERAPY VISIT (OUTPATIENT)
Dept: PHYSICAL THERAPY | Facility: CLINIC | Age: 38
End: 2018-07-10
Payer: COMMERCIAL

## 2018-07-10 DIAGNOSIS — M99.05 PELVIC SOMATIC DYSFUNCTION: Primary | ICD-10-CM

## 2018-07-10 DIAGNOSIS — N94.10 DYSPAREUNIA, FEMALE: ICD-10-CM

## 2018-07-10 DIAGNOSIS — M53.3 PAIN IN THE COCCYX: ICD-10-CM

## 2018-07-10 PROCEDURE — 97530 THERAPEUTIC ACTIVITIES: CPT | Mod: GP | Performed by: PHYSICAL THERAPIST

## 2018-07-10 PROCEDURE — 97161 PT EVAL LOW COMPLEX 20 MIN: CPT | Mod: GP | Performed by: PHYSICAL THERAPIST

## 2018-07-10 PROCEDURE — 97110 THERAPEUTIC EXERCISES: CPT | Mod: GP | Performed by: PHYSICAL THERAPIST

## 2018-07-10 PROCEDURE — 97140 MANUAL THERAPY 1/> REGIONS: CPT | Mod: GP | Performed by: PHYSICAL THERAPIST

## 2018-07-10 NOTE — PROGRESS NOTES
[facility] Initial Evaluation  Subjective:  Patient is a 38 year old female presenting with rehab pelvic hpi.   Ashli Gamboa is a 38 year old female with a pelvic dysfunction and other condition.  Condition occurred with:  After surgery.  Condition occurred: other.  This is a chronic condition  Pt complains of tailbone pain with sitting, adjusting positions in sitting and moving in bed.  Pt denies any low back pain or radicular symptoms.  She also has history of pain with intercourse prior to surgery.  Pt had surgery in July 2017 for Gastric sleeve, has lossed 165 lbs.  Pt had hysterectomy in January 2018 and since then has complained of tailbone pain.  Pt has had several laproscopic surgeries and myomectomies due to endometriosis.  Her goals for therapy are to reduce tailbone pain and decrease pain with intercourse.  Pt denies any incontinence, denies difficulty emptying or starting a stream or weak/slow stream.  She can wait several hours between voids and denies any urgency.  She wakes up 0-1 time per night to void.  Pt water intake is about 3-4 glasses per day, intake of caffeine 1 cup per day.  She eats 3 small meals due to her gastric surgery.  Pt does complains of 0-1 BM per day, stool can be soft formed/hard averaging about type 3 on the [place] scale.  She does admit to straining a bit to have a BM.  Pt is sexually active, has pain with initial penetration and deep penetration.  Pt has not been pregnant of given birth.  She feels safe at home.  .    Patient reports pain:  Coccyx.  Radiates to:  Coccyx.  Pain is described as sharp and shooting and is constant and reported as 7/10.   Pain is the same all the time.  Symptoms are exacerbated by intercourse (sitting) and relieved by nothing.  Since onset symptoms are unchanged.  Special tests:  X-ray.  Previous treatment includes chiropractic.    General health as reported by patient is good.                                               Objective:  Standing Alignment:        Lumbar:  Lordosis decr                           Lumbar/SI Evaluation  ROM:    AROM Lumbar:   Flexion:        WNL  Ext:                    Decreased curve reversal for extension   Side Bend:        Left:     Right:   Rotation:           Left:     Right:   Side Glide:        Left:     Right:                     Lumbar Palpation:  Palpation (lumbar): tenderness over sacrum bilaterally, tender coccyx external.  No tenderness internal anal exam.  Decreased mobilty of coccyx into extension.  Tenderness present at Left:    Piriformis  Tenderness present at Right: Piriformis                                Pelvic Dysfunction Evaluation:        Flexibility:    Tightness present at:Piriformis and Gluteals    Abdominal Wall:  Abdominal wall pelvic: decreased general fascial mobility of abdomen, no pain to palpation.        Pelvic Clock Exam:          Levator ANI:  ++        External Assessment:    Skin Condition:  Normal        Introitus:  Normal  Muscle Contraction/Perineal Mobility:  Elevation and urogential triangle descent  Internal Assessment:      Contraction/Grade:  Fair squeeze, definite lift (3)          SEMG Biofeedback:    Equipment:  MR20    Suraface electrode placement--Perianal:  Yes  Baseline EMG PM:  1.0 uV, steady, did have difficulty bringing down to relaxation after contraction                                 General     ROS    Assessment/Plan:    Patient is a 38 year old female with coccyx pain/dyspareunia complaints.    Patient has the following significant findings with corresponding treatment plan.                Diagnosis 1:  Coccyx pain/pelvic floor dysfunction  Pain -  manual therapy, self management, education, directional preference exercise and home program  Decreased ROM/flexibility - manual therapy, therapeutic exercise, therapeutic activity and home program  Decreased strength - therapeutic exercise, therapeutic activities and home program  Impaired  muscle performance - biofeedback, neuro re-education and home program  Decreased function - therapeutic activities and home program  Impaired posture - neuro re-education, therapeutic activities and home program    Therapy Evaluation Codes:   1) History comprised of:   Personal factors that impact the plan of care:      Time since onset of symptoms.    Comorbidity factors that impact the plan of care are:      Depression, Fibromyalgia, Migraines/headaches and Overweight.     Medications impacting care: Anti-depressant.  2) Examination of Body Systems comprised of:   Body structures and functions that impact the plan of care:      Pelvis.   Activity limitations that impact the plan of care are:      Sitting and Edroy.  3) Clinical presentation characteristics are:   Stable/Uncomplicated.  4) Decision-Making    Low complexity using standardized patient assessment instrument and/or measureable assessment of functional outcome.  Cumulative Therapy Evaluation is: Low complexity.    Previous and current functional limitations:  (See Goal Flow Sheet for this information)    Short term and Long term goals: (See Goal Flow Sheet for this information)     Communication ability:  Patient appears to be able to clearly communicate and understand verbal and written communication and follow directions correctly.  Treatment Explanation - The following has been discussed with the patient:   RX ordered/plan of care  Anticipated outcomes  Possible risks and side effects  This patient would benefit from PT intervention to resume normal activities.   Rehab potential is good.    Frequency:  1 X week, once daily  Duration:  for 6 weeks tapering to 2 x a month x 1 month  Discharge Plan:  Achieve all LTG.  Independent in home treatment program.  Reach maximal therapeutic benefit.    Please refer to the daily flowsheet for treatment today, total treatment time and time spent performing 1:1 timed codes.

## 2018-07-10 NOTE — PROGRESS NOTES
AUDIOLOGY REPORT    SUMMARY: Audiology visit completed. See audiogram for results.      RECOMMENDATIONS: Follow-up with ENT.    Trevor Wells, [facility]  Licensed Audiologist  [place] License #[id number]

## 2018-07-10 NOTE — PROGRESS NOTES
[facility] Initial Evaluation  Subjective:  Patient is a 38 year old female presenting with rehab left ankle/foot hpi.                                      Pertinent medical history includes:  Overweight, fibromyalgia, depression, migraines/headaches and other (Endometriosis).    Other surgeries include:  Other (Gastric sleeve, hyster, several laperoscopys).  Current medications:  Anti-depressants, anti-seizure medication and other (Nuvaring).  Current occupation is [employer].  Employment status: Computer work.                                  Objective:  System    Physical Exam    General     ROS    Assessment/Plan:

## 2018-07-13 ENCOUNTER — RADIANT APPOINTMENT (OUTPATIENT)
Dept: MRI IMAGING | Facility: CLINIC | Age: 38
End: 2018-07-13
Attending: OTOLARYNGOLOGY
Payer: COMMERCIAL

## 2018-07-13 DIAGNOSIS — H90.3 ASYMMETRICAL SENSORINEURAL HEARING LOSS: ICD-10-CM

## 2018-07-13 RX ORDER — GADOBUTROL 604.72 MG/ML
7.5 INJECTION INTRAVENOUS ONCE
Status: COMPLETED | OUTPATIENT
Start: 2018-07-13 | End: 2018-07-13

## 2018-07-13 RX ADMIN — GADOBUTROL 6 ML: 604.72 INJECTION INTRAVENOUS at 10:36

## 2018-07-16 ENCOUNTER — TELEPHONE (OUTPATIENT)
Dept: OTOLARYNGOLOGY | Facility: CLINIC | Age: 38
End: 2018-07-16

## 2018-07-16 NOTE — TELEPHONE ENCOUNTER
Karena Mo CNP  Pt requesting order for bilateral hearing aids- please place  Please see pt my chart message and t/e from ENT    Needs faxed to number in my chart- please forward to MA/ TC to fax.    Thanks!     Ashley Zelaya RN

## 2018-07-16 NOTE — TELEPHONE ENCOUNTER
Left message with patient regarding MRI results. No abnormalities found to account for asymmetric hearing loss, may proceed with hearing aid evaluation. Call back number left on message for any further questions or concerns.    Princess Fletcher RN, BSN.  7/16/2018 9:17 AM

## 2018-07-20 NOTE — TELEPHONE ENCOUNTER
Per Yamileth Barton, Provider  for [facility], Fidel Barber at [facility] will be part of [facility] once partnership is completed. Therefore, an insurance referral was faxed ([phone]) today to [facility] for hearing aid.    Leonora Thompson, [facility] Referral Rep

## 2018-07-26 ENCOUNTER — ALLIED HEALTH/NURSE VISIT (OUTPATIENT)
Dept: SURGERY | Facility: CLINIC | Age: 38
End: 2018-07-26
Payer: COMMERCIAL

## 2018-07-26 ENCOUNTER — OFFICE VISIT (OUTPATIENT)
Dept: SURGERY | Facility: CLINIC | Age: 38
End: 2018-07-26
Payer: COMMERCIAL

## 2018-07-26 VITALS
HEART RATE: 70 BPM | WEIGHT: 135.7 LBS | OXYGEN SATURATION: 99 % | SYSTOLIC BLOOD PRESSURE: 116 MMHG | HEIGHT: 64 IN | TEMPERATURE: 98.7 F | BODY MASS INDEX: 23.17 KG/M2 | DIASTOLIC BLOOD PRESSURE: 73 MMHG

## 2018-07-26 DIAGNOSIS — K31.83 ACHLORHYDRIA, GASTRIC: Primary | ICD-10-CM

## 2018-07-26 ASSESSMENT — PAIN SCALES - GENERAL: PAINLEVEL: NO PAIN (0)

## 2018-07-26 NOTE — PROGRESS NOTES
Nutrition Reassessment  Reason For Visit:  Ashli Gamboa is a 37 year old female presenting today for nutrition follow-up, 12 months s/p SG with Dr. Schmidt. Pt referred by Dr. Schmidt (11/30/17).    Anthropometrics:  Initial Consult Weight: 291.8 lbs  Day of Surgery Weight (7/25/17): 275.6 lbs  Current Weight: 135.7 lbs   Weight loss: -156.1 lbs from initial consult; -139.9 lbs from day of surgery    Nutrition History:  Current Vitamins/Minerals: MVI/minerals BID    *Pt reports feeling fatigued and cold.     Progress with Previous Goals:  1) Follow bariatric regular diet. - Met    2) Consume 60 grams of protein/day. - Met    3) Sip on 48-64 oz of fluids/day- between meals only. - Met    4) Eat slowly (>20 min/meal), chewing foods well (to applesauce-like consistency). - Met    5) Limit portions to 1/2 cup/meal. - ~1 cup  6)Take the following after a Sleeve Gastrectomy: - Using vitamin patches      Multivitamin/minerals: adult dose 2 times daily    Calcium Citrate containing vitamin D: 500 mg 3 times daily or 600 mg 2 times daily    Vitamin B12: sublingual form of at least 500 mcg daily or injection of 1000 mcg monthly     Vitamin D3: 2,000 International Units     Nutrition Prescription:  Grams Protein: 60 (minimum)  Amount of Fluid: 48-64 oz     Nutrition Diagnosis  Previous: Obesity r/t long history of self-monitoring deficit and excessive energy intake aeb BMI >30. - resolved.    Current: Food and nutrition-related knowledge deficit r/t lack of prior exposure to diet instruction beyond 12 months s/p SG as evidenced by Pt seeking further guidance from RD on diet instruction beyond 12 months s/p SG.    Intervention  Materials/Education provided: Reviewed goals. Praised pt on progress with goals and weight loss. Patient reported meals are around 1 cup and she continues to focus on protein first at meals.  Patient reported regular exercise.  Reviewed vitamin and mineral supplements, she continues to use a patch  multivitamin but is considering switching now that it is easier to take medications.  Gave encouragement and support. Provided pt with list of goals and RD contact information.     Patient Understanding: Good  Expected Compliance: Good    Goals:  1) Follow bariatric regular diet.   2) Consume 60 grams of protein/day.  3) Sip on 48-64 oz of fluids/day- between meals only.  4) Eat slowly (>20 min/meal), chewing foods well (to applesauce-like consistency).  5) Limit portions to 1 cup/meal.  6)Take the following after a Sleeve Gastrectomy:    Multivitamin/minerals: adult dose 2 times daily    Calcium Citrate containing vitamin D: 500 mg 3 times daily or 600 mg 2 times daily    Vitamin B12: sublingual form of at least 500 mcg daily or injection of 1000 mcg monthly     Vitamin D3: 2,000 International Units        Follow-Up: prn    Time spent with patient: 30 minutes    Yareli Ibrahim RD, LD

## 2018-07-26 NOTE — NURSING NOTE
"(   Chief Complaint   Patient presents with     RECHECK     Annual follow up    )    ( Weight: 135 lb 11.2 oz )  ( Height: 5' 4.02\" )  ( BMI (Calculated): 23.33 )  ( Initial Weight: 253 lb 4.8 oz )  ( Cumulative weight loss (lbs): 117.6 )  ( Last Visits Weight: 153 lb 14.4 oz )  ( Wt change since last visit (lbs): -18.2 )  (   )  (   )    ( BP: 116/73 )  (   )  ( Temp: 98.7  F (37.1  C) )  ( Temp src: Oral )  ( Pulse: 70 )  (   )  ( SpO2: 99 % )    (   Patient Active Problem List   Diagnosis     Morbid obesity (H)     post traumatic Arthritis of big toe     Major depression, recurrent (H)     CARDIOVASCULAR SCREENING; LDL GOAL LESS THAN 160     Endometriosis     Fibromyalgia     Muscular deconditioning     Cervical high risk HPV (human papillomavirus) test positive     S/P laparoscopic sleeve gastrectomy     Pelvic somatic dysfunction     Pain in the coccyx     Dyspareunia, female    )  (   Current Outpatient Prescriptions   Medication Sig Dispense Refill     buPROPion (WELLBUTRIN XL) 300 MG 24 hr tablet Take 1 tablet (300 mg) by mouth every morning 90 tablet 3     etonogestrel-ethinyl estradiol (NUVARING) 0.12-0.015 MG/24HR vaginal ring Place 1 each vaginally every 21 days 4 each 3     Misc Natural Products (DANDELION ROOT PO) Take 400 mg by mouth daily       MULTIPLE VITAMIN PO Take 1 patch by mouth daily PatchMD - Multivitamin Patch       Topiramate (TOPAMAX PO) Take 100 mg by mouth 2 times daily        vitamin B complex with vitamin C (VITAMIN  B COMPLEX) TABS tablet Take 1 tablet by mouth daily      )  ( Diabetes Eval:    )    ( Pain Eval:  No Pain (0) )    ( Wound Eval:       )    (   History   Smoking Status     Former Smoker     Packs/day: 0.50     Years: 5.00     Types: Cigarettes     Start date: 1/2/2007     Quit date: 11/1/2013   Smokeless Tobacco     Never Used    )    ( Signed By:  Freddie Yanez; July 26, 2018; 2:06 PM )    "

## 2018-07-26 NOTE — LETTER
2018     RE: Ashli Gamboa  [address] [place] [address]     Dear Colleague,    Thank you for referring your patient, Ashli Gamboa, to the [facility] SURGICAL WEIGHT MANAGEMENT at [facility]. Please see a copy of my visit note below.    Return Bariatric Surgery Note    RE: Ashli Gamboa  MR#: [id number]  : 1980  VISIT DATE: 2018    Dear Dr. Mo,    I had the pleasure of seeing your patient, Ashli Gamboa, in my post-bariatric surgery assessment clinic.    CHIEF COMPLAINT: Post-bariatric surgery follow-up; 12 months post-sleeve.    ~160 lbs weight loss.  Overall this weight loss has been healthy for her.  She feels strong.  She is a rule follower.  She has had no problems with diarrhea.    No adverse symptoms.  Exercises a lot.    HISTORY OF PRESENT ILLNESS:  Questions Regarding Prior Weight Loss Surgery Reviewed With Patient 2018   I had the following weight loss procedure: Sleeve Gastrectomy   What year was your surgery? 2017   How has your weight changed since your last visit? I have lost weight   Are you currently taking any weight loss medications? No   Do you currently have any of the following: None of the above   Have you been to the Emergency room since your last visit with us? No   Were you in the hospital since your last visit with us? No   Do you have any concerns today? none     Weight History:     2018   What is your highest lifetime weight? 295   What is your lowest weight since surgery? (In pounds) 133     Initial Weight: 253 lb 4.8 oz  Current Weight: Weight: 135 lb 11.2 oz  Cumulative weight loss (lbs): 117.6  Last Visits Weight: 153 lb 14.4 oz    Questions Regarding Co-Morbidities and Health Concerns Reviewed With Patient 2018   Pre-diabetes: Never   Diabetes II: Never   High Blood Pressure: Never   High cholesterol: Never   Heartburn/Reflux: Never   Are you taking daily medication  for heartburn, acid reflux, or GERD (acid reflux disease)? -   Sleep apnea: Never   PCOS: Never   Back pain: Never   Joint pain: Improved   Lower leg swelling: Never       Eating Habits 7/26/2018   How many meals do you eat per day? 3   Do you snack between meals? No   How much food are you eating at each meal? 1/2 cup to 1 cup   Are you able to separate your meals and liquids by at least 30 minutes? Yes   Are you able to avoid liquid calories? Yes       Exercise Questions Reviewed With Patient 7/26/2018   How often do you exercise? 1 to 2 times per week   What is the duration of your exercise (in minutes)? 20 Minutes   What types of exercise do you do? gym membership   What keeps you from being more active?  Lack of Time       Social History:      7/26/2018   Are you smoking? No   Are you drinking alcohol? Yes   How much alcohol? 1-2 PER MONTH       Medications:  Current Outpatient Prescriptions   Medication     buPROPion (WELLBUTRIN XL) 300 MG 24 hr tablet     etonogestrel-ethinyl estradiol (NUVARING) 0.12-0.015 MG/24HR vaginal ring     Misc Natural Products (DANDELION ROOT PO)     MULTIPLE VITAMIN PO     Topiramate (TOPAMAX PO)     vitamin B complex with vitamin C (VITAMIN  B COMPLEX) TABS tablet     No current facility-administered medications for this visit.      Facility-Administered Medications Ordered in Other Visits   Medication     BUPivacaine 0.25%     iopamidol (ISOVUE-M 200) solution 20 mL     lidocaine (PF) (XYLOCAINE) 1 % injection 30 mL     triamcinolone acetonide (KENALOG-40) injection 40 mg         7/26/2018   Do you avoid NSAIDs such as (Ibuprofen, Aleve, Naproxen, Advil)?   Yes       ROS:  GI:      7/26/2018   Vomiting: No   Diarrhea: No   Constipation: No   Swallowing trouble: No   Abdominal pain: No   Heartburn: No   Rash in skin folds: No   Depression: Yes   Stress urinary incontinence No     Skin:   BAR RBS ROS - SKIN 7/26/2018   Rash in skin folds: No     Psych:      7/26/2018   Depression:  "Yes   Anxiety: No     Female Only:   BAR RBS ROS - FEMALE ONLY 7/26/2018   Female only: None of the above       LABS/IMAGING/MEDICAL RECORDS REVIEW: reviewed from April.  No issues of concern.    PHYSICAL EXAMINATION:  /73  Pulse 70  Temp 98.7  F (37.1  C) (Oral)  Ht 5' 4.02\"  Wt 135 lb 11.2 oz  SpO2 99%  BMI 23.28 kg/m2   NAD  Breathing unlabored.    ASSESSMENT AND PLAN:      1. 12 months status laparoscopic gastric sleeve  2. Morbid Obesity historical; currently has achlorhydria. current BMI: Body mass index is 23.28 kg/(m^2).  3. Post surgical malabsorption:   Labs ordered per protocol.   Follow food plan per dietitian recommendations.   Continue taking recommended post-op vitamins.  4. Return to clinic in 12 months.    Sincerely,    Sam Schmidt MD    I spent a total of 15 minutes face to face with Crystal during today's office visit. Over 50% of this time was spent counseling the patient and/or coordinating care.  "

## 2018-08-09 DIAGNOSIS — K31.83 ACHLORHYDRIA, GASTRIC: ICD-10-CM

## 2018-08-09 LAB
ALBUMIN SERPL-MCNC: 3.2 G/DL (ref 3.4–5)
ALP SERPL-CCNC: 57 U/L (ref 40–150)
ALT SERPL W P-5'-P-CCNC: 19 U/L (ref 0–50)
ANION GAP SERPL CALCULATED.3IONS-SCNC: 9 MMOL/L (ref 3–14)
AST SERPL W P-5'-P-CCNC: 9 U/L (ref 0–45)
BILIRUB SERPL-MCNC: 0.2 MG/DL (ref 0.2–1.3)
BUN SERPL-MCNC: 14 MG/DL (ref 7–30)
CALCIUM SERPL-MCNC: 8.8 MG/DL (ref 8.5–10.1)
CHLORIDE SERPL-SCNC: 112 MMOL/L (ref 94–109)
CO2 SERPL-SCNC: 22 MMOL/L (ref 20–32)
CREAT SERPL-MCNC: 1.09 MG/DL (ref 0.52–1.04)
FERRITIN SERPL-MCNC: 56 NG/ML (ref 12–150)
GFR SERPL CREATININE-BSD FRML MDRD: 56 ML/MIN/1.7M2
GLUCOSE SERPL-MCNC: 88 MG/DL (ref 70–99)
HGB BLD-MCNC: 13.7 G/DL (ref 11.7–15.7)
POTASSIUM SERPL-SCNC: 4.1 MMOL/L (ref 3.4–5.3)
PROT SERPL-MCNC: 6.7 G/DL (ref 6.8–8.8)
PTH-INTACT SERPL-MCNC: 24 PG/ML (ref 18–80)
SODIUM SERPL-SCNC: 144 MMOL/L (ref 133–144)
VIT B12 SERPL-MCNC: 487 PG/ML (ref 193–986)

## 2018-08-09 NOTE — PROGRESS NOTES
Return Bariatric Surgery Note    RE: Ashli Gamboa  MR#: [id number]  : 1980  VISIT DATE: 2018    Dear Dr. Mo,    I had the pleasure of seeing your patient, Ashli Gamboa, in my post-bariatric surgery assessment clinic.    CHIEF COMPLAINT: Post-bariatric surgery follow-up; 12 months post-sleeve.    ~160 lbs weight loss.  Overall this weight loss has been healthy for her.  She feels strong.  She is a rule follower.  She has had no problems with diarrhea.    No adverse symptoms.  Exercises a lot.    HISTORY OF PRESENT ILLNESS:  Questions Regarding Prior Weight Loss Surgery Reviewed With Patient 2018   I had the following weight loss procedure: Sleeve Gastrectomy   What year was your surgery? 2017   How has your weight changed since your last visit? I have lost weight   Are you currently taking any weight loss medications? No   Do you currently have any of the following: None of the above   Have you been to the Emergency room since your last visit with us? No   Were you in the hospital since your last visit with us? No   Do you have any concerns today? none       Weight History:     2018   What is your highest lifetime weight? 295   What is your lowest weight since surgery? (In pounds) 133     Initial Weight: 253 lb 4.8 oz  Current Weight: Weight: 135 lb 11.2 oz  Cumulative weight loss (lbs): 117.6  Last Visits Weight: 153 lb 14.4 oz    Questions Regarding Co-Morbidities and Health Concerns Reviewed With Patient 2018   Pre-diabetes: Never   Diabetes II: Never   High Blood Pressure: Never   High cholesterol: Never   Heartburn/Reflux: Never   Are you taking daily medication for heartburn, acid reflux, or GERD (acid reflux disease)? -   Sleep apnea: Never   PCOS: Never   Back pain: Never   Joint pain: Improved   Lower leg swelling: Never       Eating Habits 2018   How many meals do you eat per day? 3   Do you snack between meals? No   How much food are you eating at  "each meal? 1/2 cup to 1 cup   Are you able to separate your meals and liquids by at least 30 minutes? Yes   Are you able to avoid liquid calories? Yes       Exercise Questions Reviewed With Patient 7/26/2018   How often do you exercise? 1 to 2 times per week   What is the duration of your exercise (in minutes)? 20 Minutes   What types of exercise do you do? gym membership   What keeps you from being more active?  Lack of Time       Social History:      7/26/2018   Are you smoking? No   Are you drinking alcohol? Yes   How much alcohol? 1-2 PER MONTH       Medications:  Current Outpatient Prescriptions   Medication     buPROPion (WELLBUTRIN XL) 300 MG 24 hr tablet     etonogestrel-ethinyl estradiol (NUVARING) 0.12-0.015 MG/24HR vaginal ring     Misc Natural Products (DANDELION ROOT PO)     MULTIPLE VITAMIN PO     Topiramate (TOPAMAX PO)     vitamin B complex with vitamin C (VITAMIN  B COMPLEX) TABS tablet     No current facility-administered medications for this visit.      Facility-Administered Medications Ordered in Other Visits   Medication     BUPivacaine 0.25%     iopamidol (ISOVUE-M 200) solution 20 mL     lidocaine (PF) (XYLOCAINE) 1 % injection 30 mL     triamcinolone acetonide (KENALOG-40) injection 40 mg         7/26/2018   Do you avoid NSAIDs such as (Ibuprofen, Aleve, Naproxen, Advil)?   Yes       ROS:  GI:      7/26/2018   Vomiting: No   Diarrhea: No   Constipation: No   Swallowing trouble: No   Abdominal pain: No   Heartburn: No   Rash in skin folds: No   Depression: Yes   Stress urinary incontinence No     Skin:   BAR RBS ROS - SKIN 7/26/2018   Rash in skin folds: No     Psych:      7/26/2018   Depression: Yes   Anxiety: No     Female Only:   BAR RBS ROS - FEMALE ONLY 7/26/2018   Female only: None of the above       LABS/IMAGING/MEDICAL RECORDS REVIEW: reviewed from April.  No issues of concern.    PHYSICAL EXAMINATION:  /73  Pulse 70  Temp 98.7  F (37.1  C) (Oral)  Ht 5' 4.02\"  Wt 135 lb 11.2 " oz  SpO2 99%  BMI 23.28 kg/m2   NAD  Breathing unlabored.    ASSESSMENT AND PLAN:      1. 12 months status laparoscopic gastric sleeve  2. Morbid Obesity historical; currently has achlorhydria. current BMI: Body mass index is 23.28 kg/(m^2).  3. Post surgical malabsorption:   Labs ordered per protocol.   Follow food plan per dietitian recommendations.   Continue taking recommended post-op vitamins.  4. Return to clinic in 12 months.    Sincerely,    Sam Schmidt MD    I spent a total of 15 minutes face to face with Ashli during today's office visit. Over 50% of this time was spent counseling the patient and/or coordinating care.

## 2018-08-10 LAB — DEPRECATED CALCIDIOL+CALCIFEROL SERPL-MC: 63 UG/L (ref 20–75)

## 2018-08-12 LAB
ANNOTATION COMMENT IMP: NORMAL
RETINYL PALMITATE SERPL-MCNC: 0.03 MG/L (ref 0–0.1)
VIT A SERPL-MCNC: 0.8 MG/L (ref 0.3–1.2)

## 2018-08-21 ENCOUNTER — TRANSFERRED RECORDS (OUTPATIENT)
Dept: HEALTH INFORMATION MANAGEMENT | Facility: CLINIC | Age: 38
End: 2018-08-21

## 2018-08-24 ENCOUNTER — TRANSFERRED RECORDS (OUTPATIENT)
Dept: HEALTH INFORMATION MANAGEMENT | Facility: CLINIC | Age: 38
End: 2018-08-24

## 2018-09-17 ENCOUNTER — OFFICE VISIT (OUTPATIENT)
Dept: OBGYN | Facility: CLINIC | Age: 38
End: 2018-09-17
Payer: COMMERCIAL

## 2018-09-17 VITALS
DIASTOLIC BLOOD PRESSURE: 68 MMHG | HEIGHT: 64 IN | BODY MASS INDEX: 22.88 KG/M2 | WEIGHT: 134 LBS | SYSTOLIC BLOOD PRESSURE: 118 MMHG | HEART RATE: 72 BPM

## 2018-09-17 DIAGNOSIS — N80.9 ENDOMETRIOSIS: Primary | ICD-10-CM

## 2018-09-17 PROCEDURE — 99213 OFFICE O/P EST LOW 20 MIN: CPT | Performed by: OBSTETRICS & GYNECOLOGY

## 2018-09-17 NOTE — PROGRESS NOTES
SUBJECTIVE:                                                   Ashli Gamboa is a 38 year old female who presents to clinic today for the following health issue(s):  Patient presents with:  RECHECK: RYANN BANKS      Additional information: has been experiencing tailbone pain since surgery    HPI: Patient is seen at this time for follow-up of endometriosis.  The patient has lost 170 pounds of weight.  She complains of some tailbone pain but has no other difficulties.      No LMP recorded. Patient is not currently having periods (Reason: Birth Control)..   Patient is sexually active, .  Using hysterectomy for contraception.    reports that she quit smoking about 4 years ago. Her smoking use included Cigarettes. She started smoking about 11 years ago. She has a 2.50 pack-year smoking history. She has never used smokeless tobacco.    STD testing offered?  Declined    Health maintenance updated:  yes    Today's PHQ-2 Score:   PHQ-2 (  [employer]) 4/3/2017   Q1: Little interest or pleasure in doing things 1   Q2: Feeling down, depressed or hopeless 1   PHQ-2 Score 2   Q1: Little interest or pleasure in doing things -   Q2: Feeling down, depressed or hopeless -   PHQ-2 Score -     Today's PHQ-9 Score:   PHQ-9 SCORE 2018   Total Score -   Total Score MyChart -   Total Score 1     Today's ALIDA-7 Score:   ALIDA-7 SCORE 2017   Total Score -   Total Score 3       Problem list and histories reviewed & adjusted, as indicated.  Additional history: as documented.    Patient Active Problem List   Diagnosis     Morbid obesity (H)     post traumatic Arthritis of big toe     Major depression, recurrent (H)     CARDIOVASCULAR SCREENING; LDL GOAL LESS THAN 160     Endometriosis     Fibromyalgia     Muscular deconditioning     Cervical high risk HPV (human papillomavirus) test positive     S/P laparoscopic sleeve gastrectomy     Pelvic somatic dysfunction     Pain in the coccyx     Dyspareunia, female     Past Surgical  History:   Procedure Laterality Date     ABDOMEN SURGERY      laparoscopy X2     DILATE CERVIX, HYSTEROSCOPY, ABLATE ENDOMETRIUM NOVASURE, COMBINED N/A 11/17/2016    Procedure: COMBINED DILATE CERVIX, HYSTEROSCOPY, ABLATE ENDOMETRIUM NOVASURE;  Surgeon: Sabas Patel MD;  Location: [facility]     GYN SURGERY  dates above    myomectomy x2, laparoscopy x2     LAPAROSCOPIC ASSISTED HYSTERECTOMY VAGINAL N/A 1/4/2018    Procedure: LAPAROSCOPIC ASSISTED HYSTERECTOMY VAGINAL;;  Surgeon: Sabas Patel MD;  Location: [facility]     LAPAROSCOPIC GASTRIC SLEEVE N/A 7/25/2017    Procedure: LAPAROSCOPIC GASTRIC SLEEVE;  Laparoscopic Sleeve Gastrectomy;  Surgeon: Sam Schmidt MD;  Location: [facility]     LAPAROSCOPIC LYSIS ADHESIONS  7/10/2014    Procedure: LAPAROSCOPIC LYSIS ADHESIONS;  Surgeon: Sabas Patel MD;  Location: [facility]     LAPAROSCOPIC SALPINGECTOMY Bilateral 1/4/2018    Procedure: LAPAROSCOPIC SALPINGECTOMY;;  Surgeon: Sabas Patel MD;  Location: [facility]     LASER CO2 LAPAROSCOPIC VAPORIZATION ENDOMETRIUM  7/10/2014    Procedure: LASER CO2 LAPAROSCOPIC VAPORIZATION ENDOMETRIUM;  Surgeon: Sabas Patel MD;  Location: [facility]     LASER CO2 LAPAROSCOPIC VAPORIZATION ENDOMETRIUM N/A 6/30/2015    Procedure: LASER CO2 LAPAROSCOPIC VAPORIZATION ENDOMETRIUM;  Surgeon: Sabas Patel MD;  Location: [facility]     LASER CO2 LAPAROSCOPIC VAPORIZATION ENDOMETRIUM, LYSIS ADHESIONS N/A 1/4/2018    Procedure: LASER CO2 LAPAROSCOPIC VAPORIZATION ENDOMETRIUM, LYSIS ADHESIONS;  LAPAROSCOPY WITH CO2 LASER LYSIS OF ADHESIONS AND VAPORIZATION OF ENDOMETRIOSIS, CYSTOTOMY LEFT OVARY, LAPAROSCOPIC VAGINAL HYSTERECTOMY WITH BILATERAL SALPINGECTOMY ;  Surgeon: Sabas Patel MD;  Location: [facility]     LASER CO2 LAPAROSCOPY DIAGNOSTIC, LYSIS ADHESIONS, COMBINED N/A 6/30/2015    Procedure: COMBINED LASER CO2 LAPAROSCOPY DIAGNOSTIC, LYSIS ADHESIONS;  Surgeon: Sabas Patel MD;  Location: [facility]      Social History   Substance Use  Topics     Smoking status: Former Smoker     Packs/day: 0.50     Years: 5.00     Types: Cigarettes     Start date: 1/2/2007     Quit date: 11/1/2013     Smokeless tobacco: Never Used     Alcohol use 0.0 oz/week      Comment: occas      Problem (# of Occurrences) Relation (Name,Age of Onset)    Arthritis (1) Father    Breast Cancer (1) Maternal Grandmother    Cancer (2) Mother: Cervical , Maternal Grandmother    Cerebrovascular Disease (1) Paternal Grandmother    Crohn Disease (1) Mother    Depression (4) Mother, Father, Brother, Sister    Mental Illness (1) Brother: schizophrenia and bipolar    Obesity (1) Mother    Other - See Comments (1) Mother: Fibromyalgia    Ovarian Cancer (1) Mother: Cervical Cancer    Rheumatoid Arthritis (1) Father    Stomach Problem (3) Mother, Sister, Brother    Substance Abuse (3) Mother, Father, Brother            Current Outpatient Prescriptions   Medication Sig     buPROPion (WELLBUTRIN XL) 300 MG 24 hr tablet Take 1 tablet (300 mg) by mouth every morning     etonogestrel-ethinyl estradiol (NUVARING) 0.12-0.015 MG/24HR vaginal ring Place 1 each vaginally every 21 days     Misc Natural Products (DANDELION ROOT PO) Take 400 mg by mouth daily     MULTIPLE VITAMIN PO Take 1 patch by mouth daily PatchMD - Multivitamin Patch     Topiramate (TOPAMAX PO) Take 100 mg by mouth 2 times daily      vitamin B complex with vitamin C (VITAMIN  B COMPLEX) TABS tablet Take 1 tablet by mouth daily     No current facility-administered medications for this visit.      Facility-Administered Medications Ordered in Other Visits   Medication     BUPivacaine 0.25%     iopamidol (ISOVUE-M 200) solution 20 mL     lidocaine (PF) (XYLOCAINE) 1 % injection 30 mL     triamcinolone acetonide (KENALOG-40) injection 40 mg     No Known Allergies    ROS:  12 point review of systems negative other than symptoms noted below.  Constitutional: Fatigue  Genitourinary: Painful [place]  Musculoskeletal: Joint  "Pain  Endocrine: Cold Intolerance and Loss of Hair    OBJECTIVE:     /68  Pulse 72  Ht 5' 4\" (1.626 m)  Wt 134 lb (60.8 kg)  Breastfeeding? No  BMI 23 kg/m2  Body mass index is 23 kg/(m^2).    Exam:  Constitutional:  Appearance: Well nourished, well developed alert, in no acute distress  Gastrointestinal:  Abdominal Examination:  Abdomen nontender to palpation, tone normal without rigidity or guarding, no masses present, umbilicus without lesions; Liver/Spleen:  No hepatomegaly present, liver nontender to palpation; Hernias:  No hernias present  Lymphatic: Lymph Nodes:  No other lymphadenopathy present  Skin:General Inspection:  No rashes present, no lesions present, no areas of discoloration; Genitalia and Groin:  No rashes present, no lesions present, no areas of discoloration, no masses present.  Neurologic/Psychiatric:  Mental Status:  Oriented X3   Pelvic Exam:  External Genitalia:     Normal appearance for age, no discharge present, no tenderness present, no inflammatory lesions present, color normal  Vagina:     Normal vaginal vault without central or paravaginal defects, no discharge present, no inflammatory lesions present, no masses present  Bladder:     Nontender to palpation  Urethra:   Urethral Body:  Urethra palpation normal, urethra structural support normal   Urethral Meatus:  No erythema or lesions present  Cervix:     Surgically absent  Uterus:     Surgically absent  Adnexa:     Surgically absent  Perineum:     Perineum within normal limits, no evidence of trauma, no rashes or skin lesions present  Anus:     Anus within normal limits, no hemorrhoids present  Inguinal Lymph Nodes:     No lymphadenopathy present     In-Clinic Test Results:      ASSESSMENT/PLAN:                                                      Patient with history of advanced endometriosis who has an excellent exam at this time.  Her tailbone pain I believe is due more to her weight loss and shift in her posture.  I " could not find any hot spots on her pelvic exam.  She will continue with her current therapy and see us in 6 months.          Sabas Patel MD  [facility] [place]

## 2018-09-17 NOTE — MR AVS SNAPSHOT
"              After Visit Summary   9/17/2018    Ashli Gamboa    MRN: [id number]           Patient Information     Date Of Birth          1980        Visit Information        Provider Department      9/17/2018 4:30 PM Sabas Patel MD [facility]        Today's Diagnoses     Endometriosis    -  1       Follow-ups after your visit        Who to contact     If you have questions or need follow up information about today's clinic visit or your schedule please contact [facility] directly at [phone].  Normal or non-critical lab and imaging results will be communicated to you by [employer], letter or phone within 4 business days after the clinic has received the results. If you do not hear from us within 7 days, please contact the clinic through [employer] or phone. If you have a critical or abnormal lab result, we will notify you by phone as soon as possible.  Submit refill requests through [employer] or call your pharmacy and they will forward the refill request to us. Please allow 3 business days for your refill to be completed.          Additional Information About Your Visit        MyChart Information     [employer] gives you secure access to your electronic health record. If you see a primary care provider, you can also send messages to your care team and make appointments. If you have questions, please call your primary care clinic.  If you do not have a primary care provider, please call [phone] and they will assist you.        Care EveryWhere ID     This is your Care EveryWhere ID. This could be used by other organizations to access your [place] medical records  [id number]        Your Vitals Were     Pulse Height Breastfeeding? BMI (Body Mass Index)          72 5' 4\" (1.626 m) No 23 kg/m2         Blood Pressure from Last 3 Encounters:   09/17/18 118/68   07/26/18 116/73   06/08/18 104/64    Weight from Last 3 Encounters:   09/17/18 134 lb (60.8 kg) "   07/26/18 135 lb 11.2 oz (61.6 kg)   07/09/18 138 lb (62.6 kg)              Today, you had the following     No orders found for display       Primary Care Provider Office Phone # Fax #    DANIELE Osorio CODEY [phone] [address] [facility] [address]        Equal Access to Services     SHILA OLMSTEAD : Hadii aad ku hadasho Soomaali, waaxda luqadaha, qaybta kaalmada adeegyada, waxay idiin hayaan adeeg narinderpatreynaldo lashala . So [facility] [phone].    ATENCIÓN: Si emilianala marcus, tiene a flynn disposición servicios gratuitos de asistencia lingüística. [facility] [phone].    We comply with applicable federal civil rights laws and [place] laws. We do not discriminate on the basis of race, color, national origin, age, disability, sex, sexual orientation, or gender identity.            Thank you!     Thank you for choosing [facility] [place]  for your care. Our goal is always to provide you with excellent care. Hearing back from our patients is one way we can continue to improve our services. Please take a few minutes to complete the written survey that you may receive in the mail after your visit with us. Thank you!             Your Updated Medication List - Protect others around you: Learn how to safely use, store and throw away your medicines at www.disposemymeds.org.          This list is accurate as of 9/17/18  4:50 PM.  Always use your most recent med list.                   Brand Name Dispense Instructions for use Diagnosis    buPROPion 300 MG 24 hr tablet    WELLBUTRIN XL    90 tablet    Take 1 tablet (300 mg) by mouth every morning    Recurrent major depressive disorder, in partial remission (H)       DANDELION ROOT PO      Take 400 mg by mouth daily        etonogestrel-ethinyl estradiol 0.12-0.015 MG/24HR vaginal ring    NUVARING    4 each    Place 1 each vaginally every 21 days    Endometriosis       MULTIPLE VITAMIN PO      Take 1 patch by mouth daily PatchMD -  Multivitamin Patch        TOPAMAX PO      Take 100 mg by mouth 2 times daily        vitamin B complex with vitamin C Tabs tablet      Take 1 tablet by mouth daily

## 2018-09-23 ENCOUNTER — HEALTH MAINTENANCE LETTER (OUTPATIENT)
Age: 38
End: 2018-09-23

## 2018-11-18 ENCOUNTER — HEALTH MAINTENANCE LETTER (OUTPATIENT)
Age: 38
End: 2018-11-18

## 2019-02-13 ENCOUNTER — APPOINTMENT (OUTPATIENT)
Dept: CT IMAGING | Facility: CLINIC | Age: 39
End: 2019-02-13
Attending: EMERGENCY MEDICINE
Payer: COMMERCIAL

## 2019-02-13 ENCOUNTER — APPOINTMENT (OUTPATIENT)
Dept: ULTRASOUND IMAGING | Facility: CLINIC | Age: 39
End: 2019-02-13
Attending: EMERGENCY MEDICINE
Payer: COMMERCIAL

## 2019-02-13 ENCOUNTER — HOSPITAL ENCOUNTER (OUTPATIENT)
Facility: CLINIC | Age: 39
Setting detail: OBSERVATION
Discharge: HOME | End: 2019-02-14
Attending: EMERGENCY MEDICINE | Admitting: EMERGENCY MEDICINE
Payer: COMMERCIAL

## 2019-02-13 DIAGNOSIS — F32.9 MAJOR DEPRESSIVE DISORDER WITH SINGLE EPISODE, REMISSION STATUS UNSPECIFIED: ICD-10-CM

## 2019-02-13 DIAGNOSIS — R10.32 ABDOMINAL PAIN, LEFT LOWER QUADRANT: ICD-10-CM

## 2019-02-13 DIAGNOSIS — M79.7 SCAPULOHUMERAL FIBROSITIS: ICD-10-CM

## 2019-02-13 DIAGNOSIS — R10.2 ACUTE PELVIC PAIN: Primary | ICD-10-CM

## 2019-02-13 LAB
ALBUMIN SERPL-MCNC: 3.1 G/DL (ref 3.4–5)
ALP SERPL-CCNC: 46 U/L (ref 40–150)
ALT SERPL W P-5'-P-CCNC: 15 U/L (ref 0–50)
ANION GAP SERPL CALCULATED.3IONS-SCNC: 8 MMOL/L (ref 3–14)
AST SERPL W P-5'-P-CCNC: 11 U/L (ref 0–45)
BASOPHILS # BLD AUTO: 0 10E9/L (ref 0–0.2)
BASOPHILS NFR BLD AUTO: 0.4 %
BILIRUB SERPL-MCNC: 0.3 MG/DL (ref 0.2–1.3)
BUN SERPL-MCNC: 13 MG/DL (ref 7–30)
CALCIUM SERPL-MCNC: 8.5 MG/DL (ref 8.5–10.1)
CHLORIDE SERPL-SCNC: 110 MMOL/L (ref 94–109)
CO2 SERPL-SCNC: 24 MMOL/L (ref 20–32)
CREAT SERPL-MCNC: 0.99 MG/DL (ref 0.52–1.04)
DIFFERENTIAL METHOD BLD: NORMAL
EOSINOPHIL # BLD AUTO: 0.1 10E9/L (ref 0–0.7)
EOSINOPHIL NFR BLD AUTO: 1.3 %
ERYTHROCYTE [DISTWIDTH] IN BLOOD BY AUTOMATED COUNT: 12.9 % (ref 10–15)
GFR SERPL CREATININE-BSD FRML MDRD: 71 ML/MIN/{1.73_M2}
GLUCOSE SERPL-MCNC: 81 MG/DL (ref 70–99)
HCT VFR BLD AUTO: 43.2 % (ref 35–47)
HGB BLD-MCNC: 13.8 G/DL (ref 11.7–15.7)
IMM GRANULOCYTES # BLD: 0 10E9/L (ref 0–0.4)
IMM GRANULOCYTES NFR BLD: 0.1 %
LYMPHOCYTES # BLD AUTO: 4.6 10E9/L (ref 0.8–5.3)
LYMPHOCYTES NFR BLD AUTO: 59.5 %
MCH RBC QN AUTO: 28.9 PG (ref 26.5–33)
MCHC RBC AUTO-ENTMCNC: 31.9 G/DL (ref 31.5–36.5)
MCV RBC AUTO: 91 FL (ref 78–100)
MONOCYTES # BLD AUTO: 0.5 10E9/L (ref 0–1.3)
MONOCYTES NFR BLD AUTO: 5.9 %
NEUTROPHILS # BLD AUTO: 2.5 10E9/L (ref 1.6–8.3)
NEUTROPHILS NFR BLD AUTO: 32.8 %
NRBC # BLD AUTO: 0 10*3/UL
NRBC BLD AUTO-RTO: 0 /100
PLATELET # BLD AUTO: 249 10E9/L (ref 150–450)
POTASSIUM SERPL-SCNC: 4.1 MMOL/L (ref 3.4–5.3)
PROT SERPL-MCNC: 6.4 G/DL (ref 6.8–8.8)
RBC # BLD AUTO: 4.77 10E12/L (ref 3.8–5.2)
SODIUM SERPL-SCNC: 142 MMOL/L (ref 133–144)
WBC # BLD AUTO: 7.7 10E9/L (ref 4–11)

## 2019-02-13 PROCEDURE — 80053 COMPREHEN METABOLIC PANEL: CPT | Performed by: EMERGENCY MEDICINE

## 2019-02-13 PROCEDURE — 96376 TX/PRO/DX INJ SAME DRUG ADON: CPT | Performed by: EMERGENCY MEDICINE

## 2019-02-13 PROCEDURE — 25800030 ZZH RX IP 258 OP 636

## 2019-02-13 PROCEDURE — 25000128 H RX IP 250 OP 636: Performed by: EMERGENCY MEDICINE

## 2019-02-13 PROCEDURE — 96374 THER/PROPH/DIAG INJ IV PUSH: CPT | Mod: 59 | Performed by: EMERGENCY MEDICINE

## 2019-02-13 PROCEDURE — 74177 CT ABD & PELVIS W/CONTRAST: CPT

## 2019-02-13 PROCEDURE — 93976 VASCULAR STUDY: CPT

## 2019-02-13 PROCEDURE — 25800030 ZZH RX IP 258 OP 636: Performed by: EMERGENCY MEDICINE

## 2019-02-13 PROCEDURE — 99284 EMERGENCY DEPT VISIT MOD MDM: CPT | Mod: Z6 | Performed by: EMERGENCY MEDICINE

## 2019-02-13 PROCEDURE — 85025 COMPLETE CBC W/AUTO DIFF WBC: CPT | Performed by: EMERGENCY MEDICINE

## 2019-02-13 PROCEDURE — 99285 EMERGENCY DEPT VISIT HI MDM: CPT | Mod: 25 | Performed by: EMERGENCY MEDICINE

## 2019-02-13 PROCEDURE — 96375 TX/PRO/DX INJ NEW DRUG ADDON: CPT | Performed by: EMERGENCY MEDICINE

## 2019-02-13 RX ORDER — ONDANSETRON 2 MG/ML
4 INJECTION INTRAMUSCULAR; INTRAVENOUS ONCE
Status: COMPLETED | OUTPATIENT
Start: 2019-02-13 | End: 2019-02-13

## 2019-02-13 RX ORDER — MORPHINE SULFATE 4 MG/ML
4 INJECTION, SOLUTION INTRAMUSCULAR; INTRAVENOUS ONCE
Status: COMPLETED | OUTPATIENT
Start: 2019-02-13 | End: 2019-02-13

## 2019-02-13 RX ORDER — SODIUM CHLORIDE 9 MG/ML
100 INJECTION, SOLUTION INTRAVENOUS ONCE
Status: COMPLETED | OUTPATIENT
Start: 2019-02-13 | End: 2019-02-13

## 2019-02-13 RX ORDER — IOPAMIDOL 755 MG/ML
100 INJECTION, SOLUTION INTRAVASCULAR ONCE
Status: COMPLETED | OUTPATIENT
Start: 2019-02-13 | End: 2019-02-13

## 2019-02-13 RX ORDER — MORPHINE SULFATE 4 MG/ML
4 INJECTION, SOLUTION INTRAMUSCULAR; INTRAVENOUS ONCE
Status: COMPLETED | OUTPATIENT
Start: 2019-02-13 | End: 2019-02-14

## 2019-02-13 RX ORDER — SODIUM CHLORIDE 9 MG/ML
INJECTION, SOLUTION INTRAVENOUS
Status: COMPLETED
Start: 2019-02-13 | End: 2019-02-13

## 2019-02-13 RX ORDER — SODIUM CHLORIDE 9 MG/ML
1000 INJECTION, SOLUTION INTRAVENOUS CONTINUOUS
Status: DISCONTINUED | OUTPATIENT
Start: 2019-02-13 | End: 2019-02-14 | Stop reason: HOSPADM

## 2019-02-13 RX ADMIN — MORPHINE SULFATE 4 MG: 4 INJECTION, SOLUTION INTRAMUSCULAR; INTRAVENOUS at 20:29

## 2019-02-13 RX ADMIN — SODIUM CHLORIDE 56 ML: 9 INJECTION, SOLUTION INTRAVENOUS at 22:43

## 2019-02-13 RX ADMIN — IOPAMIDOL 66 ML: 755 INJECTION, SOLUTION INTRAVENOUS at 22:43

## 2019-02-13 RX ADMIN — SODIUM CHLORIDE, PRESERVATIVE FREE 1000 ML: 5 INJECTION INTRAVENOUS at 20:33

## 2019-02-13 RX ADMIN — ONDANSETRON 4 MG: 2 INJECTION INTRAMUSCULAR; INTRAVENOUS at 20:31

## 2019-02-13 RX ADMIN — MORPHINE SULFATE 4 MG: 4 INJECTION, SOLUTION INTRAMUSCULAR; INTRAVENOUS at 21:55

## 2019-02-13 RX ADMIN — SODIUM CHLORIDE 1000 ML: 9 INJECTION, SOLUTION INTRAVENOUS at 22:04

## 2019-02-13 RX ADMIN — Medication 1000 ML: at 20:33

## 2019-02-13 ASSESSMENT — ENCOUNTER SYMPTOMS
DIARRHEA: 0
BLOOD IN STOOL: 0
VOMITING: 0
NERVOUS/ANXIOUS: 1
NAUSEA: 0

## 2019-02-14 VITALS
SYSTOLIC BLOOD PRESSURE: 93 MMHG | RESPIRATION RATE: 18 BRPM | WEIGHT: 126.5 LBS | TEMPERATURE: 97.9 F | DIASTOLIC BLOOD PRESSURE: 75 MMHG | HEART RATE: 69 BPM | OXYGEN SATURATION: 98 % | BODY MASS INDEX: 21.71 KG/M2

## 2019-02-14 DIAGNOSIS — N80.9 ENDOMETRIOSIS: ICD-10-CM

## 2019-02-14 PROBLEM — R10.2 ACUTE PELVIC PAIN: Status: ACTIVE | Noted: 2019-02-14

## 2019-02-14 LAB
ALBUMIN UR-MCNC: NEGATIVE MG/DL
APPEARANCE UR: CLEAR
BILIRUB UR QL STRIP: NEGATIVE
COLOR UR AUTO: YELLOW
GLUCOSE UR STRIP-MCNC: NEGATIVE MG/DL
HGB UR QL STRIP: NEGATIVE
KETONES UR STRIP-MCNC: NEGATIVE MG/DL
LEUKOCYTE ESTERASE UR QL STRIP: NEGATIVE
NITRATE UR QL: NEGATIVE
PH UR STRIP: 5.5 PH (ref 5–7)
SOURCE: NORMAL
SP GR UR STRIP: 1.03 (ref 1–1.03)
UROBILINOGEN UR STRIP-MCNC: NORMAL MG/DL (ref 0–2)

## 2019-02-14 PROCEDURE — 81003 URINALYSIS AUTO W/O SCOPE: CPT | Performed by: EMERGENCY MEDICINE

## 2019-02-14 PROCEDURE — 96376 TX/PRO/DX INJ SAME DRUG ADON: CPT | Performed by: EMERGENCY MEDICINE

## 2019-02-14 PROCEDURE — 25000128 H RX IP 250 OP 636: Performed by: EMERGENCY MEDICINE

## 2019-02-14 PROCEDURE — 25000128 H RX IP 250 OP 636: Performed by: NURSE PRACTITIONER

## 2019-02-14 PROCEDURE — G0378 HOSPITAL OBSERVATION PER HR: HCPCS

## 2019-02-14 PROCEDURE — 99234 HOSP IP/OBS SM DT SF/LOW 45: CPT | Mod: Z6 | Performed by: EMERGENCY MEDICINE

## 2019-02-14 PROCEDURE — 96376 TX/PRO/DX INJ SAME DRUG ADON: CPT

## 2019-02-14 PROCEDURE — 25000132 ZZH RX MED GY IP 250 OP 250 PS 637: Performed by: NURSE PRACTITIONER

## 2019-02-14 PROCEDURE — 25800030 ZZH RX IP 258 OP 636: Performed by: EMERGENCY MEDICINE

## 2019-02-14 RX ORDER — ONDANSETRON 2 MG/ML
4 INJECTION INTRAMUSCULAR; INTRAVENOUS EVERY 6 HOURS PRN
Status: DISCONTINUED | OUTPATIENT
Start: 2019-02-14 | End: 2019-02-14 | Stop reason: HOSPADM

## 2019-02-14 RX ORDER — ACETAMINOPHEN 650 MG/1
650 SUPPOSITORY RECTAL EVERY 4 HOURS PRN
Status: DISCONTINUED | OUTPATIENT
Start: 2019-02-14 | End: 2019-02-14

## 2019-02-14 RX ORDER — ACETAMINOPHEN 325 MG/1
650 TABLET ORAL EVERY 4 HOURS PRN
Status: DISCONTINUED | OUTPATIENT
Start: 2019-02-14 | End: 2019-02-14

## 2019-02-14 RX ORDER — MORPHINE SULFATE 4 MG/ML
4 INJECTION, SOLUTION INTRAMUSCULAR; INTRAVENOUS EVERY 4 HOURS PRN
Status: DISCONTINUED | OUTPATIENT
Start: 2019-02-14 | End: 2019-02-14

## 2019-02-14 RX ORDER — ONDANSETRON 4 MG/1
4 TABLET, ORALLY DISINTEGRATING ORAL EVERY 6 HOURS PRN
Status: DISCONTINUED | OUTPATIENT
Start: 2019-02-14 | End: 2019-02-14 | Stop reason: HOSPADM

## 2019-02-14 RX ORDER — AMOXICILLIN 250 MG
2 CAPSULE ORAL
Status: DISCONTINUED | OUTPATIENT
Start: 2019-02-14 | End: 2019-02-14 | Stop reason: HOSPADM

## 2019-02-14 RX ORDER — PROCHLORPERAZINE 25 MG
25 SUPPOSITORY, RECTAL RECTAL EVERY 12 HOURS PRN
Status: DISCONTINUED | OUTPATIENT
Start: 2019-02-14 | End: 2019-02-14 | Stop reason: HOSPADM

## 2019-02-14 RX ORDER — BUPROPION HYDROCHLORIDE 300 MG/1
300 TABLET ORAL EVERY MORNING
Status: DISCONTINUED | OUTPATIENT
Start: 2019-02-14 | End: 2019-02-14 | Stop reason: HOSPADM

## 2019-02-14 RX ORDER — AMOXICILLIN 250 MG
1 CAPSULE ORAL
Status: DISCONTINUED | OUTPATIENT
Start: 2019-02-14 | End: 2019-02-14 | Stop reason: HOSPADM

## 2019-02-14 RX ORDER — ETONOGESTREL/ETHINYL ESTRADIOL .12-.015MG
RING, VAGINAL VAGINAL
Qty: 1 EACH | Refills: 0 | Status: SHIPPED | OUTPATIENT
Start: 2019-02-14

## 2019-02-14 RX ORDER — OXYCODONE AND ACETAMINOPHEN 5; 325 MG/1; MG/1
1-2 TABLET ORAL EVERY 4 HOURS PRN
Status: DISCONTINUED | OUTPATIENT
Start: 2019-02-14 | End: 2019-02-14 | Stop reason: HOSPADM

## 2019-02-14 RX ORDER — NALOXONE HYDROCHLORIDE 0.4 MG/ML
.1-.4 INJECTION, SOLUTION INTRAMUSCULAR; INTRAVENOUS; SUBCUTANEOUS
Status: DISCONTINUED | OUTPATIENT
Start: 2019-02-14 | End: 2019-02-14 | Stop reason: HOSPADM

## 2019-02-14 RX ORDER — PROCHLORPERAZINE MALEATE 5 MG
10 TABLET ORAL EVERY 6 HOURS PRN
Status: DISCONTINUED | OUTPATIENT
Start: 2019-02-14 | End: 2019-02-14 | Stop reason: HOSPADM

## 2019-02-14 RX ORDER — OXYCODONE AND ACETAMINOPHEN 5; 325 MG/1; MG/1
1-2 TABLET ORAL EVERY 4 HOURS PRN
Qty: 20 TABLET | Refills: 0 | Status: SHIPPED | OUTPATIENT
Start: 2019-02-14 | End: 2019-04-23

## 2019-02-14 RX ORDER — KETOROLAC TROMETHAMINE 30 MG/ML
30 INJECTION, SOLUTION INTRAMUSCULAR; INTRAVENOUS EVERY 6 HOURS PRN
Status: DISCONTINUED | OUTPATIENT
Start: 2019-02-14 | End: 2019-02-14 | Stop reason: HOSPADM

## 2019-02-14 RX ORDER — TOPIRAMATE 100 MG/1
100 TABLET, FILM COATED ORAL 2 TIMES DAILY
Status: DISCONTINUED | OUTPATIENT
Start: 2019-02-14 | End: 2019-02-14 | Stop reason: HOSPADM

## 2019-02-14 RX ADMIN — SODIUM CHLORIDE 1000 ML: 9 INJECTION, SOLUTION INTRAVENOUS at 05:21

## 2019-02-14 RX ADMIN — OXYCODONE HYDROCHLORIDE AND ACETAMINOPHEN 2 TABLET: 5; 325 TABLET ORAL at 10:27

## 2019-02-14 RX ADMIN — OXYCODONE HYDROCHLORIDE AND ACETAMINOPHEN 2 TABLET: 5; 325 TABLET ORAL at 14:26

## 2019-02-14 RX ADMIN — MORPHINE SULFATE 4 MG: 4 INJECTION, SOLUTION INTRAMUSCULAR; INTRAVENOUS at 00:42

## 2019-02-14 RX ADMIN — MORPHINE SULFATE 4 MG: 4 INJECTION INTRAVENOUS at 03:52

## 2019-02-14 RX ADMIN — MORPHINE SULFATE 4 MG: 4 INJECTION INTRAVENOUS at 07:48

## 2019-02-14 RX ADMIN — BUPROPION HYDROCHLORIDE 300 MG: 300 TABLET, FILM COATED, EXTENDED RELEASE ORAL at 07:48

## 2019-02-14 RX ADMIN — TOPIRAMATE 100 MG: 100 TABLET, FILM COATED ORAL at 07:48

## 2019-02-14 ASSESSMENT — PAIN DESCRIPTION - DESCRIPTORS: DESCRIPTORS: ACHING

## 2019-02-14 NOTE — ED PROVIDER NOTES
"  History     Chief Complaint   Patient presents with     Pelvic Pain     \"I have endometriosis and having sever pelvic pain.\" Pain has been present since 1500. Denies vaginal discharge.     HPI  Ashli Gamboa is a 39 year old female with history of sleeve gastrectomy and endometriosis s/p hysterectomy who presents for evaluation of pelvic pain. Patient reports onset of sharp, left-sided pelvic pain today around 3 PM. She states her pain has been constant since onset and currently rates it as a 7/10. Patient states this pain is different than pain she's had previously with endometriosis, noting that it's normally right-sided and less painful. She reports taking Tylenol, though reports it was ineffective. Patient denies vomiting, diarrhea, black or bloody stool, vaginal discharge, or nausea, though she does report some anxiety today. She states she last saw her OB 2 months ago, though has a follow-up on the 1st of March. Denies recent ultrasound.    Past Medical History:   Diagnosis Date     Arthritis     BIG TOE     Cervical high risk HPV (human papillomavirus) test positive 07/05/2017     Cervical high risk HPV (human papillomavirus) test positive 06/10/2015     Depressive disorder 2007    I have been on and off medication for the last several years     Endometriosis      Hearing problem      Obese      Sensorineural hearing loss        Past Surgical History:   Procedure Laterality Date     ABDOMEN SURGERY      laparoscopy X2     DILATE CERVIX, HYSTEROSCOPY, ABLATE ENDOMETRIUM NOVASURE, COMBINED N/A 11/17/2016    Procedure: COMBINED DILATE CERVIX, HYSTEROSCOPY, ABLATE ENDOMETRIUM NOVASURE;  Surgeon: Sabas Patel MD;  Location: [facility]     GYN SURGERY  dates above    myomectomy x2, laparoscopy x2     LAPAROSCOPIC ASSISTED HYSTERECTOMY VAGINAL N/A 1/4/2018    Procedure: LAPAROSCOPIC ASSISTED HYSTERECTOMY VAGINAL;;  Surgeon: Sabas Patel MD;  Location: [facility]     LAPAROSCOPIC GASTRIC SLEEVE N/A " 7/25/2017    Procedure: LAPAROSCOPIC GASTRIC SLEEVE;  Laparoscopic Sleeve Gastrectomy;  Surgeon: Sam Schmidt MD;  Location: [facility]     LAPAROSCOPIC LYSIS ADHESIONS  7/10/2014    Procedure: LAPAROSCOPIC LYSIS ADHESIONS;  Surgeon: Sabas Patel MD;  Location: [facility]     LAPAROSCOPIC SALPINGECTOMY Bilateral 1/4/2018    Procedure: LAPAROSCOPIC SALPINGECTOMY;;  Surgeon: Sabas Patel MD;  Location: [facility]     LASER CO2 LAPAROSCOPIC VAPORIZATION ENDOMETRIUM  7/10/2014    Procedure: LASER CO2 LAPAROSCOPIC VAPORIZATION ENDOMETRIUM;  Surgeon: Sabas Patel MD;  [facility]     LASER CO2 LAPAROSCOPIC VAPORIZATION ENDOMETRIUM N/A 6/30/2015    Procedure: LASER CO2 LAPAROSCOPIC VAPORIZATION ENDOMETRIUM;  Surgeon: Sabas Patel MD;  [facility]     LASER CO2 LAPAROSCOPIC VAPORIZATION ENDOMETRIUM, LYSIS ADHESIONS N/A 1/4/2018    Procedure: LASER CO2 LAPAROSCOPIC VAPORIZATION ENDOMETRIUM, LYSIS ADHESIONS;  LAPAROSCOPY WITH CO2 LASER LYSIS OF ADHESIONS AND VAPORIZATION OF ENDOMETRIOSIS, CYSTOTOMY LEFT OVARY, LAPAROSCOPIC VAGINAL HYSTERECTOMY WITH BILATERAL SALPINGECTOMY ;  Surgeon: Sabas Patel MD;  Location: [facility]     LASER CO2 LAPAROSCOPY DIAGNOSTIC, LYSIS ADHESIONS, COMBINED N/A 6/30/2015    Procedure: COMBINED LASER CO2 LAPAROSCOPY DIAGNOSTIC, LYSIS ADHESIONS;  Surgeon: Sabas Patel MD;  Location: [facility]       Family History   Problem Relation Age of Onset     Depression Mother      Cancer Mother         Cervical      Other - See Comments Mother         Fibromyalgia     Crohn's Disease Mother      Obesity Mother      Ovarian Cancer Mother         Cervical Cancer     Substance Abuse Mother      Stomach Problem Mother      Arthritis Father      Rheumatoid Arthritis Father      Depression Father      Substance Abuse Father      Cerebrovascular Disease Paternal Grandmother      Breast Cancer Maternal Grandmother      Cancer Maternal Grandmother      Depression Brother      Depression Sister       Mental Illness Brother         schizophrenia and bipolar     Substance Abuse Brother      Stomach Problem Sister      Stomach Problem Brother        Social History     Tobacco Use     Smoking status: Former Smoker     Packs/day: 0.50     Years: 5.00     Pack years: 2.50     Types: Cigarettes     Start date: 2007     Last attempt to quit: 2013     Years since quittin.2     Smokeless tobacco: Never Used   Substance Use Topics     Alcohol use: Yes     Alcohol/week: 0.0 oz     Comment: occas       Current Facility-Administered Medications   Medication     0.9% sodium chloride BOLUS    Followed by     sodium chloride 0.9% infusion     morphine (PF) injection 4 mg     ondansetron (ZOFRAN) injection 4 mg     Current Outpatient Medications   Medication     buPROPion (WELLBUTRIN XL) 300 MG 24 hr tablet     etonogestrel-ethinyl estradiol (NUVARING) 0.12-0.015 MG/24HR vaginal ring     MULTIPLE VITAMIN PO     Topiramate (TOPAMAX PO)     vitamin B complex with vitamin C (VITAMIN  B COMPLEX) TABS tablet     Facility-Administered Medications Ordered in Other Encounters   Medication     BUPivacaine 0.25%     iopamidol (ISOVUE-M 200) solution 20 mL     lidocaine (PF) (XYLOCAINE) 1 % injection 30 mL     triamcinolone acetonide (KENALOG-40) injection 40 mg      No Known Allergies     I have reviewed the Medications, Allergies, Past Medical and Surgical History, and Social History in the Epic system.    Review of Systems   Gastrointestinal: Negative for blood in stool, diarrhea, nausea and vomiting.   Genitourinary: Positive for pelvic pain (sharp, left-sided). Negative for vaginal discharge.   Psychiatric/Behavioral: The patient is nervous/anxious.    All other systems reviewed and are negative.      Physical Exam   BP: 147/75  Pulse: 70  Temp: 98.4  F (36.9  C)  Resp: 16  Weight: 57.4 kg (126 lb 8 oz)  SpO2: 100 %      Physical Exam  Physical Exam   Constitutional: oriented to person, place, and time. appears  well-developed and well-nourished.   HENT:   Head: Normocephalic and atraumatic.   Neck: Normal range of motion.   Pulmonary/Chest: Effort normal. No respiratory distress.   Cardiac: No murmurs, rubs, gallops. RRR.  Abdominal: Left lower quadrant tenderness to palpation.  Abdomen soft, nondistended. No rebound tenderness.  MSK: Long bones without deformity or evidence of trauma  Neurological: alert and oriented to person, place, and time.   Skin: Skin is warm and dry.   Psychiatric:  normal mood and affect.  behavior is normal. Thought content normal.     ED Course        Procedures        Results for orders placed or performed during the hospital encounter of 02/13/19   US Pelvis Cmplt w Transvag & Doppler LmtPel Duplex Limited    Narrative    PELVIC ULTRASOUND WITH ENDOVAGINAL TRANSDUCER    2/13/2019 9:29 PM     HISTORY: Pelvic pain.    TECHNIQUE:  Transabdominal and endovaginal sonography was performed.    COMPARISON: None.    FINDINGS: The uterus is absent. Neither ovary is identified. No free  fluid. No pelvic mass is evident.      Impression    IMPRESSION: No abnormal mass or fluid collection. Neither ovary is  identified.     CT Abdomen Pelvis w Contrast    Narrative    CT ABDOMEN AND PELVIS WITH CONTRAST   2/13/2019 10:45 PM     HISTORY: Ongoing left lower quadrant pain, history of hysterectomy.    TECHNIQUE:  CT abdomen and pelvis with 66 mL Isovue-370 IV. Radiation  dose for this scan was reduced using automated exposure control,  adjustment of the mA and/or kV according to patient size, or iterative  reconstruction technique.    COMPARISON: 5/9/2014.    FINDINGS:  Abdomen: The lung bases are unremarkable. The heart size is normal.  The liver, spleen, gallbladder, pancreas and adrenal glands are normal  in appearance. There is a small cyst or calyceal diverticulum  containing calcification in the upper pole of the left kidney  measuring 1.2 cm. No hydronephrosis. There is no abdominal or pelvic  lymph node  enlargement.    Pelvis: There is a NuvaRing in the vagina. Uterus is absent. No  adnexal mass. There is no bowel obstruction or inflammation. The  appendix is normal. Postoperative changes about stomach.      Impression    IMPRESSION: No acute abnormality. No bowel obstruction or  inflammation.   Comprehensive metabolic panel   Result Value Ref Range    Sodium 142 133 - 144 mmol/L    Potassium 4.1 3.4 - 5.3 mmol/L    Chloride 110 (H) 94 - 109 mmol/L    Carbon Dioxide 24 20 - 32 mmol/L    Anion Gap 8 3 - 14 mmol/L    Glucose 81 70 - 99 mg/dL    Urea Nitrogen 13 7 - 30 mg/dL    Creatinine 0.99 0.52 - 1.04 mg/dL    GFR Estimate 71 >60 mL/min/[1.73_m2]    GFR Estimate If Black 83 >60 mL/min/[1.73_m2]    Calcium 8.5 8.5 - 10.1 mg/dL    Bilirubin Total 0.3 0.2 - 1.3 mg/dL    Albumin 3.1 (L) 3.4 - 5.0 g/dL    Protein Total 6.4 (L) 6.8 - 8.8 g/dL    Alkaline Phosphatase 46 40 - 150 U/L    ALT 15 0 - 50 U/L    AST 11 0 - 45 U/L   CBC with platelets differential   Result Value Ref Range    WBC 7.7 4.0 - 11.0 10e9/L    RBC Count 4.77 3.8 - 5.2 10e12/L    Hemoglobin 13.8 11.7 - 15.7 g/dL    Hematocrit 43.2 35.0 - 47.0 %    MCV 91 78 - 100 fl    MCH 28.9 26.5 - 33.0 pg    MCHC 31.9 31.5 - 36.5 g/dL    RDW 12.9 10.0 - 15.0 %    Platelet Count 249 150 - 450 10e9/L    Diff Method Automated Method     % Neutrophils 32.8 %    % Lymphocytes 59.5 %    % Monocytes 5.9 %    % Eosinophils 1.3 %    % Basophils 0.4 %    % Immature Granulocytes 0.1 %    Nucleated RBCs 0 0 /100    Absolute Neutrophil 2.5 1.6 - 8.3 10e9/L    Absolute Lymphocytes 4.6 0.8 - 5.3 10e9/L    Absolute Monocytes 0.5 0.0 - 1.3 10e9/L    Absolute Eosinophils 0.1 0.0 - 0.7 10e9/L    Absolute Basophils 0.0 0.0 - 0.2 10e9/L    Abs Immature Granulocytes 0.0 0 - 0.4 10e9/L    Absolute Nucleated RBC 0.0        Labs Ordered and Resulted from Time of ED Arrival Up to the Time of Departure from the ED - No data to display         Assessments & Plan (with Medical Decision Making)    MDM  Patient presented with ongoing pelvic pain left lower quadrant which does seem to be new.  She has had no vaginal bleeding or discharge.  No history of kidney stones however will check urine.  Ultrasound to be done to assess for torsion.  Patient otherwise appears nontoxic and well.  Morphine given for pain.    Reevaluation: Ultrasound did not visualize ovaries.  CT abdomen pelvis negative.  Labs do not reveal etiology for pain.  Patient needing continuing doses of pain medications.  Patient will be [facility] to [facility] ED observation for pain control.  Discussed with gynecology, they agree with workup and are available should pain continue to be a problem however they do not feel the need to see the patient at this point.  I have reviewed the nursing notes.    I have reviewed the findings, diagnosis, plan and need for follow up with the patient.       Medication List      There are no discharge medications for this visit.         Final diagnoses:   Abdominal pain, left lower quadrant   IMilton, am serving as a trained medical scribe to document services personally performed by Ruben Beverly MD, based on the provider's statements to me.   IRuben MD, was physically present and have reviewed and verified the accuracy of this note documented by Milton Le.      2/13/2019   [facility] EMERGENCY DEPARTMENT     Ruben Beverly MD  02/13/19 [id number]

## 2019-02-14 NOTE — DISCHARGE SUMMARY
Discharge Summary    Ashli Gamboa MRN# [id number]   YOB: 1980 Age: 39 year old     Date of Admission:  2/13/2019  Date of Discharge:  2/14/2019  Admitting Physician:  Ruben Beverly MD  Discharge Physician:  Ruben Zhang MD   Discharging Service:  Emergency Medicine     Primary Provider: Karena Mo          Discharge Diagnosis:     Acute pelvic pain    * No resolved hospital problems. *               Discharge Disposition:   Discharged to home           Condition on Discharge:   Discharge condition: Stable   Code status on discharge: Full Code           Procedures:   No procedures performed during this admission          Discharge Medications:     Current Discharge Medication List      START taking these medications    Details   oxyCODONE-acetaminophen (PERCOCET) 5-325 MG tablet Take 1-2 tablets by mouth every 4 hours as needed for moderate to severe pain  Qty: 20 tablet, Refills: 0    Associated Diagnoses: Acute pelvic pain         CONTINUE these medications which have NOT CHANGED    Details   buPROPion (WELLBUTRIN XL) 300 MG 24 hr tablet Take 1 tablet (300 mg) by mouth every morning  Qty: 90 tablet, Refills: 3    Associated Diagnoses: Recurrent major depressive disorder, in partial remission (H)      MULTIPLE VITAMIN PO Take 1 patch by mouth daily PatchMD - Multivitamin Patch      Topiramate (TOPAMAX PO) Take 100 mg by mouth 2 times daily       vitamin B complex with vitamin C (VITAMIN  B COMPLEX) TABS tablet Take 1 tablet by mouth daily      NUVARING 0.12-0.015 MG/24HR vaginal ring INSERT 1 RING VAGINALLY EVERY 21 DAYS  Qty: 1 each, Refills: 0    Associated Diagnoses: Endometriosis                   Consultations:   Curbside consult from OBGYN             Brief History of Illness:   Please see detailed H&P from 12/14/19, in brief: Ashli Gamboa is a 39 year old female with history of sleeve gastrectomy and endometriosis s/p hysterectomy who presents for  evaluation of pelvic pain.            Hospital Course:   1. Left-sided pelvic pain : Sudden onset of left-sided pelvic pain at 3 pm today. Sharp in nature and has been constant since this makenna. Different than endometriosis pain which usually occurs on the right side. Took Tylenol at home which was ineffective. Denies any vaginal discharge. Reports being sexually active. Ultrasound did not visualize ovaries.  CT abdomen pelvis negative. ED physician discussed case with gynecology, they agree with Observation stay and are available for consultation if pain management becomes an issue. UA pending. Received Morphine 4 mg IV x 3 for pain, 1 liter IV bolus.  She was transitioned to oral percocet on 2/14, her pain control improved, but she still had some present.  No nausea or vomiting, eating well.  Discussed case with radiology who did not see any abnormalities with the ovaries on CT scan.  Discussed the case with OB GYN who recommended continued pain management and follow up as outpatient.  Patient ok for discharge to home, will try to move up her appointment with OBGYN.  She was given a percocet prescription.  She can also try intermittent ibuprofen for a short period as well.        2. Depression: Continue PTA Wellbutrin.      3. Fibromyalgia: - Continue PTA Topomax               Final Day of Progress before Discharge:       Physical Exam:  Blood pressure 93/75, pulse 69, temperature 97.9  F (36.6  C), temperature source Oral, resp. rate 18, weight 57.4 kg (126 lb 8 oz), SpO2 98 %, not currently breastfeeding.    EXAM:  Exam:  Constitutional: healthy, alert and no distress  Cardiovascular: No lifts, heaves, or thrills. RRR. No murmurs, clicks gallops or rub  Respiratory: Good diaphragmatic excursion. Lungs clear  Gastrointestinal: Abdomen soft, non-tender. BS normal. No masses, organomegaly, left sided pelvic pain  Musculoskeletal: extremities normal- no gross deformities noted, gait normal and normal muscle  tone  Skin: no suspicious lesions or rashes  Neurologic: Gait normal. Alert and oriented.   Psychiatric: mentation appears normal and affect normal/bright    BP 93/75 (BP Location: Left arm)   Pulse 69   Temp 97.9  F (36.6  C) (Oral)   Resp 18   Wt 57.4 kg (126 lb 8 oz)   SpO2 98%   Breastfeeding? No   BMI 21.71 kg/m               Data:  All laboratory data reviewed             Significant Results:     Results for orders placed or performed during the hospital encounter of 02/13/19   US Pelvis Cmplt w Transvag & Doppler LmtPel Duplex Limited    Narrative    PELVIC ULTRASOUND WITH ENDOVAGINAL TRANSDUCER    2/13/2019 9:29 PM     HISTORY: Pelvic pain.    TECHNIQUE:  Transabdominal and endovaginal sonography was performed.    COMPARISON: None.    FINDINGS: The uterus is absent. Neither ovary is identified. No free  fluid. No pelvic mass is evident.      Impression    IMPRESSION: No abnormal mass or fluid collection. Neither ovary is  identified.     CT Abdomen Pelvis w Contrast    Narrative    CT ABDOMEN AND PELVIS WITH CONTRAST   2/13/2019 10:45 PM     HISTORY: Ongoing left lower quadrant pain, history of hysterectomy.    TECHNIQUE:  CT abdomen and pelvis with 66 mL Isovue-370 IV. Radiation  dose for this scan was reduced using automated exposure control,  adjustment of the mA and/or kV according to patient size, or iterative  reconstruction technique.    COMPARISON: 5/9/2014.    FINDINGS:  Abdomen: The lung bases are unremarkable. The heart size is normal.  The liver, spleen, gallbladder, pancreas and adrenal glands are normal  in appearance. There is a small cyst or calyceal diverticulum  containing calcification in the upper pole of the left kidney  measuring 1.2 cm. No hydronephrosis. There is no abdominal or pelvic  lymph node enlargement.    Pelvis: There is a NuvaRing in the vagina. Uterus is absent. No  adnexal mass. There is no bowel obstruction or inflammation. The  appendix is normal. Postoperative  changes about stomach.      Impression    IMPRESSION: No acute abnormality. No bowel obstruction or  inflammation.   Comprehensive metabolic panel   Result Value Ref Range    Sodium 142 133 - 144 mmol/L    Potassium 4.1 3.4 - 5.3 mmol/L    Chloride 110 (H) 94 - 109 mmol/L    Carbon Dioxide 24 20 - 32 mmol/L    Anion Gap 8 3 - 14 mmol/L    Glucose 81 70 - 99 mg/dL    Urea Nitrogen 13 7 - 30 mg/dL    Creatinine 0.99 0.52 - 1.04 mg/dL    GFR Estimate 71 >60 mL/min/[1.73_m2]    GFR Estimate If Black 83 >60 mL/min/[1.73_m2]    Calcium 8.5 8.5 - 10.1 mg/dL    Bilirubin Total 0.3 0.2 - 1.3 mg/dL    Albumin 3.1 (L) 3.4 - 5.0 g/dL    Protein Total 6.4 (L) 6.8 - 8.8 g/dL    Alkaline Phosphatase 46 40 - 150 U/L    ALT 15 0 - 50 U/L    AST 11 0 - 45 U/L   CBC with platelets differential   Result Value Ref Range    WBC 7.7 4.0 - 11.0 10e9/L    RBC Count 4.77 3.8 - 5.2 10e12/L    Hemoglobin 13.8 11.7 - 15.7 g/dL    Hematocrit 43.2 35.0 - 47.0 %    MCV 91 78 - 100 fl    MCH 28.9 26.5 - 33.0 pg    MCHC 31.9 31.5 - 36.5 g/dL    RDW 12.9 10.0 - 15.0 %    Platelet Count 249 150 - 450 10e9/L    Diff Method Automated Method     % Neutrophils 32.8 %    % Lymphocytes 59.5 %    % Monocytes 5.9 %    % Eosinophils 1.3 %    % Basophils 0.4 %    % Immature Granulocytes 0.1 %    Nucleated RBCs 0 0 /100    Absolute Neutrophil 2.5 1.6 - 8.3 10e9/L    Absolute Lymphocytes 4.6 0.8 - 5.3 10e9/L    Absolute Monocytes 0.5 0.0 - 1.3 10e9/L    Absolute Eosinophils 0.1 0.0 - 0.7 10e9/L    Absolute Basophils 0.0 0.0 - 0.2 10e9/L    Abs Immature Granulocytes 0.0 0 - 0.4 10e9/L    Absolute Nucleated RBC 0.0    UA reflex to Microscopic and Culture   Result Value Ref Range    Color Urine Yellow     Appearance Urine Clear     Glucose Urine Negative NEG^Negative mg/dL    Bilirubin Urine Negative NEG^Negative    Ketones Urine Negative NEG^Negative mg/dL    Specific Gravity Urine 1.029 1.003 - 1.035    Blood Urine Negative NEG^Negative    pH Urine 5.5 5.0 - 7.0  pH    Protein Albumin Urine Negative NEG^Negative mg/dL    Urobilinogen mg/dL Normal 0.0 - 2.0 mg/dL    Nitrite Urine Negative NEG^Negative    Leukocyte Esterase Urine Negative NEG^Negative    Source Abdominal       Recent Results (from the past 48 hour(s))   US Pelvis Cmplt w Transvag & Doppler LmtPel Duplex Limited    Narrative    PELVIC ULTRASOUND WITH ENDOVAGINAL TRANSDUCER    2/13/2019 9:29 PM     HISTORY: Pelvic pain.    TECHNIQUE:  Transabdominal and endovaginal sonography was performed.    COMPARISON: None.    FINDINGS: The uterus is absent. Neither ovary is identified. No free  fluid. No pelvic mass is evident.      Impression    IMPRESSION: No abnormal mass or fluid collection. Neither ovary is  identified.     CT Abdomen Pelvis w Contrast    Narrative    CT ABDOMEN AND PELVIS WITH CONTRAST   2/13/2019 10:45 PM     HISTORY: Ongoing left lower quadrant pain, history of hysterectomy.    TECHNIQUE:  CT abdomen and pelvis with 66 mL Isovue-370 IV. Radiation  dose for this scan was reduced using automated exposure control,  adjustment of the mA and/or kV according to patient size, or iterative  reconstruction technique.    COMPARISON: 5/9/2014.    FINDINGS:  Abdomen: The lung bases are unremarkable. The heart size is normal.  The liver, spleen, gallbladder, pancreas and adrenal glands are normal  in appearance. There is a small cyst or calyceal diverticulum  containing calcification in the upper pole of the left kidney  measuring 1.2 cm. No hydronephrosis. There is no abdominal or pelvic  lymph node enlargement.    Pelvis: There is a NuvaRing in the vagina. Uterus is absent. No  adnexal mass. There is no bowel obstruction or inflammation. The  appendix is normal. Postoperative changes about stomach.      Impression    IMPRESSION: No acute abnormality. No bowel obstruction or  inflammation.                Pending Results:   Unresulted Labs Ordered in the Past 30 Days of this Admission     No orders found for  last 61 day(s).                  Discharge Instructions and Follow-Up:     Discharge Procedure Orders   Reason for your hospital stay   Order Comments: Left sided pelvic pain.  CT of abdomen and pelvis and ultrasound of pelvis were unremarkable.  UA negative for infection.  Discussed case with OB/GYN who recommended pain management and try to move up the follow up with OB/GYN.     Activity   Order Comments: Your activity upon discharge: activity as tolerated     Order Specific Question Answer Comments   Is discharge order? Yes      When to contact your care team   Order Comments: Return to the ER if you have worsening pain, vomiting, fevers, fainting     Adult [facility] Follow-up and recommended labs and tests   Order Comments: Follow up with OBGYN as outpatient     Diet   Order Comments: Follow this diet upon discharge: Orders Placed This Encounter      Regular Diet Adult     Order Specific Question Answer Comments   Is discharge order? Yes           Attestation:  Juana Vazquez.  APRN, CNP

## 2019-02-14 NOTE — ED NOTES
Observation Brochure and Video    Patient informed of observation status based on provider's order.  Observation brochure was given and the video watched. Patient/Family stated understanding. Questions answered.  ARLEN PERERA

## 2019-02-14 NOTE — H&P
[facility], [place]    History and Physical - Hospitalist Service       Date of Admission:  2/13/2019    Assessment & Plan   Ashli Gamboa is a 39 year old female with history of sleeve gastrectomy and endometriosis s/p hysterectomy who presents for evaluation of pelvic pain.    1. Left-sided pelvic pain : Sudden onset of left-sided pelvic pain at 3 pm today. Sharp in nature and has been constant since this makenna. Different than endometriosis pain which usually occurs on the right side. Took Tylenol at home which was ineffective. Denies any vaginal discharge. Reports being sexually active. Ultrasound did not visualize ovaries.  CT abdomen pelvis negative. ED physician discussed case with gynecology, they agree with Observation stay and are available for consultation if pain management becomes an issue. UA pending. Received Morphine 4 mg IV x 3 for pain, 1 liter IV bolus   - Toradol 30 mg q 6 hours prn  - Percocet, Morphine IV for breakthrough.   - Continuous IVF at 125/hr   -Antiemetics  - Continue Nuvaring (placed on Monday per patient)  - Senna prn      2. Depression: Continue PTA Wellbutrin.     3. Fibromyalgia: - Continue PTA Topomax        Diet: Regular diet  DVT Prophylaxis: Low Risk/Ambulatory with no VTE prophylaxis indicated  Ray Catheter: not present      Disposition Plan   Expected discharge: Tomorrow, recommended to prior living arrangement once adequate pain management/ tolerating PO medications.  Entered: DANIELE Layne CNP 02/13/2019, 11:53 PM     The patient's care was discussed with the Bedside Nurse, Patient and ED Physician Dr. Beverly.    DANIELE Layne CNP  [facility]    ______________________________________________________________________    Chief Complaint   Left-sided pelvic pain.    History is obtained from the patient    History of Present Illness   Ashli Gamboa is a 39 year old female with  history of sleeve gastrectomy and endometriosis s/p hysterectomy who presents for evaluation of pelvic pain. Patient reports onset of sharp, left-sided pelvic pain today around 3 PM. She states her pain has been constant since onset and currently rates it as a 7/10. Patient states this pain is different than pain she's had previously with endometriosis, noting that it's normally right-sided and less painful. She reports taking Tylenol, though reports it was ineffective. Patient denies vomiting, diarrhea, black or bloody stool, vaginal discharge, or nausea, though she does report some anxiety today. She states she last saw her OB in September No LMP recorded. Patient is not currently having periods Patient is sexually active, . Using hysterectomy for contraception. Nuvaring for suppression which she has in continuously, switching it out every 21 days.  Reports that she quit smoking about 4 years ago.  Patient is switching gynecologists and is scheduled to see her new gynecologist on . Last bowel movement was two days ago.        Review of Systems    The 5 point Review of Systems is negative other than noted in the HPI or here. Reports left sided pelvic pain. Denies nausea, vomiting, diarrhea, black or bloody stool, vaginal discharge    Past Medical History    I have reviewed this patient's medical history and updated it with pertinent information if needed.   Past Medical History:   Diagnosis Date     Arthritis     BIG TOE     Cervical high risk HPV (human papillomavirus) test positive 2017     Cervical high risk HPV (human papillomavirus) test positive 06/10/2015     Depressive disorder     I have been on and off medication for the last several years     Endometriosis      Hearing problem      Obese      Sensorineural hearing loss        Past Surgical History   I have reviewed this patient's surgical history and updated it with pertinent information if needed.  Past Surgical History:    Procedure Laterality Date     ABDOMEN SURGERY      laparoscopy X2     DILATE CERVIX, HYSTEROSCOPY, ABLATE ENDOMETRIUM NOVASURE, COMBINED N/A 11/17/2016    Procedure: COMBINED DILATE CERVIX, HYSTEROSCOPY, ABLATE ENDOMETRIUM NOVASURE;  Surgeon: Sabas Patel MD;  Location: [facility]     GYN SURGERY  dates above    myomectomy x2, laparoscopy x2     LAPAROSCOPIC ASSISTED HYSTERECTOMY VAGINAL N/A 1/4/2018    Procedure: LAPAROSCOPIC ASSISTED HYSTERECTOMY VAGINAL;;  Surgeon: Sabas Patel MD;  Location: [facility]     LAPAROSCOPIC GASTRIC SLEEVE N/A 7/25/2017    Procedure: LAPAROSCOPIC GASTRIC SLEEVE;  Laparoscopic Sleeve Gastrectomy;  Surgeon: Sam Schmidt MD;  Location: [facility]     LAPAROSCOPIC LYSIS ADHESIONS  7/10/2014    Procedure: LAPAROSCOPIC LYSIS ADHESIONS;  Surgeon: Sabas Patel MD;  Location: [facility]     LAPAROSCOPIC SALPINGECTOMY Bilateral 1/4/2018    Procedure: LAPAROSCOPIC SALPINGECTOMY;;  Surgeon: Sabas Patel MD;  Location: [facility]     LASER CO2 LAPAROSCOPIC VAPORIZATION ENDOMETRIUM  7/10/2014    Procedure: LASER CO2 LAPAROSCOPIC VAPORIZATION ENDOMETRIUM;  Surgeon: Sabas Patel MD;  Location: [facility]     LASER CO2 LAPAROSCOPIC VAPORIZATION ENDOMETRIUM N/A 6/30/2015    Procedure: LASER CO2 LAPAROSCOPIC VAPORIZATION ENDOMETRIUM;  Surgeon: Sabas Patel MD;  Location: [facility]     LASER CO2 LAPAROSCOPIC VAPORIZATION ENDOMETRIUM, LYSIS ADHESIONS N/A 1/4/2018    Procedure: LASER CO2 LAPAROSCOPIC VAPORIZATION ENDOMETRIUM, LYSIS ADHESIONS;  LAPAROSCOPY WITH CO2 LASER LYSIS OF ADHESIONS AND VAPORIZATION OF ENDOMETRIOSIS, CYSTOTOMY LEFT OVARY, LAPAROSCOPIC VAGINAL HYSTERECTOMY WITH BILATERAL SALPINGECTOMY ;  Surgeon: Sabas Patel MD;  Location: [facility]     LASER CO2 LAPAROSCOPY DIAGNOSTIC, LYSIS ADHESIONS, COMBINED N/A 6/30/2015    Procedure: COMBINED LASER CO2 LAPAROSCOPY DIAGNOSTIC, LYSIS ADHESIONS;  Surgeon: Sabas Patel MD;  Location: [facility]       Social History   I have reviewed this  patient's social history and updated it with pertinent information if needed.  Social History     Tobacco Use     Smoking status: Former Smoker     Packs/day: 0.50     Years: 5.00     Pack years: 2.50     Types: Cigarettes     Start date: 2007     Last attempt to quit: 2013     Years since quittin.2     Smokeless tobacco: Never Used   Substance Use Topics     Alcohol use: Yes     Alcohol/week: 0.0 oz     Comment: occas     Drug use: Yes     Types: Marijuana     Comment: 2 weeks ago for pain       Family History   I have reviewed this patient's family history and updated it with pertinent information if needed.   Family History   Problem Relation Age of Onset     Depression Mother      Cancer Mother         Cervical      Other - See Comments Mother         Fibromyalgia     Crohn's Disease Mother      Obesity Mother      Ovarian Cancer Mother         Cervical Cancer     Substance Abuse Mother      Stomach Problem Mother      Arthritis Father      Rheumatoid Arthritis Father      Depression Father      Substance Abuse Father      Cerebrovascular Disease Paternal Grandmother      Breast Cancer Maternal Grandmother      Cancer Maternal Grandmother      Depression Brother      Depression Sister      Mental Illness Brother         schizophrenia and bipolar     Substance Abuse Brother      Stomach Problem Sister      Stomach Problem Brother        Prior to Admission Medications   Prior to Admission Medications   Prescriptions Last Dose Informant Patient Reported? Taking?   MULTIPLE VITAMIN PO 2019 at Unknown time Self Yes Yes   Sig: Take 1 patch by mouth daily PatchMD - Multivitamin Patch   Topiramate (TOPAMAX PO) 2019 at Unknown time Self Yes Yes   Sig: Take 100 mg by mouth 2 times daily    buPROPion (WELLBUTRIN XL) 300 MG 24 hr tablet 2019 at Unknown time  No Yes   Sig: Take 1 tablet (300 mg) by mouth every morning   etonogestrel-ethinyl estradiol (NUVARING) 0.12-0.015 MG/24HR vaginal ring  2/13/2019 at Unknown time  No Yes   Sig: Place 1 each vaginally every 21 days   vitamin B complex with vitamin C (VITAMIN  B COMPLEX) TABS tablet 2/13/2019 at Unknown time Self Yes Yes   Sig: Take 1 tablet by mouth daily      Facility-Administered Medications: None     Allergies   No Known Allergies    Physical Exam   Vital Signs: Temp: 98.4  F (36.9  C) Temp src: Oral BP: 147/75 Pulse: 70   Resp: 16 SpO2: 100 % O2 Device: None (Room air)    Weight: 126 lbs 8 oz    Constitutional: oriented to person, place, and time. appears well-developed and well-nourished.   HENT:   Head: Normocephalic and atraumatic.   Neck: Normal range of motion.   Pulmonary/Chest: Effort normal. No respiratory distress.   Cardiac: No murmurs, rubs, gallops. RRR.  Abdominal: Left lower quadrant tenderness to palpation.  Abdomen soft, nondistended. No rebound tenderness.  MSK: Long bones without deformity or evidence of trauma  Neurological: alert and oriented to person, place, and time.   Skin: Skin is warm and dry.   Psychiatric:  normal mood and affect.  behavior is normal. Thought content normal.         Data   Data reviewed today: I reviewed all medications, new labs and imaging results over the last 24 hours. I personally reviewed no images or EKG's today.    Recent Labs   Lab 02/13/19 2023   WBC 7.7   HGB 13.8   MCV 91         POTASSIUM 4.1   CHLORIDE 110*   CO2 24   BUN 13   CR 0.99   ANIONGAP 8   KWABENA 8.5   GLC 81   ALBUMIN 3.1*   PROTTOTAL 6.4*   BILITOTAL 0.3   ALKPHOS 46   ALT 15   AST 11     Most Recent 3 CBC's:  Recent Labs   Lab Test 02/13/19 2023 08/09/18  1346 01/18/18  1537  07/19/17  0902 02/22/17  1051   WBC 7.7  --   --   --  8.4 7.3   HGB 13.8 13.7 14.8   < > 13.7 14.2   MCV 91  --   --   --  90 88     --   --   --  288 328    < > = values in this interval not displayed.     Most Recent 3 BMP's:  Recent Labs   Lab Test 02/13/19 2023 08/09/18  1346 04/12/18  1509    144 140   POTASSIUM 4.1 4.1  3.8   CHLORIDE 110* 112* 111*   CO2 24 22 23   BUN 13 14 13   CR 0.99 1.09* 0.98   ANIONGAP 8 9 7   KWABENA 8.5 8.8 9.0   GLC 81 88 84     Most Recent 2 LFT's:  Recent Labs   Lab Test 02/13/19 2023 08/09/18  1346   AST 11 9   ALT 15 19   ALKPHOS 46 57   BILITOTAL 0.3 0.2     Recent Results (from the past 24 hour(s))   US Pelvis Cmplt w Transvag & Doppler LmtPel Duplex Limited    Narrative    PELVIC ULTRASOUND WITH ENDOVAGINAL TRANSDUCER    2/13/2019 9:29 PM     HISTORY: Pelvic pain.    TECHNIQUE:  Transabdominal and endovaginal sonography was performed.    COMPARISON: None.    FINDINGS: The uterus is absent. Neither ovary is identified. No free  fluid. No pelvic mass is evident.      Impression    IMPRESSION: No abnormal mass or fluid collection. Neither ovary is  identified.     CT Abdomen Pelvis w Contrast    Narrative    CT ABDOMEN AND PELVIS WITH CONTRAST   2/13/2019 10:45 PM     HISTORY: Ongoing left lower quadrant pain, history of hysterectomy.    TECHNIQUE:  CT abdomen and pelvis with 66 mL Isovue-370 IV. Radiation  dose for this scan was reduced using automated exposure control,  adjustment of the mA and/or kV according to patient size, or iterative  reconstruction technique.    COMPARISON: 5/9/2014.    FINDINGS:  Abdomen: The lung bases are unremarkable. The heart size is normal.  The liver, spleen, gallbladder, pancreas and adrenal glands are normal  in appearance. There is a small cyst or calyceal diverticulum  containing calcification in the upper pole of the left kidney  measuring 1.2 cm. No hydronephrosis. There is no abdominal or pelvic  lymph node enlargement.    Pelvis: There is a NuvaRing in the vagina. Uterus is absent. No  adnexal mass. There is no bowel obstruction or inflammation. The  appendix is normal. Postoperative changes about stomach.      Impression    IMPRESSION: No acute abnormality. No bowel obstruction or  inflammation.

## 2019-02-14 NOTE — PLAN OF CARE
Discharge medications and instructions reviewed with patient and SO, both verbalized the understanding. Took medication script and all their belongings.    
Observation Goals     -diagnostic tests and consults completed and resulted: No, need to collect a UA   -vital signs normal or at patient baseline: bradycardic   /69   Pulse 68   Temp 98.1  F (36.7  C) (Oral)   Resp 16   Wt 57.4 kg (126 lb 8 oz)   SpO2 96%   Breastfeeding? No   BMI 21.71 kg/m     -tolerating oral intake to maintain hydration: yes   -adequate pain control on oral analgesics: Gave dose of IV morphine. Pt wants to switch to oral medications in the morning.   Nurse to notify provider when observation goals have been met and patient is ready for discharge.  
diagnostic tests and consults completed and resulted- No yet   -vital signs normal or at patient baseline- VSS per baseline.   -tolerating oral intake to maintain hydration- Good po intake   -adequate pain control on oral analgesics- Receive percocet with good relief.  Nurse to notify provider when observation goals have been met and patient is ready for discharge.  
diagnostic tests and consults completed and resulted- yes  -vital signs normal or at patient baseline- VSS per baseline.   -tolerating oral intake to maintain hydration- Good po intake   -adequate pain control on oral analgesics- Receive percocet with good relief.  Nurse to notify provider when observation goals have been met and patient is ready for discharge.            
Pt. consents to blood transfusion if needed

## 2019-02-14 NOTE — ED NOTES
"[facility]   ED Nurse to Floor Handoff     Ashli Gamboa is a 39 year old female who speaks English and lives with a spouse,  in a home  They arrived in the ED by car from home    ED Chief Complaint: Pelvic Pain (\"I have endometriosis and having sever pelvic pain.\" Pain has been present since 1500. Denies vaginal discharge.)    ED Dx;   Final diagnoses:   Abdominal pain, left lower quadrant         Needed?: No    Allergies: No Known Allergies.  Past Medical Hx:   Past Medical History:   Diagnosis Date     Arthritis     BIG TOE     Cervical high risk HPV (human papillomavirus) test positive 07/05/2017     Cervical high risk HPV (human papillomavirus) test positive 06/10/2015     Depressive disorder 2007    I have been on and off medication for the last several years     Endometriosis      Hearing problem      Obese      Sensorineural hearing loss       Baseline Mental status: WDL  Current Mental Status changes: at basesline    Infection present or suspected this encounter: no  Sepsis suspected: No  Isolation type: No active isolations     Activity level - Baseline/Home:  Independent  Activity Level - Current:   Independent    Bariatric equipment needed?: No    In the ED these meds were given:   Medications   0.9% sodium chloride BOLUS (1,000 mLs Intravenous Not Given 2/13/19 2034)     Followed by   sodium chloride 0.9% infusion (1,000 mLs Intravenous New Bag 2/13/19 2204)   ondansetron (ZOFRAN) injection 4 mg (4 mg Intravenous Given 2/13/19 2031)   morphine (PF) injection 4 mg (4 mg Intravenous Given 2/13/19 2029)   morphine (PF) injection 4 mg (4 mg Intravenous Given 2/13/19 2155)   iopamidol (ISOVUE-370) solution 100 mL (66 mLs Intravenous Given 2/13/19 2243)   sodium chloride 0.9% infusion (56 mLs Intravenous New Bag 2/13/19 2243)   morphine (PF) injection 4 mg (4 mg Intravenous Given 2/14/19 0042)       Drips running?  No    Home pump  No    Current " LDAs  Peripheral IV 02/13/19 Left Upper arm (Active)   Dressing Intervention New dressing  2/13/2019  8:27 PM   Number of days: 1       Labs results:   Labs Ordered and Resulted from Time of ED Arrival Up to the Time of Departure from the ED   COMPREHENSIVE METABOLIC PANEL - Abnormal; Notable for the following components:       Result Value    Chloride 110 (*)     Albumin 3.1 (*)     Protein Total 6.4 (*)     All other components within normal limits   CBC WITH PLATELETS DIFFERENTIAL   UA MACROSCOPIC WITH REFLEX TO MICRO AND CULTURE       Imaging Studies:   Recent Results (from the past 24 hour(s))   US Pelvis Cmplt w Transvag & Doppler LmtPel Duplex Limited    Narrative    PELVIC ULTRASOUND WITH ENDOVAGINAL TRANSDUCER    2/13/2019 9:29 PM     HISTORY: Pelvic pain.    TECHNIQUE:  Transabdominal and endovaginal sonography was performed.    COMPARISON: None.    FINDINGS: The uterus is absent. Neither ovary is identified. No free  fluid. No pelvic mass is evident.      Impression    IMPRESSION: No abnormal mass or fluid collection. Neither ovary is  identified.     CT Abdomen Pelvis w Contrast    Narrative    CT ABDOMEN AND PELVIS WITH CONTRAST   2/13/2019 10:45 PM     HISTORY: Ongoing left lower quadrant pain, history of hysterectomy.    TECHNIQUE:  CT abdomen and pelvis with 66 mL Isovue-370 IV. Radiation  dose for this scan was reduced using automated exposure control,  adjustment of the mA and/or kV according to patient size, or iterative  reconstruction technique.    COMPARISON: 5/9/2014.    FINDINGS:  Abdomen: The lung bases are unremarkable. The heart size is normal.  The liver, spleen, gallbladder, pancreas and adrenal glands are normal  in appearance. There is a small cyst or calyceal diverticulum  containing calcification in the upper pole of the left kidney  measuring 1.2 cm. No hydronephrosis. There is no abdominal or pelvic  lymph node enlargement.    Pelvis: There is a NuvaRing in the vagina. Uterus is  absent. No  adnexal mass. There is no bowel obstruction or inflammation. The  appendix is normal. Postoperative changes about stomach.      Impression    IMPRESSION: No acute abnormality. No bowel obstruction or  inflammation.       Recent vital signs:   /79   Pulse 65   Temp 98.3  F (36.8  C) (Oral)   Resp 16   Wt 57.4 kg (126 lb 8 oz)   SpO2 98%   Breastfeeding? No   BMI 21.71 kg/m      Cardiac Rhythm: Normal Sinus  Pt needs tele? No  Skin/wound Issues: None    Code Status: Full Code    Pain control: good    Nausea control: good    Abnormal labs/tests/findings requiring intervention: Pain medication    Family present during ED course? Yes   Family Comments/Social Situation comments: Pt lives at home with  and 2 dogs    Tasks needing completion:     ARLEN PERERA, RN  [facility]-- [phone] [place] [facility]  [phone] [facility] ED

## 2019-02-14 NOTE — TELEPHONE ENCOUNTER
"Requested Prescriptions   Pending Prescriptions Disp Refills     NUVARING 0.12-0.015 MG/24HR vaginal ring [Pharmacy Med Name: NUVARING]  0     Sig: INSERT 1 RING VAGINALLY EVERY 21 DAYS    Contraceptives Protocol Passed - 2/14/2019  4:42 AM       Passed - Patient is not a current smoker if age is 35 or older       Passed - Recent (12 mo) or future (30 days) visit within the authorizing provider's specialty    Patient had office visit in the last 12 months or has a visit in the next 30 days with authorizing provider or within the authorizing provider's specialty.  See \"Patient Info\" tab in inbasket, or \"Choose Columns\" in Meds & Orders section of the refill encounter.             Passed - Medication is active on med list       Passed - No active pregnancy on record       Passed - No positive pregnancy test in past 12 months        Last Written Prescription Date:  3/21/2018  Last Fill Quantity: 4,  # refills: 3   Last office visit: 9/17/2018 with prescribing provider:  Sabas Patel MD.   Future Office Visit:   Next 5 appointments (look out 90 days)    Mar 18, 2019  4:30 PM CDT  SHORT with Sabas Patel MD  [facility] ([facility]) [address] [place] [address] [id number] [phone]         Prescription approved per [facility] Refill Protocol.  Marisol Booker RN on 2/14/2019 at 9:15 AM    "

## 2019-02-15 ENCOUNTER — TELEPHONE (OUTPATIENT)
Dept: FAMILY MEDICINE | Facility: CLINIC | Age: 39
End: 2019-02-15

## 2019-02-15 NOTE — TELEPHONE ENCOUNTER
ED / Discharge Outreach Protocol -     admitted from 2/13 - 2/14 for abdominal pain/pelvic pain     Discharged with oxycodone     No f/u appt. Scheduled at this time     Patient Contact    Attempt # 1    Was call answered?  No.  Left message on voicemail with information to clinic triage back.    Jaci Del Angel, Registered Nurse   [facility]

## 2019-02-19 ENCOUNTER — TRANSFERRED RECORDS (OUTPATIENT)
Dept: HEALTH INFORMATION MANAGEMENT | Facility: CLINIC | Age: 39
End: 2019-02-19

## 2019-02-19 LAB — PHQ9 SCORE: 7

## 2019-04-23 ENCOUNTER — OFFICE VISIT (OUTPATIENT)
Dept: FAMILY MEDICINE | Facility: CLINIC | Age: 39
End: 2019-04-23
Payer: COMMERCIAL

## 2019-04-23 VITALS
HEART RATE: 72 BPM | SYSTOLIC BLOOD PRESSURE: 100 MMHG | WEIGHT: 125 LBS | RESPIRATION RATE: 16 BRPM | HEIGHT: 64 IN | BODY MASS INDEX: 21.34 KG/M2 | DIASTOLIC BLOOD PRESSURE: 66 MMHG | TEMPERATURE: 97.5 F

## 2019-04-23 DIAGNOSIS — N80.9 ENDOMETRIOSIS: ICD-10-CM

## 2019-04-23 DIAGNOSIS — Z01.818 PREOP GENERAL PHYSICAL EXAM: Primary | ICD-10-CM

## 2019-04-23 LAB — HGB BLD-MCNC: 13.6 G/DL (ref 11.7–15.7)

## 2019-04-23 PROCEDURE — 85018 HEMOGLOBIN: CPT | Performed by: NURSE PRACTITIONER

## 2019-04-23 PROCEDURE — 36415 COLL VENOUS BLD VENIPUNCTURE: CPT | Performed by: NURSE PRACTITIONER

## 2019-04-23 PROCEDURE — 99214 OFFICE O/P EST MOD 30 MIN: CPT | Performed by: NURSE PRACTITIONER

## 2019-04-23 ASSESSMENT — MIFFLIN-ST. JEOR: SCORE: 1227

## 2019-04-23 NOTE — PROGRESS NOTES
[address] [address]  [phone]  Dept: [phone]    PRE-OP EVALUATION:  Today's date: 2019    Ashli Gamboa (: 1980) presents for pre-operative evaluation assessment as requested by Dr. Alcala.  She requires evaluation and anesthesia risk assessment prior to undergoing surgery/procedure for treatment of endometriosis    Fax number for surgical facility: [phone]  Primary Physician: Karena Mo  Type of Anesthesia Anticipated: General    Patient has a Health Care Directive or Living Will:  NO    Preop Questions 2019   Who is doing your surgery? Ruben Alcala   What are you having done? laparoscopy   Date of Surgery/Procedure: 19   Facility or Hospital where procedure/surgery will be performed: [facility]   1.  Do you have a history of Heart attack, stroke, stent, coronary bypass surgery, or other heart surgery? No   2.  Do you ever have any pain or discomfort in your chest? No   3.  Do you have a history of  Heart Failure? No   4.   Are you troubled by shortness of breath when:  walking on a level surface, or up a slight hill, or at night? No   5.  Do you currently have a cold, bronchitis or other respiratory infection? No   6.  Do you have a cough, shortness of breath, or wheezing? No   7.  Do you sometimes get pains in the calves of your legs when you walk? No   8. Do you or anyone in your family have previous history of blood clots? No   9.  Do you or does anyone in your family have a serious bleeding problem such as prolonged bleeding following surgeries or cuts? No   10. Have you ever had problems with anemia or been told to take iron pills? YES -    11. Have you had any abnormal blood loss such as black, tarry or bloody stools, or abnormal vaginal bleeding? No   12. Have you ever had a blood transfusion? No   13. Have you or any of your relatives ever had problems with anesthesia? No   14. Do you have sleep  apnea, excessive snoring or daytime drowsiness? YES -    15. Do you have any prosthetic heart valves? No   16. Do you have prosthetic joints? No   17. Is there any chance that you may be pregnant? No         HPI:     HPI related to upcoming procedure: pelvic pain x years related to endometriosis      See problem list for active medical problems.  Problems all longstanding and stable, except as noted/documented.  See ROS for pertinent symptoms related to these conditions.                                                                                                                                                          .    MEDICAL HISTORY:     Patient Active Problem List    Diagnosis Date Noted     Acute pelvic pain 02/14/2019     Priority: Medium     Pelvic somatic dysfunction 07/10/2018     Priority: Medium     Pain in the coccyx 07/10/2018     Priority: Medium     Dyspareunia, female 07/10/2018     Priority: Medium     S/P laparoscopic sleeve gastrectomy 07/25/2017     Priority: Medium     Cervical high risk HPV (human papillomavirus) test positive 07/05/2017     Priority: Medium     2012, 2013, 2014 all NIL paps.  6/10/15 NIL pap, + HR HPV (not 16 or 18). Plan: cotest in 1 year.  7/5/17 NIL pap, + HR HPV (not 16 or 18). Plan colp  10/18/17 Colpo.  Bx-normal. NIL pap.  Plan: cotest in 1 year  1/4/18 LAVH-benign.  - pap smear no longer indicated per ASCCP guidelines.     Source: ASCCP Practice FAQs.  Patient with supracervical hysterectomy (she still has cervix). Continue screening until 65 or longer if hx of abnormal that requires longer screening.  Patient with total hysterectomy - (cervix removed) If no hx of abnormal pap and normal pathology on hyst - discontinue screening.   9 ACOG Practice Bulletin # 131, Nov 2012.             Muscular deconditioning 02/06/2017     Priority: Medium     Fibromyalgia 12/07/2016     Priority: Medium     Endometriosis 06/10/2015     Priority: Medium     CARDIOVASCULAR  SCREENING; LDL GOAL LESS THAN 160 12/26/2014     Priority: Medium     Major depression, recurrent (H) 11/26/2014     Priority: Medium     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     Priority: Medium     Morbid obesity (H) 11/27/2012     Priority: Medium      Past Medical History:   Diagnosis Date     Arthritis     BIG TOE     Cervical high risk HPV (human papillomavirus) test positive 07/05/2017     Cervical high risk HPV (human papillomavirus) test positive 06/10/2015     Depressive disorder 2007    I have been on and off medication for the last several years     Endometriosis      Hearing problem      Obese      Sensorineural hearing loss      Past Surgical History:   Procedure Laterality Date     ABDOMEN SURGERY      laparoscopy X2     DILATE CERVIX, HYSTEROSCOPY, ABLATE ENDOMETRIUM NOVASURE, COMBINED N/A 11/17/2016    Procedure: COMBINED DILATE CERVIX, HYSTEROSCOPY, ABLATE ENDOMETRIUM NOVASURE;  Surgeon: Sabas Patel MD;  Location: [facility]     GYN SURGERY  dates above    myomectomy x2, laparoscopy x2     LAPAROSCOPIC ASSISTED HYSTERECTOMY VAGINAL N/A 1/4/2018    Procedure: LAPAROSCOPIC ASSISTED HYSTERECTOMY VAGINAL;;  Surgeon: Sabas Patel MD;  Location: [facility]     LAPAROSCOPIC GASTRIC SLEEVE N/A 7/25/2017    Procedure: LAPAROSCOPIC GASTRIC SLEEVE;  Laparoscopic Sleeve Gastrectomy;  Surgeon: Sam Schmidt MD;  Location: [facility]     LAPAROSCOPIC LYSIS ADHESIONS  7/10/2014    Procedure: LAPAROSCOPIC LYSIS ADHESIONS;  Surgeon: Sabas Patel MD;  Location: [facility]     LAPAROSCOPIC SALPINGECTOMY Bilateral 1/4/2018    Procedure: LAPAROSCOPIC SALPINGECTOMY;;  Surgeon: Sabas Patel MD;  Location: [facility]     LASER CO2 LAPAROSCOPIC VAPORIZATION ENDOMETRIUM  7/10/2014    Procedure: LASER CO2 LAPAROSCOPIC VAPORIZATION ENDOMETRIUM;  Surgeon: Sabas Patel MD;  Location: [facility]     LASER CO2 LAPAROSCOPIC VAPORIZATION ENDOMETRIUM N/A 6/30/2015    Procedure: LASER CO2 LAPAROSCOPIC VAPORIZATION  ENDOMETRIUM;  Surgeon: Sabas Patel MD;  Location: [facility]     LASER CO2 LAPAROSCOPIC VAPORIZATION ENDOMETRIUM, LYSIS ADHESIONS N/A 2018    Procedure: LASER CO2 LAPAROSCOPIC VAPORIZATION ENDOMETRIUM, LYSIS ADHESIONS;  LAPAROSCOPY WITH CO2 LASER LYSIS OF ADHESIONS AND VAPORIZATION OF ENDOMETRIOSIS, CYSTOTOMY LEFT OVARY, LAPAROSCOPIC VAGINAL HYSTERECTOMY WITH BILATERAL SALPINGECTOMY ;  Surgeon: Sabas Patel MD;  Location: [facility]     LASER CO2 LAPAROSCOPY DIAGNOSTIC, LYSIS ADHESIONS, COMBINED N/A 2015    Procedure: COMBINED LASER CO2 LAPAROSCOPY DIAGNOSTIC, LYSIS ADHESIONS;  Surgeon: Sabas Patel MD;  Location: [facility]     Current Outpatient Medications   Medication Sig Dispense Refill     buPROPion (WELLBUTRIN XL) 300 MG 24 hr tablet Take 1 tablet (300 mg) by mouth every morning 90 tablet 3     MULTIPLE VITAMIN PO Take 1 patch by mouth daily PatchMD - Multivitamin Patch       NUVARING 0.12-0.015 MG/24HR vaginal ring INSERT 1 RING VAGINALLY EVERY 21 DAYS 1 each 0     Topiramate (TOPAMAX PO) Take 100 mg by mouth 2 times daily        vitamin B complex with vitamin C (VITAMIN  B COMPLEX) TABS tablet Take 1 tablet by mouth daily       OTC products: None, except as noted above and no recent use of OTC ASA, NSAIDS or Steroids    No Known Allergies   Latex Allergy: NO    Social History     Tobacco Use     Smoking status: Former Smoker     Packs/day: 0.50     Years: 5.00     Pack years: 2.50     Types: Cigarettes     Start date: 2007     Last attempt to quit: 2013     Years since quittin.4     Smokeless tobacco: Never Used   Substance Use Topics     Alcohol use: Yes     Alcohol/week: 0.0 oz     Comment: occas     History   Drug Use     Types: Marijuana     Comment: 2 weeks ago for pain       REVIEW OF SYSTEMS:   Constitutional, neuro, ENT, endocrine, pulmonary, cardiac, gastrointestinal, genitourinary, musculoskeletal, integument and psychiatric systems are negative, except as otherwise  "noted.    EXAM:   /66   Pulse 72   Temp 97.5  F (36.4  C) (Oral)   Resp 16   Ht 1.626 m (5' 4\")   Wt 56.7 kg (125 lb)   BMI 21.46 kg/m      GENERAL APPEARANCE: healthy, alert and no distress     EYES: EOMI, PERRL     HENT: ear canals and TM's normal and nose and mouth without ulcers or lesions     NECK: no adenopathy, no asymmetry, masses, or scars and thyroid normal to palpation     RESP: lungs clear to auscultation - no rales, rhonchi or wheezes     CV: regular rates and rhythm, normal S1 S2, no S3 or S4 and no murmur, click or rub     ABDOMEN:  Bilateral Pelvic pain.  soft, nontender, no HSM or masses and bowel sounds normal     MS: extremities normal- no gross deformities noted, no evidence of inflammation in joints, FROM in all extremities.     SKIN: no suspicious lesions or rashes     NEURO: Normal strength and tone, sensory exam grossly normal, mentation intact and speech normal     PSYCH: mentation appears normal. and affect normal/bright     LYMPHATICS: No cervical adenopathy    DIAGNOSTICS:   EKG: Not indicated due to non-vascular surgery and low risk of event (age <65 and without cardiac risk factors)  Hemoglobin (indicated for history of anemia or procedure with significant blood loss such as tonsillectomy, major intraperitoneal surgery, vascular surgery, major spine surgery, total joint replacement)    Recent Labs   Lab Test 02/13/19  2023 08/09/18  1346  11/30/17  1412 07/19/17  0902  07/16/12  1706   HGB 13.8 13.7   < > 14.4 13.7   < >  --      --   --   --  288   < >  --     144   < > 139 142   < >  --    POTASSIUM 4.1 4.1   < > 3.3* 3.7   < >  --    CR 0.99 1.09*   < > 0.81 1.06*   < >  --    A1C  --   --   --  4.6  --   --  5.4    < > = values in this interval not displayed.        IMPRESSION:   Reason for surgery/procedure: endometriosis and pelvic pain  Diagnosis/reason for consult: preop exam     The proposed surgical procedure is considered INTERMEDIATE risk.    REVISED " CARDIAC RISK INDEX  The patient has the following serious cardiovascular risks for perioperative complications such as (MI, PE, VFib and 3  AV Block):  No serious cardiac risks  INTERPRETATION: 0 risks: Class I (very low risk - 0.4% complication rate)    The patient has the following additional risks for perioperative complications:  High tolerance to opioid analgesics due to chronic pain      ICD-10-CM    1. Preop general physical exam Z01.818 Hemoglobin   2. Endometriosis N80.9 Hemoglobin       RECOMMENDATIONS:       --Patient is to take all scheduled medications on the day of surgery EXCEPT for modifications listed below.    APPROVAL GIVEN to proceed with proposed procedure, without further diagnostic evaluation       Signed Electronically by: DANIELE Osorio CNP    Copy of this evaluation report is provided to requesting physician.    [facility] Preop Guidelines    Revised Cardiac Risk Index

## 2019-05-04 DIAGNOSIS — N80.9 ENDOMETRIOSIS: ICD-10-CM

## 2019-05-06 RX ORDER — ETONOGESTREL/ETHINYL ESTRADIOL .12-.015MG
RING, VAGINAL VAGINAL
Refills: 0 | OUTPATIENT
Start: 2019-05-06

## 2019-05-06 NOTE — TELEPHONE ENCOUNTER
"Requested Prescriptions   Pending Prescriptions Disp Refills     NUVARING 0.12-0.015 MG/24HR vaginal ring [Pharmacy Med Name: NUVARING VAGINAL RING]  0     Sig: INSERT 1 RING VAGINALLY AS DIRECTED. REMOVE AFTER 3 WEEKS & WAIT 7 DAYS BEFORE INSERTING A NEW RING       Contraceptives Protocol Passed - 5/4/2019 12:22 AM        Passed - Patient is not a current smoker if age is 35 or older        Passed - Recent (12 mo) or future (30 days) visit within the authorizing provider's specialty     Patient had office visit in the last 12 months or has a visit in the next 30 days with authorizing provider or within the authorizing provider's specialty.  See \"Patient Info\" tab in inbasket, or \"Choose Columns\" in Meds & Orders section of the refill encounter.              Passed - Medication is active on med list        Passed - No active pregnancy on record        Passed - No positive pregnancy test in past 12 months      Last Written Prescription Date:  2/14/19  Last Fill Quantity: 1,  # refills: 0   Last office visit: 9/17/2018 with prescribing provider:  Amanda   Future Office Visit:  None    9/17/19 She will continue with her current therapy and see us in 6 months.  Pt due for annual, no appt scheduled. Pt already received one month extension. Rx denied.     "

## 2019-05-30 ENCOUNTER — MYC MEDICAL ADVICE (OUTPATIENT)
Dept: FAMILY MEDICINE | Facility: CLINIC | Age: 39
End: 2019-05-30

## 2019-05-30 NOTE — TELEPHONE ENCOUNTER
Routing to PCP to review, also [facility] referrals.    Referrals, please review and advise how to proceed. PCP Chepe is out today 5/30 returning 5/31    Thank you  Nikole Perry RN, BSN   [facility]

## 2019-05-30 NOTE — TELEPHONE ENCOUNTER
Spoke with pt, whose insurance is [employer] and Ruben Alcala, DO at [facility] is in the [facility] network and no physical therapy referral for Pelvic Floor is required because it is listed on by-pass.    Leonora Thompson, [place] Referral Rep

## 2019-06-27 DIAGNOSIS — F33.41 RECURRENT MAJOR DEPRESSIVE DISORDER, IN PARTIAL REMISSION (H): ICD-10-CM

## 2019-06-27 NOTE — TELEPHONE ENCOUNTER
"Requested Prescriptions   Pending Prescriptions Disp Refills     buPROPion (WELLBUTRIN XL) 300 MG 24 hr tablet [Pharmacy Med Name: BUPROPION HCL  MG TABLET]  Last Written Prescription Date:  6-8-18  Last Fill Quantity: 90 tab,  # refills: 3   Last office visit: 4/23/2019 with prescribing provider:  Karena Mo   Future Office Visit:    90 tablet 1     Sig: TAKE 1 TABLET BY MOUTH EVERY DAY IN THE MORNING       SSRIs Protocol Failed - 6/27/2019  6:47 AM        Failed - PHQ-9 score less than 5 in past 6 months     Please review last PHQ-9 score.   PHQ-9 SCORE 7/5/2017 1/2/2018 6/8/2018   PHQ-9 Total Score - - -   PHQ-9 Total Score MyChart - 2 (Minimal depression) -   PHQ-9 Total Score 3 2 1     ALIDA-7 SCORE 12/30/2015 7/5/2017   Total Score 1 (minimal anxiety) -   Total Score 1 3           Passed - Medication is Bupropion     If the medication is Bupropion (Wellbutrin), and the patient is taking for smoking cessation; OK to refill.          Passed - Medication is active on med list        Passed - Patient is age 18 or older        Passed - No active pregnancy on record        Passed - No positive pregnancy test in last 12 months        Passed - Recent (6 mo) or future (30 days) visit within the authorizing provider's specialty     Patient had office visit in the last 6 months or has a visit in the next 30 days with authorizing provider or within the authorizing provider's specialty.  See \"Patient Info\" tab in inbasket, or \"Choose Columns\" in Meds & Orders section of the refill encounter.             "

## 2019-06-28 RX ORDER — BUPROPION HYDROCHLORIDE 300 MG/1
TABLET ORAL
Qty: 90 TABLET | Refills: 0 | Status: SHIPPED | OUTPATIENT
Start: 2019-06-28 | End: 2019-10-29

## 2019-06-28 NOTE — TELEPHONE ENCOUNTER
Left message advising patient of refill & to call back or complete questionnaire via [employer].    Jeanie Castillo

## 2019-06-28 NOTE — TELEPHONE ENCOUNTER
phq 9 sent to patient via [employer].    LOV: 4.23.2019    Med refilled in the interim.    TC--please call patient to complete overdue PHQ 9    Thanks! Meaghan Zamudio RN

## 2019-07-18 ENCOUNTER — HOSPITAL ENCOUNTER (EMERGENCY)
Facility: CLINIC | Age: 39
Discharge: HOME | End: 2019-07-18
Attending: EMERGENCY MEDICINE | Admitting: EMERGENCY MEDICINE
Payer: COMMERCIAL

## 2019-07-18 ENCOUNTER — TELEPHONE (OUTPATIENT)
Dept: FAMILY MEDICINE | Facility: CLINIC | Age: 39
End: 2019-07-18

## 2019-07-18 VITALS
SYSTOLIC BLOOD PRESSURE: 111 MMHG | BODY MASS INDEX: 22.04 KG/M2 | TEMPERATURE: 98.6 F | OXYGEN SATURATION: 100 % | WEIGHT: 129.1 LBS | HEIGHT: 64 IN | RESPIRATION RATE: 18 BRPM | HEART RATE: 79 BPM | DIASTOLIC BLOOD PRESSURE: 73 MMHG

## 2019-07-18 DIAGNOSIS — L03.90 CELLULITIS, UNSPECIFIED CELLULITIS SITE: ICD-10-CM

## 2019-07-18 LAB
ANION GAP SERPL CALCULATED.3IONS-SCNC: 7 MMOL/L (ref 3–14)
BASOPHILS # BLD AUTO: 0 10E9/L (ref 0–0.2)
BASOPHILS NFR BLD AUTO: 0.8 %
BUN SERPL-MCNC: 15 MG/DL (ref 7–30)
CALCIUM SERPL-MCNC: 8.4 MG/DL (ref 8.5–10.1)
CHLORIDE SERPL-SCNC: 113 MMOL/L (ref 94–109)
CO2 SERPL-SCNC: 23 MMOL/L (ref 20–32)
CREAT SERPL-MCNC: 0.88 MG/DL (ref 0.52–1.04)
DIFFERENTIAL METHOD BLD: ABNORMAL
EOSINOPHIL # BLD AUTO: 0.1 10E9/L (ref 0–0.7)
EOSINOPHIL NFR BLD AUTO: 1.3 %
ERYTHROCYTE [DISTWIDTH] IN BLOOD BY AUTOMATED COUNT: 13.2 % (ref 10–15)
GFR SERPL CREATININE-BSD FRML MDRD: 83 ML/MIN/{1.73_M2}
GLUCOSE SERPL-MCNC: 68 MG/DL (ref 70–99)
HCT VFR BLD AUTO: 39.5 % (ref 35–47)
HGB BLD-MCNC: 12.1 G/DL (ref 11.7–15.7)
IMM GRANULOCYTES # BLD: 0 10E9/L (ref 0–0.4)
IMM GRANULOCYTES NFR BLD: 0.2 %
INR PPP: 1.12 (ref 0.86–1.14)
LYMPHOCYTES # BLD AUTO: 2 10E9/L (ref 0.8–5.3)
LYMPHOCYTES NFR BLD AUTO: 41.1 %
MCH RBC QN AUTO: 28.9 PG (ref 26.5–33)
MCHC RBC AUTO-ENTMCNC: 30.6 G/DL (ref 31.5–36.5)
MCV RBC AUTO: 94 FL (ref 78–100)
MONOCYTES # BLD AUTO: 0.5 10E9/L (ref 0–1.3)
MONOCYTES NFR BLD AUTO: 10 %
NEUTROPHILS # BLD AUTO: 2.2 10E9/L (ref 1.6–8.3)
NEUTROPHILS NFR BLD AUTO: 46.6 %
NRBC # BLD AUTO: 0 10*3/UL
NRBC BLD AUTO-RTO: 0 /100
PLATELET # BLD AUTO: 209 10E9/L (ref 150–450)
POTASSIUM SERPL-SCNC: 3.7 MMOL/L (ref 3.4–5.3)
RBC # BLD AUTO: 4.19 10E12/L (ref 3.8–5.2)
SODIUM SERPL-SCNC: 143 MMOL/L (ref 133–144)
WBC # BLD AUTO: 4.8 10E9/L (ref 4–11)

## 2019-07-18 PROCEDURE — 80048 BASIC METABOLIC PNL TOTAL CA: CPT | Performed by: EMERGENCY MEDICINE

## 2019-07-18 PROCEDURE — 25000132 ZZH RX MED GY IP 250 OP 250 PS 637: Performed by: EMERGENCY MEDICINE

## 2019-07-18 PROCEDURE — 36415 COLL VENOUS BLD VENIPUNCTURE: CPT | Performed by: EMERGENCY MEDICINE

## 2019-07-18 PROCEDURE — 99284 EMERGENCY DEPT VISIT MOD MDM: CPT | Mod: Z6 | Performed by: EMERGENCY MEDICINE

## 2019-07-18 PROCEDURE — 85025 COMPLETE CBC W/AUTO DIFF WBC: CPT | Performed by: EMERGENCY MEDICINE

## 2019-07-18 PROCEDURE — 99283 EMERGENCY DEPT VISIT LOW MDM: CPT | Performed by: EMERGENCY MEDICINE

## 2019-07-18 PROCEDURE — 85610 PROTHROMBIN TIME: CPT | Performed by: EMERGENCY MEDICINE

## 2019-07-18 RX ORDER — CEPHALEXIN 500 MG/1
500 CAPSULE ORAL 2 TIMES DAILY
COMMUNITY
End: 2019-10-29

## 2019-07-18 RX ORDER — OXYCODONE HYDROCHLORIDE 5 MG/1
5 TABLET ORAL ONCE
Status: COMPLETED | OUTPATIENT
Start: 2019-07-18 | End: 2019-07-18

## 2019-07-18 RX ORDER — DOXYCYCLINE 100 MG/1
100 CAPSULE ORAL 2 TIMES DAILY
COMMUNITY
End: 2019-10-29

## 2019-07-18 RX ADMIN — OXYCODONE HYDROCHLORIDE 5 MG: 5 TABLET ORAL at 12:28

## 2019-07-18 ASSESSMENT — MIFFLIN-ST. JEOR: SCORE: 1245.59

## 2019-07-18 ASSESSMENT — ENCOUNTER SYMPTOMS
MYALGIAS: 1
CHILLS: 0
FEVER: 0
WOUND: 1

## 2019-07-18 NOTE — ED TRIAGE NOTES
"Tick bite 4th of July weekend. Started antibiotics. Getting progressively worse and started on a second antibiotic yesterday at [place]. Reports worsening \"coloring\" but has not gotten larger. Is taking doxycycline and cephalexin.   "

## 2019-07-18 NOTE — ED PROVIDER NOTES
[place] EMERGENCY DEPARTMENT ([facility])  7/18/19 Vertical Triage B  9:45 AM   History     Chief Complaint   Patient presents with     Insect Bite     Wound Infection     The history is provided by the patient and medical records.     Ashli Gamboa is a 39 year old female who presents with a rash to her posterior leg.  She states that one week ago she pulled a tick off the back of her leg.  Approximately 3 to 4 days ago she started to notice redness and pain to the area and was seen in clinic and started on doxycycline for suspected Lyme's disease.  The redness worsened and there was concern for cellulitis as well as the tick bite and she was started on Keflex and given a shot of what appears to be Rocephin.  She has been having some body aches but states that she has fibromyalgia and cannot tell if this is anything new.  She denies any fever or chills.  No prior history of DVT.  She is not on any anticoagulation.  She presents today with some discoloration of the wound.  It is now bluish-purple and she is concerned that the infection is worsening.  She denies any trauma to her leg.    I have reviewed the Medications, Allergies, Past Medical and Surgical History, and Social History in the [employer] system.  PAST MEDICAL HISTORY:   Past Medical History:   Diagnosis Date     Arthritis     BIG TOE     Cervical high risk HPV (human papillomavirus) test positive 07/05/2017     Cervical high risk HPV (human papillomavirus) test positive 06/10/2015     Depressive disorder 2007    I have been on and off medication for the last several years     Endometriosis      Hearing problem      Obese      Sensorineural hearing loss        PAST SURGICAL HISTORY:   Past Surgical History:   Procedure Laterality Date     ABDOMEN SURGERY      laparoscopy X2     DILATE CERVIX, HYSTEROSCOPY, ABLATE ENDOMETRIUM NOVASURE, COMBINED N/A 11/17/2016    Procedure: COMBINED DILATE CERVIX, HYSTEROSCOPY, ABLATE ENDOMETRIUM NOVASURE;   Surgeon: Sabas Patel MD;  Location: [facility]     GYN SURGERY  dates above    myomectomy x2, laparoscopy x2     GYN SURGERY      Endometriosis surgery 5/1/19     LAPAROSCOPIC ASSISTED HYSTERECTOMY VAGINAL N/A 1/4/2018    Procedure: LAPAROSCOPIC ASSISTED HYSTERECTOMY VAGINAL;;  Surgeon: Sabas Patel MD;  Location: [facility]     LAPAROSCOPIC GASTRIC SLEEVE N/A 7/25/2017    Procedure: LAPAROSCOPIC GASTRIC SLEEVE;  Laparoscopic Sleeve Gastrectomy;  Surgeon: Sam Schmidt MD;  Location: [facility]     LAPAROSCOPIC LYSIS ADHESIONS  7/10/2014    Procedure: LAPAROSCOPIC LYSIS ADHESIONS;  Surgeon: Sabas Patel MD;  Location: [facility]     LAPAROSCOPIC SALPINGECTOMY Bilateral 1/4/2018    Procedure: LAPAROSCOPIC SALPINGECTOMY;;  Surgeon: Sabas Patel MD;  Location: [facility]     LASER CO2 LAPAROSCOPIC VAPORIZATION ENDOMETRIUM  7/10/2014    Procedure: LASER CO2 LAPAROSCOPIC VAPORIZATION ENDOMETRIUM;  Surgeon: Sabas Patel MD;  Location: [facility]     LASER CO2 LAPAROSCOPIC VAPORIZATION ENDOMETRIUM N/A 6/30/2015    Procedure: LASER CO2 LAPAROSCOPIC VAPORIZATION ENDOMETRIUM;  Surgeon: Sabas Patel MD;  Location: [facility]     LASER CO2 LAPAROSCOPIC VAPORIZATION ENDOMETRIUM, LYSIS ADHESIONS N/A 1/4/2018    Procedure: LASER CO2 LAPAROSCOPIC VAPORIZATION ENDOMETRIUM, LYSIS ADHESIONS;  LAPAROSCOPY WITH CO2 LASER LYSIS OF ADHESIONS AND VAPORIZATION OF ENDOMETRIOSIS, CYSTOTOMY LEFT OVARY, LAPAROSCOPIC VAGINAL HYSTERECTOMY WITH BILATERAL SALPINGECTOMY ;  Surgeon: Sabas Patel MD;  Location: [facility]     LASER CO2 LAPAROSCOPY DIAGNOSTIC, LYSIS ADHESIONS, COMBINED N/A 6/30/2015    Procedure: COMBINED LASER CO2 LAPAROSCOPY DIAGNOSTIC, LYSIS ADHESIONS;  Surgeon: Sabas Patel MD;  Location: [facility]       FAMILY HISTORY:   Family History   Problem Relation Age of Onset     Depression Mother      Cancer Mother         Cervical      Other - See Comments Mother         Fibromyalgia     Crohn's Disease Mother      Obesity Mother       Ovarian Cancer Mother         Cervical Cancer     Substance Abuse Mother      Stomach Problem Mother      Arthritis Father      Rheumatoid Arthritis Father      Depression Father      Substance Abuse Father      Cerebrovascular Disease Paternal Grandmother      Breast Cancer Maternal Grandmother      Cancer Maternal Grandmother      Depression Brother      Depression Sister      Mental Illness Brother         schizophrenia and bipolar     Substance Abuse Brother      Stomach Problem Sister      Stomach Problem Brother        SOCIAL HISTORY:   Social History     Tobacco Use     Smoking status: Former Smoker     Packs/day: 0.50     Years: 5.00     Pack years: 2.50     Types: Cigarettes     Start date: 2007     Last attempt to quit: 2013     Years since quittin.7     Smokeless tobacco: Never Used   Substance Use Topics     Alcohol use: Yes     Alcohol/week: 0.0 oz     Comment: 2 drinks per month       Patient's Medications   New Prescriptions    No medications on file   Previous Medications    BUPROPION (WELLBUTRIN XL) 300 MG 24 HR TABLET    TAKE 1 TABLET BY MOUTH EVERY DAY IN THE MORNING    CEPHALEXIN (KEFLEX) 500 MG CAPSULE    Take 500 mg by mouth 2 times daily    DOXYCYCLINE MONOHYDRATE (MONODOX) 100 MG CAPSULE    Take 100 mg by mouth 2 times daily    MEDICAL CANNABIS (PATIENT'S OWN SUPPLY)    See Admin Instructions (The purpose of this order is to document that the patient reports taking medical cannabis.  This is not a prescription, and is not used to certify that the patient has a qualifying medical condition.)    MULTIPLE VITAMIN PO    Take 1 patch by mouth daily PatchMD - Multivitamin Patch    NUVARING 0.12-0.015 MG/24HR VAGINAL RING    INSERT 1 RING VAGINALLY EVERY 21 DAYS    TOPIRAMATE (TOPAMAX PO)    Take 300 mg by mouth daily     VITAMIN B COMPLEX WITH VITAMIN C (VITAMIN  B COMPLEX) TABS TABLET    Take 1 tablet by mouth daily   Modified Medications    No medications on file   Discontinued  "Medications    No medications on file        No Known Allergies   Review of Systems   Constitutional: Negative for chills and fever.   Musculoskeletal: Positive for myalgias.   Skin: Positive for wound (behind leg).       Physical Exam   BP: 113/60  Pulse: 86  Temp: 98.6  F (37  C)  Resp: 16  Height: 162.6 cm (5' 4\")  Weight: 58.6 kg (129 lb 1.6 oz)  SpO2: 100 %      Physical Exam   Constitutional: She is oriented to person, place, and time. No distress.   HENT:   Head: Normocephalic and atraumatic.   Eyes: Pupils are equal, round, and reactive to light. Conjunctivae are normal.   Neck: Normal range of motion. Neck supple.   Cardiovascular: Normal rate and intact distal pulses.   Pulmonary/Chest: Effort normal. No respiratory distress. She has no wheezes. She has no rales.   Musculoskeletal: Normal range of motion.   Neurological: She is alert and oriented to person, place, and time.   Skin: Skin is warm and dry. She is not diaphoretic.   Oval area with erythema and contusion, tenderness without fluctuance or induration   Psychiatric: She has a normal mood and affect. Her behavior is normal. Thought content normal.   Nursing note and vitals reviewed.      ED Course        Procedures             Critical Care time:  none             Labs Ordered and Resulted from Time of ED Arrival Up to the Time of Departure from the ED - No data to display         Assessments & Plan (with Medical Decision Making)   This is a 39-year-old female who presents with redness and swelling to her posterior left knee.  She started treatment with doxycycline for suspected Lyme's when she was bitten with a tick to that area.  She also developed some redness and is now taking Keflex.  She states that her redness and pain have improved but the coloration is changed to a darker blue.  The area appears contused.  No evidence of abscess or other serious complication.  Her lab evaluation was normal.  She was discharged with instructions to continue " her doxycycline and Keflex and follow-up with her primary care provider.     I have reviewed the nursing notes.    I have reviewed the findings, diagnosis, plan and need for follow up with the patient.       Medication List      There are no discharge medications for this visit.         Final diagnoses:   None     I, Julia Beltrán, am serving as a trained medical scribe to document services personally performed by Bhaskar Chacon MD based on the provider's statements to me on July 18, 2019.  This document has been checked and approved by the attending provider.    I, Bhaskar Chacon MD, was physically present and have reviewed and verified the accuracy of this note documented by Julia Beltrán, medical scribe.       7/18/2019   [facility], [place], EMERGENCY DEPARTMENT     Bhaskar Chacon MD  07/18/19 [id number]

## 2019-07-18 NOTE — TELEPHONE ENCOUNTER
LMOM and waiting to hear back. Also said she could use my chart to message us.  Rosanne Garcia RN

## 2019-07-18 NOTE — TELEPHONE ENCOUNTER
Reason for Call:  Other call back    Detailed comments: Patient was treated at [facility] for a tick bite on the back of leg & cellulitis. She's also getting treated for lyme disease. Patient would like a call back to discus symptoms & next steps. Please advise, thank you!     Phone Number Patient can be reached at: Home number on file [phone] (home)    Best Time: anytime    Can we leave a detailed message on this number? YES    Call taken on 7/18/2019 at 7:19 AM by Jeanie Whitley

## 2019-07-18 NOTE — ED AVS SNAPSHOT
[facility], [place], Emergency Department  [address] [address]  Phone:  [phone]                                    Ashli Gamboa   MRN: [id number]    Department:  [facility], Emergency Department   Date of Visit:  7/18/2019           After Visit Summary Signature Page    I have received my discharge instructions, and my questions have been answered. I have discussed any challenges I see with this plan with the nurse or doctor.    ..........................................................................................................................................  Patient/Patient Representative Signature      ..........................................................................................................................................  Patient Representative Print Name and Relationship to Patient    ..................................................               ................................................  Date                                   Time    ..........................................................................................................................................  Reviewed by Signature/Title    ...................................................              ..............................................  Date                                               Time          22EPIC Rev 08/18

## 2019-07-19 PROBLEM — M99.05 PELVIC SOMATIC DYSFUNCTION: Status: RESOLVED | Noted: 2018-07-10 | Resolved: 2019-07-19

## 2019-07-19 PROBLEM — N94.10 DYSPAREUNIA, FEMALE: Status: RESOLVED | Noted: 2018-07-10 | Resolved: 2019-07-19

## 2019-07-19 PROBLEM — M53.3 PAIN IN THE COCCYX: Status: RESOLVED | Noted: 2018-07-10 | Resolved: 2019-07-19

## 2019-10-29 ENCOUNTER — OFFICE VISIT (OUTPATIENT)
Dept: FAMILY MEDICINE | Facility: CLINIC | Age: 39
End: 2019-10-29
Payer: COMMERCIAL

## 2019-10-29 DIAGNOSIS — A69.20 LYME DISEASE: ICD-10-CM

## 2019-10-29 DIAGNOSIS — J01.90 ACUTE SINUSITIS WITH SYMPTOMS > 10 DAYS: ICD-10-CM

## 2019-10-29 DIAGNOSIS — N76.0 BV (BACTERIAL VAGINOSIS): ICD-10-CM

## 2019-10-29 DIAGNOSIS — R30.0 DYSURIA: Primary | ICD-10-CM

## 2019-10-29 DIAGNOSIS — N89.8 VAGINAL ITCHING: ICD-10-CM

## 2019-10-29 DIAGNOSIS — B96.89 BV (BACTERIAL VAGINOSIS): ICD-10-CM

## 2019-10-29 DIAGNOSIS — F33.41 RECURRENT MAJOR DEPRESSIVE DISORDER, IN PARTIAL REMISSION (H): ICD-10-CM

## 2019-10-29 LAB
ALBUMIN UR-MCNC: 30 MG/DL
APPEARANCE UR: CLEAR
BACTERIA #/AREA URNS HPF: ABNORMAL /HPF
BILIRUB UR QL STRIP: NEGATIVE
COLOR UR AUTO: YELLOW
ERYTHROCYTE [DISTWIDTH] IN BLOOD BY AUTOMATED COUNT: 12.7 % (ref 10–15)
GLUCOSE UR STRIP-MCNC: NEGATIVE MG/DL
HCT VFR BLD AUTO: 45.2 % (ref 35–47)
HGB BLD-MCNC: 14.5 G/DL (ref 11.7–15.7)
HGB UR QL STRIP: NEGATIVE
KETONES UR STRIP-MCNC: NEGATIVE MG/DL
LEUKOCYTE ESTERASE UR QL STRIP: NEGATIVE
MCH RBC QN AUTO: 29.2 PG (ref 26.5–33)
MCHC RBC AUTO-ENTMCNC: 32.1 G/DL (ref 31.5–36.5)
MCV RBC AUTO: 91 FL (ref 78–100)
NITRATE UR QL: NEGATIVE
NON-SQ EPI CELLS #/AREA URNS LPF: ABNORMAL /LPF
PH UR STRIP: 6.5 PH (ref 5–7)
PLATELET # BLD AUTO: 242 10E9/L (ref 150–450)
RBC # BLD AUTO: 4.97 10E12/L (ref 3.8–5.2)
RBC #/AREA URNS AUTO: ABNORMAL /HPF
SOURCE: ABNORMAL
SP GR UR STRIP: 1.02 (ref 1–1.03)
SPECIMEN SOURCE: ABNORMAL
UROBILINOGEN UR STRIP-ACNC: 1 EU/DL (ref 0.2–1)
WBC # BLD AUTO: 6.9 10E9/L (ref 4–11)
WBC #/AREA URNS AUTO: ABNORMAL /HPF
WET PREP SPEC: ABNORMAL

## 2019-10-29 PROCEDURE — 36415 COLL VENOUS BLD VENIPUNCTURE: CPT | Performed by: NURSE PRACTITIONER

## 2019-10-29 PROCEDURE — 86617 LYME DISEASE ANTIBODY: CPT | Mod: 90 | Performed by: NURSE PRACTITIONER

## 2019-10-29 PROCEDURE — 99214 OFFICE O/P EST MOD 30 MIN: CPT | Performed by: NURSE PRACTITIONER

## 2019-10-29 PROCEDURE — 86618 LYME DISEASE ANTIBODY: CPT | Performed by: NURSE PRACTITIONER

## 2019-10-29 PROCEDURE — 87210 SMEAR WET MOUNT SALINE/INK: CPT | Performed by: NURSE PRACTITIONER

## 2019-10-29 PROCEDURE — 85027 COMPLETE CBC AUTOMATED: CPT | Performed by: NURSE PRACTITIONER

## 2019-10-29 PROCEDURE — 99000 SPECIMEN HANDLING OFFICE-LAB: CPT | Performed by: NURSE PRACTITIONER

## 2019-10-29 PROCEDURE — 81001 URINALYSIS AUTO W/SCOPE: CPT | Performed by: NURSE PRACTITIONER

## 2019-10-29 RX ORDER — BUPROPION HYDROCHLORIDE 300 MG/1
TABLET ORAL
Qty: 90 TABLET | Refills: 1 | Status: SHIPPED | OUTPATIENT
Start: 2019-10-29 | End: 2020-06-02

## 2019-10-29 RX ORDER — METRONIDAZOLE 500 MG/1
500 TABLET ORAL 2 TIMES DAILY
Qty: 14 TABLET | Refills: 0 | Status: SHIPPED | OUTPATIENT
Start: 2019-10-29 | End: 2019-11-05

## 2019-10-29 ASSESSMENT — ANXIETY QUESTIONNAIRES
6. BECOMING EASILY ANNOYED OR IRRITABLE: SEVERAL DAYS
2. NOT BEING ABLE TO STOP OR CONTROL WORRYING: SEVERAL DAYS
5. BEING SO RESTLESS THAT IT IS HARD TO SIT STILL: NOT AT ALL
3. WORRYING TOO MUCH ABOUT DIFFERENT THINGS: SEVERAL DAYS
GAD7 TOTAL SCORE: 5
IF YOU CHECKED OFF ANY PROBLEMS ON THIS QUESTIONNAIRE, HOW DIFFICULT HAVE THESE PROBLEMS MADE IT FOR YOU TO DO YOUR WORK, TAKE CARE OF THINGS AT HOME, OR GET ALONG WITH OTHER PEOPLE: SOMEWHAT DIFFICULT
1. FEELING NERVOUS, ANXIOUS, OR ON EDGE: SEVERAL DAYS
7. FEELING AFRAID AS IF SOMETHING AWFUL MIGHT HAPPEN: NOT AT ALL

## 2019-10-29 ASSESSMENT — PATIENT HEALTH QUESTIONNAIRE - PHQ9: 5. POOR APPETITE OR OVEREATING: SEVERAL DAYS

## 2019-10-29 NOTE — PROGRESS NOTES
Subjective     Ashli Gamboa is a 39 year old female who presents to clinic today for the following health issues:    HPI   Vaginal Symptoms      Duration: 3 days    Description  itching, burning and odor    Intensity:  moderate    Accompanying signs and symptoms (fever/dysuria/abdominal or back pain): None    History  Sexually active: yes, single partner, contraception not required  Possibility of pregnancy: No  Recent antibiotic use: YES    Precipitating or alleviating factors: None    Therapies tried and outcome: none   Outcome:     Medication Followup of wellbutrin    Taking Medication as prescribed: yes    Side Effects:  None    Medication Helping Symptoms:  Yes    WORKING WELL.     NOT HAVING ANY SIDE EFFECTS    DUE FOR REFILLS     RESPIRATORY/SINUS SYMPTOMS      Duration: 3 days    Description  nasal congestion, rhinorrhea, facial pain/pressure, cough and fatigue/malaise    Severity: mild    Accompanying signs and symptoms: None    History (predisposing factors):  Had a sinus infection 2 weeks ago.  Completed antibiotics last week    Precipitating or alleviating factors: None    Therapies tried and outcome:  rest and fluids oral decongestant     Fatigue    Duration: many months, worsening    Description (location/character/radiation): overall    Intensity:  moderate    Accompanying signs and symptoms: body aches    History (similar episodes/previous evaluation):  Has fibromyalgia and may be related to fibro flare    Precipitating or alleviating factors: None    Therapies tried and outcome: rest and aleve.       Had a tick bite in July.  Was treated for lymes  Wants to get labs rechecked.    Fatigue since then.      Reviewed and updated as needed this visit by Provider  Tobacco  Allergies  Meds  Problems  Med Hx  Surg Hx  Fam Hx         Review of Systems   ROS COMP: Constitutional, HEENT, cardiovascular, pulmonary, GI, , musculoskeletal, neuro, skin, endocrine and psych systems are negative,  except as otherwise noted.      Objective    BP (P) 100/67   Pulse (P) 79   Temp (P) 97.7  F (36.5  C) (Oral)   Resp (P) 18   Wt (P) 56.7 kg (125 lb)   LMP  (LMP Unknown)   SpO2 (P) 100%   BMI (P) 21.46 kg/m    Body mass index is 21.46 kg/m  (pended).  Physical Exam   GENERAL: healthy, alert and no distress  EYES: Eyes grossly normal to inspection, PERRL and conjunctivae and sclerae normal  HENT: ear canals and TM's normal, nose and mouth without ulcers or lesions  NECK: no adenopathy, no asymmetry, masses, or scars and thyroid normal to palpation  RESP: lungs clear to auscultation - no rales, rhonchi or wheezes  CV: regular rate and rhythm, normal S1 S2, no S3 or S4, no murmur, click or rub, no peripheral edema and peripheral pulses strong  ABDOMEN: soft, nontender, no hepatosplenomegaly, no masses and bowel sounds normal  MS: no gross musculoskeletal defects noted, no edema  SKIN: no suspicious lesions or rashes  PSYCH: mentation appears normal, affect normal/bright  PHQ-9 SCORE 7/5/2017 1/2/2018 6/8/2018   PHQ-9 Total Score - - -   PHQ-9 Total Score MyChart - 2 (Minimal depression) -   PHQ-9 Total Score 3 2 1       Diagnostic Test Results:  Labs reviewed in Epic  Results for orders placed or performed in visit on 10/29/19 (from the past 24 hour(s))   UA reflex to Microscopic and Culture   Result Value Ref Range    Color Urine Yellow     Appearance Urine Clear     Glucose Urine Negative NEG^Negative mg/dL    Bilirubin Urine Negative NEG^Negative    Ketones Urine Negative NEG^Negative mg/dL    Specific Gravity Urine 1.020 1.003 - 1.035    Blood Urine Negative NEG^Negative    pH Urine 6.5 5.0 - 7.0 pH    Protein Albumin Urine 30 (A) NEG^Negative mg/dL    Urobilinogen Urine 1.0 0.2 - 1.0 EU/dL    Nitrite Urine Negative NEG^Negative    Leukocyte Esterase Urine Negative NEG^Negative    Source Midstream Urine    Wet prep   Result Value Ref Range    Specimen Description Vagina     Wet Prep No Trichomonas seen      Wet Prep Few  Clue cells seen   (A)     Wet Prep No yeast seen     Wet Prep Few  WBC'S seen      Urine Microscopic   Result Value Ref Range    WBC Urine 0 - 5 OTO5^0 - 5 /HPF    RBC Urine O - 2 OTO2^O - 2 /HPF    Squamous Epithelial /LPF Urine Few FEW^Few /LPF    Bacteria Urine Few (A) NEG^Negative /HPF   CBC with platelets   Result Value Ref Range    WBC 6.9 4.0 - 11.0 10e9/L    RBC Count 4.97 3.8 - 5.2 10e12/L    Hemoglobin 14.5 11.7 - 15.7 g/dL    Hematocrit 45.2 35.0 - 47.0 %    MCV 91 78 - 100 fl    MCH 29.2 26.5 - 33.0 pg    MCHC 32.1 31.5 - 36.5 g/dL    RDW 12.7 10.0 - 15.0 %    Platelet Count 242 150 - 450 10e9/L           Assessment & Plan       ICD-10-CM    1. Dysuria R30.0 UA reflex to Microscopic and Culture     Urine Microscopic   2. Recurrent major depressive disorder, in partial remission (H) F33.41 buPROPion (WELLBUTRIN XL) 300 MG 24 hr tablet   3. Vaginal itching N89.8 Wet prep   4. Lyme disease A69.20 Lyme Disease Thelma with reflex to WB Serum   5. Acute sinusitis with symptoms > 10 days J01.90 CBC with platelets   6. BV (bacterial vaginosis) N76.0 metroNIDAZOLE (FLAGYL) 500 MG tablet    B96.89       UA normal.      Depression stable.    Refill wellbutrin at 300 mg daily  F/u in 2-3 months or sooner if worsening.      Your wet prep shows that you have bacterial vaginosis (overgrowth of a bacteria called clue cells).  This is not a sexually transmitted disease.  I sent in a prescription for Flagyl which is an antibiotic that you will take twice daily for one week.  You cannot drink alcohol while taking this medication. ;l    lymes test pending.      CBC normal.    I think this is primarily related to a viral illness given the various associated symptoms.  Went over the treatment of viral illnesses, which is primarily supportive.    Recheck if not improving as expected        See Patient Instructions    Return in about 3 months (around 1/29/2020) for Physical Exam.    Karena Mo, APRN  [facility]

## 2019-10-30 ASSESSMENT — ANXIETY QUESTIONNAIRES: GAD7 TOTAL SCORE: 5

## 2019-11-01 LAB
B BURGDOR IGG SER QL IB: NEGATIVE
B BURGDOR IGG+IGM SER QL: 9.11 (ref 0–0.89)
B BURGDOR IGM SER QL IB: NEGATIVE

## 2019-11-05 ENCOUNTER — HEALTH MAINTENANCE LETTER (OUTPATIENT)
Age: 39
End: 2019-11-05

## 2019-11-06 ENCOUNTER — MYC MEDICAL ADVICE (OUTPATIENT)
Dept: FAMILY MEDICINE | Facility: CLINIC | Age: 39
End: 2019-11-06

## 2019-11-06 DIAGNOSIS — R53.81 MALAISE: ICD-10-CM

## 2019-11-06 DIAGNOSIS — A69.20 LYME DISEASE: Primary | ICD-10-CM

## 2019-11-06 DIAGNOSIS — M79.10 MYALGIA: ICD-10-CM

## 2019-11-06 NOTE — TELEPHONE ENCOUNTER
Routing to provider - Chepe - please review and advise as appropriate    Patient has questions regarding results and plan - she says she continues with symptoms - new symptoms 'burning skin'    What does positive lyme and negative western blot mean?      Sent triage as well

## 2019-11-08 ENCOUNTER — RECORDS - HEALTHEAST (OUTPATIENT)
Dept: ADMINISTRATIVE | Facility: OTHER | Age: 39
End: 2019-11-08

## 2019-11-11 NOTE — TELEPHONE ENCOUNTER
RECORDS RECEIVED FROM: Internal - Lyme disease, records in care everywherem scheduled per patient   DATE RECEIVED: 11.12.20109   NOTES (Gather within 2 years) STATUS DETAILS   OFFICE NOTE from referring provider   Internal 11.06.2019   OFFICE NOTE from other specialist N/A    DISCHARGE SUMMARY from hospital N/A    DISCHARGE REPORT from the ER N/A    LABS (any labs) Internal / CE    MEDICATION LIST Internal    IMAGING  (NEED IMAGES AND REPORTS)     Osteomyelitis: Foot imaging  N/A    Liver Abscess: Abdominal imaging N/A    Other (anything related to diagnoses N/A

## 2019-11-12 ENCOUNTER — OFFICE VISIT (OUTPATIENT)
Dept: INFECTIOUS DISEASES | Facility: CLINIC | Age: 39
End: 2019-11-12
Attending: INTERNAL MEDICINE
Payer: COMMERCIAL

## 2019-11-12 ENCOUNTER — PRE VISIT (OUTPATIENT)
Dept: INFECTIOUS DISEASES | Facility: CLINIC | Age: 39
End: 2019-11-12

## 2019-11-12 VITALS
WEIGHT: 133.8 LBS | OXYGEN SATURATION: 99 % | HEART RATE: 87 BPM | TEMPERATURE: 98.6 F | BODY MASS INDEX: 22.97 KG/M2 | SYSTOLIC BLOOD PRESSURE: 127 MMHG | DIASTOLIC BLOOD PRESSURE: 82 MMHG

## 2019-11-12 DIAGNOSIS — A69.20 LYME DISEASE: ICD-10-CM

## 2019-11-12 DIAGNOSIS — M79.10 MYALGIA: ICD-10-CM

## 2019-11-12 DIAGNOSIS — R53.81 MALAISE: ICD-10-CM

## 2019-11-12 DIAGNOSIS — A69.20 NEUROLOGICAL LYME DISEASE: Primary | ICD-10-CM

## 2019-11-12 LAB
BASOPHILS # BLD AUTO: 0.1 10E9/L (ref 0–0.2)
BASOPHILS NFR BLD AUTO: 0.7 %
DIFFERENTIAL METHOD BLD: NORMAL
EOSINOPHIL # BLD AUTO: 0.1 10E9/L (ref 0–0.7)
EOSINOPHIL NFR BLD AUTO: 1.2 %
ERYTHROCYTE [DISTWIDTH] IN BLOOD BY AUTOMATED COUNT: 13.2 % (ref 10–15)
HCT VFR BLD AUTO: 42.3 % (ref 35–47)
HGB BLD-MCNC: 13.6 G/DL (ref 11.7–15.7)
IMM GRANULOCYTES # BLD: 0 10E9/L (ref 0–0.4)
IMM GRANULOCYTES NFR BLD: 0.1 %
LYMPHOCYTES # BLD AUTO: 2.6 10E9/L (ref 0.8–5.3)
LYMPHOCYTES NFR BLD AUTO: 38.8 %
MCH RBC QN AUTO: 29 PG (ref 26.5–33)
MCHC RBC AUTO-ENTMCNC: 32.2 G/DL (ref 31.5–36.5)
MCV RBC AUTO: 90 FL (ref 78–100)
MONOCYTES # BLD AUTO: 0.4 10E9/L (ref 0–1.3)
MONOCYTES NFR BLD AUTO: 5.6 %
NEUTROPHILS # BLD AUTO: 3.7 10E9/L (ref 1.6–8.3)
NEUTROPHILS NFR BLD AUTO: 53.6 %
NRBC # BLD AUTO: 0 10*3/UL
NRBC BLD AUTO-RTO: 0 /100
PLATELET # BLD AUTO: 265 10E9/L (ref 150–450)
RBC # BLD AUTO: 4.69 10E12/L (ref 3.8–5.2)
WBC # BLD AUTO: 6.8 10E9/L (ref 4–11)

## 2019-11-12 PROCEDURE — 36415 COLL VENOUS BLD VENIPUNCTURE: CPT | Performed by: INTERNAL MEDICINE

## 2019-11-12 PROCEDURE — 87207 SMEAR SPECIAL STAIN: CPT | Performed by: INTERNAL MEDICINE

## 2019-11-12 PROCEDURE — 86780 TREPONEMA PALLIDUM: CPT | Performed by: INTERNAL MEDICINE

## 2019-11-12 PROCEDURE — 86617 LYME DISEASE ANTIBODY: CPT | Performed by: INTERNAL MEDICINE

## 2019-11-12 PROCEDURE — 85025 COMPLETE CBC W/AUTO DIFF WBC: CPT | Performed by: INTERNAL MEDICINE

## 2019-11-12 PROCEDURE — 86753 PROTOZOA ANTIBODY NOS: CPT | Performed by: INTERNAL MEDICINE

## 2019-11-12 PROCEDURE — 86618 LYME DISEASE ANTIBODY: CPT | Performed by: INTERNAL MEDICINE

## 2019-11-12 PROCEDURE — 87798 DETECT AGENT NOS DNA AMP: CPT | Performed by: INTERNAL MEDICINE

## 2019-11-12 PROCEDURE — 87015 SPECIMEN INFECT AGNT CONCNTJ: CPT | Performed by: INTERNAL MEDICINE

## 2019-11-12 PROCEDURE — G0463 HOSPITAL OUTPT CLINIC VISIT: HCPCS | Mod: ZF

## 2019-11-12 ASSESSMENT — PATIENT HEALTH QUESTIONNAIRE - PHQ9: SUM OF ALL RESPONSES TO PHQ QUESTIONS 1-9: 0

## 2019-11-12 ASSESSMENT — PAIN SCALES - GENERAL: PAINLEVEL: SEVERE PAIN (6)

## 2019-11-12 NOTE — LETTER
11/12/2019      RE: Ashli Gamboa  [address]  [place] [address]       [facility]  New Patient Visit  11/14/2019     Chief Complaint:  Fatigue, new tremor, feels like skin is burning    HPI:  Ashli Gamboa is a 39 year old female with a hx of fibromyalgia, usually well controlled, morbid obesity s/p gastric bypass who presented to [facility] with an ovoid rash behind her L knee. She had been camping in [place] and had a tiny tick on her at that site which she removed. (she had a pic in her phone which was consistent with Lymes). At [facility] they diagnosed Lymes and gave her 14 days of doxycycline. The rash initially worsened but then resolved on doxycycline.     However, since July she has had worsening fatigue, body pain, and a sensation of burning skin. She also has a new tremor in her R arm. She has a history of fibromyalgia at baseline. She says she generally has come to terms with it and is on medical cannabis. She says the fatigue and body pain are similar. However, she has much worse 'brain fog' than normal. The feeling of burning skill all over her body is new. And she has never had a tremor in the past.     Due to her symptoms her PCP re-tested her for lymes and the screen was positive but the Western Blot was positive for 3 bands 41, 23, 18 kDa IgG and 1 band 23 kDa for IgM. This would make the Western blot be negative as the cut offs are 5 IgG bands and 2 IgM bands to be positive.    ROS: Complete 12-point ROS is negative except as noted above.    Past Medical History:  Past Medical History:   Diagnosis Date     Arthritis     BIG TOE     Cervical high risk HPV (human papillomavirus) test positive 07/05/2017     Cervical high risk HPV (human papillomavirus) test positive 06/10/2015     Depressive disorder 2007    I have been on and off medication for the last several years     Endometriosis      Hearing problem      Obese      Sensorineural hearing loss         Past Surgical History:  Past Surgical History:   Procedure Laterality Date     ABDOMEN SURGERY      laparoscopy X2     DILATE CERVIX, HYSTEROSCOPY, ABLATE ENDOMETRIUM NOVASURE, COMBINED N/A 11/17/2016    Procedure: COMBINED DILATE CERVIX, HYSTEROSCOPY, ABLATE ENDOMETRIUM NOVASURE;  Surgeon: Sabas Patel MD;  Location: [facility]     GYN SURGERY  dates above    myomectomy x2, laparoscopy x2     GYN SURGERY      Endometriosis surgery 5/1/19     LAPAROSCOPIC ASSISTED HYSTERECTOMY VAGINAL N/A 1/4/2018    Procedure: LAPAROSCOPIC ASSISTED HYSTERECTOMY VAGINAL;;  Surgeon: Sabas Patel MD;  Location: [facility]     LAPAROSCOPIC GASTRIC SLEEVE N/A 7/25/2017    Procedure: LAPAROSCOPIC GASTRIC SLEEVE;  Laparoscopic Sleeve Gastrectomy;  Surgeon: Sam Schmidt MD;  Location: [facility]     LAPAROSCOPIC LYSIS ADHESIONS  7/10/2014    Procedure: LAPAROSCOPIC LYSIS ADHESIONS;  Surgeon: Sabas Patel MD;  Location: [facility]     LAPAROSCOPIC SALPINGECTOMY Bilateral 1/4/2018    Procedure: LAPAROSCOPIC SALPINGECTOMY;;  Surgeon: Sabas Patel MD;  Location: [facility]     LASER CO2 LAPAROSCOPIC VAPORIZATION ENDOMETRIUM  7/10/2014    Procedure: LASER CO2 LAPAROSCOPIC VAPORIZATION ENDOMETRIUM;  Surgeon: Sabas Patel MD;  Location: [facility]     LASER CO2 LAPAROSCOPIC VAPORIZATION ENDOMETRIUM N/A 6/30/2015    Procedure: LASER CO2 LAPAROSCOPIC VAPORIZATION ENDOMETRIUM;  Surgeon: Sabas Patel MD;  Location: [facility]     LASER CO2 LAPAROSCOPIC VAPORIZATION ENDOMETRIUM, LYSIS ADHESIONS N/A 1/4/2018    Procedure: LASER CO2 LAPAROSCOPIC VAPORIZATION ENDOMETRIUM, LYSIS ADHESIONS;  LAPAROSCOPY WITH CO2 LASER LYSIS OF ADHESIONS AND VAPORIZATION OF ENDOMETRIOSIS, CYSTOTOMY LEFT OVARY, LAPAROSCOPIC VAGINAL HYSTERECTOMY WITH BILATERAL SALPINGECTOMY ;  Surgeon: Sabas Patel MD;  Location: [facility]     LASER CO2 LAPAROSCOPY DIAGNOSTIC, LYSIS ADHESIONS, COMBINED N/A 6/30/2015    Procedure: COMBINED LASER CO2 LAPAROSCOPY DIAGNOSTIC, LYSIS  ADHESIONS;  Surgeon: Sabas Patel MD;  Location: [facility]       Social History:  Social History     Socioeconomic History     Marital status:      Spouse name: Sid     Number of children: 0     Years of education: Not on file     Highest education level: Not on file   Occupational History     Comment: [employer]   Social Needs     Financial resource strain: Not on file     Food insecurity:     Worry: Not on file     Inability: Not on file     Transportation needs:     Medical: Not on file     Non-medical: Not on file   Tobacco Use     Smoking status: Former Smoker     Packs/day: 0.50     Years: 5.00     Pack years: 2.50     Types: Cigarettes     Start date: 2007     Last attempt to quit: 2013     Years since quittin.0     Smokeless tobacco: Never Used   Substance and Sexual Activity     Alcohol use: Yes     Alcohol/week: 0.0 standard drinks     Comment: 2 drinks per month     Drug use: Yes     Types: Marijuana     Comment: 2 weeks ago for pain     Sexual activity: Yes     Partners: Male     Birth control/protection: Inserts/Ring   Lifestyle     Physical activity:     Days per week: Not on file     Minutes per session: Not on file     Stress: Not on file   Relationships     Social connections:     Talks on phone: Not on file     Gets together: Not on file     Attends Mu-ism service: Not on file     Active member of club or organization: Not on file     Attends meetings of clubs or organizations: Not on file     Relationship status: Not on file     Intimate partner violence:     Fear of current or ex partner: Not on file     Emotionally abused: Not on file     Physically abused: Not on file     Forced sexual activity: Not on file   Other Topics Concern     Parent/sibling w/ CABG, MI or angioplasty before 65F 55M? No   Social History Narrative     Not on file       Family Medical History:  Family History   Problem Relation Age of Onset     Depression Mother      Cancer  Mother         Cervical      Other - See Comments Mother         Fibromyalgia     Crohn's Disease Mother      Obesity Mother      Ovarian Cancer Mother         Cervical Cancer     Substance Abuse Mother      Stomach Problem Mother      Arthritis Father      Rheumatoid Arthritis Father      Depression Father      Substance Abuse Father      Cerebrovascular Disease Paternal Grandmother      Breast Cancer Maternal Grandmother      Cancer Maternal Grandmother      Depression Brother      Depression Sister      Mental Illness Brother         schizophrenia and bipolar     Substance Abuse Brother      Stomach Problem Sister      Stomach Problem Brother    Both Mother and Sister have fibromyalgia    Allergies:   No Known Allergies    Medications:  Current Outpatient Medications   Medication Sig Dispense Refill     buPROPion (WELLBUTRIN XL) 300 MG 24 hr tablet TAKE 1 TABLET BY MOUTH EVERY DAY IN THE MORNING 90 tablet 1     medical cannabis (Patient's own supply) See Admin Instructions (The purpose of this order is to document that the patient reports taking medical cannabis.  This is not a prescription, and is not used to certify that the patient has a qualifying medical condition.)       MULTIPLE VITAMIN PO Take 1 patch by mouth daily PatchMD - Multivitamin Patch       NUVARING 0.12-0.015 MG/24HR vaginal ring INSERT 1 RING VAGINALLY EVERY 21 DAYS 1 each 0     Topiramate (TOPAMAX PO) Take 300 mg by mouth daily        vitamin B complex with vitamin C (VITAMIN  B COMPLEX) TABS tablet Take 1 tablet by mouth daily       VITAMIN D PO Take 5,000 Units by mouth daily         Immunizations:  Immunization History   Administered Date(s) Administered     TD (ADULT, 7+) 12/12/2003     Tdap (Adacel,Boostrix) 06/14/2011       Exam:  B/P: 127/82, T: 98.6, P: 87, R: Data Unavailable, Weight: 133 lbs 12.8 oz  Gen: Alert and in no distress.   Psych: Normal affect. Alert and oriented.   HEENT: PERRL. No icterus. Oropharynx pink and moist  without lesions.   Neck: No lymphadenopathy.   CV: Regular rate and rhythm without m/r/g.   Chest: Clear to auscultation bilaterally without wheezes or crackles.   Abdomen: Soft, non-distended. Non-tender. Normal bowel sounds.   Extremities: Warm and well perfused.   Skin: No rashes or lesions noted.     Labs:  WBC   Date Value Ref Range Status   11/12/2019 6.8 4.0 - 11.0 10e9/L Final       No results found for: CRP    Creatinine   Date Value Ref Range Status   07/18/2019 0.88 0.52 - 1.04 mg/dL Final   02/13/2019 0.99 0.52 - 1.04 mg/dL Final   08/09/2018 1.09 (H) 0.52 - 1.04 mg/dL Final     10/29/19  Lymes Thelma 9.11  Positive, Presence of detectable Borrelia burgdorferi antibodies. Sample will   be sent to [facility] for a Western Blot assay for confirmation.   Western Blot  IgG  Negative    Comment: (Note)   Band(s) present: 41, 23, 18 kDa   (Insufficient number of bands for positive result)   INTERPRETIVE INFORMATION: B. burgdorferi IgG Immunoblot   For this assay, a positive result is reported when any 5 or   more of the following 10 bands are present: 18, 23, 28, 30,   39, 41, 45, 58, 66, or 93 kDa.  All other banding patterns   are reported as negative.    IgM  Value: Negative   Comment: (Note)   Band(s) present: 23 kDa   (Insufficient number of bands for positive result)   INTERPRETIVE INFORMATION: B. burgdorferi Antibody IgM                            Immunoblot   For this assay, a positive result is reported when any 2 or   more of the following bands are present: 23, 39, or 41 kDa.   All other banding patterns are reported as negative.   Performed by [employer],      11/12/19  RPR negative    11/12/19  2:52 PM [id number]    Specimen Information: Blood        Component Collected Lab   Specimen Description 11/12/2019  2:52    Blood    Parasite Stain 11/12/2019  2:52    No Babesia found    Parasite Stain 11/12/2019  2:52    No Anaplasma phagocytophilum or Ehrlichia spp. found    Parasite Stain  11/12/2019  2:52    Giemsa stained thin and thick smears reveal no inclusions of Anaplasma phagocytophilum or   Ehrlichia spp. and no Babesia species.      Brain MRI 6/2018  MR BRAIN W/O & W CONTRAST 7/13/2018 11:32 AM     Provided History:  Asymmetric sensorineural hearing loss  ICD-10: Asymmetrical sensorineural hearing loss     Comparison: No films available for comparison     Technique: Axial diffusion-weighted with ADC map, T2-weighted,  turboFLAIR and T1-weighted images of the brain and axial T1-weighted  and coronal T2-weighted with fat saturation images centered on the  internal auditory canals were obtained without intravenous contrast.  Following intravenous administration of gadolinium, axial turboFLAIR  images of the brain and axial T1-weighted with fat saturation and  coronal T1-weighted images centered on the internal auditory canals  were obtained.     Contrast: 6 mL     Findings: No abnormal signal or enhancement along the course of the  seventh and eighth cranial nerves on either side. Vestibular and  auditory structures exhibit normal signal on T2-weighted images and no  abnormal enhancement.     Images of the whole brain demonstrate no mass lesion, no mass effect,  or midline shift. No intracranial hemorrhage on  susceptibility-weighted images. There is no restricted diffusion.  Ventricles are proportionate to the cerebral sulci. No abnormal  enhancement.                                                                      Impression:    No mass or abnormal enhancement seen    Assessment and Plan:  Ashli Gamboa is a 39 year old female with a hx of fibromyalgia and recent Lymes who presents with fatige, malaise, body pain, increased 'brain fog' and a new tremor not consistent with her fibromyalgia.     Neurologic Lymes: This is a patient with recently diagnosed and treated for lymes which appears appropriate. Her picture of the rash is almost identical to one on the CDC website with  a blueish tint. However, now with her new generalized and neurological symptoms including brain fog, burning sensation on her skin, and new tremor, she was re-tested. The Lyme's Screen is positive but Western Blot has 3 positive IgG bands and 1 IgM band but not 5 or 2. Hence, the confirmatory test was negative. Her story and symptoms seem very consistent with neurological lymes. I decided to look for mimics like syphilis or other tickborne illnesses (although doxy should treat ehrlichia and anaplasma, ehrlichia/anaplasma/babesia rarely have neurological symptoms, and she is monogamous with her  of several years). I also decided to discuss with our .    The RPR and parasite smear was negative.     I discussed with Dr. Butcher who has not seen the patient. However, she says that there is some challenge with interpreting the western blot in some specific cases. While she does not know of doxycycline inhibiting antibody production, that happens in strep so seems possible that early treatment stopped the antibody production. Also it is known that the antibodies fade over time and it has been almost 4 months between infection, treating, and re-testing.     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 However, the symptoms do not always resolve even with treatment, so it may not relieve the symptoms. Given neurological Lymes beyond just a peripheral nerve, I will use IV ceftriaxone for 4 weeks.    Plan  -IV ceftriaxone 2gm x4 weeks  -she will need a picc and a first dose  -home vs. Infusion center antibitotics.     Return to clinic in 1 month.    MD Karena Martinez MD

## 2019-11-12 NOTE — NURSING NOTE
Chief Complaint   Patient presents with     New Patient     Lyme's     /82 (BP Location: Right arm, Patient Position: Sitting, Cuff Size: Adult Regular)   Pulse 87   Temp 98.6  F (37  C)   Wt 60.7 kg (133 lb 12.8 oz)   LMP  (LMP Unknown)   SpO2 99%   BMI 22.97 kg/m        Balbir Galeano, EMT

## 2019-11-12 NOTE — LETTER
11/12/2019       RE: Ashli Gamboa  [address]  [place] [address]     Dear Colleague,    Thank you for referring your patient, Ashli Gamboa, to the [facility] AND INFECTIOUS DISEASES at [facility]. Please see a copy of my visit note below.    [facility]  New Patient Visit  11/14/2019     Chief Complaint:  Fatigue, new tremor, feels like skin is burning    HPI:  Ashli Gamboa is a 39 year old female with a hx of fibromyalgia, usually well controlled, morbid obesity s/p gastric bypass who presented to [place] with an ovoid rash behind her L knee. She had been camping in [facility] and had a tiny tick on her at that site which she removed. (she had a pic in her phone which was consistent with Lymes). At [place] they diagnosed Lymes and gave her 14 days of doxycycline. The rash initially worsened but then resolved on doxycycline.     However, since July she has had worsening fatigue, body pain, and a sensation of burning skin. She also has a new tremor in her R arm. She has a history of fibromyalgia at baseline. She says she generally has come to terms with it and is on medical cannabis. She says the fatigue and body pain are similar. However, she has much worse 'brain fog' than normal. The feeling of burning skill all over her body is new. And she has never had a tremor in the past.     Due to her symptoms her PCP re-tested her for lymes and the screen was positive but the Western Blot was positive for 3 bands 41, 23, 18 kDa IgG and 1 band 23 kDa for IgM. This would make the Western blot be negative as the cut offs are 5 IgG bands and 2 IgM bands to be positive.    ROS: Complete 12-point ROS is negative except as noted above.    Past Medical History:  Past Medical History:   Diagnosis Date     Arthritis     BIG TOE     Cervical high risk HPV (human papillomavirus) test positive 07/05/2017     Cervical high risk  HPV (human papillomavirus) test positive 06/10/2015     Depressive disorder 2007    I have been on and off medication for the last several years     Endometriosis      Hearing problem      Obese      Sensorineural hearing loss        Past Surgical History:  Past Surgical History:   Procedure Laterality Date     ABDOMEN SURGERY      laparoscopy X2     DILATE CERVIX, HYSTEROSCOPY, ABLATE ENDOMETRIUM NOVASURE, COMBINED N/A 11/17/2016    Procedure: COMBINED DILATE CERVIX, HYSTEROSCOPY, ABLATE ENDOMETRIUM NOVASURE;  Surgeon: Sabas Patel MD;  Location: [facility]     GYN SURGERY  dates above    myomectomy x2, laparoscopy x2     GYN SURGERY      Endometriosis surgery 5/1/19     LAPAROSCOPIC ASSISTED HYSTERECTOMY VAGINAL N/A 1/4/2018    Procedure: LAPAROSCOPIC ASSISTED HYSTERECTOMY VAGINAL;;  Surgeon: Sabas Patel MD;  Location: [facility]     LAPAROSCOPIC GASTRIC SLEEVE N/A 7/25/2017    Procedure: LAPAROSCOPIC GASTRIC SLEEVE;  Laparoscopic Sleeve Gastrectomy;  Surgeon: Sam Schmidt MD;  Location: [facility]     LAPAROSCOPIC LYSIS ADHESIONS  7/10/2014    Procedure: LAPAROSCOPIC LYSIS ADHESIONS;  Surgeon: Sabas Patel MD;  Location: [facility]     LAPAROSCOPIC SALPINGECTOMY Bilateral 1/4/2018    Procedure: LAPAROSCOPIC SALPINGECTOMY;;  Surgeon: Sabas Patel MD;  Location: [facility]     LASER CO2 LAPAROSCOPIC VAPORIZATION ENDOMETRIUM  7/10/2014    Procedure: LASER CO2 LAPAROSCOPIC VAPORIZATION ENDOMETRIUM;  Surgeon: Sabas Patel MD;  Location: [facility]     LASER CO2 LAPAROSCOPIC VAPORIZATION ENDOMETRIUM N/A 6/30/2015    Procedure: LASER CO2 LAPAROSCOPIC VAPORIZATION ENDOMETRIUM;  Surgeon: Sabas Patel MD;  Location:  [facility]     LASER CO2 LAPAROSCOPIC VAPORIZATION ENDOMETRIUM, LYSIS ADHESIONS N/A 1/4/2018    Procedure: LASER CO2 LAPAROSCOPIC VAPORIZATION ENDOMETRIUM, LYSIS ADHESIONS;  LAPAROSCOPY WITH CO2 LASER LYSIS OF ADHESIONS AND VAPORIZATION OF ENDOMETRIOSIS, CYSTOTOMY LEFT OVARY, LAPAROSCOPIC VAGINAL  HYSTERECTOMY WITH BILATERAL SALPINGECTOMY ;  Surgeon: Sabas Patel MD;  Location: [facility]     LASER CO2 LAPAROSCOPY DIAGNOSTIC, LYSIS ADHESIONS, COMBINED N/A 2015    Procedure: COMBINED LASER CO2 LAPAROSCOPY DIAGNOSTIC, LYSIS ADHESIONS;  Surgeon: Sabas Patel MD;  Location: [facility]       Social History:  Social History     Socioeconomic History     Marital status:      Spouse name: Sid     Number of children: 0     Years of education: Not on file     Highest education level: Not on file   Occupational History     Comment: [employer]   Social Needs     Financial resource strain: Not on file     Food insecurity:     Worry: Not on file     Inability: Not on file     Transportation needs:     Medical: Not on file     Non-medical: Not on file   Tobacco Use     Smoking status: Former Smoker     Packs/day: 0.50     Years: 5.00     Pack years: 2.50     Types: Cigarettes     Start date: 2007     Last attempt to quit: 2013     Years since quittin.0     Smokeless tobacco: Never Used   Substance and Sexual Activity     Alcohol use: Yes     Alcohol/week: 0.0 standard drinks     Comment: 2 drinks per month     Drug use: Yes     Types: Marijuana     Comment: 2 weeks ago for pain     Sexual activity: Yes     Partners: Male     Birth control/protection: Inserts/Ring   Lifestyle     Physical activity:     Days per week: Not on file     Minutes per session: Not on file     Stress: Not on file   Relationships     Social connections:     Talks on phone: Not on file     Gets together: Not on file     Attends Church service: Not on file     Active member of club or organization: Not on file     Attends meetings of clubs or organizations: Not on file     Relationship status: Not on file     Intimate partner violence:     Fear of current or ex partner: Not on file     Emotionally abused: Not on file     Physically abused: Not on file     Forced sexual activity: Not on file   Other  Topics Concern     Parent/sibling w/ CABG, MI or angioplasty before 65F 55M? No   Social History Narrative     Not on file       Family Medical History:  Family History   Problem Relation Age of Onset     Depression Mother      Cancer Mother         Cervical      Other - See Comments Mother         Fibromyalgia     Crohn's Disease Mother      Obesity Mother      Ovarian Cancer Mother         Cervical Cancer     Substance Abuse Mother      Stomach Problem Mother      Arthritis Father      Rheumatoid Arthritis Father      Depression Father      Substance Abuse Father      Cerebrovascular Disease Paternal Grandmother      Breast Cancer Maternal Grandmother      Cancer Maternal Grandmother      Depression Brother      Depression Sister      Mental Illness Brother         schizophrenia and bipolar     Substance Abuse Brother      Stomach Problem Sister      Stomach Problem Brother    Both Mother and Sister have fibromyalgia    Allergies:   No Known Allergies    Medications:  Current Outpatient Medications   Medication Sig Dispense Refill     buPROPion (WELLBUTRIN XL) 300 MG 24 hr tablet TAKE 1 TABLET BY MOUTH EVERY DAY IN THE MORNING 90 tablet 1     medical cannabis (Patient's own supply) See Admin Instructions (The purpose of this order is to document that the patient reports taking medical cannabis.  This is not a prescription, and is not used to certify that the patient has a qualifying medical condition.)       MULTIPLE VITAMIN PO Take 1 patch by mouth daily Patch[employer] - Multivitamin Patch       NUVARING 0.12-0.015 MG/24HR vaginal ring INSERT 1 RING VAGINALLY EVERY 21 DAYS 1 each 0     Topiramate (TOPAMAX PO) Take 300 mg by mouth daily        vitamin B complex with vitamin C (VITAMIN  B COMPLEX) TABS tablet Take 1 tablet by mouth daily       VITAMIN D PO Take 5,000 Units by mouth daily         Immunizations:  Immunization History   Administered Date(s) Administered     TD (ADULT, 7+) 12/12/2003     Tdap  (Adacel,Boostrix) 06/14/2011       Exam:  B/P: 127/82, T: 98.6, P: 87, R: Data Unavailable, Weight: 133 lbs 12.8 oz  Gen: Alert and in no distress.   Psych: Normal affect. Alert and oriented.   HEENT: PERRL. No icterus. Oropharynx pink and moist without lesions.   Neck: No lymphadenopathy.   CV: Regular rate and rhythm without m/r/g.   Chest: Clear to auscultation bilaterally without wheezes or crackles.   Abdomen: Soft, non-distended. Non-tender. Normal bowel sounds.   Extremities: Warm and well perfused.   Skin: No rashes or lesions noted.     Labs:  WBC   Date Value Ref Range Status   11/12/2019 6.8 4.0 - 11.0 10e9/L Final       No results found for: CRP    Creatinine   Date Value Ref Range Status   07/18/2019 0.88 0.52 - 1.04 mg/dL Final   02/13/2019 0.99 0.52 - 1.04 mg/dL Final   08/09/2018 1.09 (H) 0.52 - 1.04 mg/dL Final     10/29/19  Lymes Thelma 9.11  Positive, Presence of detectable Borrelia burgdorferi antibodies. Sample will   be sent to [facility] for a Western Blot assay for confirmation.   Western Blot  IgG  Negative    Comment: (Note)   Band(s) present: 41, 23, 18 kDa   (Insufficient number of bands for positive result)   INTERPRETIVE INFORMATION: B. burgdorferi IgG Immunoblot   For this assay, a positive result is reported when any 5 or   more of the following 10 bands are present: 18, 23, 28, 30,   39, 41, 45, 58, 66, or 93 kDa.  All other banding patterns   are reported as negative.    IgM  Value: Negative   Comment: (Note)   Band(s) present: 23 kDa   (Insufficient number of bands for positive result)   INTERPRETIVE INFORMATION: B. burgdorferi Antibody IgM                            Immunoblot   For this assay, a positive result is reported when any 2 or   more of the following bands are present: 23, 39, or 41 kDa.   All other banding patterns are reported as negative.   Performed by [facility],      11/12/19  RPR negative    11/12/19  2:52 PM [id number]    Specimen Information: Blood        Component  Collected Lab   Specimen Description 11/12/2019  2:52    Blood    Parasite Stain 11/12/2019  2:52    No Babesia found    Parasite Stain 11/12/2019  2:52    No Anaplasma phagocytophilum or Ehrlichia spp. found    Parasite Stain 11/12/2019  2:52    Giemsa stained thin and thick smears reveal no inclusions of Anaplasma phagocytophilum or   Ehrlichia spp. and no Babesia species.      Brain MRI 6/2018  MR BRAIN W/O & W CONTRAST 7/13/2018 11:32 AM     Provided History:  Asymmetric sensorineural hearing loss  ICD-10: Asymmetrical sensorineural hearing loss     Comparison: No films available for comparison     Technique: Axial diffusion-weighted with ADC map, T2-weighted,  turboFLAIR and T1-weighted images of the brain and axial T1-weighted  and coronal T2-weighted with fat saturation images centered on the  internal auditory canals were obtained without intravenous contrast.  Following intravenous administration of gadolinium, axial turboFLAIR  images of the brain and axial T1-weighted with fat saturation and  coronal T1-weighted images centered on the internal auditory canals  were obtained.     Contrast: 6 mL     Findings: No abnormal signal or enhancement along the course of the  seventh and eighth cranial nerves on either side. Vestibular and  auditory structures exhibit normal signal on T2-weighted images and no  abnormal enhancement.     Images of the whole brain demonstrate no mass lesion, no mass effect,  or midline shift. No intracranial hemorrhage on  susceptibility-weighted images. There is no restricted diffusion.  Ventricles are proportionate to the cerebral sulci. No abnormal  enhancement.                                                                      Impression:    No mass or abnormal enhancement seen    Assessment and Plan:  Ashli Gamboa is a 39 year old female with a hx of fibromyalgia and recent Lymes who presents with fatige, malaise, body pain, increased 'brain  fog' and a new tremor not consistent with her fibromyalgia.     Neurologic Lymes: This is a patient with recently diagnosed and treated for lymes which appears appropriate. Her picture of the rash is almost identical to one on the CDC website with a blueish tint. However, now with her new generalized and neurological symptoms including brain fog, burning sensation on her skin, and new tremor, she was re-tested. The Lyme's Screen is positive but Western Blot has 3 positive IgG bands and 1 IgM band but not 5 or 2. Hence, the confirmatory test was negative. Her story and symptoms seem very consistent with neurological lymes. I decided to look for mimics like syphilis or other tickborne illnesses (although doxy should treat ehrlichia and anaplasma, ehrlichia/anaplasma/babesia rarely have neurological symptoms, and she is monogamous with her  of several years). I also decided to discuss with our .    The RPR and parasite smear was negative.     I discussed with Dr. Butcher who has not seen the patient. However, she says that there is some challenge with interpreting the western blot in some specific cases. While she does not know of doxycycline inhibiting antibody production, that happens in strep so seems possible that early treatment stopped the antibody production. Also it is known that the antibodies fade over time and it has been almost 4 months between infection, treating, and re-testing.     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 However, the symptoms do not always resolve even with treatment, so it may not relieve the symptoms. Given neurological Lymes beyond just a peripheral nerve, I will use IV ceftriaxone for 4 weeks.    Plan  -IV ceftriaxone 2gm x4 weeks  -she will need a picc and a first dose  -home vs. Infusion center antibitotics.     Return to clinic in 1 month.    Karena Cortez MD

## 2019-11-12 NOTE — LETTER
11/12/2019       RE: Ashli Gamboa  [address]  [place] [address]     Dear Colleague,    Thank you for referring your patient, Ashli Gamboa, to the [facility] AND INFECTIOUS DISEASES at [facility]. Please see a copy of my visit note below.    [facility]  New Patient Visit  11/14/2019     Chief Complaint:  Fatigue, new tremor, feels like skin is burning    HPI:  Ashli Gamboa is a 39 year old female with a hx of fibromyalgia, usually well controlled, morbid obesity s/p gastric bypass who presented to [facility] with an ovoid rash behind her L knee. She had been camping in [place] and had a tiny tick on her at that site which she removed. (she had a pic in her phone which was consistent with Lymes). At [facility] they diagnosed Lymes and gave her 14 days of doxycycline. The rash initially worsened but then resolved on doxycycline.     However, since July she has had worsening fatigue, body pain, and a sensation of burning skin. She also has a new tremor in her R arm. She has a history of fibromyalgia at baseline. She says she generally has come to terms with it and is on medical cannabis. She says the fatigue and body pain are similar. However, she has much worse 'brain fog' than normal. The feeling of burning skill all over her body is new. And she has never had a tremor in the past.     Due to her symptoms her PCP re-tested her for lymes and the screen was positive but the Western Blot was positive for 3 bands 41, 23, 18 kDa IgG and 1 band 23 kDa for IgM. This would make the Western blot be negative as the cut offs are 5 IgG bands and 2 IgM bands to be positive.    ROS: Complete 12-point ROS is negative except as noted above.    Past Medical History:  Past Medical History:   Diagnosis Date     Arthritis     BIG TOE     Cervical high risk HPV (human papillomavirus) test positive 07/05/2017     Cervical high risk  HPV (human papillomavirus) test positive 06/10/2015     Depressive disorder 2007    I have been on and off medication for the last several years     Endometriosis      Hearing problem      Obese      Sensorineural hearing loss        Past Surgical History:  Past Surgical History:   Procedure Laterality Date     ABDOMEN SURGERY      laparoscopy X2     DILATE CERVIX, HYSTEROSCOPY, ABLATE ENDOMETRIUM NOVASURE, COMBINED N/A 11/17/2016    Procedure: COMBINED DILATE CERVIX, HYSTEROSCOPY, ABLATE ENDOMETRIUM NOVASURE;  Surgeon: Sabas Patel MD;  Location: [facility]     GYN SURGERY  dates above    myomectomy x2, laparoscopy x2     GYN SURGERY      Endometriosis surgery 5/1/19     LAPAROSCOPIC ASSISTED HYSTERECTOMY VAGINAL N/A 1/4/2018    Procedure: LAPAROSCOPIC ASSISTED HYSTERECTOMY VAGINAL;;  Surgeon: Sabas Patel MD;  Location: [facility]     LAPAROSCOPIC GASTRIC SLEEVE N/A 7/25/2017    Procedure: LAPAROSCOPIC GASTRIC SLEEVE;  Laparoscopic Sleeve Gastrectomy;  Surgeon: Sam Schmidt MD;  Location: [facility]     LAPAROSCOPIC LYSIS ADHESIONS  7/10/2014    Procedure: LAPAROSCOPIC LYSIS ADHESIONS;  Surgeon: Sabas Patel MD;  Location: [facility]     LAPAROSCOPIC SALPINGECTOMY Bilateral 1/4/2018    Procedure: LAPAROSCOPIC SALPINGECTOMY;;  Surgeon: Sabas Patel MD;  Location: [facility]     LASER CO2 LAPAROSCOPIC VAPORIZATION ENDOMETRIUM  7/10/2014    Procedure: LASER CO2 LAPAROSCOPIC VAPORIZATION ENDOMETRIUM;  Surgeon: Sabas Patel MD;  Location: [facility]     LASER CO2 LAPAROSCOPIC VAPORIZATION ENDOMETRIUM N/A 6/30/2015    Procedure: LASER CO2 LAPAROSCOPIC VAPORIZATION ENDOMETRIUM;  Surgeon: Sabas Patle MD;  Location:  [facility]     LASER CO2 LAPAROSCOPIC VAPORIZATION ENDOMETRIUM, LYSIS ADHESIONS N/A 1/4/2018    Procedure: LASER CO2 LAPAROSCOPIC VAPORIZATION ENDOMETRIUM, LYSIS ADHESIONS;  LAPAROSCOPY WITH CO2 LASER LYSIS OF ADHESIONS AND VAPORIZATION OF ENDOMETRIOSIS, CYSTOTOMY LEFT OVARY, LAPAROSCOPIC VAGINAL  HYSTERECTOMY WITH BILATERAL SALPINGECTOMY ;  Surgeon: Sabas Patel MD;  Location: [facility]     LASER CO2 LAPAROSCOPY DIAGNOSTIC, LYSIS ADHESIONS, COMBINED N/A 2015    Procedure: COMBINED LASER CO2 LAPAROSCOPY DIAGNOSTIC, LYSIS ADHESIONS;  Surgeon: Sabas Patel MD;  Location: [facility]       Social History:  Social History     Socioeconomic History     Marital status:      Spouse name: Sid     Number of children: 0     Years of education: Not on file     Highest education level: Not on file   Occupational History     Comment: [employer]   Social Needs     Financial resource strain: Not on file     Food insecurity:     Worry: Not on file     Inability: Not on file     Transportation needs:     Medical: Not on file     Non-medical: Not on file   Tobacco Use     Smoking status: Former Smoker     Packs/day: 0.50     Years: 5.00     Pack years: 2.50     Types: Cigarettes     Start date: 2007     Last attempt to quit: 2013     Years since quittin.0     Smokeless tobacco: Never Used   Substance and Sexual Activity     Alcohol use: Yes     Alcohol/week: 0.0 standard drinks     Comment: 2 drinks per month     Drug use: Yes     Types: Marijuana     Comment: 2 weeks ago for pain     Sexual activity: Yes     Partners: Male     Birth control/protection: Inserts/Ring   Lifestyle     Physical activity:     Days per week: Not on file     Minutes per session: Not on file     Stress: Not on file   Relationships     Social connections:     Talks on phone: Not on file     Gets together: Not on file     Attends Hinduism service: Not on file     Active member of club or organization: Not on file     Attends meetings of clubs or organizations: Not on file     Relationship status: Not on file     Intimate partner violence:     Fear of current or ex partner: Not on file     Emotionally abused: Not on file     Physically abused: Not on file     Forced sexual activity: Not on file   Other  Topics Concern     Parent/sibling w/ CABG, MI or angioplasty before 65F 55M? No   Social History Narrative     Not on file       Family Medical History:  Family History   Problem Relation Age of Onset     Depression Mother      Cancer Mother         Cervical      Other - See Comments Mother         Fibromyalgia     Crohn's Disease Mother      Obesity Mother      Ovarian Cancer Mother         Cervical Cancer     Substance Abuse Mother      Stomach Problem Mother      Arthritis Father      Rheumatoid Arthritis Father      Depression Father      Substance Abuse Father      Cerebrovascular Disease Paternal Grandmother      Breast Cancer Maternal Grandmother      Cancer Maternal Grandmother      Depression Brother      Depression Sister      Mental Illness Brother         schizophrenia and bipolar     Substance Abuse Brother      Stomach Problem Sister      Stomach Problem Brother    Both Mother and Sister have fibromyalgia    Allergies:   No Known Allergies    Medications:  Current Outpatient Medications   Medication Sig Dispense Refill     buPROPion (WELLBUTRIN XL) 300 MG 24 hr tablet TAKE 1 TABLET BY MOUTH EVERY DAY IN THE MORNING 90 tablet 1     medical cannabis (Patient's own supply) See Admin Instructions (The purpose of this order is to document that the patient reports taking medical cannabis.  This is not a prescription, and is not used to certify that the patient has a qualifying medical condition.)       MULTIPLE VITAMIN PO Take 1 patch by mouth daily PatchMD - Multivitamin Patch       NUVARING 0.12-0.015 MG/24HR vaginal ring INSERT 1 RING VAGINALLY EVERY 21 DAYS 1 each 0     Topiramate (TOPAMAX PO) Take 300 mg by mouth daily        vitamin B complex with vitamin C (VITAMIN  B COMPLEX) TABS tablet Take 1 tablet by mouth daily       VITAMIN D PO Take 5,000 Units by mouth daily         Immunizations:  Immunization History   Administered Date(s) Administered     TD (ADULT, 7+) 12/12/2003     Tdap  (Adacel,Boostrix) 06/14/2011       Exam:  B/P: 127/82, T: 98.6, P: 87, R: Data Unavailable, Weight: 133 lbs 12.8 oz  Gen: Alert and in no distress.   Psych: Normal affect. Alert and oriented.   HEENT: PERRL. No icterus. Oropharynx pink and moist without lesions.   Neck: No lymphadenopathy.   CV: Regular rate and rhythm without m/r/g.   Chest: Clear to auscultation bilaterally without wheezes or crackles.   Abdomen: Soft, non-distended. Non-tender. Normal bowel sounds.   Extremities: Warm and well perfused.   Skin: No rashes or lesions noted.     Labs:  WBC   Date Value Ref Range Status   11/12/2019 6.8 4.0 - 11.0 10e9/L Final       No results found for: CRP    Creatinine   Date Value Ref Range Status   07/18/2019 0.88 0.52 - 1.04 mg/dL Final   02/13/2019 0.99 0.52 - 1.04 mg/dL Final   08/09/2018 1.09 (H) 0.52 - 1.04 mg/dL Final     10/29/19  Lymes Thelma 9.11  Positive, Presence of detectable Borrelia burgdorferi antibodies. Sample will   be sent to [facility] for a Western Blot assay for confirmation.   Western Blot  IgG  Negative    Comment: (Note)   Band(s) present: 41, 23, 18 kDa   (Insufficient number of bands for positive result)   INTERPRETIVE INFORMATION: B. burgdorferi IgG Immunoblot   For this assay, a positive result is reported when any 5 or   more of the following 10 bands are present: 18, 23, 28, 30,   39, 41, 45, 58, 66, or 93 kDa.  All other banding patterns   are reported as negative.    IgM  Value: Negative   Comment: (Note)   Band(s) present: 23 kDa   (Insufficient number of bands for positive result)   INTERPRETIVE INFORMATION: B. burgdorferi Antibody IgM                            Immunoblot   For this assay, a positive result is reported when any 2 or   more of the following bands are present: 23, 39, or 41 kDa.   All other banding patterns are reported as negative.   Performed by [facility],      11/12/19  RPR negative    11/12/19  2:52 PM [id number]    Specimen Information: Blood        Component  Collected Lab   Specimen Description 11/12/2019  2:52    Blood    Parasite Stain 11/12/2019  2:52    No Babesia found    Parasite Stain 11/12/2019  2:52    No Anaplasma phagocytophilum or Ehrlichia spp. found    Parasite Stain 11/12/2019  2:52    Giemsa stained thin and thick smears reveal no inclusions of Anaplasma phagocytophilum or   Ehrlichia spp. and no Babesia species.      Brain MRI 6/2018  MR BRAIN W/O & W CONTRAST 7/13/2018 11:32 AM     Provided History:  Asymmetric sensorineural hearing loss  ICD-10: Asymmetrical sensorineural hearing loss     Comparison: No films available for comparison     Technique: Axial diffusion-weighted with ADC map, T2-weighted,  turboFLAIR and T1-weighted images of the brain and axial T1-weighted  and coronal T2-weighted with fat saturation images centered on the  internal auditory canals were obtained without intravenous contrast.  Following intravenous administration of gadolinium, axial turboFLAIR  images of the brain and axial T1-weighted with fat saturation and  coronal T1-weighted images centered on the internal auditory canals  were obtained.     Contrast: 6 mL     Findings: No abnormal signal or enhancement along the course of the  seventh and eighth cranial nerves on either side. Vestibular and  auditory structures exhibit normal signal on T2-weighted images and no  abnormal enhancement.     Images of the whole brain demonstrate no mass lesion, no mass effect,  or midline shift. No intracranial hemorrhage on  susceptibility-weighted images. There is no restricted diffusion.  Ventricles are proportionate to the cerebral sulci. No abnormal  enhancement.                                                                      Impression:    No mass or abnormal enhancement seen    Assessment and Plan:  Ashli Gamboa is a 39 year old female with a hx of fibromyalgia and recent Lymes who presents with fatige, malaise, body pain, increased 'brain  fog' and a new tremor not consistent with her fibromyalgia.     Neurologic Lymes: This is a patient with recently diagnosed and treated for lymes which appears appropriate. Her picture of the rash is almost identical to one on the CDC website with a blueish tint. However, now with her new generalized and neurological symptoms including brain fog, burning sensation on her skin, and new tremor, she was re-tested. The Lyme's Screen is positive but Western Blot has 3 positive IgG bands and 1 IgM band but not 5 or 2. Hence, the confirmatory test was negative. Her story and symptoms seem very consistent with neurological lymes. I decided to look for mimics like syphilis or other tickborne illnesses (although doxy should treat ehrlichia and anaplasma, ehrlichia/anaplasma/babesia rarely have neurological symptoms, and she is monogamous with her  of several years). I also decided to discuss with our .    The RPR and parasite smear was negative.     I discussed with Dr. Butcher who has not seen the patient. However, she says that there is some challenge with interpreting the western blot in some specific cases. While she does not know of doxycycline inhibiting antibody production, that happens in strep so seems possible that early treatment stopped the antibody production. Also it is known that the antibodies fade over time and it has been almost 4 months between infection, treating, and re-testing.     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 However, the symptoms do not always resolve even with treatment, so it may not relieve the symptoms. Given neurological Lymes beyond just a peripheral nerve, I will use IV ceftriaxone for 4 weeks.    Plan  -IV ceftriaxone 2gm x4 weeks  -she will need a picc and a first dose  -home vs. Infusion center antibitotics.     Return to clinic in 1 month.    Karena Cortez MD

## 2019-11-13 LAB
PARASITE SPEC INSPECT: NORMAL
SPECIMEN SOURCE: NORMAL
T PALLIDUM AB SER QL: NONREACTIVE

## 2019-11-14 ENCOUNTER — MYC MEDICAL ADVICE (OUTPATIENT)
Dept: INFECTIOUS DISEASES | Facility: CLINIC | Age: 39
End: 2019-11-14

## 2019-11-14 NOTE — PROGRESS NOTES
[facility]  New Patient Visit  11/14/2019     Chief Complaint:  Fatigue, new tremor, feels like skin is burning    HPI:  Ashli Gamboa is a 39 year old female with a hx of fibromyalgia, usually well controlled, morbid obesity s/p gastric bypass who presented to [facility] with an ovoid rash behind her L knee. She had been camping in [facility] and had a tiny tick on her at that site which she removed. (she had a pic in her phone which was consistent with Lymes). At [facility] they diagnosed Lymes and gave her 14 days of doxycycline. The rash initially worsened but then resolved on doxycycline.     However, since July she has had worsening fatigue, body pain, and a sensation of burning skin. She also has a new tremor in her R arm. She has a history of fibromyalgia at baseline. She says she generally has come to terms with it and is on medical cannabis. She says the fatigue and body pain are similar. However, she has much worse 'brain fog' than normal. The feeling of burning skill all over her body is new. And she has never had a tremor in the past.     Due to her symptoms her PCP re-tested her for lymes and the screen was positive but the Western Blot was positive for 3 bands 41, 23, 18 kDa IgG and 1 band 23 kDa for IgM. This would make the Western blot be negative as the cut offs are 5 IgG bands and 2 IgM bands to be positive.    ROS: Complete 12-point ROS is negative except as noted above.    Past Medical History:  Past Medical History:   Diagnosis Date     Arthritis     BIG TOE     Cervical high risk HPV (human papillomavirus) test positive 07/05/2017     Cervical high risk HPV (human papillomavirus) test positive 06/10/2015     Depressive disorder 2007    I have been on and off medication for the last several years     Endometriosis      Hearing problem      Obese      Sensorineural hearing loss        Past Surgical History:  Past Surgical History:   Procedure Laterality Date     ABDOMEN  SURGERY      laparoscopy X2     DILATE CERVIX, HYSTEROSCOPY, ABLATE ENDOMETRIUM NOVASURE, COMBINED N/A 11/17/2016    Procedure: COMBINED DILATE CERVIX, HYSTEROSCOPY, ABLATE ENDOMETRIUM NOVASURE;  Surgeon: Sabas Patel MD;  Location: [facility]     GYN SURGERY  dates above    myomectomy x2, laparoscopy x2     GYN SURGERY      Endometriosis surgery 5/1/19     LAPAROSCOPIC ASSISTED HYSTERECTOMY VAGINAL N/A 1/4/2018    Procedure: LAPAROSCOPIC ASSISTED HYSTERECTOMY VAGINAL;;  Surgeon: Sabas Patel MD;  Location: [facility]     LAPAROSCOPIC GASTRIC SLEEVE N/A 7/25/2017    Procedure: LAPAROSCOPIC GASTRIC SLEEVE;  Laparoscopic Sleeve Gastrectomy;  Surgeon: Sam Schmidt MD;  Location: [facility]     LAPAROSCOPIC LYSIS ADHESIONS  7/10/2014    Procedure: LAPAROSCOPIC LYSIS ADHESIONS;  Surgeon: Sabas Patel MD;  Location: [facility]     LAPAROSCOPIC SALPINGECTOMY Bilateral 1/4/2018    Procedure: LAPAROSCOPIC SALPINGECTOMY;;  Surgeon: Sabas Patel MD;  Location: [facility]     LASER CO2 LAPAROSCOPIC VAPORIZATION ENDOMETRIUM  7/10/2014    Procedure: LASER CO2 LAPAROSCOPIC VAPORIZATION ENDOMETRIUM;  Surgeon: Sabas Patel MD;  Location: [facility]     LASER CO2 LAPAROSCOPIC VAPORIZATION ENDOMETRIUM N/A 6/30/2015    Procedure: LASER CO2 LAPAROSCOPIC VAPORIZATION ENDOMETRIUM;  Surgeon: Sabas Patel MD;  Location: [facility]     LASER CO2 LAPAROSCOPIC VAPORIZATION ENDOMETRIUM, LYSIS ADHESIONS N/A 1/4/2018    Procedure: LASER CO2 LAPAROSCOPIC VAPORIZATION ENDOMETRIUM, LYSIS ADHESIONS;  LAPAROSCOPY WITH CO2 LASER LYSIS OF ADHESIONS AND VAPORIZATION OF ENDOMETRIOSIS, CYSTOTOMY LEFT OVARY, LAPAROSCOPIC VAGINAL HYSTERECTOMY WITH BILATERAL SALPINGECTOMY ;  Surgeon: Sabas Patel MD;  Location: [facility]     LASER CO2 LAPAROSCOPY DIAGNOSTIC, LYSIS ADHESIONS, COMBINED N/A 6/30/2015    Procedure: COMBINED LASER CO2 LAPAROSCOPY DIAGNOSTIC, LYSIS ADHESIONS;  Surgeon: Sabas Patel MD;  Location: [facility]       Social History:  Social  History     Socioeconomic History     Marital status:      Spouse name: Sid     Number of children: 0     Years of education: Not on file     Highest education level: Not on file   Occupational History     Comment: [employer]   Social Needs     Financial resource strain: Not on file     Food insecurity:     Worry: Not on file     Inability: Not on file     Transportation needs:     Medical: Not on file     Non-medical: Not on file   Tobacco Use     Smoking status: Former Smoker     Packs/day: 0.50     Years: 5.00     Pack years: 2.50     Types: Cigarettes     Start date: 2007     Last attempt to quit: 2013     Years since quittin.0     Smokeless tobacco: Never Used   Substance and Sexual Activity     Alcohol use: Yes     Alcohol/week: 0.0 standard drinks     Comment: 2 drinks per month     Drug use: Yes     Types: Marijuana     Comment: 2 weeks ago for pain     Sexual activity: Yes     Partners: Male     Birth control/protection: Inserts/Ring   Lifestyle     Physical activity:     Days per week: Not on file     Minutes per session: Not on file     Stress: Not on file   Relationships     Social connections:     Talks on phone: Not on file     Gets together: Not on file     Attends Confucianism service: Not on file     Active member of club or organization: Not on file     Attends meetings of clubs or organizations: Not on file     Relationship status: Not on file     Intimate partner violence:     Fear of current or ex partner: Not on file     Emotionally abused: Not on file     Physically abused: Not on file     Forced sexual activity: Not on file   Other Topics Concern     Parent/sibling w/ CABG, MI or angioplasty before 65F 55M? No   Social History Narrative     Not on file       Family Medical History:  Family History   Problem Relation Age of Onset     Depression Mother      Cancer Mother         Cervical      Other - See Comments Mother         Fibromyalgia     Crohn's  Disease Mother      Obesity Mother      Ovarian Cancer Mother         Cervical Cancer     Substance Abuse Mother      Stomach Problem Mother      Arthritis Father      Rheumatoid Arthritis Father      Depression Father      Substance Abuse Father      Cerebrovascular Disease Paternal Grandmother      Breast Cancer Maternal Grandmother      Cancer Maternal Grandmother      Depression Brother      Depression Sister      Mental Illness Brother         schizophrenia and bipolar     Substance Abuse Brother      Stomach Problem Sister      Stomach Problem Brother    Both Mother and Sister have fibromyalgia    Allergies:   No Known Allergies    Medications:  Current Outpatient Medications   Medication Sig Dispense Refill     buPROPion (WELLBUTRIN XL) 300 MG 24 hr tablet TAKE 1 TABLET BY MOUTH EVERY DAY IN THE MORNING 90 tablet 1     medical cannabis (Patient's own supply) See Admin Instructions (The purpose of this order is to document that the patient reports taking medical cannabis.  This is not a prescription, and is not used to certify that the patient has a qualifying medical condition.)       MULTIPLE VITAMIN PO Take 1 patch by mouth daily [employer] - Multivitamin Patch       NUVARING 0.12-0.015 MG/24HR vaginal ring INSERT 1 RING VAGINALLY EVERY 21 DAYS 1 each 0     Topiramate (TOPAMAX PO) Take 300 mg by mouth daily        vitamin B complex with vitamin C (VITAMIN  B COMPLEX) TABS tablet Take 1 tablet by mouth daily       VITAMIN D PO Take 5,000 Units by mouth daily         Immunizations:  Immunization History   Administered Date(s) Administered     TD (ADULT, 7+) 12/12/2003     Tdap (Adacel,Boostrix) 06/14/2011       Exam:  B/P: 127/82, T: 98.6, P: 87, R: Data Unavailable, Weight: 133 lbs 12.8 oz  Gen: Alert and in no distress.   Psych: Normal affect. Alert and oriented.   HEENT: PERRL. No icterus. Oropharynx pink and moist without lesions.   Neck: No lymphadenopathy.   CV: Regular rate and rhythm without m/r/g.    Chest: Clear to auscultation bilaterally without wheezes or crackles.   Abdomen: Soft, non-distended. Non-tender. Normal bowel sounds.   Extremities: Warm and well perfused.   Skin: No rashes or lesions noted.     Labs:  WBC   Date Value Ref Range Status   11/12/2019 6.8 4.0 - 11.0 10e9/L Final       No results found for: CRP    Creatinine   Date Value Ref Range Status   07/18/2019 0.88 0.52 - 1.04 mg/dL Final   02/13/2019 0.99 0.52 - 1.04 mg/dL Final   08/09/2018 1.09 (H) 0.52 - 1.04 mg/dL Final     10/29/19  Lymes Thelma 9.11  Positive, Presence of detectable Borrelia burgdorferi antibodies. Sample will   be sent to [facility] for a Western Blot assay for confirmation.   Western Blot  IgG  Negative    Comment: (Note)   Band(s) present: 41, 23, 18 kDa   (Insufficient number of bands for positive result)   INTERPRETIVE INFORMATION: B. burgdorferi IgG Immunoblot   For this assay, a positive result is reported when any 5 or   more of the following 10 bands are present: 18, 23, 28, 30,   39, 41, 45, 58, 66, or 93 kDa.  All other banding patterns   are reported as negative.    IgM  Value: Negative   Comment: (Note)   Band(s) present: 23 kDa   (Insufficient number of bands for positive result)   INTERPRETIVE INFORMATION: B. burgdorferi Antibody IgM                            Immunoblot   For this assay, a positive result is reported when any 2 or   more of the following bands are present: 23, 39, or 41 kDa.   All other banding patterns are reported as negative.   Performed by [facility],      11/12/19  RPR negative    11/12/19  2:52 PM [id number]    Specimen Information: Blood        Component Collected Lab   Specimen Description 11/12/2019  2:52    Blood    Parasite Stain 11/12/2019  2:52    No Babesia found    Parasite Stain 11/12/2019  2:52    No Anaplasma phagocytophilum or Ehrlichia spp. found    Parasite Stain 11/12/2019  2:52    Giemsa stained thin and thick smears reveal no inclusions of  Anaplasma phagocytophilum or   Ehrlichia spp. and no Babesia species.      Brain MRI 6/2018  MR BRAIN W/O & W CONTRAST 7/13/2018 11:32 AM     Provided History:  Asymmetric sensorineural hearing loss  ICD-10: Asymmetrical sensorineural hearing loss     Comparison: No films available for comparison     Technique: Axial diffusion-weighted with ADC map, T2-weighted,  turboFLAIR and T1-weighted images of the brain and axial T1-weighted  and coronal T2-weighted with fat saturation images centered on the  internal auditory canals were obtained without intravenous contrast.  Following intravenous administration of gadolinium, axial turboFLAIR  images of the brain and axial T1-weighted with fat saturation and  coronal T1-weighted images centered on the internal auditory canals  were obtained.     Contrast: 6 mL     Findings: No abnormal signal or enhancement along the course of the  seventh and eighth cranial nerves on either side. Vestibular and  auditory structures exhibit normal signal on T2-weighted images and no  abnormal enhancement.     Images of the whole brain demonstrate no mass lesion, no mass effect,  or midline shift. No intracranial hemorrhage on  susceptibility-weighted images. There is no restricted diffusion.  Ventricles are proportionate to the cerebral sulci. No abnormal  enhancement.                                                                      Impression:    No mass or abnormal enhancement seen    Assessment and Plan:  Ashli Gamboa is a 39 year old female with a hx of fibromyalgia and recent Lymes who presents with fatige, malaise, body pain, increased 'brain fog' and a new tremor not consistent with her fibromyalgia.     Neurologic Lymes: This is a patient with recently diagnosed and treated for lymes which appears appropriate. Her picture of the rash is almost identical to one on the CDC website with a blueish tint. However, now with her new generalized and neurological symptoms  including brain fog, burning sensation on her skin, and new tremor, she was re-tested. The Lyme's Screen is positive but Western Blot has 3 positive IgG bands and 1 IgM band but not 5 or 2. Hence, the confirmatory test was negative. Her story and symptoms seem very consistent with neurological lymes. I decided to look for mimics like syphilis or other tickborne illnesses (although doxy should treat ehrlichia and anaplasma, ehrlichia/anaplasma/babesia rarely have neurological symptoms, and she is monogamous with her  of several years). I also decided to discuss with our .    The RPR and parasite smear was negative.     I discussed with Dr. Butcher who has not seen the patient. However, she says that there is some challenge with interpreting the western blot in some specific cases. While she does not know of doxycycline inhibiting antibody production, that happens in strep so seems possible that early treatment stopped the antibody production. Also it is known that the antibodies fade over time and it has been almost 4 months between infection, treating, and re-testing.     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 However, the symptoms do not always resolve even with treatment, so it may not relieve the symptoms. Given neurological Lymes beyond just a peripheral nerve, I will use IV ceftriaxone for 4 weeks.    Plan  -IV ceftriaxone 2gm x4 weeks  -she will need a picc and a first dose  -home vs. Infusion center antibitotics.     Return to clinic in 1 month.    Karena Cortez MD

## 2019-11-15 ENCOUNTER — HOME INFUSION (PRE-WILLOW HOME INFUSION) (OUTPATIENT)
Dept: PHARMACY | Facility: CLINIC | Age: 39
End: 2019-11-15

## 2019-11-15 ENCOUNTER — ANCILLARY PROCEDURE (OUTPATIENT)
Dept: RADIOLOGY | Facility: AMBULATORY SURGERY CENTER | Age: 39
End: 2019-11-15
Attending: INTERNAL MEDICINE
Payer: COMMERCIAL

## 2019-11-15 ENCOUNTER — INFUSION THERAPY VISIT (OUTPATIENT)
Dept: INFUSION THERAPY | Facility: CLINIC | Age: 39
End: 2019-11-15
Attending: INTERNAL MEDICINE
Payer: COMMERCIAL

## 2019-11-15 ENCOUNTER — HOSPITAL ENCOUNTER (OUTPATIENT)
Facility: AMBULATORY SURGERY CENTER | Age: 39
End: 2019-11-15
Attending: PHYSICIAN ASSISTANT
Payer: COMMERCIAL

## 2019-11-15 VITALS
HEIGHT: 64 IN | HEART RATE: 79 BPM | RESPIRATION RATE: 16 BRPM | DIASTOLIC BLOOD PRESSURE: 79 MMHG | OXYGEN SATURATION: 99 % | TEMPERATURE: 98.1 F | BODY MASS INDEX: 22.85 KG/M2 | WEIGHT: 133.82 LBS | SYSTOLIC BLOOD PRESSURE: 119 MMHG

## 2019-11-15 VITALS
TEMPERATURE: 97.8 F | DIASTOLIC BLOOD PRESSURE: 73 MMHG | SYSTOLIC BLOOD PRESSURE: 113 MMHG | OXYGEN SATURATION: 98 % | HEART RATE: 87 BPM

## 2019-11-15 DIAGNOSIS — A69.20 NEUROLOGICAL LYME DISEASE: ICD-10-CM

## 2019-11-15 DIAGNOSIS — A69.20 NEUROLOGICAL LYME DISEASE: Primary | ICD-10-CM

## 2019-11-15 DIAGNOSIS — A69.20 LYME DISEASE: Primary | ICD-10-CM

## 2019-11-15 LAB
A PHAGOCYTOPH DNA BLD QL NAA+PROBE: NOT DETECTED
B BURGDOR IGG SER QL IB: NEGATIVE
B BURGDOR IGG+IGM SER QL: 9.27 (ref 0–0.89)
B BURGDOR IGM SER QL IB: POSITIVE
B MICROTI IGG TITR SER: NORMAL {TITER}
B MICROTI IGM TITR SER: NORMAL {TITER}
E CHAFFEENSIS DNA BLD QL NAA+PROBE: NOT DETECTED
E EWINGII DNA SPEC QL NAA+PROBE: NOT DETECTED
EHRLICHIA DNA SPEC QL NAA+PROBE: NOT DETECTED

## 2019-11-15 PROCEDURE — 25000128 H RX IP 250 OP 636: Mod: ZF | Performed by: INTERNAL MEDICINE

## 2019-11-15 PROCEDURE — 25000125 ZZHC RX 250: Mod: ZF | Performed by: INTERNAL MEDICINE

## 2019-11-15 PROCEDURE — 96374 THER/PROPH/DIAG INJ IV PUSH: CPT

## 2019-11-15 DEVICE — CATH VA POWER PICC 5FR 70CM SL 3175155: Type: IMPLANTABLE DEVICE | Site: ARM | Status: FUNCTIONAL

## 2019-11-15 RX ORDER — HEPARIN SODIUM,PORCINE 10 UNIT/ML
VIAL (ML) INTRAVENOUS PRN
Status: DISCONTINUED | OUTPATIENT
Start: 2019-11-15 | End: 2019-11-15 | Stop reason: HOSPADM

## 2019-11-15 RX ORDER — HEPARIN SODIUM,PORCINE 10 UNIT/ML
5 VIAL (ML) INTRAVENOUS
Status: DISCONTINUED | OUTPATIENT
Start: 2019-11-15 | End: 2019-11-15 | Stop reason: HOSPADM

## 2019-11-15 RX ORDER — CEFTRIAXONE SODIUM 2 G
2 VIAL (EA) INJECTION ONCE
Status: CANCELLED | OUTPATIENT
Start: 2019-11-15

## 2019-11-15 RX ORDER — HEPARIN SODIUM,PORCINE 10 UNIT/ML
5 VIAL (ML) INTRAVENOUS
Status: CANCELLED | OUTPATIENT
Start: 2019-11-15

## 2019-11-15 RX ORDER — HEPARIN SODIUM (PORCINE) LOCK FLUSH IV SOLN 100 UNIT/ML 100 UNIT/ML
5 SOLUTION INTRAVENOUS
Status: CANCELLED | OUTPATIENT
Start: 2019-11-15

## 2019-11-15 RX ORDER — CEFTRIAXONE SODIUM 2 G
2 VIAL (EA) INJECTION ONCE
Status: COMPLETED | OUTPATIENT
Start: 2019-11-15 | End: 2019-11-15

## 2019-11-15 RX ORDER — HEPARIN SODIUM,PORCINE 10 UNIT/ML
5-10 VIAL (ML) INTRAVENOUS
Status: DISCONTINUED | OUTPATIENT
Start: 2019-11-15 | End: 2019-11-16 | Stop reason: HOSPADM

## 2019-11-15 RX ORDER — HEPARIN SODIUM,PORCINE 10 UNIT/ML
5 VIAL (ML) INTRAVENOUS EVERY 24 HOURS
Status: CANCELLED | OUTPATIENT
Start: 2019-11-15

## 2019-11-15 RX ADMIN — HEPARIN, PORCINE (PF) 10 UNIT/ML INTRAVENOUS SYRINGE 5 ML: at 15:21

## 2019-11-15 RX ADMIN — CEFTRIAXONE SODIUM 2 G: 2 INJECTION, POWDER, FOR SOLUTION INTRAMUSCULAR; INTRAVENOUS at 14:54

## 2019-11-15 ASSESSMENT — MIFFLIN-ST. JEOR: SCORE: 1267

## 2019-11-15 NOTE — PATIENT INSTRUCTIONS
Dear Ashli Gamboa    Thank you for choosing [facility] ([facility]) for your infusion.  The following information is a summary of our appointment as well as important reminders.      Please refer to your hospital discharge instructions for details on home care services, future appointments, phone numbers, and diet/activity levels.    We look forward in seeing you on your next appointment here at [facility] ([facility]).  Please don t hesitate to call us at [phone] to reschedule any of your appointments or to speak with one of the [facility] registered nurses.  It was a pleasure taking care of you today.    Sincerely,    [facility]  [facility]  [address] [place] [address]  [address]  Phone:  [phone]

## 2019-11-15 NOTE — DISCHARGE INSTRUCTIONS
A collaboration between [facility] and [facility]  Experts in minimally invasive, targeted treatments performed using imaging guidance    Venous Access Device,  Port Catheter or Tunneled or Non-Tunneled Central Line Placement    Today you had a procedure today to install a venous access device; either a tunneled central vein catheter or a subcutaneous port catheter.    One of our Radiology PAs performed this procedure for you today:  ? Ruben Rebollar PA-C  ? DANK Wang PA-C  ? Ernie Teran PA-C  ? Theresa Cormier PA-C   ? Santana Capone PA-C    After you go home:  - Drink plenty of fluids.  Generally 6-8 (8 ounce) glasses a day is recommended.  - Resume your regular diet unless otherwise ordered by a medical provider.  - Keep any applied tape/gauze dressings clean and dry.  Change tape/gauze dressings if they get wet or soiled.  - You may shower the following day after procedure, however cover and protect from moisture any tape/gauze dressings.  You may let water hit and run over dried skin glue, but do not scrub.  Pat the area dry after showering.  - Port placement incisions are closed with absorbable suture, meaning they do not need to be removed at a later date, and a topical skin adhesive (skin glue).  This glue will wear off in 7-14 days.  Do not remove before this time.  If 14 days have passed and residual glue is present, you may gently remove it.  - Do not apply gels, lotions, or ointments to the glue site for the first 10 days as this may cause the glue to prematurely soften and fail.  - Do not perform strenuous activities or lift greater than 10 pounds for the next three days.  - If there is bleeding or oozing from the procedure site, apply firm pressure to the area for 5-10 minutes.  If the bleeding continues seek medical advice at the numbers below.  - Mild procedure site discomfort can be treated with an ice pack and over-the-counter  pain relievers.        For 24 hours after any sedation used:  - Relax and take it easy.  No strenuous activities.  - Do not drive or operate machines at home or at work.  - No alcohol consumption.  - Do not make any important or legal decisions.    Call our Interventional Radiology (IR) service if:  - If you start bleeding from the procedure site.  If you do start to bleed from the site, lie down and hold some pressure on the site.  Our radiology provider can help you decide if you need to return to the hospital.  - If you have new or worsening pain related to the procedure.  - If you have concerning swelling at the procedure site.  - If you develop persistent nausea or vomiting.  - If you develop hives or a rash or any unexplained itching.  - If you have a fever of greater than 100.5  F and chills in the first 5 days after procedure.  - Any other concerns related to your procedure.      [facility]  Interventional Radiology (IR)  [address]  [address] [facility] [place] [address]    Contact Number:  [phone]  (IR control desk)  - Monday - Friday 8:00 am - 4:30 pm    After hours for urgent concerns:  [phone]  - After 4:30 pm Monday - Friday, Weekends and Holidays.   - Ask for Interventional Radiology on-call.  Someone is available 24 hours a day.  - [facility] toll free number:  [phone]

## 2019-11-15 NOTE — PROCEDURES
Bee Ashli VILMA.  MRN: [id number]    Completed placement of 5 [place] 41 cm single lumen, Bard PowerPICC brand, peripherally inserted central catheter (PICC) via LEFT Basilic vein. Tip lying in the right atrium. Okay to use immediately. Dx: Long term antibiotic use. Valery.  LOCAL  <1

## 2019-11-15 NOTE — PROGRESS NOTES
Nursing Note  Ashli Gamboa [facility] for:   Chief Complaint   Patient presents with     Infusion     IV ABX     During today's [facility] appointment, orders from Karena Cortez MD  were completed.  Frequency: once in clinic (test dose) daily x1 month @ home    Progress note:  Patient identification verified by name and date of birth.  Assessment completed.  Vitals recorded in Doc Flowsheets.  Patient was provided with education regarding infusion and possible side effects.  Patient verbalized understanding.     present during visit today: Not Applicable.    Treatment Conditions: Patient was monitored after test dose for 20 minutes. VSS.     Premedications: were not ordered.    Drug Waste Record: No    Infusion length and rate:  infusion given over approximately 3 minutes    Labs: were not ordered for this appointment.    Vascular access: PICC accessed today.    Post Infusion Assessment:  Patient tolerated infusion without incident.     Discharge Plan:   Follow up plan of care with: ongoing infusions at [facility] and home care.  Discharge instructions were reviewed with patient.  Patient/representative verbalized understanding of discharge instructions and all questions answered.  Patient discharged from [facility] in stable condition.    **Spoke with home infusion to give report that patient tolerated dose well and to continue set-up with delivery. Patient aware of the plan and given the number to contact home infusion if necessary**    Dayana Barksdale RN    Administrations This Visit     cefTRIAXone (ROCEPHIN) 2 g in 20 mL SWFI for IVP     Admin Date  11/15/2019 Action  Given Dose  2 g Route  Intravenous Administered By  Dayana Barksdale RN          heparin lock flush 10 UNIT/ML injection 5 mL     Admin Date  11/15/2019 Action  Given Dose  5 mL  Route  Intracatheter Administered By  Dayana Barksdale, RN                /73   Pulse 87   Temp 97.8  F (36.6  C) (Oral)   LMP  (LMP Unknown)   SpO2 98%

## 2019-11-15 NOTE — PROGRESS NOTES
Therapy: IV ABX  Insurance: [employer]  100% coverage, no ded or oop. Patient will have $25.00 co-pay per RN visit.    In reference to referral date 11/15/19 from [facility] to check IV ABX coverage.    Please contact Intake with any questions, [phone] or In Basket pool, [facility] ([phone]).

## 2019-11-16 ENCOUNTER — HOME INFUSION (PRE-WILLOW HOME INFUSION) (OUTPATIENT)
Dept: PHARMACY | Facility: CLINIC | Age: 39
End: 2019-11-16

## 2019-11-18 NOTE — PROGRESS NOTES
This is a recent snapshot of the patient's [facility] medical record.  For current drug dose and complete information and questions, call [phone] or In Basket pool, [facility] ([id number])  CSN Number:  [id number]

## 2019-11-19 NOTE — PROGRESS NOTES
This is a recent snapshot of the patient's [facility] medical record.  For current drug dose and complete information and questions, call [phone] or In Basket pool, [facility] ([id number])  CSN Number:  [id number]

## 2019-11-20 ENCOUNTER — HOSPITAL ENCOUNTER (EMERGENCY)
Facility: CLINIC | Age: 39
End: 2019-11-20
Payer: COMMERCIAL

## 2019-11-20 ENCOUNTER — MEDICAL CORRESPONDENCE (OUTPATIENT)
Dept: HEALTH INFORMATION MANAGEMENT | Facility: CLINIC | Age: 39
End: 2019-11-20

## 2019-11-20 ENCOUNTER — HOME INFUSION (PRE-WILLOW HOME INFUSION) (OUTPATIENT)
Dept: PHARMACY | Facility: CLINIC | Age: 39
End: 2019-11-20

## 2019-11-20 LAB
ANION GAP SERPL CALCULATED.3IONS-SCNC: 4 MMOL/L (ref 3–14)
AST SERPL W P-5'-P-CCNC: 8 U/L (ref 0–45)
BASOPHILS # BLD AUTO: 0 10E9/L (ref 0–0.2)
BASOPHILS NFR BLD AUTO: 0.6 %
BUN SERPL-MCNC: 14 MG/DL (ref 7–30)
CALCIUM SERPL-MCNC: 8.5 MG/DL (ref 8.5–10.1)
CHLORIDE SERPL-SCNC: 115 MMOL/L (ref 94–109)
CO2 SERPL-SCNC: 24 MMOL/L (ref 20–32)
CREAT SERPL-MCNC: 0.88 MG/DL (ref 0.52–1.04)
CRP SERPL-MCNC: 15.8 MG/L (ref 0–8)
DIFFERENTIAL METHOD BLD: NORMAL
EOSINOPHIL # BLD AUTO: 0.1 10E9/L (ref 0–0.7)
EOSINOPHIL NFR BLD AUTO: 1.5 %
ERYTHROCYTE [DISTWIDTH] IN BLOOD BY AUTOMATED COUNT: 13.6 % (ref 10–15)
ERYTHROCYTE [SEDIMENTATION RATE] IN BLOOD BY WESTERGREN METHOD: 7 MM/H (ref 0–20)
GFR SERPL CREATININE-BSD FRML MDRD: 83 ML/MIN/{1.73_M2}
GLUCOSE SERPL-MCNC: 70 MG/DL (ref 70–99)
HCT VFR BLD AUTO: 41.9 % (ref 35–47)
HGB BLD-MCNC: 13.3 G/DL (ref 11.7–15.7)
IMM GRANULOCYTES # BLD: 0 10E9/L (ref 0–0.4)
IMM GRANULOCYTES NFR BLD: 0.2 %
LYMPHOCYTES # BLD AUTO: 2.4 10E9/L (ref 0.8–5.3)
LYMPHOCYTES NFR BLD AUTO: 37.4 %
MCH RBC QN AUTO: 28.6 PG (ref 26.5–33)
MCHC RBC AUTO-ENTMCNC: 31.7 G/DL (ref 31.5–36.5)
MCV RBC AUTO: 90 FL (ref 78–100)
MONOCYTES # BLD AUTO: 0.4 10E9/L (ref 0–1.3)
MONOCYTES NFR BLD AUTO: 6.8 %
NEUTROPHILS # BLD AUTO: 3.5 10E9/L (ref 1.6–8.3)
NEUTROPHILS NFR BLD AUTO: 53.5 %
NRBC # BLD AUTO: 0 10*3/UL
NRBC BLD AUTO-RTO: 0 /100
PLATELET # BLD AUTO: 245 10E9/L (ref 150–450)
POTASSIUM SERPL-SCNC: 3.7 MMOL/L (ref 3.4–5.3)
RBC # BLD AUTO: 4.65 10E12/L (ref 3.8–5.2)
SODIUM SERPL-SCNC: 143 MMOL/L (ref 133–144)
WBC # BLD AUTO: 6.5 10E9/L (ref 4–11)

## 2019-11-20 PROCEDURE — 86140 C-REACTIVE PROTEIN: CPT | Performed by: INTERNAL MEDICINE

## 2019-11-20 PROCEDURE — 80048 BASIC METABOLIC PNL TOTAL CA: CPT | Performed by: INTERNAL MEDICINE

## 2019-11-20 PROCEDURE — 85025 COMPLETE CBC W/AUTO DIFF WBC: CPT | Performed by: INTERNAL MEDICINE

## 2019-11-20 PROCEDURE — 85652 RBC SED RATE AUTOMATED: CPT | Performed by: INTERNAL MEDICINE

## 2019-11-20 PROCEDURE — 84450 TRANSFERASE (AST) (SGOT): CPT | Performed by: INTERNAL MEDICINE

## 2019-11-21 ENCOUNTER — HOME INFUSION (PRE-WILLOW HOME INFUSION) (OUTPATIENT)
Dept: PHARMACY | Facility: CLINIC | Age: 39
End: 2019-11-21

## 2019-11-21 NOTE — PROGRESS NOTES
This is a recent snapshot of the patient's [facility] medical record.  For current drug dose and complete information and questions, call [phone] or In Basket pool, [facility] ([id number])  CSN Number:  [id number]

## 2019-11-22 NOTE — PROGRESS NOTES
This is a recent snapshot of the patient's [facility] medical record.  For current drug dose and complete information and questions, call [phone] or In Basket pool, [facility] ([id number])  CSN Number:  [id number]

## 2019-11-25 ENCOUNTER — HOME INFUSION (PRE-WILLOW HOME INFUSION) (OUTPATIENT)
Dept: PHARMACY | Facility: CLINIC | Age: 39
End: 2019-11-25

## 2019-11-25 ENCOUNTER — MEDICAL CORRESPONDENCE (OUTPATIENT)
Dept: HEALTH INFORMATION MANAGEMENT | Facility: CLINIC | Age: 39
End: 2019-11-25

## 2019-11-25 LAB
ANION GAP SERPL CALCULATED.3IONS-SCNC: 6 MMOL/L (ref 3–14)
AST SERPL W P-5'-P-CCNC: 9 U/L (ref 0–45)
BASOPHILS # BLD AUTO: 0.1 10E9/L (ref 0–0.2)
BASOPHILS NFR BLD AUTO: 1.2 %
BUN SERPL-MCNC: 11 MG/DL (ref 7–30)
CALCIUM SERPL-MCNC: 8.6 MG/DL (ref 8.5–10.1)
CHLORIDE SERPL-SCNC: 113 MMOL/L (ref 94–109)
CO2 SERPL-SCNC: 22 MMOL/L (ref 20–32)
CREAT SERPL-MCNC: 0.88 MG/DL (ref 0.52–1.04)
CRP SERPL-MCNC: 8.2 MG/L (ref 0–8)
DIFFERENTIAL METHOD BLD: NORMAL
EOSINOPHIL # BLD AUTO: 0.1 10E9/L (ref 0–0.7)
EOSINOPHIL NFR BLD AUTO: 2.1 %
ERYTHROCYTE [DISTWIDTH] IN BLOOD BY AUTOMATED COUNT: 13.4 % (ref 10–15)
ERYTHROCYTE [SEDIMENTATION RATE] IN BLOOD BY WESTERGREN METHOD: 6 MM/H (ref 0–20)
GFR SERPL CREATININE-BSD FRML MDRD: 82 ML/MIN/{1.73_M2}
GLUCOSE SERPL-MCNC: 84 MG/DL (ref 70–99)
HCT VFR BLD AUTO: 41.1 % (ref 35–47)
HGB BLD-MCNC: 13 G/DL (ref 11.7–15.7)
IMM GRANULOCYTES # BLD: 0 10E9/L (ref 0–0.4)
IMM GRANULOCYTES NFR BLD: 0.3 %
LYMPHOCYTES # BLD AUTO: 1.9 10E9/L (ref 0.8–5.3)
LYMPHOCYTES NFR BLD AUTO: 32.9 %
MCH RBC QN AUTO: 28.6 PG (ref 26.5–33)
MCHC RBC AUTO-ENTMCNC: 31.6 G/DL (ref 31.5–36.5)
MCV RBC AUTO: 91 FL (ref 78–100)
MONOCYTES # BLD AUTO: 0.4 10E9/L (ref 0–1.3)
MONOCYTES NFR BLD AUTO: 6.1 %
NEUTROPHILS # BLD AUTO: 3.3 10E9/L (ref 1.6–8.3)
NEUTROPHILS NFR BLD AUTO: 57.4 %
NRBC # BLD AUTO: 0 10*3/UL
NRBC BLD AUTO-RTO: 0 /100
PLATELET # BLD AUTO: 224 10E9/L (ref 150–450)
POTASSIUM SERPL-SCNC: 3.9 MMOL/L (ref 3.4–5.3)
RBC # BLD AUTO: 4.54 10E12/L (ref 3.8–5.2)
SODIUM SERPL-SCNC: 141 MMOL/L (ref 133–144)
WBC # BLD AUTO: 5.7 10E9/L (ref 4–11)

## 2019-11-25 PROCEDURE — 85025 COMPLETE CBC W/AUTO DIFF WBC: CPT | Performed by: INTERNAL MEDICINE

## 2019-11-25 PROCEDURE — 86140 C-REACTIVE PROTEIN: CPT | Performed by: INTERNAL MEDICINE

## 2019-11-25 PROCEDURE — 84450 TRANSFERASE (AST) (SGOT): CPT | Performed by: INTERNAL MEDICINE

## 2019-11-25 PROCEDURE — 80048 BASIC METABOLIC PNL TOTAL CA: CPT | Performed by: INTERNAL MEDICINE

## 2019-11-25 PROCEDURE — 85652 RBC SED RATE AUTOMATED: CPT | Performed by: INTERNAL MEDICINE

## 2019-11-26 ENCOUNTER — HOME INFUSION (PRE-WILLOW HOME INFUSION) (OUTPATIENT)
Dept: PHARMACY | Facility: CLINIC | Age: 39
End: 2019-11-26

## 2019-11-26 NOTE — PROGRESS NOTES
This is a recent snapshot of the patient's [facility] medical record.  For current drug dose and complete information and questions, call [phone] or In Basket pool, [facility] ([id number])  CSN Number:  [id number]

## 2019-11-27 NOTE — PROGRESS NOTES
This is a recent snapshot of the patient's [facility] medical record.  For current drug dose and complete information and questions, call [phone] or In Basket pool, [facility] ([id number])  CSN Number:  [id number]

## 2019-12-04 ENCOUNTER — HOME INFUSION (PRE-WILLOW HOME INFUSION) (OUTPATIENT)
Dept: PHARMACY | Facility: CLINIC | Age: 39
End: 2019-12-04

## 2019-12-05 ENCOUNTER — MEDICAL CORRESPONDENCE (OUTPATIENT)
Dept: HEALTH INFORMATION MANAGEMENT | Facility: CLINIC | Age: 39
End: 2019-12-05

## 2019-12-05 ENCOUNTER — HOME INFUSION (PRE-WILLOW HOME INFUSION) (OUTPATIENT)
Dept: PHARMACY | Facility: CLINIC | Age: 39
End: 2019-12-05

## 2019-12-05 LAB
ANION GAP SERPL CALCULATED.3IONS-SCNC: 5 MMOL/L (ref 3–14)
AST SERPL W P-5'-P-CCNC: 6 U/L (ref 0–45)
BASOPHILS # BLD AUTO: 0.1 10E9/L (ref 0–0.2)
BASOPHILS NFR BLD AUTO: 1 %
BUN SERPL-MCNC: 14 MG/DL (ref 7–30)
CALCIUM SERPL-MCNC: 8.2 MG/DL (ref 8.5–10.1)
CHLORIDE SERPL-SCNC: 114 MMOL/L (ref 94–109)
CO2 SERPL-SCNC: 23 MMOL/L (ref 20–32)
CREAT SERPL-MCNC: 0.86 MG/DL (ref 0.52–1.04)
CRP SERPL-MCNC: 9.8 MG/L (ref 0–8)
DIFFERENTIAL METHOD BLD: NORMAL
EOSINOPHIL # BLD AUTO: 0.1 10E9/L (ref 0–0.7)
EOSINOPHIL NFR BLD AUTO: 1.8 %
ERYTHROCYTE [DISTWIDTH] IN BLOOD BY AUTOMATED COUNT: 13.4 % (ref 10–15)
ERYTHROCYTE [SEDIMENTATION RATE] IN BLOOD BY WESTERGREN METHOD: 6 MM/H (ref 0–20)
GFR SERPL CREATININE-BSD FRML MDRD: 85 ML/MIN/{1.73_M2}
GLUCOSE SERPL-MCNC: 99 MG/DL (ref 70–99)
HCT VFR BLD AUTO: 40.5 % (ref 35–47)
HGB BLD-MCNC: 12.9 G/DL (ref 11.7–15.7)
IMM GRANULOCYTES # BLD: 0 10E9/L (ref 0–0.4)
IMM GRANULOCYTES NFR BLD: 0.2 %
LYMPHOCYTES # BLD AUTO: 1.9 10E9/L (ref 0.8–5.3)
LYMPHOCYTES NFR BLD AUTO: 31.1 %
MCH RBC QN AUTO: 29.3 PG (ref 26.5–33)
MCHC RBC AUTO-ENTMCNC: 31.9 G/DL (ref 31.5–36.5)
MCV RBC AUTO: 92 FL (ref 78–100)
MONOCYTES # BLD AUTO: 0.5 10E9/L (ref 0–1.3)
MONOCYTES NFR BLD AUTO: 8 %
NEUTROPHILS # BLD AUTO: 3.5 10E9/L (ref 1.6–8.3)
NEUTROPHILS NFR BLD AUTO: 57.9 %
NRBC # BLD AUTO: 0 10*3/UL
NRBC BLD AUTO-RTO: 0 /100
PLATELET # BLD AUTO: 234 10E9/L (ref 150–450)
POTASSIUM SERPL-SCNC: 3.5 MMOL/L (ref 3.4–5.3)
RBC # BLD AUTO: 4.41 10E12/L (ref 3.8–5.2)
SODIUM SERPL-SCNC: 142 MMOL/L (ref 133–144)
WBC # BLD AUTO: 6 10E9/L (ref 4–11)

## 2019-12-05 PROCEDURE — 86140 C-REACTIVE PROTEIN: CPT | Performed by: INTERNAL MEDICINE

## 2019-12-05 PROCEDURE — 84450 TRANSFERASE (AST) (SGOT): CPT | Performed by: INTERNAL MEDICINE

## 2019-12-05 PROCEDURE — 85025 COMPLETE CBC W/AUTO DIFF WBC: CPT | Performed by: INTERNAL MEDICINE

## 2019-12-05 PROCEDURE — 85652 RBC SED RATE AUTOMATED: CPT | Performed by: INTERNAL MEDICINE

## 2019-12-05 PROCEDURE — 80048 BASIC METABOLIC PNL TOTAL CA: CPT | Performed by: INTERNAL MEDICINE

## 2019-12-05 NOTE — PROGRESS NOTES
This is a recent snapshot of the patient's [facility] medical record.  For current drug dose and complete information and questions, call [phone] or In Basket pool, [facility] ([id number])  CSN Number:  [id number]

## 2019-12-06 ENCOUNTER — HOME INFUSION (PRE-WILLOW HOME INFUSION) (OUTPATIENT)
Dept: PHARMACY | Facility: CLINIC | Age: 39
End: 2019-12-06

## 2019-12-06 NOTE — PROGRESS NOTES
This is a recent snapshot of the patient's [facility] medical record.  For current drug dose and complete information and questions, call [phone] or In Basket pool, [facility] ([id number])  CSN Number:  [id number]

## 2019-12-09 ENCOUNTER — TELEPHONE (OUTPATIENT)
Dept: INFECTIOUS DISEASES | Facility: CLINIC | Age: 39
End: 2019-12-09

## 2019-12-09 NOTE — TELEPHONE ENCOUNTER
Patient contacted and reminded of upcoming appointment.  Patient confirmed they will be attending.  Patient instructed to bring updated medications list to appointment.    Obed Wu MA

## 2019-12-09 NOTE — PROGRESS NOTES
This is a recent snapshot of the patient's [facility] medical record.  For current drug dose and complete information and questions, call [phone] or In Basket pool, [facility] ([id number])  CSN Number:  [id number]

## 2019-12-10 ENCOUNTER — OFFICE VISIT (OUTPATIENT)
Dept: INFECTIOUS DISEASES | Facility: CLINIC | Age: 39
End: 2019-12-10
Attending: INTERNAL MEDICINE
Payer: COMMERCIAL

## 2019-12-10 VITALS
SYSTOLIC BLOOD PRESSURE: 144 MMHG | OXYGEN SATURATION: 99 % | DIASTOLIC BLOOD PRESSURE: 73 MMHG | HEART RATE: 74 BPM | WEIGHT: 130.2 LBS | BODY MASS INDEX: 22.35 KG/M2 | TEMPERATURE: 98 F

## 2019-12-10 DIAGNOSIS — A69.20 NEUROLOGICAL LYME DISEASE: Primary | ICD-10-CM

## 2019-12-10 PROCEDURE — G0463 HOSPITAL OUTPT CLINIC VISIT: HCPCS | Mod: ZF

## 2019-12-10 RX ORDER — CEFTRIAXONE SODIUM 2 G
2 VIAL (EA) INJECTION EVERY 24 HOURS
COMMUNITY
End: 2020-12-03

## 2019-12-10 ASSESSMENT — PAIN SCALES - GENERAL: PAINLEVEL: MODERATE PAIN (5)

## 2019-12-10 NOTE — LETTER
12/10/2019      RE: Ashli Gamboa  [address]  [place] [address]        [facility]  Dr. Karena Cortez, [facility], Floor [address]  [address] [place] [address]   Patient:  Ashli Gamboa, Date of birth 1980, Medical record number [id number]  Date of Visit:  12/10/2019         Assessment and Recommendations:     Ashli Gamboa is a 39 year old female with a hx of fibromyalgia and recent Lymes who presented 4 weeks ago with fatige, malaise, body pain, increased 'brain fog' and a new tremor not consistent with her fibromyalgia who presents after 4 weeks of IV ceftriaxone.    Neurologic Lymes  Still with tremor but improved.  Burning sensation comes and goes so better but still present.   Given improvement but not remission we have agreed she will complete a total of 6 weeks and then quit.   -total of 6 weeks of IV ceftiaxone  -then pull the picc      Karena Cortez MD  Division of Infectious Diseases and International Medicine  [phone]         History of Infectious Disease Illness:     Ashli Gamboa is a 39 year old female with a hx of fibromyalgia and recent Lymes who presented 4 weeks ago with fatige, malaise, body pain, increased 'brain fog' and a new tremor not consistent with her fibromyalgia.      She had recent lymes appropriately treated with doxycycline.Her new symptoms included new generalized and neurological symptoms including brain fog, burning sensation on her skin, and new tremor, she was re-tested. The initial Lyme's Screen is positive but Western Blot has 3 positive IgG bands and 1 IgM band but not 5 or 2. Hence, the confirmatory test was negative. Her story and symptoms seem very consistent with neurological lymes. I decided to look for mimics like syphilis or other tickborne illnesses but her RPR and parasite smear were negative.      I discussed with Dr. Butcher who felt there was some art in  diagnosing lymes and recommended treating for neurologic lymes. We set her up for IV ceftriaxone 2 grams for 4 weeks. However, her follow up Lymes IgM was positive. She presents at the end of that follow up period.      She reports her tremor was still present but improved. The burning sensation in her skin comes and goes now instead of being constant. She wishes to have a slightly extended course of antibiotics as she is seeing benefit but not remission.         Past Medical and Surgical History:     Past Medical History:   Diagnosis Date     Arthritis     BIG TOE     Cervical high risk HPV (human papillomavirus) test positive 07/05/2017     Cervical high risk HPV (human papillomavirus) test positive 06/10/2015     Depressive disorder 2007    I have been on and off medication for the last several years     Endometriosis      Hearing problem      Obese      Sensorineural hearing loss        Past Surgical History:   Procedure Laterality Date     ABDOMEN SURGERY      laparoscopy X2     DILATE CERVIX, HYSTEROSCOPY, ABLATE ENDOMETRIUM NOVASURE, COMBINED N/A 11/17/2016    Procedure: COMBINED DILATE CERVIX, HYSTEROSCOPY, ABLATE ENDOMETRIUM NOVASURE;  Surgeon: Sabas Patel MD;  Location: [facility]     GYN SURGERY  dates above    myomectomy x2, laparoscopy x2     GYN SURGERY      Endometriosis surgery 5/1/19     INSERT PICC LINE Left 11/15/2019    Procedure: INSERTION, PICC, single lumen PICC;  Surgeon: Emmett Rasmussen PA-C;  Location: [facility] OR     IR PICC PLACEMENT > 5 YRS OF AGE  11/15/2019     LAPAROSCOPIC ASSISTED HYSTERECTOMY VAGINAL N/A 1/4/2018    Procedure: LAPAROSCOPIC ASSISTED HYSTERECTOMY VAGINAL;;  Surgeon: Sabas Patel MD;  Location: [facility]     LAPAROSCOPIC GASTRIC SLEEVE N/A 7/25/2017    Procedure: LAPAROSCOPIC GASTRIC SLEEVE;  Laparoscopic Sleeve Gastrectomy;  Surgeon: Sam Schmidt MD;  Location: [facility] OR     LAPAROSCOPIC LYSIS ADHESIONS  7/10/2014    Procedure: LAPAROSCOPIC LYSIS  ADHESIONS;  Surgeon: Sabas Patel MD;  Location: [facility]     LAPAROSCOPIC SALPINGECTOMY Bilateral 1/4/2018    Procedure: LAPAROSCOPIC SALPINGECTOMY;;  Surgeon: Sabas Patel MD;  Location: [facility]     LASER CO2 LAPAROSCOPIC VAPORIZATION ENDOMETRIUM  7/10/2014    Procedure: LASER CO2 LAPAROSCOPIC VAPORIZATION ENDOMETRIUM;  Surgeon: Sabas Patel MD;  Location: [facility]     LASER CO2 LAPAROSCOPIC VAPORIZATION ENDOMETRIUM N/A 6/30/2015    Procedure: LASER CO2 LAPAROSCOPIC VAPORIZATION ENDOMETRIUM;  Surgeon: Sabas Patel MD;  Location: [facility]     LASER CO2 LAPAROSCOPIC VAPORIZATION ENDOMETRIUM, LYSIS ADHESIONS N/A 1/4/2018    Procedure: LASER CO2 LAPAROSCOPIC VAPORIZATION ENDOMETRIUM, LYSIS ADHESIONS;  LAPAROSCOPY WITH CO2 LASER LYSIS OF ADHESIONS AND VAPORIZATION OF ENDOMETRIOSIS, CYSTOTOMY LEFT OVARY, LAPAROSCOPIC VAGINAL HYSTERECTOMY WITH BILATERAL SALPINGECTOMY ;  Surgeon: Sabas Patel MD;  Location: [facility]     LASER CO2 LAPAROSCOPY DIAGNOSTIC, LYSIS ADHESIONS, COMBINED N/A 6/30/2015    Procedure: COMBINED LASER CO2 LAPAROSCOPY DIAGNOSTIC, LYSIS ADHESIONS;  Surgeon: Sabas Patel MD;  Location: [facility]           Family History:     Family History   Problem Relation Age of Onset     Depression Mother      Cancer Mother         Cervical      Other - See Comments Mother         Fibromyalgia     Crohn's Disease Mother      Obesity Mother      Ovarian Cancer Mother         Cervical Cancer     Substance Abuse Mother      Stomach Problem Mother      Arthritis Father      Rheumatoid Arthritis Father      Depression Father      Substance Abuse Father      Cerebrovascular Disease Paternal Grandmother      Breast Cancer Maternal Grandmother      Cancer Maternal Grandmother      Depression Brother      Depression Sister      Mental Illness Brother         schizophrenia and bipolar     Substance Abuse Brother      Stomach Problem Sister      Stomach Problem Brother            Social History:     Social History      Tobacco Use     Smoking status: Former Smoker     Packs/day: 0.50     Years: 5.00     Pack years: 2.50     Types: Cigarettes     Start date: 2007     Last attempt to quit: 2013     Years since quittin.1     Smokeless tobacco: Never Used   Substance Use Topics     Alcohol use: Yes     Alcohol/week: 0.0 standard drinks     Comment: 2 drinks per month     Drug use: Yes     Types: Marijuana     Comment: 2 weeks ago for pain     Social History     Social History Narrative     Not on file            Review of Systems:   CONSTITUTIONAL:  No fevers or chills  EYES: negative for icterus  ENT:  negative for hearing loss, tinnitus, sore throat  RESPIRATORY:  negative for cough, sputum or dyspnea  CARDIOVASCULAR:  negative for chest pain, palpitations  GASTROINTESTINAL:  negative for nausea, vomiting, diarrhea or constipation  GENITOURINARY:  negative for dysuria  HEME:  No easy bruising  INTEGUMENT:  negative for rash or pruritus  NEURO:  Negative for headache         Current Medications:     Current Outpatient Medications   Medication Sig Dispense Refill     buPROPion (WELLBUTRIN XL) 300 MG 24 hr tablet TAKE 1 TABLET BY MOUTH EVERY DAY IN THE MORNING 90 tablet 1     medical cannabis (Patient's own supply) See Admin Instructions (The purpose of this order is to document that the patient reports taking medical cannabis.  This is not a prescription, and is not used to certify that the patient has a qualifying medical condition.)       MULTIPLE VITAMIN PO Take 1 patch by mouth daily PatchMD - Multivitamin Patch       NUVARING 0.12-0.015 MG/24HR vaginal ring INSERT 1 RING VAGINALLY EVERY 21 DAYS 1 each 0     sterile water (preservative free) SOLN 20 mL with cefTRIAXone 2 GM SOLR 2 g vial Inject 2 g into the vein every 24 hours       Topiramate (TOPAMAX PO) Take 300 mg by mouth daily        vitamin B complex with vitamin C (VITAMIN  B COMPLEX) TABS tablet Take 1 tablet by mouth daily       VITAMIN D PO Take 5,000  Units by mouth daily              Immunization History:     Immunization History   Administered Date(s) Administered     TD (ADULT, 7+) 12/12/2003     Tdap (Adacel,Boostrix) 06/14/2011            Allergies:   No Known Allergies         Physical Exam:   Vital signs:  BP (!) 144/73 (BP Location: Right arm, Patient Position: Sitting, Cuff Size: Adult Small)   Pulse 74   Temp 98  F (36.7  C) (Oral)   Wt 59.1 kg (130 lb 3.2 oz)   LMP  (LMP Unknown)   SpO2 99%   BMI 22.35 kg/m       Physical Examination:  GENERAL:  well-developed, well-nourished, seated in no acute distress.  HEENT:  Head is normocephalic, atraumatic   EYES:  Eyes have anicteric sclerae without conjunctival injection   ENT:  Oropharynx is moist without exudates or ulcers. Tongue is midline  NECK:  Supple. No cervical lymphadenopathy  LUNGS:  Clear to auscultation bilateral.   CARDIOVASCULAR:  Regular rate and rhythm with no murmurs, gallops or rubs.  ABDOMEN:  Normal bowel sounds, soft, nontender. No appreciable hepatosplenomegaly.  SKIN:  No acute rashes.    NEUROLOGIC:  Grossly nonfocal. Active x4 extremities         Laboratory Data:     Metabolic Studies   Sodium   Date Value Ref Range Status   12/05/2019 142 133 - 144 mmol/L Final   11/25/2019 141 133 - 144 mmol/L Final     Potassium   Date Value Ref Range Status   12/05/2019 3.5 3.4 - 5.3 mmol/L Final   11/25/2019 3.9 3.4 - 5.3 mmol/L Final     Chloride   Date Value Ref Range Status   12/05/2019 114 (H) 94 - 109 mmol/L Final   11/25/2019 113 (H) 94 - 109 mmol/L Final     Carbon Dioxide   Date Value Ref Range Status   12/05/2019 23 20 - 32 mmol/L Final   11/25/2019 22 20 - 32 mmol/L Final     Anion Gap   Date Value Ref Range Status   12/05/2019 5 3 - 14 mmol/L Final   11/25/2019 6 3 - 14 mmol/L Final     Urea Nitrogen   Date Value Ref Range Status   12/05/2019 14 7 - 30 mg/dL Final   11/25/2019 11 7 - 30 mg/dL Final     Creatinine   Date Value Ref Range Status   12/05/2019 0.86 0.52 - 1.04 mg/dL  Final   11/25/2019 0.88 0.52 - 1.04 mg/dL Final     GFR Estimate   Date Value Ref Range Status   12/05/2019 85 >60 mL/min/[1.73_m2] Final     Comment:     Non  GFR Calc  Starting 12/18/2018, serum creatinine based estimated GFR (eGFR) will be   calculated using the Chronic Kidney Disease Epidemiology Collaboration   (CKD-EPI) equation.     11/25/2019 82 >60 mL/min/[1.73_m2] Final     Comment:     Non  GFR Calc  Starting 12/18/2018, serum creatinine based estimated GFR (eGFR) will be   calculated using the Chronic Kidney Disease Epidemiology Collaboration   (CKD-EPI) equation.       Glucose   Date Value Ref Range Status   12/05/2019 99 70 - 99 mg/dL Final   11/25/2019 84 70 - 99 mg/dL Final     Hemoglobin A1C   Date Value Ref Range Status   11/30/2017 4.6 4.3 - 6.0 % Final     Calcium   Date Value Ref Range Status   12/05/2019 8.2 (L) 8.5 - 10.1 mg/dL Final   11/25/2019 8.6 8.5 - 10.1 mg/dL Final     CRP Inflammation   Date Value Ref Range Status   12/05/2019 9.8 (H) 0.0 - 8.0 mg/L Final   11/25/2019 8.2 (H) 0.0 - 8.0 mg/L Final   11/20/2019 15.8 (H) 0.0 - 8.0 mg/L Final       Inflammatory Markers   CRP Inflammation   Date Value Ref Range Status   12/05/2019 9.8 (H) 0.0 - 8.0 mg/L Final   11/25/2019 8.2 (H) 0.0 - 8.0 mg/L Final   11/20/2019 15.8 (H) 0.0 - 8.0 mg/L Final       Hepatic Studies    Bilirubin Total   Date Value Ref Range Status   02/13/2019 0.3 0.2 - 1.3 mg/dL Final   08/09/2018 0.2 0.2 - 1.3 mg/dL Final     Alkaline Phosphatase   Date Value Ref Range Status   02/13/2019 46 40 - 150 U/L Final   08/09/2018 57 40 - 150 U/L Final     Albumin   Date Value Ref Range Status   02/13/2019 3.1 (L) 3.4 - 5.0 g/dL Final   08/09/2018 3.2 (L) 3.4 - 5.0 g/dL Final     AST   Date Value Ref Range Status   12/05/2019 6 0 - 45 U/L Final   11/25/2019 9 0 - 45 U/L Final     ALT   Date Value Ref Range Status   02/13/2019 15 0 - 50 U/L Final   08/09/2018 19 0 - 50 U/L Final       Hematology  Studies      WBC   Date Value Ref Range Status   12/05/2019 6.0 4.0 - 11.0 10e9/L Final   11/25/2019 5.7 4.0 - 11.0 10e9/L Final     Absolute Neutrophil   Date Value Ref Range Status   12/05/2019 3.5 1.6 - 8.3 10e9/L Final   11/25/2019 3.3 1.6 - 8.3 10e9/L Final     Absolute Lymphocytes   Date Value Ref Range Status   12/05/2019 1.9 0.8 - 5.3 10e9/L Final   11/25/2019 1.9 0.8 - 5.3 10e9/L Final     Absolute Monocytes   Date Value Ref Range Status   12/05/2019 0.5 0.0 - 1.3 10e9/L Final   11/25/2019 0.4 0.0 - 1.3 10e9/L Final     Absolute Eosinophils   Date Value Ref Range Status   12/05/2019 0.1 0.0 - 0.7 10e9/L Final   11/25/2019 0.1 0.0 - 0.7 10e9/L Final     Hemoglobin   Date Value Ref Range Status   12/05/2019 12.9 11.7 - 15.7 g/dL Final   11/25/2019 13.0 11.7 - 15.7 g/dL Final     Hematocrit   Date Value Ref Range Status   12/05/2019 40.5 35.0 - 47.0 % Final   11/25/2019 41.1 35.0 - 47.0 % Final     Platelet Count   Date Value Ref Range Status   12/05/2019 234 150 - 450 10e9/L Final   11/25/2019 224 150 - 450 10e9/L Final       Imaging:  [unfilled]          Karena Cortez MD

## 2019-12-10 NOTE — PROGRESS NOTES
[facility]  Dr. Karena Cortez, [facility], Floor 3  [address] [id number]   Patient:  Ashli Gamboa, Date of birth 1980, Medical record number [id number]  Date of Visit:  12/10/2019         Assessment and Recommendations:     Ashli Gamboa is a 39 year old female with a hx of fibromyalgia and recent Lymes who presented 4 weeks ago with fatige, malaise, body pain, increased 'brain fog' and a new tremor not consistent with her fibromyalgia who presents after 4 weeks of IV ceftriaxone.    Neurologic Lymes  Still with tremor but improved.  Burning sensation comes and goes so better but still present.   Given improvement but not remission we have agreed she will complete a total of 6 weeks and then quit.   -total of 6 weeks of IV ceftiaxone  -then pull the [facility]      Karena Cortez MD  Division of Infectious Diseases and International Medicine  [phone]         History of Infectious Disease Illness:     Ashli Gamboa is a 39 year old female with a hx of fibromyalgia and recent Lymes who presented 4 weeks ago with fatige, malaise, body pain, increased 'brain fog' and a new tremor not consistent with her fibromyalgia.      She had recent lymes appropriately treated with doxycycline.Her new symptoms included new generalized and neurological symptoms including brain fog, burning sensation on her skin, and new tremor, she was re-tested. The initial Lyme's Screen is positive but Western Blot has 3 positive IgG bands and 1 IgM band but not 5 or 2. Hence, the confirmatory test was negative. Her story and symptoms seem very consistent with neurological lymes. I decided to look for mimics like syphilis or other tickborne illnesses but her RPR and parasite smear were negative.      I discussed with Dr. Butcher who felt there was some art in diagnosing lymes and recommended treating for neurologic lymes. We set her up for IV  ceftriaxone 2 grams for 4 weeks. However, her follow up Lymes IgM was positive. She presents at the end of that follow up period.      She reports her tremor was still present but improved. The burning sensation in her skin comes and goes now instead of being constant. She wishes to have a slightly extended course of antibiotics as she is seeing benefit but not remission.         Past Medical and Surgical History:     Past Medical History:   Diagnosis Date     Arthritis     BIG TOE     Cervical high risk HPV (human papillomavirus) test positive 07/05/2017     Cervical high risk HPV (human papillomavirus) test positive 06/10/2015     Depressive disorder 2007    I have been on and off medication for the last several years     Endometriosis      Hearing problem      Obese      Sensorineural hearing loss        Past Surgical History:   Procedure Laterality Date     ABDOMEN SURGERY      laparoscopy X2     DILATE CERVIX, HYSTEROSCOPY, ABLATE ENDOMETRIUM NOVASURE, COMBINED N/A 11/17/2016    Procedure: COMBINED DILATE CERVIX, HYSTEROSCOPY, ABLATE ENDOMETRIUM NOVASURE;  Surgeon: Sabas Patel MD;  Location: [facility]     GYN SURGERY  dates above    myomectomy x2, laparoscopy x2     GYN SURGERY      Endometriosis surgery 5/1/19     INSERT PICC LINE Left 11/15/2019    Procedure: INSERTION, PICC, single lumen PICC;  Surgeon: Emmett Rasmussen PA-C;  Location: [facility] OR     IR PICC PLACEMENT > 5 YRS OF AGE  11/15/2019     LAPAROSCOPIC ASSISTED HYSTERECTOMY VAGINAL N/A 1/4/2018    Procedure: LAPAROSCOPIC ASSISTED HYSTERECTOMY VAGINAL;;  Surgeon: Sabas Patel MD;  Location: [facility]     LAPAROSCOPIC GASTRIC SLEEVE N/A 7/25/2017    Procedure: LAPAROSCOPIC GASTRIC SLEEVE;  Laparoscopic Sleeve Gastrectomy;  Surgeon: Sam Schmidt MD;  Location: [facility] OR     LAPAROSCOPIC LYSIS ADHESIONS  7/10/2014    Procedure: LAPAROSCOPIC LYSIS ADHESIONS;  Surgeon: Sabas Patel MD;  Location: [facility]     LAPAROSCOPIC SALPINGECTOMY  Bilateral 1/4/2018    Procedure: LAPAROSCOPIC SALPINGECTOMY;;  Surgeon: Sabas Patel MD;  Location: [facility]     LASER CO2 LAPAROSCOPIC VAPORIZATION ENDOMETRIUM  7/10/2014    Procedure: LASER CO2 LAPAROSCOPIC VAPORIZATION ENDOMETRIUM;  Surgeon: Sabas Patel MD;  Location: [facility]     LASER CO2 LAPAROSCOPIC VAPORIZATION ENDOMETRIUM N/A 6/30/2015    Procedure: LASER CO2 LAPAROSCOPIC VAPORIZATION ENDOMETRIUM;  Surgeon: Sabas Patel MD;  Location:  [facility]     LASER CO2 LAPAROSCOPIC VAPORIZATION ENDOMETRIUM, LYSIS ADHESIONS N/A 1/4/2018    Procedure: LASER CO2 LAPAROSCOPIC VAPORIZATION ENDOMETRIUM, LYSIS ADHESIONS;  LAPAROSCOPY WITH CO2 LASER LYSIS OF ADHESIONS AND VAPORIZATION OF ENDOMETRIOSIS, CYSTOTOMY LEFT OVARY, LAPAROSCOPIC VAGINAL HYSTERECTOMY WITH BILATERAL SALPINGECTOMY ;  Surgeon: Sabas Patel MD;  Location: [facility]     LASER CO2 LAPAROSCOPY DIAGNOSTIC, LYSIS ADHESIONS, COMBINED N/A 6/30/2015    Procedure: COMBINED LASER CO2 LAPAROSCOPY DIAGNOSTIC, LYSIS ADHESIONS;  Surgeon: Sabas Patel MD;  Location: [facility]           Family History:     Family History   Problem Relation Age of Onset     Depression Mother      Cancer Mother         Cervical      Other - See Comments Mother         Fibromyalgia     Crohn's Disease Mother      Obesity Mother      Ovarian Cancer Mother         Cervical Cancer     Substance Abuse Mother      Stomach Problem Mother      Arthritis Father      Rheumatoid Arthritis Father      Depression Father      Substance Abuse Father      Cerebrovascular Disease Paternal Grandmother      Breast Cancer Maternal Grandmother      Cancer Maternal Grandmother      Depression Brother      Depression Sister      Mental Illness Brother         schizophrenia and bipolar     Substance Abuse Brother      Stomach Problem Sister      Stomach Problem Brother            Social History:     Social History     Tobacco Use     Smoking status: Former Smoker     Packs/day: 0.50     Years: 5.00      Pack years: 2.50     Types: Cigarettes     Start date: 2007     Last attempt to quit: 2013     Years since quittin.1     Smokeless tobacco: Never Used   Substance Use Topics     Alcohol use: Yes     Alcohol/week: 0.0 standard drinks     Comment: 2 drinks per month     Drug use: Yes     Types: Marijuana     Comment: 2 weeks ago for pain     Social History     Social History Narrative     Not on file            Review of Systems:   CONSTITUTIONAL:  No fevers or chills  EYES: negative for icterus  ENT:  negative for hearing loss, tinnitus, sore throat  RESPIRATORY:  negative for cough, sputum or dyspnea  CARDIOVASCULAR:  negative for chest pain, palpitations  GASTROINTESTINAL:  negative for nausea, vomiting, diarrhea or constipation  GENITOURINARY:  negative for dysuria  HEME:  No easy bruising  INTEGUMENT:  negative for rash or pruritus  NEURO:  Negative for headache         Current Medications:     Current Outpatient Medications   Medication Sig Dispense Refill     buPROPion (WELLBUTRIN XL) 300 MG 24 hr tablet TAKE 1 TABLET BY MOUTH EVERY DAY IN THE MORNING 90 tablet 1     medical cannabis (Patient's own supply) See Admin Instructions (The purpose of this order is to document that the patient reports taking medical cannabis.  This is not a prescription, and is not used to certify that the patient has a qualifying medical condition.)       MULTIPLE VITAMIN PO Take 1 patch by mouth daily PatchMD - Multivitamin Patch       NUVARING 0.12-0.015 MG/24HR vaginal ring INSERT 1 RING VAGINALLY EVERY 21 DAYS 1 each 0     sterile water (preservative free) SOLN 20 mL with cefTRIAXone 2 GM SOLR 2 g vial Inject 2 g into the vein every 24 hours       Topiramate (TOPAMAX PO) Take 300 mg by mouth daily        vitamin B complex with vitamin C (VITAMIN  B COMPLEX) TABS tablet Take 1 tablet by mouth daily       VITAMIN D PO Take 5,000 Units by mouth daily              Immunization History:     Immunization History    Administered Date(s) Administered     TD (ADULT, 7+) 12/12/2003     Tdap (Adacel,Boostrix) 06/14/2011            Allergies:   No Known Allergies         Physical Exam:   Vital signs:  BP (!) 144/73 (BP Location: Right arm, Patient Position: Sitting, Cuff Size: Adult Small)   Pulse 74   Temp 98  F (36.7  C) (Oral)   Wt 59.1 kg (130 lb 3.2 oz)   LMP  (LMP Unknown)   SpO2 99%   BMI 22.35 kg/m      Physical Examination:  GENERAL:  well-developed, well-nourished, seated in no acute distress.  HEENT:  Head is normocephalic, atraumatic   EYES:  Eyes have anicteric sclerae without conjunctival injection   ENT:  Oropharynx is moist without exudates or ulcers. Tongue is midline  NECK:  Supple. No cervical lymphadenopathy  LUNGS:  Clear to auscultation bilateral.   CARDIOVASCULAR:  Regular rate and rhythm with no murmurs, gallops or rubs.  ABDOMEN:  Normal bowel sounds, soft, nontender. No appreciable hepatosplenomegaly.  SKIN:  No acute rashes.    NEUROLOGIC:  Grossly nonfocal. Active x4 extremities         Laboratory Data:     Metabolic Studies   Sodium   Date Value Ref Range Status   12/05/2019 142 133 - 144 mmol/L Final   11/25/2019 141 133 - 144 mmol/L Final     Potassium   Date Value Ref Range Status   12/05/2019 3.5 3.4 - 5.3 mmol/L Final   11/25/2019 3.9 3.4 - 5.3 mmol/L Final     Chloride   Date Value Ref Range Status   12/05/2019 114 (H) 94 - 109 mmol/L Final   11/25/2019 113 (H) 94 - 109 mmol/L Final     Carbon Dioxide   Date Value Ref Range Status   12/05/2019 23 20 - 32 mmol/L Final   11/25/2019 22 20 - 32 mmol/L Final     Anion Gap   Date Value Ref Range Status   12/05/2019 5 3 - 14 mmol/L Final   11/25/2019 6 3 - 14 mmol/L Final     Urea Nitrogen   Date Value Ref Range Status   12/05/2019 14 7 - 30 mg/dL Final   11/25/2019 11 7 - 30 mg/dL Final     Creatinine   Date Value Ref Range Status   12/05/2019 0.86 0.52 - 1.04 mg/dL Final   11/25/2019 0.88 0.52 - 1.04 mg/dL Final     GFR Estimate   Date Value Ref  Range Status   12/05/2019 85 >60 mL/min/[1.73_m2] Final     Comment:     Non  GFR Calc  Starting 12/18/2018, serum creatinine based estimated GFR (eGFR) will be   calculated using the Chronic Kidney Disease Epidemiology Collaboration   (CKD-EPI) equation.     11/25/2019 82 >60 mL/min/[1.73_m2] Final     Comment:     Non  GFR Calc  Starting 12/18/2018, serum creatinine based estimated GFR (eGFR) will be   calculated using the Chronic Kidney Disease Epidemiology Collaboration   (CKD-EPI) equation.       Glucose   Date Value Ref Range Status   12/05/2019 99 70 - 99 mg/dL Final   11/25/2019 84 70 - 99 mg/dL Final     Hemoglobin A1C   Date Value Ref Range Status   11/30/2017 4.6 4.3 - 6.0 % Final     Calcium   Date Value Ref Range Status   12/05/2019 8.2 (L) 8.5 - 10.1 mg/dL Final   11/25/2019 8.6 8.5 - 10.1 mg/dL Final     CRP Inflammation   Date Value Ref Range Status   12/05/2019 9.8 (H) 0.0 - 8.0 mg/L Final   11/25/2019 8.2 (H) 0.0 - 8.0 mg/L Final   11/20/2019 15.8 (H) 0.0 - 8.0 mg/L Final       Inflammatory Markers   CRP Inflammation   Date Value Ref Range Status   12/05/2019 9.8 (H) 0.0 - 8.0 mg/L Final   11/25/2019 8.2 (H) 0.0 - 8.0 mg/L Final   11/20/2019 15.8 (H) 0.0 - 8.0 mg/L Final       Hepatic Studies    Bilirubin Total   Date Value Ref Range Status   02/13/2019 0.3 0.2 - 1.3 mg/dL Final   08/09/2018 0.2 0.2 - 1.3 mg/dL Final     Alkaline Phosphatase   Date Value Ref Range Status   02/13/2019 46 40 - 150 U/L Final   08/09/2018 57 40 - 150 U/L Final     Albumin   Date Value Ref Range Status   02/13/2019 3.1 (L) 3.4 - 5.0 g/dL Final   08/09/2018 3.2 (L) 3.4 - 5.0 g/dL Final     AST   Date Value Ref Range Status   12/05/2019 6 0 - 45 U/L Final   11/25/2019 9 0 - 45 U/L Final     ALT   Date Value Ref Range Status   02/13/2019 15 0 - 50 U/L Final   08/09/2018 19 0 - 50 U/L Final       Hematology Studies      WBC   Date Value Ref Range Status   12/05/2019 6.0 4.0 - 11.0 10e9/L  Final   11/25/2019 5.7 4.0 - 11.0 10e9/L Final     Absolute Neutrophil   Date Value Ref Range Status   12/05/2019 3.5 1.6 - 8.3 10e9/L Final   11/25/2019 3.3 1.6 - 8.3 10e9/L Final     Absolute Lymphocytes   Date Value Ref Range Status   12/05/2019 1.9 0.8 - 5.3 10e9/L Final   11/25/2019 1.9 0.8 - 5.3 10e9/L Final     Absolute Monocytes   Date Value Ref Range Status   12/05/2019 0.5 0.0 - 1.3 10e9/L Final   11/25/2019 0.4 0.0 - 1.3 10e9/L Final     Absolute Eosinophils   Date Value Ref Range Status   12/05/2019 0.1 0.0 - 0.7 10e9/L Final   11/25/2019 0.1 0.0 - 0.7 10e9/L Final     Hemoglobin   Date Value Ref Range Status   12/05/2019 12.9 11.7 - 15.7 g/dL Final   11/25/2019 13.0 11.7 - 15.7 g/dL Final     Hematocrit   Date Value Ref Range Status   12/05/2019 40.5 35.0 - 47.0 % Final   11/25/2019 41.1 35.0 - 47.0 % Final     Platelet Count   Date Value Ref Range Status   12/05/2019 234 150 - 450 10e9/L Final   11/25/2019 224 150 - 450 10e9/L Final       Imaging:  [unfilled]

## 2019-12-10 NOTE — LETTER
12/10/2019       RE: Ashli Gamboa  [address]  [place] [address]     Dear Colleague,    Thank you for referring your patient, Ashli Gamboa, to the [facility] AND INFECTIOUS DISEASES at [facility]. Please see a copy of my visit note below.     [facility]  Dr. Karena Cortez, [facility], Floor [address]  [address] [place] [address]   Patient:  Ashli Gamboa, Date of birth 1980, Medical record number [id number]  Date of Visit:  12/10/2019         Assessment and Recommendations:     Ashli Gamboa is a 39 year old female with a hx of fibromyalgia and recent Lymes who presented 4 weeks ago with fatige, malaise, body pain, increased 'brain fog' and a new tremor not consistent with her fibromyalgia who presents after 4 weeks of IV ceftriaxone.    Neurologic Lymes  Still with tremor but improved.  Burning sensation comes and goes so better but still present.   Given improvement but not remission we have agreed she will complete a total of 6 weeks and then quit.   -total of 6 weeks of IV ceftiaxone  -then pull the picc      Karena Cortez MD  Division of Infectious Diseases and International Medicine  [phone]         History of Infectious Disease Illness:     Ashli Gamboa is a 39 year old female with a hx of fibromyalgia and recent Lymes who presented 4 weeks ago with fatige, malaise, body pain, increased 'brain fog' and a new tremor not consistent with her fibromyalgia.      She had recent lymes appropriately treated with doxycycline.Her new symptoms included new generalized and neurological symptoms including brain fog, burning sensation on her skin, and new tremor, she was re-tested. The initial Lyme's Screen is positive but Western Blot has 3 positive IgG bands and 1 IgM band but not 5 or 2. Hence, the confirmatory test was negative. Her story and symptoms  seem very consistent with neurological lymes. I decided to look for mimics like syphilis or other tickborne illnesses but her RPR and parasite smear were negative.      I discussed with Dr. Butcher who felt there was some art in diagnosing lymes and recommended treating for neurologic lymes. We set her up for IV ceftriaxone 2 grams for 4 weeks. However, her follow up Lymes IgM was positive. She presents at the end of that follow up period.      She reports her tremor was still present but improved. The burning sensation in her skin comes and goes now instead of being constant. She wishes to have a slightly extended course of antibiotics as she is seeing benefit but not remission.         Past Medical and Surgical History:     Past Medical History:   Diagnosis Date     Arthritis     BIG TOE     Cervical high risk HPV (human papillomavirus) test positive 07/05/2017     Cervical high risk HPV (human papillomavirus) test positive 06/10/2015     Depressive disorder 2007    I have been on and off medication for the last several years     Endometriosis      Hearing problem      Obese      Sensorineural hearing loss        Past Surgical History:   Procedure Laterality Date     ABDOMEN SURGERY      laparoscopy X2     DILATE CERVIX, HYSTEROSCOPY, ABLATE ENDOMETRIUM NOVASURE, COMBINED N/A 11/17/2016    Procedure: COMBINED DILATE CERVIX, HYSTEROSCOPY, ABLATE ENDOMETRIUM NOVASURE;  Surgeon: Sabas Patel MD;  Location: [facility]     GYN SURGERY  dates above    myomectomy x2, laparoscopy x2     GYN SURGERY      Endometriosis surgery 5/1/19     INSERT PICC LINE Left 11/15/2019    Procedure: INSERTION, PICC, single lumen PICC;  Surgeon: Emmett Rasmussen PA-C;  Location: [facility] OR     IR PICC PLACEMENT > 5 YRS OF AGE  11/15/2019     LAPAROSCOPIC ASSISTED HYSTERECTOMY VAGINAL N/A 1/4/2018    Procedure: LAPAROSCOPIC ASSISTED HYSTERECTOMY VAGINAL;;  Surgeon: Sabas Patel MD;  Location: [facility]     LAPAROSCOPIC GASTRIC SLEEVE  N/A 7/25/2017    Procedure: LAPAROSCOPIC GASTRIC SLEEVE;  Laparoscopic Sleeve Gastrectomy;  Surgeon: Sam Schmidt MD;  Location: [facility] OR     LAPAROSCOPIC LYSIS ADHESIONS  7/10/2014    Procedure: LAPAROSCOPIC LYSIS ADHESIONS;  Surgeon: Sabas Patel MD;  Location: [facility]     LAPAROSCOPIC SALPINGECTOMY Bilateral 1/4/2018    Procedure: LAPAROSCOPIC SALPINGECTOMY;;  Surgeon: Sabas Patel MD;  Location: [facility]     LASER CO2 LAPAROSCOPIC VAPORIZATION ENDOMETRIUM  7/10/2014    Procedure: LASER CO2 LAPAROSCOPIC VAPORIZATION ENDOMETRIUM;  Surgeon: Sabas Patel MD;  Location:  [facility]     LASER CO2 LAPAROSCOPIC VAPORIZATION ENDOMETRIUM N/A 6/30/2015    Procedure: LASER CO2 LAPAROSCOPIC VAPORIZATION ENDOMETRIUM;  Surgeon: Sabas Patel MD;  Location: [facility]     LASER CO2 LAPAROSCOPIC VAPORIZATION ENDOMETRIUM, LYSIS ADHESIONS N/A 1/4/2018    Procedure: LASER CO2 LAPAROSCOPIC VAPORIZATION ENDOMETRIUM, LYSIS ADHESIONS;  LAPAROSCOPY WITH CO2 LASER LYSIS OF ADHESIONS AND VAPORIZATION OF ENDOMETRIOSIS, CYSTOTOMY LEFT OVARY, LAPAROSCOPIC VAGINAL HYSTERECTOMY WITH BILATERAL SALPINGECTOMY ;  Surgeon: Sabas Patel MD;  Location: [facility]     LASER CO2 LAPAROSCOPY DIAGNOSTIC, LYSIS ADHESIONS, COMBINED N/A 6/30/2015    Procedure: COMBINED LASER CO2 LAPAROSCOPY DIAGNOSTIC, LYSIS ADHESIONS;  Surgeon: Sabas Patel MD;  Location: [facility]           Family History:     Family History   Problem Relation Age of Onset     Depression Mother      Cancer Mother         Cervical      Other - See Comments Mother         Fibromyalgia     Crohn's Disease Mother      Obesity Mother      Ovarian Cancer Mother         Cervical Cancer     Substance Abuse Mother      Stomach Problem Mother      Arthritis Father      Rheumatoid Arthritis Father      Depression Father      Substance Abuse Father      Cerebrovascular Disease Paternal Grandmother      Breast Cancer Maternal Grandmother      Cancer Maternal Grandmother      Depression  Brother      Depression Sister      Mental Illness Brother         schizophrenia and bipolar     Substance Abuse Brother      Stomach Problem Sister      Stomach Problem Brother            Social History:     Social History     Tobacco Use     Smoking status: Former Smoker     Packs/day: 0.50     Years: 5.00     Pack years: 2.50     Types: Cigarettes     Start date: 2007     Last attempt to quit: 2013     Years since quittin.1     Smokeless tobacco: Never Used   Substance Use Topics     Alcohol use: Yes     Alcohol/week: 0.0 standard drinks     Comment: 2 drinks per month     Drug use: Yes     Types: Marijuana     Comment: 2 weeks ago for pain     Social History     Social History Narrative     Not on file            Review of Systems:   CONSTITUTIONAL:  No fevers or chills  EYES: negative for icterus  ENT:  negative for hearing loss, tinnitus, sore throat  RESPIRATORY:  negative for cough, sputum or dyspnea  CARDIOVASCULAR:  negative for chest pain, palpitations  GASTROINTESTINAL:  negative for nausea, vomiting, diarrhea or constipation  GENITOURINARY:  negative for dysuria  HEME:  No easy bruising  INTEGUMENT:  negative for rash or pruritus  NEURO:  Negative for headache         Current Medications:     Current Outpatient Medications   Medication Sig Dispense Refill     buPROPion (WELLBUTRIN XL) 300 MG 24 hr tablet TAKE 1 TABLET BY MOUTH EVERY DAY IN THE MORNING 90 tablet 1     medical cannabis (Patient's own supply) See Admin Instructions (The purpose of this order is to document that the patient reports taking medical cannabis.  This is not a prescription, and is not used to certify that the patient has a qualifying medical condition.)       MULTIPLE VITAMIN PO Take 1 patch by mouth daily PatchMD - Multivitamin Patch       NUVARING 0.12-0.015 MG/24HR vaginal ring INSERT 1 RING VAGINALLY EVERY 21 DAYS 1 each 0     sterile water (preservative free) SOLN 20 mL with cefTRIAXone 2 GM SOLR 2 g vial Inject  2 g into the vein every 24 hours       Topiramate (TOPAMAX PO) Take 300 mg by mouth daily        vitamin B complex with vitamin C (VITAMIN  B COMPLEX) TABS tablet Take 1 tablet by mouth daily       VITAMIN D PO Take 5,000 Units by mouth daily              Immunization History:     Immunization History   Administered Date(s) Administered     TD (ADULT, 7+) 12/12/2003     Tdap (Adacel,Boostrix) 06/14/2011            Allergies:   No Known Allergies         Physical Exam:   Vital signs:  BP (!) 144/73 (BP Location: Right arm, Patient Position: Sitting, Cuff Size: Adult Small)   Pulse 74   Temp 98  F (36.7  C) (Oral)   Wt 59.1 kg (130 lb 3.2 oz)   LMP  (LMP Unknown)   SpO2 99%   BMI 22.35 kg/m       Physical Examination:  GENERAL:  well-developed, well-nourished, seated in no acute distress.  HEENT:  Head is normocephalic, atraumatic   EYES:  Eyes have anicteric sclerae without conjunctival injection   ENT:  Oropharynx is moist without exudates or ulcers. Tongue is midline  NECK:  Supple. No cervical lymphadenopathy  LUNGS:  Clear to auscultation bilateral.   CARDIOVASCULAR:  Regular rate and rhythm with no murmurs, gallops or rubs.  ABDOMEN:  Normal bowel sounds, soft, nontender. No appreciable hepatosplenomegaly.  SKIN:  No acute rashes.    NEUROLOGIC:  Grossly nonfocal. Active x4 extremities         Laboratory Data:     Metabolic Studies   Sodium   Date Value Ref Range Status   12/05/2019 142 133 - 144 mmol/L Final   11/25/2019 141 133 - 144 mmol/L Final     Potassium   Date Value Ref Range Status   12/05/2019 3.5 3.4 - 5.3 mmol/L Final   11/25/2019 3.9 3.4 - 5.3 mmol/L Final     Chloride   Date Value Ref Range Status   12/05/2019 114 (H) 94 - 109 mmol/L Final   11/25/2019 113 (H) 94 - 109 mmol/L Final     Carbon Dioxide   Date Value Ref Range Status   12/05/2019 23 20 - 32 mmol/L Final   11/25/2019 22 20 - 32 mmol/L Final     Anion Gap   Date Value Ref Range Status   12/05/2019 5 3 - 14 mmol/L Final    11/25/2019 6 3 - 14 mmol/L Final     Urea Nitrogen   Date Value Ref Range Status   12/05/2019 14 7 - 30 mg/dL Final   11/25/2019 11 7 - 30 mg/dL Final     Creatinine   Date Value Ref Range Status   12/05/2019 0.86 0.52 - 1.04 mg/dL Final   11/25/2019 0.88 0.52 - 1.04 mg/dL Final     GFR Estimate   Date Value Ref Range Status   12/05/2019 85 >60 mL/min/[1.73_m2] Final     Comment:     Non  GFR Calc  Starting 12/18/2018, serum creatinine based estimated GFR (eGFR) will be   calculated using the Chronic Kidney Disease Epidemiology Collaboration   (CKD-EPI) equation.     11/25/2019 82 >60 mL/min/[1.73_m2] Final     Comment:     Non  GFR Calc  Starting 12/18/2018, serum creatinine based estimated GFR (eGFR) will be   calculated using the Chronic Kidney Disease Epidemiology Collaboration   (CKD-EPI) equation.       Glucose   Date Value Ref Range Status   12/05/2019 99 70 - 99 mg/dL Final   11/25/2019 84 70 - 99 mg/dL Final     Hemoglobin A1C   Date Value Ref Range Status   11/30/2017 4.6 4.3 - 6.0 % Final     Calcium   Date Value Ref Range Status   12/05/2019 8.2 (L) 8.5 - 10.1 mg/dL Final   11/25/2019 8.6 8.5 - 10.1 mg/dL Final     CRP Inflammation   Date Value Ref Range Status   12/05/2019 9.8 (H) 0.0 - 8.0 mg/L Final   11/25/2019 8.2 (H) 0.0 - 8.0 mg/L Final   11/20/2019 15.8 (H) 0.0 - 8.0 mg/L Final       Inflammatory Markers   CRP Inflammation   Date Value Ref Range Status   12/05/2019 9.8 (H) 0.0 - 8.0 mg/L Final   11/25/2019 8.2 (H) 0.0 - 8.0 mg/L Final   11/20/2019 15.8 (H) 0.0 - 8.0 mg/L Final       Hepatic Studies    Bilirubin Total   Date Value Ref Range Status   02/13/2019 0.3 0.2 - 1.3 mg/dL Final   08/09/2018 0.2 0.2 - 1.3 mg/dL Final     Alkaline Phosphatase   Date Value Ref Range Status   02/13/2019 46 40 - 150 U/L Final   08/09/2018 57 40 - 150 U/L Final     Albumin   Date Value Ref Range Status   02/13/2019 3.1 (L) 3.4 - 5.0 g/dL Final   08/09/2018 3.2 (L) 3.4 - 5.0 g/dL  Final     AST   Date Value Ref Range Status   12/05/2019 6 0 - 45 U/L Final   11/25/2019 9 0 - 45 U/L Final     ALT   Date Value Ref Range Status   02/13/2019 15 0 - 50 U/L Final   08/09/2018 19 0 - 50 U/L Final       Hematology Studies      WBC   Date Value Ref Range Status   12/05/2019 6.0 4.0 - 11.0 10e9/L Final   11/25/2019 5.7 4.0 - 11.0 10e9/L Final     Absolute Neutrophil   Date Value Ref Range Status   12/05/2019 3.5 1.6 - 8.3 10e9/L Final   11/25/2019 3.3 1.6 - 8.3 10e9/L Final     Absolute Lymphocytes   Date Value Ref Range Status   12/05/2019 1.9 0.8 - 5.3 10e9/L Final   11/25/2019 1.9 0.8 - 5.3 10e9/L Final     Absolute Monocytes   Date Value Ref Range Status   12/05/2019 0.5 0.0 - 1.3 10e9/L Final   11/25/2019 0.4 0.0 - 1.3 10e9/L Final     Absolute Eosinophils   Date Value Ref Range Status   12/05/2019 0.1 0.0 - 0.7 10e9/L Final   11/25/2019 0.1 0.0 - 0.7 10e9/L Final     Hemoglobin   Date Value Ref Range Status   12/05/2019 12.9 11.7 - 15.7 g/dL Final   11/25/2019 13.0 11.7 - 15.7 g/dL Final     Hematocrit   Date Value Ref Range Status   12/05/2019 40.5 35.0 - 47.0 % Final   11/25/2019 41.1 35.0 - 47.0 % Final     Platelet Count   Date Value Ref Range Status   12/05/2019 234 150 - 450 10e9/L Final   11/25/2019 224 150 - 450 10e9/L Final       Imaging:  [unfilled]          Again, thank you for allowing me to participate in the care of your patient.      Sincerely,    Karena Cortez MD

## 2019-12-10 NOTE — NURSING NOTE
Chief Complaint   Patient presents with     RECHECK     Lyme disease         BP (!) 144/73 (BP Location: Right arm, Patient Position: Sitting, Cuff Size: Adult Small)   Pulse 74   Temp 98  F (36.7  C) (Oral)   Wt 59.1 kg (130 lb 3.2 oz)   LMP  (LMP Unknown)   SpO2 99%   BMI 22.35 kg/m      Bonnie Pagan CMA     12/10/2019 1:09 PM

## 2019-12-11 ENCOUNTER — HOME INFUSION (PRE-WILLOW HOME INFUSION) (OUTPATIENT)
Dept: PHARMACY | Facility: CLINIC | Age: 39
End: 2019-12-11

## 2019-12-11 ENCOUNTER — MEDICAL CORRESPONDENCE (OUTPATIENT)
Dept: HEALTH INFORMATION MANAGEMENT | Facility: CLINIC | Age: 39
End: 2019-12-11

## 2019-12-11 LAB
ANION GAP SERPL CALCULATED.3IONS-SCNC: 5 MMOL/L (ref 3–14)
AST SERPL W P-5'-P-CCNC: 10 U/L (ref 0–45)
BASOPHILS # BLD AUTO: 0.1 10E9/L (ref 0–0.2)
BASOPHILS NFR BLD AUTO: 1.3 %
BUN SERPL-MCNC: 17 MG/DL (ref 7–30)
CALCIUM SERPL-MCNC: 8 MG/DL (ref 8.5–10.1)
CHLORIDE SERPL-SCNC: 113 MMOL/L (ref 94–109)
CO2 SERPL-SCNC: 24 MMOL/L (ref 20–32)
CREAT SERPL-MCNC: 0.89 MG/DL (ref 0.52–1.04)
CRP SERPL-MCNC: 4.2 MG/L (ref 0–8)
DIFFERENTIAL METHOD BLD: NORMAL
EOSINOPHIL # BLD AUTO: 0.1 10E9/L (ref 0–0.7)
EOSINOPHIL NFR BLD AUTO: 3 %
ERYTHROCYTE [DISTWIDTH] IN BLOOD BY AUTOMATED COUNT: 13.2 % (ref 10–15)
ERYTHROCYTE [SEDIMENTATION RATE] IN BLOOD BY WESTERGREN METHOD: 6 MM/H (ref 0–20)
GFR SERPL CREATININE-BSD FRML MDRD: 81 ML/MIN/{1.73_M2}
GLUCOSE SERPL-MCNC: 110 MG/DL (ref 70–99)
HCT VFR BLD AUTO: 39.8 % (ref 35–47)
HGB BLD-MCNC: 12.8 G/DL (ref 11.7–15.7)
IMM GRANULOCYTES # BLD: 0 10E9/L (ref 0–0.4)
IMM GRANULOCYTES NFR BLD: 0.2 %
LYMPHOCYTES # BLD AUTO: 2.3 10E9/L (ref 0.8–5.3)
LYMPHOCYTES NFR BLD AUTO: 47.7 %
MCH RBC QN AUTO: 29.2 PG (ref 26.5–33)
MCHC RBC AUTO-ENTMCNC: 32.2 G/DL (ref 31.5–36.5)
MCV RBC AUTO: 91 FL (ref 78–100)
MONOCYTES # BLD AUTO: 0.3 10E9/L (ref 0–1.3)
MONOCYTES NFR BLD AUTO: 6.3 %
NEUTROPHILS # BLD AUTO: 2 10E9/L (ref 1.6–8.3)
NEUTROPHILS NFR BLD AUTO: 41.5 %
NRBC # BLD AUTO: 0 10*3/UL
NRBC BLD AUTO-RTO: 0 /100
PLATELET # BLD AUTO: 246 10E9/L (ref 150–450)
POTASSIUM SERPL-SCNC: 3.6 MMOL/L (ref 3.4–5.3)
RBC # BLD AUTO: 4.38 10E12/L (ref 3.8–5.2)
SODIUM SERPL-SCNC: 142 MMOL/L (ref 133–144)
WBC # BLD AUTO: 4.7 10E9/L (ref 4–11)

## 2019-12-11 PROCEDURE — 85652 RBC SED RATE AUTOMATED: CPT | Performed by: INTERNAL MEDICINE

## 2019-12-11 PROCEDURE — 86140 C-REACTIVE PROTEIN: CPT | Performed by: INTERNAL MEDICINE

## 2019-12-11 PROCEDURE — 80048 BASIC METABOLIC PNL TOTAL CA: CPT | Performed by: INTERNAL MEDICINE

## 2019-12-11 PROCEDURE — 84450 TRANSFERASE (AST) (SGOT): CPT | Performed by: INTERNAL MEDICINE

## 2019-12-11 PROCEDURE — 85025 COMPLETE CBC W/AUTO DIFF WBC: CPT | Performed by: INTERNAL MEDICINE

## 2019-12-12 ENCOUNTER — HOME INFUSION (PRE-WILLOW HOME INFUSION) (OUTPATIENT)
Dept: PHARMACY | Facility: CLINIC | Age: 39
End: 2019-12-12

## 2019-12-12 NOTE — PROGRESS NOTES
This is a recent snapshot of the patient's [facility] medical record.  For current drug dose and complete information and questions, call [phone] or In Basket pool, fv home infusion ([id number])  CSN Number:  [id number]

## 2019-12-13 NOTE — PROGRESS NOTES
This is a recent snapshot of the patient's [facility] medical record.  For current drug dose and complete information and questions, call [phone] or In Basket pool, fv home infusion ([id number])  CSN Number:  [id number]

## 2019-12-18 ENCOUNTER — HOME INFUSION (PRE-WILLOW HOME INFUSION) (OUTPATIENT)
Dept: PHARMACY | Facility: CLINIC | Age: 39
End: 2019-12-18

## 2019-12-19 ENCOUNTER — HOME INFUSION (PRE-WILLOW HOME INFUSION) (OUTPATIENT)
Dept: PHARMACY | Facility: CLINIC | Age: 39
End: 2019-12-19

## 2019-12-20 ENCOUNTER — HOME INFUSION (PRE-WILLOW HOME INFUSION) (OUTPATIENT)
Dept: PHARMACY | Facility: CLINIC | Age: 39
End: 2019-12-20

## 2019-12-20 NOTE — PROGRESS NOTES
This is a recent snapshot of the patient's [facility] medical record.  For current drug dose and complete information and questions, call [phone] or In [facility] pool, fv home infusion ([id number])  CSN Number:  [id number]

## 2019-12-23 NOTE — PROGRESS NOTES
This is a recent snapshot of the patient's [facility] medical record.  For current drug dose and complete information and questions, call [phone] or In Basket pool, fv home infusion ([id number])  CSN Number:  [id number]

## 2019-12-24 NOTE — PROGRESS NOTES
This is a recent snapshot of the patient's [facility] medical record.  For current drug dose and complete information and questions, call [phone] or In Basket pool, fv home infusion ([id number])  CSN Number:  [id number]

## 2019-12-27 ENCOUNTER — HOME INFUSION (PRE-WILLOW HOME INFUSION) (OUTPATIENT)
Dept: PHARMACY | Facility: CLINIC | Age: 39
End: 2019-12-27

## 2020-01-03 NOTE — PROGRESS NOTES
This is a recent snapshot of the patient's [facility] medical record.  For current drug dose and complete information and questions, call [phone] or In Basket pool, fv home infusion ([id number])  CSN Number:  [id number]
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
## 2020-01-15 NOTE — PATIENT INSTRUCTIONS
AFTER VISIT SUMMARY (AVS):    At today's visit we thoroughly discussed various diagnostic possibilities for your symptoms, necessary evaluation, and the plan, which includes:  Orders Placed This Encounter   Procedures     EEG EXTENDED MONITORING  MINUTES     MR Brain w/o & w Contrast     Paraneoplastic antibody     Thyroid peroxidase antibody     NMDA Receptor Ab IgG Serum with Reflex     NEUROPSYCHOLOGY REFERRAL     No new medications.     Additional recommendations after the work-up.    Next follow-up appointment is in the next 3 months or earlier if needed.    Please do not hesitate to call me with any questions or concerns.    Thanks.

## 2020-01-16 ENCOUNTER — OFFICE VISIT (OUTPATIENT)
Dept: NEUROLOGY | Facility: CLINIC | Age: 40
End: 2020-01-16
Payer: COMMERCIAL

## 2020-01-16 VITALS
WEIGHT: 133 LBS | TEMPERATURE: 98.2 F | OXYGEN SATURATION: 99 % | HEART RATE: 91 BPM | DIASTOLIC BLOOD PRESSURE: 78 MMHG | BODY MASS INDEX: 22.83 KG/M2 | SYSTOLIC BLOOD PRESSURE: 120 MMHG

## 2020-01-16 DIAGNOSIS — R41.9 COGNITIVE COMPLAINTS: Primary | ICD-10-CM

## 2020-01-16 DIAGNOSIS — R41.89 SPELL OF ALTERED COGNITION: ICD-10-CM

## 2020-01-16 PROCEDURE — 86255 FLUORESCENT ANTIBODY SCREEN: CPT | Mod: 90 | Performed by: PSYCHIATRY & NEUROLOGY

## 2020-01-16 PROCEDURE — 36415 COLL VENOUS BLD VENIPUNCTURE: CPT | Performed by: PSYCHIATRY & NEUROLOGY

## 2020-01-16 PROCEDURE — 83519 RIA NONANTIBODY: CPT | Mod: 90 | Performed by: PSYCHIATRY & NEUROLOGY

## 2020-01-16 PROCEDURE — 99205 OFFICE O/P NEW HI 60 MIN: CPT | Performed by: PSYCHIATRY & NEUROLOGY

## 2020-01-16 PROCEDURE — 99000 SPECIMEN HANDLING OFFICE-LAB: CPT | Performed by: PSYCHIATRY & NEUROLOGY

## 2020-01-16 PROCEDURE — 86376 MICROSOMAL ANTIBODY EACH: CPT | Performed by: PSYCHIATRY & NEUROLOGY

## 2020-01-16 PROCEDURE — 83520 IMMUNOASSAY QUANT NOS NONAB: CPT | Mod: 90 | Performed by: PSYCHIATRY & NEUROLOGY

## 2020-01-16 PROCEDURE — 99354 ZZC PROLONGED SERV,OFFICE,1ST HR: CPT | Performed by: PSYCHIATRY & NEUROLOGY

## 2020-01-16 RX ORDER — RIFAMPIN 300 MG/1
CAPSULE ORAL
COMMUNITY
Start: 2020-01-08 | End: 2020-12-03

## 2020-01-16 ASSESSMENT — MONTREAL COGNITIVE ASSESSMENT (MOCA)
WHAT IS THE TOTAL SCORE (OUT OF 30): 25
4. NAME EACH OF THE THREE ANIMALS SHOWN: 3
8. SERIAL SUBTRACTION OF 7S: 3
10. [FLUENCY] NAME WORDS STARTING WITH DESIGNATED LETTER: 0
WHAT LEVEL OF EDUCATION WAS ATTAINED: 0
9. REPEAT EACH SENTENCE: 2
12. MEMORY INDEX SCORE: 1
7. [VIGILENCE] TAP WHEN HEARING DESIGNATED LETTER: 1
11. FOR EACH PAIR OF WORDS, WHAT CATEGORY DO THEY BELONG TO (OUT OF 2): 2
6. READ LIST OF DIGITS [FORWARD/BACKWARD]: 2
VISUOSPATIAL/EXECUTIVE SUBSCORE: 5
13. ORIENTATION SUBSCORE: 6
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
## 2020-01-17 ENCOUNTER — ANCILLARY PROCEDURE (OUTPATIENT)
Dept: MRI IMAGING | Facility: CLINIC | Age: 40
End: 2020-01-17
Attending: PSYCHIATRY & NEUROLOGY
Payer: COMMERCIAL

## 2020-01-17 DIAGNOSIS — R41.9 COGNITIVE COMPLAINTS: ICD-10-CM

## 2020-01-17 RX ORDER — GADOBUTROL 604.72 MG/ML
7.5 INJECTION INTRAVENOUS ONCE
Status: COMPLETED | OUTPATIENT
Start: 2020-01-17 | End: 2020-01-17

## 2020-01-17 RX ADMIN — GADOBUTROL 6 ML: 604.72 INJECTION INTRAVENOUS at 17:55

## 2020-01-18 LAB — NMDAR IGG TITR SER IF: NORMAL {TITER}

## 2020-01-18 NOTE — DISCHARGE INSTRUCTIONS
MRI Contrast Discharge Instructions    The IV contrast you received today will pass out of your body in your  urine. This will happen in the next 24 hours. You will not feel this process.  Your urine will not change color.    Drink at least 4 extra glasses of water or juice today (unless your doctor  has restricted your fluids). This reduces the stress on your kidneys.  You may take your regular medicines.    If you are on dialysis: It is best to have dialysis today.    If you have a reaction: Most reactions happen right away. If you have  any new symptoms after leaving the hospital (such as hives or swelling),  call your hospital at the correct number below. Or call your family doctor.  If you have breathing distress or wheezing, call 911.    Special instructions: ***    I have read and understand the above information.    Signature:______________________________________ Date:___________    Staff:__________________________________________ Date:___________     Time:__________    [place] [facility] Departments:    ___[place]: [phone]  ___[facility]: [phone]  ___[place]: [phone] ___[facility]: [phone]  ___[facility]: [phone]  ___[facility]: [phone]  ___[phone]  ___[facility]: [phone]  ___[phone]

## 2020-01-20 LAB — THYROPEROXIDASE AB SERPL-ACNC: <10 IU/ML

## 2020-01-22 LAB — PNP ABY SERUM: NORMAL

## 2020-02-04 ENCOUNTER — ALLIED HEALTH/NURSE VISIT (OUTPATIENT)
Dept: NEUROLOGY | Facility: CLINIC | Age: 40
End: 2020-02-04
Payer: COMMERCIAL

## 2020-02-04 DIAGNOSIS — R41.89 SPELL OF ALTERED COGNITION: ICD-10-CM

## 2020-02-04 DIAGNOSIS — R41.9 COGNITIVE COMPLAINTS: ICD-10-CM

## 2020-02-04 NOTE — PROCEDURES
TYPE OF STUDY: Portable inpatient EEG.    DATE OF STUDY:  2020      EEG #:       HISTORY:  A 40-year-old reports spells of altered mental status.  There are periods of impairment, staring and significant cognitive slowing.  There are spells of amnesia and confusion.  She is being evaluated for epilepsy.      FINDINGS:  Low voltage desynchronized EEG during most of the waking recording.  At times, brief runs of a 10 Hz posterior dominant rhythm are seen.  During drowsiness, there are slow eye movements.  There are periods of diffuse theta.  Hyperventilation is performed which results in bursts of diffuse theta.  Photic stimulation does not induce any abnormalities.  The patient does eventually descended into sleep with vertex waves and sleep spindles.      OTHER INTERICTAL ABNORMALITIES:  No epileptiform discharges.      ICTAL ABNORMALITIES:  No electrographic seizures.      IMPRESSION:  Normal in wakefulness and sleep.  No epileptiform discharges or seizures.      MD JORGE Loyd MD             D: 2020   T: 2020   MT: AKA      Name:     BANDAR LEWIS   MRN:      [id number]        Account:        [id number]   :      1980           Procedure Date: 2020      Document: [id number]

## 2020-03-12 ENCOUNTER — MYC MEDICAL ADVICE (OUTPATIENT)
Dept: FAMILY MEDICINE | Facility: CLINIC | Age: 40
End: 2020-03-12

## 2020-03-17 ENCOUNTER — TELEPHONE (OUTPATIENT)
Dept: INFECTIOUS DISEASES | Facility: CLINIC | Age: 40
End: 2020-03-17

## 2020-03-17 NOTE — TELEPHONE ENCOUNTER
[facility] Call Center    Phone Message    May a detailed message be left on voicemail: yes     Reason for Call: Other: Lupe, a nurse from [facility] is calling about this pt. The pt told Lupe that Dr Estrada could only see the pt for 6 wks (or it might have been 6 mos). Lupe is calling to clarify that. Please call Lay at [facility] at [phone]. Thanks.      Action Taken: Message routed to:  [facility] dis    Travel Screening: Not Applicable

## 2020-03-18 NOTE — TELEPHONE ENCOUNTER
Called Lupe back at [facility]. She is the referral person there, and pt asked for a referral to [facility] for further Lyme's Disease treatment. Explained to Lupe that our clinic does not have a policy per se for abx therapy for Lyme treatment, but typically we do not continue antibiotic treatment once a pt has completed the recommended Lyme treatment. Oftentimes, once a pt has been treated for Lyme with abxs, the pt needs symptom management.  Carolina Martinez RN

## 2020-03-19 ENCOUNTER — TELEPHONE (OUTPATIENT)
Dept: NEUROPSYCHOLOGY | Facility: CLINIC | Age: 40
End: 2020-03-19

## 2020-03-19 ENCOUNTER — COMMUNICATION - HEALTHEAST (OUTPATIENT)
Dept: ADMINISTRATIVE | Facility: CLINIC | Age: 40
End: 2020-03-19

## 2020-03-19 NOTE — TELEPHONE ENCOUNTER
Spoke with Pt about cancelling their upcoming appointment with us following a [facility] directive. Pt verbalized their understanding. They were also informed that someone will be contacting them in the near future to reschedule their appointment.

## 2020-04-09 ENCOUNTER — TELEPHONE (OUTPATIENT)
Dept: NEUROPSYCHOLOGY | Facility: CLINIC | Age: 40
End: 2020-04-09

## 2020-04-17 ENCOUNTER — VIRTUAL VISIT (OUTPATIENT)
Dept: NEUROPSYCHOLOGY | Facility: CLINIC | Age: 40
End: 2020-04-17
Payer: COMMERCIAL

## 2020-04-17 DIAGNOSIS — R41.3 MEMORY LOSS: ICD-10-CM

## 2020-04-17 DIAGNOSIS — F54 PSYCHOLOGICAL FACTORS AFFECTING MEDICAL CONDITION: ICD-10-CM

## 2020-04-17 DIAGNOSIS — Z01.818 PREPROCEDURAL EXAMINATION: Primary | ICD-10-CM
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
## 2020-04-20 ENCOUNTER — MYC MEDICAL ADVICE (OUTPATIENT)
Dept: FAMILY MEDICINE | Facility: CLINIC | Age: 40
End: 2020-04-20

## 2020-04-22 ENCOUNTER — VIRTUAL VISIT (OUTPATIENT)
Dept: FAMILY MEDICINE | Facility: CLINIC | Age: 40
End: 2020-04-22
Payer: COMMERCIAL

## 2020-04-22 DIAGNOSIS — Z86.69 HISTORY OF MIGRAINE: Primary | ICD-10-CM

## 2020-04-22 PROCEDURE — 99213 OFFICE O/P EST LOW 20 MIN: CPT | Mod: TEL | Performed by: PHYSICIAN ASSISTANT

## 2020-04-22 RX ORDER — SUMATRIPTAN 50 MG/1
TABLET, FILM COATED ORAL
Qty: 30 TABLET | Refills: 1 | Status: SHIPPED | OUTPATIENT
Start: 2020-04-22 | End: 2023-08-16

## 2020-04-22 NOTE — PATIENT INSTRUCTIONS
Patient Education     Patient Education    Sumatriptan Nasal spray, solution    Sumatriptan Succinate Oral tablet    Sumatriptan Succinate Solution for injection    Sumatriptan Transdermal patch - iontophorectic  Sumatriptan Succinate Oral tablet  What is this medicine?  SUMATRIPTAN (yeyo ma TRIP tan) is used to treat migraines with or without aura. An aura is a strange feeling or visual disturbance that warns you of an attack. It is not used to prevent migraines.  This medicine may be used for other purposes; ask your health care provider or pharmacist if you have questions.  What should I tell my health care provider before I take this medicine?  They need to know if you have any of these conditions:    bowel disease or colitis    diabetes    family history of heart disease    fast or irregular heart beat    heart or blood vessel disease, angina (chest pain), or previous heart attack    high blood pressure    high cholesterol    history of stroke, transient ischemic attacks (TIAs or mini-strokes), or intracranial bleeding    kidney or liver disease    overweight    poor circulation    postmenopausal or surgical removal of uterus and ovaries    Raynaud's disease    seizure disorder    an unusual or allergic reaction to sumatriptan, other medicines, foods, dyes, or preservatives    pregnant or trying to get pregnant    breast-feeding  How should I use this medicine?  Take this medicine by mouth with a glass of water. Follow the directions on the prescription label. This medicine is taken at the first symptoms of a migraine. It is not for everyday use. If your migraine headache returns after one dose, you can take another dose as directed. You must leave at least 2 hours between doses, and do not take more than 100 mg as a single dose. Do not take more than 200 mg total in any 24 hour period. If there is no improvement at all after the first dose, do not take a second dose without talking to your doctor or health  care professional. Do not take your medicine more often than directed.  Talk to your pediatrician regarding the use of this medicine in children. Special care may be needed.  Overdosage: If you think you have taken too much of this medicine contact a poison control center or emergency room at once.  NOTE: This medicine is only for you. Do not share this medicine with others.  What if I miss a dose?  This does not apply; this medicine is not for regular use.  What may interact with this medicine?  Do not take this medicine with any of the following medicines:    amphetamine or cocaine    dihydroergotamine, ergotamine, ergoloid mesylates, methysergide, or ergot-type medication - do not take within 24 hours of taking sumatriptan    feverfew    MAOIs like Carbex, Eldepryl, Marplan, Nardil, and Parnate - do not take sumatriptan within 2 weeks of stopping MAOI therapy    other migraine medicines like almotriptan, eletriptan, naratriptan, rizatriptan, zolmitriptan - do not take within 24 hours of taking sumatriptan    tryptophan  This medicine may also interact with the following medications:    lithium    medicines for mental depression, anxiety or mood problems    medicines for weight loss such as dexfenfluramine, dextroamphetamine, fenfluramine, or sibutramine    Johanna's wort  This list may not describe all possible interactions. Give your health care provider a list of all the medicines, herbs, non-prescription drugs, or dietary supplements you use. Also tell them if you smoke, drink alcohol, or use illegal drugs. Some items may interact with your medicine.  What should I watch for while using this medicine?  Only take this medicine for a migraine headache. Take it if you get warning symptoms or at the start of a migraine attack. It is not for regular use to prevent migraine attacks.  You may get drowsy or dizzy. Do not drive, use machinery, or do anything that needs mental alertness until you know how this medicine  affects you. To reduce dizzy or fainting spells, do not sit or stand up quickly, especially if you are an older patient. Alcohol can increase drowsiness, dizziness and flushing. Avoid alcoholic drinks.  Smoking cigarettes may increase the risk of heart-related side effects from using this medicine.  If you take migraine medicines for 10 or more days a month, your migraines may get worse. Keep a diary of headache days and medicine use. Contact your healthcare professional if your migraine attacks occur more frequently.  What side effects may I notice from receiving this medicine?  Side effects that you should report to your doctor or health care professional as soon as possible:    allergic reactions like skin rash, itching or hives, swelling of the face, lips, or tongue    fast, slow, or irregular heart beat    hallucinations    increased or decreased blood pressure    seizures    severe stomach pain and cramping, bloody diarrhea    signs and symptoms of a blood clot such as breathing problems; changes in vision; chest pain; severe, sudden headache; pain, swelling, warmth in the leg; trouble speaking; sudden numbness or weakness of the face, arm or leg    tingling, pain, or numbness in the face, hands or feet  Side effects that usually do not require medical attention (report to your doctor or health care professional if they continue or are bothersome):    drowsiness    feeling warm, flushing, or redness of the face    headache    muscle cramps, pain    nausea, vomiting    unusually weak or tired  This list may not describe all possible side effects. Call your doctor for medical advice about side effects. You may report side effects to FDA at 9-874-VBD-9506.  Where should I keep my medicine?  Keep out of the reach of children.  Store at room temperature between 2 and 30 degrees C (36 and 86 degrees F). Throw away any unused medicine after the expiration date.  NOTE:This sheet is a summary. It may not cover all  possible information. If you have questions about this medicine, talk to your doctor, pharmacist, or health care provider. Copyright  2016 [employer]
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
## 2020-06-01 ENCOUNTER — MYC REFILL (OUTPATIENT)
Dept: FAMILY MEDICINE | Facility: CLINIC | Age: 40
End: 2020-06-01

## 2020-06-01 DIAGNOSIS — F33.41 RECURRENT MAJOR DEPRESSIVE DISORDER, IN PARTIAL REMISSION (H): ICD-10-CM

## 2020-06-01 RX ORDER — BUPROPION HYDROCHLORIDE 300 MG/1
TABLET ORAL
Qty: 90 TABLET | Refills: 1 | OUTPATIENT
Start: 2020-06-01

## 2020-06-02 ENCOUNTER — VIRTUAL VISIT (OUTPATIENT)
Dept: FAMILY MEDICINE | Facility: CLINIC | Age: 40
End: 2020-06-02
Payer: COMMERCIAL

## 2020-06-02 DIAGNOSIS — F33.41 RECURRENT MAJOR DEPRESSIVE DISORDER, IN PARTIAL REMISSION (H): ICD-10-CM

## 2020-06-02 PROCEDURE — 99213 OFFICE O/P EST LOW 20 MIN: CPT | Mod: GT | Performed by: NURSE PRACTITIONER

## 2020-06-02 PROCEDURE — 96127 BRIEF EMOTIONAL/BEHAV ASSMT: CPT | Performed by: NURSE PRACTITIONER

## 2020-06-02 RX ORDER — BUPROPION HYDROCHLORIDE 300 MG/1
TABLET ORAL
Qty: 90 TABLET | Refills: 3 | Status: SHIPPED | OUTPATIENT
Start: 2020-06-02 | End: 2021-07-06

## 2020-06-02 ASSESSMENT — PATIENT HEALTH QUESTIONNAIRE - PHQ9: SUM OF ALL RESPONSES TO PHQ QUESTIONS 1-9: 3

## 2020-06-02 NOTE — PROGRESS NOTES
"Ashli Gamboa is a 40 year old female who is being evaluated via a billable video visit.      The patient has been notified of following:     \"This video visit will be conducted via a call between you and your physician/provider. We have found that certain health care needs can be provided without the need for an in-person physical exam.  This service lets us provide the care you need with a video conversation.  If a prescription is necessary we can send it directly to your pharmacy.  If lab work is needed we can place an order for that and you can then stop by our lab to have the test done at a later time.    Video visits are billed at different rates depending on your insurance coverage.  Please reach out to your insurance provider with any questions.    If during the course of the call the physician/provider feels a video visit is not appropriate, you will not be charged for this service.\"    Patient has given verbal consent for Video visit? Yes    How would you like to obtain your AVS? Glenn    Patient would like the video invitation sent by: Send to e-mail at: [email or web address]    Will anyone else be joining your video visit? No      Subjective     Ashli Gamboa is a 40 year old female who presents today via video visit for the following health issues:    HPI  Medication Refill    Taking Medication as prescribed: yes    Side Effects:  None    Medication Helping Symptoms:  yes     Due for refills of wellbutrin.   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    Moved out to [place] and bought a house.        Video Start Time: 7:28 AM      Patient Active Problem List   Diagnosis     Morbid obesity (H)     post traumatic Arthritis of big toe     Major depression, recurrent (H)     CARDIOVASCULAR SCREENING; LDL GOAL LESS THAN 160     Endometriosis "     Fibromyalgia     Muscular deconditioning     Cervical high risk HPV (human papillomavirus) test positive     S/P laparoscopic sleeve gastrectomy     Acute pelvic pain     Lyme disease     Neurological Lyme disease     Past Surgical History:   Procedure Laterality Date     ABDOMEN SURGERY      laparoscopy X2     DILATE CERVIX, HYSTEROSCOPY, ABLATE ENDOMETRIUM NOVASURE, COMBINED N/A 11/17/2016    Procedure: COMBINED DILATE CERVIX, HYSTEROSCOPY, ABLATE ENDOMETRIUM NOVASURE;  Surgeon: Sabas Patel MD;  Location: [facility]     GYN SURGERY  dates above    myomectomy x2, laparoscopy x2     GYN SURGERY      Endometriosis surgery 5/1/19     INSERT PICC LINE Left 11/15/2019    Procedure: INSERTION, PICC, single lumen PICC;  Surgeon: Emmett Rasmussen PA-C;  Location: [facility] OR     IR PICC PLACEMENT > 5 YRS OF AGE  11/15/2019     LAPAROSCOPIC ASSISTED HYSTERECTOMY VAGINAL N/A 1/4/2018    Procedure: LAPAROSCOPIC ASSISTED HYSTERECTOMY VAGINAL;;  Surgeon: Sabas Patel MD;  Location: [facility]     LAPAROSCOPIC GASTRIC SLEEVE N/A 7/25/2017    Procedure: LAPAROSCOPIC GASTRIC SLEEVE;  Laparoscopic Sleeve Gastrectomy;  Surgeon: Sam Schmidt MD;  Location: [facility] OR     LAPAROSCOPIC LYSIS ADHESIONS  7/10/2014    Procedure: LAPAROSCOPIC LYSIS ADHESIONS;  Surgeon: Sabas Patel MD;  Location: [facility]     LAPAROSCOPIC SALPINGECTOMY Bilateral 1/4/2018    Procedure: LAPAROSCOPIC SALPINGECTOMY;;  Surgeon: Sabas Patel MD;  Location: [facility]     LASER CO2 LAPAROSCOPIC VAPORIZATION ENDOMETRIUM  7/10/2014    Procedure: LASER CO2 LAPAROSCOPIC VAPORIZATION ENDOMETRIUM;  Surgeon: Sabas Patel MD;  Location: [facility]     LASER CO2 LAPAROSCOPIC VAPORIZATION ENDOMETRIUM N/A 6/30/2015    Procedure: LASER CO2 LAPAROSCOPIC VAPORIZATION ENDOMETRIUM;  Surgeon: Sabas Patel MD;  Location: [facility]     LASER CO2 LAPAROSCOPIC VAPORIZATION ENDOMETRIUM, LYSIS ADHESIONS N/A 1/4/2018    Procedure: LASER CO2 LAPAROSCOPIC VAPORIZATION  ENDOMETRIUM, LYSIS ADHESIONS;  LAPAROSCOPY WITH CO2 LASER LYSIS OF ADHESIONS AND VAPORIZATION OF ENDOMETRIOSIS, CYSTOTOMY LEFT OVARY, LAPAROSCOPIC VAGINAL HYSTERECTOMY WITH BILATERAL SALPINGECTOMY ;  Surgeon: Sabas Patel MD;  Location: [facility]     LASER CO2 LAPAROSCOPY DIAGNOSTIC, LYSIS ADHESIONS, COMBINED N/A 2015    Procedure: COMBINED LASER CO2 LAPAROSCOPY DIAGNOSTIC, LYSIS ADHESIONS;  Surgeon: Sabas Patel MD;  Location: [facility]       Social History     Tobacco Use     Smoking status: Former Smoker     Packs/day: 0.50     Years: 5.00     Pack years: 2.50     Types: Cigarettes     Start date: 2007     Last attempt to quit: 2013     Years since quittin.5     Smokeless tobacco: Never Used   Substance Use Topics     Alcohol use: Yes     Alcohol/week: 0.0 standard drinks     Comment: 2 drinks per month     Family History   Problem Relation Age of Onset     Depression Mother      Cancer Mother         Cervical      Other - See Comments Mother         Fibromyalgia     Crohn's Disease Mother      Obesity Mother      Ovarian Cancer Mother         Cervical Cancer     Substance Abuse Mother      Stomach Problem Mother      Arthritis Father      Rheumatoid Arthritis Father      Depression Father      Substance Abuse Father      Cerebrovascular Disease Paternal Grandmother      Breast Cancer Maternal Grandmother      Cancer Maternal Grandmother      Depression Brother      Depression Sister      Mental Illness Brother         schizophrenia and bipolar     Substance Abuse Brother      Stomach Problem Sister      Stomach Problem Brother          Current Outpatient Medications   Medication Sig Dispense Refill     buPROPion (WELLBUTRIN XL) 300 MG 24 hr tablet TAKE 1 TABLET BY MOUTH EVERY DAY IN THE MORNING 90 tablet 1     medical cannabis (Patient's own supply) See Admin Instructions (The purpose of this order is to document that the patient reports taking medical cannabis.  This is not a prescription,  "and is not used to certify that the patient has a qualifying medical condition.)       MULTIPLE VITAMIN PO Take 1 patch by mouth daily PatchMD - Multivitamin Patch       NUVARING 0.12-0.015 MG/24HR vaginal ring INSERT 1 RING VAGINALLY EVERY 21 DAYS 1 each 0     SUMAtriptan (IMITREX) 50 MG tablet Take 1-2 tablets at the onset of a headache. May repeat in 2 hours. Max 4 tablets/24 hours. 30 tablet 1     Topiramate (TOPAMAX PO) Take 300 mg by mouth daily        vitamin B complex with vitamin C (VITAMIN  B COMPLEX) TABS tablet Take 1 tablet by mouth daily       VITAMIN D PO Take 5,000 Units by mouth daily       rifampin (RIFADIN) 300 MG capsule        sterile water (preservative free) SOLN 20 mL with cefTRIAXone 2 GM SOLR 2 g vial Inject 2 g into the vein every 24 hours       No Known Allergies    Reviewed and updated as needed this visit by Provider  Tobacco  Allergies  Meds  Problems  Med Hx  Surg Hx  Fam Hx         Review of Systems   Constitutional, HEENT, cardiovascular, pulmonary, GI, , musculoskeletal, neuro, skin, endocrine and psych systems are negative, except as otherwise noted.      Objective    LMP  (LMP Unknown)   Estimated body mass index is 22.83 kg/m  as calculated from the following:    Height as of 11/15/19: 1.626 m (5' 4\").    Weight as of 1/16/20: 60.3 kg (133 lb).  Physical Exam     GENERAL: Healthy, alert and no distress  EYES: Eyes grossly normal to inspection.  No discharge or erythema, or obvious scleral/conjunctival abnormalities.  RESP: No audible wheeze, cough, or visible cyanosis.  No visible retractions or increased work of breathing.    SKIN: Visible skin clear. No significant rash, abnormal pigmentation or lesions.  NEURO: Cranial nerves grossly intact.  Mentation and speech appropriate for age.  PSYCH: Mentation appears normal, affect normal/bright, judgement and insight intact, normal speech and appearance well-groomed.      PHQ 6/8/2018 11/12/2019 6/2/2020   PHQ-9 Total Score " 1 0 3   Q9: Thoughts of better off dead/self-harm past 2 weeks Not at all Not at all Not at all     ALIDA-7 SCORE 12/30/2015 7/5/2017 10/29/2019   Total Score 1 (minimal anxiety) - -   Total Score 1 3 5               Assessment & Plan       ICD-10-CM    1. Recurrent major depressive disorder, in partial remission (H)  F33.41 buPROPion (WELLBUTRIN XL) 300 MG 24 hr tablet      stable and well controlled on wellbutrin at 300 mg daily.    Due for refills  Continue exercise.    Continue counseling  Refill x 1 year or sooner if worsening.          No follow-ups on file.    DANIELE Osorio CNP  [facility]      Video-Visit Details    Type of service:  Video Visit    Video End Time:7:43 AM    Originating Location (pt. Location): Home    Distant Location (provider location):  [facility]     Platform used for Video Visit: Norris    No follow-ups on file.       DANIELE Osorio CNP

## 2020-10-06 ENCOUNTER — VIRTUAL VISIT (OUTPATIENT)
Dept: INFECTIOUS DISEASES | Facility: CLINIC | Age: 40
End: 2020-10-06
Attending: FAMILY MEDICINE
Payer: COMMERCIAL

## 2020-10-06 DIAGNOSIS — R41.3 MEMORY LOSS: Primary | ICD-10-CM

## 2020-10-06 PROCEDURE — 99214 OFFICE O/P EST MOD 30 MIN: CPT | Mod: TEL | Performed by: INTERNAL MEDICINE

## 2020-10-06 ASSESSMENT — PAIN SCALES - GENERAL: PAINLEVEL: SEVERE PAIN (7)

## 2020-10-06 NOTE — PROGRESS NOTES
"Ashli Gamboa is a 40 year old female who is being evaluated via a billable video visit.      The patient has been notified of following:     \"This video visit will be conducted via a call between you and your physician/provider. We have found that certain health care needs can be provided without the need for an in-person physical exam.  This service lets us provide the care you need with a video conversation.  If a prescription is necessary we can send it directly to your pharmacy.  If lab work is needed we can place an order for that and you can then stop by our lab to have the test done at a later time.    Video visits are billed at different rates depending on your insurance coverage.  Please reach out to your insurance provider with any questions.    If during the course of the call the physician/provider feels a video visit is not appropriate, you will not be charged for this service.\"    Patient has given verbal consent for Video visit? Yes  How would you like to obtain your AVS? MyChart  If you are dropped from the video visit, the video invite should be resent to: Text to cell phone: [phone]  Will anyone else be joining your video visit? No      Video-Visit Details    Type of service:  Video Visit    25 min visit    Originating Location (pt. Location): Home    Distant Location (provider location):  [facility] INFECTI[facility] DISE[facility] [place]     Platform used for Video Visit: [facility]  Dr. Karena Cortez, [facility], Floor [address]  [address] [place] [address]   Patient:  Ashli Gamboa, Date of birth 1980, Medical record number [id number]  Date of Visit:  10/13/2020         Assessment and Recommendations:     Patient is a 40 year old with fibromyalgia and neurologic lymes s/p treatment who remains with significant memory loss and fatigue.    Memory loss and fatigue following lyme's treatment. It makes " me wonder what else could be going on. Her CRP was wnl. MRI and EEG wnl. She does have a hx of fibromyalgia but she isn't looking to run marathons just to not get lost 2/2 memory loss. We discussed different possibilities and the only infectious disease on the differential is some kind of encephalitis/meningitis. As such we discussed and agreed to do an LP. We will get basic labs, check viral causes, look for oligoclonal bands, and save some CSF for future testing. I will refer her to a neurologist here at [facility] and she will complete her neuropsych testing.   -LP with Glucose, cell count, GM stain and culture  -CSF protein  -HSV, VZV, [place], [place]  -oligoclonal bands  -refer to neurology  -she will call to complete neuropsych testing      Karena Cortez MD  Division of Infectious Diseases and International Medicine  [phone]         History of Infectious Disease Illness:       Patient is a 40 year old who is well known to me. She has a hx of a gastric bypass, fibromyalgia, and neurologic lymes I treated with 6 weeks of IV ceftriaxone. Initially this seemed to relieve her symptoms and her tremor completely resolved. However, she has continued to have memory loss/brain fog, fatigue, back of the neck headaches, and difficulty working. Her memory loss has impacted her ability to work and do ADLs. She forgot her pin from her debit card which she has had for years. She has had trouble regulating her temperature and sees light trails.     She saw neurology in January/February and had an MRI and EEG done. They did not have much to offer her. She did neuropsychiatric testing which showed worse memory than expected given her age and education.      No fevers or chills. No rashes. No exposure to ticks or animals.     She has been talking to some of the lyme community in [place]. She is considering going to a 'lyme literate' doctor.             Past Medical and Surgical History:     Past Medical History:    Diagnosis Date     Arthritis     BIG TOE     Cervical high risk HPV (human papillomavirus) test positive 07/05/2017     Cervical high risk HPV (human papillomavirus) test positive 06/10/2015     Depressive disorder 2007    I have been on and off medication for the last several years     Endometriosis      Hearing problem      Obese      Sensorineural hearing loss        Past Surgical History:   Procedure Laterality Date     ABDOMEN SURGERY      laparoscopy X2     DILATE CERVIX, HYSTEROSCOPY, ABLATE ENDOMETRIUM NOVASURE, COMBINED N/A 11/17/2016    Procedure: COMBINED DILATE CERVIX, HYSTEROSCOPY, ABLATE ENDOMETRIUM NOVASURE;  Surgeon: Sabas Patel MD;  Location: [facility]     GYN SURGERY  dates above    myomectomy x2, laparoscopy x2     GYN SURGERY      Endometriosis surgery 5/1/19     INSERT PICC LINE Left 11/15/2019    Procedure: INSERTION, PICC, single lumen PICC;  Surgeon: Emmett Rasmussen PA-C;  Location: [facility] OR     IR PICC PLACEMENT > 5 YRS OF AGE  11/15/2019     LAPAROSCOPIC ASSISTED HYSTERECTOMY VAGINAL N/A 1/4/2018    Procedure: LAPAROSCOPIC ASSISTED HYSTERECTOMY VAGINAL;;  Surgeon: Sabas Patel MD;  Location: [facility]     LAPAROSCOPIC GASTRIC SLEEVE N/A 7/25/2017    Procedure: LAPAROSCOPIC GASTRIC SLEEVE;  Laparoscopic Sleeve Gastrectomy;  Surgeon: Sam Schmidt MD;  Location: [facility] OR     LAPAROSCOPIC LYSIS ADHESIONS  7/10/2014    Procedure: LAPAROSCOPIC LYSIS ADHESIONS;  Surgeon: Sabas Patel MD;  Location: [facility]     LAPAROSCOPIC SALPINGECTOMY Bilateral 1/4/2018    Procedure: LAPAROSCOPIC SALPINGECTOMY;;  Surgeon: Sabas Patel MD;  Location: [facility]     LASER CO2 LAPAROSCOPIC VAPORIZATION ENDOMETRIUM  7/10/2014    Procedure: LASER CO2 LAPAROSCOPIC VAPORIZATION ENDOMETRIUM;  Surgeon: Sabas Patel MD;  Location: [facility]     LASER CO2 LAPAROSCOPIC VAPORIZATION ENDOMETRIUM N/A 6/30/2015    Procedure: LASER CO2 LAPAROSCOPIC VAPORIZATION ENDOMETRIUM;  Surgeon: Sabas Patel MD;   Location: [facility]     LASER CO2 LAPAROSCOPIC VAPORIZATION ENDOMETRIUM, LYSIS ADHESIONS N/A 2018    Procedure: LASER CO2 LAPAROSCOPIC VAPORIZATION ENDOMETRIUM, LYSIS ADHESIONS;  LAPAROSCOPY WITH CO2 LASER LYSIS OF ADHESIONS AND VAPORIZATION OF ENDOMETRIOSIS, CYSTOTOMY LEFT OVARY, LAPAROSCOPIC VAGINAL HYSTERECTOMY WITH BILATERAL SALPINGECTOMY ;  Surgeon: Sabas Patel MD;  Location: [facility]     LASER CO2 LAPAROSCOPY DIAGNOSTIC, LYSIS ADHESIONS, COMBINED N/A 2015    Procedure: COMBINED LASER CO2 LAPAROSCOPY DIAGNOSTIC, LYSIS ADHESIONS;  Surgeon: Sabas Patel MD;  Location: [facility]           Family History:     Family History   Problem Relation Age of Onset     Depression Mother      Cancer Mother         Cervical      Other - See Comments Mother         Fibromyalgia     Crohn's Disease Mother      Obesity Mother      Ovarian Cancer Mother         Cervical Cancer     Substance Abuse Mother      Stomach Problem Mother      Arthritis Father      Rheumatoid Arthritis Father      Depression Father      Substance Abuse Father      Cerebrovascular Disease Paternal Grandmother      Breast Cancer Maternal Grandmother      Cancer Maternal Grandmother      Depression Brother      Depression Sister      Mental Illness Brother         schizophrenia and bipolar     Substance Abuse Brother      Stomach Problem Sister      Stomach Problem Brother            Social History:     Social History     Tobacco Use     Smoking status: Former Smoker     Packs/day: 0.50     Years: 5.00     Pack years: 2.50     Types: Cigarettes     Start date: 2007     Quit date: 2013     Years since quittin.9     Smokeless tobacco: Never Used   Substance Use Topics     Alcohol use: Yes     Alcohol/week: 0.0 standard drinks     Comment: 2 drinks per month     Drug use: Yes     Types: Marijuana     Comment: 2 weeks ago for pain     Social History     Social History Narrative     Not on file            Review of Systems:   CONSTITUTIONAL:   No fevers or chills  EYES: negative for icterus  ENT:  negative for hearing loss, tinnitus, sore throat  RESPIRATORY:  negative for cough, sputum or dyspnea  CARDIOVASCULAR:  negative for chest pain, palpitations  GASTROINTESTINAL:  negative for nausea, vomiting, diarrhea or constipation  GENITOURINARY:  negative for dysuria  HEME:  No easy bruising  INTEGUMENT:  negative for rash or pruritus  NEURO:  She is having some headaches in the back of her head, she is seeing light trails. She is having memory loss.          Current Medications:     Current Outpatient Medications   Medication Sig Dispense Refill     buPROPion (WELLBUTRIN XL) 300 MG 24 hr tablet TAKE 1 TABLET BY MOUTH EVERY DAY IN THE MORNING 90 tablet 3     medical cannabis (Patient's own supply) See Admin Instructions (The purpose of this order is to document that the patient reports taking medical cannabis.  This is not a prescription, and is not used to certify that the patient has a qualifying medical condition.)       MULTIPLE VITAMIN PO Take 1 patch by mouth daily PatchMD - Multivitamin Patch       NUVARING 0.12-0.015 MG/24HR vaginal ring INSERT 1 RING VAGINALLY EVERY 21 DAYS 1 each 0     SUMAtriptan (IMITREX) 50 MG tablet Take 1-2 tablets at the onset of a headache. May repeat in 2 hours. Max 4 tablets/24 hours. 30 tablet 1     Topiramate (TOPAMAX PO) Take 300 mg by mouth daily        vitamin B complex with vitamin C (VITAMIN  B COMPLEX) TABS tablet Take 1 tablet by mouth daily       VITAMIN D PO Take 5,000 Units by mouth daily       rifampin (RIFADIN) 300 MG capsule        sterile water (preservative free) SOLN 20 mL with cefTRIAXone 2 GM SOLR 2 g vial Inject 2 g into the vein every 24 hours              Immunization History:     Immunization History   Administered Date(s) Administered     TD (ADULT, 7+) 12/12/2003     Tdap (Adacel,Boostrix) 06/14/2011            Allergies:   No Known Allergies         Physical Exam:   Vital signs:  LMP  (LMP  Unknown)  No vitals taken in this virtual visit.     Physical Examination:  GENERAL:  well-developed, well-nourished, seated in no acute distress.  HEENT:  Head is normocephalic, atraumatic   EYES:  Eyes have anicteric sclerae without conjunctival injection   LUNGS:  Breathing comfortably on RA.  CARDIOVASCULAR:  No signs of cyanosis or LE edema.  ABDOMEN:  Normal bowel sounds, soft, nontender. No appreciable hepatosplenomegaly.  SKIN:  No acute rashes.    NEUROLOGIC:  Grossly nonfocal. Active x4 extremities         Laboratory Data:     Metabolic Studies   Sodium   Date Value Ref Range Status   12/11/2019 142 133 - 144 mmol/L Final   12/05/2019 142 133 - 144 mmol/L Final     Potassium   Date Value Ref Range Status   12/11/2019 3.6 3.4 - 5.3 mmol/L Final   12/05/2019 3.5 3.4 - 5.3 mmol/L Final     Chloride   Date Value Ref Range Status   12/11/2019 113 (H) 94 - 109 mmol/L Final   12/05/2019 114 (H) 94 - 109 mmol/L Final     Carbon Dioxide   Date Value Ref Range Status   12/11/2019 24 20 - 32 mmol/L Final   12/05/2019 23 20 - 32 mmol/L Final     Anion Gap   Date Value Ref Range Status   12/11/2019 5 3 - 14 mmol/L Final   12/05/2019 5 3 - 14 mmol/L Final     Urea Nitrogen   Date Value Ref Range Status   12/11/2019 17 7 - 30 mg/dL Final   12/05/2019 14 7 - 30 mg/dL Final     Creatinine   Date Value Ref Range Status   12/11/2019 0.89 0.52 - 1.04 mg/dL Final   12/05/2019 0.86 0.52 - 1.04 mg/dL Final     GFR Estimate   Date Value Ref Range Status   12/11/2019 81 >60 mL/min/[1.73_m2] Final     Comment:     Non  GFR Calc  Starting 12/18/2018, serum creatinine based estimated GFR (eGFR) will be   calculated using the Chronic Kidney Disease Epidemiology Collaboration   (CKD-EPI) equation.     12/05/2019 85 >60 mL/min/[1.73_m2] Final     Comment:     Non  GFR Calc  Starting 12/18/2018, serum creatinine based estimated GFR (eGFR) will be   calculated using the Chronic Kidney Disease Epidemiology  Collaboration   (CKD-EPI) equation.       Glucose   Date Value Ref Range Status   12/11/2019 110 (H) 70 - 99 mg/dL Final   12/05/2019 99 70 - 99 mg/dL Final     Hemoglobin A1C   Date Value Ref Range Status   11/30/2017 4.6 4.3 - 6.0 % Final     Calcium   Date Value Ref Range Status   12/11/2019 8.0 (L) 8.5 - 10.1 mg/dL Final   12/05/2019 8.2 (L) 8.5 - 10.1 mg/dL Final     CRP Inflammation   Date Value Ref Range Status   12/11/2019 4.2 0.0 - 8.0 mg/L Final   12/05/2019 9.8 (H) 0.0 - 8.0 mg/L Final   11/25/2019 8.2 (H) 0.0 - 8.0 mg/L Final   11/20/2019 15.8 (H) 0.0 - 8.0 mg/L Final       Inflammatory Markers   CRP Inflammation   Date Value Ref Range Status   12/11/2019 4.2 0.0 - 8.0 mg/L Final   12/05/2019 9.8 (H) 0.0 - 8.0 mg/L Final   11/25/2019 8.2 (H) 0.0 - 8.0 mg/L Final   11/20/2019 15.8 (H) 0.0 - 8.0 mg/L Final       Hepatic Studies    Bilirubin Total   Date Value Ref Range Status   02/13/2019 0.3 0.2 - 1.3 mg/dL Final   08/09/2018 0.2 0.2 - 1.3 mg/dL Final     Alkaline Phosphatase   Date Value Ref Range Status   02/13/2019 46 40 - 150 U/L Final   08/09/2018 57 40 - 150 U/L Final     Albumin   Date Value Ref Range Status   02/13/2019 3.1 (L) 3.4 - 5.0 g/dL Final   08/09/2018 3.2 (L) 3.4 - 5.0 g/dL Final     AST   Date Value Ref Range Status   12/11/2019 10 0 - 45 U/L Final   12/05/2019 6 0 - 45 U/L Final     ALT   Date Value Ref Range Status   02/13/2019 15 0 - 50 U/L Final   08/09/2018 19 0 - 50 U/L Final       Hematology Studies      WBC   Date Value Ref Range Status   12/11/2019 4.7 4.0 - 11.0 10e9/L Final   12/05/2019 6.0 4.0 - 11.0 10e9/L Final     Absolute Neutrophil   Date Value Ref Range Status   12/11/2019 2.0 1.6 - 8.3 10e9/L Final   12/05/2019 3.5 1.6 - 8.3 10e9/L Final     Absolute Lymphocytes   Date Value Ref Range Status   12/11/2019 2.3 0.8 - 5.3 10e9/L Final   12/05/2019 1.9 0.8 - 5.3 10e9/L Final     Absolute Monocytes   Date Value Ref Range Status   12/11/2019 0.3 0.0 - 1.3 10e9/L Final    12/05/2019 0.5 0.0 - 1.3 10e9/L Final     Absolute Eosinophils   Date Value Ref Range Status   12/11/2019 0.1 0.0 - 0.7 10e9/L Final   12/05/2019 0.1 0.0 - 0.7 10e9/L Final     Hemoglobin   Date Value Ref Range Status   12/11/2019 12.8 11.7 - 15.7 g/dL Final   12/05/2019 12.9 11.7 - 15.7 g/dL Final     Hematocrit   Date Value Ref Range Status   12/11/2019 39.8 35.0 - 47.0 % Final   12/05/2019 40.5 35.0 - 47.0 % Final     Platelet Count   Date Value Ref Range Status   12/11/2019 246 150 - 450 10e9/L Final   12/05/2019 234 150 - 450 10e9/L Final       Imaging:  [unfilled]          Karena Cortez MD

## 2020-10-08 DIAGNOSIS — Z11.59 ENCOUNTER FOR SCREENING FOR OTHER VIRAL DISEASES: Primary | ICD-10-CM

## 2020-10-08 LAB
B BURGDOR AB CSF IA-ACNC: NORMAL
GLUCOSE CSF-MCNC: NORMAL MG/DL (ref 40–70)
PROT CSF-MCNC: NORMAL MG/DL (ref 15–60)
RBC # CSF MANUAL: NORMAL /UL (ref 0–2)
VDRL CSF QL: NORMAL
WBC # CSF MANUAL: NORMAL /UL (ref 0–5)

## 2020-10-14 ENCOUNTER — MYC MEDICAL ADVICE (OUTPATIENT)
Dept: INFECTIOUS DISEASES | Facility: CLINIC | Age: 40
End: 2020-10-14

## 2020-10-16 DIAGNOSIS — Z11.59 ENCOUNTER FOR SCREENING FOR OTHER VIRAL DISEASES: ICD-10-CM

## 2020-10-16 PROCEDURE — 99000 SPECIMEN HANDLING OFFICE-LAB: CPT | Performed by: PATHOLOGY

## 2020-10-16 PROCEDURE — U0003 INFECTIOUS AGENT DETECTION BY NUCLEIC ACID (DNA OR RNA); SEVERE ACUTE RESPIRATORY SYNDROME CORONAVIRUS 2 (SARS-COV-2) (CORONAVIRUS DISEASE [COVID-19]), AMPLIFIED PROBE TECHNIQUE, MAKING USE OF HIGH THROUGHPUT TECHNOLOGIES AS DESCRIBED BY CMS-2020-01-R: HCPCS | Mod: 90 | Performed by: PATHOLOGY

## 2020-10-17 LAB
SARS-COV-2 RNA SPEC QL NAA+PROBE: NOT DETECTED
SPECIMEN SOURCE: NORMAL

## 2020-10-18 DIAGNOSIS — Z01.812 PRE-PROCEDURE LAB EXAM: Primary | ICD-10-CM

## 2020-10-19 ENCOUNTER — ANCILLARY PROCEDURE (OUTPATIENT)
Dept: GENERAL RADIOLOGY | Facility: CLINIC | Age: 40
End: 2020-10-19
Attending: INTERNAL MEDICINE
Payer: COMMERCIAL

## 2020-10-19 VITALS
RESPIRATION RATE: 20 BRPM | SYSTOLIC BLOOD PRESSURE: 136 MMHG | OXYGEN SATURATION: 100 % | TEMPERATURE: 98.5 F | DIASTOLIC BLOOD PRESSURE: 85 MMHG | HEART RATE: 76 BPM

## 2020-10-19 DIAGNOSIS — Z01.812 PRE-OPERATIVE LABORATORY EXAMINATION: Primary | ICD-10-CM

## 2020-10-19 DIAGNOSIS — R41.3 MEMORY LOSS: ICD-10-CM

## 2020-10-19 LAB
ERYTHROCYTE [DISTWIDTH] IN BLOOD BY AUTOMATED COUNT: 12.4 % (ref 10–15)
EV RNA SPEC QL NAA+PROBE: NEGATIVE
GRAM STN SPEC: NORMAL
HCT VFR BLD AUTO: 46 % (ref 35–47)
HGB BLD-MCNC: 14.5 G/DL (ref 11.7–15.7)
INR PPP: 1.16 (ref 0.86–1.14)
MCH RBC QN AUTO: 29.4 PG (ref 26.5–33)
MCHC RBC AUTO-ENTMCNC: 31.5 G/DL (ref 31.5–36.5)
MCV RBC AUTO: 93 FL (ref 78–100)
PLATELET # BLD AUTO: 204 10E9/L (ref 150–450)
RBC # BLD AUTO: 4.93 10E12/L (ref 3.8–5.2)
SPECIMEN SOURCE: NORMAL
SPECIMEN SOURCE: NORMAL
WBC # BLD AUTO: 7 10E9/L (ref 4–11)

## 2020-10-19 PROCEDURE — 62328 DX LMBR SPI PNXR W/FLUOR/CT: CPT | Performed by: PHYSICIAN ASSISTANT

## 2020-10-19 PROCEDURE — 99000 SPECIMEN HANDLING OFFICE-LAB: CPT | Performed by: PATHOLOGY

## 2020-10-19 PROCEDURE — 85610 PROTHROMBIN TIME: CPT | Performed by: PATHOLOGY

## 2020-10-19 PROCEDURE — 87070 CULTURE OTHR SPECIMN AEROBIC: CPT | Mod: 90 | Performed by: PATHOLOGY

## 2020-10-19 PROCEDURE — 87205 SMEAR GRAM STAIN: CPT | Mod: 90 | Performed by: PATHOLOGY

## 2020-10-19 PROCEDURE — 82784 ASSAY IGA/IGD/IGG/IGM EACH: CPT | Mod: 90 | Performed by: PATHOLOGY

## 2020-10-19 PROCEDURE — 87529 HSV DNA AMP PROBE: CPT | Mod: 90 | Performed by: PATHOLOGY

## 2020-10-19 PROCEDURE — 87498 ENTEROVIRUS PROBE&REVRS TRNS: CPT | Mod: 90 | Performed by: PATHOLOGY

## 2020-10-19 PROCEDURE — 82040 ASSAY OF SERUM ALBUMIN: CPT | Mod: 90 | Performed by: PATHOLOGY

## 2020-10-19 PROCEDURE — 87015 SPECIMEN INFECT AGNT CONCNTJ: CPT | Mod: 90 | Performed by: PATHOLOGY

## 2020-10-19 PROCEDURE — 82042 OTHER SOURCE ALBUMIN QUAN EA: CPT | Mod: 90 | Performed by: PATHOLOGY

## 2020-10-19 PROCEDURE — 83916 OLIGOCLONAL BANDS: CPT | Mod: 90 | Performed by: PATHOLOGY

## 2020-10-19 PROCEDURE — 85027 COMPLETE CBC AUTOMATED: CPT | Performed by: PATHOLOGY

## 2020-10-19 PROCEDURE — 36415 COLL VENOUS BLD VENIPUNCTURE: CPT | Performed by: PATHOLOGY

## 2020-10-19 PROCEDURE — 89050 BODY FLUID CELL COUNT: CPT | Mod: 90 | Performed by: PATHOLOGY

## 2020-10-19 PROCEDURE — 87798 DETECT AGENT NOS DNA AMP: CPT | Mod: 90 | Performed by: PATHOLOGY

## 2020-10-19 RX ORDER — LIDOCAINE HYDROCHLORIDE 10 MG/ML
30 INJECTION, SOLUTION EPIDURAL; INFILTRATION; INTRACAUDAL; PERINEURAL ONCE
Status: COMPLETED | OUTPATIENT
Start: 2020-10-19 | End: 2020-10-19

## 2020-10-19 RX ADMIN — LIDOCAINE HYDROCHLORIDE 5 ML: 10 INJECTION, SOLUTION EPIDURAL; INFILTRATION; INTRACAUDAL; PERINEURAL at 11:58

## 2020-10-19 NOTE — PROGRESS NOTES
Interventional Radiology Brief Post Procedure Note    Procedure: IR Lumbar Puncture    Proceduralist: Santana Capone PA-C    Assistant: None    Time Out: Prior to the start of the procedure and with procedural staff participation, I verbally confirmed the patient s identity using two indicators, relevant allergies, that the procedure was appropriate and matched the consent or emergent situation, and that the correct equipment/implants were available. Immediately prior to starting the procedure I conducted the Time Out with the procedural staff and re-confirmed the patient s name, procedure, and site/side. (The Joint Commission universal protocol was followed.)  Yes    Medications   Medication Event Details Admin User Admin Time       Sedation: None. Local Anesthestic used    Findings: Completed image guided diagnostic lumbar puncture at L3/L4 with immediate return of clear CSF. A total of 14 ml of clear CSF was collected and sent for diagnostic testing. Patient tolerated the procedure well. No immediate complication.    Estimated Blood Loss: None    Fluoroscopy Time:  0.7 minute(s)    SPECIMENS: Fluid and/or tissue for laboratory analysis    Complications: 1. None     Condition: Stable    Plan: 1 hour bedrest prior to discharge. Follow-up per primary team.     Comments: See dictated procedure note for full details.    Santana Capone PA-C

## 2020-10-19 NOTE — DISCHARGE INSTRUCTIONS
Discharge Instructions After Your Lumbar Puncture      Relax and take it easy for 24 hours    You may resume normal activity tomorrow if you feel well    You may remove the bandage on your back in the evening or the next morning    Drink at least 4 glasses of extra fluid today if not on a fluid restriction    Do not submerge injection site in water for 24 hours, (no baths. pools, hot tubs).  Showers are OK.           DO NOT drive or operate machinery at home or at work for at least 24 hours    If you develop a headache you can take over the counter medicines and lay down.  You can try drinking caffeine-coffee, soda.    Try laying flat until you are feeling better.           CALL YOUR PRIMARY PHYSICIAN IF:    You develop a severe headache    You develop nausea or vomiting     You develop a temperature of 101  or greater

## 2020-10-20 LAB
HSV1 DNA CSF QL NAA+PROBE: NOT DETECTED
HSV2 DNA CSF QL NAA+PROBE: NOT DETECTED
MICROBIOLOGIST REVIEW: NORMAL

## 2020-10-21 ENCOUNTER — NURSE TRIAGE (OUTPATIENT)
Dept: NURSING | Facility: CLINIC | Age: 40
End: 2020-10-21

## 2020-10-21 ENCOUNTER — TELEPHONE (OUTPATIENT)
Dept: INFECTIOUS DISEASES | Facility: CLINIC | Age: 40
End: 2020-10-21

## 2020-10-21 ENCOUNTER — TRANSFERRED RECORDS (OUTPATIENT)
Dept: HEALTH INFORMATION MANAGEMENT | Facility: CLINIC | Age: 40
End: 2020-10-21

## 2020-10-21 DIAGNOSIS — G97.1 HEADACHE AFTER SPINAL PUNCTURE: Primary | ICD-10-CM

## 2020-10-21 LAB
ALB CSF/SERPL: 9.5 RATIO (ref 0–9)
ALBUMIN CSF-MCNC: 34 MG/DL (ref 0–35)
ALBUMIN SERPL-MCNC: 3580 MG/DL (ref 3500–5200)
GLUCOSE CSF-MCNC: 46 MG/DL (ref 40–70)
IGG CSF-MCNC: 3.4 MG/DL (ref 0–6)
IGG SERPL-MCNC: 805 MG/DL (ref 768–1632)
IGG SYNTH RATE SER+CSF CALC-MRATE: <0 MG/D
IGG/ALB CLEAR SER+CSF-RTO: 0.44 RATIO (ref 0.28–0.66)
IGG/ALB CSF: 0.1 RATIO (ref 0.09–0.25)
INR PPP: 1.2 (ref 0.9–1.1)
MISCELLANEOUS TEST: NORMAL
OLIGOCLONAL BANDS CSF ELPH-IMP: ABNORMAL
OLIGOCLONAL BANDS CSF ELPH-IMP: NEGATIVE
OLIGOCLONAL BANDS CSF IEF: 0 BANDS (ref 0–1)

## 2020-10-21 NOTE — TELEPHONE ENCOUNTER
Pt stated that she would accept Epo as a blood product if medically necessary Pt was in the clinic on Monday for a lumbar puncture.  Since she had the lumbar puncture procedure.  She has had a headache that will not go away.  It is the worst headache ever. Pt has tried Tylenol, Advil, Imitrex, ice packs, heating pad and the headache will not go away.   Pt is only able to stand up for a couple of minutes and then needs to lay back down because the headache pain is so bad.      Some dizziness.   But no nausea, vomiting, diarrhea, or constipation noted.      I suggested ER for Pt care.  Pt agreed with plan of care.   Will go to the closest ER to her home for an evaluation.    Miya Lennon RN  Central Triage Red Flags/Med Refills         Additional Information    Negative: Difficult to awaken or acting confused (e.g., disoriented, slurred speech)    Negative: Weakness of the face, arm or leg on one side of the body and new onset    Negative: Numbness of the face, arm or leg on one side of the body and new onset    Negative: Loss of speech or garbled speech and new onset    Negative: Passed out (i.e., fainted, collapsed and was not responding)    Negative: Sounds like a life-threatening emergency to the triager    Negative: Followed a head injury within last 3 days    Negative: Traumatic Brain Injury (TBI) is suspected    Negative: Sinus pain of forehead and yellow or green nasal discharge    Negative: Pregnant    Protocols used: HEADACHE-A-[place]

## 2020-10-21 NOTE — TELEPHONE ENCOUNTER
Karena Cortez MD  You 6 minutes ago (12:35 PM)     She is recommended to drink lots of fluids and try caffeine and tylenol.     If she has tried these there is something called an epidural blood patch which is uncommonly done. I am not sure how we would arrange this.

## 2020-10-21 NOTE — TELEPHONE ENCOUNTER
See MyChart encounter from today. Pt is going to go to ED in [place] now. She is in a lot of pain and unable to stand for more than a few minutes. Her  will be driving her.   Carolina Martinez RN

## 2020-10-21 NOTE — TELEPHONE ENCOUNTER
[facility] Call Center    Phone Message    May a detailed message be left on voicemail: yes     Reason for Call: Symptoms or Concerns     If patient has red-flag symptoms, warm transfer to triage line    Current symptom or concern: Spinal headache, nausea    Symptoms have been present for:  3 day(s)    Has patient previously been seen for this? Yes    By Dana:     Date: 10/06/20    Are there any new or worsening symptoms? Yes: Pt called stating shes had a headache since her lumbar puncture on 10/19/20. Pt stated this is the worst shes ever felt. She feels nausea but has not thrown up. Pt denied a fever and stated she feels better when laying down but feels this is getting worse. Called Red Flag Triage and spoke to Kortney and was not able to transfer/called ID priority and went straight to . i was not able to transfer patient for immediate assistance. Please follow up ASAP      Action Taken: Message routed to:  [facility] ([facility]): ID    Travel Screening: Not Applicable

## 2020-10-22 ENCOUNTER — DOCUMENTATION ONLY (OUTPATIENT)
Dept: INTERVENTIONAL RADIOLOGY/VASCULAR | Facility: CLINIC | Age: 40
End: 2020-10-22

## 2020-10-22 LAB
PROT CSF-MCNC: 56 MG/DL (ref 15–60)
SPECIMEN SOURCE: NORMAL
WNV RNA SER QL NAA+PROBE: NOT DETECTED

## 2020-10-22 NOTE — TELEPHONE ENCOUNTER
See Glenn carvajal from today. Pt reports feeling better than yesterday, and she received a call from IR re: follow-up and if blood patch procedure is needed. She knows to contact IR/neuro team if she continues to have sx's on Monday.  Carolina Martinez RN

## 2020-10-22 NOTE — PROGRESS NOTES
Patient Ashli Gamboa is a 40-year-old female who was at the [facility] for image guided lumbar puncture on 10/19/2020.  Patient presented to the emergency room in [place] on 10/21/2020 for evaluation of severe positional headaches.  The emergency department there diagnosed her with probable CSF leak related to lumbar puncture.  Patient states that they were preparing to do a blood patch procedure but her INR result came back at 1.2 which was outside their guidelines.    I talked with patient on the phone this morning 10/22/2020 and she confirmed that her symptoms are somewhat improved but she continues to have persistent positional headaches that are much worse when standing up.  Patient has been hydrating well and getting some symptom relief with over-the-counter pain relieving medications and her as needed Imitrex for migraines.  Patient states that her symptoms today are slightly better than they have been on previous day.      Per the neuro interventional radiology protocol patient should be 5 days out from lumbar puncture before blood patch procedure is performed.  This means the patient continues to have symptoms through the weekend that a blood patch procedure to be indicated not emergently on Monday, 10/26/2020.    Patient was given the interventional scheduling number and directed to call on Monday morning if she is continuing to have positional headache symptoms.      Santana Capone PA-C  Interventional Radiology  10/22/2020

## 2020-10-23 LAB
APPEARANCE CSF: ABNORMAL
COLOR CSF: COLORLESS
RBC # CSF MANUAL: 1 /UL (ref 0–2)
TUBE # CSF: 4 #
WBC # CSF MANUAL: 0 /UL (ref 0–5)

## 2020-10-24 LAB
BACTERIA SPEC CULT: NO GROWTH
SPECIMEN SOURCE: NORMAL

## 2020-10-30 ENCOUNTER — TELEPHONE (OUTPATIENT)
Dept: INTERVENTIONAL RADIOLOGY/VASCULAR | Facility: CLINIC | Age: 40
End: 2020-10-30

## 2020-10-30 DIAGNOSIS — G97.1 POST LUMBAR PUNCTURE HEADACHE: Primary | ICD-10-CM

## 2020-10-30 DIAGNOSIS — G97.1 HEADACHE FOLLOWING LUMBAR PUNCTURE: Primary | ICD-10-CM

## 2020-11-02 ENCOUNTER — TELEPHONE (OUTPATIENT)
Dept: RADIOLOGY | Facility: CLINIC | Age: 40
End: 2020-11-02

## 2020-11-02 LAB — LAB SCANNED RESULT: NORMAL

## 2020-11-02 NOTE — TELEPHONE ENCOUNTER
Spoke with patient regarding persistent headache following LP. Currently feeling better, however, persistent dull headache which is constant throughout the day. No exacerbation with standing and no quick relief with laying flat.     Explained that this does not sound like a typical spinal headache at this time and given the length of time elapsed since her LP that blood patch is unlikely to help with symptoms.    Patient agreeable that she does not want to undergo a procedure that may not help. Patient will continue conservative therapy. Patient notified to call back if symptoms worsen or more typical of a spinal headache. Also encouraged to seek emergent treatment if new or significantly worsening of symptoms.    Patient agrees to above plan.

## 2020-11-03 DIAGNOSIS — A69.20 NEUROLOGICAL LYME DISEASE: Primary | ICD-10-CM

## 2020-11-06 ENCOUNTER — TELEPHONE (OUTPATIENT)
Dept: INFECTIOUS DISEASES | Facility: CLINIC | Age: 40
End: 2020-11-06

## 2020-11-06 NOTE — TELEPHONE ENCOUNTER
Reported results from [facility]. Nothing definitive and [place] was negative.   She had yet to hear from neurology. I will send their admin a message.     Karena Cortez MD

## 2020-11-10 ENCOUNTER — VIRTUAL VISIT (OUTPATIENT)
Dept: NEUROLOGY | Facility: CLINIC | Age: 40
End: 2020-11-10
Attending: INTERNAL MEDICINE
Payer: COMMERCIAL

## 2020-11-10 ENCOUNTER — PRE VISIT (OUTPATIENT)
Dept: NEUROLOGY | Facility: CLINIC | Age: 40
End: 2020-11-10

## 2020-11-10 DIAGNOSIS — R26.89 IMBALANCE: ICD-10-CM

## 2020-11-10 DIAGNOSIS — R41.89 SPELL OF ALTERED COGNITION: ICD-10-CM

## 2020-11-10 DIAGNOSIS — B94.8 POST-LYME DISEASE SYNDROME: ICD-10-CM

## 2020-11-10 DIAGNOSIS — R41.9 COGNITIVE COMPLAINTS: Primary | ICD-10-CM

## 2020-11-10 PROCEDURE — 99215 OFFICE O/P EST HI 40 MIN: CPT | Mod: 95 | Performed by: PSYCHIATRY & NEUROLOGY

## 2020-11-10 RX ORDER — RIFAMPIN 150 MG/1
CAPSULE ORAL
COMMUNITY
Start: 2020-01-28 | End: 2021-03-19

## 2020-11-10 RX ORDER — CEFDINIR 300 MG/1
CAPSULE ORAL
COMMUNITY
Start: 2020-11-04 | End: 2021-03-19

## 2020-11-10 RX ORDER — MINOCYCLINE HYDROCHLORIDE 100 MG/1
100 CAPSULE ORAL
COMMUNITY
End: 2021-03-19

## 2020-11-10 NOTE — TELEPHONE ENCOUNTER
FUTURE VISIT INFORMATION      FUTURE VISIT INFORMATION:    Date: 11/10/2020    Time: 7am    Location: [facility]  REFERRAL INFORMATION:    Referring provider:  Dr. Cortez    Referring providers clinic:   Infectious Disease    Reason for visit/diagnosis  Neurological Lyme Disease     RECORDS REQUESTED FROM:       Clinic name Comments Records Status Imaging Status   [facility]  ED visit -10/21/2020 Epic N/A          Internal Dr. Cortez-10/6/2020    MR Brain 1/17/2020 Epic PACS
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
## 2020-11-10 NOTE — PROGRESS NOTES
"Ashli Gamboa is a 40 year old female who is being evaluated via a billable video visit.      The patient has been notified of following:     \"This video visit will be conducted via a call between you and your physician/provider. We have found that certain health care needs can be provided without the need for an in-person physical exam.  This service lets us provide the care you need with a video conversation.  If a prescription is necessary we can send it directly to your pharmacy.  If lab work is needed we can place an order for that and you can then stop by our lab to have the test done at a later time.    Video visits are billed at different rates depending on your insurance coverage.  Please reach out to your insurance provider with any questions.    If during the course of the call the physician/provider feels a video visit is not appropriate, you will not be charged for this service.\"    Patient has given verbal consent for Video visit? Yes  How would you like to obtain your AVS? [email or web address]  If you are dropped from the video visit, the video invite should be resent to: Other e-mail: [email or web address]  Will anyone else be joining your video visit? No        Video-Visit Details    Type of service:  Video Visit    Video Start Time: 0700  Video End Time: 8:11 AM    Originating Location (pt. Location): Home    Distant Location (provider location):  [facility] NEUROLOGY [facility]     Platform used for Video Visit: Norris Ryan      "

## 2020-11-16 ENCOUNTER — TELEPHONE (OUTPATIENT)
Dept: CARDIOLOGY | Facility: CLINIC | Age: 40
End: 2020-11-16

## 2020-11-16 NOTE — TELEPHONE ENCOUNTER
Called LVM to reschedule the appointment (11/18). The order said General Cardiologist but the nurse reached out to me and said she is being seen for POTS which is to see a EP Dr.   Please schedule with Dr. Song VIDEO NEW ONLY . Once canceled please cancel the appointment for wed 11/18, and link order .     Referral for POTS  NEW VIDEO BENDITT

## 2020-11-16 NOTE — TELEPHONE ENCOUNTER
RECORDS RECEIVED FROM:   DATE RECEIVED:   NOTES STATUS DETAILS   OFFICE NOTE from referring provider    Internal Dr. Pope 11-10-20 neuro   OFFICE NOTE from other cardiologist    N/A    DISCHARGE SUMMARY from hospital    N/A    DISCHARGE REPORT from the ER   N/A    OPERATIVE REPORT    N/A    MEDICATION LIST   Internal    LABS     BMP   Internal 12-11-19   CBC   Internal 10-19-20   CMP   Internal 2-13-19   Lipids   N/A    TSH   N/A    DIAGNOSTIC PROCEDURES     EKG   N/A    Monitor Reports   N/A    IMAGING (DISC & REPORT)      Echo   N/A    Stress Tests   N/A    Cath   N/A    MRI/MRA   N/A    CT/CTA   N/A

## 2020-11-18 ENCOUNTER — PRE VISIT (OUTPATIENT)
Dept: CARDIOLOGY | Facility: CLINIC | Age: 40
End: 2020-11-18

## 2020-11-22 ENCOUNTER — HEALTH MAINTENANCE LETTER (OUTPATIENT)
Age: 40
End: 2020-11-22

## 2020-11-24 ENCOUNTER — MEDICAL CORRESPONDENCE (OUTPATIENT)
Dept: HEALTH INFORMATION MANAGEMENT | Facility: CLINIC | Age: 40
End: 2020-11-24

## 2020-12-03 ENCOUNTER — VIRTUAL VISIT (OUTPATIENT)
Dept: CARDIOLOGY | Facility: CLINIC | Age: 40
End: 2020-12-03
Attending: INTERNAL MEDICINE
Payer: COMMERCIAL

## 2020-12-03 DIAGNOSIS — B94.8 POST-LYME DISEASE SYNDROME: ICD-10-CM

## 2020-12-03 DIAGNOSIS — R55 SYNCOPE: Primary | ICD-10-CM

## 2020-12-03 DIAGNOSIS — R00.0 SINUS TACHYCARDIA: ICD-10-CM

## 2020-12-03 DIAGNOSIS — G90.9 AUTONOMIC DYSFUNCTION: ICD-10-CM

## 2020-12-03 DIAGNOSIS — R41.89 SPELL OF ALTERED COGNITION: ICD-10-CM

## 2020-12-03 PROCEDURE — 99203 OFFICE O/P NEW LOW 30 MIN: CPT | Mod: 95 | Performed by: INTERNAL MEDICINE

## 2020-12-03 RX ORDER — LIDOCAINE 40 MG/G
CREAM TOPICAL
Status: CANCELLED | OUTPATIENT
Start: 2020-12-03

## 2020-12-03 RX ORDER — SODIUM CHLORIDE 9 MG/ML
INJECTION, SOLUTION INTRAVENOUS CONTINUOUS
Status: CANCELLED | OUTPATIENT
Start: 2020-12-03

## 2020-12-03 NOTE — PATIENT INSTRUCTIONS
"          You were seen in the Electrophysiology Clinic today by: Gilberto Song MD    Plan:     We will schedule you for an autonomic study, or \"tilt table test.\" This is done in our catheterization lab at the [facility]. Dr. Song will be doing the procedure. Instructions for the test are noted below. Gloria, our EP , will reach out to you to schedule this procedure, and will send you updated instructions with date and time once scheduled.     You will need to have a COVID test done prior to the procedure. Gloria will discuss this with you.    Labs/Tests Needed:    5 day Biotel (Cardionet) Monitor to assess heart rates. We can mail this out.    Blood tests to be added on to tilt table study.    Follow up visit:    Determined after tilt table study.    Your Care Team:  EP Cardiology   Telephone Number     Suki Hernandez RN [phone]     For scheduling appts or procedures:    Gloria Heck   [phone]   For the Device Clinic (Pacemakers, ICDs, Loop Recorders)    During business hours: [phone]  After business hours:   [phone]- select option 4 and ask for job code [id number].       Cardiovascular Clinic:   [address] [place] [address]. [place] [address]      As always, Thank you for trusting us with your health care needs!         You are scheduled for a Tilt Table/Autonomic study with Dr. Song      You will need to undergo a COVID-19 PCR swab test within 4 days of procedure. You will receive a phone call with more information. If you do not hear from the COVID scheduling team, please call: [phone] to schedule.    Below are instructions, if you have questions please contact our office.     1. You should arrive at the [facility] at __TBD_____  ([address], [place]: [phone]).    2. Report to the GOLD waiting room. Your procedure will be done in the Catheterization Lab.     3. Wear comfortable clothing. (sweat/yoga pants, t-shirt). " Please allow 3-4 hours for this procedure to be completed.     4. Do not eat or drink ANYTHING for 6 hours prior to arrival.     5. The morning of your procedure, you may take any scheduled medications with a SIP of water- unless you were instructed differently by your physician.   Do not take any vitamins, minerals or herbal supplements.     6. You may drive yourself to and from this procedure.       If you have further questions, please utilize [employer] to contact us.   If your question concerns the above instructions, contact:  Suki Hernandez RN  Electrophysiology Nurse Coordinator.  [phone]    If your question concerns the schedule/appointment times, contact:  FAIZ[facility] Cantu Procedure   [phone]

## 2020-12-03 NOTE — PROGRESS NOTES
"Onel Gamboa is a 40 year old female who is being evaluated via a billable video visit.      The patient has been notified of following:     \"This video visit will be conducted via a call between you and your physician/provider. We have found that certain health care needs can be provided without the need for an in-person physical exam.  This service lets us provide the care you need with a video conversation.  If a prescription is necessary we can send it directly to your pharmacy.  If lab work is needed we can place an order for that and you can then stop by our lab to have the test done at a later time.    Video visits are billed at different rates depending on your insurance coverage.  Please reach out to your insurance provider with any questions.    If during the course of the call the physician/provider feels a video visit is not appropriate, you will not be charged for this service.\"    Patient has given verbal consent for Video visit? Yes  How would you like to obtain your AVS? MyChart  If you are dropped from the video visit, the video invite should be resent to: Other e-mail: [employer] [phone]  Will anyone else be joining your video visit? No      HPI:   Onel is a pleasant 40-year-old woman referred by the neurology service (Dr. Seals) for evaluation of possible pots.  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  Patient has been now working at home during the Covid crisis (since March 2020)    Onel's current symptoms appear to have started approximately in the middle of 2019 and may have been associated with a positive Lyme diagnosis and associated antibiotic treatment.  It is noted that the patient did have bariatric surgery in 2017 and went from about 300pounds to 130 pounds currently.  However she did not have any of her \"POTS-like\" symptoms in the time.  Prior to the Lyme diagnosis.    Currently onel indicates that her resting heart " "rate tends to be relatively low in the 50s.  She has noted a marked increase in heart rate when she gets up from seated or supine position but still only in the 80s to 90s.  She does not report any excessive heart rates above 100.  She has an apple watch for this purpose.    Other symptoms include a lightheadedness sensation after arising.  The symptoms are often accompanied by a visual disturbance which she describes as \"light sabers\".  She does not describe any syncope or collapse but does feel somewhat unsteady and finds this to be debilitating and that she is afraid to do much physical activity.    Ashli's medications have not changed recently as noted in the neurology note reprinted below.     Patient is a non-smoker and does not consume much in the way of alcohol.  She does not use recreational drugs.  There is no family history of similar symptoms.  Patient's  did suffer from a stroke due to a presumed PFO and has had that treated.    Ashli does not have children at home.    Recent Neurology Visit:   The patient was initially seen in neurologic consultation on 1/16/2020 with Dr. Batista for evaluation of memory loss with cognitive fog. Please see the comprehensive neurologic consultation note from that date in the Epic records for details. In summary, the patient obtained suspected Lyme disease after suffering a tick bite in 07/2019 which gave her an ovoid rash behind her left knee.  She was treated with 14 day course of Doxycycline.  Symptoms of memory loss, brain fog, confusion, fatigue, malaise, and diffuse body pain occurred 10/2019.  These symptoms were different than her already diagnosed fibromyalgia symptoms.  The patient was then treated for 6 week course of IV ceftriaxone antibiotics after Lyme testing came back positive.  Symptoms were described as getting lost in familiar places, intermittent visual changes, and there was no focal neurological deficits seen on exam.  MoCA score was " 25/30 on initial evaluation.  Initial concern was for parraneoplastic syndromes, steroid responsive encephalopathy, subclinical seizures, structural brain lesions, medication side effects, mental health conditions.  Repeat Brain MRI was done on 1/17/2020 and normal.  EEG 90 minute monitoring was done 2/4/2020 and normal.  Serum testing with NMDA receptor nick, paraneoplastic Nick, TPO nick were done 1/16/2020 and normal.  The patient was to complete neuropsychological testing, but this was not completed due to COVID restrictions.     The patient is taking Wellbutrin for depression, and Topamax for migraine prophylaxis.  These medications were not altered or changed at the start of symptoms in 10/2019.    At today's visit I reviewed the history of postural orthostatic tachycardia syndrome and the fact that while she does have some symptoms that may fit that category, she does not meet the typical criteria.  On the other hand she may be exhibiting some autonomic disturbances that could relate back to her Lyme disease or its treatment and this would be reasonable to investigate further.  Patient indicated understanding of the role of autonomic testing in this circumstance.  We will make arrangements.  In the interim, an ambulatory ECG monitor will be used to try and ascertain whether she meets heart rate criteria for pots      PAST MEDICAL HISTORY:  Past Medical History:   Diagnosis Date     Arthritis     BIG TOE     Cervical high risk HPV (human papillomavirus) test positive 07/05/2017     Cervical high risk HPV (human papillomavirus) test positive 06/10/2015     Depressive disorder 2007    I have been on and off medication for the last several years     Endometriosis      Hearing problem      Obese      Sensorineural hearing loss        CURRENT MEDICATIONS:  Current Outpatient Medications   Medication Sig Dispense Refill     buPROPion (WELLBUTRIN XL) 300 MG 24 hr tablet TAKE 1 TABLET BY MOUTH EVERY DAY IN THE MORNING 90  tablet 3     cefdinir (OMNICEF) 300 MG capsule        medical cannabis (Patient's own supply) See Admin Instructions (The purpose of this order is to document that the patient reports taking medical cannabis.  This is not a prescription, and is not used to certify that the patient has a qualifying medical condition.)       minocycline (MINOCIN) 100 MG capsule Take 100 mg by mouth       MULTIPLE VITAMIN PO Take 1 patch by mouth daily PatchMD - Multivitamin Patch       NUVARING 0.12-0.015 MG/24HR vaginal ring INSERT 1 RING VAGINALLY EVERY 21 DAYS 1 each 0     rifampin (RIFADIN) 150 MG capsule        SUMAtriptan (IMITREX) 50 MG tablet Take 1-2 tablets at the onset of a headache. May repeat in 2 hours. Max 4 tablets/24 hours. 30 tablet 1     Topiramate (TOPAMAX PO) Take 300 mg by mouth daily        vitamin B complex with vitamin C (VITAMIN  B COMPLEX) TABS tablet Take 1 tablet by mouth daily       VITAMIN D PO Take 5,000 Units by mouth daily       rifampin (RIFADIN) 300 MG capsule        sterile water (preservative free) SOLN 20 mL with cefTRIAXone 2 GM SOLR 2 g vial Inject 2 g into the vein every 24 hours         PAST SURGICAL HISTORY:  Past Surgical History:   Procedure Laterality Date     ABDOMEN SURGERY      laparoscopy X2     DILATE CERVIX, HYSTEROSCOPY, ABLATE ENDOMETRIUM NOVASURE, COMBINED N/A 11/17/2016    Procedure: COMBINED DILATE CERVIX, HYSTEROSCOPY, ABLATE ENDOMETRIUM NOVASURE;  Surgeon: Sabas Patel MD;  Location: [facility]     GYN SURGERY  dates above    myomectomy x2, laparoscopy x2     GYN SURGERY      Endometriosis surgery 5/1/19     INSERT PICC LINE Left 11/15/2019    Procedure: INSERTION, PICC, single lumen PICC;  Surgeon: Emmett Rsamussen PA-C;  Location: [facility] OR     IR PICC PLACEMENT > 5 YRS OF AGE  11/15/2019     LAPAROSCOPIC ASSISTED HYSTERECTOMY VAGINAL N/A 1/4/2018    Procedure: LAPAROSCOPIC ASSISTED HYSTERECTOMY VAGINAL;;  Surgeon: Sabas Patel MD;  Location: [facility]      LAPAROSCOPIC GASTRIC SLEEVE N/A 7/25/2017    Procedure: LAPAROSCOPIC GASTRIC SLEEVE;  Laparoscopic Sleeve Gastrectomy;  Surgeon: Sam Schmidt MD;  Location: [facility] OR     LAPAROSCOPIC LYSIS ADHESIONS  7/10/2014    Procedure: LAPAROSCOPIC LYSIS ADHESIONS;  Surgeon: Sabas Patel MD;  Location: [facility]     LAPAROSCOPIC SALPINGECTOMY Bilateral 1/4/2018    Procedure: LAPAROSCOPIC SALPINGECTOMY;;  Surgeon: Sabas Patel MD;  Location: [facility]     LASER CO2 LAPAROSCOPIC VAPORIZATION ENDOMETRIUM  7/10/2014    Procedure: LASER CO2 LAPAROSCOPIC VAPORIZATION ENDOMETRIUM;  Surgeon: Sabas Patel MD;  Location: [facility]     LASER CO2 LAPAROSCOPIC VAPORIZATION ENDOMETRIUM N/A 6/30/2015    Procedure: LASER CO2 LAPAROSCOPIC VAPORIZATION ENDOMETRIUM;  Surgeon: Sabas Patel MD;  Location: [facility]     LASER CO2 LAPAROSCOPIC VAPORIZATION ENDOMETRIUM, LYSIS ADHESIONS N/A 1/4/2018    Procedure: LASER CO2 LAPAROSCOPIC VAPORIZATION ENDOMETRIUM, LYSIS ADHESIONS;  LAPAROSCOPY WITH CO2 LASER LYSIS OF ADHESIONS AND VAPORIZATION OF ENDOMETRIOSIS, CYSTOTOMY LEFT OVARY, LAPAROSCOPIC VAGINAL HYSTERECTOMY WITH BILATERAL SALPINGECTOMY ;  Surgeon: Sabas Patel MD;  Location: [facility]     LASER CO2 LAPAROSCOPY DIAGNOSTIC, LYSIS ADHESIONS, COMBINED N/A 6/30/2015    Procedure: COMBINED LASER CO2 LAPAROSCOPY DIAGNOSTIC, LYSIS ADHESIONS;  Surgeon: Sabas Patel MD;  Location: [facility]       ALLERGIES:   No Known Allergies    FAMILY HISTORY:  Family History   Problem Relation Age of Onset     Depression Mother      Cancer Mother         Cervical      Other - See Comments Mother         Fibromyalgia     Crohn's Disease Mother      Obesity Mother      Ovarian Cancer Mother         Cervical Cancer     Substance Abuse Mother      Stomach Problem Mother      Arthritis Father      Rheumatoid Arthritis Father      Depression Father      Substance Abuse Father      Cerebrovascular Disease Paternal Grandmother      Breast Cancer Maternal  Grandmother      Cancer Maternal Grandmother      Depression Brother      Depression Sister      Mental Illness Brother         schizophrenia and bipolar     Substance Abuse Brother      Stomach Problem Sister      Stomach Problem Brother          SOCIAL HISTORY:  Social History     Tobacco Use     Smoking status: Former Smoker     Packs/day: 0.50     Years: 5.00     Pack years: 2.50     Types: Cigarettes     Start date: 2007     Quit date: 2013     Years since quittin.0     Smokeless tobacco: Never Used   Substance Use Topics     Alcohol use: Yes     Alcohol/week: 0.0 standard drinks     Comment: 2 drinks per month     Drug use: Yes     Types: Marijuana     Comment: 2 weeks ago for pain       ROS:   Constitutional: No fever, chills, or sweats. Weight stable.   ENT: No visual disturbance, ear ache, epistaxis, sore throat.   Cardiovascular: As per HPI.   Respiratory: No cough, hemoptysis.    GI: No nausea, vomiting,    : No hematuria.   Integument: Negative.   Psychiatric: Negative.   Hematologic:  Easy bruising, no easy bleeding.  Neuro: Negative.   Endocrinology: No significant heat or cold intolerance   Musculoskeletal: No myalgia.    Exam:  LMP  (LMP Unknown)   GENERAL APPEARANCE: healthy, alert and no distress    RESPIRATORY: - no rales, rhonchi or wheezes, no use of accessory muscles, no retractions, respirations are unlabored, normal respiratory rate  NEURO: alert and oriented to person/place/time, normal speech, gait and affect    SKIN: no ecchymoses, no rashes recently but did have with her Lyme diagnosis last year    Labs:  CBC RESULTS:   Lab Results   Component Value Date    WBC 7.0 10/19/2020    RBC 4.93 10/19/2020    HGB 14.5 10/19/2020    HCT 46.0 10/19/2020    MCV 93 10/19/2020    MCH 29.4 10/19/2020    MCHC 31.5 10/19/2020    RDW 12.4 10/19/2020     10/19/2020       BMP RESULTS:  Lab Results   Component Value Date     2019    POTASSIUM 3.6 2019    CHLORIDE 113  (H) 12/11/2019    CO2 24 12/11/2019    ANIONGAP 5 12/11/2019     (H) 12/11/2019    BUN 17 12/11/2019    CR 0.89 12/11/2019    GFRESTIMATED 81 12/11/2019    GFRESTBLACK >90 12/11/2019    KWABENA 8.0 (L) 12/11/2019        INR RESULTS:  Lab Results   Component Value Date    INR 1.2 (A) 10/21/2020    INR 1.16 (H) 10/19/2020    INR 1.12 07/18/2019       Procedures:  PULMONARY FUNCTION TESTS:   No flowsheet data found.      ECHOCARDIOGRAM:   No results found for this or any previous visit (from the past 8760 hour(s)).      Assessment and Plan:   1.  History of Lyme disease in 2019 with appropriate treatment  2.  History of fibromyalgia diagnosis since approximate 2015  3.  Concern regarding postural orthostatic tachycardia syndrome or similar autonomic dysfunction resulting in current symptoms of lightheadedness, instability and visual disturbances    Plan  1.  Arrange for 5-day CardioNet monitor to assess heart rate range during daily activities  2.  Patient to undergo autonomic studies at [facility].  She understands that this may be delayed till after the beginning of the new year given the Covid situation  3.  Blood tests for ESR, CRP, SABA, thyroid function , tryptase, plasma histamine and prostaglandin D2 to be taken at the time of autonomic testing    Video on 10: 0 1 AM; video off 10: 21am; elapsed 20 minutes  Platform D[employer]  Patient at home; clinic [facility] heart    I very much appreciated the opportunity to see and assess Ashli Gamboa in the clinic today. Please do not hesitate to contact my office if you have any questions or concerns.      Gilberto Song MD  Cardiac Arrhythmia Service  [facility]  [phone]      CC  SELF, REFERRED

## 2020-12-03 NOTE — LETTER
December 3, 2020      TO: Ashli Donna Gamboa  [address] [facility] [address]         Dear Ashli,        You are scheduled for a Tilt Table/Autonomic study with Dr. Gilberto Song      You will need to undergo a COVID-19 PCR swab test within 4 days of procedure. You will receive a phone call with more information. If you do not hear from the COVID scheduling team, please call: [phone] to schedule.    Below are instructions, if you have questions please contact our office.     1. You should arrive at the [facility] at _______  ([address], [place]: [phone]).    2. Report to the [facility] waiting room. Your procedure will be done in the Catheterization Lab.     3. Wear comfortable clothing. (sweat/yoga pants, t-shirt). Please allow 3-4 hours for this procedure to be completed.     4. Do not eat or drink ANYTHING for 6 hours prior to arrival.     5. The morning of your procedure, you may take any scheduled medications with a SIP of water- unless you were instructed differently by your physician.   Do not take any vitamins, minerals or herbal supplements.     6. You may drive yourself to and from this procedure.       If you have further questions, please utilize [employer] to contact us.   If your question concerns the above instructions, contact:  Suki Hernandez RN  Electrophysiology Nurse Coordinator.  [phone]    If your question concerns the schedule/appointment times, contact:  [facility]   [phone]

## 2020-12-03 NOTE — LETTER
"12/3/2020      RE: Ashli Gamboa  [address] [facility] [place] [address]       Dear Colleague,    Thank you for the opportunity to participate in the care of your patient, Ashli Gamboa, at the [facility] HEART [facility] at [facility]. Please see a copy of my visit note below.    Ashli Gamboa is a 40 year old female who is being evaluated via a billable video visit.      The patient has been notified of following:     \"This video visit will be conducted via a call between you and your physician/provider. We have found that certain health care needs can be provided without the need for an in-person physical exam.  This service lets us provide the care you need with a video conversation.  If a prescription is necessary we can send it directly to your pharmacy.  If lab work is needed we can place an order for that and you can then stop by our lab to have the test done at a later time.    Video visits are billed at different rates depending on your insurance coverage.  Please reach out to your insurance provider with any questions.    If during the course of the call the physician/provider feels a video visit is not appropriate, you will not be charged for this service.\"    Patient has given verbal consent for Video visit? Yes  How would you like to obtain your AVS? MyChart  If you are dropped from the video visit, the video invite should be resent to: Other e-mail: Xanga [phone]  Will anyone else be joining your video visit? No      HPI:   Ashli is a pleasant 40-year-old woman referred by the neurology service (Dr. Seals) for evaluation of possible pots.  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  Patient has been now working at home during the Covid crisis (since March 2020)    Ashli's current symptoms appear to have started approximately in the middle of 2019 and may have been " "associated with a positive Lyme diagnosis and associated antibiotic treatment.  It is noted that the patient did have bariatric surgery in 2017 and went from about 300pounds to 130 pounds currently.  However she did not have any of her \"POTS-like\" symptoms in the time.  Prior to the Lyme diagnosis.    Currently onel indicates that her resting heart rate tends to be relatively low in the 50s.  She has noted a marked increase in heart rate when she gets up from seated or supine position but still only in the 80s to 90s.  She does not report any excessive heart rates above 100.  She has an apple watch for this purpose.    Other symptoms include a lightheadedness sensation after arising.  The symptoms are often accompanied by a visual disturbance which she describes as \"light sabers\".  She does not describe any syncope or collapse but does feel somewhat unsteady and finds this to be debilitating and that she is afraid to do much physical activity.    Onel's medications have not changed recently as noted in the neurology note reprinted below.     Patient is a non-smoker and does not consume much in the way of alcohol.  She does not use recreational drugs.  There is no family history of similar symptoms.  Patient's  did suffer from a stroke due to a presumed PFO and has had that treated.    Onel does not have children at home.    Recent Neurology Visit:   The patient was initially seen in neurologic consultation on 1/16/2020 with Dr. Batista for evaluation of memory loss with cognitive fog. Please see the comprehensive neurologic consultation note from that date in the Epic records for details. In summary, the patient obtained suspected Lyme disease after suffering a tick bite in 07/2019 which gave her an ovoid rash behind her left knee.  She was treated with 14 day course of Doxycycline.  Symptoms of memory loss, brain fog, confusion, fatigue, malaise, and diffuse body pain occurred 10/2019.  These " symptoms were different than her already diagnosed fibromyalgia symptoms.  The patient was then treated for 6 week course of IV ceftriaxone antibiotics after Lyme testing came back positive.  Symptoms were described as getting lost in familiar places, intermittent visual changes, and there was no focal neurological deficits seen on exam.  MoCA score was 25/30 on initial evaluation.  Initial concern was for parraneoplastic syndromes, steroid responsive encephalopathy, subclinical seizures, structural brain lesions, medication side effects, mental health conditions.  Repeat Brain MRI was done on 1/17/2020 and normal.  EEG 90 minute monitoring was done 2/4/2020 and normal.  Serum testing with NMDA receptor nick, paraneoplastic Nick, TPO nick were done 1/16/2020 and normal.  The patient was to complete neuropsychological testing, but this was not completed due to COVID restrictions.     The patient is taking Wellbutrin for depression, and Topamax for migraine prophylaxis.  These medications were not altered or changed at the start of symptoms in 10/2019.    At today's visit I reviewed the history of postural orthostatic tachycardia syndrome and the fact that while she does have some symptoms that may fit that category, she does not meet the typical criteria.  On the other hand she may be exhibiting some autonomic disturbances that could relate back to her Lyme disease or its treatment and this would be reasonable to investigate further.  Patient indicated understanding of the role of autonomic testing in this circumstance.  We will make arrangements.  In the interim, an ambulatory ECG monitor will be used to try and ascertain whether she meets heart rate criteria for pots      PAST MEDICAL HISTORY:  Past Medical History:   Diagnosis Date     Arthritis     BIG TOE     Cervical high risk HPV (human papillomavirus) test positive 07/05/2017     Cervical high risk HPV (human papillomavirus) test positive 06/10/2015      Depressive disorder 2007    I have been on and off medication for the last several years     Endometriosis      Hearing problem      Obese      Sensorineural hearing loss        CURRENT MEDICATIONS:  Current Outpatient Medications   Medication Sig Dispense Refill     buPROPion (WELLBUTRIN XL) 300 MG 24 hr tablet TAKE 1 TABLET BY MOUTH EVERY DAY IN THE MORNING 90 tablet 3     cefdinir (OMNICEF) 300 MG capsule        medical cannabis (Patient's own supply) See Admin Instructions (The purpose of this order is to document that the patient reports taking medical cannabis.  This is not a prescription, and is not used to certify that the patient has a qualifying medical condition.)       minocycline (MINOCIN) 100 MG capsule Take 100 mg by mouth       MULTIPLE VITAMIN PO Take 1 patch by mouth daily PatchMD - Multivitamin Patch       NUVARING 0.12-0.015 MG/24HR vaginal ring INSERT 1 RING VAGINALLY EVERY 21 DAYS 1 each 0     rifampin (RIFADIN) 150 MG capsule        SUMAtriptan (IMITREX) 50 MG tablet Take 1-2 tablets at the onset of a headache. May repeat in 2 hours. Max 4 tablets/24 hours. 30 tablet 1     Topiramate (TOPAMAX PO) Take 300 mg by mouth daily        vitamin B complex with vitamin C (VITAMIN  B COMPLEX) TABS tablet Take 1 tablet by mouth daily       VITAMIN D PO Take 5,000 Units by mouth daily       rifampin (RIFADIN) 300 MG capsule        sterile water (preservative free) SOLN 20 mL with cefTRIAXone 2 GM SOLR 2 g vial Inject 2 g into the vein every 24 hours         PAST SURGICAL HISTORY:  Past Surgical History:   Procedure Laterality Date     ABDOMEN SURGERY      laparoscopy X2     DILATE CERVIX, HYSTEROSCOPY, ABLATE ENDOMETRIUM NOVASURE, COMBINED N/A 11/17/2016    Procedure: COMBINED DILATE CERVIX, HYSTEROSCOPY, ABLATE ENDOMETRIUM NOVASURE;  Surgeon: Sabas Patel MD;  Location: [facility]     GYN SURGERY  dates above    myomectomy x2, laparoscopy x2     GYN SURGERY      Endometriosis surgery 5/1/19     INSERT  PICC LINE Left 11/15/2019    Procedure: INSERTION, PICC, single lumen PICC;  Surgeon: Emmett Rasmussen PA-C;  Location: [facility]     IR PICC PLACEMENT > 5 YRS OF AGE  11/15/2019     LAPAROSCOPIC ASSISTED HYSTERECTOMY VAGINAL N/A 1/4/2018    Procedure: LAPAROSCOPIC ASSISTED HYSTERECTOMY VAGINAL;;  Surgeon: Sabas Patel MD;  Location: [facility]     LAPAROSCOPIC GASTRIC SLEEVE N/A 7/25/2017    Procedure: LAPAROSCOPIC GASTRIC SLEEVE;  Laparoscopic Sleeve Gastrectomy;  Surgeon: Sam Schmidt MD;  Location: [facility] OR     LAPAROSCOPIC LYSIS ADHESIONS  7/10/2014    Procedure: LAPAROSCOPIC LYSIS ADHESIONS;  Surgeon: Sabas Patel MD;  Location: [facility]     LAPAROSCOPIC SALPINGECTOMY Bilateral 1/4/2018    Procedure: LAPAROSCOPIC SALPINGECTOMY;;  Surgeon: Sabas Patel MD;  Location: [facility]     LASER CO2 LAPAROSCOPIC VAPORIZATION ENDOMETRIUM  7/10/2014    Procedure: LASER CO2 LAPAROSCOPIC VAPORIZATION ENDOMETRIUM;  Surgeon: Sabas Patel MD;  Location: [facility]     LASER CO2 LAPAROSCOPIC VAPORIZATION ENDOMETRIUM N/A 6/30/2015    Procedure: LASER CO2 LAPAROSCOPIC VAPORIZATION ENDOMETRIUM;  Surgeon: Sabas Patel MD;  Location: [facility]     LASER CO2 LAPAROSCOPIC VAPORIZATION ENDOMETRIUM, LYSIS ADHESIONS N/A 1/4/2018    Procedure: LASER CO2 LAPAROSCOPIC VAPORIZATION ENDOMETRIUM, LYSIS ADHESIONS;  LAPAROSCOPY WITH CO2 LASER LYSIS OF ADHESIONS AND VAPORIZATION OF ENDOMETRIOSIS, CYSTOTOMY LEFT OVARY, LAPAROSCOPIC VAGINAL HYSTERECTOMY WITH BILATERAL SALPINGECTOMY ;  Surgeon: Sabas Patel MD;  Location: [facility]     LASER CO2 LAPAROSCOPY DIAGNOSTIC, LYSIS ADHESIONS, COMBINED N/A 6/30/2015    Procedure: COMBINED LASER CO2 LAPAROSCOPY DIAGNOSTIC, LYSIS ADHESIONS;  Surgeon: Sabas Patel MD;  Location: [facility]       ALLERGIES:   No Known Allergies    FAMILY HISTORY:  Family History   Problem Relation Age of Onset     Depression Mother      Cancer Mother         Cervical      Other - See Comments Mother          Fibromyalgia     Crohn's Disease Mother      Obesity Mother      Ovarian Cancer Mother         Cervical Cancer     Substance Abuse Mother      Stomach Problem Mother      Arthritis Father      Rheumatoid Arthritis Father      Depression Father      Substance Abuse Father      Cerebrovascular Disease Paternal Grandmother      Breast Cancer Maternal Grandmother      Cancer Maternal Grandmother      Depression Brother      Depression Sister      Mental Illness Brother         schizophrenia and bipolar     Substance Abuse Brother      Stomach Problem Sister      Stomach Problem Brother          SOCIAL HISTORY:  Social History     Tobacco Use     Smoking status: Former Smoker     Packs/day: 0.50     Years: 5.00     Pack years: 2.50     Types: Cigarettes     Start date: 2007     Quit date: 2013     Years since quittin.0     Smokeless tobacco: Never Used   Substance Use Topics     Alcohol use: Yes     Alcohol/week: 0.0 standard drinks     Comment: 2 drinks per month     Drug use: Yes     Types: Marijuana     Comment: 2 weeks ago for pain       ROS:   Constitutional: No fever, chills, or sweats. Weight stable.   ENT: No visual disturbance, ear ache, epistaxis, sore throat.   Cardiovascular: As per HPI.   Respiratory: No cough, hemoptysis.    GI: No nausea, vomiting,    : No hematuria.   Integument: Negative.   Psychiatric: Negative.   Hematologic:  Easy bruising, no easy bleeding.  Neuro: Negative.   Endocrinology: No significant heat or cold intolerance   Musculoskeletal: No myalgia.    Exam:  LMP  (LMP Unknown)   GENERAL APPEARANCE: healthy, alert and no distress    RESPIRATORY: - no rales, rhonchi or wheezes, no use of accessory muscles, no retractions, respirations are unlabored, normal respiratory rate  NEURO: alert and oriented to person/place/time, normal speech, gait and affect    SKIN: no ecchymoses, no rashes recently but did have with her Lyme diagnosis last year    Labs:  CBC RESULTS:   Lab  Results   Component Value Date    WBC 7.0 10/19/2020    RBC 4.93 10/19/2020    HGB 14.5 10/19/2020    HCT 46.0 10/19/2020    MCV 93 10/19/2020    MCH 29.4 10/19/2020    MCHC 31.5 10/19/2020    RDW 12.4 10/19/2020     10/19/2020       BMP RESULTS:  Lab Results   Component Value Date     12/11/2019    POTASSIUM 3.6 12/11/2019    CHLORIDE 113 (H) 12/11/2019    CO2 24 12/11/2019    ANIONGAP 5 12/11/2019     (H) 12/11/2019    BUN 17 12/11/2019    CR 0.89 12/11/2019    GFRESTIMATED 81 12/11/2019    GFRESTBLACK >90 12/11/2019    KWABENA 8.0 (L) 12/11/2019        INR RESULTS:  Lab Results   Component Value Date    INR 1.2 (A) 10/21/2020    INR 1.16 (H) 10/19/2020    INR 1.12 07/18/2019       Procedures:  PULMONARY FUNCTION TESTS:   No flowsheet data found.      ECHOCARDIOGRAM:   No results found for this or any previous visit (from the past 8760 hour(s)).      Assessment and Plan:   1.  History of Lyme disease in 2019 with appropriate treatment  2.  History of fibromyalgia diagnosis since approximate 2015  3.  Concern regarding postural orthostatic tachycardia syndrome or similar autonomic dysfunction resulting in current symptoms of lightheadedness, instability and visual disturbances    Plan  1.  Arrange for 5-day CardioNet monitor to assess heart rate range during daily activities  2.  Patient to undergo autonomic studies at [facility].  She understands that this may be delayed till after the beginning of the new year given the Covid situation  3.  Blood tests for ESR, CRP, SABA, thyroid function , tryptase, plasma histamine and prostaglandin D2 to be taken at the time of autonomic testing    Video on 10: 0 1 AM; video off 10: 21am; elapsed 20 minutes  Platform Doximity  Patient at home; clinic [facility] heart    I very much appreciated the opportunity to see and assess Ashli Gamboa in the clinic today. Please do not hesitate to contact my office if you have any questions or concerns.      Gilberto Song  MD  Cardiac Arrhythmia Service  [facility]  [phone]

## 2020-12-15 ENCOUNTER — TELEPHONE (OUTPATIENT)
Dept: CARDIOLOGY | Facility: CLINIC | Age: 40
End: 2020-12-15

## 2020-12-15 DIAGNOSIS — Z11.59 ENCOUNTER FOR SCREENING FOR OTHER VIRAL DISEASES: Primary | ICD-10-CM

## 2020-12-15 NOTE — TELEPHONE ENCOUNTER
EP Scheduling called the patient to schedule tilt table  with Dr. Song. The number [phone] was left for the patient to return the call and schedule the procedure.    Gloria Heck  Periop Electrophysiology   [phone]

## 2020-12-16 DIAGNOSIS — R53.83 FATIGUE: Primary | ICD-10-CM

## 2020-12-16 LAB
ALBUMIN SERPL-MCNC: 2.9 G/DL (ref 3.4–5)
ALP SERPL-CCNC: 61 U/L (ref 40–150)
ALT SERPL W P-5'-P-CCNC: 18 U/L (ref 0–50)
ANION GAP SERPL CALCULATED.3IONS-SCNC: 2 MMOL/L (ref 3–14)
AST SERPL W P-5'-P-CCNC: 12 U/L (ref 0–45)
BILIRUB SERPL-MCNC: 0.4 MG/DL (ref 0.2–1.3)
BUN SERPL-MCNC: 15 MG/DL (ref 7–30)
CALCIUM SERPL-MCNC: 9 MG/DL (ref 8.5–10.1)
CHLORIDE SERPL-SCNC: 109 MMOL/L (ref 94–109)
CO2 SERPL-SCNC: 30 MMOL/L (ref 20–32)
CREAT SERPL-MCNC: 0.88 MG/DL (ref 0.52–1.04)
GFR SERPL CREATININE-BSD FRML MDRD: 82 ML/MIN/{1.73_M2}
GLUCOSE SERPL-MCNC: 81 MG/DL (ref 70–99)
POTASSIUM SERPL-SCNC: 4.2 MMOL/L (ref 3.4–5.3)
PROT SERPL-MCNC: 6 G/DL (ref 6.8–8.8)
SODIUM SERPL-SCNC: 141 MMOL/L (ref 133–144)

## 2020-12-16 PROCEDURE — 36415 COLL VENOUS BLD VENIPUNCTURE: CPT | Performed by: PHYSICIAN ASSISTANT

## 2020-12-16 PROCEDURE — 80053 COMPREHEN METABOLIC PANEL: CPT | Performed by: PHYSICIAN ASSISTANT

## 2020-12-27 DIAGNOSIS — Z11.59 ENCOUNTER FOR SCREENING FOR OTHER VIRAL DISEASES: ICD-10-CM

## 2020-12-27 LAB
SARS-COV-2 RNA SPEC QL NAA+PROBE: NORMAL
SPECIMEN SOURCE: NORMAL

## 2020-12-27 PROCEDURE — U0003 INFECTIOUS AGENT DETECTION BY NUCLEIC ACID (DNA OR RNA); SEVERE ACUTE RESPIRATORY SYNDROME CORONAVIRUS 2 (SARS-COV-2) (CORONAVIRUS DISEASE [COVID-19]), AMPLIFIED PROBE TECHNIQUE, MAKING USE OF HIGH THROUGHPUT TECHNOLOGIES AS DESCRIBED BY CMS-2020-01-R: HCPCS | Performed by: INTERNAL MEDICINE

## 2020-12-28 ENCOUNTER — TELEPHONE (OUTPATIENT)
Dept: CARDIOLOGY | Facility: CLINIC | Age: 40
End: 2020-12-28

## 2020-12-28 DIAGNOSIS — R41.89 SPELL OF ALTERED COGNITION: ICD-10-CM

## 2020-12-28 DIAGNOSIS — R00.0 SINUS TACHYCARDIA: ICD-10-CM

## 2020-12-28 LAB
LABORATORY COMMENT REPORT: NORMAL
SARS-COV-2 RNA SPEC QL NAA+PROBE: NEGATIVE
SPECIMEN SOURCE: NORMAL

## 2020-12-28 NOTE — TELEPHONE ENCOUNTER
Left voicemail for patient to complete Travel Screen for Cardiac Cath Lab appointment on 12/29 and inform patient of updated Visitor Policy.       covid neg

## 2020-12-29 ENCOUNTER — APPOINTMENT (OUTPATIENT)
Dept: LAB | Facility: CLINIC | Age: 40
End: 2020-12-29
Attending: INTERNAL MEDICINE
Payer: COMMERCIAL

## 2020-12-29 ENCOUNTER — APPOINTMENT (OUTPATIENT)
Dept: MEDSURG UNIT | Facility: CLINIC | Age: 40
End: 2020-12-29
Attending: INTERNAL MEDICINE
Payer: COMMERCIAL

## 2020-12-29 ENCOUNTER — HOSPITAL ENCOUNTER (OUTPATIENT)
Facility: CLINIC | Age: 40
Discharge: HOME | End: 2020-12-29
Attending: INTERNAL MEDICINE | Admitting: INTERNAL MEDICINE
Payer: COMMERCIAL

## 2020-12-29 VITALS
TEMPERATURE: 98 F | HEART RATE: 64 BPM | SYSTOLIC BLOOD PRESSURE: 120 MMHG | WEIGHT: 130 LBS | RESPIRATION RATE: 18 BRPM | BODY MASS INDEX: 22.2 KG/M2 | HEIGHT: 64 IN | DIASTOLIC BLOOD PRESSURE: 72 MMHG | OXYGEN SATURATION: 100 %

## 2020-12-29 DIAGNOSIS — G90.9 AUTONOMIC DYSFUNCTION: ICD-10-CM

## 2020-12-29 DIAGNOSIS — R55 SYNCOPE: ICD-10-CM

## 2020-12-29 LAB
CRP SERPL-MCNC: <2.9 MG/L (ref 0–8)
ERYTHROCYTE [SEDIMENTATION RATE] IN BLOOD BY WESTERGREN METHOD: 4 MM/H (ref 0–20)
INTERPRETATION ECG - MUSE: NORMAL
TRYPTASE SERPL-MCNC: 16.3 UG/L
TSH SERPL DL<=0.005 MIU/L-ACNC: 1.86 MU/L (ref 0.4–4)

## 2020-12-29 PROCEDURE — 93660 TILT TABLE EVALUATION: CPT | Mod: 26 | Performed by: INTERNAL MEDICINE

## 2020-12-29 PROCEDURE — 272N000001 HC OR GENERAL SUPPLY STERILE: Performed by: INTERNAL MEDICINE

## 2020-12-29 PROCEDURE — 84150 ASSAY OF PROSTAGLANDIN: CPT | Performed by: INTERNAL MEDICINE

## 2020-12-29 PROCEDURE — 93010 ELECTROCARDIOGRAM REPORT: CPT | Mod: 59 | Performed by: INTERNAL MEDICINE

## 2020-12-29 PROCEDURE — 83520 IMMUNOASSAY QUANT NOS NONAB: CPT | Performed by: INTERNAL MEDICINE

## 2020-12-29 PROCEDURE — 93005 ELECTROCARDIOGRAM TRACING: CPT

## 2020-12-29 PROCEDURE — 83088 ASSAY OF HISTAMINE: CPT | Performed by: INTERNAL MEDICINE

## 2020-12-29 PROCEDURE — 86039 ANTINUCLEAR ANTIBODIES (ANA): CPT | Performed by: INTERNAL MEDICINE

## 2020-12-29 PROCEDURE — 93660 TILT TABLE EVALUATION: CPT | Performed by: INTERNAL MEDICINE

## 2020-12-29 PROCEDURE — 86140 C-REACTIVE PROTEIN: CPT | Performed by: INTERNAL MEDICINE

## 2020-12-29 PROCEDURE — 258N000003 HC RX IP 258 OP 636: Performed by: INTERNAL MEDICINE

## 2020-12-29 PROCEDURE — 85652 RBC SED RATE AUTOMATED: CPT | Performed by: INTERNAL MEDICINE

## 2020-12-29 PROCEDURE — 99219 PR INITIAL OBSERVATION CARE,LEVEL II: CPT | Performed by: INTERNAL MEDICINE

## 2020-12-29 PROCEDURE — 95921 AUTONOMIC NRV PARASYM INERVJ: CPT | Mod: 26 | Performed by: INTERNAL MEDICINE

## 2020-12-29 PROCEDURE — 84443 ASSAY THYROID STIM HORMONE: CPT | Performed by: INTERNAL MEDICINE

## 2020-12-29 PROCEDURE — 95921 AUTONOMIC NRV PARASYM INERVJ: CPT | Performed by: INTERNAL MEDICINE

## 2020-12-29 PROCEDURE — 86038 ANTINUCLEAR ANTIBODIES: CPT | Performed by: INTERNAL MEDICINE

## 2020-12-29 PROCEDURE — 999N000132 HC STATISTIC PP CARE STAGE 1

## 2020-12-29 PROCEDURE — 999N000054 HC STATISTIC EKG NON-CHARGEABLE

## 2020-12-29 PROCEDURE — 36415 COLL VENOUS BLD VENIPUNCTURE: CPT | Performed by: INTERNAL MEDICINE

## 2020-12-29 RX ORDER — LIDOCAINE 40 MG/G
CREAM TOPICAL
Status: DISCONTINUED | OUTPATIENT
Start: 2020-12-29 | End: 2020-12-29 | Stop reason: HOSPADM

## 2020-12-29 RX ORDER — SODIUM CHLORIDE 9 MG/ML
INJECTION, SOLUTION INTRAVENOUS CONTINUOUS
Status: DISCONTINUED | OUTPATIENT
Start: 2020-12-29 | End: 2020-12-29 | Stop reason: HOSPADM

## 2020-12-29 RX ADMIN — SODIUM CHLORIDE 500 ML: 9 INJECTION, SOLUTION INTRAVENOUS at 10:11

## 2020-12-29 ASSESSMENT — MIFFLIN-ST. JEOR: SCORE: 1244.68

## 2020-12-29 NOTE — H&P
"H&P    HPI:   Onel is a pleasant 40-year-old woman referred by the neurology service (Dr. Seals) for evaluation of possible pots.  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  Patient has been now working at home during the Covid crisis (since March 2020)     Onel's current symptoms appear to have started approximately in the middle of 2019 and may have been associated with a positive Lyme diagnosis and associated antibiotic treatment.  It is noted that the patient did have bariatric surgery in 2017 and went from about 300pounds to 130 pounds currently.  However she did not have any of her \"POTS-like\" symptoms in the time.  Prior to the Lyme diagnosis.     Currently onel indicates that her resting heart rate tends to be relatively low in the 50s.  She has noted a marked increase in heart rate when she gets up from seated or supine position but still only in the 80s to 90s.  She does not report any excessive heart rates above 100.  She has an apple watch for this purpose.     Other symptoms include a lightheadedness sensation after arising.  The symptoms are often accompanied by a visual disturbance which she describes as \"light sabers\".  She does not describe any syncope     Review of systems  Head neck: No headaches or visual disturbance at this time (see HPI  Respiratory: No cough or sputum  Cardiovascular: No chest pain on exertion.  A sense of increased heart rate when standing but without rapid heart beating or palpitations  Gastrointestinal: No current complaints   Genitourinary; no current symptoms  Constitutional, no fever chills or recent weight loss     Family social history see clinic visit    Physical examination: Afebrile healthy appearing young woman  Head neck: No jugular venous distention no nodes detected  Chest clear to auscultation with normal breath sounds  Cardiac tones are normal no murmurs  Neurologic assessment grossly " normal    Laboratory findings no specific abnormalities detected    Impression: Evaluate for pots versus other autonomic disturbance    Plan: Tilt test and autonomic testing today-consent obtained    I very much appreciated the opportunity to see and assess Ashli Gamboa in the hospital [address]. The note above summarizes my findings and current recommendations.  Please do not hesitate to contact my office if you have any questions or concerns.      Gilberto Song MD  Cardiac Arrhythmia Service  [facility]  [phone]

## 2020-12-29 NOTE — PROGRESS NOTES
"Arrived from home for a Tilt table study.  VSS.  C/O pain \"everywhere\".  Has fibromyalgia as well as she is being treated for Lymes.  Takes Topamax for pain.  PIV placed and IV infusing.  Needs consent.  Resting in bed.  "

## 2020-12-29 NOTE — DISCHARGE INSTRUCTIONS
[facility]  DISCHARGE INSTRUCTIONS FOR   TILT TABLE WITH  VASOVAGAL SYNCOPE    Date:12/29    Plan:  Your tilt table showed you have vasovagal syncope.  - Increase hydration with goal to take in 64 oz of water/electrolyte(pedialyte or water with electrolyte supplement) fluids per day.     Recommend drinking 8-10 oz. Try to avoid high carbohydrate electrolyte drinks.  - Eat six small meals per day with focus on foods low in carbohydrates and low in gluten.  - Liberalize salt intake.  - Change positions carefully and slowly and maintain safe environment.  -Follow up with Dr Song in 3 months      Cardiovascular Clinic:  [address] [place] [address], [address]    Your Care Team:        EP Cardiology      Telephone Number         Dr. Gilberto Del Cid                [phone]         DIANNA Razo RN              [phone]         For Scheduling Appointments or              Procedures:      Gloria Oconnell               [phone]       As always, Thank you for trusting us with your health care needs!

## 2020-12-29 NOTE — IP AVS SNAPSHOT
MRN:[id number]                      After Visit Summary   12/29/2020    Ashli Gamboa    MRN: [id number]           Visit Information        Department      12/29/2020  8:15 AM [facility] [address] [place]          Review of your medicines      UNREVIEWED medicines. Ask your doctor about these medicines       Dose / Directions   buPROPion 300 MG 24 hr tablet  Commonly known as: WELLBUTRIN XL  Used for: Recurrent major depressive disorder, in partial remission (H)      TAKE 1 TABLET BY MOUTH EVERY DAY IN THE MORNING  Quantity: 90 tablet  Refills: 3     cefdinir 300 MG capsule  Commonly known as: OMNICEF      Refills: 0     minocycline 100 MG capsule  Commonly known as: MINOCIN      Dose: 100 mg  Take 100 mg by mouth  Refills: 0     MULTIPLE VITAMIN PO      Dose: 1 patch  Take 1 patch by mouth daily PatchMD - Multivitamin Patch  Refills: 0     NuvaRing 0.12-0.015 MG/24HR vaginal ring  Used for: Endometriosis  Generic drug: etonogestrel-ethinyl estradiol      INSERT 1 RING VAGINALLY EVERY 21 DAYS  Quantity: 1 each  Refills: 0     rifampin 150 MG capsule  Commonly known as: RIFADIN      Refills: 0     SUMAtriptan 50 MG tablet  Commonly known as: IMITREX  Used for: History of migraine      Take 1-2 tablets at the onset of a headache. May repeat in 2 hours. Max 4 tablets/24 hours.  Quantity: 30 tablet  Refills: 1     TOPAMAX PO      Dose: 300 mg  Take 300 mg by mouth daily  Refills: 0     vitamin B complex with vitamin C tablet      Dose: 1 tablet  Take 1 tablet by mouth daily  Refills: 0     VITAMIN D PO      Dose: 5,000 Units  Take 5,000 Units by mouth daily  Refills: 0        CONTINUE these medicines which have NOT CHANGED       Dose / Directions   medical cannabis  (Patient's own supply)      See Admin Instructions (The purpose of this order is to document that the patient reports taking medical cannabis.  This is not a prescription, and is not used to certify that the patient has  a qualifying medical condition.)  Refills: 0              Protect others around you: Learn how to safely use, store and throw away your medicines at www.disposemymeds.org.       Follow-ups after your visit       Your next 10 appointments already scheduled    Jan 07, 2021 12:00 PM  (Arrive by 11:45 AM)  Neuropsych Eval with  Neuropsychology Provider  [facility] [place] ([facility]) [address]  [address] [facility] [address] [phone]         Care Instructions       Further instructions from your care team       [facility]  DISCHARGE INSTRUCTIONS FOR   TILT TABLE WITH  VASOVAGAL SYNCOPE    Date:12/29    Plan:  Your tilt table showed you have vasovagal syncope.  - Increase hydration with goal to take in 64 oz of water/electrolyte(pedialyte or water with electrolyte supplement) fluids per day.     Recommend drinking 8-10 oz. Try to avoid high carbohydrate electrolyte drinks.  - Eat six small meals per day with focus on foods low in carbohydrates and low in gluten.  - Liberalize salt intake.  - Change positions carefully and slowly and maintain safe environment.  -Follow up with Dr Song in 3 months      Cardiovascular Clinic:  [address] [place], [place], [address]    Your Care Team:        [facility]      Telephone Number         Dr. Gilberto Del Cid                [phone]         Belen Hernandez, DIANNA Henderson RN              [phone]         For Scheduling Appointments or              Procedures:      Gloria Oconnell               [phone]       As always, Thank you for trusting us with your health care needs!    Statement of Approval (From admission, onward)     Ordered          12/29/20 1246  I have reviewed and agree with all the recommendations and orders detailed in this document.  EFFECTIVE NOW     Approved and electronically signed by Anoop  "MD Dennis                Additional Information About Your Visit       [facility] Information    [facility] gives you secure access to your electronic health record. If you see a primary care provider, you can also send messages to your care team and make appointments. If you have questions, please call your primary care clinic.  If you do not have a primary care provider, please call [phone] and they will assist you.       Care EveryWhere ID    This is your Care EveryWhere ID. This could be used by other organizations to access your [facility] medical records  [id number]       Your Vitals Were  Most recent update: 12/29/2020 12:53 PM    Blood Pressure   116/61 (BP Location: Right arm)          Pulse   67          Temperature   98  F (36.7  C) (Axillary)          Respirations   18          Height   1.626 m (5' 4\")             Weight   59 kg (130 lb)    Last Period    (LMP Unknown)    Pulse Oximetry   100%    BMI (Body Mass Index)   22.31 kg/m           Primary Care Provider Office Phone # Fax #    Karena August Chepe, APRN [facility] [phone]      Equal Access to Services    [facility]: Hadii aad ku hadasho Soomaali, waaxda luqadaha, qaybta kaalmada adeegyada, waxay miguel ángel warren . So [facility] [phone].    ATENCIÓN: Si habla español, tiene a flynn disposición servicios gratuitos de asistencia lingüística. Genaro al [phone].    We comply with applicable federal and state civil rights laws, including the [place] Human Rights Act. We do not discriminate on the basis of race, color, creed, Mosque, national origin, marital status, age, disability, sex, sexual orientation, or gender identity.    If you would like an itemization of your charges they will now be available in [facility] 30 days after discharge. To access the itemized statements in [employer] go to billing/billing summary. From there select view account. There will be multiple tabs showing an overview of your account, detail, " payments, and communications. From the communications tab you can see your monthly statements, your itemized statements, and any billing letters generated for your account. If you do not have a [facility] account and need help getting access please contact [facility] support at [phone].  If you would prefer to have your itemized statements mailed please contact our automated itemized bill request line at [phone] option  2.       Thank you!    Thank you for choosing [facility] for your care. Our goal is always to provide you with excellent care. Hearing back from our patients is one way we can continue to improve our services. Please take a few minutes to complete the written survey that you may receive in the mail after you visit with us. Thank you!            Medication List      Medications          Morning Afternoon Evening Bedtime As Needed    medical cannabis  (Patient's own supply)  INSTRUCTIONS: See Admin Instructions (The purpose of this order is to document that the patient reports taking medical cannabis.  This is not a prescription, and is not used to certify that the patient has a qualifying medical condition.)                       ASK your doctor about these medications          Morning Afternoon Evening Bedtime As Needed    buPROPion 300 MG 24 hr tablet  Also known as: WELLBUTRIN XL  INSTRUCTIONS: TAKE 1 TABLET BY MOUTH EVERY DAY IN THE MORNING                     cefdinir 300 MG capsule  Also known as: OMNICEF                     minocycline 100 MG capsule  Also known as: MINOCIN  INSTRUCTIONS: Take 100 mg by mouth                     MULTIPLE VITAMIN PO  INSTRUCTIONS: Take 1 patch by mouth daily PatchMD - Multivitamin Patch                     NuvaRing 0.12-0.015 MG/24HR vaginal ring  INSTRUCTIONS: INSERT 1 RING VAGINALLY EVERY 21 DAYS  Generic drug: etonogestrel-ethinyl estradiol                     rifampin 150 MG capsule  Also known as: RIFADIN                     SUMAtriptan 50 MG tablet  Also  known as: IMITREX  INSTRUCTIONS: Take 1-2 tablets at the onset of a headache. May repeat in 2 hours. Max 4 tablets/24 hours.                     TOPAMAX PO  INSTRUCTIONS: Take 300 mg by mouth daily                     vitamin B complex with vitamin C tablet  INSTRUCTIONS: Take 1 tablet by mouth daily                     VITAMIN D PO  INSTRUCTIONS: Take 5,000 Units by mouth daily

## 2020-12-29 NOTE — PROGRESS NOTES
Patient tolerated recovery stage well. VSS.Pt denies dizziness. Patient tolerated PO food and fluids. Teaching was done and discharge instructions were given.Dr Song gave some discharge instructions to pt and discussed plan. Patient ambulated, voided, and PIV was removed. Patient discharged from the hospital to home.

## 2020-12-30 LAB
ANA PAT SER IF-IMP: ABNORMAL
ANA SER QL IF: ABNORMAL
ANA TITR SER IF: ABNORMAL {TITER}
HISTAMINE BLD-SCNC: 1201 NMOL/L (ref 180–1800)

## 2021-01-07 ENCOUNTER — OFFICE VISIT (OUTPATIENT)
Dept: NEUROPSYCHOLOGY | Facility: CLINIC | Age: 41
End: 2021-01-07
Payer: COMMERCIAL

## 2021-01-07 DIAGNOSIS — R41.3 MEMORY LOSS: Primary | ICD-10-CM

## 2021-01-07 PROCEDURE — 96138 PSYCL/NRPSYC TECH 1ST: CPT

## 2021-01-07 PROCEDURE — 96133 NRPSYC TST EVAL PHYS/QHP EA: CPT

## 2021-01-07 PROCEDURE — 96116 NUBHVL XM PHYS/QHP 1ST HR: CPT

## 2021-01-07 PROCEDURE — 96132 NRPSYC TST EVAL PHYS/QHP 1ST: CPT

## 2021-01-07 PROCEDURE — 96139 PSYCL/NRPSYC TST TECH EA: CPT

## 2021-01-07 NOTE — LETTER
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  Information was obtained via video interview with the patient and review of her medical records.  Records indicate that Ms. Gamboa was seen in Neurology on 1/16/2020 with concerns regarding brain fog beginning in October.  She was noted to have a past medical history of depression, fibromyalgia, and Lyme disease.  She was reporting a sense of memory loss and intermittent confusion in addition to fatigue, malaise, and diffuse body pain that began in October 2019, and seemed different from fibromyalgia.  She noted that she had a tick bite in July 2019 resulting in an ovoid rash behind her left knee. She was treated with a 14 day course of doxycycline for suspected Lyme disease. A MoCA on 1/16/2020 was 25/30, marginally below expected. She was noted to have a tremor thought possibly caused by Wellbutrin. Dr. Batista noted that the differential diagnosis for cognitive dysfunction included paraneoplastic syndromes, steroid responsive encephalopathy, subclinical seizures, and structural brain lesions (developed since her brain MRI study in 2018), medication side effects (Wellbutrin and Topamax), and mental health conditions, although he noted that the latter would be the diagnosis of exclusion.  In addition to repeating an MRI, additional labs, and 3-hour video EEG monitoring, Dr. Batista referred Ms. Gamboa for a neuropsychological evaluation, for further characterization of any cognitive difficulties and to evaluate mood and personality. On 4/17/2020, she completed a video interview, as the clinic was closed due to COVID-19. She was seen today in the clinic to complete an updated interview and the testing  portion of the evaluation.     Please see the report dated 4/17/2020 for full details regarding the findings of her interview. Briefly, she indicated that she was employed full time. She was working from home due to COVID-19 related precautions, but was not noticing any difficulty doing her work. She also returned to college that semester, to work on her Bachelor's degree. Although she noticed some difficulty with concentration and sometimes had to pause and re-watch lectures, she was doing quite well in her schoolwork, and had been earning A's and B's. Her mood seemed to be fairly well managed, and she had a good support system, including her psychologist. She had been able to have telehealth sessions with her psychologist since the stay-at-home order was put in place. She continued to experience chronic pain and fatigue. She did note that she had experienced a severe headache earlier that week, which was unlike her normal headaches, and felt like she had a knife in her eyebrow, causing her to vomit. This was followed by a two-day long migraine. The symptoms had resolved, but she was encouraged to mention them to her physician since it was unlike any prior headaches.     Ms. Gamboa underwent a psychological evaluation under my direction on 5/2/2017 in preparation for bariatric surgery.  Please refer to the report from that date for full details regarding her history and the findings of the evaluation.  Briefly, results indicated a longstanding history of depression which had been well managed with medications.  Assessment of mood and personality fell within normal limits.  She was thought to be a good candidate for surgery from a psychosocial perspective.     CLINICAL INTERVIEW FINDINGS    A brief interview was conducted in order to gather relevant current history, since she was not seen since April 17, 2020.  She indicated that things have gotten better for her.  She has been doing a Lyme disease treatment with  a specialist in [place].  Since she started the treatment a few months ago she is feeling less foggy.  She noted that she drove herself to the appointment today and would not have done that a few months ago.  She has not had any bouts of getting lost in the last few months.  Day-to-day, she is still making a lot of lists as a safeguard.  She stated that her  also thinks that her cognition is improving, and he is not needing to remind her as much.  She does not feel that she is at baseline, however.  She may go to the grocery store with one thing to purchase, and then not remember why she went there.  She is diligent about making a list before she leaves home.  She uses the GPS all of the time even if she is only driving for 10 minutes.    Ms. Gamboa is in school currently, after having returned a year ago.  Her spring semester starts next week.  School is going well.  She sought accommodations from the disability resource [place] because recall was difficult for her examinations.  She took a nutrition class last semester and struggled with recall.  She did very well on assignments and open book projects, but had problems with examinations.  They worked out and accommodation where she can use a note sheet, and she is writing down important information.  She has been earning A's and B's throughout the year, with 1 C+.  She is about a third of the way through her program.    When asked to describe her mood, Ms. Gamboa stated that it is all right.  She is dealing with a little bit of seasonal depression.  The holidays were different, but she does not feel that her mood is anything to be worried about.  She is feeling a little more blue than she had been.  She is working with a psychologist every other week.  Her sleep has improved.  For a while she was not sleeping and would lay in bed and stare at the ceiling.  Now she is sleeping through the night.  She has not been napping during the day.  Her appetite  has been good.  She denied suicidal ideation.    Ms. Gamboa consumes alcohol rarely.  She had a drink on New Year's, but then felt bad for the next few days.  She uses medical marijuana in pill form or a tincture under the tongue.  She has used it once in the last month.    ADDITIONAL RECORD REVIEW    An MRI of the brain on 1/17/2020 was read as normal.  She had undergone an MRI of the brain on 7/13/2018 due to asymmetric sensorineural hearing loss, which was also read as normal, and there was no change in the interval.    PAST MEDICAL HISTORY: Medical records indicate a history of major depressive disorder, Lyme disease, status post laparoscopic sleeve gastrectomy, fibromyalgia, endometriosis, arthritis.    CURRENT MEDICATIONS:  Include bupropion, cefdinir, medical cannabis, minocycline, multivitamins, rifampin, sumatriptan, topiramate, vitamin B complex with vitamin C, vitamin D.    FAMILY MEDICAL HISTORY:  Significant for a brother with bipolar affective disorder and schizophrenia, paternal family members including 2 of her father's aunts with schizophrenia, and both parents with depression.  Family medical history is otherwise reportedly neuropsychologically noncontributory.     BEHAVIORAL OBSERVATIONS    During the evaluation, Ms. Gamboa was pleasant, cooperative, and seemed to understand instructions.  She was alert and oriented to person, place, and time.  No abnormal movements were observed clinically.  Mood was euthymic.  Speech was fluent, with normal articulation, volume, and rate.  Spontaneous conversation was present and appropriate.  Internal performance validity measures fell within normal limits.  The results are believed to accurately reflect her current level of functioning.    MEASURES ADMINISTERED    The following measures were administered by a trained psychometrist, under the direct supervision of a licensed psychologist.    Subtests of the Wechsler Adult Intelligence Scale-4; Reading  subtest of the Wide Range Achievement Test-4; subtests of the Wechsler Memory Scale-4; Aubrey Complex Figure Test; [place] Verbal Learning Test-2; [place] Naming Test; Controlled Oral Word Association Test; Semantic Fluency; Clock Drawing; Trail Making Test; Stroop; [place] Card Sorting Test; Finger Tapping; Grooved Pegboard; [place] Multiphasic Personality Kiumlkxfq-5-Gxnbdhjclqes Form (MMPI-2-RF).     RESULTS AND INTERPRETATION    Overall intellectual functioning was estimated to fall in the average range, consistent with premorbid estimates based on single word reading abilities.      Confrontation naming was average for her age and level of education.  Expressive vocabulary was average.  Letter fluency was mildly slowed, and generative naming to category was low average.    Attention span was high average for her age.  Divided attention was average.  Performance on a measure of distractibility was average.  Psychomotor processing speed was average.  Finger tapping speed was above average bilaterally.  Fine manual dexterity was average bilaterally.    Basic visual perception, including matching lines and angles, was above average.  Construction of a clock fell within normal limits.  Construction of a complex design also fell within normal limits.  Assembly of visual material was average.  Visual problem solving was average.    Novel problem solving, including the ability to generate strategies and solutions, fell within normal limits for her age and level of education.    Immediate recall of verbal narrative material was average, with average recall following a 30-minute delay.  On a multiple trial list learning task, immediate recall was average, with low average recall following a 20-minute delay.  Recognition memory on this task was average.  Immediate and 30-minute delayed recall of relatively structured visual material was average.  Immediate recall of more complex visual material was borderline impaired,  with low average recall following a 30-minute delay.  Recognition memory on this task fell within normal limits.    On the MMPI-2-RF, a self-report measure of mood and personality, she responded to the items in a consistent and valid manner. She reports multiple somatic complaints including experiencing poor health and feeling weak or tired, head pain, and vague neurologic concerns. She may be prone to experiencing increases in physical symptoms during times of stress. She reports multiple specific fears, of certain animals and acts of nature.    IMPRESSIONS AND RECOMMENDATIONS    Ashli Gamboa is a 41 year old woman with a history of fibromyalgia, depression, Lyme disease, right hand tremor, and cognitive complaints since October 2019. When she was seen in Neurology on 1/16/2020, a MoCA was 25/30, slightly below expected. This prompted a referral for a neuropsychological evaluation. She was initially seen for an interview over a video platform on 4/17/2020, as the clinic was closed at that time due to the pandemic. She was seen today in the clinic, to complete the testing portion of the evaluation, along with an interview to gather an updated history.    Results indicate performance that falls within normal limits across cognitive domains.  She has a relative weakness in information processing speed. Personality assessment is suggestive of a subtle tendency to experience increases in physical symptoms during times of stress.     This pattern of performance does not reflect dementia at this time, nor is it suggestive of focal or lateralized cerebral involvement. Relatively slowed information processing speed may be associated with fatigue, nonrestorative sleep, the effects of medication, stress, or longstanding depression. Although she is managing her stressors well, in addition to the pandemic, she is maintaining full time employment and attending school. She is doing well in school with some minor  accommodations. She continues to work with a psychologist.     In terms of daily functioning, Ms. Gamboa is not experiencing cognitive difficulties that might interfere with her ability to actively participate in treatment or to manage her instrumental activities of daily living independently.  She may find that it takes her longer to complete tasks, and it may be helpful to provide herself with ample time when working on a task so that she does not become overwhelmed and work less efficiently.  The relatively slowed processing speed may underlie her subjective sense of memory decline, and may lead to a sense of general cognitive inefficiency. She may find that she does best when she is well rested; incorporating exercise and other stress-relieving activities into her schedule may also be helpful.    Given her sense of cognitive decline, it may be helpful to follow her over time.  Repeated neuropsychological evaluation in one year may help to determine whether her cognitive inefficiencies progress, remit, or remain stable.    Suki Casanova, Ph.D., ABPP  Licensed Psychologist, LP [id number]  Board Certified in Clinical Neuropsychology    Time spent: 50 minutes neurobehavioral status exam including interview, clinical assessment by licensed and board-certified neuropsychologist (CPT 62905). 60 minutes (1 unit) neuropsychological testing evaluation by licensed and board-certified neuropsychologist, including integration of patient data, interpretation of standardized test results and clinical data, clinical decision-making, treatment planning, report, and interactive feedback to the patient, first hour (CPT 79578). 95 minutes (2 units) of neuropsychological testing evaluation by licensed and board-certified neuropsychologist, including integration of patient data, interpretation of standardized test results and clinical data, clinical decision-making, treatment planning, report, and interactive feedback to the patient,  subsequent hours (CPT 54304). 30 minutes of neuropsychological test administration and scoring by technician, first 30 minutes (CPT 59829). 160 additional minutes (5 units) neuropsychological test administration and scoring by technician, subsequent 30 minutes (CPT 76288). ICD-10 Diagnoses: R41.3.    Psychometrist time:    Prep  Rooming & Test Administration   Scoring/Monitoring       Start: 11:15 Start: 12:30 Start: 0:00 Start: 0:00 Start: 14:25 Start: 0:00 Start: 0:00   Stop: 11:30 Stop: 14:25 Stop: 0:00 Stop: 0:00 Stop: 15:25 Stop: 0:00 Stop: 0:00   Total: 15m Total: 115m Total: 0m Total: 0m Total: 60m Total: 0m Total: 0m   Total Administration Time 115m  Total Scoring/Monitor Time 60m  Total Time Overall 190m               Neuropsychology Provider

## 2021-01-08 NOTE — NURSING NOTE
The patient was seen for neuropsychological evaluation at the request of Dr. Eitan Batista, for the purposes of diagnostic clarification and treatment planning. 190 minutes of test administration and scoring were provided by this writer, Ej Quezada. Please see Dr. Suki Casanova's report for a full interpretation of the findings.

## 2021-01-12 LAB — PROSTAGLANDIN D2 SERPL-MCNC: NORMAL PG/ML

## 2021-01-24 NOTE — PROGRESS NOTES
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  Information was obtained via video interview with the patient and review of her medical records.  Records indicate that Ms. Gamboa was seen in Neurology on 1/16/2020 with concerns regarding brain fog beginning in October.  She was noted to have a past medical history of depression, fibromyalgia, and Lyme disease.  She was reporting a sense of memory loss and intermittent confusion in addition to fatigue, malaise, and diffuse body pain that began in October 2019, and seemed different from fibromyalgia.  She noted that she had a tick bite in July 2019 resulting in an ovoid rash behind her left knee. She was treated with a 14 day course of doxycycline for suspected Lyme disease. A MoCA on 1/16/2020 was 25/30, marginally below expected. She was noted to have a tremor thought possibly caused by Wellbutrin. Dr. Batista noted that the differential diagnosis for cognitive dysfunction included paraneoplastic syndromes, steroid responsive encephalopathy, subclinical seizures, and structural brain lesions (developed since her brain MRI study in 2018), medication side effects (Wellbutrin and Topamax), and mental health conditions, although he noted that the latter would be the diagnosis of exclusion.  In addition to repeating an MRI, additional labs, and 3-hour video EEG monitoring, Dr. Batista referred Ms. Gamboa for a neuropsychological evaluation, for further characterization of any cognitive difficulties and to evaluate mood and personality. On 4/17/2020, she completed a video interview, as the clinic was closed due to COVID-19. She was seen today in the clinic to complete an updated interview and the testing portion of the evaluation.     Please see the report dated 4/17/2020 for full details  regarding the findings of her interview. Briefly, she indicated that she was employed full time. She was working from home due to COVID-19 related precautions, but was not noticing any difficulty doing her work. She also returned to college that semester, to work on her Bachelor's degree. Although she noticed some difficulty with concentration and sometimes had to pause and re-watch lectures, she was doing quite well in her schoolwork, and had been earning A's and B's. Her mood seemed to be fairly well managed, and she had a good support system, including her psychologist. She had been able to have telehealth sessions with her psychologist since the stay-at-home order was put in place. She continued to experience chronic pain and fatigue. She did note that she had experienced a severe headache earlier that week, which was unlike her normal headaches, and felt like she had a knife in her eyebrow, causing her to vomit. This was followed by a two-day long migraine. The symptoms had resolved, but she was encouraged to mention them to her physician since it was unlike any prior headaches.     Ms. Gamboa underwent a psychological evaluation under my direction on 5/2/2017 in preparation for bariatric surgery.  Please refer to the report from that date for full details regarding her history and the findings of the evaluation.  Briefly, results indicated a longstanding history of depression which had been well managed with medications.  Assessment of mood and personality fell within normal limits.  She was thought to be a good candidate for surgery from a psychosocial perspective.     CLINICAL INTERVIEW FINDINGS    A brief interview was conducted in order to gather relevant current history, since she was not seen since April 17, 2020.  She indicated that things have gotten better for her.  She has been doing a Lyme disease treatment with a specialist in [place].  Since she started the treatment a few months ago she is  feeling less foggy.  She noted that she drove herself to the appointment today and would not have done that a few months ago.  She has not had any bouts of getting lost in the last few months.  Day-to-day, she is still making a lot of lists as a safeguard.  She stated that her  also thinks that her cognition is improving, and he is not needing to remind her as much.  She does not feel that she is at baseline, however.  She may go to the grocery store with one thing to purchase, and then not remember why she went there.  She is diligent about making a list before she leaves home.  She uses the GPS all of the time even if she is only driving for 10 minutes.    Ms. Gamboa is in school currently, after having returned a year ago.  Her spring semester starts next week.  School is going well.  She sought accommodations from the disability resource center because recall was difficult for her examinations.  She took a nutrition class last semester and struggled with recall.  She did very well on assignments and open book projects, but had problems with examinations.  They worked out and accommodation where she can use a note sheet, and she is writing down important information.  She has been earning A's and B's throughout the year, with 1 C+.  She is about a third of the way through her program.    When asked to describe her mood, Ms. Gamboa stated that it is all right.  She is dealing with a little bit of seasonal depression.  The holidays were different, but she does not feel that her mood is anything to be worried about.  She is feeling a little more blue than she had been.  She is working with a psychologist every other week.  Her sleep has improved.  For a while she was not sleeping and would lay in bed and stare at the ceiling.  Now she is sleeping through the night.  She has not been napping during the day.  Her appetite has been good.  She denied suicidal ideation.    Ms. Gamboa consumes alcohol  rarely.  She had a drink on New Year's, but then felt bad for the next few days.  She uses medical marijuana in pill form or a tincture under the tongue.  She has used it once in the last month.    ADDITIONAL RECORD REVIEW    An MRI of the brain on 1/17/2020 was read as normal.  She had undergone an MRI of the brain on 7/13/2018 due to asymmetric sensorineural hearing loss, which was also read as normal, and there was no change in the interval.    PAST MEDICAL HISTORY: Medical records indicate a history of major depressive disorder, Lyme disease, status post laparoscopic sleeve gastrectomy, fibromyalgia, endometriosis, arthritis.    CURRENT MEDICATIONS:  Include bupropion, cefdinir, medical cannabis, minocycline, multivitamins, rifampin, sumatriptan, topiramate, vitamin B complex with vitamin C, vitamin D.    FAMILY MEDICAL HISTORY:  Significant for a brother with bipolar affective disorder and schizophrenia, paternal family members including 2 of her father's aunts with schizophrenia, and both parents with depression.  Family medical history is otherwise reportedly neuropsychologically noncontributory.     BEHAVIORAL OBSERVATIONS    During the evaluation, Ms. Gamboa was pleasant, cooperative, and seemed to understand instructions.  She was alert and oriented to person, place, and time.  No abnormal movements were observed clinically.  Mood was euthymic.  Speech was fluent, with normal articulation, volume, and rate.  Spontaneous conversation was present and appropriate.  Internal performance validity measures fell within normal limits.  The results are believed to accurately reflect her current level of functioning.    MEASURES ADMINISTERED    The following measures were administered by a trained psychometrist, under the direct supervision of a licensed psychologist.    Subtests of the Wechsler Adult Intelligence Scale-4; Reading subtest of the Wide Range Achievement Test-4; subtests of the Wechsler Memory  Scale-4; Aubrey Complex Figure Test; [place] Verbal Learning Test-2; Melber Naming Test; Controlled Oral Word Association Test; Semantic Fluency; Clock Drawing; Trail Making Test; Stroop; [place] Card Sorting Test; Finger Tapping; Grooved Pegboard; [place] Multiphasic Personality Mylljoucc-3-Sifnnkhoswbf Form (MMPI-2-RF).     RESULTS AND INTERPRETATION    Overall intellectual functioning was estimated to fall in the average range, consistent with premorbid estimates based on single word reading abilities.      Confrontation naming was average for her age and level of education.  Expressive vocabulary was average.  Letter fluency was mildly slowed, and generative naming to category was low average.    Attention span was high average for her age.  Divided attention was average.  Performance on a measure of distractibility was average.  Psychomotor processing speed was average.  Finger tapping speed was above average bilaterally.  Fine manual dexterity was average bilaterally.    Basic visual perception, including matching lines and angles, was above average.  Construction of a clock fell within normal limits.  Construction of a complex design also fell within normal limits.  Assembly of visual material was average.  Visual problem solving was average.    Novel problem solving, including the ability to generate strategies and solutions, fell within normal limits for her age and level of education.    Immediate recall of verbal narrative material was average, with average recall following a 30-minute delay.  On a multiple trial list learning task, immediate recall was average, with low average recall following a 20-minute delay.  Recognition memory on this task was average.  Immediate and 30-minute delayed recall of relatively structured visual material was average.  Immediate recall of more complex visual material was borderline impaired, with low average recall following a 30-minute delay.  Recognition memory on  this task fell within normal limits.    On the MMPI-2-RF, a self-report measure of mood and personality, she responded to the items in a consistent and valid manner. She reports multiple somatic complaints including experiencing poor health and feeling weak or tired, head pain, and vague neurologic concerns. She may be prone to experiencing increases in physical symptoms during times of stress. She reports multiple specific fears, of certain animals and acts of nature.    IMPRESSIONS AND RECOMMENDATIONS    Ashli Gamboa is a 41 year old woman with a history of fibromyalgia, depression, Lyme disease, right hand tremor, and cognitive complaints since October 2019. When she was seen in Neurology on 1/16/2020, a MoCA was 25/30, slightly below expected. This prompted a referral for a neuropsychological evaluation. She was initially seen for an interview over a video platform on 4/17/2020, as the clinic was closed at that time due to the pandemic. She was seen today in the clinic, to complete the testing portion of the evaluation, along with an interview to gather an updated history.    Results indicate performance that falls within normal limits across cognitive domains.  She has a relative weakness in information processing speed. Personality assessment is suggestive of a subtle tendency to experience increases in physical symptoms during times of stress.     This pattern of performance does not reflect dementia at this time, nor is it suggestive of focal or lateralized cerebral involvement. Relatively slowed information processing speed may be associated with fatigue, nonrestorative sleep, the effects of medication, stress, or longstanding depression. Although she is managing her stressors well, in addition to the pandemic, she is maintaining full time employment and attending school. She is doing well in school with some minor accommodations. She continues to work with a psychologist.     In terms of daily  functioning, Ms. Gamboa is not experiencing cognitive difficulties that might interfere with her ability to actively participate in treatment or to manage her instrumental activities of daily living independently.  She may find that it takes her longer to complete tasks, and it may be helpful to provide herself with ample time when working on a task so that she does not become overwhelmed and work less efficiently.  The relatively slowed processing speed may underlie her subjective sense of memory decline, and may lead to a sense of general cognitive inefficiency. She may find that she does best when she is well rested; incorporating exercise and other stress-relieving activities into her schedule may also be helpful.    Given her sense of cognitive decline, it may be helpful to follow her over time.  Repeated neuropsychological evaluation in one year may help to determine whether her cognitive inefficiencies progress, remit, or remain stable.    Suki Casanova, Ph.D., [facility]  Licensed Psychologist, [id number]  Board Certified in Clinical Neuropsychology    Time spent: 50 minutes neurobehavioral status exam including interview, clinical assessment by licensed and board-certified neuropsychologist (CPT 09966). 60 minutes (1 unit) neuropsychological testing evaluation by licensed and board-certified neuropsychologist, including integration of patient data, interpretation of standardized test results and clinical data, clinical decision-making, treatment planning, report, and interactive feedback to the patient, first hour (CPT 03790). 95 minutes (2 units) of neuropsychological testing evaluation by licensed and board-certified neuropsychologist, including integration of patient data, interpretation of standardized test results and clinical data, clinical decision-making, treatment planning, report, and interactive feedback to the patient, subsequent hours (CPT 99248). 30 minutes of neuropsychological test administration  and scoring by technician, first 30 minutes (CPT 93372). 160 additional minutes (5 units) neuropsychological test administration and scoring by technician, subsequent 30 minutes (CPT 00382). ICD-10 Diagnoses: R41.3.    Psychometrist time:    Prep  Rooming & Test Administration   Scoring/Monitoring       Start: 11:15 Start: 12:30 Start: 0:00 Start: 0:00 Start: 14:25 Start: 0:00 Start: 0:00   Stop: 11:30 Stop: 14:25 Stop: 0:00 Stop: 0:00 Stop: 15:25 Stop: 0:00 Stop: 0:00   Total: 15m Total: 115m Total: 0m Total: 0m Total: 60m Total: 0m Total: 0m   Total Administration Time 115m  Total Scoring/Monitor Time 60m  Total Time Overall 190m

## 2021-01-28 NOTE — PROGRESS NOTES
Name: Ashli Gamboa MRN: [id number]  : 1980  MCKEON: 2021  Staff: VÍCTOR Tech: LILLIANA Age: 40  Sex: Female Hand: Right   Educ: 13-15  Occupation: [employer]  Vision:  ?with correction / ?without correction  Hearing:  ?with correction / ?without correction    WAIS-IV     Raw SSa     Vocabulary  40 11     Block Design  37 9     Coding  64 9     Digit Span  32 12 RDS= 12     Visual Puzzles  15 10    WRAT4   SS %ile Grade Equiv.     Word Reading  103 58 >12.9    WMS-IV   Raw SS     Info & Orientation 14       LM I   23 9     LM II   19 9     LM Recognition  27 >75th     VR I   37 10     VR II   28 10     VR Recognition  6 51-75     WINNIE-COMPLEX FIGURE TEST      Raw    T %ile     Time to Copy  225      >16     Copy    35     >16     Short Delay Recall 15.5 37 10     Long Delay Recall 17 40 16     Recognition Total 21 50 50    [place] VERBAL LEARNING TEST - 2     Trial 1 2 3 4 5              7 11 12 14 9      Raw  SD      Trial 1-5 Total   53 48(T)     List A: Trial 1   7 0.5     List A: Trial 5   9 -2     Short Delay Free Recall  13 0.5     Short Delay Cued Recall  13 0     Long Delay Free Recall  11 -1     Long Delay Cued Recall  14 0     % Recall from Primacy  25 -1     % Recall from Middle  47 0     % Recall from Recency  28 0        Across-Trial Consistency  86 0.5     Total Repetitions  1 -1     Total Intrusions   1 -0.5     Total Recall Discrim  2.3 0     Recognition Hits  16 0     Recognition False Pos  2 0.5     Recognition Discrim  3.4 0       [place] NAMING TEST   Score: 55  T: 51       raw%ile                    [ 55    w/o cues        2   w/phonemic cues]     COWAT (FAS)   Raw: 26       z: -1.67   %ile%ile    ANIMAL FLUENCY   Raw: 18  z: -0.72  %ile%ile    CLOCK DRAWING     Command   3/3             Copy     3/3    TRAIL MAKING TEST    Raw         Err  z  %ile   A 32        0  -0.15  %ile   B 67        0  -0.14  %ile    STROOP   Raw +  Deepak  =      Total     T %ile    Word 102 +   0 = 102 47 %ile   Color  75  +   0 = 75 47 %ile       Color/Word  43 +   0 = 43 48 %ile     WCST (64 cards)    Raw   T/%ile   Categories: 5 >16   #Persev. Err.: 4 49   Concept. Resp.: 57 56   FTMS:  0    FINGER TAPPING    Avg  z   RH  62 2.8    LH 55.33 2      GROOVED PEGBOARD    Raw  Drops T   RH  63  1 46    LH 64  0 55     GOODMAN JUDGMENT OF LINE ORIENTATION Form H   Raw: 30 MAS: MAS  86%ile:    MMPI-2-RF   RCd: 51 VRIN-r:  48   RC1: 68 KEM-r:  65F   RC2: 46 F-r:  61   RC3: 38 Fp-r:  42   RC4: 57 Fs:  50   RC6: 43 FBS-r:  67   RC7: 34 RBS:  63   RC8: 47 L-r:  62   RC9: 40 K-r:  66

## 2021-02-09 DIAGNOSIS — G90.9 AUTONOMIC DYSFUNCTION: ICD-10-CM

## 2021-02-09 LAB
CRP SERPL-MCNC: <2.9 MG/L (ref 0–8)
ERYTHROCYTE [SEDIMENTATION RATE] IN BLOOD BY WESTERGREN METHOD: 5 MM/H (ref 0–20)

## 2021-02-09 PROCEDURE — 36415 COLL VENOUS BLD VENIPUNCTURE: CPT | Performed by: INTERNAL MEDICINE

## 2021-02-09 PROCEDURE — 86038 ANTINUCLEAR ANTIBODIES: CPT | Performed by: INTERNAL MEDICINE

## 2021-02-09 PROCEDURE — 86039 ANTINUCLEAR ANTIBODIES (ANA): CPT | Performed by: INTERNAL MEDICINE

## 2021-02-09 PROCEDURE — 83520 IMMUNOASSAY QUANT NOS NONAB: CPT | Performed by: INTERNAL MEDICINE

## 2021-02-09 PROCEDURE — 86140 C-REACTIVE PROTEIN: CPT | Performed by: INTERNAL MEDICINE

## 2021-02-09 PROCEDURE — 85652 RBC SED RATE AUTOMATED: CPT | Performed by: INTERNAL MEDICINE

## 2021-02-10 LAB
ANA PAT SER IF-IMP: ABNORMAL
ANA SER QL IF: ABNORMAL
ANA TITR SER IF: ABNORMAL {TITER}

## 2021-02-12 LAB — TRYPTASE SERPL-MCNC: 16.6 UG/L

## 2021-02-18 NOTE — TELEPHONE ENCOUNTER
RECORDS RECEIVED FROM: internal    DATE RECEIVED: 2.24.21    NOTES (FOR ALL VISITS) STATUS DETAILS   OFFICE NOTES from referring provider Internal  Gilberto Song   OFFICE NOTES from other specialist na    ED NOTES na    OPERATIVE REPORT  (thyroid, pituitary, adrenal, parathyroid) na    MEDICATION LIST Internal     IMAGING      DEXASCAN na    MRI (BRAIN) Internal  1.17.20    XR (Chest) na    CT (HEAD/NECK/CHEST/ABDOMEN) na    NUCLEAR  na    ULTRASOUND (HEAD/NECK) na    LABS     DIABETES: HBGA1C, CREATININE, FASTING LIPIDS, MICROALBUMIN URINE, POTASSIUM, TSH, T4    THYROID: TSH, T4, CBC, THYRODLONULIN, TOTAL T3, FREE T4, CALCITONIN, CEA Internal /ce Cbc- 10.19.20   HBGA1C 12.10.20   SABA- 2.9.21   TSH/T4- 12.29.20   Tryptase 2.9.21

## 2021-02-23 NOTE — PROGRESS NOTES
.Outcome for 02/23/21 10:00 AM :Left Voicemail for patient to call back    Ashli Gamboa  is being evaluated via a billable video visit.      How would you like to obtain your AVS? lGenn  For the video visit, send the invitation by: glenn  Will anyone else be joining your video visit? No

## 2021-02-24 ENCOUNTER — VIRTUAL VISIT (OUTPATIENT)
Dept: ENDOCRINOLOGY | Facility: CLINIC | Age: 41
End: 2021-02-24
Attending: INTERNAL MEDICINE
Payer: COMMERCIAL

## 2021-02-24 ENCOUNTER — PRE VISIT (OUTPATIENT)
Dept: ENDOCRINOLOGY | Facility: CLINIC | Age: 41
End: 2021-02-24

## 2021-02-24 DIAGNOSIS — R74.8 ELEVATED SERUM TRYPTASE: Primary | ICD-10-CM

## 2021-02-24 DIAGNOSIS — R76.8 ELEVATED ANTINUCLEAR ANTIBODY (ANA) LEVEL: ICD-10-CM

## 2021-02-24 DIAGNOSIS — R53.83 FATIGUE, UNSPECIFIED TYPE: ICD-10-CM

## 2021-02-24 DIAGNOSIS — R55 PRE-SYNCOPE: ICD-10-CM

## 2021-02-24 DIAGNOSIS — M25.50 POLYARTHRALGIA: ICD-10-CM

## 2021-02-24 PROCEDURE — 99205 OFFICE O/P NEW HI 60 MIN: CPT | Mod: 95 | Performed by: INTERNAL MEDICINE

## 2021-02-24 NOTE — PROGRESS NOTES
Endocrinology virtual Visit    Chief Complaint: Video Visit (endo)     Information obtained from:Patient      Assessment/Treatment Plan:      #1 Elevated serum tryptase level  # 2.  Elevated antinuclear antibody [SABA] level, chronic polyarthralgia, swelling of hand joints, history of rheumatoid arthritis in her father.  #3 Presyncopal symptoms/fatigue    Patient recently saw cardiology for presyncopal symptoms and possibility of POTS which led to routine blood work that included tryptase levels.  Tryptase level was noted to be elevated at 16 twice as documented below [cutoff of less than 11].  No skin lesions or MPCM, flushing, history of unexplained anaphylaxis or abdominal complaints to suggest mastocytosis.  Discussed with the patient that endocrinology is not an area of specialty that look into mastocytosis however for additional input will put a referral to immunology.  She is agreeable with plan.    She has a longstanding polyarthralgia, swelling of the small joints of the hands, history of rheumatoid arthritis in her father and recently noted elevated SABA levels(borderline x2).  Polyarthralgia was previously treated as a sequelae of Lyme disease and she has received multiple antibiotic treatments for Lyme disease including 6 weeks of intravenous antibiotic therapy.  I think it would be reasonable to look into other causes of polyarthralgia outside of Lyme disease and referral to rheumatologist is placed today for further input.    Presyncopal symptoms and fatigue: We will check a morning 8 AM cortisol with ACTH level to screen for adrenal insufficiency.  Thyroid has been checked previously and was within the normal limits.  If work-up for adrenal insufficiency is negative or within the normal limit then follow-up with endocrinology as needed.    Concern for gluten sensitivity or celiac disease-she has been avoiding gluten in her diet; which has helped with her symptoms.  Therefore, screening for celiac  disease using TTG will not be helpful at this time [can be falsely negative in a person who is already avoiding gluten in the diet).  Down the line it might be reasonable to confirm or rule out celiac disease by performing TTG.  Patient is agreeable with this plan.    Patient Instructions     Ashli,    It was a pleasure meeting you today.  As the next step will check a morning labs at 8 AM to check cortisol and ACTH levels.  This is to screen for adrenal insufficiency.    We have placed in a referral to immunology regarding tryptase elevation and referral to rheumatology in regards to your positive SABA result and polyarthralgia or multiple joint pains and history of rheumatoid arthritis in her father.    Lab scheduling to schedule at any [place] lab locations: [phone] select option 1    Orders Placed This Encounter   Procedures     Cortisol     Adrenal corticotropin     CBC with platelets differential     IMMUNOLOGY REFERRAL     Rheumatology Referral           I will contact the patient with the test results.  Return to clinic - to be determined    Test and/or medications prescribed today:  Orders Placed This Encounter   Procedures     Cortisol     Adrenal corticotropin     CBC with platelets differential     IMMUNOLOGY REFERRAL     Rheumatology Referral         Scar Keenan MD  Staff Endocrinologist    Division of Endocrinology and Diabetes      Subjective:         HPI: Ashli Gamboa is a 41 year old female with history of elevated tryptase level who is seen in consultation at Gilberto Song's request for elevated tryptase level and a positive SABA.    This is a 41-year-old female patient with history of fibromyalgia, depression/anxiety, endometriosis, obesity status post weight loss surgery in 2017, Lyme disease and history of presyncopal symptoms for which she saw cardiology recently.  Cardiology checked tryptase level which was noted to be elevated and therefore she  was sent to endocrinology clinic [patient understand that this is not the right speciality for the issue of elevated tryptase level].    Patient has been struggling with chronic pain and later on diagnosed as fibromyalgia in 2015.  This was treated with medication.  She has also history of depression and anxiety for which she has been on medications on and off over the years.  History of endometriosis and fibroids status post multiple surgeries and finally hysterectomy.  History of weight loss surgery in 2017 and significant weight loss.  She used to weigh 296 in 2017 prior to her surgery and currently she weighs 130 pounds.      2019 had a tick bite which was later on diagnosed Lyme disease.  She received 2 weeks of doxycycline treatment at the time.  2 months following that she started having intense joint and muscle pains, fatigue for which she is on multiple people and eventually she was found to have a positive Lyme test result.  Following that she has received 6 weeks course of IV antibiotics.  Despite that antibiotic treatment she continues to have multiple joint pains. currently she is working with a Lyme specialist in [place] since October 2020 who has treated her with additional antibiotics and she reports that the antibiotic treatment initially help with her with the joint pains.      More recently she experiences dizziness, lightheadedness, vision changes, presyncopal symptoms which led to cardiology referral and additional work-up for possible POTS.  She was told that she might have autonomic dysfunction and was advised to increase her salt intake and she reported that that has helped her little bit.      She did at [facility] and me assessment which suggested that she might have a celiac disease and therefore she avoided gluten completely from her diet.  And she reports report that avoiding gluten has helped her in some ways.  Reports polyarthralgia involving big joints including the hip, shoulder and hand  joints with occasional swelling of her hand joints.  No history of syncopal episode.     No Known Allergies    Current Outpatient Medications   Medication Sig Dispense Refill     buPROPion (WELLBUTRIN XL) 300 MG 24 hr tablet TAKE 1 TABLET BY MOUTH EVERY DAY IN THE MORNING 90 tablet 3     cefdinir (OMNICEF) 300 MG capsule        medical cannabis (Patient's own supply) See Admin Instructions (The purpose of this order is to document that the patient reports taking medical cannabis.  This is not a prescription, and is not used to certify that the patient has a qualifying medical condition.)       minocycline (MINOCIN) 100 MG capsule Take 100 mg by mouth       MULTIPLE VITAMIN PO Take 1 patch by mouth daily PatchMD - Multivitamin Patch       NALTREXONE HCL PO Take 4.5 mg by mouth       NUVARING 0.12-0.015 MG/24HR vaginal ring INSERT 1 RING VAGINALLY EVERY 21 DAYS 1 each 0     rifampin (RIFADIN) 150 MG capsule        SUMAtriptan (IMITREX) 50 MG tablet Take 1-2 tablets at the onset of a headache. May repeat in 2 hours. Max 4 tablets/24 hours. 30 tablet 1     Topiramate (TOPAMAX PO) Take 300 mg by mouth daily        vitamin B complex with vitamin C (VITAMIN  B COMPLEX) TABS tablet Take 1 tablet by mouth daily       VITAMIN D PO Take 5,000 Units by mouth daily         Review of Systems     11 point review system (Constitutional, HENT, Eyes, Respiratory, Cardiovascular, Gastrointestinal, Genitourinary, Musculoskeletal,Neurological, Psychiatric/Behavioural, Endocrine) is negative or is as per HPI above    Past Medical History:   Diagnosis Date     Arthritis     BIG TOE     Cervical high risk HPV (human papillomavirus) test positive 07/05/2017     Cervical high risk HPV (human papillomavirus) test positive 06/10/2015     Depressive disorder 2007    I have been on and off medication for the last several years     Endometriosis      Hearing problem      Obese      Sensorineural hearing loss      Past surgical history  Multiple  surgeries in the past including hysterectomy and gastric sleeve surgery for weight loss in 2017.  Other past surgical history reviewed in epic.    Family history-history of rheumatoid arthritis in her father which will be relevant to polyarthralgia and a borderline positive SABA.  Family history of depression in her sister and mother.  Other family history reviewed in epic.    Social history-non-smoker.  .  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    Objective:     ENDO VITALS-UMP 12/29/2020 12/29/2020 12/29/2020   Weight   58.968 kg   Weight   130 lb   Height   64 in   /72 116/61 109/66   Pulse 64 67 61   Resp 18 18 16   BSA (Calculated - sq m)   1.63   BMI (Calculated)   22.31     GENERAL: Healthy, alert and no distress  EYES: Eyes grossly normal to inspection.   RESP: No audible wheeze, cough, or visible cyanosis.    NEURO: alert and oriented.    PSYCH: Mentation appears normal, affect normal/bright, judgement and insight intact, normal speech.    In House Labs:   Lab Results   Component Value Date    A1C 4.6 11/30/2017    A1C 5.4 07/16/2012       TSH   Date Value Ref Range Status   12/29/2020 1.86 0.40 - 4.00 mU/L Final   11/30/2017 1.58 0.40 - 4.00 mU/L Final   12/07/2016 2.07 0.40 - 4.00 mU/L Final       Creatinine   Date Value Ref Range Status   12/16/2020 0.88 0.52 - 1.04 mg/dL Final   ]    ENDO THYROID LABS-[facility] Latest Ref Rng & Units 12/29/2020 1/16/2020   TSH 0.40 - 4.00 mU/L 1.86    TSH REFLEX 0.3 - 5.0 uIU/ml     THYR PEROXIDASE DAVEY <35 IU/mL  <10           Video-Visit Details    Type of service:  Video Visit  All times are in your local time  1st VideoStart: 02/24/2021 09:02 am  Stop: 02/24/2021 09:37 am  Originating Location (pt. Location): Home  Distant Location (provider location):  [facility]   Platform used for Video Visit: [employer]  65 minutes spent on the date of the encounter doing chart review, history, documentation and further activities as noted  above.

## 2021-02-24 NOTE — PATIENT INSTRUCTIONS
Crystal,    It was a pleasure meeting you today.  As the next step will check a morning labs at 8 AM to check cortisol and ACTH levels.  This is to screen for adrenal insufficiency.    We have placed in a referral to immunology regarding tryptase elevation and referral to rheumatology in regards to your positive SABA result and polyarthralgia or multiple joint pains and history of rheumatoid arthritis in her father.    Lab scheduling to schedule at any [place] lab locations: [phone] select option [phone]    Orders Placed This Encounter   Procedures     Cortisol     Adrenal corticotropin     CBC with platelets differential     IMMUNOLOGY REFERRAL     Rheumatology Referral

## 2021-02-24 NOTE — LETTER
2/24/2021       RE: Ashli Gamboa  [address] [facility] [address]     Dear Colleague,    Thank you for referring your patient, Ashli Gamboa, to the [facility] [place] [facility]. Please see a copy of my visit note below.    .Outcome for 02/23/21 10:00 AM :Left Voicemail for patient to call back    Ashli Gamboa  is being evaluated via a billable video visit.      How would you like to obtain your AVS? [employer]  For the video visit, send the invitation by: [employer]  Will anyone else be joining your video visit? No          Endocrinology virtual Visit    Chief Complaint: Video Visit (endo)     Information obtained from:Patient      Assessment/Treatment Plan:      #1 Elevated serum tryptase level  # 2.  Elevated antinuclear antibody [SABA] level, chronic polyarthralgia, swelling of hand joints, history of rheumatoid arthritis in her father.  #3 Presyncopal symptoms/fatigue    Patient recently saw cardiology for presyncopal symptoms and possibility of POTS which led to routine blood work that included tryptase levels.  Tryptase level was noted to be elevated at 16 twice as documented below [cutoff of less than 11].  No skin lesions or MPCM, flushing, history of unexplained anaphylaxis or abdominal complaints to suggest mastocytosis.  Discussed with the patient that endocrinology is not an area of specialty that look into mastocytosis however for additional input will put a referral to immunology.  She is agreeable with plan.    She has a longstanding polyarthralgia, swelling of the small joints of the hands, history of rheumatoid arthritis in her father and recently noted elevated SABA levels(borderline x2).  Polyarthralgia was previously treated as a sequelae of Lyme disease and she has received multiple antibiotic treatments for Lyme disease including 6 weeks of intravenous antibiotic therapy.  I think it  would be reasonable to look into other causes of polyarthralgia outside of Lyme disease and referral to rheumatologist is placed today for further input.    Presyncopal symptoms and fatigue: We will check a morning 8 AM cortisol with ACTH level to screen for adrenal insufficiency.  Thyroid has been checked previously and was within the normal limits.  If work-up for adrenal insufficiency is negative or within the normal limit then follow-up with endocrinology as needed.    Concern for gluten sensitivity or celiac disease-she has been avoiding gluten in her diet; which has helped with her symptoms.  Therefore, screening for celiac disease using TTG will not be helpful at this time [can be falsely negative in a person who is already avoiding gluten in the diet).  Down the line it might be reasonable to confirm or rule out celiac disease by performing TTG.  Patient is agreeable with this plan.    Patient Instructions     Crystal,    It was a pleasure meeting you today.  As the next step will check a morning labs at 8 AM to check cortisol and ACTH levels.  This is to screen for adrenal insufficiency.    We have placed in a referral to immunology regarding tryptase elevation and referral to rheumatology in regards to your positive SABA result and polyarthralgia or multiple joint pains and history of rheumatoid arthritis in her father.    Lab scheduling to schedule at any [place] lab locations: [phone] select option 1    Orders Placed This Encounter   Procedures     Cortisol     Adrenal corticotropin     CBC with platelets differential     IMMUNOLOGY REFERRAL     Rheumatology Referral           I will contact the patient with the test results.  Return to clinic - to be determined    Test and/or medications prescribed today:  Orders Placed This Encounter   Procedures     Cortisol     Adrenal corticotropin     CBC with platelets differential     IMMUNOLOGY REFERRAL     Rheumatology Referral          Scar Keenan MD  Staff Endocrinologist    Division of Endocrinology and Diabetes      Subjective:         HPI: Ashli Gamboa is a 41 year old female with history of elevated tryptase level who is seen in consultation at Gilberto Song's request for elevated tryptase level and a positive SBAA.    This is a 41-year-old female patient with history of fibromyalgia, depression/anxiety, endometriosis, obesity status post weight loss surgery in 2017, Lyme disease and history of presyncopal symptoms for which she saw cardiology recently.  Cardiology checked tryptase level which was noted to be elevated and therefore she was sent to endocrinology clinic [patient understand that this is not the right speciality for the issue of elevated tryptase level].    Patient has been struggling with chronic pain and later on diagnosed as fibromyalgia in 2015.  This was treated with medication.  She has also history of depression and anxiety for which she has been on medications on and off over the years.  History of endometriosis and fibroids status post multiple surgeries and finally hysterectomy.  History of weight loss surgery in 2017 and significant weight loss.  She used to weigh 296 in 2017 prior to her surgery and currently she weighs 130 pounds.      2019 had a tick bite which was later on diagnosed Lyme disease.  She received 2 weeks of doxycycline treatment at the time.  2 months following that she started having intense joint and muscle pains, fatigue for which she is on multiple people and eventually she was found to have a positive Lyme test result.  Following that she has received 6 weeks course of IV antibiotics.  Despite that antibiotic treatment she continues to have multiple joint pains. currently she is working with a Lyme specialist in [place] since October 2020 who has treated her with additional antibiotics and she reports that the antibiotic treatment initially help with her with the  joint pains.      More recently she experiences dizziness, lightheadedness, vision changes, presyncopal symptoms which led to cardiology referral and additional work-up for possible POTS.  She was told that she might have autonomic dysfunction and was advised to increase her salt intake and she reported that that has helped her little bit.      She did at [facility] and me assessment which suggested that she might have a celiac disease and therefore she avoided gluten completely from her diet.  And she reports report that avoiding gluten has helped her in some ways.  Reports polyarthralgia involving big joints including the hip, shoulder and hand joints with occasional swelling of her hand joints.  No history of syncopal episode.     No Known Allergies    Current Outpatient Medications   Medication Sig Dispense Refill     buPROPion (WELLBUTRIN XL) 300 MG 24 hr tablet TAKE 1 TABLET BY MOUTH EVERY DAY IN THE MORNING 90 tablet 3     cefdinir (OMNICEF) 300 MG capsule        medical cannabis (Patient's own supply) See Admin Instructions (The purpose of this order is to document that the patient reports taking medical cannabis.  This is not a prescription, and is not used to certify that the patient has a qualifying medical condition.)       minocycline (MINOCIN) 100 MG capsule Take 100 mg by mouth       MULTIPLE VITAMIN PO Take 1 patch by mouth daily PatchMD - Multivitamin Patch       NALTREXONE HCL PO Take 4.5 mg by mouth       NUVARING 0.12-0.015 MG/24HR vaginal ring INSERT 1 RING VAGINALLY EVERY 21 DAYS 1 each 0     rifampin (RIFADIN) 150 MG capsule        SUMAtriptan (IMITREX) 50 MG tablet Take 1-2 tablets at the onset of a headache. May repeat in 2 hours. Max 4 tablets/24 hours. 30 tablet 1     Topiramate (TOPAMAX PO) Take 300 mg by mouth daily        vitamin B complex with vitamin C (VITAMIN  B COMPLEX) TABS tablet Take 1 tablet by mouth daily       VITAMIN D PO Take 5,000 Units by mouth daily         Review of Systems      11 point review system (Constitutional, HENT, Eyes, Respiratory, Cardiovascular, Gastrointestinal, Genitourinary, Musculoskeletal,Neurological, Psychiatric/Behavioural, Endocrine) is negative or is as per HPI above    Past Medical History:   Diagnosis Date     Arthritis     BIG TOE     Cervical high risk HPV (human papillomavirus) test positive 07/05/2017     Cervical high risk HPV (human papillomavirus) test positive 06/10/2015     Depressive disorder 2007    I have been on and off medication for the last several years     Endometriosis      Hearing problem      Obese      Sensorineural hearing loss      Past surgical history  Multiple surgeries in the past including hysterectomy and gastric sleeve surgery for weight loss in 2017.  Other past surgical history reviewed in epic.    Family history-history of rheumatoid arthritis in her father which will be relevant to polyarthralgia and a borderline positive SABA.  Family history of depression in her sister and mother.  Other family history reviewed in epic.    Social history-non-smoker.  .  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    Objective:     ENDO VITALS-UMP 12/29/2020 12/29/2020 12/29/2020   Weight   58.968 kg   Weight   130 lb   Height   64 in   /72 116/61 109/66   Pulse 64 67 61   Resp 18 18 16   BSA (Calculated - sq m)   1.63   BMI (Calculated)   22.31     GENERAL: Healthy, alert and no distress  EYES: Eyes grossly normal to inspection.   RESP: No audible wheeze, cough, or visible cyanosis.    NEURO: alert and oriented.    PSYCH: Mentation appears normal, affect normal/bright, judgement and insight intact, normal speech.    In House Labs:   Lab Results   Component Value Date    A1C 4.6 11/30/2017    A1C 5.4 07/16/2012       TSH   Date Value Ref Range Status   12/29/2020 1.86 0.40 - 4.00 mU/L Final   11/30/2017 1.58 0.40 - 4.00 mU/L Final   12/07/2016 2.07 0.40 - 4.00 mU/L Final       Creatinine   Date Value Ref Range Status   12/16/2020  0.88 0.52 - 1.04 mg/dL Final   ]    ENDO THYROID LABS-[facility] Latest Ref Rng & Units 12/29/2020 1/16/2020   TSH 0.40 - 4.00 mU/L 1.86    TSH REFLEX 0.3 - 5.0 uIU/ml     THYR PEROXIDASE DAVEY <35 IU/mL  <10           Video-Visit Details    Type of service:  Video Visit  All times are in your local time  1st VideoStart: 02/24/2021 09:02 am  Stop: 02/24/2021 09:37 am  Originating Location (pt. Location): Home  Distant Location (provider location):  [facility]   Platform used for Video Visit: [employer]  65 minutes spent on the date of the encounter doing chart review, history, documentation and further activities as noted above.

## 2021-02-26 DIAGNOSIS — R55 PRE-SYNCOPE: ICD-10-CM

## 2021-02-26 DIAGNOSIS — R74.8 ELEVATED SERUM TRYPTASE: ICD-10-CM

## 2021-02-26 LAB
BASOPHILS # BLD AUTO: 0 10E9/L (ref 0–0.2)
BASOPHILS NFR BLD AUTO: 1 %
CORTIS SERPL-MCNC: 18.9 UG/DL (ref 4–22)
DIFFERENTIAL METHOD BLD: ABNORMAL
EOSINOPHIL # BLD AUTO: 0.1 10E9/L (ref 0–0.7)
EOSINOPHIL NFR BLD AUTO: 3 %
ERYTHROCYTE [DISTWIDTH] IN BLOOD BY AUTOMATED COUNT: 12.5 % (ref 10–15)
HCT VFR BLD AUTO: 44.2 % (ref 35–47)
HGB BLD-MCNC: 14.3 G/DL (ref 11.7–15.7)
LYMPHOCYTES # BLD AUTO: 2.6 10E9/L (ref 0.8–5.3)
LYMPHOCYTES NFR BLD AUTO: 58 %
MCH RBC QN AUTO: 29.2 PG (ref 26.5–33)
MCHC RBC AUTO-ENTMCNC: 32.4 G/DL (ref 31.5–36.5)
MCV RBC AUTO: 90 FL (ref 78–100)
MONOCYTES # BLD AUTO: 0.4 10E9/L (ref 0–1.3)
MONOCYTES NFR BLD AUTO: 8 %
NEUTROPHILS # BLD AUTO: 1.3 10E9/L (ref 1.6–8.3)
NEUTROPHILS NFR BLD AUTO: 30 %
PLATELET # BLD AUTO: 185 10E9/L (ref 150–450)
RBC # BLD AUTO: 4.89 10E12/L (ref 3.8–5.2)
WBC # BLD AUTO: 4.4 10E9/L (ref 4–11)

## 2021-02-26 PROCEDURE — 85025 COMPLETE CBC W/AUTO DIFF WBC: CPT | Performed by: INTERNAL MEDICINE

## 2021-02-26 PROCEDURE — 82533 TOTAL CORTISOL: CPT | Performed by: INTERNAL MEDICINE

## 2021-02-26 PROCEDURE — 36415 COLL VENOUS BLD VENIPUNCTURE: CPT | Performed by: INTERNAL MEDICINE

## 2021-02-26 PROCEDURE — 82024 ASSAY OF ACTH: CPT | Performed by: INTERNAL MEDICINE

## 2021-03-01 NOTE — RESULT ENCOUNTER NOTE
Crystal,     The cortisol level is within the desired range. This was checked as part of screening for adrenal insufficiency and the results rule out/exclude adrenal insufficiency as an underlying cause for your symptoms.    The complete blood count results are within the normal limits with the exception of absolute neutrophil count which came back slightly below the cut off.  It might be reasonable to check this number again with your next routine labs to document stability.       Please let me know if any questions.     Scar Keenan MD

## 2021-03-02 ENCOUNTER — TELEPHONE (OUTPATIENT)
Dept: NEUROLOGY | Facility: CLINIC | Age: 41
End: 2021-03-02

## 2021-03-03 ENCOUNTER — OFFICE VISIT (OUTPATIENT)
Dept: ALLERGY | Facility: OTHER | Age: 41
End: 2021-03-03
Payer: COMMERCIAL

## 2021-03-03 VITALS
DIASTOLIC BLOOD PRESSURE: 75 MMHG | HEART RATE: 73 BPM | SYSTOLIC BLOOD PRESSURE: 110 MMHG | WEIGHT: 130 LBS | BODY MASS INDEX: 22.2 KG/M2 | HEIGHT: 64 IN | OXYGEN SATURATION: 100 %

## 2021-03-03 DIAGNOSIS — J30.89 ALLERGIC RHINITIS DUE TO OTHER ALLERGIC TRIGGER, UNSPECIFIED SEASONALITY: ICD-10-CM

## 2021-03-03 DIAGNOSIS — L29.9 PRURITIC DISORDER: ICD-10-CM

## 2021-03-03 DIAGNOSIS — G90.9 AUTONOMIC DYSFUNCTION: ICD-10-CM

## 2021-03-03 DIAGNOSIS — R74.8 ELEVATED SERUM TRYPTASE: Primary | ICD-10-CM

## 2021-03-03 PROCEDURE — 99204 OFFICE O/P NEW MOD 45 MIN: CPT | Mod: 25 | Performed by: ALLERGY & IMMUNOLOGY

## 2021-03-03 PROCEDURE — 95004 PERQ TESTS W/ALRGNC XTRCS: CPT | Performed by: ALLERGY & IMMUNOLOGY

## 2021-03-03 ASSESSMENT — MIFFLIN-ST. JEOR: SCORE: 1239.68

## 2021-03-03 ASSESSMENT — PAIN SCALES - GENERAL: PAINLEVEL: MODERATE PAIN (5)

## 2021-03-03 NOTE — ASSESSMENT & PLAN NOTE
Intermittent nasal itching and rhinorrhea.    Skin testing:  Negative.     -Zyrtec 10 mg p.o. twice daily being started for elevated serum tryptase.

## 2021-03-03 NOTE — PATIENT INSTRUCTIONS
Allergy Staff Appt Hours Shot Hours Locations    Physician     Andres Greene DO       Support Staff     Tamara CONTI RN      Peg HUDDLESTON CMA  Tuesday:        [place] 7-5 Wednesday:        [place]: 7-5 Thursday:                    [place] 7-6     Friday:  [place]  7-2   [place]        Thursday: 8-5:20        Friday: 7-12     [place]        Tuesday: 7- 3:20 Wednesday: 7-4:20     [place] Monday: 7-2:20 Tuesday: 9-5:20         [facility]  [address] [place] [address]  Appt Line: [phone]  Allergy RN:  [phone]    [facility]  [address] [place] [address]  Appt Line: [phone]  Allergy RN:  [phone]       Important Scheduling Information  Aspirin Desensitization: Appt will last 2 clinic days. Please call the Allergy RN line for your clinic to schedule. Discontinue antihistamines 7 days prior to the appointment.     Food Challenges: Appt will last 3-4 hours. Please call the Allergy RN line for your clinic to schedule. Discontinue antihistamines 7 days prior to the appointment.     Penicillin Testing: Appt will last 2-3 hours. Please call the Allergy RN line for your clinic to schedule. Discontinue antihistamines 7 days prior to the appointment.     Skin Testing: Appt will about 40 minutes. Call the appointment line for your clinic to schedule. Discontinue antihistamines 7 days prior to the appointment.     Venom Testing: Appt will last 2-3 hours. Please call the Allergy RN line for your clinic to schedule. Discontinue antihistamines 7 days prior to the appointment.     Thank you for trusting us with your Allergy, Asthma, and Immunology care. Please feel free to contact us with any questions or concerns you may have.      - 24 hour urinary studies.   - Order genetic testing.   - Start Zyrtec 10mg by mouth twice daily.

## 2021-03-03 NOTE — PROGRESS NOTES
Ashli Gamboa is a 41 year old White female with previous medical history significant for chronic lyme disease, fibromyalgia. Ashli Gamboa is being seen today for evaluation of elevated serum tryptase. The patient is being seen in consultation at the request of Dr. Shlomo MD.     Patient has had numerous complaints since 2018.  She thinks this stemmed after she had Lyme disease.  She reports that she has diffuse itching.  This is made worse sometimes after eating.  She has been unable to pinpoint exact trigger.  Additionally associated with itchy nose and rhinorrhea.  Denies hives.  Denies any allergic reactions to medications, foods, insect stings.  She additionally has diffuse joint pain involving large joints.  She has fatigue.  Previously diagnosed with fibromyalgia.  She has had memory loss and brain fog.  She initially thought that she had POTS and has followed closely with cardiology.  Thought to have some sort of autonomic dysfunction.  She often times feels nauseated.  Intermittently will vomit.  Denies diarrhea.  Denies skin rash.  Sister with similar symptoms.  She had an elevated serum tryptase around 16.  This was repeated and remained around 16.  No prior history or evaluation for primary mast cell disease.  No history of allergy testing. No use of antihistamines.     ENVIRONMENTAL HISTORY: [place] The home is heated with a gas furnace. They do have central air conditioning. The patient's bedroom is furnished with feather/wool bedding or pillows and carpeting in bedroom.  Pets inside the house include 2 dog(s). There is no history of cockroach or mice infestation. There is/are 0 smokers in the house.  The house does not have a damp basement.     Past Medical History:   Diagnosis Date     Arthritis     BIG TOE     Cervical high risk HPV (human papillomavirus) test positive 07/05/2017     Cervical high risk HPV (human papillomavirus) test  positive 06/10/2015     Depressive disorder 2007    I have been on and off medication for the last several years     Endometriosis      Hearing problem      Obese      Sensorineural hearing loss      Family History   Problem Relation Age of Onset     Depression Mother      Cancer Mother         Cervical      Other - See Comments Mother         Fibromyalgia     Crohn's Disease Mother      Obesity Mother      Ovarian Cancer Mother         Cervical Cancer     Substance Abuse Mother      Stomach Problem Mother      Arthritis Father      Rheumatoid Arthritis Father      Depression Father      Substance Abuse Father      Cerebrovascular Disease Paternal Grandmother      Breast Cancer Maternal Grandmother      Cancer Maternal Grandmother      Depression Brother      Depression Sister      Mental Illness Brother         schizophrenia and bipolar     Substance Abuse Brother      Stomach Problem Sister      Stomach Problem Brother      Past Surgical History:   Procedure Laterality Date     ABDOMEN SURGERY      laparoscopy X2     DILATE CERVIX, HYSTEROSCOPY, ABLATE ENDOMETRIUM NOVASURE, COMBINED N/A 11/17/2016    Procedure: COMBINED DILATE CERVIX, HYSTEROSCOPY, ABLATE ENDOMETRIUM NOVASURE;  Surgeon: Sabas Patel MD;  Location: [facility]     EP STUDY TILT TABLE N/A 12/29/2020    Procedure: EP TILT TABLE;  Surgeon: Gilberto Song MD;  Location:  [facility]     GYN SURGERY  dates above    myomectomy x2, laparoscopy x2     GYN SURGERY      Endometriosis surgery 5/1/19     INSERT PICC LINE Left 11/15/2019    Procedure: INSERTION, PICC, single lumen PICC;  Surgeon: Emmett Rasmussen PA-C;  Location: [facility]     IR PICC PLACEMENT > 5 YRS OF AGE  11/15/2019     LAPAROSCOPIC ASSISTED HYSTERECTOMY VAGINAL N/A 1/4/2018    Procedure: LAPAROSCOPIC ASSISTED HYSTERECTOMY VAGINAL;;  Surgeon: Sabas Patel MD;  Location: [facility]     LAPAROSCOPIC GASTRIC SLEEVE N/A 7/25/2017    Procedure: LAPAROSCOPIC GASTRIC  SLEEVE;  Laparoscopic Sleeve Gastrectomy;  Surgeon: Sam Schmidt MD;  Location: [facility] OR     LAPAROSCOPIC LYSIS ADHESIONS  7/10/2014    Procedure: LAPAROSCOPIC LYSIS ADHESIONS;  Surgeon: Sabas Patel MD;  Location: [facility]     LAPAROSCOPIC SALPINGECTOMY Bilateral 1/4/2018    Procedure: LAPAROSCOPIC SALPINGECTOMY;;  Surgeon: Sabas Patel MD;  Location: [facility]     LASER CO2 LAPAROSCOPIC VAPORIZATION ENDOMETRIUM  7/10/2014    Procedure: LASER CO2 LAPAROSCOPIC VAPORIZATION ENDOMETRIUM;  Surgeon: Sabas Patel MD;  Location: [facility]     LASER CO2 LAPAROSCOPIC VAPORIZATION ENDOMETRIUM N/A 6/30/2015    Procedure: LASER CO2 LAPAROSCOPIC VAPORIZATION ENDOMETRIUM;  Surgeon: Sabas Patel MD;  Location: [facility]     LASER CO2 LAPAROSCOPIC VAPORIZATION ENDOMETRIUM, LYSIS ADHESIONS N/A 1/4/2018    Procedure: LASER CO2 LAPAROSCOPIC VAPORIZATION ENDOMETRIUM, LYSIS ADHESIONS;  LAPAROSCOPY WITH CO2 LASER LYSIS OF ADHESIONS AND VAPORIZATION OF ENDOMETRIOSIS, CYSTOTOMY LEFT OVARY, LAPAROSCOPIC VAGINAL HYSTERECTOMY WITH BILATERAL SALPINGECTOMY ;  Surgeon: Sabas Patel MD;  Location: [facility]     LASER CO2 LAPAROSCOPY DIAGNOSTIC, LYSIS ADHESIONS, COMBINED N/A 6/30/2015    Procedure: COMBINED LASER CO2 LAPAROSCOPY DIAGNOSTIC, LYSIS ADHESIONS;  Surgeon: Sabas Patel MD;  Location: [facility]       REVIEW OF SYSTEMS:  General: negative for weight gain. negative for weight loss. negative for changes in sleep.   Ears: negative for fullness. negative for hearing loss. negative for dizziness.   Nose: negative for snoring.negative for changes in smell. positive  for drainage.   Eyes: negative for eye watering. negative for eye itching. negative for vision changes. negative for eye redness.  Throat: negative for hoarseness. negative for sore throat. negative for trouble swallowing.   Lungs: negative for shortness of breath.negative for wheezing. negative for sputum production.   Cardiovascular: negative for chest pain. negative  for swelling of ankles. negative for fast or irregular heartbeat.   Gastrointestinal: positive  for nausea. negative for heartburn. negative for acid reflux.   Musculoskeletal: negative for joint pain. negative for joint stiffness. negative for joint swelling.   Neurologic: negative for seizures. negative for fainting. negative for weakness.   Psychiatric: negative for changes in mood. negative for anxiety.   Endocrine: negative for cold intolerance. negative for heat intolerance. negative for tremors.   Lymphatic: negative for lower extremity swelling. negative for lymph node swelling.   Hematologic: negative for easy bruising. negative for easy bleeding.  Integumentary: negative for rash. negative for scaling. negative for nail changes.       Current Outpatient Medications:      buPROPion (WELLBUTRIN XL) 300 MG 24 hr tablet, TAKE 1 TABLET BY MOUTH EVERY DAY IN THE MORNING, Disp: 90 tablet, Rfl: 3     cefdinir (OMNICEF) 300 MG capsule, , Disp: , Rfl:      medical cannabis (Patient's own supply), See Admin Instructions (The purpose of this order is to document that the patient reports taking medical cannabis.  This is not a prescription, and is not used to certify that the patient has a qualifying medical condition.), Disp: , Rfl:      minocycline (MINOCIN) 100 MG capsule, Take 100 mg by mouth, Disp: , Rfl:      MULTIPLE VITAMIN PO, Take 1 patch by mouth daily PatchMD - Multivitamin Patch, Disp: , Rfl:      NALTREXONE HCL PO, Take 4.5 mg by mouth, Disp: , Rfl:      NUVARING 0.12-0.015 MG/24HR vaginal ring, INSERT 1 RING VAGINALLY EVERY 21 DAYS, Disp: 1 each, Rfl: 0     rifampin (RIFADIN) 150 MG capsule, , Disp: , Rfl:      SUMAtriptan (IMITREX) 50 MG tablet, Take 1-2 tablets at the onset of a headache. May repeat in 2 hours. Max 4 tablets/24 hours., Disp: 30 tablet, Rfl: 1     Topiramate (TOPAMAX PO), Take 300 mg by mouth daily , Disp: , Rfl:      vitamin B complex with vitamin C (VITAMIN  B COMPLEX) TABS tablet,  Take 1 tablet by mouth daily, Disp: , Rfl:      VITAMIN D PO, Take 5,000 Units by mouth daily, Disp: , Rfl:   No current facility-administered medications for this visit.     Facility-Administered Medications Ordered in Other Visits:      BUPivacaine 0.25%, 10 mL, INTRA-ARTICULAR, Once, Kian Clement DPM     iopamidol (ISOVUE-M 200) solution 20 mL, 20 mL, INTRA-ARTICULAR, Once, Kian Clement DPM     lidocaine (PF) (XYLOCAINE) 1 % injection 30 mL, 30 mL, Subcutaneous, Once, Kian Clement DPM     triamcinolone acetonide (KENALOG-40) injection 40 mg, 40 mg, INTRA-ARTICULAR, Once, Kian Clement DPM  Immunization History   Administered Date(s) Administered     TD (ADULT, 7+) 12/12/2003     Tdap (Adacel,Boostrix) 06/14/2011     No Known Allergies      EXAM:   Constitutional:  Appears well-developed and well-nourished. No distress.   HEENT:   Head: Normocephalic.   Nasal tissue pale.    Eyes: Conjunctivae are non-erythematous   Cardiovascular: Normal rate, regular rhythm and normal heart sounds. Exam reveals no gallop and no friction rub.   No murmur heard.  Respiratory: Effort normal and breath sounds normal. No respiratory distress. No wheezes. No rales.   Musculoskeletal: Normal range of motion.   Neuro: Oriented to person, place, and time.  Skin: Skin is warm and dry. No rash noted.   Psychiatric: Normal mood and affect.     Nursing note and vitals reviewed.    ENVIRONMENTAL PERCUTANEOUS SKIN TESTING: ADULT  [facility] 3/3/2021   Consent Y   Ordering Physician Dr. Greene   Interpreting Physician Dr. Greene   Testing Technician al/sg   Location Back   Time start:  8:40 AM   Time End:  8:55 AM   Positive Control: Histatrol*ALK 1 mg/ml 5/32   Negative Control: 50% Glycerin 0   Cat Hair*ALK (10,000 BAU/ml) 0   AP Dog Hair/Dander (1:100 w/v) 0   Dust Mite p. 30,000 AU/ml 0   Dust Mite f. (30,000 AU/ml) 0   Cornelius (W/F in millimeters) 0   Chris [place] (100,000 BAU/mL) 0   Red Cedar (W/F  in millimeters) 0   Maple/Hustle (W/F in millimeters) 0   Hackberry (W/F in millimeters) 0   [place] (W/F in millimeters) 0   [place] *ALK (W/F in millimeters) 0   American Elm (W/F in millimeters) 0   [place] (W/F in millimeters) 0   [place] (W/F in millimeters) 0   Birch Mix (W/F in millimeters) 0   [place] (W/F in millimeters) 0   Oak (W/F in millimeters) 0   Cocklebur (W/F in millimeters) 0   Bellaire (W/F in millimeters) 0   White Kevin (W/F in millimeters) 0   Careless (W/F in millimeters) 0   Nettle (W/F in millimeters) 0   English Plantain (W/F in millimeters) 0   Kochia (W/F in millimeters) 0   Lamb's Quarter (W/F in millimeters) 0   Marshelder (W/F in millimeters) 0   Ragweed Mix* ALK (W/F in millimeters) 0   Russian Thistle (W/F in millimeters) 0   Sagebrush/Mugwort (W/F in millimeters) 0   Sheep Sorrel (W/F in millimeters) 0   Feather Mix* ALK (W/F in millimeters) 0   Penicillium Mix (1:10 w/v) 0   Curvularia spicifera (1:10 w/v) 0   Epicoccum (1:10 w/v) 0   Aspergillus fumigatus (1:10 w/v): 0   Alternaria tenius (1:10 w/v) 0   H. Cladosporium (1:10 w/v) 0   Phoma herbarum (1:10 w/v) 0        ASSESSMENT/PLAN:  Problem List Items Addressed This Visit        Nervous and Auditory    Autonomic dysfunction    Relevant Orders    2,3 Dinor 11 Beta Prostaglandin F2 Alpha UR 24 hour    N-Methylhistamine 24 Hr Urine    Pruritic disorder    Relevant Orders    2,3 Dinor 11 Beta Prostaglandin F2 Alpha UR 24 hour    N-Methylhistamine 24 Hr Urine    ALLERGY SKIN TESTS,ALLERGENS [[id number]]       Respiratory    Allergic rhinitis due to other allergic trigger, unspecified seasonality     Intermittent nasal itching and rhinorrhea.    Skin testing:  Negative.     -Zyrtec 10 mg p.o. twice daily being started for elevated serum tryptase.          Relevant Orders    ALLERGY SKIN TESTS,ALLERGENS [[id number]]       Other    Elevated serum tryptase - Primary     Elevated serum tryptase in the context of pruritus, rhinorrhea,  fatigue, brain fog, joint pain, diffuse pain, nausea.  Concern for mast cell disorder that could be hereditary alpha tryptasemia, mastocytosis, monoclonal mast cell activation syndrome versus muscle activation syndrome.    -We will send genetic testing for hereditary alpha tryptasemia.  -Urinary studies.  -Start on Zyrtec 10 mg by mouth twice daily.  -Consider referral to hematology for bone marrow especially if negative testing.         Relevant Orders    2,3 Dinor 11 Beta Prostaglandin F2 Alpha UR 24 hour    N-Methylhistamine 24 Hr Urine          Chart documentation with Dragon Voice recognition Software. Although reviewed after completion, some words and grammatical errors may remain.    DO ASIA HaddadI  Medical Director for Allergy/Immunology at [place], [place]

## 2021-03-03 NOTE — LETTER
3/3/2021         RE: Ashli Gamboa  [address] [facility] [address]        Dear Colleague,    Thank you for referring your patient, Ashli Gamboa, to the [facility]. Please see a copy of my visit note below.    Ashli Gamboa is a 41 year old White female with previous medical history significant for chronic lyme disease, fibromyalgia. Ashli Gamboa is being seen today for evaluation of elevated serum tryptase. The patient is being seen in consultation at the request of Dr. Shlomo MD.     Patient has had numerous complaints since 2018.  She thinks this stemmed after she had Lyme disease.  She reports that she has diffuse itching.  This is made worse sometimes after eating.  She has been unable to pinpoint exact trigger.  Additionally associated with itchy nose and rhinorrhea.  Denies hives.  Denies any allergic reactions to medications, foods, insect stings.  She additionally has diffuse joint pain involving large joints.  She has fatigue.  Previously diagnosed with fibromyalgia.  She has had memory loss and brain fog.  She initially thought that she had POTS and has followed closely with cardiology.  Thought to have some sort of autonomic dysfunction.  She often times feels nauseated.  Intermittently will vomit.  Denies diarrhea.  Denies skin rash.  Sister with similar symptoms.  She had an elevated serum tryptase around 16.  This was repeated and remained around 16.  No prior history or evaluation for primary mast cell disease.  No history of allergy testing. No use of antihistamines.     ENVIRONMENTAL HISTORY: [place] The home is heated with a gas furnace. They do have central air conditioning. The patient's bedroom is furnished with feather/wool bedding or pillows and carpeting in bedroom.  Pets inside the house include 2 dog(s). There is no history of cockroach or mice infestation. There is/are 0  smokers in the house.  The house does not have a damp basement.     Past Medical History:   Diagnosis Date     Arthritis     BIG TOE     Cervical high risk HPV (human papillomavirus) test positive 07/05/2017     Cervical high risk HPV (human papillomavirus) test positive 06/10/2015     Depressive disorder 2007    I have been on and off medication for the last several years     Endometriosis      Hearing problem      Obese      Sensorineural hearing loss      Family History   Problem Relation Age of Onset     Depression Mother      Cancer Mother         Cervical      Other - See Comments Mother         Fibromyalgia     Crohn's Disease Mother      Obesity Mother      Ovarian Cancer Mother         Cervical Cancer     Substance Abuse Mother      Stomach Problem Mother      Arthritis Father      Rheumatoid Arthritis Father      Depression Father      Substance Abuse Father      Cerebrovascular Disease Paternal Grandmother      Breast Cancer Maternal Grandmother      Cancer Maternal Grandmother      Depression Brother      Depression Sister      Mental Illness Brother         schizophrenia and bipolar     Substance Abuse Brother      Stomach Problem Sister      Stomach Problem Brother      Past Surgical History:   Procedure Laterality Date     ABDOMEN SURGERY      laparoscopy X2     DILATE CERVIX, HYSTEROSCOPY, ABLATE ENDOMETRIUM NOVASURE, COMBINED N/A 11/17/2016    Procedure: COMBINED DILATE CERVIX, HYSTEROSCOPY, ABLATE ENDOMETRIUM NOVASURE;  Surgeon: Sabas Patel MD;  Location: [facility]     EP STUDY TILT TABLE N/A 12/29/2020    Procedure: EP TILT TABLE;  Surgeon: Gilberto Song MD;  Location:  [facility]     GYN SURGERY  dates above    myomectomy x2, laparoscopy x2     GYN SURGERY      Endometriosis surgery 5/1/19     INSERT PICC LINE Left 11/15/2019    Procedure: INSERTION, PICC, single lumen PICC;  Surgeon: Emmett Rasmussen PA-C;  Location: [facility] OR     IR PICC PLACEMENT > 5 YRS OF AGE   11/15/2019     LAPAROSCOPIC ASSISTED HYSTERECTOMY VAGINAL N/A 1/4/2018    Procedure: LAPAROSCOPIC ASSISTED HYSTERECTOMY VAGINAL;;  Surgeon: Sabas Patel MD;  Location: [facility]     LAPAROSCOPIC GASTRIC SLEEVE N/A 7/25/2017    Procedure: LAPAROSCOPIC GASTRIC SLEEVE;  Laparoscopic Sleeve Gastrectomy;  Surgeon: Sam Schmidt MD;  Location: [facility] OR     LAPAROSCOPIC LYSIS ADHESIONS  7/10/2014    Procedure: LAPAROSCOPIC LYSIS ADHESIONS;  Surgeon: Sabas Patel MD;  Location: [facility]     LAPAROSCOPIC SALPINGECTOMY Bilateral 1/4/2018    Procedure: LAPAROSCOPIC SALPINGECTOMY;;  Surgeon: Sabas Patel MD;  Location: [facility]     LASER CO2 LAPAROSCOPIC VAPORIZATION ENDOMETRIUM  7/10/2014    Procedure: LASER CO2 LAPAROSCOPIC VAPORIZATION ENDOMETRIUM;  Surgeon: Sabas Patel MD;  Location: [facility]     LASER CO2 LAPAROSCOPIC VAPORIZATION ENDOMETRIUM N/A 6/30/2015    Procedure: LASER CO2 LAPAROSCOPIC VAPORIZATION ENDOMETRIUM;  Surgeon: Sabas Patel MD;  Location: [facility]     LASER CO2 LAPAROSCOPIC VAPORIZATION ENDOMETRIUM, LYSIS ADHESIONS N/A 1/4/2018    Procedure: LASER CO2 LAPAROSCOPIC VAPORIZATION ENDOMETRIUM, LYSIS ADHESIONS;  LAPAROSCOPY WITH CO2 LASER LYSIS OF ADHESIONS AND VAPORIZATION OF ENDOMETRIOSIS, CYSTOTOMY LEFT OVARY, LAPAROSCOPIC VAGINAL HYSTERECTOMY WITH BILATERAL SALPINGECTOMY ;  Surgeon: Sabas Patel MD;  Location: [facility]     LASER CO2 LAPAROSCOPY DIAGNOSTIC, LYSIS ADHESIONS, COMBINED N/A 6/30/2015    Procedure: COMBINED LASER CO2 LAPAROSCOPY DIAGNOSTIC, LYSIS ADHESIONS;  Surgeon: Sabas Patel MD;  Location: [facility]       REVIEW OF SYSTEMS:  General: negative for weight gain. negative for weight loss. negative for changes in sleep.   Ears: negative for fullness. negative for hearing loss. negative for dizziness.   Nose: negative for snoring.negative for changes in smell. positive  for drainage.   Eyes: negative for eye watering. negative for eye itching. negative for vision changes.  negative for eye redness.  Throat: negative for hoarseness. negative for sore throat. negative for trouble swallowing.   Lungs: negative for shortness of breath.negative for wheezing. negative for sputum production.   Cardiovascular: negative for chest pain. negative for swelling of ankles. negative for fast or irregular heartbeat.   Gastrointestinal: positive  for nausea. negative for heartburn. negative for acid reflux.   Musculoskeletal: negative for joint pain. negative for joint stiffness. negative for joint swelling.   Neurologic: negative for seizures. negative for fainting. negative for weakness.   Psychiatric: negative for changes in mood. negative for anxiety.   Endocrine: negative for cold intolerance. negative for heat intolerance. negative for tremors.   Lymphatic: negative for lower extremity swelling. negative for lymph node swelling.   Hematologic: negative for easy bruising. negative for easy bleeding.  Integumentary: negative for rash. negative for scaling. negative for nail changes.       Current Outpatient Medications:      buPROPion (WELLBUTRIN XL) 300 MG 24 hr tablet, TAKE 1 TABLET BY MOUTH EVERY DAY IN THE MORNING, Disp: 90 tablet, Rfl: 3     cefdinir (OMNICEF) 300 MG capsule, , Disp: , Rfl:      medical cannabis (Patient's own supply), See Admin Instructions (The purpose of this order is to document that the patient reports taking medical cannabis.  This is not a prescription, and is not used to certify that the patient has a qualifying medical condition.), Disp: , Rfl:      minocycline (MINOCIN) 100 MG capsule, Take 100 mg by mouth, Disp: , Rfl:      MULTIPLE VITAMIN PO, Take 1 patch by mouth daily PatchMD - Multivitamin Patch, Disp: , Rfl:      NALTREXONE HCL PO, Take 4.5 mg by mouth, Disp: , Rfl:      NUVARING 0.12-0.015 MG/24HR vaginal ring, INSERT 1 RING VAGINALLY EVERY 21 DAYS, Disp: 1 each, Rfl: 0     rifampin (RIFADIN) 150 MG capsule, , Disp: , Rfl:      SUMAtriptan (IMITREX) 50 MG  tablet, Take 1-2 tablets at the onset of a headache. May repeat in 2 hours. Max 4 tablets/24 hours., Disp: 30 tablet, Rfl: 1     Topiramate (TOPAMAX PO), Take 300 mg by mouth daily , Disp: , Rfl:      vitamin B complex with vitamin C (VITAMIN  B COMPLEX) TABS tablet, Take 1 tablet by mouth daily, Disp: , Rfl:      VITAMIN D PO, Take 5,000 Units by mouth daily, Disp: , Rfl:   No current facility-administered medications for this visit.     Facility-Administered Medications Ordered in Other Visits:      BUPivacaine 0.25%, 10 mL, INTRA-ARTICULAR, Once, Kian Clement DPM     iopamidol (ISOVUE-M 200) solution 20 mL, 20 mL, INTRA-ARTICULAR, Once, Kian Clement DPM     lidocaine (PF) (XYLOCAINE) 1 % injection 30 mL, 30 mL, Subcutaneous, Once, Kian Clement DPM     triamcinolone acetonide (KENALOG-40) injection 40 mg, 40 mg, INTRA-ARTICULAR, Once, Kian Clement DPM  Immunization History   Administered Date(s) Administered     TD (ADULT, 7+) 12/12/2003     Tdap (Adacel,Boostrix) 06/14/2011     No Known Allergies      EXAM:   Constitutional:  Appears well-developed and well-nourished. No distress.   HEENT:   Head: Normocephalic.   Nasal tissue pale.    Eyes: Conjunctivae are non-erythematous   Cardiovascular: Normal rate, regular rhythm and normal heart sounds. Exam reveals no gallop and no friction rub.   No murmur heard.  Respiratory: Effort normal and breath sounds normal. No respiratory distress. No wheezes. No rales.   Musculoskeletal: Normal range of motion.   Neuro: Oriented to person, place, and time.  Skin: Skin is warm and dry. No rash noted.   Psychiatric: Normal mood and affect.     Nursing note and vitals reviewed.    ENVIRONMENTAL PERCUTANEOUS SKIN TESTING: ADULT  [facility] 3/3/2021   Consent Y   Ordering Physician Dr. Greene   Interpreting Physician Dr. Greene   Testing Technician al/sg   Location Back   Time start:  8:40 AM   Time End:  8:55 AM   Positive Control:  Histatrol*ALK 1 mg/ml 5/32   Negative Control: 50% Glycerin 0   Cat Hair*ALK (10,000 BAU/ml) 0   AP Dog Hair/Dander (1:100 w/v) 0   Dust Mite p. 30,000 AU/ml 0   Dust Mite f. (30,000 AU/ml) 0   Cornelius (W/F in millimeters) 0   Chris Grass (100,000 BAU/mL) 0   Red Cedar (W/F in millimeters) 0   Maple/[place]juntas (W/F in millimeters) 0   Hackberry (W/F in millimeters) 0   Saluda (W/F in millimeters) 0   Lucas *ALK (W/F in millimeters) 0   American Elm (W/F in millimeters) 0   Kimball (W/F in millimeters) 0   Black Gwynedd (W/F in millimeters) 0   Birch Mix (W/F in millimeters) 0   [place] (W/F in millimeters) 0   Oak (W/F in millimeters) 0   Cocklebur (W/F in millimeters) 0   Progreso (W/F in millimeters) 0   White Kevin (W/F in millimeters) 0   Careless (W/F in millimeters) 0   Nettle (W/F in millimeters) 0   English Plantain (W/F in millimeters) 0   Kochia (W/F in millimeters) 0   Lamb's Quarter (W/F in millimeters) 0   Marshelder (W/F in millimeters) 0   Ragweed Mix* ALK (W/F in millimeters) 0   Russian Thistle (W/F in millimeters) 0   Sagebrush/Mugwort (W/F in millimeters) 0   Sheep Sorrel (W/F in millimeters) 0   Feather Mix* ALK (W/F in millimeters) 0   Penicillium Mix (1:10 w/v) 0   Curvularia spicifera (1:10 w/v) 0   Epicoccum (1:10 w/v) 0   Aspergillus fumigatus (1:10 w/v): 0   Alternaria tenius (1:10 w/v) 0   H. Cladosporium (1:10 w/v) 0   Phoma herbarum (1:10 w/v) 0        ASSESSMENT/PLAN:  Problem List Items Addressed This Visit        Nervous and Auditory    Autonomic dysfunction    Relevant Orders    2,3 Dinor 11 Beta Prostaglandin F2 Alpha UR 24 hour    N-Methylhistamine 24 Hr Urine    Pruritic disorder    Relevant Orders    2,3 Dinor 11 Beta Prostaglandin F2 Alpha UR 24 hour    N-Methylhistamine 24 Hr Urine    ALLERGY SKIN TESTS,ALLERGENS [[id number]]       Respiratory    Allergic rhinitis due to other allergic trigger, unspecified seasonality     Intermittent nasal itching and rhinorrhea.    Skin  testing:  Negative.     -Zyrtec 10 mg p.o. twice daily being started for elevated serum tryptase.          Relevant Orders    ALLERGY SKIN TESTS,ALLERGENS [[id number]]       Other    Elevated serum tryptase - Primary     Elevated serum tryptase in the context of pruritus, rhinorrhea, fatigue, brain fog, joint pain, diffuse pain, nausea.  Concern for mast cell disorder that could be hereditary alpha tryptasemia, mastocytosis, monoclonal mast cell activation syndrome versus muscle activation syndrome.    -We will send genetic testing for hereditary alpha tryptasemia.  -Urinary studies.  -Start on Zyrtec 10 mg by mouth twice daily.  -Consider referral to hematology for bone marrow especially if negative testing.         Relevant Orders    2,3 Dinor 11 Beta Prostaglandin F2 Alpha UR 24 hour    N-Methylhistamine 24 Hr Urine          Chart documentation with Dragon Voice recognition Software. Although reviewed after completion, some words and grammatical errors may remain.    Andres Greene DO FAAAAI  Medical Director for Allergy/Immunology at [place], [place]        Again, thank you for allowing me to participate in the care of your patient.        Sincerely,        Andres Greene DO

## 2021-03-03 NOTE — ASSESSMENT & PLAN NOTE
Elevated serum tryptase in the context of pruritus, rhinorrhea, fatigue, brain fog, joint pain, diffuse pain, nausea.  Concern for mast cell disorder that could be hereditary alpha tryptasemia, mastocytosis, monoclonal mast cell activation syndrome versus muscle activation syndrome.    -We will send genetic testing for hereditary alpha tryptasemia.  -Urinary studies.  -Start on Zyrtec 10 mg by mouth twice daily.  -Consider referral to hematology for bone marrow especially if negative testing.

## 2021-03-04 PROCEDURE — 82542 COL CHROMOTOGRAPHY QUAL/QUAN: CPT | Mod: 90 | Performed by: ALLERGY & IMMUNOLOGY

## 2021-03-04 PROCEDURE — 84150 ASSAY OF PROSTAGLANDIN: CPT | Mod: 90 | Performed by: ALLERGY & IMMUNOLOGY

## 2021-03-04 PROCEDURE — 99000 SPECIMEN HANDLING OFFICE-LAB: CPT | Performed by: ALLERGY & IMMUNOLOGY

## 2021-03-05 DIAGNOSIS — G90.9 AUTONOMIC DYSFUNCTION: ICD-10-CM

## 2021-03-05 DIAGNOSIS — L29.9 PRURITIC DISORDER: ICD-10-CM

## 2021-03-05 DIAGNOSIS — R74.8 ELEVATED SERUM TRYPTASE: ICD-10-CM

## 2021-03-05 LAB — ACTH PLAS-MCNC: 15 PG/ML

## 2021-03-09 LAB — 2,3 11B PROSTAGLANDIN F2A UR: 3370 PG/MG CR

## 2021-03-11 LAB
COLLECT DURATION TIME UR: 24 H
CREAT 24H UR-MCNC: 50 MG/DL
CREAT 24H UR-MRATE: 750 MG/24 H
ME-HISTAMINE/CREAT 24H UR: 161 MCG/G CR (ref 30–200)
SPECIMEN VOL 24H UR: 1500 ML

## 2021-03-16 ENCOUNTER — VIRTUAL VISIT (OUTPATIENT)
Dept: NEUROLOGY | Facility: CLINIC | Age: 41
End: 2021-03-16
Payer: COMMERCIAL

## 2021-03-16 DIAGNOSIS — B94.8 POST-LYME DISEASE SYNDROME: Primary | ICD-10-CM

## 2021-03-16 PROCEDURE — 99214 OFFICE O/P EST MOD 30 MIN: CPT | Mod: 95 | Performed by: PSYCHIATRY & NEUROLOGY

## 2021-03-16 ASSESSMENT — PATIENT HEALTH QUESTIONNAIRE - PHQ9: SUM OF ALL RESPONSES TO PHQ QUESTIONS 1-9: 0

## 2021-03-16 NOTE — PROGRESS NOTES
Ashli is a 41 year old who is being evaluated via a billable video visit.      How would you like to obtain your AVS? Mychart  If the video visit is dropped, the invitation should be resent by: [phone]      Video Start Time: 1100  Video-Visit Details    Type of service:  Video Visit    Video End Time:11:23 AM    Originating Location (pt. Location): Home    Distant Location (provider location):  [facility] NEUROLOGY [facility]     Platform used for Video Visit: [employer]

## 2021-03-16 NOTE — LETTER
3/16/2021       RE: Ashli Gamboa  [address] [facility] [address]     Dear Colleague,    Thank you for referring your patient, Ashli Gamboa, to the [facility]UROLOG[facility] CLINIC [place] [facility]. Please see a copy of my visit note below.    [facility] Neurology Follow Up Visit    Ashli Gamboa MRN# [id number]   Age: 41 year old YOB: 1980     Brief history of symptoms: The patient was initially seen in neurologic consultation on 11/10/2020 for evaluation of memory concerns. Please see the comprehensive neurologic consultation note from that date in the Epic records for details. At the time of our visit the patient was primarily having difficulties with odd positive phenomena of her vision (light trails when turning her head) as well as dizziness with feelings of imbalance (standing for prolonged periods and when standing quickly).  Many of her symptoms were thought to be longstanding after a prolonged and complicated infection of Lyme disease in 7/2019, history of fibromyalgia.  Given her thorough w/up previously (MRI brain, CSF, EEG, multiple serums studies) it was thought that given non-focal neurological symptoms and cognitive concerns she would benefit from further w/up for POTS with her cardiologist (Tilt table if needed), to see a PT for imbalance.  Consideration to weaning off of Topamax and possible epidermal skin fiber density testing was made pending future results and symptoms.    Neuropsychological testing was done 1/7/2021 with cognitive domains showing a normal performance pattern which was unreflective of dementia, or focal cerebral insult.  Repeat testing in one year was to be considered.    Tilt table testing was done 12/29/2020 with findings not indicative for POTS.  She was encouraged to continue taking fluids as this did help relieve some of her symptoms experienced during testing.  The patient  indicates that her cardiologist, Dr. Song, told her she may have a dysautonomia and is planning on following up with her in the future after she continues with improved fluid managment.    The patient was sen with Dr. Greene of allergy/immunology for +serum tryptase levels along with her other symptoms and was to have further evaluation for possible mast cell disease including alpha tryptasemia.  She was started on zyrtec, and was to have urinary studies done after skin allergy testing was relatively normal.    Today, the patient still has periods of brain fog with memory loss, paresthesias of the body, dizziness (improved with fluid intake increase). She is still taking Topamax and will consider decreasing this if other testing is unrevealing.  We discussed tht her cognitive testing was remarkably normal and that a slowed sensory processing deficit is possibly contributing to making her feel that her memory function is worse than it is in actuality.     Medications:     Current Outpatient Medications   Medication Sig     buPROPion (WELLBUTRIN XL) 300 MG 24 hr tablet TAKE 1 TABLET BY MOUTH EVERY DAY IN THE MORNING     cefdinir (OMNICEF) 300 MG capsule      medical cannabis (Patient's own supply) See Admin Instructions (The purpose of this order is to document that the patient reports taking medical cannabis.  This is not a prescription, and is not used to certify that the patient has a qualifying medical condition.)     minocycline (MINOCIN) 100 MG capsule Take 100 mg by mouth     MULTIPLE VITAMIN PO Take 1 patch by mouth daily PatchMD - Multivitamin Patch     NALTREXONE HCL PO Take 4.5 mg by mouth     NUVARING 0.12-0.015 MG/24HR vaginal ring INSERT 1 RING VAGINALLY EVERY 21 DAYS     rifampin (RIFADIN) 150 MG capsule      SUMAtriptan (IMITREX) 50 MG tablet Take 1-2 tablets at the onset of a headache. May repeat in 2 hours. Max 4 tablets/24 hours.     Topiramate (TOPAMAX PO) Take 300 mg by mouth daily      vitamin  B complex with vitamin C (VITAMIN  B COMPLEX) TABS tablet Take 1 tablet by mouth daily     VITAMIN D PO Take 5,000 Units by mouth daily      Review of Systems:   A comprehensive 10 point review of systems (constitutional, ENT, cardiac, peripheral vascular, lymphatic, respiratory, GI, , Musculoskeletal, skin, Neurological) was performed and found to be negative except as described in this note.      Physical Exam:   General: Seated comfortably in no acute distress.  HEENT: Neck supple with normal range of motion.   Neurologic:     Mental Status: Fully alert, attentive and oriented. Speech clear and fluent, no paraphasic errors.     Cranial Nerves: EOMI with normal smooth pursuit. Facial movements symmetric.No dysarthria.     Motor: No tremors or other abnormal movements observed.          Assessment and Plan:   Assessment:  Possible small fiber neuropathy    The patient had negative POTS testing with tilt table but did have some symptom response to fluid intake.  While this isn't necessarily a defining characteristic for a dysautonomia, it is possible that given her migrating paresthesias, and positive response to fluids, she could have a small fiber neuropathy in the setting of a chronic inflammatory process as of yet undetermined.  Fibromyalgia can have associated small fiber neuropathy, and given the patient's ongoing symptoms I do think it is reasonable to further define if this is present as it may lead to more treatment options regarding neuropathic pain/sensory symptoms.  We also discussed that should the result come back negative that I may not have further treatment or diagnostic options to offer and further treatment should be through her PCP and other providers.  The patient was agreeable to this test and plan.     Plan:  Small fiber density testing    Follow up in Neurology clinic in 6-8 weeks or earlier as needed should new concerns arise.    MANDI Pope D.O.   of  Neurology    Total time (30 min) in this patient encounter was spent on pre-charting, counseling and/or coordination of care. We reviewed diagnostic results, impressions, and discussed other possible tests if symptoms do not improve. The patient is in agreement with this plan and has no further questions.      Ashli is a 41 year old who is being evaluated via a billable video visit.      How would you like to obtain your AVS? Mychart  If the video visit is dropped, the invitation should be resent by: [phone]      Video-Visit Details    Type of service:  Video Visit    Video Start Time: 1100  Video End Time:11:23 AM    Originating Location (pt. Location): Home    Distant Location (provider location):  [facility] NEUROLOGY CLINIC [place]     Platform used for Video Visit: [employer] [employer]

## 2021-03-16 NOTE — PROGRESS NOTES
[facility] Neurology Follow Up Visit    Ashli Gamboa MRN# [id number]   Age: 41 year old YOB: 1980     Brief history of symptoms: The patient was initially seen in neurologic consultation on 11/10/2020 for evaluation of memory concerns. Please see the comprehensive neurologic consultation note from that date in the Epic records for details. At the time of our visit the patient was primarily having difficulties with odd positive phenomena of her vision (light trails when turning her head) as well as dizziness with feelings of imbalance (standing for prolonged periods and when standing quickly).  Many of her symptoms were thought to be longstanding after a prolonged and complicated infection of Lyme disease in 7/2019, history of fibromyalgia.  Given her thorough w/up previously (MRI brain, CSF, EEG, multiple serums studies) it was thought that given non-focal neurological symptoms and cognitive concerns she would benefit from further w/up for POTS with her cardiologist (Tilt table if needed), to see a PT for imbalance.  Consideration to weaning off of Topamax and possible epidermal skin fiber density testing was made pending future results and symptoms.    Neuropsychological testing was done 1/7/2021 with cognitive domains showing a normal performance pattern which was unreflective of dementia, or focal cerebral insult.  Repeat testing in one year was to be considered.    Tilt table testing was done 12/29/2020 with findings not indicative for POTS.  She was encouraged to continue taking fluids as this did help relieve some of her symptoms experienced during testing.  The patient indicates that her cardiologist, Dr. Song, told her she may have a dysautonomia and is planning on following up with her in the future after she continues with improved fluid managment.    The patient was sen with Dr. Greene of allergy/immunology for +serum tryptase levels along with her other symptoms and was to have  further evaluation for possible mast cell disease including alpha tryptasemia.  She was started on zyrtec, and was to have urinary studies done after skin allergy testing was relatively normal.    Today, the patient still has periods of brain fog with memory loss, paresthesias of the body, dizziness (improved with fluid intake increase). She is still taking Topamax and will consider decreasing this if other testing is unrevealing.  We discussed tht her cognitive testing was remarkably normal and that a slowed sensory processing deficit is possibly contributing to making her feel that her memory function is worse than it is in actuality.     Medications:     Current Outpatient Medications   Medication Sig     buPROPion (WELLBUTRIN XL) 300 MG 24 hr tablet TAKE 1 TABLET BY MOUTH EVERY DAY IN THE MORNING     cefdinir (OMNICEF) 300 MG capsule      medical cannabis (Patient's own supply) See Admin Instructions (The purpose of this order is to document that the patient reports taking medical cannabis.  This is not a prescription, and is not used to certify that the patient has a qualifying medical condition.)     minocycline (MINOCIN) 100 MG capsule Take 100 mg by mouth     MULTIPLE VITAMIN PO Take 1 patch by mouth daily PatchMD - Multivitamin Patch     NALTREXONE HCL PO Take 4.5 mg by mouth     NUVARING 0.12-0.015 MG/24HR vaginal ring INSERT 1 RING VAGINALLY EVERY 21 DAYS     rifampin (RIFADIN) 150 MG capsule      SUMAtriptan (IMITREX) 50 MG tablet Take 1-2 tablets at the onset of a headache. May repeat in 2 hours. Max 4 tablets/24 hours.     Topiramate (TOPAMAX PO) Take 300 mg by mouth daily      vitamin B complex with vitamin C (VITAMIN  B COMPLEX) TABS tablet Take 1 tablet by mouth daily     VITAMIN D PO Take 5,000 Units by mouth daily      Review of Systems:   A comprehensive 10 point review of systems (constitutional, ENT, cardiac, peripheral vascular, lymphatic, respiratory, GI, , Musculoskeletal, skin,  Neurological) was performed and found to be negative except as described in this note.      Physical Exam:   General: Seated comfortably in no acute distress.  HEENT: Neck supple with normal range of motion.   Neurologic:     Mental Status: Fully alert, attentive and oriented. Speech clear and fluent, no paraphasic errors.     Cranial Nerves: EOMI with normal smooth pursuit. Facial movements symmetric.No dysarthria.     Motor: No tremors or other abnormal movements observed.          Assessment and Plan:   Assessment:  Possible small fiber neuropathy    The patient had negative POTS testing with tilt table but did have some symptom response to fluid intake.  While this isn't necessarily a defining characteristic for a dysautonomia, it is possible that given her migrating paresthesias, and positive response to fluids, she could have a small fiber neuropathy in the setting of a chronic inflammatory process as of yet undetermined.  Fibromyalgia can have associated small fiber neuropathy, and given the patient's ongoing symptoms I do think it is reasonable to further define if this is present as it may lead to more treatment options regarding neuropathic pain/sensory symptoms.  We also discussed that should the result come back negative that I may not have further treatment or diagnostic options to offer and further treatment should be through her PCP and other providers.  The patient was agreeable to this test and plan.     Plan:  Small fiber density testing    Follow up in Neurology clinic in 6-8 weeks or earlier as needed should new concerns arise.    MANDI Pope D.O.   of Neurology    Total time (30 min) in this patient encounter was spent on pre-charting, counseling and/or coordination of care. We reviewed diagnostic results, impressions, and discussed other possible tests if symptoms do not improve. The patient is in agreement with this plan and has no further questions.

## 2021-03-19 ENCOUNTER — MYC MEDICAL ADVICE (OUTPATIENT)
Dept: FAMILY MEDICINE | Facility: OTHER | Age: 41
End: 2021-03-19

## 2021-03-19 ENCOUNTER — VIRTUAL VISIT (OUTPATIENT)
Dept: FAMILY MEDICINE | Facility: OTHER | Age: 41
End: 2021-03-19
Payer: COMMERCIAL

## 2021-03-19 DIAGNOSIS — Z20.822 EXPOSURE TO COVID-19 VIRUS: Primary | ICD-10-CM

## 2021-03-19 PROCEDURE — 99212 OFFICE O/P EST SF 10 MIN: CPT | Mod: GT | Performed by: PHYSICIAN ASSISTANT

## 2021-03-19 NOTE — PROGRESS NOTES
Ashli is a 41 year old who is being evaluated via a billable video visit.      How would you like to obtain your AVS? Videobothart  If the video visit is dropped, the invitation should be resent by: Text to cell phone: [phone]  Will anyone else be joining your video visit? No    Video Start Time: 3:38 PM    Assessment & Plan     Exposure to COVID-19 virus  Exposure to COVID 13 days ago, is set to return to work 17 days after her exposure.  She just recently tested negative for COVID, test results were sent via [employer].  Her symptoms have resolved.  Ok to return to work as planned.      Return in about 3 months (around 6/19/2021) for Physical Exam.    Options for treatment and follow-up care were reviewed with the patient and/or guardian. Patient and/or guardian engaged in the decision making process and verbalized understanding of the options discussed and agreed with the final plan.    Rm Gordon PA-C  [facility]   Ashli is a 41 year old who presents for the following health issues     HPI     Patient was exposed to positive covid on 3/6 and started having fatigue last weekend. Patient feels fine now. Had covid testing done on 3/15 at [facility] that was negative. Patient will send picture through [employer] of results. Needs letter to return to work.    The Wednesday-Friday they were down at [place] after the expsoure.  She was feeling extra tired but wasn't sure if it was related to traveling.  They came home Friday night, and then Saturday evening and was talking with a neighbor and they informed her that another neighbor had tested positive that she had been around that individual on 3/6/2021.      Just fatigue, kind of a dry cough.  Symptoms have resolved.   No loss of smell, taste, no fevers or chills.         Review of Systems   Constitutional, HEENT, cardiovascular, pulmonary, gi and gu systems are negative, except as otherwise noted.      Objective            Vitals:  No vitals were obtained today due to virtual visit.    Physical Exam   GENERAL: Healthy, alert and no distress  EYES: Eyes grossly normal to inspection.  No discharge or erythema, or obvious scleral/conjunctival abnormalities.  RESP: No audible wheeze, cough, or visible cyanosis.  No visible retractions or increased work of breathing.    SKIN: Visible skin clear. No significant rash, abnormal pigmentation or lesions.  NEURO: Cranial nerves grossly intact.  Mentation and speech appropriate for age.  PSYCH: Mentation appears normal, affect normal/bright, judgement and insight intact, normal speech and appearance well-groomed.        Video-Visit Details    Type of service:  Video Visit    Video End Time:3:42 PM    Originating Location (pt. Location): Home    Distant Location (provider location):  [facility]     Platform used for Video Visit: [employer] Manager

## 2021-03-19 NOTE — LETTER
[facility]  [address]  [place] [address]  Phone: [phone]    March 19, 2021        Ashli Gamboa  [address] [place] [address]          To whom it may concern:    RE: Ashli Gamboa    Patient was seen and treated today at our clinic. She is ok to return to work on Tuesday (3/23/2021) next week.      Please contact me for questions or concerns.      Sincerely,        Rm Gordon PA-C

## 2021-03-25 ENCOUNTER — MYC MEDICAL ADVICE (OUTPATIENT)
Dept: ALLERGY | Facility: OTHER | Age: 41
End: 2021-03-25

## 2021-04-06 ENCOUNTER — VIRTUAL VISIT (OUTPATIENT)
Dept: RHEUMATOLOGY | Facility: CLINIC | Age: 41
End: 2021-04-06
Payer: COMMERCIAL

## 2021-04-06 DIAGNOSIS — M25.50 POLYARTHRALGIA: ICD-10-CM

## 2021-04-06 DIAGNOSIS — R76.8 ELEVATED ANTINUCLEAR ANTIBODY (ANA) LEVEL: ICD-10-CM

## 2021-04-06 PROCEDURE — 99204 OFFICE O/P NEW MOD 45 MIN: CPT | Mod: 95 | Performed by: STUDENT IN AN ORGANIZED HEALTH CARE EDUCATION/TRAINING PROGRAM

## 2021-04-06 NOTE — PATIENT INSTRUCTIONS
-Please make an appointment for lab and x-rays at any [facility] location near you  -I will be in touch once all test results are back  -Follow-up pending test results

## 2021-04-06 NOTE — PROGRESS NOTES
Ashli is a 41 year old who is being evaluated via a billable video visit.      How would you like to obtain your AVS? MyChart  If the video visit is dropped, the invitation should be resent by: Send to e-mail at: [email or web address]  Will anyone else be joining your video visit? No      Video Start Time:1:15  Video-Visit Details    Type of service:  Video Visit    Video End Time:1:50    Originating Location (pt. Location): Home    Distant Location (provider location):  [facility]     Platform used for Video Visit: EverZero

## 2021-04-13 ENCOUNTER — ANCILLARY PROCEDURE (OUTPATIENT)
Dept: GENERAL RADIOLOGY | Facility: OTHER | Age: 41
End: 2021-04-13
Attending: STUDENT IN AN ORGANIZED HEALTH CARE EDUCATION/TRAINING PROGRAM
Payer: COMMERCIAL

## 2021-04-13 DIAGNOSIS — R76.8 ELEVATED ANTINUCLEAR ANTIBODY (ANA) LEVEL: ICD-10-CM

## 2021-04-13 DIAGNOSIS — M25.50 POLYARTHRALGIA: ICD-10-CM

## 2021-04-13 PROCEDURE — 36415 COLL VENOUS BLD VENIPUNCTURE: CPT | Performed by: STUDENT IN AN ORGANIZED HEALTH CARE EDUCATION/TRAINING PROGRAM

## 2021-04-13 PROCEDURE — 86160 COMPLEMENT ANTIGEN: CPT | Mod: 59 | Performed by: STUDENT IN AN ORGANIZED HEALTH CARE EDUCATION/TRAINING PROGRAM

## 2021-04-13 PROCEDURE — 86225 DNA ANTIBODY NATIVE: CPT | Performed by: STUDENT IN AN ORGANIZED HEALTH CARE EDUCATION/TRAINING PROGRAM

## 2021-04-13 PROCEDURE — 86200 CCP ANTIBODY: CPT | Performed by: STUDENT IN AN ORGANIZED HEALTH CARE EDUCATION/TRAINING PROGRAM

## 2021-04-13 PROCEDURE — 73130 X-RAY EXAM OF HAND: CPT | Mod: LT | Performed by: RADIOLOGY

## 2021-04-13 PROCEDURE — 86235 NUCLEAR ANTIGEN ANTIBODY: CPT | Mod: 59 | Performed by: STUDENT IN AN ORGANIZED HEALTH CARE EDUCATION/TRAINING PROGRAM

## 2021-04-13 PROCEDURE — 73523 X-RAY EXAM HIPS BI 5/> VIEWS: CPT | Performed by: RADIOLOGY

## 2021-04-13 PROCEDURE — 86431 RHEUMATOID FACTOR QUANT: CPT | Performed by: STUDENT IN AN ORGANIZED HEALTH CARE EDUCATION/TRAINING PROGRAM

## 2021-04-14 LAB
C3 SERPL-MCNC: 114 MG/DL (ref 81–157)
C4 SERPL-MCNC: 25 MG/DL (ref 13–39)
ENA RNP IGG SER IA-ACNC: <0.2 AI (ref 0–0.9)
ENA SM IGG SER-ACNC: <0.2 AI (ref 0–0.9)
ENA SS-A IGG SER IA-ACNC: <0.2 AI (ref 0–0.9)
ENA SS-B IGG SER IA-ACNC: 0.2 AI (ref 0–0.9)

## 2021-04-14 NOTE — RESULT ENCOUNTER NOTE
Dear Crystal,     Hand x-rays are negative.    Please let us know if you have any questions or concerns.    Regards,  Earline Watters MD

## 2021-04-14 NOTE — RESULT ENCOUNTER NOTE
Dear Crystal,     Hip x-rays are negative.    Please let us know if you have any questions or concerns.    Regards,  Earline Watters MD

## 2021-04-15 LAB
CCP AB SER IA-ACNC: 1 U/ML
DSDNA AB SER-ACNC: <1 IU/ML
RHEUMATOID FACT SER NEPH-ACNC: <7 IU/ML (ref 0–20)

## 2021-04-15 NOTE — RESULT ENCOUNTER NOTE
Dear Ashli,     Labs are negative - antibodies for rheumatoid arthritis, and specific antibodies for lupus, Sjogren's syndrome, mixed connective tissue disease are all negative. No further rheumatology follow-up needed at this time. Recommend follow-up with primary care provider. You can follow-up with me as needed.    Please let us know if you have any questions or concerns.    Regards,  Earline Watters MD

## 2021-04-20 ENCOUNTER — OFFICE VISIT (OUTPATIENT)
Dept: NEUROLOGY | Facility: CLINIC | Age: 41
End: 2021-04-20
Attending: PSYCHIATRY & NEUROLOGY
Payer: COMMERCIAL

## 2021-04-20 ENCOUNTER — TELEPHONE (OUTPATIENT)
Dept: NEUROLOGY | Facility: CLINIC | Age: 41
End: 2021-04-20

## 2021-04-20 DIAGNOSIS — R20.9 DISTURBANCE OF SKIN SENSATION: Primary | ICD-10-CM

## 2021-04-20 DIAGNOSIS — B94.8 POST-LYME DISEASE SYNDROME: ICD-10-CM

## 2021-04-20 PROCEDURE — 11105 PUNCH BX SKIN EA SEP/ADDL: CPT | Performed by: PSYCHIATRY & NEUROLOGY

## 2021-04-20 PROCEDURE — 11104 PUNCH BX SKIN SINGLE LESION: CPT | Performed by: PSYCHIATRY & NEUROLOGY

## 2021-04-20 NOTE — PROGRESS NOTES
Procedure note: Skin biopsy. Indication: sensory disturbance, skin. After obtaining informed consent, 3 mm PUNCH skin biopsies were performed over the extensor digitorum brevis muscle of the right foot, and the right medial calf, about 10 cm distally to the medial knee joint. 1% lidocaine anesthesia and betadine sterile prep were used. The patient tolerated the procedure well. Wound care and patient education were provided by Kristy Behling, REEGT. Following completion of the procedure, both the performing physician and the assistant, independently verified the presence of one skin sample in each of the two (2) vials sent to the Lab. Ryan Moise MD

## 2021-04-20 NOTE — LETTER
4/20/2021       RE: Ashli Gamboa  [address] [place] [address]     Dear Colleague,    Thank you for referring your patient, Ashli Gamboa, to the [facility]. Please see a copy of my visit note below.    Procedure note: Skin biopsy. Indication: sensory disturbance, skin. After obtaining informed consent, 3 mm PUNCH skin biopsies were performed over the extensor digitorum brevis muscle of the right foot, and the right medial calf, about 10 cm distally to the medial knee joint. 1% lidocaine anesthesia and betadine sterile prep were used. The patient tolerated the procedure well. Wound care and patient education were provided by Kristy Behling, REEGT. Following completion of the procedure, both the performing physician and the assistant, independently verified the presence of one skin sample in each of the two (2) vials sent to the Lab. Ryan Moise MD

## 2021-05-07 ENCOUNTER — TRANSFERRED RECORDS (OUTPATIENT)
Dept: HEALTH INFORMATION MANAGEMENT | Facility: CLINIC | Age: 41
End: 2021-05-07

## 2021-05-10 LAB — LAB SCANNED RESULT: NORMAL

## 2021-06-02 DIAGNOSIS — L98.7 EXCESS SKIN: Primary | ICD-10-CM

## 2021-06-02 DIAGNOSIS — E66.01 MORBID OBESITY (H): ICD-10-CM

## 2021-06-02 DIAGNOSIS — Z98.84 S/P LAPAROSCOPIC SLEEVE GASTRECTOMY: ICD-10-CM

## 2021-06-03 NOTE — TELEPHONE ENCOUNTER
FUTURE VISIT INFORMATION      FUTURE VISIT INFORMATION:    Date: 7/28/21    Time: 9:00am    Location: [facility]  REFERRAL INFORMATION:    Referring provider:  Yu Evans PA-C    Referring providers clinic:  [facility] General surgery    Reason for visit/diagnosis  S/P laparoscopic sleeve gastrectomy    Excess skin    RECORDS REQUESTED FROM:       Clinic name Comments Records Status Imaging Status   [facility] General surgery Ov/referral 6/2/21 EPIC

## 2021-06-07 NOTE — TELEPHONE ENCOUNTER
Please call for supporting records.      [facility] [phone]  Referring Provider: Karena Mo CNP  DX: Lymes disease, malaise and myalgia's    Ref. Referral was received with no records in the ID folder 3/18 @ 11:46 and 3/19 @ 1:01 .

## 2021-06-07 NOTE — TELEPHONE ENCOUNTER
03.30 - lvm x 2 - left a very detailed msg. Relaying previous msg. Stated she can call us to schedule, but it will not be for a while. Or for pt to continue with [facility]. Noted that If no word back, assuming that she will be seeing [facility] or not interested.

## 2021-06-07 NOTE — TELEPHONE ENCOUNTER
Pt was seen and tx for this with [facility] Infectious Disease. Pt should f/u there. If the pt wants a second opinion, pt should wait to be seen until its ok for pt's to be seen in clinic again.

## 2021-06-07 NOTE — TELEPHONE ENCOUNTER
Per Leonora Thompson w/ [facility] does not release records. She said for our office to send a release to fax  to obtain records.     She can be reached @ [phone]

## 2021-06-07 NOTE — TELEPHONE ENCOUNTER
03.23 - lvm x 1 with pt    Per matthew, pt should f/u with [facility] ID, since they have seen her already. Otherwise, if she wants to SUNIL and come here, notify they she should wait until it is ok for pt to be seen in clinic again.

## 2021-07-03 DIAGNOSIS — F33.41 RECURRENT MAJOR DEPRESSIVE DISORDER, IN PARTIAL REMISSION (H): ICD-10-CM

## 2021-07-06 RX ORDER — BUPROPION HYDROCHLORIDE 300 MG/1
TABLET ORAL
Qty: 90 TABLET | Refills: 0 | Status: SHIPPED | OUTPATIENT
Start: 2021-07-06 | End: 2021-10-11

## 2021-07-06 NOTE — TELEPHONE ENCOUNTER
,  --Please contact patient and ask to schedule an annual visit/physical    ---Prescription approved per [facility] Refill Protocol.       Lenka Santos RN BSN     [facility]        --Last visit: 6/2/2020.    --Future Visit: none      PHQ 11/12/2019 6/2/2020 3/16/2021   PHQ-9 Total Score 0 3 0   Q9: Thoughts of better off dead/self-harm past 2 weeks Not at all Not at all Not at all

## 2021-07-28 ENCOUNTER — PRE VISIT (OUTPATIENT)
Dept: SURGERY | Facility: CLINIC | Age: 41
End: 2021-07-28

## 2021-07-28 NOTE — TELEPHONE ENCOUNTER
FUTURE VISIT INFORMATION      FUTURE VISIT INFORMATION:    Date: 10/6/21    Time: 8:00am    [place] [facility]  REFERRAL INFORMATION:    Referring provider:  Yu Evans PA-C    Referring providers clinic:  [facility] General surgery    Reason for visit/diagnosis  S/P laparoscopic sleeve gastrectomy    Excess skin     RECORDS REQUESTED FROM:         Clinic name Comments Records Status Imaging Status   [facility] General surgery Ov/referral 6/2/21 EPIC

## 2021-08-24 ENCOUNTER — TELEPHONE (OUTPATIENT)
Dept: PLASTIC SURGERY | Facility: CLINIC | Age: 41
End: 2021-08-24

## 2021-08-24 NOTE — TELEPHONE ENCOUNTER
LVM with pod number. Dr Wise clinic doesn't begin until 10:00am on 10/6. Per clinic, pt can overbook for same day at 10:00am, 10:15am, or 11:15am. Sent mychart.

## 2021-08-30 NOTE — TELEPHONE ENCOUNTER
LVMx2 notified pt that appt time was changed to 10:00am on 10/6 due to change in Dr Wise's schedule template. Sent mychart and letter to notify pt.

## 2021-08-31 NOTE — TELEPHONE ENCOUNTER
FUTURE VISIT INFORMATION      FUTURE VISIT INFORMATION:    Date: 10/6/21    Time: 10:00am    [place] [facility]  REFERRAL INFORMATION:    Referring provider:  Yu Evans PA-C    Referring providers clinic:  [facility] General surgery    Reason for visit/diagnosis  S/P laparoscopic sleeve gastrectomy    Excess skin     RECORDS REQUESTED FROM:         Clinic name Comments Records Status Imaging Status   [facility] General surgery Ov/referral 6/2/21 EPIC

## 2021-09-13 ENCOUNTER — OFFICE VISIT (OUTPATIENT)
Dept: FAMILY MEDICINE | Facility: OTHER | Age: 41
End: 2021-09-13
Payer: COMMERCIAL

## 2021-09-13 VITALS
BODY MASS INDEX: 25.4 KG/M2 | TEMPERATURE: 98.4 F | SYSTOLIC BLOOD PRESSURE: 108 MMHG | WEIGHT: 148 LBS | DIASTOLIC BLOOD PRESSURE: 64 MMHG | RESPIRATION RATE: 14 BRPM | OXYGEN SATURATION: 100 % | HEART RATE: 85 BPM

## 2021-09-13 DIAGNOSIS — N64.4 PAIN OF BOTH BREASTS: ICD-10-CM

## 2021-09-13 DIAGNOSIS — Z13.29 SCREENING FOR THYROID DISORDER: ICD-10-CM

## 2021-09-13 DIAGNOSIS — R53.83 FATIGUE, UNSPECIFIED TYPE: Primary | ICD-10-CM

## 2021-09-13 DIAGNOSIS — E55.9 VITAMIN D DEFICIENCY: ICD-10-CM

## 2021-09-13 DIAGNOSIS — F33.41 RECURRENT MAJOR DEPRESSIVE DISORDER, IN PARTIAL REMISSION (H): ICD-10-CM

## 2021-09-13 PROBLEM — D25.9 LEIOMYOMA OF UTERUS, UNSPECIFIED: Status: ACTIVE | Noted: 2021-09-13

## 2021-09-13 PROBLEM — G89.29 CHRONIC PELVIC PAIN IN FEMALE: Status: ACTIVE | Noted: 2019-05-01

## 2021-09-13 PROBLEM — R10.2 CHRONIC PELVIC PAIN IN FEMALE: Status: ACTIVE | Noted: 2019-05-01

## 2021-09-13 LAB
ALBUMIN UR-MCNC: NEGATIVE MG/DL
APPEARANCE UR: CLEAR
BASOPHILS # BLD AUTO: 0.1 10E3/UL (ref 0–0.2)
BASOPHILS NFR BLD AUTO: 1 %
BILIRUB UR QL STRIP: NEGATIVE
COLOR UR AUTO: YELLOW
EOSINOPHIL # BLD AUTO: 0.2 10E3/UL (ref 0–0.7)
EOSINOPHIL NFR BLD AUTO: 2 %
ERYTHROCYTE [DISTWIDTH] IN BLOOD BY AUTOMATED COUNT: 14.5 % (ref 10–15)
FOLATE SERPL-MCNC: 5.4 NG/ML
GLUCOSE UR STRIP-MCNC: NEGATIVE MG/DL
HCT VFR BLD AUTO: 42.6 % (ref 35–47)
HGB BLD-MCNC: 13.4 G/DL (ref 11.7–15.7)
HGB UR QL STRIP: NEGATIVE
IRON SATN MFR SERPL: 21 % (ref 15–46)
IRON SERPL-MCNC: 88 UG/DL (ref 35–180)
KETONES UR STRIP-MCNC: NEGATIVE MG/DL
LEUKOCYTE ESTERASE UR QL STRIP: NEGATIVE
LYMPHOCYTES # BLD AUTO: 1.9 10E3/UL (ref 0.8–5.3)
LYMPHOCYTES NFR BLD AUTO: 26 %
MCH RBC QN AUTO: 28.8 PG (ref 26.5–33)
MCHC RBC AUTO-ENTMCNC: 31.5 G/DL (ref 31.5–36.5)
MCV RBC AUTO: 92 FL (ref 78–100)
MONOCYTES # BLD AUTO: 0.6 10E3/UL (ref 0–1.3)
MONOCYTES NFR BLD AUTO: 9 %
NEUTROPHILS # BLD AUTO: 4.5 10E3/UL (ref 1.6–8.3)
NEUTROPHILS NFR BLD AUTO: 62 %
NITRATE UR QL: NEGATIVE
PH UR STRIP: 6 [PH] (ref 5–7)
PLATELET # BLD AUTO: 260 10E3/UL (ref 150–450)
RBC # BLD AUTO: 4.65 10E6/UL (ref 3.8–5.2)
SP GR UR STRIP: 1.02 (ref 1–1.03)
TIBC SERPL-MCNC: 412 UG/DL (ref 240–430)
TSH SERPL DL<=0.005 MIU/L-ACNC: 0.98 MU/L (ref 0.4–4)
UROBILINOGEN UR STRIP-ACNC: 0.2 E.U./DL
VIT B12 SERPL-MCNC: 1397 PG/ML (ref 193–986)
WBC # BLD AUTO: 7.2 10E3/UL (ref 4–11)

## 2021-09-13 PROCEDURE — 83550 IRON BINDING TEST: CPT | Performed by: NURSE PRACTITIONER

## 2021-09-13 PROCEDURE — 85025 COMPLETE CBC W/AUTO DIFF WBC: CPT | Performed by: NURSE PRACTITIONER

## 2021-09-13 PROCEDURE — 81003 URINALYSIS AUTO W/O SCOPE: CPT | Performed by: NURSE PRACTITIONER

## 2021-09-13 PROCEDURE — 82607 VITAMIN B-12: CPT | Performed by: NURSE PRACTITIONER

## 2021-09-13 PROCEDURE — 36415 COLL VENOUS BLD VENIPUNCTURE: CPT | Performed by: NURSE PRACTITIONER

## 2021-09-13 PROCEDURE — 82746 ASSAY OF FOLIC ACID SERUM: CPT | Performed by: NURSE PRACTITIONER

## 2021-09-13 PROCEDURE — 84443 ASSAY THYROID STIM HORMONE: CPT | Performed by: NURSE PRACTITIONER

## 2021-09-13 PROCEDURE — 82306 VITAMIN D 25 HYDROXY: CPT | Performed by: NURSE PRACTITIONER

## 2021-09-13 PROCEDURE — 99214 OFFICE O/P EST MOD 30 MIN: CPT | Performed by: NURSE PRACTITIONER

## 2021-09-13 RX ORDER — TOPIRAMATE 100 MG/1
3 TABLET, FILM COATED ORAL DAILY
COMMUNITY
Start: 2021-08-07 | End: 2024-09-20

## 2021-09-13 RX ORDER — AMOXICILLIN 250 MG
1 CAPSULE ORAL DAILY
COMMUNITY
End: 2023-04-05

## 2021-09-13 ASSESSMENT — ENCOUNTER SYMPTOMS
CARDIOVASCULAR NEGATIVE: 1
PSYCHIATRIC NEGATIVE: 1
GASTROINTESTINAL NEGATIVE: 1
RESPIRATORY NEGATIVE: 1
FEVER: 0
FATIGUE: 1

## 2021-09-13 ASSESSMENT — ANXIETY QUESTIONNAIRES
2. NOT BEING ABLE TO STOP OR CONTROL WORRYING: NOT AT ALL
5. BEING SO RESTLESS THAT IT IS HARD TO SIT STILL: NOT AT ALL
IF YOU CHECKED OFF ANY PROBLEMS ON THIS QUESTIONNAIRE, HOW DIFFICULT HAVE THESE PROBLEMS MADE IT FOR YOU TO DO YOUR WORK, TAKE CARE OF THINGS AT HOME, OR GET ALONG WITH OTHER PEOPLE: NOT DIFFICULT AT ALL
3. WORRYING TOO MUCH ABOUT DIFFERENT THINGS: SEVERAL DAYS
7. FEELING AFRAID AS IF SOMETHING AWFUL MIGHT HAPPEN: NOT AT ALL
6. BECOMING EASILY ANNOYED OR IRRITABLE: NOT AT ALL
1. FEELING NERVOUS, ANXIOUS, OR ON EDGE: NOT AT ALL
GAD7 TOTAL SCORE: 2

## 2021-09-13 ASSESSMENT — PATIENT HEALTH QUESTIONNAIRE - PHQ9
5. POOR APPETITE OR OVEREATING: SEVERAL DAYS
SUM OF ALL RESPONSES TO PHQ QUESTIONS 1-9: 5

## 2021-09-13 NOTE — PROGRESS NOTES
Assessment & Plan     Fatigue, unspecified type  - Patient notes that she has been having worsening fatigue over the last two months. In addition to that she has had bilateral breast pain. She has tenderness to touch with both her breasts, does not recall ever having a mammogram yet. Patient denies any fevers or chills. No recent trauma. Has a history of lymes so pain is not uncommon for her, this type of pain however, she notes is definitely different/new from her chronic pain.   - We discussed her previous rheumatological labs, which were negative for autoimmune concerns. Therefore I do recommend we do some additional labs today including ruling out anemia/vitamin B12 given she has had a gastric sleeve surgery. I recommend full hematological evaluation as noted below as well.     - CBC with platelets and differential; Future  - UA Macro with Reflex to Micro and Culture - lab collect; Future  - Iron and iron binding capacity; Future  - Folate; Future  - Vitamin B12; Future  - Vitamin B12  - Folate  - Iron and iron binding capacity  - UA Macro with Reflex to Micro and Culture - lab collect  - CBC with platelets and differential    Pain of both breasts  - When I did my exam today I did feel an area along the left breast along the 3'oclock region that was consistent with some possible dense tissue vs lump. Right breast was tender, as was left. Recommend that we do diagnostic mammogram with follow up ultrasounds.  - We also discussed cardiac history, reviewed and unlikely related as she has had an extensive work up.   - MA Diagnostic Digital Bilateral; Future  - US Breast Left Complete 4 Quadrants; Future  - US Breast Right Complete 4 Quadrants; Future    Screening for thyroid disorder    - TSH with free T4 reflex; Future  - TSH with free T4 reflex    Vitamin D deficiency    - Vitamin D Deficiency; Future  - Vitamin D Deficiency    Recurrent major depressive disorder, in partial remission (H)  Stable per patient.      The patient indicates understanding of these issues and agrees with the plan.         There are no Patient Instructions on file for this visit.    No follow-ups on file.    DANIELE Zamora CNP  [facility] MAGALY Cornelius is a 41 year old who presents for the following health issues     History of Present Illness       She eats 2-3 servings of fruits and vegetables daily.She consumes 0 sweetened beverage(s) daily.She exercises with enough effort to increase her heart rate 20 to 29 minutes per day.  She exercises with enough effort to increase her heart rate 3 or less days per week.   She is taking medications regularly.       Breast Concern - also extreme fatigue   Onset/Duration: a couple of months  Description:   Location: mostly outer and nipple area  Pain or tenderness: YES  Redness:  no   Intensity: moderate, severe  Progression of Symptoms: worsening  Accompanying Signs & Symptoms:  Any lumps in axillary region: Not that she has noticed   Movable:  no   Nipple discharge: none  Changes in the skin or nipple: none  On Hormone therapy: Nuva  Does it change with menstrual cycle: not applicable - had a hysterectomy but can still sense some changes in her body when her would normally have been   Previous history of similar problem: none  First degree relative with breast cancer: a positive family history for breast cancer in her maternal grandmother.  Precipitating factors:           Worsened by: Does have chronic Lymes disease so is not sure if related (has pain on head and back of hair line from this that has recently come back as well)  Alleviating factors:            Improved by: None  Therapies tried and outcome: None  No LMP recorded (lmp unknown). Patient has had a hysterectomy.    Rapid weight gain  Increased fatigue   No pain anywhere else breast pain bilateral  Even hurts to put on a bra  She is pretty used to normal pain as she has chronic lymes disease. This is  very local for her. Going on for the past 2 months. She has put on 12 lbs in this time. Has not changed her diet. 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 No new medications.   Naltrexone for her lymes.   No urinary symptoms.   No abdominal pain  No new medications  S/P Hysterectomy a couple years ago  S/P Gastric sleeve surgery      Review of Systems   Constitutional: Positive for fatigue. Negative for fever.   Respiratory: Negative.    Cardiovascular: Negative.    Gastrointestinal: Negative.    Genitourinary: Negative.    Psychiatric/Behavioral: Negative.             Objective    /64   Pulse 85   Temp 98.4  F (36.9  C) (Temporal)   Resp 14   Wt 67.1 kg (148 lb)   LMP  (LMP Unknown)   SpO2 100%   BMI 25.40 kg/m    Body mass index is 25.4 kg/m .  Physical Exam  Chest:      Breasts:         Right: Tenderness present. No swelling, bleeding, inverted nipple, mass, nipple discharge or skin change.         Left: Mass (left side 3'clock consistent with possible dense tissue vs lump) and tenderness present. No swelling, bleeding, inverted nipple, nipple discharge or skin change.   Lymphadenopathy:      Upper Body:      Right upper body: No supraclavicular, axillary or pectoral adenopathy.      Left upper body: No supraclavicular, axillary or pectoral adenopathy.   Neurological:      Mental Status: She is alert.   Psychiatric:         Mood and Affect: Mood normal.         Behavior: Behavior normal.         Thought Content: Thought content normal.         Judgment: Judgment normal.            Results for orders placed or performed in visit on 09/13/21 (from the past 24 hour(s))   UA Macro with Reflex to Micro and Culture - lab collect    Specimen: Urine, Midstream   Result Value Ref Range    Color Urine Yellow Colorless, Straw, Light Yellow, Yellow    Appearance Urine Clear Clear    Glucose Urine Negative Negative mg/dL    Bilirubin Urine Negative Negative    Ketones Urine Negative Negative mg/dL     Specific Gravity Urine 1.025 1.003 - 1.035    Blood Urine Negative Negative    pH Urine 6.0 5.0 - 7.0    Protein Albumin Urine Negative Negative mg/dL    Urobilinogen Urine 0.2 0.2, 1.0 E.U./dL    Nitrite Urine Negative Negative    Leukocyte Esterase Urine Negative Negative    Narrative    Microscopic not indicated   CBC with platelets and differential    Narrative    The following orders were created for panel order CBC with platelets and differential.  Procedure                               Abnormality         Status                     ---------                               -----------         ------                     CBC with platelets and d...[[id number]]                      Final result                 Please view results for these tests on the individual orders.   CBC with platelets and differential   Result Value Ref Range    WBC Count 7.2 4.0 - 11.0 10e3/uL    RBC Count 4.65 3.80 - 5.20 10e6/uL    Hemoglobin 13.4 11.7 - 15.7 g/dL    Hematocrit 42.6 35.0 - 47.0 %    MCV 92 78 - 100 fL    MCH 28.8 26.5 - 33.0 pg    MCHC 31.5 31.5 - 36.5 g/dL    RDW 14.5 10.0 - 15.0 %    Platelet Count 260 150 - 450 10e3/uL    % Neutrophils 62 %    % Lymphocytes 26 %    % Monocytes 9 %    % Eosinophils 2 %    % Basophils 1 %    Absolute Neutrophils 4.5 1.6 - 8.3 10e3/uL    Absolute Lymphocytes 1.9 0.8 - 5.3 10e3/uL    Absolute Monocytes 0.6 0.0 - 1.3 10e3/uL    Absolute Eosinophils 0.2 0.0 - 0.7 10e3/uL    Absolute Basophils 0.1 0.0 - 0.2 10e3/uL

## 2021-09-14 LAB — DEPRECATED CALCIDIOL+CALCIFEROL SERPL-MC: 51 UG/L (ref 20–75)

## 2021-09-14 ASSESSMENT — ANXIETY QUESTIONNAIRES: GAD7 TOTAL SCORE: 2

## 2021-09-19 ENCOUNTER — HEALTH MAINTENANCE LETTER (OUTPATIENT)
Age: 41
End: 2021-09-19

## 2021-10-04 ENCOUNTER — ANCILLARY PROCEDURE (OUTPATIENT)
Dept: MAMMOGRAPHY | Facility: CLINIC | Age: 41
End: 2021-10-04
Attending: NURSE PRACTITIONER
Payer: COMMERCIAL

## 2021-10-04 DIAGNOSIS — N64.4 PAIN OF BOTH BREASTS: ICD-10-CM

## 2021-10-04 PROCEDURE — 77066 DX MAMMO INCL CAD BI: CPT | Performed by: RADIOLOGY

## 2021-10-04 PROCEDURE — G0279 TOMOSYNTHESIS, MAMMO: HCPCS | Performed by: RADIOLOGY

## 2021-10-04 PROCEDURE — 76642 ULTRASOUND BREAST LIMITED: CPT | Mod: LT | Performed by: RADIOLOGY

## 2021-10-05 DIAGNOSIS — F33.41 RECURRENT MAJOR DEPRESSIVE DISORDER, IN PARTIAL REMISSION (H): ICD-10-CM

## 2021-10-06 ENCOUNTER — PRE VISIT (OUTPATIENT)
Dept: SURGERY | Facility: CLINIC | Age: 41
End: 2021-10-06

## 2021-10-07 NOTE — TELEPHONE ENCOUNTER
Routing refill request to provider for review/approval because:  PHQ-9 score greater than 4 per [facility] protocol  PHQ-9 score:    PHQ 9/13/2021   PHQ-9 Total Score 5   Q9: Thoughts of better off dead/self-harm past 2 weeks Not at all         Stella Ace, ARYANN RN  [facility]

## 2021-10-11 RX ORDER — BUPROPION HYDROCHLORIDE 300 MG/1
TABLET ORAL
Qty: 90 TABLET | Refills: 0 | Status: SHIPPED | OUTPATIENT
Start: 2021-10-11 | End: 2022-01-05

## 2021-12-29 ENCOUNTER — IMMUNIZATION (OUTPATIENT)
Dept: FAMILY MEDICINE | Facility: CLINIC | Age: 41
End: 2021-12-29
Payer: COMMERCIAL

## 2021-12-29 DIAGNOSIS — Z23 HIGH PRIORITY FOR 2019-NCOV VACCINE: Primary | ICD-10-CM

## 2021-12-29 PROCEDURE — 91300 COVID-19,PF,PFIZER (12+ YRS): CPT

## 2021-12-29 PROCEDURE — 0004A COVID-19,PF,PFIZER (12+ YRS): CPT

## 2021-12-29 NOTE — PROGRESS NOTES
Prior to immunization administration, verified patients identity using patient s name and date of birth. Please see Immunization Activity for additional information.     Screening Questionnaire for Adult Immunization    Are you sick today?   No   Do you have allergies to medications, food, a vaccine component or latex?   No   Have you ever had a serious reaction after receiving a vaccination?   No   Do you have a long-term health problem with heart, lung, kidney, or metabolic disease (e.g., diabetes), asthma, a blood disorder, no spleen, complement component deficiency, a cochlear implant, or a spinal fluid leak?  Are you on long-term aspirin therapy?   No   Do you have cancer, leukemia, HIV/AIDS, or any other immune system problem?   No   Do you have a parent, brother, or sister with an immune system problem?   No   In the past 3 months, have you taken medications that affect  your immune system, such as prednisone, other steroids, or anticancer drugs; drugs for the treatment of rheumatoid arthritis, Crohn s disease, or psoriasis; or have you had radiation treatments?   No   Have you had a seizure, or a brain or other nervous system problem?   No   During the past year, have you received a transfusion of blood or blood    products, or been given immune (gamma) globulin or antiviral drug?   No   For women: Are you pregnant or is there a chance you could become       pregnant during the next month?   No   Have you received any vaccinations in the past 4 weeks?   No     Immunization questionnaire answers were all negative.        Per orders of Dr. Mo, injection of Pfizer given by Lena Dyson. Patient instructed to remain in clinic for 15 minutes afterwards, and to report any adverse reaction to me immediately.       Screening performed by Lena Dyson on 12/29/2021 at 12:06 PM.

## 2022-01-09 ENCOUNTER — HEALTH MAINTENANCE LETTER (OUTPATIENT)
Age: 42
End: 2022-01-09

## 2022-01-17 ENCOUNTER — TRANSFERRED RECORDS (OUTPATIENT)
Dept: HEALTH INFORMATION MANAGEMENT | Facility: CLINIC | Age: 42
End: 2022-01-17
Payer: COMMERCIAL

## 2022-02-08 NOTE — PROGRESS NOTES
Received request via: Pharmacy    Was the patient seen in the last year in this department? Yes  7/22/21  Does the patient have an active prescription (recently filled or refills available) for medication(s) requested? No   Subjective:    Ahsli Gamboa is a 37 year old female with deconditioning condition which occurred with other.      This is a chronic condition  Date of orders 1/27/17    PMH: Diagnosed with fibromyalgia in December, working on pain management and energy levels. Understands that weight management can help. 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 Endometriosis with 4 surgeries since June 2013.    Greatest complaint is low back and upper back pain. General aching, migraines and fatigue.     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 Likes walking elliptical, swimming (not often though). Walks 10 minutes to office from car. Getting a sit>stand desk at work    Initially on gabapentin (6 wks no change), just started Lyrica this week. Flexeril at night..    Patient reports pain:  Cervical spine and lumbar spine.  Radiates to:  Low back and upper back.  Pain is described as aching and is constant and reported as 8/10.  Associated symptoms:  Loss of strength and loss of motion. Pain is worse in the P.M..  Exacerbated by: decreased activity, overactivity. Not enough sleep. and relieved by heat (epsom salt bath, relaxation techniques).  Since onset symptoms are unchanged.        General health as reported by patient is good.                                                 Objective:    System                                           Hip Evaluation    Hip Strength:  : 3+/5 glute med; 4/5 glute max B hip.                                             General Evaluation:  AROM:      Cervical/Thoracic:  Significant findings:  Pain with cervical flex and ext  Lumbopelvic:  Significant findings: pain with low back ext, mod - SB B  Upper Extremity:  Significant findings:  Soreness with shoulder Abd B        Lower Extremity Strength:  Strength wnl lower extremity general: overactive glute/hip flexor with TrA set.                        Posture:    Standing:   Rounded shoulders, forward head, lordosis increased, genu recurvatum and pes  planus  Sitting:  Rounded shoulders and forward head                                           ROS    Assessment/Plan:      Patient is a 37 year old female with deconditioning d/t fibromyalgia.    Patient has the following significant findings with corresponding treatment plan.                Diagnosis 1:  Fibromyalgia  Pain -  self management, education and home program  Decreased ROM/flexibility - manual therapy, therapeutic exercise and home program  Decreased joint mobility - manual therapy, therapeutic exercise and home program  Decreased strength - therapeutic exercise, therapeutic activities and home program  Decreased proprioception - neuro re-education, gait training, therapeutic activities and home program  Impaired posture - neuro re-education, therapeutic activities and home program    Therapy Evaluation Codes:   1) History comprised of:   Personal factors that impact the plan of care:      Gender and Time since onset of symptoms.    Comorbidity factors that impact the plan of care are:      Depression, Fibromyalgia, Migraines/headaches and Overweight.     Medications impacting care: Anti-depressant and Muscle relaxant.  2) Examination of Body Systems comprised of:   Body structures and functions that impact the plan of care:      Lumbar spine, Pelvis and General conditioning.   Activity limitations that impact the plan of care are:      Bending, Driving, Stairs, Standing and Walking.  3) Clinical presentation characteristics are:   Unstable/Unpredictable.  4) Decision-Making    High complexity using standardized patient assessment instrument and/or measureable assessment of functional outcome.  Cumulative Therapy Evaluation is: High complexity.    Previous and current functional limitations:  (See Goal Flow Sheet for this information)    Short term and Long term goals: (See Goal Flow Sheet for this information)     Communication ability:  Patient appears to be able to clearly communicate and understand  verbal and written communication and follow directions correctly.  Treatment Explanation - The following has been discussed with the patient:   RX ordered/plan of care  Anticipated outcomes  Possible risks and side effects  This patient would benefit from PT intervention to resume normal activities.   Rehab potential is good.    Frequency:  1 X week, once daily  Duration:  for 8 weeks  Discharge Plan:  Achieve all LTG.  Independent in home treatment program.  Reach maximal therapeutic benefit.    Please refer to the daily flowsheet for treatment today, total treatment time and time spent performing 1:1 timed codes.

## 2022-03-31 DIAGNOSIS — F33.41 RECURRENT MAJOR DEPRESSIVE DISORDER, IN PARTIAL REMISSION (H): ICD-10-CM

## 2022-03-31 RX ORDER — BUPROPION HYDROCHLORIDE 300 MG/1
TABLET ORAL
Qty: 90 TABLET | Refills: 0 | Status: SHIPPED | OUTPATIENT
Start: 2022-03-31 | End: 2022-07-08

## 2022-03-31 NOTE — TELEPHONE ENCOUNTER
Refill given, please schedule mood visit virtual, evisit etc.       DANIELE Zamora CNP  Questions or concerns please feel free to send me a [facility] [employer] message or call me  Phone : [phone]

## 2022-04-07 NOTE — TELEPHONE ENCOUNTER
My chart message sent to patient asking she schedule an appointment    India Riley MA on 4/7/2022 at 2:31 PM

## 2022-04-21 ENCOUNTER — TRANSFERRED RECORDS (OUTPATIENT)
Dept: HEALTH INFORMATION MANAGEMENT | Facility: CLINIC | Age: 42
End: 2022-04-21
Payer: COMMERCIAL

## 2022-05-24 ENCOUNTER — TRANSFERRED RECORDS (OUTPATIENT)
Dept: HEALTH INFORMATION MANAGEMENT | Facility: CLINIC | Age: 42
End: 2022-05-24
Payer: COMMERCIAL

## 2022-05-27 ENCOUNTER — VIRTUAL VISIT (OUTPATIENT)
Dept: FAMILY MEDICINE | Facility: CLINIC | Age: 42
End: 2022-05-27
Payer: COMMERCIAL

## 2022-05-27 ENCOUNTER — ALLIED HEALTH/NURSE VISIT (OUTPATIENT)
Dept: FAMILY MEDICINE | Facility: OTHER | Age: 42
End: 2022-05-27
Attending: NURSE PRACTITIONER
Payer: COMMERCIAL

## 2022-05-27 DIAGNOSIS — J02.9 SORE THROAT: ICD-10-CM

## 2022-05-27 DIAGNOSIS — J02.9 SORE THROAT: Primary | ICD-10-CM

## 2022-05-27 LAB
DEPRECATED S PYO AG THROAT QL EIA: NEGATIVE
GROUP A STREP BY PCR: NOT DETECTED

## 2022-05-27 PROCEDURE — 99213 OFFICE O/P EST LOW 20 MIN: CPT | Mod: GT | Performed by: NURSE PRACTITIONER

## 2022-05-27 PROCEDURE — 87651 STREP A DNA AMP PROBE: CPT

## 2022-05-27 PROCEDURE — 99207 PR NO CHARGE NURSE ONLY: CPT

## 2022-05-27 NOTE — PROGRESS NOTES
Ashli is a 42 year old who is being evaluated via a billable video visit.      How would you like to obtain your AVS? MyChart  If the video visit is dropped, the invitation should be resent by: Text to cell phone: [phone]  Will anyone else be joining your video visit? No      Video Start Time: 12:46 PM    Assessment & Plan     Sore throat    - Streptococcus A Rapid Scr w Reflx to PCR - Lab Collect  Patient called the [facility] earlier today prior to her visit to see if she could get an appointment to have her lab work completed there, they told her when she had completed her visit that she should call back and set up for lab appointment.  Advised her to  perform some salt water gargles, continue with Tylenol as needed, sipping on warm fluids etc.  Rapid strep results will be reviewed and culture will take 24 hours.                    Return if symptoms worsen or fail to improve, for sore throat.    Cindy Dubon NP  [facility]    Heriberto Cornelius is a 42 year old who presents for the following health issues: acute concern, patient is located in [place], [place]. Virtual visit today.     HPI     Acute Illness  Acute illness concerns: sore throat.Went to [facility] 3 days ago and COVID test was negative, strep testing was not performed at that time. She was without fevers at that time.   Onset/Duration: 2 weeks  Symptoms:  Fever: no  Chills/Sweats: YES- feels cold  Headache (location?): YES- frontal midline  Sinus Pressure: YES- mild in severity  Conjunctivitis:  no  Ear Pain: YES- feels plugged, right more than left  Rhinorrhea: no  Congestion: YES, increased PND  Sore Throat: YES- aching and burning, itchy at times  Cough: YES-non-productive  Wheeze: no  Decreased Appetite: YES- mild  Nausea: no  Vomiting: no  Diarrhea: no  Dysuria/Freq.: no  Dysuria or Hematuria: no  Fatigue/Achiness: YES  Sick/Strep Exposure: no  Therapies tried and outcome: Tylenol PRN,  Zyrte PRN      Review of Systems   CV: NEGATIVE for chest pain, palpitations or peripheral edema      Objective           Vitals:  No vitals were obtained today due to virtual visit.    Physical Exam   GENERAL: Healthy, alert and no distress, sitting in recliner, no distress  EYES: Eyes grossly normal to inspection.  No discharge or erythema, or obvious scleral/conjunctival abnormalities.  RESP: No audible wheeze, cough, or visible cyanosis.  No visible retractions or increased work of breathing.    SKIN: Visible skin clear. No significant rash, abnormal pigmentation or lesions.  NEURO: Cranial nerves grossly intact.  Mentation and speech appropriate for age.  PSYCH: Mentation appears normal, affect normal/bright, judgement and insight intact, normal speech and appearance well-groomed.                Video-Visit Details    Type of service:  Video Visit    Video End Time:1:03 PM    Originating Location (pt. Location): Home    Distant Location (provider location):  [facility]     Platform used for Video Visit: "[employer]

## 2022-06-01 ASSESSMENT — PATIENT HEALTH QUESTIONNAIRE - PHQ9: SUM OF ALL RESPONSES TO PHQ QUESTIONS 1-9: 5

## 2022-07-06 DIAGNOSIS — F33.41 RECURRENT MAJOR DEPRESSIVE DISORDER, IN PARTIAL REMISSION (H): ICD-10-CM

## 2022-07-08 RX ORDER — BUPROPION HYDROCHLORIDE 300 MG/1
TABLET ORAL
Qty: 90 TABLET | Refills: 0 | Status: SHIPPED | OUTPATIENT
Start: 2022-07-08 | End: 2022-07-15

## 2022-07-08 NOTE — TELEPHONE ENCOUNTER
"Pending Prescriptions:                       Disp   Refills    buPROPion (WELLBUTRIN XL) 300 MG 24 hr tab*90 tab*0        Sig: TAKE 1 TABLET BY MOUTH EVERY DAY IN THE MORNING    Routing refill request to provider for review/approval because:  Labs not current:  PHQ9  Requested Prescriptions   Pending Prescriptions Disp Refills    buPROPion (WELLBUTRIN XL) 300 MG 24 hr tablet [Pharmacy Med Name: BUPROPION HCL  MG TABLET] 90 tablet 0     Sig: TAKE 1 TABLET BY MOUTH EVERY DAY IN THE MORNING        SSRIs Protocol Failed - 7/6/2022 12:54 PM        Failed - PHQ-9 score less than 5 in past 6 months     Please review last PHQ-9 score.           Passed - Medication is Bupropion     If the medication is Bupropion (Wellbutrin), and the patient is taking for smoking cessation; OK to refill.            Passed - Medication is active on med list        Passed - Patient is age 18 or older        Passed - No active pregnancy on record        Passed - No positive pregnancy test in last 12 months        Passed - Recent (6 mo) or future (30 days) visit within the authorizing provider's specialty     Patient had office visit in the last 6 months or has a visit in the next 30 days with authorizing provider or within the authorizing provider's specialty.  See \"Patient Info\" tab in inbasket, or \"Choose Columns\" in Meds & Orders section of the refill encounter.                        " Fluid removal rate 80. Filtered changed per policy. Patient tolerated well.

## 2022-07-15 ENCOUNTER — OFFICE VISIT (OUTPATIENT)
Dept: FAMILY MEDICINE | Facility: OTHER | Age: 42
End: 2022-07-15
Payer: COMMERCIAL

## 2022-07-15 VITALS
DIASTOLIC BLOOD PRESSURE: 74 MMHG | BODY MASS INDEX: 23.26 KG/M2 | OXYGEN SATURATION: 99 % | SYSTOLIC BLOOD PRESSURE: 108 MMHG | TEMPERATURE: 97.9 F | WEIGHT: 135.5 LBS | HEART RATE: 68 BPM

## 2022-07-15 DIAGNOSIS — M79.7 FIBROMYALGIA: ICD-10-CM

## 2022-07-15 DIAGNOSIS — Z11.4 SCREENING FOR HIV (HUMAN IMMUNODEFICIENCY VIRUS): ICD-10-CM

## 2022-07-15 DIAGNOSIS — Z00.00 ROUTINE GENERAL MEDICAL EXAMINATION AT A HEALTH CARE FACILITY: Primary | ICD-10-CM

## 2022-07-15 DIAGNOSIS — Z12.31 ENCOUNTER FOR SCREENING MAMMOGRAM FOR BREAST CANCER: ICD-10-CM

## 2022-07-15 DIAGNOSIS — Z13.220 SCREENING FOR HYPERLIPIDEMIA: ICD-10-CM

## 2022-07-15 DIAGNOSIS — Z12.4 CERVICAL CANCER SCREENING: ICD-10-CM

## 2022-07-15 DIAGNOSIS — Z13.29 SCREENING FOR THYROID DISORDER: ICD-10-CM

## 2022-07-15 DIAGNOSIS — F33.41 RECURRENT MAJOR DEPRESSIVE DISORDER, IN PARTIAL REMISSION (H): ICD-10-CM

## 2022-07-15 DIAGNOSIS — R53.83 FATIGUE, UNSPECIFIED TYPE: ICD-10-CM

## 2022-07-15 DIAGNOSIS — Z11.59 NEED FOR HEPATITIS C SCREENING TEST: ICD-10-CM

## 2022-07-15 LAB
ALBUMIN SERPL-MCNC: 3.3 G/DL (ref 3.4–5)
ALP SERPL-CCNC: 53 U/L (ref 40–150)
ALT SERPL W P-5'-P-CCNC: 14 U/L (ref 0–50)
ANION GAP SERPL CALCULATED.3IONS-SCNC: 4 MMOL/L (ref 3–14)
AST SERPL W P-5'-P-CCNC: 4 U/L (ref 0–45)
BASOPHILS # BLD AUTO: 0 10E3/UL (ref 0–0.2)
BASOPHILS NFR BLD AUTO: 0 %
BILIRUB SERPL-MCNC: 0.4 MG/DL (ref 0.2–1.3)
BUN SERPL-MCNC: 13 MG/DL (ref 7–30)
CALCIUM SERPL-MCNC: 8.5 MG/DL (ref 8.5–10.1)
CHLORIDE BLD-SCNC: 115 MMOL/L (ref 94–109)
CHOLEST SERPL-MCNC: 168 MG/DL
CO2 SERPL-SCNC: 24 MMOL/L (ref 20–32)
CREAT SERPL-MCNC: 1.12 MG/DL (ref 0.52–1.04)
EOSINOPHIL # BLD AUTO: 0.1 10E3/UL (ref 0–0.7)
EOSINOPHIL NFR BLD AUTO: 1 %
ERYTHROCYTE [DISTWIDTH] IN BLOOD BY AUTOMATED COUNT: 13.6 % (ref 10–15)
FASTING STATUS PATIENT QL REPORTED: YES
GFR SERPL CREATININE-BSD FRML MDRD: 63 ML/MIN/1.73M2
GLUCOSE BLD-MCNC: 78 MG/DL (ref 70–99)
HCT VFR BLD AUTO: 44.5 % (ref 35–47)
HDLC SERPL-MCNC: 86 MG/DL
HGB BLD-MCNC: 13.9 G/DL (ref 11.7–15.7)
LDLC SERPL CALC-MCNC: 66 MG/DL
LYMPHOCYTES # BLD AUTO: 2.5 10E3/UL (ref 0.8–5.3)
LYMPHOCYTES NFR BLD AUTO: 39 %
MCH RBC QN AUTO: 28.8 PG (ref 26.5–33)
MCHC RBC AUTO-ENTMCNC: 31.2 G/DL (ref 31.5–36.5)
MCV RBC AUTO: 92 FL (ref 78–100)
MONOCYTES # BLD AUTO: 0.3 10E3/UL (ref 0–1.3)
MONOCYTES NFR BLD AUTO: 5 %
NEUTROPHILS # BLD AUTO: 3.4 10E3/UL (ref 1.6–8.3)
NEUTROPHILS NFR BLD AUTO: 54 %
NONHDLC SERPL-MCNC: 82 MG/DL
PLATELET # BLD AUTO: 187 10E3/UL (ref 150–450)
POTASSIUM BLD-SCNC: 4.3 MMOL/L (ref 3.4–5.3)
PROT SERPL-MCNC: 6.7 G/DL (ref 6.8–8.8)
RBC # BLD AUTO: 4.83 10E6/UL (ref 3.8–5.2)
SODIUM SERPL-SCNC: 143 MMOL/L (ref 133–144)
TRIGL SERPL-MCNC: 79 MG/DL
TSH SERPL DL<=0.005 MIU/L-ACNC: 0.66 MU/L (ref 0.4–4)
WBC # BLD AUTO: 6.3 10E3/UL (ref 4–11)

## 2022-07-15 PROCEDURE — 86803 HEPATITIS C AB TEST: CPT | Performed by: NURSE PRACTITIONER

## 2022-07-15 PROCEDURE — 99213 OFFICE O/P EST LOW 20 MIN: CPT | Mod: 25 | Performed by: NURSE PRACTITIONER

## 2022-07-15 PROCEDURE — 90471 IMMUNIZATION ADMIN: CPT | Performed by: NURSE PRACTITIONER

## 2022-07-15 PROCEDURE — 87389 HIV-1 AG W/HIV-1&-2 AB AG IA: CPT | Performed by: NURSE PRACTITIONER

## 2022-07-15 PROCEDURE — 99396 PREV VISIT EST AGE 40-64: CPT | Mod: 25 | Performed by: NURSE PRACTITIONER

## 2022-07-15 PROCEDURE — 80050 GENERAL HEALTH PANEL: CPT | Performed by: NURSE PRACTITIONER

## 2022-07-15 PROCEDURE — 36415 COLL VENOUS BLD VENIPUNCTURE: CPT | Performed by: NURSE PRACTITIONER

## 2022-07-15 PROCEDURE — 90715 TDAP VACCINE 7 YRS/> IM: CPT | Performed by: NURSE PRACTITIONER

## 2022-07-15 PROCEDURE — 80061 LIPID PANEL: CPT | Performed by: NURSE PRACTITIONER

## 2022-07-15 RX ORDER — BUPROPION HYDROCHLORIDE 300 MG/1
300 TABLET ORAL DAILY
COMMUNITY
End: 2022-07-15

## 2022-07-15 RX ORDER — BUPROPION HYDROCHLORIDE 300 MG/1
300 TABLET ORAL EVERY MORNING
Qty: 90 TABLET | Refills: 2 | Status: SHIPPED | OUTPATIENT
Start: 2022-07-15 | End: 2023-06-28

## 2022-07-15 ASSESSMENT — ENCOUNTER SYMPTOMS
HEMATOCHEZIA: 0
MYALGIAS: 0
HEADACHES: 0
SHORTNESS OF BREATH: 0
BREAST MASS: 0
ABDOMINAL PAIN: 0
DYSURIA: 0
NERVOUS/ANXIOUS: 0
HEARTBURN: 0
CHILLS: 0
COUGH: 0
FREQUENCY: 0
NAUSEA: 0
SORE THROAT: 0
HEMATURIA: 0
ARTHRALGIAS: 0
PARESTHESIAS: 0
CONSTIPATION: 0
PALPITATIONS: 0
DIZZINESS: 0
WEAKNESS: 0
DIARRHEA: 0
JOINT SWELLING: 1
FEVER: 0
EYE PAIN: 0

## 2022-07-15 ASSESSMENT — PATIENT HEALTH QUESTIONNAIRE - PHQ9
10. IF YOU CHECKED OFF ANY PROBLEMS, HOW DIFFICULT HAVE THESE PROBLEMS MADE IT FOR YOU TO DO YOUR WORK, TAKE CARE OF THINGS AT HOME, OR GET ALONG WITH OTHER PEOPLE: NOT DIFFICULT AT ALL
SUM OF ALL RESPONSES TO PHQ QUESTIONS 1-9: 5
SUM OF ALL RESPONSES TO PHQ QUESTIONS 1-9: 5

## 2022-07-15 ASSESSMENT — PAIN SCALES - GENERAL: PAINLEVEL: NO PAIN (0)

## 2022-07-15 NOTE — PATIENT INSTRUCTIONS
Please let me know if you need you would like a genetics referral for breast cancer risk factors- Estrogen vs BRACA (genetic).     Preventive Health Recommendations  Female Ages 40 to 49    Yearly exam:     See your health care provider every year in order to  1. Review health changes.   2. Discuss preventive care.    3. Review your medicines if your doctor prescribed any.      Get a Pap test every three years (unless you have an abnormal result and your provider advises testing more often).      If you get Pap tests with HPV test, you only need to test every 5 years, unless you have an abnormal result. You do not need a Pap test if your uterus was removed (hysterectomy) and you have not had cancer.      You should be tested each year for STDs (sexually transmitted diseases), if you're at risk.     Ask your doctor if you should have a mammogram.      Have a colonoscopy (test for colon cancer) if someone in your family has had colon cancer or polyps before age 50.       Have a cholesterol test every 5 years.       Have a diabetes test (fasting glucose) after age 45. If you are at risk for diabetes, you should have this test every 3 years.    Shots: Get a flu shot each year. Get a tetanus shot every 10 years.     Nutrition:     Eat at least 5 servings of fruits and vegetables each day.    Eat whole-grain bread, whole-wheat pasta and brown rice instead of white grains and rice.    Get adequate Calcium and Vitamin D.      Lifestyle    Exercise at least 150 minutes a week (an average of 30 minutes a day, 5 days a week). This will help you control your weight and prevent disease.    Limit alcohol to one drink per day.    No smoking.     Wear sunscreen to prevent skin cancer.    See your dentist every six months for an exam and cleaning.

## 2022-07-15 NOTE — PROGRESS NOTES
SUBJECTIVE:   CC: Ashli Gamboa is an 42 year old woman who presents for preventive health visit.     Healthy Habits:     Getting at least 3 servings of Calcium per day:  Yes    Bi-annual eye exam:  Yes    Dental care twice a year:  Yes    Sleep apnea or symptoms of sleep apnea:  None    Diet:  Gluten-free/reduced    Frequency of exercise:  4-5 days/week    Duration of exercise:  30-45 minutes    Taking medications regularly:  Yes    Medication side effects:  Lightheadedness    PHQ-2 Total Score: 2    Additional concerns today:  No      Having Pap with specialist, will send results   Today's PHQ-2 Score:   PHQ-2 (  [employer]) 7/15/2022   Q1: Little interest or pleasure in doing things 1   Q2: Feeling down, depressed or hopeless 1   PHQ-2 Score 2   PHQ-2 Total Score (12-17 Years)- Positive if 3 or more points; Administer PHQ-A if positive -   Q1: Little interest or pleasure in doing things Several days   Q2: Feeling down, depressed or hopeless Several days   PHQ-2 Score 2       Abuse: Current or Past (Physical, Sexual or Emotional) - No  Do you feel safe in your environment? Yes    Have you ever done Advance Care Planning? (For example, a Health Directive, POLST, or a discussion with a medical provider or your loved ones about your wishes): No, advance care planning information given to patient to review.  Patient declined advance care planning discussion at this time.    Social History     Tobacco Use     Smoking status: Former Smoker     Packs/day: 0.50     Years: 5.00     Pack years: 2.50     Types: Cigarettes     Start date: 2007     Quit date: 2013     Years since quittin.7     Smokeless tobacco: Never Used   Substance Use Topics     Alcohol use: Yes     Alcohol/week: 0.0 standard drinks     Comment: 2 drinks per month     Alcohol Use 7/15/2022   Prescreen: >3 drinks/day or >7 drinks/week? No   Prescreen: >3 drinks/day or >7 drinks/week? -     Reviewed orders with patient.  Reviewed  health maintenance and updated orders accordingly - Yes  Lab work is in process    Breast Cancer Screening:    FHS-7:   Breast CA Risk Assessment (FHS-7) 10/4/2021 6/1/2022 6/1/2022 7/15/2022   Did any of your first-degree relatives have breast or ovarian cancer? No No No No   Did any of your relatives have bilateral breast cancer? - Yes Yes Yes   Did any man in your family have breast cancer? No No No No   Did any woman in your family have breast and ovarian cancer? No No No No   Did any woman in your family have breast cancer before age 50 y? Yes Yes Yes Yes   Do you have 2 or more relatives with breast and/or ovarian cancer? No Yes Yes Yes   Do you have 2 or more relatives with breast and/or bowel cancer? No Yes Yes Yes     click delete button to remove this line now  Mammogram Screening - Offered annual screening and updated Health Maintenance for mutual plan based on risk factor consideration    Pertinent mammograms are reviewed under the imaging tab.    History of abnormal Pap smear: NO - age 30-65 PAP every 5 years with negative HPV co-testing recommended  PAP / HPV Latest Ref Rng & Units 10/18/2017 7/17/2017 7/5/2017   PAP (Historical) - NIL - NIL   HPV16 NEG - Negative -   HPV18 NEG - Negative -   HRHPV NEG - Positive(A) -     Reviewed and updated as needed this visit by clinical staff    Allergies  Meds                Reviewed and updated as needed this visit by Provider                   Past Medical History:   Diagnosis Date     Arthritis     BIG TOE     Cervical high risk HPV (human papillomavirus) test positive 07/05/2017     Cervical high risk HPV (human papillomavirus) test positive 06/10/2015     Depressive disorder 2007    I have been on and off medication for the last several years     Endometriosis      Hearing problem      Obese      Sensorineural hearing loss       Past Surgical History:   Procedure Laterality Date     ABDOMEN SURGERY      laparoscopy X2     DILATE CERVIX, HYSTEROSCOPY,  ABLATE ENDOMETRIUM NOVASURE, COMBINED N/A 11/17/2016    Procedure: COMBINED DILATE CERVIX, HYSTEROSCOPY, ABLATE ENDOMETRIUM NOVASURE;  Surgeon: Sabas Patel MD;  Location: [facility]     EP STUDY TILT TABLE N/A 12/29/2020    Procedure: EP TILT TABLE;  Surgeon: Gilberto Song MD;  Location: [facility]     GYN SURGERY  dates above    myomectomy x2, laparoscopy x2     GYN SURGERY      Endometriosis surgery 5/1/19     INSERT PICC LINE Left 11/15/2019    Procedure: INSERTION, PICC, single lumen PICC;  Surgeon: Emmett Rasmussen PA-C;  Location: [facility]     IR PICC PLACEMENT > 5 YRS OF AGE  11/15/2019     LAPAROSCOPIC ASSISTED HYSTERECTOMY VAGINAL N/A 1/4/2018    Procedure: LAPAROSCOPIC ASSISTED HYSTERECTOMY VAGINAL;;  Surgeon: Sabas Patel MD;  Location: [facility]     LAPAROSCOPIC GASTRIC SLEEVE N/A 7/25/2017    Procedure: LAPAROSCOPIC GASTRIC SLEEVE;  Laparoscopic Sleeve Gastrectomy;  Surgeon: Sam Schmidt MD;  Location: [facility]     LAPAROSCOPIC LYSIS ADHESIONS  7/10/2014    Procedure: LAPAROSCOPIC LYSIS ADHESIONS;  Surgeon: Sabas Patel MD;  Location: [facility]     LAPAROSCOPIC SALPINGECTOMY Bilateral 1/4/2018    Procedure: LAPAROSCOPIC SALPINGECTOMY;;  Surgeon: Sabas Patel MD;  Location: [facility]     LASER CO2 LAPAROSCOPIC VAPORIZATION ENDOMETRIUM  7/10/2014    Procedure: LASER CO2 LAPAROSCOPIC VAPORIZATION ENDOMETRIUM;  Surgeon: Sabas Patel MD;  Location: [facility]     LASER CO2 LAPAROSCOPIC VAPORIZATION ENDOMETRIUM N/A 6/30/2015    Procedure: LASER CO2 LAPAROSCOPIC VAPORIZATION ENDOMETRIUM;  Surgeon: Sabas Patel MD;  Location: [facility]     LASER CO2 LAPAROSCOPIC VAPORIZATION ENDOMETRIUM, LYSIS ADHESIONS N/A 1/4/2018    Procedure: LASER CO2 LAPAROSCOPIC VAPORIZATION ENDOMETRIUM, LYSIS ADHESIONS;  LAPAROSCOPY WITH CO2 LASER LYSIS OF ADHESIONS AND VAPORIZATION OF ENDOMETRIOSIS, CYSTOTOMY LEFT OVARY, LAPAROSCOPIC VAGINAL HYSTERECTOMY WITH BILATERAL SALPINGECTOMY ;  Surgeon:  Sabas Patel MD;  Location: [facility]     LASER CO2 LAPAROSCOPY DIAGNOSTIC, LYSIS ADHESIONS, COMBINED N/A 6/30/2015    Procedure: COMBINED LASER CO2 LAPAROSCOPY DIAGNOSTIC, LYSIS ADHESIONS;  Surgeon: Sabas Patel MD;  Location: [facility]     ZZC EXCIS UTERINE FIBROID,ABD APPRCH      Description: Uterine Myomectomy;  Recorded: 11/11/2013;       Review of Systems   Constitutional: Negative for chills and fever.   HENT: Negative for congestion, ear pain, hearing loss and sore throat.    Eyes: Negative for pain and visual disturbance.   Respiratory: Negative for cough and shortness of breath.    Cardiovascular: Negative for chest pain, palpitations and peripheral edema.   Gastrointestinal: Negative for abdominal pain, constipation, diarrhea, heartburn, hematochezia and nausea.   Breasts:  Negative for tenderness, breast mass and discharge.   Genitourinary: Negative for dysuria, frequency, genital sores, hematuria, pelvic pain, urgency, vaginal bleeding and vaginal discharge.   Musculoskeletal: Positive for joint swelling. Negative for arthralgias and myalgias.   Skin: Negative for rash.   Neurological: Negative for dizziness, weakness, headaches and paresthesias.   Psychiatric/Behavioral: Negative for mood changes. The patient is not nervous/anxious.           OBJECTIVE:   /74 (BP Location: Right arm)   Pulse 68   Temp 97.9  F (36.6  C) (Oral)   Wt 61.5 kg (135 lb 8 oz)   LMP  (LMP Unknown)   SpO2 99%   BMI 23.26 kg/m    Physical Exam  GENERAL: healthy, alert and no distress  EYES: Eyes grossly normal to inspection, PERRL and conjunctivae and sclerae normal  HENT: ear canals and TM's normal, nose and mouth without ulcers or lesions  NECK: no adenopathy, no asymmetry, masses, or scars and thyroid normal to palpation  RESP: lungs clear to auscultation - no rales, rhonchi or wheezes  CV: regular rate and rhythm, normal S1 S2, no S3 or S4, no murmur, click or rub, no peripheral edema and peripheral pulses  strong  ABDOMEN: soft, nontender, no hepatosplenomegaly, no masses and bowel sounds normal  MS: no gross musculoskeletal defects noted, no edema  SKIN: no suspicious lesions or rashes  NEURO: Normal strength and tone, mentation intact and speech normal  PSYCH: mentation appears normal, affect normal/bright    Diagnostic Test Results:  Labs reviewed in Epic      ASSESSMENT/PLAN:   (Z00.00) Routine general medical examination at a health care facility  (primary encounter diagnosis)  Comment:   Plan: Updated    Plans to get Pap outside of facility and will send records.       (F33.41) Recurrent major depressive disorder, in partial remission (H)  Comment:   Plan: buPROPion (WELLBUTRIN XL) 300 MG 24 hr tablet,         Comprehensive metabolic panel (BMP + Alb, Alk         Phos, ALT, AST, Total. Bili, TP)        Stable     (Z12.31) Encounter for screening mammogram for breast cancer  Comment:   Plan: MA Screening Digital Bilateral            (M79.7) Fibromyalgia  Comment:   Plan: Stable, has had much improvement with the help of her helping paws canine.     (R53.83) Fatigue, unspecified type  Comment:   Plan: CBC with platelets and differential        Increased since she got back from her recent trip  Will do some labs although I suspect this could be her fibromyalgia vs immune system.     (Z13.29) Screening for thyroid disorder  Comment:   Plan: TSH with free T4 reflex            (Z11.4) Screening for HIV (human immunodeficiency virus)  Comment:   Plan: HIV Antigen Antibody Combo            (Z11.59) Need for hepatitis C screening test  Comment:   Plan: Hepatitis C Screen Reflex to HCV RNA Quant and         Genotype            (Z13.220) Screening for hyperlipidemia  Comment:   Plan: Lipid panel reflex to direct LDL Fasting            (Z12.4) Cervical cancer screening  Comment:   Plan: Plans to have pap with gynecologist     Patient has been advised of split billing requirements and indicates understanding:  "Yes    COUNSELING:  Reviewed preventive health counseling, as reflected in patient instructions    Estimated body mass index is 23.26 kg/m  as calculated from the following:    Height as of 3/3/21: 1.626 m (5' 4\").    Weight as of this encounter: 61.5 kg (135 lb 8 oz).        She reports that she quit smoking about 8 years ago. Her smoking use included cigarettes. She started smoking about 15 years ago. She has a 2.50 pack-year smoking history. She has never used smokeless tobacco.      Counseling Resources:  ATP IV Guidelines  Pooled Cohorts Equation Calculator  Breast Cancer Risk Calculator  BRCA-Related Cancer Risk Assessment: FHS-7 Tool  FRAX Risk Assessment  ICSI Preventive Guidelines  Dietary Guidelines for Americans, 2010  USDA's MyPlate  ASA Prophylaxis  Lung CA Screening    DANIELE Zamora [facility]  Answers for HPI/ROS submitted by the patient on 7/15/2022  If you checked off any problems, how difficult have these problems made it for you to do your work, take care of things at home, or get along with other people?: Not difficult at all  PHQ9 TOTAL SCORE: 5      "

## 2022-07-16 LAB
HCV AB SERPL QL IA: NONREACTIVE
HIV 1+2 AB+HIV1 P24 AG SERPL QL IA: NONREACTIVE

## 2022-08-02 ENCOUNTER — DOCUMENTATION ONLY (OUTPATIENT)
Dept: LAB | Facility: OTHER | Age: 42
End: 2022-08-02

## 2022-08-02 DIAGNOSIS — R79.82 ELEVATED C-REACTIVE PROTEIN: Primary | ICD-10-CM

## 2022-08-02 DIAGNOSIS — R74.8 ELEVATED CREATINE KINASE: ICD-10-CM

## 2022-08-02 NOTE — PROGRESS NOTES
Labs placed for chemistry.       DANIELE Zamora CNP  Questions or concerns please feel free to send me a [employer] message or call me  Phone : [phone]

## 2022-08-02 NOTE — PROGRESS NOTES
Patient is inquiring labwork from visit on 7/15. There currently are no available orders for patient. Please place lab orders for patient if needed.     Thank you,     Klaudia MACK) [facility]

## 2022-08-03 ENCOUNTER — ANCILLARY PROCEDURE (OUTPATIENT)
Dept: MAMMOGRAPHY | Facility: CLINIC | Age: 42
End: 2022-08-03
Attending: NURSE PRACTITIONER
Payer: COMMERCIAL

## 2022-08-03 DIAGNOSIS — Z12.31 ENCOUNTER FOR SCREENING MAMMOGRAM FOR BREAST CANCER: ICD-10-CM

## 2022-08-03 PROCEDURE — 77067 SCR MAMMO BI INCL CAD: CPT | Performed by: STUDENT IN AN ORGANIZED HEALTH CARE EDUCATION/TRAINING PROGRAM

## 2022-09-21 ENCOUNTER — LAB (OUTPATIENT)
Dept: LAB | Facility: OTHER | Age: 42
End: 2022-09-21
Payer: COMMERCIAL

## 2022-09-21 DIAGNOSIS — R74.8 ELEVATED CREATINE KINASE: ICD-10-CM

## 2022-09-21 LAB
ANION GAP SERPL CALCULATED.3IONS-SCNC: 4 MMOL/L (ref 3–14)
BUN SERPL-MCNC: 12 MG/DL (ref 7–30)
CALCIUM SERPL-MCNC: 8.5 MG/DL (ref 8.5–10.1)
CHLORIDE BLD-SCNC: 113 MMOL/L (ref 94–109)
CO2 SERPL-SCNC: 25 MMOL/L (ref 20–32)
CREAT SERPL-MCNC: 1.07 MG/DL (ref 0.52–1.04)
GFR SERPL CREATININE-BSD FRML MDRD: 66 ML/MIN/1.73M2
GLUCOSE BLD-MCNC: 83 MG/DL (ref 70–99)
POTASSIUM BLD-SCNC: 4.3 MMOL/L (ref 3.4–5.3)
SODIUM SERPL-SCNC: 142 MMOL/L (ref 133–144)

## 2022-09-21 PROCEDURE — 80048 BASIC METABOLIC PNL TOTAL CA: CPT

## 2022-09-21 PROCEDURE — 36415 COLL VENOUS BLD VENIPUNCTURE: CPT

## 2022-10-17 ENCOUNTER — OFFICE VISIT (OUTPATIENT)
Dept: FAMILY MEDICINE | Facility: OTHER | Age: 42
End: 2022-10-17
Payer: COMMERCIAL

## 2022-10-17 VITALS
TEMPERATURE: 97.8 F | SYSTOLIC BLOOD PRESSURE: 102 MMHG | RESPIRATION RATE: 16 BRPM | HEART RATE: 76 BPM | OXYGEN SATURATION: 99 % | WEIGHT: 137 LBS | DIASTOLIC BLOOD PRESSURE: 78 MMHG | HEIGHT: 64 IN | BODY MASS INDEX: 23.39 KG/M2

## 2022-10-17 DIAGNOSIS — Z98.84 S/P LAPAROSCOPIC SLEEVE GASTRECTOMY: ICD-10-CM

## 2022-10-17 DIAGNOSIS — E55.9 VITAMIN D DEFICIENCY: ICD-10-CM

## 2022-10-17 DIAGNOSIS — Z13.29 SCREENING FOR THYROID DISORDER: ICD-10-CM

## 2022-10-17 DIAGNOSIS — A69.20 LYME DISEASE: ICD-10-CM

## 2022-10-17 DIAGNOSIS — M79.7 FIBROMYALGIA: ICD-10-CM

## 2022-10-17 DIAGNOSIS — R53.83 OTHER FATIGUE: ICD-10-CM

## 2022-10-17 DIAGNOSIS — F33.41 RECURRENT MAJOR DEPRESSIVE DISORDER, IN PARTIAL REMISSION (H): ICD-10-CM

## 2022-10-17 DIAGNOSIS — R59.0 CERVICAL LYMPHADENOPATHY: Primary | ICD-10-CM

## 2022-10-17 LAB
ALBUMIN SERPL-MCNC: 3 G/DL (ref 3.4–5)
ALBUMIN UR-MCNC: NEGATIVE MG/DL
ALP SERPL-CCNC: 48 U/L (ref 40–150)
ALT SERPL W P-5'-P-CCNC: 13 U/L (ref 0–50)
ANION GAP SERPL CALCULATED.3IONS-SCNC: 7 MMOL/L (ref 3–14)
APPEARANCE UR: CLEAR
AST SERPL W P-5'-P-CCNC: 12 U/L (ref 0–45)
BASOPHILS # BLD AUTO: 0 10E3/UL (ref 0–0.2)
BASOPHILS NFR BLD AUTO: 0 %
BILIRUB SERPL-MCNC: 0.3 MG/DL (ref 0.2–1.3)
BILIRUB UR QL STRIP: NEGATIVE
BUN SERPL-MCNC: 13 MG/DL (ref 7–30)
CALCIUM SERPL-MCNC: 8.6 MG/DL (ref 8.5–10.1)
CHLORIDE BLD-SCNC: 111 MMOL/L (ref 94–109)
CLUE CELLS: ABNORMAL
CO2 SERPL-SCNC: 21 MMOL/L (ref 20–32)
COLOR UR AUTO: YELLOW
CREAT SERPL-MCNC: 0.92 MG/DL (ref 0.52–1.04)
CRP SERPL-MCNC: 8.5 MG/L (ref 0–8)
EOSINOPHIL # BLD AUTO: 0.1 10E3/UL (ref 0–0.7)
EOSINOPHIL NFR BLD AUTO: 1 %
ERYTHROCYTE [DISTWIDTH] IN BLOOD BY AUTOMATED COUNT: 13.8 % (ref 10–15)
ERYTHROCYTE [SEDIMENTATION RATE] IN BLOOD BY WESTERGREN METHOD: 6 MM/HR (ref 0–20)
FERRITIN SERPL-MCNC: 10 NG/ML (ref 12–150)
GFR SERPL CREATININE-BSD FRML MDRD: 79 ML/MIN/1.73M2
GLUCOSE BLD-MCNC: 79 MG/DL (ref 70–99)
GLUCOSE UR STRIP-MCNC: NEGATIVE MG/DL
HCT VFR BLD AUTO: 42.7 % (ref 35–47)
HGB BLD-MCNC: 13.5 G/DL (ref 11.7–15.7)
HGB UR QL STRIP: NEGATIVE
IRON SATN MFR SERPL: 22 % (ref 15–46)
IRON SERPL-MCNC: 97 UG/DL (ref 35–180)
KETONES UR STRIP-MCNC: NEGATIVE MG/DL
LEUKOCYTE ESTERASE UR QL STRIP: NEGATIVE
LYMPHOCYTES # BLD AUTO: 2.6 10E3/UL (ref 0.8–5.3)
LYMPHOCYTES NFR BLD AUTO: 35 %
MAGNESIUM SERPL-MCNC: 2 MG/DL (ref 1.6–2.3)
MCH RBC QN AUTO: 28.7 PG (ref 26.5–33)
MCHC RBC AUTO-ENTMCNC: 31.6 G/DL (ref 31.5–36.5)
MCV RBC AUTO: 91 FL (ref 78–100)
MONOCYTES # BLD AUTO: 0.6 10E3/UL (ref 0–1.3)
MONOCYTES NFR BLD AUTO: 7 %
MONOCYTES NFR BLD AUTO: NEGATIVE %
NEUTROPHILS # BLD AUTO: 4.3 10E3/UL (ref 1.6–8.3)
NEUTROPHILS NFR BLD AUTO: 57 %
NITRATE UR QL: NEGATIVE
PH UR STRIP: 7 [PH] (ref 5–7)
PLATELET # BLD AUTO: 278 10E3/UL (ref 150–450)
POTASSIUM BLD-SCNC: 3.8 MMOL/L (ref 3.4–5.3)
PROT SERPL-MCNC: 6.5 G/DL (ref 6.8–8.8)
RBC # BLD AUTO: 4.71 10E6/UL (ref 3.8–5.2)
SODIUM SERPL-SCNC: 139 MMOL/L (ref 133–144)
SP GR UR STRIP: 1.02 (ref 1–1.03)
TIBC SERPL-MCNC: 438 UG/DL (ref 240–430)
TRICHOMONAS, WET PREP: ABNORMAL
TSH SERPL DL<=0.005 MIU/L-ACNC: 1.44 MU/L (ref 0.4–4)
UROBILINOGEN UR STRIP-ACNC: 0.2 E.U./DL
VIT B12 SERPL-MCNC: 598 PG/ML (ref 232–1245)
WBC # BLD AUTO: 7.6 10E3/UL (ref 4–11)
WBC'S/HIGH POWER FIELD, WET PREP: ABNORMAL
YEAST, WET PREP: ABNORMAL

## 2022-10-17 PROCEDURE — 86308 HETEROPHILE ANTIBODY SCREEN: CPT | Performed by: NURSE PRACTITIONER

## 2022-10-17 PROCEDURE — 85652 RBC SED RATE AUTOMATED: CPT | Performed by: NURSE PRACTITIONER

## 2022-10-17 PROCEDURE — 86140 C-REACTIVE PROTEIN: CPT | Performed by: NURSE PRACTITIONER

## 2022-10-17 PROCEDURE — 83550 IRON BINDING TEST: CPT | Performed by: NURSE PRACTITIONER

## 2022-10-17 PROCEDURE — 81003 URINALYSIS AUTO W/O SCOPE: CPT | Performed by: NURSE PRACTITIONER

## 2022-10-17 PROCEDURE — 86617 LYME DISEASE ANTIBODY: CPT | Performed by: NURSE PRACTITIONER

## 2022-10-17 PROCEDURE — 86665 EPSTEIN-BARR CAPSID VCA: CPT | Performed by: NURSE PRACTITIONER

## 2022-10-17 PROCEDURE — 82607 VITAMIN B-12: CPT | Performed by: NURSE PRACTITIONER

## 2022-10-17 PROCEDURE — 82728 ASSAY OF FERRITIN: CPT | Performed by: NURSE PRACTITIONER

## 2022-10-17 PROCEDURE — 99214 OFFICE O/P EST MOD 30 MIN: CPT | Performed by: NURSE PRACTITIONER

## 2022-10-17 PROCEDURE — 87210 SMEAR WET MOUNT SALINE/INK: CPT | Performed by: NURSE PRACTITIONER

## 2022-10-17 PROCEDURE — 83735 ASSAY OF MAGNESIUM: CPT | Performed by: NURSE PRACTITIONER

## 2022-10-17 PROCEDURE — 83540 ASSAY OF IRON: CPT | Performed by: NURSE PRACTITIONER

## 2022-10-17 PROCEDURE — 80050 GENERAL HEALTH PANEL: CPT | Performed by: NURSE PRACTITIONER

## 2022-10-17 PROCEDURE — 86618 LYME DISEASE ANTIBODY: CPT | Performed by: NURSE PRACTITIONER

## 2022-10-17 PROCEDURE — 36415 COLL VENOUS BLD VENIPUNCTURE: CPT | Performed by: NURSE PRACTITIONER

## 2022-10-17 PROCEDURE — 82306 VITAMIN D 25 HYDROXY: CPT | Performed by: NURSE PRACTITIONER

## 2022-10-17 PROCEDURE — 86038 ANTINUCLEAR ANTIBODIES: CPT | Performed by: NURSE PRACTITIONER

## 2022-10-17 ASSESSMENT — PAIN SCALES - GENERAL: PAINLEVEL: SEVERE PAIN (6)

## 2022-10-17 NOTE — PROGRESS NOTES
Assessment & Plan     Cervical lymphadenopathy  Small area along the right cervical lymph node chain with pea sized enlargement. Area is non-tender, but patient does not it can be tender at times.   Etiology unknown.  Suspect viral in nature vs other hematological etiology vs unknown etiology   At this time I recommend labs depending on those I would recommend a soft tissue neck ultrasound.   Monitor for additional symptoms.   Reassuring she has not other physical symptoms today on exam that are alarming.     Other fatigue  Uncertain etiology and if this is directly related to above so I did recommend a hematological work up. I did discuss depression/seasonal changes given her history and this does not seem to be a contributor as she does not feel depressed and has been doing well since having her service dog as well.   - CBC with platelets and differential; Future  - UA Macro with Reflex to Micro and Culture - lab collect; Future  - Wet prep - lab collect; Future  - Mononucleosis screen; Future  - EBV Capsid Antibody IgM; Future  - Comprehensive metabolic panel (BMP + Alb, Alk Phos, ALT, AST, Total. Bili, TP); Future  - Anti Nuclear Thelma IgG by IFA with Reflex; Future  - CRP, inflammation; Future  - ESR: Erythrocyte sedimentation rate; Future  - CBC with platelets and differential  - UA Macro with Reflex to Micro and Culture - lab collect  - Wet prep - lab collect  - Mononucleosis screen  - EBV Capsid Antibody IgM  - Comprehensive metabolic panel (BMP + Alb, Alk Phos, ALT, AST, Total. Bili, TP)  - Anti Nuclear Thelma IgG by IFA with Reflex  - CRP, inflammation  - ESR: Erythrocyte sedimentation rate    Lyme disease  Recheck for elevated lyme total.   - Lyme Disease Total Abs Bld with Reflex to Confirm CLIA; Future  - Lyme Disease Total Abs Bld with Reflex to Confirm CLIA  - LYME CONFIRM IGG/IGM BY CHEMILUMINESCENT IA BLOOD    Fibromyalgia  Managed well with current regimen, could be a contributing factor in above      Recurrent major depressive disorder, in partial remission (H)  Stable     Vitamin D deficiency  Recheck   - 25 OH Vit D therapy monitoring; Future  - 25 OH Vit D therapy monitoring    Screening for thyroid disorder    - TSH with free T4 reflex; Future  - TSH with free T4 reflex    S/P laparoscopic sleeve gastrectomy  Recommend vitamin Recheck as a contributing factor to above   - Vitamin B12; Future  - Iron and iron binding capacity; Future  - Ferritin; Future  - Magnesium; Future  - Vitamin B12  - Iron and iron binding capacity  - Ferritin  - Magnesium         There are no Patient Instructions on file for this visit.    No follow-ups on file.    DANIELE Zamora CNP  [facility] MAGALY Cornelius is a 42 year old accompanied by her service dog, presenting for the following health issues:  swollen gland (Bump)      History of Present Illness       Reason for visit:  Swollen glands for a few months,feeling extra tired. painful bump on right gland. these are the glands in my neck/throat  Symptom onset:  More than a month  Symptoms include:  Excessive tiredness, pain, tired voice by the end of the day most days  Symptom intensity:  Moderate  Symptom progression:  Worsening  Had these symptoms before:  No  What makes it worse:  No  What makes it better:  No    She eats 4 or more servings of fruits and vegetables daily.She consumes 1 sweetened beverage(s) daily.She exercises with enough effort to increase her heart rate 10 to 19 minutes per day.  She exercises with enough effort to increase her heart rate 3 or less days per week.   She is taking medications regularly.       It is not uncommon for her to have swollen glands, but now has had them since the beginning of September  Falling asleep easily  Right gland has a bump that feels hard. Was not painful at first, but has gotten painful over time. In the last week and a half (tender). As the day goes on she gets more tired and her  "voice diminishes and hurts worse then others. Does not hurt to swallow.   No fevers  Feeling \"off\"   Prolapse surgery scheduled for December. Had an insert placed and then was uncomfortable. Has a nuvaring. Noticed some gunk on the pessry out completely. Cleared up quickly.       Review of Systems   As noted above       Objective    /78   Pulse 76   Temp 97.8  F (36.6  C) (Temporal)   Resp 16   Ht 1.626 m (5' 4\")   Wt 62.1 kg (137 lb)   LMP  (LMP Unknown)   SpO2 99%   BMI 23.52 kg/m    Body mass index is 23.52 kg/m .  Physical Exam  HENT:      Head: Normocephalic.      Right Ear: Tympanic membrane, ear canal and external ear normal.      Left Ear: Tympanic membrane, ear canal and external ear normal.      Mouth/Throat:      Mouth: Mucous membranes are moist.      Pharynx: No oropharyngeal exudate or posterior oropharyngeal erythema.   Eyes:      General: Lids are normal.      Pupils: Pupils are equal, round, and reactive to light.   Neck:     Cardiovascular:      Rate and Rhythm: Normal rate and regular rhythm.   Pulmonary:      Effort: Pulmonary effort is normal.      Breath sounds: Normal breath sounds.   Abdominal:      General: Abdomen is flat. Bowel sounds are normal.      Palpations: Abdomen is soft.   Musculoskeletal:         General: Normal range of motion.   Lymphadenopathy:      Cervical: Cervical adenopathy present.      Right cervical: Deep cervical adenopathy present. No superficial or posterior cervical adenopathy.     Left cervical: No superficial, deep or posterior cervical adenopathy.   Skin:     General: Skin is warm.   Neurological:      General: No focal deficit present.      Mental Status: She is alert and oriented to person, place, and time.   Psychiatric:         Mood and Affect: Mood normal.         Behavior: Behavior normal. Behavior is cooperative.         Thought Content: Thought content normal.         Judgment: Judgment normal.          Results for orders placed or " performed in visit on 10/17/22   UA Macro with Reflex to Micro and Culture - lab collect     Status: Normal    Specimen: Urine, Clean Catch   Result Value Ref Range    Color Urine Yellow Colorless, Straw, Light Yellow, Yellow    Appearance Urine Clear Clear    Glucose Urine Negative Negative mg/dL    Bilirubin Urine Negative Negative    Ketones Urine Negative Negative mg/dL    Specific Gravity Urine 1.020 1.003 - 1.035    Blood Urine Negative Negative    pH Urine 7.0 5.0 - 7.0    Protein Albumin Urine Negative Negative mg/dL    Urobilinogen Urine 0.2 0.2, 1.0 E.U./dL    Nitrite Urine Negative Negative    Leukocyte Esterase Urine Negative Negative    Narrative    Microscopic not indicated   Mononucleosis screen     Status: Normal   Result Value Ref Range    Mononucleosis Screen Negative Negative   Comprehensive metabolic panel (BMP + Alb, Alk Phos, ALT, AST, Total. Bili, TP)     Status: Abnormal   Result Value Ref Range    Sodium 139 133 - 144 mmol/L    Potassium 3.8 3.4 - 5.3 mmol/L    Chloride 111 (H) 94 - 109 mmol/L    Carbon Dioxide (CO2) 21 20 - 32 mmol/L    Anion Gap 7 3 - 14 mmol/L    Urea Nitrogen 13 7 - 30 mg/dL    Creatinine 0.92 0.52 - 1.04 mg/dL    Calcium 8.6 8.5 - 10.1 mg/dL    Glucose 79 70 - 99 mg/dL    Alkaline Phosphatase 48 40 - 150 U/L    AST 12 0 - 45 U/L    ALT 13 0 - 50 U/L    Protein Total 6.5 (L) 6.8 - 8.8 g/dL    Albumin 3.0 (L) 3.4 - 5.0 g/dL    Bilirubin Total 0.3 0.2 - 1.3 mg/dL    GFR Estimate 79 >60 mL/min/1.73m2   Anti Nuclear Thelma IgG by IFA with Reflex     Status: Normal   Result Value Ref Range    SABA interpretation Negative Negative   CRP, inflammation     Status: Abnormal   Result Value Ref Range    CRP Inflammation 8.5 (H) 0.0 - 8.0 mg/L   ESR: Erythrocyte sedimentation rate     Status: Normal   Result Value Ref Range    Erythrocyte Sedimentation Rate 6 0 - 20 mm/hr   TSH with free T4 reflex     Status: Normal   Result Value Ref Range    TSH 1.44 0.40 - 4.00 mU/L   Lyme Disease  Total Abs Bld with Reflex to Confirm CLIA     Status: Abnormal   Result Value Ref Range    Lyme Disease Antibodies Total 3.10 (H) <0.90   CBC with platelets and differential     Status: None   Result Value Ref Range    WBC Count 7.6 4.0 - 11.0 10e3/uL    RBC Count 4.71 3.80 - 5.20 10e6/uL    Hemoglobin 13.5 11.7 - 15.7 g/dL    Hematocrit 42.7 35.0 - 47.0 %    MCV 91 78 - 100 fL    MCH 28.7 26.5 - 33.0 pg    MCHC 31.6 31.5 - 36.5 g/dL    RDW 13.8 10.0 - 15.0 %    Platelet Count 278 150 - 450 10e3/uL    % Neutrophils 57 %    % Lymphocytes 35 %    % Monocytes 7 %    % Eosinophils 1 %    % Basophils 0 %    Absolute Neutrophils 4.3 1.6 - 8.3 10e3/uL    Absolute Lymphocytes 2.6 0.8 - 5.3 10e3/uL    Absolute Monocytes 0.6 0.0 - 1.3 10e3/uL    Absolute Eosinophils 0.1 0.0 - 0.7 10e3/uL    Absolute Basophils 0.0 0.0 - 0.2 10e3/uL   Vitamin B12     Status: Normal   Result Value Ref Range    Vitamin B12 598 232 - 1,245 pg/mL   Iron and iron binding capacity     Status: Abnormal   Result Value Ref Range    Iron 97 35 - 180 ug/dL    Iron Binding Capacity 438 (H) 240 - 430 ug/dL    Iron Sat Index 22 15 - 46 %   Ferritin     Status: Abnormal   Result Value Ref Range    Ferritin 10 (L) 12 - 150 ng/mL   Magnesium     Status: Normal   Result Value Ref Range    Magnesium 2.0 1.6 - 2.3 mg/dL   Wet prep - lab collect     Status: Abnormal    Specimen: Vagina; Swab   Result Value Ref Range    Trichomonas Absent Absent    Yeast Absent Absent    Clue Cells Absent Absent    WBCs/high power field 1+ (A) None   CBC with platelets and differential     Status: None    Narrative    The following orders were created for panel order CBC with platelets and differential.  Procedure                               Abnormality         Status                     ---------                               -----------         ------                     CBC with platelets and d...[[id number]]                      Final result                 Please view results  for these tests on the individual orders.

## 2022-10-18 LAB
ANA SER QL IF: NEGATIVE
B BURGDOR IGG+IGM SER QL: 3.1

## 2022-10-19 LAB
B BURGDOR IGG SERPL QL IA: 10.3 INDEX
B BURGDOR IGG SERPL QL IA: REACTIVE
B BURGDOR IGM SERPL QL IA: <0.01 INDEX
B BURGDOR IGM SERPL QL IA: NONREACTIVE
DEPRECATED CALCIDIOL+CALCIFEROL SERPL-MC: <67 UG/L (ref 20–75)
EBV VCA IGM SER IA-ACNC: 14.4 U/ML
EBV VCA IGM SER IA-ACNC: NORMAL
VITAMIN D2 SERPL-MCNC: <5 UG/L
VITAMIN D3 SERPL-MCNC: 62 UG/L

## 2022-10-24 ENCOUNTER — MYC MEDICAL ADVICE (OUTPATIENT)
Dept: FAMILY MEDICINE | Facility: OTHER | Age: 42
End: 2022-10-24

## 2022-10-24 DIAGNOSIS — R59.0 CERVICAL LYMPHADENOPATHY: Primary | ICD-10-CM

## 2022-10-24 NOTE — TELEPHONE ENCOUNTER
Order placed.      DANIELE Zamora CNP  Questions or concerns please feel free to send me a [employer] message or call me  Phone : [phone]

## 2022-10-25 ENCOUNTER — ANCILLARY PROCEDURE (OUTPATIENT)
Dept: ULTRASOUND IMAGING | Facility: CLINIC | Age: 42
End: 2022-10-25
Attending: NURSE PRACTITIONER
Payer: COMMERCIAL

## 2022-10-25 DIAGNOSIS — R59.0 CERVICAL LYMPHADENOPATHY: ICD-10-CM

## 2022-10-25 PROCEDURE — 76536 US EXAM OF HEAD AND NECK: CPT | Mod: GC | Performed by: RADIOLOGY

## 2022-11-14 ENCOUNTER — TRANSFERRED RECORDS (OUTPATIENT)
Dept: HEALTH INFORMATION MANAGEMENT | Facility: CLINIC | Age: 42
End: 2022-11-14

## 2022-11-20 ENCOUNTER — HEALTH MAINTENANCE LETTER (OUTPATIENT)
Age: 42
End: 2022-11-20

## 2022-12-23 ENCOUNTER — LAB REQUISITION (OUTPATIENT)
Dept: LAB | Facility: CLINIC | Age: 42
End: 2022-12-23
Payer: COMMERCIAL

## 2022-12-23 DIAGNOSIS — Z01.89 ENCOUNTER FOR OTHER SPECIFIED SPECIAL EXAMINATIONS: ICD-10-CM

## 2022-12-23 LAB — SARS-COV-2 RNA RESP QL NAA+PROBE: NEGATIVE

## 2022-12-23 PROCEDURE — U0005 INFEC AGEN DETEC AMPLI PROBE: HCPCS | Mod: ORL | Performed by: OBSTETRICS & GYNECOLOGY

## 2023-01-03 ENCOUNTER — LAB REQUISITION (OUTPATIENT)
Dept: LAB | Facility: CLINIC | Age: 43
End: 2023-01-03

## 2023-01-03 DIAGNOSIS — R30.0 DYSURIA: ICD-10-CM

## 2023-01-03 PROCEDURE — 87086 URINE CULTURE/COLONY COUNT: CPT | Performed by: OBSTETRICS & GYNECOLOGY

## 2023-01-05 LAB — BACTERIA UR CULT: ABNORMAL

## 2023-01-19 ENCOUNTER — TRANSFERRED RECORDS (OUTPATIENT)
Dept: HEALTH INFORMATION MANAGEMENT | Facility: CLINIC | Age: 43
End: 2023-01-19

## 2023-03-20 ENCOUNTER — TRANSFERRED RECORDS (OUTPATIENT)
Dept: HEALTH INFORMATION MANAGEMENT | Facility: CLINIC | Age: 43
End: 2023-03-20

## 2023-04-05 ENCOUNTER — ANCILLARY PROCEDURE (OUTPATIENT)
Dept: GENERAL RADIOLOGY | Facility: OTHER | Age: 43
End: 2023-04-05
Attending: NURSE PRACTITIONER
Payer: COMMERCIAL

## 2023-04-05 ENCOUNTER — OFFICE VISIT (OUTPATIENT)
Dept: FAMILY MEDICINE | Facility: OTHER | Age: 43
End: 2023-04-05
Payer: COMMERCIAL

## 2023-04-05 VITALS
HEART RATE: 63 BPM | HEIGHT: 64 IN | WEIGHT: 144 LBS | OXYGEN SATURATION: 100 % | SYSTOLIC BLOOD PRESSURE: 106 MMHG | BODY MASS INDEX: 24.59 KG/M2 | RESPIRATION RATE: 13 BRPM | DIASTOLIC BLOOD PRESSURE: 64 MMHG | TEMPERATURE: 97.9 F

## 2023-04-05 DIAGNOSIS — G89.29 CHRONIC LEFT SHOULDER PAIN: ICD-10-CM

## 2023-04-05 DIAGNOSIS — F33.41 RECURRENT MAJOR DEPRESSIVE DISORDER, IN PARTIAL REMISSION (H): Primary | ICD-10-CM

## 2023-04-05 DIAGNOSIS — D50.9 IRON DEFICIENCY ANEMIA, UNSPECIFIED IRON DEFICIENCY ANEMIA TYPE: ICD-10-CM

## 2023-04-05 DIAGNOSIS — M25.512 CHRONIC LEFT SHOULDER PAIN: ICD-10-CM

## 2023-04-05 PROBLEM — N81.9 VAGINAL VAULT PROLAPSE: Status: ACTIVE | Noted: 2022-12-28

## 2023-04-05 LAB
BASOPHILS # BLD AUTO: 0 10E3/UL (ref 0–0.2)
BASOPHILS NFR BLD AUTO: 1 %
EOSINOPHIL # BLD AUTO: 0.2 10E3/UL (ref 0–0.7)
EOSINOPHIL NFR BLD AUTO: 3 %
ERYTHROCYTE [DISTWIDTH] IN BLOOD BY AUTOMATED COUNT: 14 % (ref 10–15)
FERRITIN SERPL-MCNC: 16 NG/ML (ref 6–175)
HCT VFR BLD AUTO: 44.6 % (ref 35–47)
HGB BLD-MCNC: 14.1 G/DL (ref 11.7–15.7)
IRON BINDING CAPACITY (ROCHE): 341 UG/DL (ref 240–430)
IRON SATN MFR SERPL: 60 % (ref 15–46)
IRON SERPL-MCNC: 203 UG/DL (ref 37–145)
LYMPHOCYTES # BLD AUTO: 2.3 10E3/UL (ref 0.8–5.3)
LYMPHOCYTES NFR BLD AUTO: 44 %
MCH RBC QN AUTO: 28.9 PG (ref 26.5–33)
MCHC RBC AUTO-ENTMCNC: 31.6 G/DL (ref 31.5–36.5)
MCV RBC AUTO: 91 FL (ref 78–100)
MONOCYTES # BLD AUTO: 0.5 10E3/UL (ref 0–1.3)
MONOCYTES NFR BLD AUTO: 9 %
NEUTROPHILS # BLD AUTO: 2.2 10E3/UL (ref 1.6–8.3)
NEUTROPHILS NFR BLD AUTO: 43 %
PLATELET # BLD AUTO: 242 10E3/UL (ref 150–450)
RBC # BLD AUTO: 4.88 10E6/UL (ref 3.8–5.2)
WBC # BLD AUTO: 5.2 10E3/UL (ref 4–11)

## 2023-04-05 PROCEDURE — 99214 OFFICE O/P EST MOD 30 MIN: CPT | Performed by: NURSE PRACTITIONER

## 2023-04-05 PROCEDURE — 73030 X-RAY EXAM OF SHOULDER: CPT | Mod: TC | Performed by: RADIOLOGY

## 2023-04-05 PROCEDURE — 83550 IRON BINDING TEST: CPT | Performed by: NURSE PRACTITIONER

## 2023-04-05 PROCEDURE — 36415 COLL VENOUS BLD VENIPUNCTURE: CPT | Performed by: NURSE PRACTITIONER

## 2023-04-05 PROCEDURE — 83540 ASSAY OF IRON: CPT | Performed by: NURSE PRACTITIONER

## 2023-04-05 PROCEDURE — 82728 ASSAY OF FERRITIN: CPT | Performed by: NURSE PRACTITIONER

## 2023-04-05 PROCEDURE — 85025 COMPLETE CBC W/AUTO DIFF WBC: CPT | Performed by: NURSE PRACTITIONER

## 2023-04-05 ASSESSMENT — ENCOUNTER SYMPTOMS
MUSCLE WEAKNESS: 1
INSOMNIA: 0
STIFFNESS: 1
MUSCLE CRAMPS: 0
MYALGIAS: 1
DECREASED CONCENTRATION: 1
ARTHRALGIAS: 1
BACK PAIN: 0
NERVOUS/ANXIOUS: 1
DEPRESSION: 1
PANIC: 0

## 2023-04-05 ASSESSMENT — PATIENT HEALTH QUESTIONNAIRE - PHQ9
SUM OF ALL RESPONSES TO PHQ QUESTIONS 1-9: 4
10. IF YOU CHECKED OFF ANY PROBLEMS, HOW DIFFICULT HAVE THESE PROBLEMS MADE IT FOR YOU TO DO YOUR WORK, TAKE CARE OF THINGS AT HOME, OR GET ALONG WITH OTHER PEOPLE: SOMEWHAT DIFFICULT
SUM OF ALL RESPONSES TO PHQ QUESTIONS 1-9: 4

## 2023-04-05 ASSESSMENT — PAIN SCALES - GENERAL: PAINLEVEL: MODERATE PAIN (4)

## 2023-04-05 NOTE — PROGRESS NOTES
Assessment & Plan     Recurrent major depressive disorder, in partial remission (H)  Stable and doing well per patient  Due for recheck in July with her annual exam  No need for refills today     Chronic left shoulder pain  Decreased ROM with some tenderness, suspect tendonitis vs a possible tearing. Recommend Xray today and follow up with Orthopedic along with possible PT. Discussed with patient  Continue with at home cares- ice or heat, topical NSAIDS to area, decrease repetitive motion of arm.   - XR Shoulder Left 2 Views; Future  - Orthopedic  Referral; Future    Iron deficiency anemia, unspecified iron deficiency anemia type  Had viral illness and surgery   Started iron medication and has seen improvement. Will check today and then again in July.   - CBC with platelets and differential; Future  - Iron and iron binding capacity; Future  - Ferritin; Future  - Ferritin  - Iron and iron binding capacity  - CBC with platelets and differential    The patient indicates understanding of these issues and agrees with the plan.       There are no Patient Instructions on file for this visit.    DANIELE Zamora CNP  [facility] MAGALY Cornelius is a 43 year old, presenting for the following health issues:  Shoulder Pain      History of Present Illness       Reason for visit:  Pain in left shoulder  Symptom onset:  More than a month (before marla)  Symptoms include:  Sharp pain when I lift over my head or rotate back.  Symptom intensity:  Moderate  Symptom progression:  Staying the same  Had these symptoms before:  No  What makes it worse:  Increased movement  What makes it better:  No    She eats 2-3 servings of fruits and vegetables daily.She consumes 0 sweetened beverage(s) daily.She exercises with enough effort to increase her heart rate 9 or less minutes per day.  She exercises with enough effort to increase her heart rate 3 or less days per week.   She is taking  "medications regularly.           Review of Systems         Objective    /64 (Cuff Size: Adult Regular)   Pulse 63   Temp 97.9  F (36.6  C) (Oral)   Resp 13   Ht 1.629 m (5' 4.13\")   Wt 65.3 kg (144 lb)   LMP  (LMP Unknown)   SpO2 100%   BMI 24.61 kg/m    Body mass index is 24.61 kg/m .  Physical Exam   GENERAL: healthy, alert and no distress  MS: LUE exam shows Decreased ROM of arm above head with pain, 4/5 hand  with weakness, internal and external rotation - pain exhibited, lateral raise with pain.   NEURO: Normal strength and tone, mentation intact and speech normal  PSYCH: mentation appears normal, affect normal/bright    Results for orders placed or performed in visit on 04/05/23   CBC with platelets and differential     Status: None   Result Value Ref Range    WBC Count 5.2 4.0 - 11.0 10e3/uL    RBC Count 4.88 3.80 - 5.20 10e6/uL    Hemoglobin 14.1 11.7 - 15.7 g/dL    Hematocrit 44.6 35.0 - 47.0 %    MCV 91 78 - 100 fL    MCH 28.9 26.5 - 33.0 pg    MCHC 31.6 31.5 - 36.5 g/dL    RDW 14.0 10.0 - 15.0 %    Platelet Count 242 150 - 450 10e3/uL    % Neutrophils 43 %    % Lymphocytes 44 %    % Monocytes 9 %    % Eosinophils 3 %    % Basophils 1 %    Absolute Neutrophils 2.2 1.6 - 8.3 10e3/uL    Absolute Lymphocytes 2.3 0.8 - 5.3 10e3/uL    Absolute Monocytes 0.5 0.0 - 1.3 10e3/uL    Absolute Eosinophils 0.2 0.0 - 0.7 10e3/uL    Absolute Basophils 0.0 0.0 - 0.2 10e3/uL   CBC with platelets and differential     Status: None    Narrative    The following orders were created for panel order CBC with platelets and differential.  Procedure                               Abnormality         Status                     ---------                               -----------         ------                     CBC with platelets and d...[[id number]]                      Final result                 Please view results for these tests on the individual orders.             "

## 2023-04-05 NOTE — TELEPHONE ENCOUNTER
DIAGNOSIS: Chronic Left Shoulder Pain   APPOINTMENT DATE: 04/06/2023   NOTES STATUS DETAILS   OFFICE NOTE from referring provider Internal 04/05/2023  - Juana Fox CNP - [facility]   OFFICE NOTE from other specialist [facility] 11/14/2022 - [facility]   MEDICATION LIST Internal    XRAYS (IMAGES & REPORTS) PACS Internal:  04/05/2023 - LT Shoulder

## 2023-04-06 ENCOUNTER — PRE VISIT (OUTPATIENT)
Dept: ORTHOPEDICS | Facility: CLINIC | Age: 43
End: 2023-04-06

## 2023-04-06 ENCOUNTER — OFFICE VISIT (OUTPATIENT)
Dept: ORTHOPEDICS | Facility: CLINIC | Age: 43
End: 2023-04-06
Attending: NURSE PRACTITIONER
Payer: COMMERCIAL

## 2023-04-06 DIAGNOSIS — M25.512 CHRONIC LEFT SHOULDER PAIN: ICD-10-CM

## 2023-04-06 DIAGNOSIS — M67.912 TENDINOPATHY OF LEFT ROTATOR CUFF: Primary | ICD-10-CM

## 2023-04-06 DIAGNOSIS — G89.29 CHRONIC LEFT SHOULDER PAIN: ICD-10-CM

## 2023-04-06 PROCEDURE — 99204 OFFICE O/P NEW MOD 45 MIN: CPT | Performed by: ORTHOPAEDIC SURGERY

## 2023-04-06 NOTE — PROGRESS NOTES
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    HPI:   Ashli Gamboa is a very pleasant 43 year old, right-hand dominant female who presents for evaluation of left shoulder pain.  Symptoms started in Late summer/early fall of 2022. There was not a precipitating event that she can remember. The pain is located to the anterior and posterior shoulder. Worst pain is rated a 4 of 10, and current pain is rated at 0 of 10. Symptoms are worsened by movements, reaching behind, and lifting. Symptoms are improved with rest and avoidance. Patient has tried pain releivers with some relief. Associated symptoms include intense pain. Patient has no syptoms radiating down the arm, with no numbness. Notably, the patient has had no history of surgery on the shoulder. No other concerns or complaints at this time.    PAST MEDICAL HISTORY:  Past Medical History:   Diagnosis Date     Arthritis     BIG TOE     Cervical high risk HPV (human papillomavirus) test positive 07/05/2017     Cervical high risk HPV (human papillomavirus) test positive 06/10/2015     Depressive disorder 2007    I have been on and off medication for the last several years     Endometriosis      Hearing problem      Obese      Sensorineural hearing loss        PAST SURGICAL HISTORY:  Past Surgical History:   Procedure Laterality Date     ABDOMEN SURGERY      laparoscopy X2     DILATE CERVIX, HYSTEROSCOPY, ABLATE ENDOMETRIUM NOVASURE, COMBINED N/A 11/17/2016    Procedure: COMBINED DILATE CERVIX, HYSTEROSCOPY, ABLATE ENDOMETRIUM NOVASURE;  Surgeon: Sabas Patel MD;  Location: [facility]     EP STUDY TILT TABLE N/A 12/29/2020    Procedure: EP TILT TABLE;  Surgeon: Gilberto Song MD;  Location:  HEART CARDIAC CATH LAB     GYN SURGERY  dates above    myomectomy x2, laparoscopy x2     GYN SURGERY       Endometriosis surgery 5/1/19     HYSTERECTOMY, PAP NO LONGER INDICATED       INSERT PICC LINE Left 11/15/2019    Procedure: INSERTION, PICC, single lumen PICC;  Surgeon: Emmett Rasmussen PA-C;  [facility]     IR PICC PLACEMENT > 5 YRS OF AGE  11/15/2019     LAPAROSCOPIC ASSISTED HYSTERECTOMY VAGINAL N/A 01/04/2018    Procedure: LAPAROSCOPIC ASSISTED HYSTERECTOMY VAGINAL;;  Surgeon: Sabas Patel MD;  Location: [facility]     LAPAROSCOPIC GASTRIC SLEEVE N/A 07/25/2017    Procedure: LAPAROSCOPIC GASTRIC SLEEVE;  Laparoscopic Sleeve Gastrectomy;  Surgeon: Sam Schmidt MD;  Location: [facility] OR     LAPAROSCOPIC LYSIS ADHESIONS  07/10/2014    Procedure: LAPAROSCOPIC LYSIS ADHESIONS;  Surgeon: Sabas Patel MD;  Location: [facility]     LAPAROSCOPIC SALPINGECTOMY Bilateral 01/04/2018    Procedure: LAPAROSCOPIC SALPINGECTOMY;;  Surgeon: Sabas Patel MD;  Location: [facility]     LASER CO2 LAPAROSCOPIC VAPORIZATION ENDOMETRIUM  07/10/2014    Procedure: LASER CO2 LAPAROSCOPIC VAPORIZATION ENDOMETRIUM;  Surgeon: Sabas Patel MD;  Location: [facility]     LASER CO2 LAPAROSCOPIC VAPORIZATION ENDOMETRIUM N/A 06/30/2015    Procedure: LASER CO2 LAPAROSCOPIC VAPORIZATION ENDOMETRIUM;  Surgeon: Sabas Patel MD;  Location: [facility]     LASER CO2 LAPAROSCOPIC VAPORIZATION ENDOMETRIUM, LYSIS ADHESIONS N/A 01/04/2018    Procedure: LASER CO2 LAPAROSCOPIC VAPORIZATION ENDOMETRIUM, LYSIS ADHESIONS;  LAPAROSCOPY WITH CO2 LASER LYSIS OF ADHESIONS AND VAPORIZATION OF ENDOMETRIOSIS, CYSTOTOMY LEFT OVARY, LAPAROSCOPIC VAGINAL HYSTERECTOMY WITH BILATERAL SALPINGECTOMY ;  Surgeon: Sabas Patel MD;  Location: [facility]     LASER CO2 LAPAROSCOPY DIAGNOSTIC, LYSIS ADHESIONS, COMBINED N/A 06/30/2015    Procedure: COMBINED LASER CO2 LAPAROSCOPY DIAGNOSTIC, LYSIS ADHESIONS;  Surgeon: Sabas Patel MD;  Location: [facility]     ZZC EXCIS UTERINE FIBROID,ABD APPRCH      Description: Uterine Myomectomy;  Recorded: 11/11/2013;        CURRENT MEDICATIONS:  Current Outpatient Medications   Medication Sig Dispense Refill     B Complex-C (VITAMIN B + C COMPLEX PO) Take 1 tablet by mouth At Bedtime       buPROPion (WELLBUTRIN XL) 300 MG 24 hr tablet Take 1 tablet (300 mg) by mouth every morning 90 tablet 2     MULTIPLE VITAMIN PO Take 1 patch by mouth daily PatchMD - Multivitamin Patch       NALTREXONE HCL PO Take 4.5 mg by mouth       NUVARING 0.12-0.015 MG/24HR vaginal ring INSERT 1 RING VAGINALLY EVERY 21 DAYS 1 each 0     SUMAtriptan (IMITREX) 50 MG tablet Take 1-2 tablets at the onset of a headache. May repeat in 2 hours. Max 4 tablets/24 hours. 30 tablet 1     topiramate (TOPAMAX) 100 MG tablet Take 3 tablets by mouth daily       vitamin B complex with vitamin C (STRESS TAB) tablet Take 1 tablet by mouth daily       vitamin D3 (CHOLECALCIFEROL) 125 MCG (5000 UT) tablet Take 1 tablet by mouth every 24 hours         ALLERGIES:    No Known Allergies      FAMILY HISTORY: No pertinent family history, reviewed in EMR.    SOCIAL HISTORY:   Social History     Socioeconomic History     Marital status:      Spouse name: Sid     Number of children: 0     Years of education: Not on file     Highest education level: Not on file   Occupational History     Comment: [employer]   Tobacco Use     Smoking status: Former     Packs/day: 0.50     Years: 5.00     Pack years: 2.50     Types: Cigarettes     Start date: 2007     Quit date: 2013     Years since quittin.4     Smokeless tobacco: Never   Vaping Use     Vaping status: Never Used   Substance and Sexual Activity     Alcohol use: Not Currently     Comment: 2 drinks per month     Drug use: Not Currently     Types: Marijuana     Comment: 2 weeks ago for pain     Sexual activity: Yes     Partners: Male     Birth control/protection: Inserts/Ring   Other Topics Concern     Parent/sibling w/ CABG, MI or angioplasty before 65F 55M? No   Social History Narrative     Not  on file     Social Determinants of Health     Financial Resource Strain: Not on file   Food Insecurity: Not on file   Transportation Needs: Not on file   Physical Activity: Not on file   Stress: Not on file   Social Connections: Not on file   Intimate Partner Violence: Not on file   Housing Stability: Not on file       REVIEW OF SYSTEMS: Positive for that noted in past medical history and history of present illness and otherwise reviewed in EMR    PHYSICAL EXAM:  Patient is Data Unavailable and weighs 0 lbs 0 oz LMP  (LMP Unknown)   There is no height or weight on file to calculate BMI.   Constitutional: Well-developed, well-nourished, healthy appearing female.  Skin: Warm, dry   HEENT: Normal  Cardiac: Well perfused extremities, strong 2+ peripheral pulses. No edema.   Pulmonary: Breathing room air    Musculoskeletal:   Left Shoulder:  AROM left shoulder: 170/120/60/L1   AROM right shoulder: 170/170/60/L1   PROM left shoulder: 170/170/60/L!   5/5 supraspinatus, 5/5 infraspinatus, 5/5 subscapularis  no AC joint pain, negative cross body adduction  positive Neer and Aguilar impingement signs  negative belly-press/lift-off  negative Speed's test  negative Apprehension  negative Jerk test  Neurovascular exam and cervical spine exam are normal.    X-RAYS:   I personally reviewed the prior left shoulder x-rays which showed mild AC joint arthritis, well-maintained glenohumeral joint space    ADVANCED IMAGING:     IMPRESSION: 43 year old-year-old right hand dominant female, with left shoulder rotator cuff tendinitis    PLAN:     I discussed with the patient the etiology of their condition. We discussed at length the options as noted above.  I discussed with the patient that I believe the pain is coming from her component of rotator cuff tendinopathy.  The patient has well-maintained motion, symmetric strength, no acute injury.  I gave recommendations for conservative treatment measures including activity modification, heat  and ice, over-the-counter analgesics, physical therapy.  We discussed that if the patient fails to improve with physical therapy after 2 to 3 months and consider a subacromial cortisone injection.  Patient is going to start with physical therapy, can follow-up as needed in 3 to 4 months.    At the conclusion of the office visit, Ashli verbally acknowledged that I answered all of her questions satisfactorily.    Zuleima Beckwith MD  Orthopedic Surgery Sports Medicine and Shoulder Surgery

## 2023-04-06 NOTE — LETTER
4/6/2023         RE: Ashli Gamboa  [address] [facility] [address]        Dear Colleague,    Thank you for referring your patient, Ashli Gamboa, to the [facility] [place]. Please see a copy of my visit note below.    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    HPI:   Ashli Gamboa is a very pleasant 43 year old, right-hand dominant female who presents for evaluation of left shoulder pain.  Symptoms started in Late summer/early fall of 2022. There was not a precipitating event that she can remember. The pain is located to the anterior and posterior shoulder. Worst pain is rated a 4 of 10, and current pain is rated at 0 of 10. Symptoms are worsened by movements, reaching behind, and lifting. Symptoms are improved with rest and avoidance. Patient has tried pain releivers with some relief. Associated symptoms include intense pain. Patient has no syptoms radiating down the arm, with no numbness. Notably, the patient has had no history of surgery on the shoulder. No other concerns or complaints at this time.    PAST MEDICAL HISTORY:  Past Medical History:   Diagnosis Date    Arthritis     BIG TOE    Cervical high risk HPV (human papillomavirus) test positive 07/05/2017    Cervical high risk HPV (human papillomavirus) test positive 06/10/2015    Depressive disorder 2007    I have been on and off medication for the last several years    Endometriosis     Hearing problem     Obese     Sensorineural hearing loss        PAST SURGICAL HISTORY:  Past Surgical History:   Procedure Laterality Date    ABDOMEN SURGERY      laparoscopy X2    DILATE CERVIX, HYSTEROSCOPY, ABLATE ENDOMETRIUM NOVASURE, COMBINED N/A 11/17/2016    Procedure: COMBINED DILATE CERVIX, HYSTEROSCOPY, ABLATE ENDOMETRIUM NOVASURE;  Surgeon: Amanda  Sabas ZEE MD;  Location: [facility]    EP STUDY TILT TABLE N/A 12/29/2020    Procedure: EP TILT TABLE;  Surgeon: Gilberto Song MD;  Location: [facility]    GYN SURGERY  dates above    myomectomy x2, laparoscopy x2    GYN SURGERY      Endometriosis surgery 5/1/19    HYSTERECTOMY, PAP NO LONGER INDICATED      INSERT PICC LINE Left 11/15/2019    Procedure: INSERTION, PICC, single lumen PICC;  Surgeon: Emmett Rasmussen PA-C;  [facility]    IR PICC PLACEMENT > 5 YRS OF AGE  11/15/2019    LAPAROSCOPIC ASSISTED HYSTERECTOMY VAGINAL N/A 01/04/2018    Procedure: LAPAROSCOPIC ASSISTED HYSTERECTOMY VAGINAL;;  Surgeon: Sabas Patel MD;  Location: [facility]    LAPAROSCOPIC GASTRIC SLEEVE N/A 07/25/2017    Procedure: LAPAROSCOPIC GASTRIC SLEEVE;  Laparoscopic Sleeve Gastrectomy;  Surgeon: Sam Schmidt MD;  Location: [facility]    LAPAROSCOPIC LYSIS ADHESIONS  07/10/2014    Procedure: LAPAROSCOPIC LYSIS ADHESIONS;  Surgeon: Sabas Patel MD;  Location: [facility]    LAPAROSCOPIC SALPINGECTOMY Bilateral 01/04/2018    Procedure: LAPAROSCOPIC SALPINGECTOMY;;  Surgeon: Sabas Patel MD;  Location: [facility]    LASER CO2 LAPAROSCOPIC VAPORIZATION ENDOMETRIUM  07/10/2014    Procedure: LASER CO2 LAPAROSCOPIC VAPORIZATION ENDOMETRIUM;  Surgeon: Sabas Patel MD;  Location: [facility]    LASER CO2 LAPAROSCOPIC VAPORIZATION ENDOMETRIUM N/A 06/30/2015    Procedure: LASER CO2 LAPAROSCOPIC VAPORIZATION ENDOMETRIUM;  Surgeon: Sabas Patel MD;  Location: [facility]    LASER CO2 LAPAROSCOPIC VAPORIZATION ENDOMETRIUM, LYSIS ADHESIONS N/A 01/04/2018    Procedure: LASER CO2 LAPAROSCOPIC VAPORIZATION ENDOMETRIUM, LYSIS ADHESIONS;  LAPAROSCOPY WITH CO2 LASER LYSIS OF ADHESIONS AND VAPORIZATION OF ENDOMETRIOSIS, CYSTOTOMY LEFT OVARY, LAPAROSCOPIC VAGINAL HYSTERECTOMY WITH BILATERAL SALPINGECTOMY ;  Surgeon: Sabas Patel MD;  Location: [facility]    LASER CO2 LAPAROSCOPY DIAGNOSTIC, LYSIS ADHESIONS, COMBINED N/A  2015    Procedure: COMBINED LASER CO2 LAPAROSCOPY DIAGNOSTIC, LYSIS ADHESIONS;  Surgeon: Sabas Patel MD;  Location: [facility] EXCIS UTERINE FIBROID,ABD APPRCH      Description: Uterine Myomectomy;  Recorded: 2013;       CURRENT MEDICATIONS:  Current Outpatient Medications   Medication Sig Dispense Refill    B Complex-C (VITAMIN B + C COMPLEX PO) Take 1 tablet by mouth At Bedtime      buPROPion (WELLBUTRIN XL) 300 MG 24 hr tablet Take 1 tablet (300 mg) by mouth every morning 90 tablet 2    MULTIPLE VITAMIN PO Take 1 patch by mouth daily PatchMD - Multivitamin Patch      NALTREXONE HCL PO Take 4.5 mg by mouth      NUVARING 0.12-0.015 MG/24HR vaginal ring INSERT 1 RING VAGINALLY EVERY 21 DAYS 1 each 0    SUMAtriptan (IMITREX) 50 MG tablet Take 1-2 tablets at the onset of a headache. May repeat in 2 hours. Max 4 tablets/24 hours. 30 tablet 1    topiramate (TOPAMAX) 100 MG tablet Take 3 tablets by mouth daily      vitamin B complex with vitamin C (STRESS TAB) tablet Take 1 tablet by mouth daily      vitamin D3 (CHOLECALCIFEROL) 125 MCG (5000 UT) tablet Take 1 tablet by mouth every 24 hours         ALLERGIES:    No Known Allergies      FAMILY HISTORY: No pertinent family history, reviewed in EMR.    SOCIAL HISTORY:   Social History     Socioeconomic History    Marital status:      Spouse name: Sid    Number of children: 0    Years of education: Not on file    Highest education level: Not on file   Occupational History     Comment: [employer]   Tobacco Use    Smoking status: Former     Packs/day: 0.50     Years: 5.00     Pack years: 2.50     Types: Cigarettes     Start date: 2007     Quit date: 2013     Years since quittin.4    Smokeless tobacco: Never   Vaping Use    Vaping status: Never Used   Substance and Sexual Activity    Alcohol use: Not Currently     Comment: 2 drinks per month    Drug use: Not Currently     Types: Marijuana     Comment: 2 weeks ago  for pain    Sexual activity: Yes     Partners: Male     Birth control/protection: Inserts/Ring   Other Topics Concern    Parent/sibling w/ CABG, MI or angioplasty before 65F 55M? No   Social History Narrative    Not on file     Social Determinants of Health     Financial Resource Strain: Not on file   Food Insecurity: Not on file   Transportation Needs: Not on file   Physical Activity: Not on file   Stress: Not on file   Social Connections: Not on file   Intimate Partner Violence: Not on file   Housing Stability: Not on file       REVIEW OF SYSTEMS: Positive for that noted in past medical history and history of present illness and otherwise reviewed in EMR    PHYSICAL EXAM:  Patient is Data Unavailable and weighs 0 lbs 0 oz LMP  (LMP Unknown)   There is no height or weight on file to calculate BMI.   Constitutional: Well-developed, well-nourished, healthy appearing female.  Skin: Warm, dry   HEENT: Normal  Cardiac: Well perfused extremities, strong 2+ peripheral pulses. No edema.   Pulmonary: Breathing room air    Musculoskeletal:   Left Shoulder:  AROM left shoulder: 170/120/60/L1   AROM right shoulder: 170/170/60/L1   PROM left shoulder: 170/170/60/L!   5/5 supraspinatus, 5/5 infraspinatus, 5/5 subscapularis  no AC joint pain, negative cross body adduction  positive Neer and Aguilar impingement signs  negative belly-press/lift-off  negative Speed's test  negative Apprehension  negative Jerk test  Neurovascular exam and cervical spine exam are normal.    X-RAYS:   I personally reviewed the prior left shoulder x-rays which showed mild AC joint arthritis, well-maintained glenohumeral joint space    ADVANCED IMAGING:     IMPRESSION: 43 year old-year-old right hand dominant female, with left shoulder rotator cuff tendinitis    PLAN:     I discussed with the patient the etiology of their condition. We discussed at length the options as noted above.  I discussed with the patient that I believe the pain is coming from her  component of rotator cuff tendinopathy.  The patient has well-maintained motion, symmetric strength, no acute injury.  I gave recommendations for conservative treatment measures including activity modification, heat and ice, over-the-counter analgesics, physical therapy.  We discussed that if the patient fails to improve with physical therapy after 2 to 3 months and consider a subacromial cortisone injection.  Patient is going to start with physical therapy, can follow-up as needed in 3 to 4 months.    At the conclusion of the office visit, Ashli verbally acknowledged that I answered all of her questions satisfactorily.    Zuleima Beckwith MD  Orthopedic Surgery Sports Medicine and Shoulder Surgery

## 2023-05-02 ENCOUNTER — THERAPY VISIT (OUTPATIENT)
Dept: PHYSICAL THERAPY | Facility: CLINIC | Age: 43
End: 2023-05-02
Attending: ORTHOPAEDIC SURGERY
Payer: COMMERCIAL

## 2023-05-02 DIAGNOSIS — M67.912 TENDINOPATHY OF LEFT ROTATOR CUFF: ICD-10-CM

## 2023-05-02 DIAGNOSIS — M25.512 ACUTE PAIN OF LEFT SHOULDER: ICD-10-CM

## 2023-05-02 PROCEDURE — 97161 PT EVAL LOW COMPLEX 20 MIN: CPT | Mod: GP | Performed by: PHYSICAL THERAPIST

## 2023-05-02 PROCEDURE — 97110 THERAPEUTIC EXERCISES: CPT | Mod: GP | Performed by: PHYSICAL THERAPIST

## 2023-05-02 NOTE — PROGRESS NOTES
Physical Therapy Initial Evaluation  Subjective:  The history is provided by the patient. No  was used.   Patient Health History  Ashli Gamboa being seen for Left Shoulder Pain .     Date of Onset: Fall 2022.   Problem occurred: Unknown   Pain score: Ranges 0-7/10.  General health as reported by patient is good.  Pertinent medical history includes: fibromyalgia, other and depression. Other medical history details: Chronic Lyme's Disease, Endometriosis.   Red flags:  None as reported by patient.  Medical allergies: none.   Surgeries include:  Other. Other surgery history details: Gastric sleeve 2017.    Current medications:  Anti-depressants. Other medications details: wellbrutrin.    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   Primary job tasks include:  Computer work and prolonged sitting.                  Therapist Generated HPI Evaluation         Type of problem:  Left shoulder.    This is a new condition.  Condition occurred with:  Unknown cause.  Where condition occurred: for unknown reasons.  Patient reports pain:  Anterior.  Pain is described as aching and sharp and is intermittent.  Pain is the same all the time.  Since onset symptoms are unchanged.  Associated symptoms:  Loss of motion/stiffness. Symptoms are exacerbated by lying on extremity, lifting, using arm behind back and using arm overhead  Relieved by: avoid the motions that hurt.  Special tests included:  X-ray.    Restrictions due to condition include:  Working in normal job without restrictions.  Barriers include:  None as reported by patient.                        Objective:  Standing Alignment:    Cervical/Thoracic:  Thoracic kyphosis increased  Shoulder/UE:  Elevated scapula L and elevated scapula R                  Flexibility/Screens:   Positive screens:  Shoulder  Upper Extremity:    Decreased left upper extremity flexibility at:  Pectoralis Major and Pectoralis Minor    Decreased right upper  extremity flexibility present at:  Pectoralis Major and Pectoralis Minor    Spine:  Decreased left spine flexibility:  Levator    Decreased right spine flexibility:  Levator                       Shoulder Evaluation:  ROM:  AROM:    Flexion:  Left:  140+    Right:  150    Abduction:  Left: 115++   Right:  165    Internal Rotation:  Left:  T12+    Right:  T10  External Rotation:  Left:  T2    Right:  T2                PROM:    Flexion:  Left:  Wnl    Right: wnl      Abduction:  Left:  Wnl    Right:  Wnl    Internal Rotation:  Left:  20    Right:  60  External Rotation:  Left:  Wnl    Right:  Wnl                    Strength:  : Strong and painfree L shoulder MMT.                      Stability Testing:  normal      Special Tests:  Special tests assessed shoulder: Decreased Thoracic Mobility.  Left shoulder positive for the following special tests:  Impingement  Left shoulder negative for the following special tests:  Rotator cuff tear    Palpation:  normal      Mobility Tests:  not assessed                                                 General     ROS    Assessment/Plan:    Patient is a 43 year old female with left side shoulder complaints.    Patient has the following significant findings with corresponding treatment plan.                Diagnosis 1:  Impingement Syndrome L Shoulder  Pain -  manual therapy, self management, education and home program  Decreased ROM/flexibility - manual therapy, therapeutic exercise, therapeutic activity and home program  Decreased joint mobility - manual therapy, therapeutic exercise, therapeutic activity and home program  Decreased strength - therapeutic exercise, therapeutic activities and home program  Inflammation - self management/home program  Impaired muscle performance - neuro re-education and home program  Decreased function - therapeutic activities and home program    Therapy Evaluation Codes:   1) History comprised of:   Personal factors that impact the plan of care:       Time since onset of symptoms.    Comorbidity factors that impact the plan of care are:      Depression, Fibromyalgia and Endometriosis and Chronic Lyme's Disease syndrome.     Medications impacting care: Anti-depressant.  2) Examination of Body Systems comprised of:   Body structures and functions that impact the plan of care:      Shoulder.   Activity limitations that impact the plan of care are:      Lifting and Reaching.  3) Clinical presentation characteristics are:   Stable/Uncomplicated.  4) Decision-Making    Low complexity using standardized patient assessment instrument and/or measureable assessment of functional outcome.  Cumulative Therapy Evaluation is: Low complexity.    Previous and current functional limitations:  (See Goal Flow Sheet for this information)    Short term and Long term goals: (See Goal Flow Sheet for this information)     Communication ability:  Patient appears to be able to clearly communicate and understand verbal and written communication and follow directions correctly.  Treatment Explanation - The following has been discussed with the patient:   RX ordered/plan of care  Anticipated outcomes  Possible risks and side effects  This patient would benefit from PT intervention to resume normal activities.   Rehab potential is good.    Frequency:  1 X week, once daily  Duration:  for 8 weeks  Discharge Plan:  Achieve all LTG.  Independent in home treatment program.  Return to previous functional level by discharge.  Reach maximal therapeutic benefit.    Please refer to the daily flowsheet for treatment today, total treatment time and time spent performing 1:1 timed codes.

## 2023-05-16 ENCOUNTER — THERAPY VISIT (OUTPATIENT)
Dept: PHYSICAL THERAPY | Facility: CLINIC | Age: 43
End: 2023-05-16
Payer: COMMERCIAL

## 2023-05-16 DIAGNOSIS — M25.512 ACUTE PAIN OF LEFT SHOULDER: Primary | ICD-10-CM

## 2023-05-16 PROCEDURE — 97110 THERAPEUTIC EXERCISES: CPT | Mod: GP | Performed by: PHYSICAL THERAPIST

## 2023-05-16 PROCEDURE — 97140 MANUAL THERAPY 1/> REGIONS: CPT | Mod: GP | Performed by: PHYSICAL THERAPIST

## 2023-05-16 PROCEDURE — 97112 NEUROMUSCULAR REEDUCATION: CPT | Mod: GP | Performed by: PHYSICAL THERAPIST

## 2023-05-22 ENCOUNTER — TRANSFERRED RECORDS (OUTPATIENT)
Dept: HEALTH INFORMATION MANAGEMENT | Facility: CLINIC | Age: 43
End: 2023-05-22
Payer: COMMERCIAL

## 2023-06-28 DIAGNOSIS — F33.41 RECURRENT MAJOR DEPRESSIVE DISORDER, IN PARTIAL REMISSION (H): ICD-10-CM

## 2023-06-28 RX ORDER — BUPROPION HYDROCHLORIDE 300 MG/1
TABLET ORAL
Qty: 90 TABLET | Refills: 1 | Status: SHIPPED | OUTPATIENT
Start: 2023-06-28 | End: 2024-03-22

## 2023-06-28 NOTE — TELEPHONE ENCOUNTER
Pending Prescriptions:                       Disp   Refills    buPROPion (WELLBUTRIN XL) 300 MG 24 hr tab*90 tab*2        Sig: TAKE 1 TABLET BY MOUTH EVERY MORNING    Routing refill request to provider for review/approval because:  A break in medication  Appointments in Next Year      Jul 28, 2023  8:00 AM  (Arrive by 7:40 AM)  Preventative Adult Visit with DANIELE Arshad CNP  [facility] ([facility] [place] ) [phone]

## 2023-07-05 ENCOUNTER — PATIENT OUTREACH (OUTPATIENT)
Dept: CARE COORDINATION | Facility: CLINIC | Age: 43
End: 2023-07-05
Payer: COMMERCIAL

## 2023-07-10 NOTE — PROGRESS NOTES
Patient did not return for further treatment after second visit and no additional progress was noted.  Please refer to the SOAP note and goal flowsheet completed on 5/16/23  for discharge information.

## 2023-07-11 ENCOUNTER — TRANSFERRED RECORDS (OUTPATIENT)
Dept: HEALTH INFORMATION MANAGEMENT | Facility: CLINIC | Age: 43
End: 2023-07-11
Payer: COMMERCIAL

## 2023-08-03 ENCOUNTER — ANCILLARY PROCEDURE (OUTPATIENT)
Dept: MAMMOGRAPHY | Facility: CLINIC | Age: 43
End: 2023-08-03
Attending: NURSE PRACTITIONER
Payer: COMMERCIAL

## 2023-08-03 DIAGNOSIS — Z12.31 VISIT FOR SCREENING MAMMOGRAM: ICD-10-CM

## 2023-08-03 PROCEDURE — 77067 SCR MAMMO BI INCL CAD: CPT | Mod: GC | Performed by: STUDENT IN AN ORGANIZED HEALTH CARE EDUCATION/TRAINING PROGRAM

## 2023-08-03 PROCEDURE — 77063 BREAST TOMOSYNTHESIS BI: CPT | Mod: GC | Performed by: STUDENT IN AN ORGANIZED HEALTH CARE EDUCATION/TRAINING PROGRAM

## 2023-08-16 ENCOUNTER — OFFICE VISIT (OUTPATIENT)
Dept: FAMILY MEDICINE | Facility: OTHER | Age: 43
End: 2023-08-16
Payer: COMMERCIAL

## 2023-08-16 VITALS
TEMPERATURE: 97.9 F | OXYGEN SATURATION: 99 % | BODY MASS INDEX: 24.13 KG/M2 | WEIGHT: 141.31 LBS | HEART RATE: 78 BPM | DIASTOLIC BLOOD PRESSURE: 58 MMHG | RESPIRATION RATE: 16 BRPM | HEIGHT: 64 IN | SYSTOLIC BLOOD PRESSURE: 102 MMHG

## 2023-08-16 DIAGNOSIS — E78.5 HYPERLIPIDEMIA LDL GOAL <130: ICD-10-CM

## 2023-08-16 DIAGNOSIS — R22.1 NECK NODULE: ICD-10-CM

## 2023-08-16 DIAGNOSIS — F41.1 GAD (GENERALIZED ANXIETY DISORDER): ICD-10-CM

## 2023-08-16 DIAGNOSIS — Z13.29 SCREENING FOR THYROID DISORDER: ICD-10-CM

## 2023-08-16 DIAGNOSIS — Z98.84 S/P LAPAROSCOPIC SLEEVE GASTRECTOMY: ICD-10-CM

## 2023-08-16 DIAGNOSIS — D50.9 IRON DEFICIENCY ANEMIA, UNSPECIFIED IRON DEFICIENCY ANEMIA TYPE: ICD-10-CM

## 2023-08-16 DIAGNOSIS — Z00.00 ROUTINE GENERAL MEDICAL EXAMINATION AT A HEALTH CARE FACILITY: Primary | ICD-10-CM

## 2023-08-16 DIAGNOSIS — M79.7 FIBROMYALGIA: ICD-10-CM

## 2023-08-16 DIAGNOSIS — F33.41 RECURRENT MAJOR DEPRESSIVE DISORDER, IN PARTIAL REMISSION (H): ICD-10-CM

## 2023-08-16 LAB
ALBUMIN SERPL BCG-MCNC: 3.9 G/DL (ref 3.5–5.2)
ALP SERPL-CCNC: 61 U/L (ref 35–104)
ALT SERPL W P-5'-P-CCNC: 9 U/L (ref 0–50)
ANION GAP SERPL CALCULATED.3IONS-SCNC: 10 MMOL/L (ref 7–15)
AST SERPL W P-5'-P-CCNC: 15 U/L (ref 0–45)
BILIRUB SERPL-MCNC: 0.3 MG/DL
BUN SERPL-MCNC: 12.3 MG/DL (ref 6–20)
CALCIUM SERPL-MCNC: 9.2 MG/DL (ref 8.6–10)
CHLORIDE SERPL-SCNC: 109 MMOL/L (ref 98–107)
CHOLEST SERPL-MCNC: 179 MG/DL
CREAT SERPL-MCNC: 1.16 MG/DL (ref 0.51–0.95)
DEPRECATED HCO3 PLAS-SCNC: 21 MMOL/L (ref 22–29)
GFR SERPL CREATININE-BSD FRML MDRD: 60 ML/MIN/1.73M2
GLUCOSE SERPL-MCNC: 86 MG/DL (ref 70–99)
HDLC SERPL-MCNC: 83 MG/DL
LDLC SERPL CALC-MCNC: 79 MG/DL
NONHDLC SERPL-MCNC: 96 MG/DL
POTASSIUM SERPL-SCNC: 4.2 MMOL/L (ref 3.4–5.3)
PROT SERPL-MCNC: 6.6 G/DL (ref 6.4–8.3)
SODIUM SERPL-SCNC: 140 MMOL/L (ref 136–145)
TRIGL SERPL-MCNC: 85 MG/DL
TSH SERPL DL<=0.005 MIU/L-ACNC: 2.01 UIU/ML (ref 0.3–4.2)

## 2023-08-16 PROCEDURE — 80061 LIPID PANEL: CPT | Performed by: NURSE PRACTITIONER

## 2023-08-16 PROCEDURE — 99214 OFFICE O/P EST MOD 30 MIN: CPT | Mod: 25 | Performed by: NURSE PRACTITIONER

## 2023-08-16 PROCEDURE — 99396 PREV VISIT EST AGE 40-64: CPT | Performed by: NURSE PRACTITIONER

## 2023-08-16 PROCEDURE — 84443 ASSAY THYROID STIM HORMONE: CPT | Performed by: NURSE PRACTITIONER

## 2023-08-16 PROCEDURE — 80053 COMPREHEN METABOLIC PANEL: CPT | Performed by: NURSE PRACTITIONER

## 2023-08-16 PROCEDURE — 36415 COLL VENOUS BLD VENIPUNCTURE: CPT | Performed by: NURSE PRACTITIONER

## 2023-08-16 RX ORDER — VENLAFAXINE HYDROCHLORIDE 37.5 MG/1
37.5 CAPSULE, EXTENDED RELEASE ORAL DAILY
Qty: 30 CAPSULE | Refills: 0 | Status: SHIPPED | OUTPATIENT
Start: 2023-08-16 | End: 2023-09-08

## 2023-08-16 RX ORDER — VALACYCLOVIR HYDROCHLORIDE 1 G/1
1 TABLET, FILM COATED ORAL
COMMUNITY
Start: 2023-07-11 | End: 2023-10-02

## 2023-08-16 RX ORDER — TRIAMCINOLONE ACETONIDE 1 MG/G
CREAM TOPICAL 2 TIMES DAILY
COMMUNITY
Start: 2023-07-11 | End: 2023-10-02

## 2023-08-16 ASSESSMENT — ENCOUNTER SYMPTOMS
SORE THROAT: 0
MYALGIAS: 0
CONSTIPATION: 0
HEARTBURN: 0
HEADACHES: 0
DIZZINESS: 0
NAUSEA: 1
PALPITATIONS: 0
HEMATOCHEZIA: 0
JOINT SWELLING: 0
DIARRHEA: 0
FREQUENCY: 0
PARESTHESIAS: 0
CHILLS: 0
WEAKNESS: 0
ARTHRALGIAS: 0
HEMATURIA: 0
DYSURIA: 0
NERVOUS/ANXIOUS: 0
ABDOMINAL PAIN: 0
COUGH: 0
EYE PAIN: 0
BREAST MASS: 0
SHORTNESS OF BREATH: 0
FEVER: 0

## 2023-08-16 ASSESSMENT — ANXIETY QUESTIONNAIRES
IF YOU CHECKED OFF ANY PROBLEMS ON THIS QUESTIONNAIRE, HOW DIFFICULT HAVE THESE PROBLEMS MADE IT FOR YOU TO DO YOUR WORK, TAKE CARE OF THINGS AT HOME, OR GET ALONG WITH OTHER PEOPLE: SOMEWHAT DIFFICULT
5. BEING SO RESTLESS THAT IT IS HARD TO SIT STILL: NOT AT ALL
7. FEELING AFRAID AS IF SOMETHING AWFUL MIGHT HAPPEN: NOT AT ALL
4. TROUBLE RELAXING: SEVERAL DAYS
6. BECOMING EASILY ANNOYED OR IRRITABLE: SEVERAL DAYS
GAD7 TOTAL SCORE: 5
1. FEELING NERVOUS, ANXIOUS, OR ON EDGE: SEVERAL DAYS
3. WORRYING TOO MUCH ABOUT DIFFERENT THINGS: SEVERAL DAYS
GAD7 TOTAL SCORE: 5
2. NOT BEING ABLE TO STOP OR CONTROL WORRYING: SEVERAL DAYS

## 2023-08-16 ASSESSMENT — PATIENT HEALTH QUESTIONNAIRE - PHQ9
SUM OF ALL RESPONSES TO PHQ QUESTIONS 1-9: 8
10. IF YOU CHECKED OFF ANY PROBLEMS, HOW DIFFICULT HAVE THESE PROBLEMS MADE IT FOR YOU TO DO YOUR WORK, TAKE CARE OF THINGS AT HOME, OR GET ALONG WITH OTHER PEOPLE: SOMEWHAT DIFFICULT
SUM OF ALL RESPONSES TO PHQ QUESTIONS 1-9: 8

## 2023-08-16 ASSESSMENT — PAIN SCALES - GENERAL: PAINLEVEL: NO PAIN (0)

## 2023-08-16 NOTE — PROGRESS NOTES
SUBJECTIVE:   CC: Ashli is an 43 year old who presents for preventive health visit.       2023     7:02 AM   Additional Questions   Roomed by Marlen       Healthy Habits:     Getting at least 3 servings of Calcium per day:  Yes    Bi-annual eye exam:  Yes    Dental care twice a year:  Yes    Sleep apnea or symptoms of sleep apnea:  None    Diet:  Regular (no restrictions)    Frequency of exercise:  1 day/week    Duration of exercise:  15-30 minutes    Taking medications regularly:  Yes    Medication side effects:  None    Additional concerns today:  Yes    Still having fatigue, increased anxiety that she has not had before  Could be menopause.   S/p hysterectomy, tried to estrogen and did not like it at all.   Nuvaring helping with the endometrosis.   Tried estrogen cream as well.   Continues to do counseling and has been doing for several years.       Today's PHQ-9 Score:       2023     5:21 AM   PHQ-9 SCORE   PHQ-9 Total Score MyChart 8 (Mild depression)   PHQ-9 Total Score 8           10/29/2019     7:41 AM 2021     3:29 PM 2023     5:21 AM   ALIDA-7 SCORE   Total Score   5 (mild anxiety)   Total Score 5 2 5               Social History     Tobacco Use    Smoking status: Former     Packs/day: 0.50     Years: 5.00     Pack years: 2.50     Types: Cigarettes     Start date: 2007     Quit date: 2013     Years since quittin.7    Smokeless tobacco: Never   Substance Use Topics    Alcohol use: Yes     Comment: 2 drinks per month           2023     5:26 AM   Alcohol Use   Prescreen: >3 drinks/day or >7 drinks/week? No     Reviewed orders with patient.  Reviewed health maintenance and updated orders accordingly - Yes  Lab work is in process    Breast Cancer Screenin/1/2022     4:58 PM 7/15/2022     9:06 AM 8/3/2022     7:52 AM   Breast CA Risk Assessment (FHS-7)   Do you have a family history of breast, colon, or ovarian cancer? Yes    Yes Yes No / Unknown       click  delete button to remove this line now  Mammogram Screening - Offered annual screening and updated Health Maintenance for [place] plan based on risk factor consideration  Pertinent mammograms are reviewed under the imaging tab.    History of abnormal Pap smear: Status post benign hysterectomy. Health Maintenance and Surgical History updated.      Latest Ref Rng & Units 10/18/2017    11:09 AM 7/17/2017     9:53 AM 7/5/2017    12:50 PM   PAP / HPV   PAP (Historical)  NIL   NIL    HPV 16 DNA NEG  Negative     HPV 18 DNA NEG  Negative     Other HR HPV NEG  Positive       Reviewed and updated as needed this visit by clinical staff   Tobacco  Allergies  Meds              Reviewed and updated as needed this visit by Provider                 Past Medical History:   Diagnosis Date    Arthritis     BIG TOE    Cervical high risk HPV (human papillomavirus) test positive 07/05/2017    Cervical high risk HPV (human papillomavirus) test positive 06/10/2015    Depressive disorder 2007    I have been on and off medication for the last several years    Endometriosis     Hearing problem     Obese     Sensorineural hearing loss       Past Surgical History:   Procedure Laterality Date    ABDOMEN SURGERY      laparoscopy X2    APPENDECTOMY  7/17    DILATE CERVIX, HYSTEROSCOPY, ABLATE ENDOMETRIUM NOVASURE, COMBINED N/A 11/17/2016    Procedure: COMBINED DILATE CERVIX, HYSTEROSCOPY, ABLATE ENDOMETRIUM NOVASURE;  Surgeon: Sabas Patel MD;  Location: [facility]    EP STUDY TILT TABLE N/A 12/29/2020    Procedure: EP TILT TABLE;  Surgeon: Gilberto Song MD;  Location:  [facility]    GYN SURGERY  dates above    myomectomy x2, laparoscopy x2    GYN SURGERY      Endometriosis surgery 5/1/19    HYSTERECTOMY, PAP NO LONGER INDICATED      INSERT PICC LINE Left 11/15/2019    Procedure: INSERTION, PICC, single lumen PICC;  Surgeon: Emmett Rasmussen PA-C;  Location: [facility]    IR PICC PLACEMENT > 5 YRS OF AGE  11/15/2019     LAPAROSCOPIC ASSISTED HYSTERECTOMY VAGINAL N/A 01/04/2018    Procedure: LAPAROSCOPIC ASSISTED HYSTERECTOMY VAGINAL;;  Surgeon: Sabas Patel MD;  Location: [facility]    LAPAROSCOPIC GASTRIC SLEEVE N/A 07/25/2017    Procedure: LAPAROSCOPIC GASTRIC SLEEVE;  Laparoscopic Sleeve Gastrectomy;  Surgeon: Sam Schmidt MD;  Location: [facility] OR    LAPAROSCOPIC LYSIS ADHESIONS  07/10/2014    Procedure: LAPAROSCOPIC LYSIS ADHESIONS;  Surgeon: Sabas Patel MD;  Location: [facility]    LAPAROSCOPIC SALPINGECTOMY Bilateral 01/04/2018    Procedure: LAPAROSCOPIC SALPINGECTOMY;;  Surgeon: Sabas Patel MD;  Location: [facility]    LASER CO2 LAPAROSCOPIC VAPORIZATION ENDOMETRIUM  07/10/2014    Procedure: LASER CO2 LAPAROSCOPIC VAPORIZATION ENDOMETRIUM;  Surgeon: Sabas Patel MD;  Location: [facility]    LASER CO2 LAPAROSCOPIC VAPORIZATION ENDOMETRIUM N/A 06/30/2015    Procedure: LASER CO2 LAPAROSCOPIC VAPORIZATION ENDOMETRIUM;  Surgeon: Sabas Patel MD;  Location: [facility]    LASER CO2 LAPAROSCOPIC VAPORIZATION ENDOMETRIUM, LYSIS ADHESIONS N/A 01/04/2018    Procedure: LASER CO2 LAPAROSCOPIC VAPORIZATION ENDOMETRIUM, LYSIS ADHESIONS;  LAPAROSCOPY WITH CO2 LASER LYSIS OF ADHESIONS AND VAPORIZATION OF ENDOMETRIOSIS, CYSTOTOMY LEFT OVARY, LAPAROSCOPIC VAGINAL HYSTERECTOMY WITH BILATERAL SALPINGECTOMY ;  Surgeon: Sabas Patel MD;  Location: [facility]    LASER CO2 LAPAROSCOPY DIAGNOSTIC, LYSIS ADHESIONS, COMBINED N/A 06/30/2015    Procedure: COMBINED LASER CO2 LAPAROSCOPY DIAGNOSTIC, LYSIS ADHESIONS;  Surgeon: Sabas Patel MD;  Location: [facility]    ZZC EXCIS UTERINE FIBROID,ABD APPRCH      Description: Uterine Myomectomy;  Recorded: 11/11/2013;       Review of Systems   Constitutional:  Negative for chills and fever.   HENT:  Positive for hearing loss. Negative for congestion, ear pain and sore throat.    Eyes:  Negative for pain and visual disturbance.   Respiratory:  Negative for cough and shortness of breath.    Cardiovascular:  " Negative for chest pain, palpitations and peripheral edema.   Gastrointestinal:  Positive for nausea. Negative for abdominal pain, constipation, diarrhea, heartburn and hematochezia.   Breasts:  Negative for tenderness, breast mass and discharge.   Genitourinary:  Negative for dysuria, frequency, genital sores, hematuria, pelvic pain, urgency, vaginal bleeding and vaginal discharge.   Musculoskeletal:  Negative for arthralgias, joint swelling and myalgias.   Skin:  Negative for rash.   Neurological:  Negative for dizziness, weakness, headaches and paresthesias.   Psychiatric/Behavioral:  Positive for mood changes. The patient is not nervous/anxious.           OBJECTIVE:   /58   Pulse 78   Temp 97.9  F (36.6  C) (Temporal)   Resp 16   Ht 1.62 m (5' 3.78\")   Wt 64.1 kg (141 lb 5 oz)   LMP  (LMP Unknown)   SpO2 99%   BMI 24.42 kg/m    Physical Exam  GENERAL: healthy, alert and no distress  EYES: Eyes grossly normal to inspection, PERRL and conjunctivae and sclerae normal  HENT: ear canals and TM's normal, nose and mouth without ulcers or lesions  NECK: right side of neck with pea size nodule noted along the submandibular region.   RESP: lungs clear to auscultation - no rales, rhonchi or wheezes  CV: regular rate and rhythm, normal S1 S2, no S3 or S4, no murmur, click or rub, no peripheral edema and peripheral pulses strong  ABDOMEN: soft, nontender, no hepatosplenomegaly, no masses and bowel sounds normal  MS: no gross musculoskeletal defects noted, no edema  SKIN: no suspicious lesions or rashes  NEURO: Normal strength and tone, mentation intact and speech normal  PSYCH: mentation appears normal, affect normal/bright    Diagnostic Test Results:  Labs reviewed in Epic  Results for orders placed or performed in visit on 08/16/23 (from the past 24 hour(s))   Lipid panel reflex to direct LDL Fasting   Result Value Ref Range    Cholesterol 179 <200 mg/dL    Triglycerides 85 <150 mg/dL    Direct Measure HDL " 83 >=50 mg/dL    LDL Cholesterol Calculated 79 <=100 mg/dL    Non HDL Cholesterol 96 <130 mg/dL    Narrative    Cholesterol  Desirable:  <200 mg/dL    Triglycerides  Normal:  Less than 150 mg/dL  Borderline High:  150-199 mg/dL  High:  200-499 mg/dL  Very High:  Greater than or equal to 500 mg/dL    Direct Measure HDL  Female:  Greater than or equal to 50 mg/dL   Male:  Greater than or equal to 40 mg/dL    LDL Cholesterol  Desirable:  <100mg/dL  Above Desirable:  100-129 mg/dL   Borderline High:  130-159 mg/dL   High:  160-189 mg/dL   Very High:  >= 190 mg/dL    Non HDL Cholesterol  Desirable:  130 mg/dL  Above Desirable:  130-159 mg/dL  Borderline High:  160-189 mg/dL  High:  190-219 mg/dL  Very High:  Greater than or equal to 220 mg/dL   Comprehensive metabolic panel (BMP + Alb, Alk Phos, ALT, AST, Total. Bili, TP)   Result Value Ref Range    Sodium 140 136 - 145 mmol/L    Potassium 4.2 3.4 - 5.3 mmol/L    Chloride 109 (H) 98 - 107 mmol/L    Carbon Dioxide (CO2) 21 (L) 22 - 29 mmol/L    Anion Gap 10 7 - 15 mmol/L    Urea Nitrogen 12.3 6.0 - 20.0 mg/dL    Creatinine 1.16 (H) 0.51 - 0.95 mg/dL    Calcium 9.2 8.6 - 10.0 mg/dL    Glucose 86 70 - 99 mg/dL    Alkaline Phosphatase 61 35 - 104 U/L    AST 15 0 - 45 U/L    ALT 9 0 - 50 U/L    Protein Total 6.6 6.4 - 8.3 g/dL    Albumin 3.9 3.5 - 5.2 g/dL    Bilirubin Total 0.3 <=1.2 mg/dL    GFR Estimate 60 (L) >60 mL/min/1.73m2   TSH with free T4 reflex   Result Value Ref Range    TSH 2.01 0.30 - 4.20 uIU/mL       ASSESSMENT/PLAN:   (Z00.00) Routine general medical examination at a health care facility  (primary encounter diagnosis)  Comment:   Plan: Updated HM     (F33.41) Recurrent major depressive disorder, in partial remission (H)  Comment:   Plan: venlafaxine (EFFEXOR XR) 37.5 MG 24 hr capsule        Worsening  Continues on Wellbutrin and discussed addition of Effexor. Have checked other factors back in April and all her labs normal. Continues counseling. Recommend  trial of Effexor. Discussed medication and side effects. Discussed follow up plans. Discussed black box warning.   Level 4     (M79.7) Fibromyalgia  Comment:   Plan: Comprehensive metabolic panel (BMP + Alb, Alk         Phos, ALT, AST, Total. Bili, TP)            (E78.5) Hyperlipidemia LDL goal <130  Comment:   Plan: Lipid panel reflex to direct LDL Fasting,         Comprehensive metabolic panel (BMP + Alb, Alk         Phos, ALT, AST, Total. Bili, TP)        The 10-year ASCVD risk score (Christiano GUERRERO, et al., 2019) is: 0.2%    Values used to calculate the score:      Age: 43 years      Sex: Female      Is Non- : No      Diabetic: No      Tobacco smoker: No      Systolic Blood Pressure: 102 mmHg      Is BP treated: No      HDL Cholesterol: 83 mg/dL      Total Cholesterol: 179 mg/dL      (D50.9) Iron deficiency anemia, unspecified iron deficiency anemia type  Comment:   Plan: Stable     (Z98.84) S/P laparoscopic sleeve gastrectomy  Comment:   Plan: Monitoring labs and anemia     (R22.1) Neck nodule  Comment: Patient notes this has increased in size so will repeat ultrasound. Did not meet criteria for follow up 1 year ago.   Plan: US Head Neck Soft Tissue        Level 4     (F41.1) ALIDA (generalized anxiety disorder)  Comment:   Plan: venlafaxine (EFFEXOR XR) 37.5 MG 24 hr capsule        As noted above  Level 4     (Z13.29) Screening for thyroid disorder  Comment:   Plan: TSH with free T4 reflex            Patient has been advised of split billing requirements and indicates understanding: Yes    Depression Screening Follow Up        8/16/2023     5:21 AM   PHQ   PHQ-9 Total Score 8   Q9: Thoughts of better off dead/self-harm past 2 weeks Several days   F/U: Thoughts of suicide or self-harm No   F/U: Safety concerns No         8/16/2023     5:21 AM   Last PHQ-9   1.  Little interest or pleasure in doing things 1   2.  Feeling down, depressed, or hopeless 1   3.  Trouble falling or staying asleep, or  sleeping too much 1   4.  Feeling tired or having little energy 1   5.  Poor appetite or overeating 1   6.  Feeling bad about yourself 0   7.  Trouble concentrating 1   8.  Moving slowly or restless 1   Q9: Thoughts of better off dead/self-harm past 2 weeks 1   PHQ-9 Total Score 8   In the past two weeks have you had thoughts of suicide or self harm? No   Do you have concerns about your personal safety or the safety of others? No       Follow Up      Follow Up Actions Taken  Crisis resource information provided in the After Visit Summary    Discussed the following ways the patient can remain in a safe environment:  be around others and Counseling     COUNSELING:  Reviewed preventive health counseling, as reflected in patient instructions        She reports that she quit smoking about 9 years ago. Her smoking use included cigarettes. She started smoking about 16 years ago. She has a 2.50 pack-year smoking history. She has never used smokeless tobacco.      DANIELE Zamora CNP  [facility] submitted by the patient for this visit:  Patient Health Questionnaire (Submitted on 8/16/2023)  If you checked off any problems, how difficult have these problems made it for you to do your work, take care of things at home, or get along with other people?: Somewhat difficult  PHQ9 TOTAL SCORE: 8  ALIDA-7 (Submitted on 8/16/2023)  ALIDA 7 TOTAL SCORE: 5

## 2023-08-23 ENCOUNTER — ANCILLARY PROCEDURE (OUTPATIENT)
Dept: ULTRASOUND IMAGING | Facility: CLINIC | Age: 43
End: 2023-08-23
Attending: NURSE PRACTITIONER
Payer: COMMERCIAL

## 2023-08-23 DIAGNOSIS — R22.1 NECK NODULE: ICD-10-CM

## 2023-08-23 PROCEDURE — 76536 US EXAM OF HEAD AND NECK: CPT | Mod: GC | Performed by: RADIOLOGY

## 2023-09-07 DIAGNOSIS — F41.1 GAD (GENERALIZED ANXIETY DISORDER): ICD-10-CM

## 2023-09-07 DIAGNOSIS — F33.41 RECURRENT MAJOR DEPRESSIVE DISORDER, IN PARTIAL REMISSION (H): ICD-10-CM

## 2023-09-08 RX ORDER — VENLAFAXINE HYDROCHLORIDE 37.5 MG/1
37.5 CAPSULE, EXTENDED RELEASE ORAL DAILY
Qty: 30 CAPSULE | Refills: 0 | Status: SHIPPED | OUTPATIENT
Start: 2023-09-08 | End: 2023-10-02

## 2023-09-08 NOTE — TELEPHONE ENCOUNTER
Appointments in Next Year      Sep 27, 2023  8:30 AM  (Arrive by 8:10 AM)  Provider Visit with DANIELE Arshad CNP  [facility] ([facility] [place] ) [phone]            DANIELE Zamora CNP  Questions or concerns please feel free to send me a [employer] message or call me  Phone : [phone]

## 2023-09-11 ENCOUNTER — OFFICE VISIT (OUTPATIENT)
Dept: FAMILY MEDICINE | Facility: OTHER | Age: 43
End: 2023-09-11
Payer: COMMERCIAL

## 2023-09-11 VITALS
OXYGEN SATURATION: 97 % | HEART RATE: 74 BPM | DIASTOLIC BLOOD PRESSURE: 62 MMHG | HEIGHT: 64 IN | RESPIRATION RATE: 18 BRPM | TEMPERATURE: 98.5 F | SYSTOLIC BLOOD PRESSURE: 98 MMHG | BODY MASS INDEX: 23.73 KG/M2 | WEIGHT: 139 LBS

## 2023-09-11 DIAGNOSIS — R30.0 DYSURIA: Primary | ICD-10-CM

## 2023-09-11 DIAGNOSIS — R07.0 THROAT PAIN: ICD-10-CM

## 2023-09-11 LAB
ALBUMIN UR-MCNC: NEGATIVE MG/DL
AMORPH CRY #/AREA URNS HPF: ABNORMAL /HPF
APPEARANCE UR: CLEAR
BACTERIA #/AREA URNS HPF: ABNORMAL /HPF
BILIRUB UR QL STRIP: NEGATIVE
COLOR UR AUTO: YELLOW
DEPRECATED S PYO AG THROAT QL EIA: NEGATIVE
GLUCOSE UR STRIP-MCNC: NEGATIVE MG/DL
GROUP A STREP BY PCR: NOT DETECTED
HGB UR QL STRIP: NEGATIVE
KETONES UR STRIP-MCNC: NEGATIVE MG/DL
LEUKOCYTE ESTERASE UR QL STRIP: ABNORMAL
MUCOUS THREADS #/AREA URNS LPF: PRESENT /LPF
NITRATE UR QL: NEGATIVE
PH UR STRIP: 6.5 [PH] (ref 5–7)
RBC #/AREA URNS AUTO: ABNORMAL /HPF
SARS-COV-2 RNA RESP QL NAA+PROBE: NEGATIVE
SP GR UR STRIP: 1.02 (ref 1–1.03)
SQUAMOUS #/AREA URNS AUTO: ABNORMAL /LPF
UROBILINOGEN UR STRIP-ACNC: 0.2 E.U./DL
WBC #/AREA URNS AUTO: ABNORMAL /HPF

## 2023-09-11 PROCEDURE — 87635 SARS-COV-2 COVID-19 AMP PRB: CPT | Performed by: PHYSICIAN ASSISTANT

## 2023-09-11 PROCEDURE — 87651 STREP A DNA AMP PROBE: CPT | Performed by: PHYSICIAN ASSISTANT

## 2023-09-11 PROCEDURE — 81001 URINALYSIS AUTO W/SCOPE: CPT | Performed by: PHYSICIAN ASSISTANT

## 2023-09-11 PROCEDURE — 99213 OFFICE O/P EST LOW 20 MIN: CPT | Performed by: PHYSICIAN ASSISTANT

## 2023-09-11 ASSESSMENT — PATIENT HEALTH QUESTIONNAIRE - PHQ9
10. IF YOU CHECKED OFF ANY PROBLEMS, HOW DIFFICULT HAVE THESE PROBLEMS MADE IT FOR YOU TO DO YOUR WORK, TAKE CARE OF THINGS AT HOME, OR GET ALONG WITH OTHER PEOPLE: SOMEWHAT DIFFICULT
SUM OF ALL RESPONSES TO PHQ QUESTIONS 1-9: 6
SUM OF ALL RESPONSES TO PHQ QUESTIONS 1-9: 6

## 2023-09-11 ASSESSMENT — ANXIETY QUESTIONNAIRES
3. WORRYING TOO MUCH ABOUT DIFFERENT THINGS: SEVERAL DAYS
IF YOU CHECKED OFF ANY PROBLEMS ON THIS QUESTIONNAIRE, HOW DIFFICULT HAVE THESE PROBLEMS MADE IT FOR YOU TO DO YOUR WORK, TAKE CARE OF THINGS AT HOME, OR GET ALONG WITH OTHER PEOPLE: NOT DIFFICULT AT ALL
GAD7 TOTAL SCORE: 4
1. FEELING NERVOUS, ANXIOUS, OR ON EDGE: SEVERAL DAYS
7. FEELING AFRAID AS IF SOMETHING AWFUL MIGHT HAPPEN: NOT AT ALL
6. BECOMING EASILY ANNOYED OR IRRITABLE: SEVERAL DAYS
GAD7 TOTAL SCORE: 4
2. NOT BEING ABLE TO STOP OR CONTROL WORRYING: NOT AT ALL
4. TROUBLE RELAXING: SEVERAL DAYS
5. BEING SO RESTLESS THAT IT IS HARD TO SIT STILL: NOT AT ALL

## 2023-09-11 ASSESSMENT — ENCOUNTER SYMPTOMS
FATIGUE: 1
SORE THROAT: 1
SWOLLEN GLANDS: 1
HEADACHES: 1

## 2023-09-11 ASSESSMENT — PAIN SCALES - GENERAL: PAINLEVEL: MODERATE PAIN (4)

## 2023-09-11 NOTE — PROGRESS NOTES
Assessment & Plan     ICD-10-CM    1. Dysuria  R30.0 UA Macroscopic with reflex to Microscopic and Culture - Clinic Collect     UA Microscopic with Reflex to Culture     CANCELED: UA Macroscopic with reflex to Microscopic and Culture - Lab Collect     CANCELED: UA Macroscopic with reflex to Microscopic and Culture - Lab Collect      2. Throat pain  R07.0 Streptococcus A Rapid Screen w/Reflex to PCR - Clinic Collect     Symptomatic COVID-19 Virus (Coronavirus) by PCR Nose     Streptococcus A Rapid Screen w/Reflex to PCR - Clinic Collect     Group A Streptococcus PCR Throat Swab        Dysuria  - Patient with history of UTIs, feeling pelvic pain with urination that she had in the past with utis  - Will get UA today, await results     2. Throat pain   - Patient with 6 days of symptoms   - No signs of infection on exam other that bilateral submandibular lymph nodes   - Did have negative COVID test but that was ~2 days after symptoms   - Recommend strep and COVID swabs   - Await results   - Discussed symptomatic cares as needed   - If persists greater than 10 days, could treat for sinus infection       Review of the result(s) of each unique test - None     Diagnosis or treatment significantly limited by social determinants of health - none     20 minutes spent on the date of the encounter doing chart review, history and exam, documentation and further activities as noted above    The patient indicates understanding of these issues and agrees with the plan.    I, Sherif Mccloud PA-C,  was present with the PA student who participated in the service and in the documentation of the note.  I have verified the history and personally performed the physical exam and medical decision making.  I agree with the assessment and plan of care as documented in the note.     FERNANDA RasheedS   [facility]     Corazon Mccloud PA-C  [facility] [place]        Heriberto Cornelius is  "a 43 year old, presenting for the following health issues:  Pharyngitis, Otalgia, Fatigue, and Generalized Body Aches      9/11/2023     3:05 PM   Additional Questions   Roomed by Stephani   Accompanied by self         9/11/2023     3:05 PM   Patient Reported Additional Medications   Patient reports taking the following new medications NA       History of Present Illness       Reason for visit:  Cold,possible bladder infection  Symptom onset:  3-7 days ago  Symptom intensity:  Moderate  Symptom progression:  Staying the same  Had these symptoms before:  Yes    She eats 2-3 servings of fruits and vegetables daily.She consumes 1 sweetened beverage(s) daily.She exercises with enough effort to increase her heart rate 10 to 19 minutes per day.  She exercises with enough effort to increase her heart rate 3 or less days per week.   She is taking medications regularly.     -started Wednesday with bilat ear fullness and sore throat  -on and off fever just below 103 that has not been present for the last couple days  -pain relievers and cold meds without relief  -swollen lymph nodes on neck  -new pelvic pain with urination that is consistent with past UTI's        Review of Systems   Constitutional:  Positive for fatigue.   HENT:  Positive for ear pain and sore throat.    Neurological:  Positive for headaches.      Constitutional, HEENT, cardiovascular, pulmonary, gi and gu systems are negative, except as otherwise noted.      Objective    BP 98/62   Pulse 74   Temp 98.5  F (36.9  C) (Temporal)   Resp 18   Ht 1.63 m (5' 4.17\")   Wt 63 kg (139 lb)   LMP  (LMP Unknown)   SpO2 97%   BMI 23.73 kg/m    Body mass index is 23.73 kg/m .  Physical Exam   GENERAL APPEARANCE: mildly ill appearing, alert and no distress  EYES: Eyes grossly normal to inspection, PERRLA, conjunctivae and sclerae without injection or discharge, EOM intact   HENT: Bilateral ear canals without erythema or cerumen, bilateral TM's pearly grey with normal " light reflex, no effusion, injection, or bulging, nasal turbinates without swelling, erythema, or discharge, mouth without ulcers or lesions, oropharynx clear and oral mucous membranes moist, no sinus tenderness   NECK: Bilateral anterior cervical adenopathy, no adenopathy in cervical or supraclavicular regions  RESP: Lungs clear to auscultation - no rales, rhonchi or wheezes   CV: Regular rates and rhythm, normal S1 S2, no S3 or S4, no murmur, click or rub  MS: No musculoskeletal defects are noted and gait is age appropriate without ataxia   SKIN: No suspicious lesions or rashes, hydration status appears adeuqate with normal skin turgor   PSYCH: Alert and oriented x3; speech- coherent , normal rate and volume; able to articulate logical thoughts, able to abstract reason, no tangential thoughts, no hallucinations or delusions, mentation appears normal, Mood is euthymic. Affect is appropriate for this mood state and bright. Thought content is free of suicidal ideation, hallucinations, and delusions. Dress is adequate and upkept. Eye contact is good during conversation.       Diagnostics: see orders pending in Epic

## 2023-09-12 NOTE — RESULT ENCOUNTER NOTE
Dave Crystal    Your results were negative for infection. I recommend pushing fluids and over the counter medications as helpful.     The results are attached for your review.       Sherif Mccloud PA-C

## 2023-10-02 ENCOUNTER — VIRTUAL VISIT (OUTPATIENT)
Dept: FAMILY MEDICINE | Facility: OTHER | Age: 43
End: 2023-10-02
Payer: COMMERCIAL

## 2023-10-02 DIAGNOSIS — R43.2 ABNORMAL SENSE OF TASTE: ICD-10-CM

## 2023-10-02 DIAGNOSIS — R25.1 TREMOR: ICD-10-CM

## 2023-10-02 DIAGNOSIS — B37.0 THRUSH: Primary | ICD-10-CM

## 2023-10-02 DIAGNOSIS — F33.41 RECURRENT MAJOR DEPRESSIVE DISORDER, IN PARTIAL REMISSION (H): ICD-10-CM

## 2023-10-02 DIAGNOSIS — J34.89 NASAL LESION: ICD-10-CM

## 2023-10-02 DIAGNOSIS — F41.1 GAD (GENERALIZED ANXIETY DISORDER): ICD-10-CM

## 2023-10-02 PROCEDURE — 99214 OFFICE O/P EST MOD 30 MIN: CPT | Mod: VID | Performed by: NURSE PRACTITIONER

## 2023-10-02 RX ORDER — NYSTATIN 100000/ML
500000 SUSPENSION, ORAL (FINAL DOSE FORM) ORAL 4 TIMES DAILY
Qty: 560 ML | Refills: 0 | Status: SHIPPED | OUTPATIENT
Start: 2023-10-02 | End: 2023-11-27

## 2023-10-02 RX ORDER — VENLAFAXINE HYDROCHLORIDE 37.5 MG/1
37.5 CAPSULE, EXTENDED RELEASE ORAL DAILY
Qty: 30 CAPSULE | Refills: 0 | Status: SHIPPED | OUTPATIENT
Start: 2023-10-02 | End: 2023-10-26

## 2023-10-02 ASSESSMENT — PATIENT HEALTH QUESTIONNAIRE - PHQ9
SUM OF ALL RESPONSES TO PHQ QUESTIONS 1-9: 4
10. IF YOU CHECKED OFF ANY PROBLEMS, HOW DIFFICULT HAVE THESE PROBLEMS MADE IT FOR YOU TO DO YOUR WORK, TAKE CARE OF THINGS AT HOME, OR GET ALONG WITH OTHER PEOPLE: NOT DIFFICULT AT ALL
SUM OF ALL RESPONSES TO PHQ QUESTIONS 1-9: 4

## 2023-10-02 NOTE — PATIENT INSTRUCTIONS
- Start Nystatin swish and swallow, take for 7 days then an additional 7 days. Contact me if this does not improve.   - Continue Effexor with meals  - Make sure to have 3 meals a day and protein snacks.         DANIELE Zamora CNP  Questions or concerns please feel free to send me a [employer] message or call me  Phone : [phone]

## 2023-10-02 NOTE — PROGRESS NOTES
Ashli is a 43 year old who is being evaluated via a billable video visit.      How would you like to obtain your AVS? MyChart  If the video visit is dropped, the invitation should be resent by: Text to cell phone: [phone]  Will anyone else be joining your video visit? No      Assessment & Plan     Thrush  Patient noted some whiteness on her tongue that does not improve with brushing.   Not on any antibiotics, no new medications (other than effexor).   Discussed trial of nystatin.  Advised to use for 14 days and contact me if no improvements, follow up with me in 1 month. If worsening I can try and see her in person instead.   - nystatin (MYCOSTATIN) 016765 UNIT/ML suspension; Swish and swallow 5 mLs (500,000 Units) in mouth 4 times daily    Recurrent major depressive disorder, in partial remission (H24)  ALIDA (generalized anxiety disorder)  Improved, having a light tremor and abnormal taste in mouth. Discussed the taste could be thrush vs GERD. Will see if this improves. Will also check her labs as well given she notes she is not eating a ton since using the medication. Encouraged 3 meals a day and protein snacks. Will see what labs show and also recommend follow up in 1 month.   - venlafaxine (EFFEXOR XR) 37.5 MG 24 hr capsule; Take 1 capsule (37.5 mg) by mouth daily      Tremor  Abnormal sense of taste  As noted above   - Comprehensive metabolic panel (BMP + Alb, Alk Phos, ALT, AST, Total. Bili, TP); Future  - Magnesium; Future  - Vitamin B12; Future  - Hemoglobin A1c; Future    Nasal lesion  Lesion on her nose has not resolved, will recommend dermatology for removal and biopsy.   - Adult Dermatology Referral; Future    The patient indicates understanding of these issues and agrees with the plan.      Juana Fox, DANIELE CNP  [facility]   Ashli is a 43 year old, presenting for the following health issues:  Recheck Medication      10/2/2023     8:01 AM   Additional  Questions   Roomed by bk   Accompanied by self       HPI     Depression Followup  How are you doing with your depression since your last visit? Improved - pt says it's been harder to ease into than expected and she's been having severe sleeping issues and a bad taste in mouth with no appetite   Are you having other symptoms that might be associated with depression? Yes:  sleeping issues  Have you had a significant life event?  No   Are you feeling anxious or having panic attacks?   No - med is helping with anxiety  Do you have any concerns with your use of alcohol or other drugs? No    Social History     Tobacco Use    Smoking status: Former     Packs/day: 0.50     Years: 5.00     Pack years: 2.50     Types: Cigarettes     Start date: 2007     Quit date: 2013     Years since quittin.9    Smokeless tobacco: Never   Vaping Use    Vaping Use: Never used   Substance Use Topics    Alcohol use: Yes     Comment: 2 drinks per month    Drug use: Not Currently     Types: Marijuana     Comment: 2 weeks ago for pain         2023     5:21 AM 2023     2:48 PM 10/2/2023     8:00 AM   PHQ   PHQ-9 Total Score 8 6 4   Q9: Thoughts of better off dead/self-harm past 2 weeks Several days Not at all Not at all   F/U: Thoughts of suicide or self-harm No     F/U: Safety concerns No           2021     3:29 PM 2023     5:21 AM 2023     2:48 PM   ALIDA-7 SCORE   Total Score  5 (mild anxiety) 4 (minimal anxiety)   Total Score 2 5 4         10/2/2023     8:00 AM   Last PHQ-9   1.  Little interest or pleasure in doing things 1   2.  Feeling down, depressed, or hopeless 0   3.  Trouble falling or staying asleep, or sleeping too much 1   4.  Feeling tired or having little energy 1   5.  Poor appetite or overeating 0   6.  Feeling bad about yourself 0   7.  Trouble concentrating 1   8.  Moving slowly or restless 0   Q9: Thoughts of better off dead/self-harm past 2 weeks 0   PHQ-9 Total Score 4         2023      2:48 PM   ALIDA-7    1. Feeling nervous, anxious, or on edge 1   2. Not being able to stop or control worrying 0   3. Worrying too much about different things 1   4. Trouble relaxing 1   5. Being so restless that it is hard to sit still 0   6. Becoming easily annoyed or irritable 1   7. Feeling afraid, as if something awful might happen 0   ALIDA-7 Total Score 4   If you checked any problems, how difficult have they made it for you to do your work, take care of things at home, or get along with other people? Not difficult at all       Started the Effexor, was hard to start at first. Has abnormal taste in her mouth. Tongue is white.       Review of Systems       Objective    Vitals - Patient Reported  Pain Score: No Pain (0)        Physical Exam   GENERAL: Healthy, alert and no distress  HENT: oral mucous membranes moist  SKIN: Visible skin clear. No significant rash, abnormal pigmentation or lesions.  NEURO: Cranial nerves grossly intact.  Mentation and speech appropriate for age.  PSYCH: Mentation appears normal, affect normal/bright, judgement and insight intact, normal speech and appearance well-groomed.    No results found for any visits on 10/02/23.      Video-Visit Details    Type of service:  Video Visit     Originating Location (pt. Location): Other Work    Distant Location (provider location):  Off-site  Platform used for Video Visit: [employer]    .undefined[^^

## 2023-10-25 DIAGNOSIS — F33.41 RECURRENT MAJOR DEPRESSIVE DISORDER, IN PARTIAL REMISSION (H): ICD-10-CM

## 2023-10-25 DIAGNOSIS — F41.1 GAD (GENERALIZED ANXIETY DISORDER): ICD-10-CM

## 2023-10-26 RX ORDER — VENLAFAXINE HYDROCHLORIDE 37.5 MG/1
37.5 CAPSULE, EXTENDED RELEASE ORAL DAILY
Qty: 90 CAPSULE | Refills: 1 | Status: SHIPPED | OUTPATIENT
Start: 2023-10-26 | End: 2024-04-29

## 2023-10-30 ENCOUNTER — TELEPHONE (OUTPATIENT)
Dept: FAMILY MEDICINE | Facility: OTHER | Age: 43
End: 2023-10-30
Payer: COMMERCIAL

## 2023-10-30 NOTE — TELEPHONE ENCOUNTER
Patient wanting to know if 11/6 appointment can be changed to F2F? If not, OK to work in another day?

## 2023-10-30 NOTE — TELEPHONE ENCOUNTER
The time she is scheduled is during my virtual time, but I could see her that day at 3:00pm.       DANIELE Zamora CNP  Questions or concerns please feel free to send me a [employer] message or call me  Phone : [phone]

## 2023-10-31 ENCOUNTER — OFFICE VISIT (OUTPATIENT)
Dept: FAMILY MEDICINE | Facility: CLINIC | Age: 43
End: 2023-10-31
Attending: NURSE PRACTITIONER
Payer: COMMERCIAL

## 2023-10-31 DIAGNOSIS — L81.4 LENTIGINES: Primary | ICD-10-CM

## 2023-10-31 DIAGNOSIS — J34.89 NASAL LESION: ICD-10-CM

## 2023-10-31 DIAGNOSIS — D22.9 MULTIPLE BENIGN NEVI: ICD-10-CM

## 2023-10-31 PROCEDURE — 99203 OFFICE O/P NEW LOW 30 MIN: CPT | Performed by: DERMATOLOGY

## 2023-10-31 ASSESSMENT — PAIN SCALES - GENERAL: PAINLEVEL: NO PAIN (0)

## 2023-10-31 NOTE — PATIENT INSTRUCTIONS
Patient Education       Proper skin care from [facility]:    -Eliminate harsh soaps as they strip the natural oils from the skin, often resulting in dry itchy skin ( i.e. Dial, Zest, [place])  -Use mild soaps such as Cetaphil or Dove Sensitive Skin in the shower. You do not need to use soap on arms, legs, and trunk every time you shower unless visibly soiled.   -Avoid hot or cold showers.  -After showering, lightly dry off and apply moisturizing within 2-3 minutes. This will help trap moisture in the skin.   -Aggressive use of a moisturizer at least 1-2 times a day to the entire body (including -Vanicream, Cetaphil, Aquaphor or Cerave) and moisturize hands after every washing.  -We recommend using moisturizers that come in a tub that needs to be scooped out, not a pump. This has more of an oil base. It will hold moisture in your skin much better than a water base moisturizer. The above recommended are non-pore clogging.      Wear a sunscreen with at least SPF 30 on your face, ears, neck and V of the chest daily. Wear sunscreen on other areas of the body if those areas are exposed to the sun throughout the day. Sunscreens can contain physical and/or chemical blockers. Physical blockers are less likely to clog pores, these include zinc oxide and titanium dioxide. Reapply every two hour and after swimming.     Sunscreen examples: https://www.ewg.org/sunscreen/    UV radiation  UVA radiation remains constant throughout the day and throughout the year. It is a longer wavelength than UVB and therefore penetrates deeper into the skin leading to immediate and delayed tanning, photoaging, and skin cancer. 70-80% of UVA and UVB radiation occurs between the hours of 10am-2pm.  UVB radiation  UVB radiation causes the most harmful effects and is more significant during the summer months. However, snow and ice can reflect UVB radiation leading to skin damage during the winter months as well. UVB radiation is  responsible for tanning, burning, inflammation, delayed erythema (pinkness), pigmentation (brown spots), and skin cancer.     I recommend self monthly full body exams and yearly full body exams with a dermatology provider. If you develop a new or changing lesion please follow up for examination. Most skin cancers are pink and scaly or pink and pearly. However, we do see blue/brown/black skin cancers.  Consider the ABCDEs of melanoma when giving yourself your monthly full body exam ( don't forget the groin, buttocks, feet, toes, etc). A-asymmetry, B-borders, C-color, D-diameter, E-elevation or evolving. If you see any of these changes please follow up in clinic. If you cannot see your back I recommend purchasing a hand held mirror to use with a larger wall mirror.       Checking for Skin Cancer  You can find cancer early by checking your skin each month. There are 3 kinds of skin cancer. They are melanoma, basal cell carcinoma, and squamous cell carcinoma. Doing monthly skin checks is the best way to find new marks or skin changes. Follow the instructions below for checking your skin.   The ABCDEs of checking moles for melanoma   Check your moles or growths for signs of melanoma using ABCDE:   Asymmetry: the sides of the mole or growth don t match  Border: the edges are ragged, notched, or blurred  Color: the color within the mole or growth varies  Diameter: the mole or growth is larger than 6 mm (size of a pencil eraser)  Evolving: the size, shape, or color of the mole or growth is changing (evolving is not shown in the images below)    Checking for other types of skin cancer  Basal cell carcinoma or squamous cell carcinoma have symptoms such as:     A spot or mole that looks different from all other marks on your skin  Changes in how an area feels, such as itching, tenderness, or pain  Changes in the skin's surface, such as oozing, bleeding, or scaliness  A sore that does not heal  New swelling or redness beyond  the border of a mole    Who s at risk?  Anyone can get skin cancer. But you are at greater risk if you have:   Fair skin, light-colored hair, or light-colored eyes  Many moles or abnormal moles on your skin  A history of sunburns from sunlight or tanning beds  A family history of skin cancer  A history of exposure to radiation or chemicals  A weakened immune system  If you have had skin cancer in the past, you are at risk for recurring skin cancer.   How to check your skin  Do your monthly skin checkups in front of a full-length mirror. Check all parts of your body, including your:   Head (ears, face, neck, and scalp)  Torso (front, back, and sides)  Arms (tops, undersides, upper, and lower armpits)  Hands (palms, backs, and fingers, including under the nails)  Buttocks and genitals  Legs (front, back, and sides)  Feet (tops, soles, toes, including under the nails, and between toes)  If you have a lot of moles, take digital photos of them each month. Make sure to take photos both up close and from a distance. These can help you see if any moles change over time.   Most skin changes are not cancer. But if you see any changes in your skin, call your doctor right away. Only he or she can diagnose a problem. If you have skin cancer, seeing your doctor can be the first step toward getting the treatment that could save your life.   Kuli Kuli last reviewed this educational content on 4/1/2019 2000-2020 [employer]. [address] [place] [address], [place] [address]. All rights reserved. This information is not intended as a substitute for professional medical care. Always follow your healthcare professional's instructions.       When should I call my doctor?  If you are worsening or not improving, please, contact us or seek urgent care as noted below.     Who should I call with questions (adults)?  [facility] (adult and pediatric): [phone]  [facility]  [place] (adult): [phone]  [facility] ([place], [place], [place] and [place]) [phone]  For urgent needs outside of business hours call the [facility] at [phone] and ask for the dermatology resident on call to be paged  If this is a medical emergency and you are unable to reach an ER, Call 911      If you need a prescription refill, please contact your pharmacy. Refills are approved or denied by our Physicians during normal business hours, Monday through Fridays  Per office policy, refills will not be granted if you have not been seen within the past year (or sooner depending on your child's condition)

## 2023-10-31 NOTE — LETTER
10/31/2023         RE: Ashli Gamboa  [address] [facility] [address]        Dear Colleague,    Thank you for referring your patient, Ashli Gamboa, to the [facility]. Please see a copy of my visit note below.    [facility] Dermatology Note  Encounter Date: Oct 31, 2023  Office Visit     Dermatology Problem List:  Last skin check 10/31/23, recommended yearly  # Lesion to monitor, left nasal columella, telangiectasia vs. Fibrous papule.  # Lesions to monitor, left posterior upper arm, 2 brown adjacent macules. 1 evenly brown with reticular network. 1 with annular appearance. Inferior to this pink papule.    SHx: lived in [place] for 5 years.  FHx: skin cancer (Mother).  ____________________________________________    Assessment & Plan:    # Lesion to monitor, left nasal columella, telangiectasia vs. fibrous papule. Cannot exclude BCC but disfavored.  -Plan to monitor, notify us if changes, growth, or symptoms  - Photograph obtained in clinic today.    # Lesions to monitor, left posterior upper arm, 2 brown adjacent macules. 1 evenly brown with reticular network. 1 with annular appearance. Inferior to this pink papule (favor dermatofibroma).  - Photo today    # Multiple benign nevi.   # Lentigenes.  - Monitor for ABCDEs of melanoma   - Continue sun protection - recommend SPF 30 or higher with frequent application   - Return sooner if noticing changing or symptomatic lesions      Procedures Performed:   None.    Follow-up: 1 year(s) in-person, or earlier for new or changing lesions    Staff and Scribe:     I, Roxie Parmar, am serving as a scribe to document services personally performed by Dr. Ciera Carter, based on data collection and the provider's statements to me.     Provider Disclosure:   The documentation recorded by the scribe accurately reflects the services I personally performed and the decisions made by  me.    Ciera Carter MD    Department of Dermatology  [facility]: Phone: [phone], Fax: [phone]  11/6/2023     ____________________________________________    CC: Skin Check (FBSE, c/o spot on nose)    HPI:  Ms. Ashli Gamboa is a(n) 43 year old female who presents today as a new patient for FBSE.    The patient would like spot on her nose to be checked. Usually scabs, then will bleed a little. Has been present for about 1 year.     FHx of skin cancer (Mother).    Patient is otherwise feeling well, without additional skin concerns.    Labs Reviewed:  N/A    Physical Exam:  Vitals: LMP  (LMP Unknown)   SKIN: Total skin excluding the undergarment areas was performed. The exam included the head/face, neck, both arms, chest, back, abdomen, both legs, digits and/or nails.   - left nasal columella, pink/red vascular macule.  - Left posterior upper arm, 2 brown adjacent macules. 1 evenly brown with reticular network. 1 with annular appearance.  - Inferior to this pink papule.  - Multiple regular brown pigmented macules and papules are identified on the trunk and extremities. .   - Scattered brown macules on sun exposed areas.  - No other lesions of concern on areas examined.     Medications:  Current Outpatient Medications   Medication     buPROPion (WELLBUTRIN XL) 300 MG 24 hr tablet     MULTIPLE VITAMIN PO     NALTREXONE HCL PO     NUVARING 0.12-0.015 MG/24HR vaginal ring     topiramate (TOPAMAX) 100 MG tablet     venlafaxine (EFFEXOR XR) 37.5 MG 24 hr capsule     vitamin B complex with vitamin C (STRESS TAB) tablet     vitamin D3 (CHOLECALCIFEROL) 125 MCG (5000 UT) tablet     nystatin (MYCOSTATIN) 374841 UNIT/ML suspension     No current facility-administered medications for this visit.     Facility-Administered Medications Ordered in Other Visits   Medication     BUPivacaine 0.25%     iopamidol (ISOVUE-M  200) solution 20 mL     lidocaine (PF) (XYLOCAINE) 1 % injection 30 mL     triamcinolone acetonide (KENALOG-40) injection 40 mg      Past Medical History:   Patient Active Problem List   Diagnosis     post traumatic Arthritis of big toe     Major depression, recurrent (H24)     Endometriosis     Fibromyalgia     Muscular deconditioning     Cervical high risk HPV (human papillomavirus) test positive     S/P laparoscopic sleeve gastrectomy     Acute pelvic pain     Lyme disease     Neurological Lyme disease     Syncope     Autonomic dysfunction     Allergic rhinitis due to other allergic trigger, unspecified seasonality     Pruritic disorder     Elevated serum tryptase     Leiomyoma of uterus, unspecified     Chronic pelvic pain in female     Vaginal vault prolapse     Acute pain of left shoulder     Past Medical History:   Diagnosis Date     Arthritis     BIG TOE     Cervical high risk HPV (human papillomavirus) test positive 07/05/2017     Cervical high risk HPV (human papillomavirus) test positive 06/10/2015     Depressive disorder 2007    I have been on and off medication for the last several years     Endometriosis      Hearing problem      Obese      Sensorineural hearing loss         CC Juana Fox, APRN CNP  [address]  [place] [address] on close of this encounter.      Again, thank you for allowing me to participate in the care of your patient.        Sincerely,        Ciera Carter MD

## 2023-10-31 NOTE — PROGRESS NOTES
[facility] Dermatology Note  Encounter Date: Oct 31, 2023  Office Visit     Dermatology Problem List:  Last skin check 10/31/23, recommended yearly  # Lesion to monitor, left nasal columella, telangiectasia vs. Fibrous papule.  # Lesions to monitor, left posterior upper arm, 2 brown adjacent macules. 1 evenly brown with reticular network. 1 with annular appearance. Inferior to this pink papule.    SHx: lived in [place] for 5 years.  FHx: skin cancer (Mother).  ____________________________________________    Assessment & Plan:    # Lesion to monitor, left nasal columella, telangiectasia vs. fibrous papule. Cannot exclude BCC but disfavored.  -Plan to monitor, notify us if changes, growth, or symptoms  - Photograph obtained in clinic today.    # Lesions to monitor, left posterior upper arm, 2 brown adjacent macules. 1 evenly brown with reticular network. 1 with annular appearance. Inferior to this pink papule (favor dermatofibroma).  - Photo today    # Multiple benign nevi.   # Lentigenes.  - Monitor for ABCDEs of melanoma   - Continue sun protection - recommend SPF 30 or higher with frequent application   - Return sooner if noticing changing or symptomatic lesions      Procedures Performed:   None.    Follow-up: 1 year(s) in-person, or earlier for new or changing lesions    Staff and Scribe:     I, Roxie Parmar, am serving as a scribe to document services personally performed by Dr. Ciera Carter, based on data collection and the provider's statements to me.     Provider Disclosure:   The documentation recorded by the scribe accurately reflects the services I personally performed and the decisions made by me.    Ciera Carter MD    Department of Dermatology  [facility]: Phone: [phone], Fax: [phone]  11/6/2023     ____________________________________________    CC: Skin Check  (FBSE, c/o spot on nose)    HPI:  Ms. Ashli Gamboa is a(n) 43 year old female who presents today as a new patient for FBSE.    The patient would like spot on her nose to be checked. Usually scabs, then will bleed a little. Has been present for about 1 year.     FHx of skin cancer (Mother).    Patient is otherwise feeling well, without additional skin concerns.    Labs Reviewed:  N/A    Physical Exam:  Vitals: LMP  (LMP Unknown)   SKIN: Total skin excluding the undergarment areas was performed. The exam included the head/face, neck, both arms, chest, back, abdomen, both legs, digits and/or nails.   - left nasal columella, pink/red vascular macule.  - Left posterior upper arm, 2 brown adjacent macules. 1 evenly brown with reticular network. 1 with annular appearance.  - Inferior to this pink papule.  - Multiple regular brown pigmented macules and papules are identified on the trunk and extremities. .   - Scattered brown macules on sun exposed areas.  - No other lesions of concern on areas examined.     Medications:  Current Outpatient Medications   Medication    buPROPion (WELLBUTRIN XL) 300 MG 24 hr tablet    MULTIPLE VITAMIN PO    NALTREXONE HCL PO    NUVARING 0.12-0.015 MG/24HR vaginal ring    topiramate (TOPAMAX) 100 MG tablet    venlafaxine (EFFEXOR XR) 37.5 MG 24 hr capsule    vitamin B complex with vitamin C (STRESS TAB) tablet    vitamin D3 (CHOLECALCIFEROL) 125 MCG (5000 UT) tablet    nystatin (MYCOSTATIN) 042784 UNIT/ML suspension     No current facility-administered medications for this visit.     Facility-Administered Medications Ordered in Other Visits   Medication    BUPivacaine 0.25%    iopamidol (ISOVUE-M 200) solution 20 mL    lidocaine (PF) (XYLOCAINE) 1 % injection 30 mL    triamcinolone acetonide (KENALOG-40) injection 40 mg      Past Medical History:   Patient Active Problem List   Diagnosis    post traumatic Arthritis of big toe    Major depression, recurrent (H24)    Endometriosis     Fibromyalgia    Muscular deconditioning    Cervical high risk HPV (human papillomavirus) test positive    S/P laparoscopic sleeve gastrectomy    Acute pelvic pain    Lyme disease    Neurological Lyme disease    Syncope    Autonomic dysfunction    Allergic rhinitis due to other allergic trigger, unspecified seasonality    Pruritic disorder    Elevated serum tryptase    Leiomyoma of uterus, unspecified    Chronic pelvic pain in female    Vaginal vault prolapse    Acute pain of left shoulder     Past Medical History:   Diagnosis Date    Arthritis     BIG TOE    Cervical high risk HPV (human papillomavirus) test positive 07/05/2017    Cervical high risk HPV (human papillomavirus) test positive 06/10/2015    Depressive disorder 2007    I have been on and off medication for the last several years    Endometriosis     Hearing problem     Obese     Sensorineural hearing loss         CC Juana Fox, APRN CNP  [address]  [place] [address] on close of this encounter.

## 2023-11-14 ENCOUNTER — TRANSCRIBE ORDERS (OUTPATIENT)
Dept: OTHER | Age: 43
End: 2023-11-14

## 2023-11-14 ENCOUNTER — TRANSFERRED RECORDS (OUTPATIENT)
Dept: HEALTH INFORMATION MANAGEMENT | Facility: CLINIC | Age: 43
End: 2023-11-14
Payer: COMMERCIAL

## 2023-11-14 DIAGNOSIS — M25.512 PAIN IN LEFT SHOULDER: Primary | ICD-10-CM

## 2023-11-14 DIAGNOSIS — M25.559 PAIN IN UNSPECIFIED HIP: ICD-10-CM

## 2023-11-22 ENCOUNTER — MYC MEDICAL ADVICE (OUTPATIENT)
Dept: FAMILY MEDICINE | Facility: OTHER | Age: 43
End: 2023-11-22

## 2023-11-22 ENCOUNTER — LAB (OUTPATIENT)
Dept: LAB | Facility: OTHER | Age: 43
End: 2023-11-22
Payer: COMMERCIAL

## 2023-11-22 DIAGNOSIS — R25.1 TREMOR: ICD-10-CM

## 2023-11-22 DIAGNOSIS — R43.2 ABNORMAL SENSE OF TASTE: ICD-10-CM

## 2023-11-22 LAB
ALBUMIN SERPL BCG-MCNC: 3.8 G/DL (ref 3.5–5.2)
ALP SERPL-CCNC: 59 U/L (ref 40–150)
ALT SERPL W P-5'-P-CCNC: 9 U/L (ref 0–50)
ANION GAP SERPL CALCULATED.3IONS-SCNC: 9 MMOL/L (ref 7–15)
AST SERPL W P-5'-P-CCNC: 13 U/L (ref 0–45)
BILIRUB SERPL-MCNC: 0.3 MG/DL
BUN SERPL-MCNC: 9.8 MG/DL (ref 6–20)
CALCIUM SERPL-MCNC: 9.1 MG/DL (ref 8.6–10)
CHLORIDE SERPL-SCNC: 107 MMOL/L (ref 98–107)
CREAT SERPL-MCNC: 1.08 MG/DL (ref 0.51–0.95)
DEPRECATED HCO3 PLAS-SCNC: 23 MMOL/L (ref 22–29)
EGFRCR SERPLBLD CKD-EPI 2021: 65 ML/MIN/1.73M2
GLUCOSE SERPL-MCNC: 85 MG/DL (ref 70–99)
HBA1C MFR BLD: 4.9 % (ref 0–5.6)
MAGNESIUM SERPL-MCNC: 2.1 MG/DL (ref 1.7–2.3)
POTASSIUM SERPL-SCNC: 3.9 MMOL/L (ref 3.4–5.3)
PROT SERPL-MCNC: 6.3 G/DL (ref 6.4–8.3)
SODIUM SERPL-SCNC: 139 MMOL/L (ref 135–145)

## 2023-11-22 PROCEDURE — 83036 HEMOGLOBIN GLYCOSYLATED A1C: CPT

## 2023-11-22 PROCEDURE — 83735 ASSAY OF MAGNESIUM: CPT

## 2023-11-22 PROCEDURE — 82607 VITAMIN B-12: CPT

## 2023-11-22 PROCEDURE — 36415 COLL VENOUS BLD VENIPUNCTURE: CPT

## 2023-11-22 PROCEDURE — 80053 COMPREHEN METABOLIC PANEL: CPT

## 2023-11-23 LAB — VIT B12 SERPL-MCNC: 625 PG/ML (ref 232–1245)

## 2023-11-27 ENCOUNTER — VIRTUAL VISIT (OUTPATIENT)
Dept: FAMILY MEDICINE | Facility: OTHER | Age: 43
End: 2023-11-27
Payer: COMMERCIAL

## 2023-11-27 DIAGNOSIS — R74.8 ELEVATED CREATINE KINASE: ICD-10-CM

## 2023-11-27 DIAGNOSIS — K21.9 GASTROESOPHAGEAL REFLUX DISEASE, UNSPECIFIED WHETHER ESOPHAGITIS PRESENT: Primary | ICD-10-CM

## 2023-11-27 PROCEDURE — 99213 OFFICE O/P EST LOW 20 MIN: CPT | Mod: VID | Performed by: NURSE PRACTITIONER

## 2023-11-27 RX ORDER — OMEPRAZOLE 40 MG/1
40 CAPSULE, DELAYED RELEASE ORAL DAILY
Qty: 90 CAPSULE | Refills: 0 | Status: SHIPPED | OUTPATIENT
Start: 2023-11-27 | End: 2023-12-27

## 2023-11-27 NOTE — PROGRESS NOTES
"    Instructions Relayed to Patient by Virtual Roomer:     Patient is active on [employer]:   Relayed following to patient: \"It looks like you are active on [employer], are you able to join the visit this way? If not, do you need us to send you a link now or would you like your provider to send a link via text or email when they are ready to initiate the visit?\"    Reminded patient to ensure they were logged on to virtual visit by arrival time listed. Documented in appointment notes if patient had flexibility to initiate visit sooner than arrival time. If pediatric virtual visit, ensured pediatric patient along with parent/guardian will be present for video visit.     Patient offered the website [email or web address] and/or phone number to [employer] Help line: [phone]     Ashli is a 43 year old who is being evaluated via a billable video visit.      How would you like to obtain your AVS? [employer]  If the video visit is dropped, the invitation should be resent by: Text to cell phone: [phone]  Will anyone else be joining your video visit? No        Assessment & Plan     Gastroesophageal reflux disease, unspecified whether esophagitis present  Symptoms seem consistent with acid reflux given the presentation of her symptoms.   We are going to have her start Prilosec daily,   Discussed warning symptoms if uncontrolled and recheck her symptoms in the future.   - omeprazole (PRILOSEC) 40 MG DR capsule; Take 1 capsule (40 mg) by mouth daily  - Adult GI  Referral - Procedure Only; Future    Elevated creatine kinase  Recheck with good hydration   - Basic metabolic panel  (Ca, Cl, CO2, Creat, Gluc, K, Na, BUN); Future       See Patient Instructions    DANIELE Zamora CNP  [facility]   Ashli is a 43 year old, presenting for the following health issues:  Recheck Medication (Lab Results , Medications Refills/) and Throat Problem (2 months - feels like " something is caught in the back of the throat )      History of Present Illness       Reason for visit:  Lab followup and throat issue continues    She eats 2-3 servings of fruits and vegetables daily.She consumes 0 sweetened beverage(s) daily.She exercises with enough effort to increase her heart rate 9 or less minutes per day.  She exercises with enough effort to increase her heart rate 3 or less days per week.   She is taking medications regularly.       PT Here to discuss labs and medications    Concern - Throat   Onset: 2 months  Description: feels like something is stuck in throat - also has bad taste in mouth, chemical taste on lips   Intensity: moderate  Progression of Symptoms:  same and constant  Accompanying Signs & Symptoms: Started with sore throat, now just feels like something is stuck in throat, now short of breath   Previous history of similar problem: None  Precipitating factors:        Worsened by: None   Alleviating factors:        Improved by: None   Therapies tried and outcome: Tried magic mouthwash - as thought it was thrush, but symptoms have continued     Nystatin made her really sick and tried it a couple times and it made her sick. Did not continue.   Initial taste in her mouth- almost gone or  a lot better  Now this feels different.     Sleeping- thought about trying melatonin.   Tried effexor both in the morning and at night.   Nothing seems to help  Clockwork awake 2.5 half hours after she goes to sleep.   She has good energy throughout the day.     Review of Systems       Objective           Vitals:  No vitals were obtained today due to virtual visit.    Physical Exam   GENERAL: Healthy, alert and no distress  SKIN: Visible skin clear. No significant rash, abnormal pigmentation or lesions.  NEURO: Cranial nerves grossly intact.  Mentation and speech appropriate for age.  PSYCH: Mentation appears normal, affect normal/bright, judgement and insight intact, normal speech and appearance  well-groomed.    Diagnostic: None       Video-Visit Details    Type of service:  Video Visit     Originating Location (pt. Location): Home    Distant Location (provider location):  On-site  Platform used for Video Visit: Norris

## 2023-11-27 NOTE — PATIENT INSTRUCTIONS
Trial of Magnesium Glycinate or melatonin first 1 hour before bedtime  Good bedtime routine with no electronics.   Calming music at bedtime can also be helpful.       DANIELE Zamora CNP  Questions or concerns please feel free to send me a Zaplox message or call me  Phone : [phone]

## 2023-11-28 ENCOUNTER — TELEPHONE (OUTPATIENT)
Dept: GASTROENTEROLOGY | Facility: CLINIC | Age: 43
End: 2023-11-28
Payer: COMMERCIAL

## 2023-11-28 NOTE — TELEPHONE ENCOUNTER
"Endoscopy Scheduling Screen    Have you had a positive Covid test in the last 14 days?  No    Are you active on MyChart?   Yes    What insurance is in the chart?  Other:  [employer]    Ordering/Referring Provider:   FADI JOSEPH        (If ordering provider performs procedure, schedule with ordering provider unless otherwise instructed. )    BMI: Estimated body mass index is 23.73 kg/m  as calculated from the following:    Height as of 9/11/23: 1.63 m (5' 4.17\").    Weight as of 9/11/23: 63 kg (139 lb).     Sedation Ordered  moderate sedation.   If patient BMI > 50 do not schedule in [facility].    If patient BMI > 45 do not schedule at [facility].    Are you taking methadone or Suboxone?  No    Are you taking any prescription medications for pain 3 or more times per week?   Yes, sedation and prep (RN Review required.)    Do you have a history of malignant hyperthermia or adverse reaction to anesthesia?  No    (Females) Are you currently pregnant?   No     Have you been diagnosed or told you have pulmonary hypertension?   No    Do you have an LVAD?  No    Have you been told you have moderate to severe sleep apnea?  No    Have you been told you have COPD, asthma, or any other lung disease?  No    Do you have any heart conditions?  No     Have you ever had an organ transplant?   No    Have you ever had or are you awaiting a heart or lung transplant?   No    Have you had a stroke or transient ischemic attack (TIA aka \"mini stroke\" in the last 6 months?   No    Have you been diagnosed with or been told you have cirrhosis of the liver?   No    Are you currently on dialysis?   No    Do you need assistance transferring?   No    BMI: Estimated body mass index is 23.73 kg/m  as calculated from the following:    Height as of 9/11/23: 1.63 m (5' 4.17\").    Weight as of 9/11/23: 63 kg (139 lb).     Is patients BMI > 40 and scheduling location [facility]?  No    Do you take an injectable medication for weight loss or diabetes (excluding " insulin)?  No    Do you take the medication Naltrexone?  Yes Recommended hold time is 3 days before your procedure. Please contact your prescribing provider to discuss.    Do you take blood thinners?  No       Prep   Are you currently on dialysis or do you have chronic kidney disease?  No    Do you have a diagnosis of diabetes?  No    Do you have a diagnosis of cystic fibrosis (CF)?  No    On a regular basis do you go 3 -5 days between bowel movements?  No    BMI > 40?  No    Preferred Pharmacy:    [employer] [id number] [place] [address] [place] [address] [place] [id number]  Phone: [phone] Fax: [phone]    [id number] - [place], [place] - [address] [address]  Phone: [phone] Fax: [phone]      Final Scheduling Details   Colonoscopy prep sent?  N/A    Procedure scheduled  Upper endoscopy (EGD)    Surgeon:  Larissa     Date of procedure:  12/7     Pre-OP / PAC:   No - Not required for this site.    Location  [facility] - Patient preference.    Sedation   MAC/Deep Sedation - Per exclusion criteria.      Patient Reminders:   You will receive a call from a Nurse to review instructions and health history.  This assessment must be completed prior to your procedure.  Failure to complete the Nurse assessment may result in the procedure being cancelled.      On the day of your procedure, please designate an adult(s) who can drive you home stay with you for the next 24 hours. The medicines used in the exam will make you sleepy. You will not be able to drive.      You cannot take public transportation, ride share services, or non-medical taxi service without a responsible caregiver.  Medical transport services are allowed with the requirement that a responsible caregiver will receive you at your destination.  We require that drivers and caregivers are confirmed prior to your procedure.

## 2023-12-06 RX ORDER — ONDANSETRON 2 MG/ML
4 INJECTION INTRAMUSCULAR; INTRAVENOUS
Status: CANCELLED | OUTPATIENT
Start: 2023-12-06

## 2023-12-06 RX ORDER — LIDOCAINE 40 MG/G
CREAM TOPICAL
Status: CANCELLED | OUTPATIENT
Start: 2023-12-06

## 2023-12-07 ENCOUNTER — HOSPITAL ENCOUNTER (OUTPATIENT)
Facility: CLINIC | Age: 43
Discharge: HOME | End: 2023-12-07
Attending: SURGERY | Admitting: SURGERY
Payer: COMMERCIAL

## 2023-12-07 ENCOUNTER — ANESTHESIA EVENT (OUTPATIENT)
Dept: GASTROENTEROLOGY | Facility: CLINIC | Age: 43
End: 2023-12-07
Payer: COMMERCIAL

## 2023-12-07 ENCOUNTER — ANESTHESIA (OUTPATIENT)
Dept: GASTROENTEROLOGY | Facility: CLINIC | Age: 43
End: 2023-12-07
Payer: COMMERCIAL

## 2023-12-07 VITALS
HEART RATE: 66 BPM | DIASTOLIC BLOOD PRESSURE: 72 MMHG | SYSTOLIC BLOOD PRESSURE: 111 MMHG | RESPIRATION RATE: 16 BRPM | TEMPERATURE: 98 F | OXYGEN SATURATION: 100 %

## 2023-12-07 LAB — UPPER GI ENDOSCOPY: NORMAL

## 2023-12-07 PROCEDURE — 250N000009 HC RX 250: Performed by: NURSE ANESTHETIST, CERTIFIED REGISTERED

## 2023-12-07 PROCEDURE — 250N000011 HC RX IP 250 OP 636: Performed by: NURSE ANESTHETIST, CERTIFIED REGISTERED

## 2023-12-07 PROCEDURE — 370N000017 HC ANESTHESIA TECHNICAL FEE, PER MIN: Performed by: SURGERY

## 2023-12-07 PROCEDURE — 258N000003 HC RX IP 258 OP 636: Performed by: NURSE ANESTHETIST, CERTIFIED REGISTERED

## 2023-12-07 PROCEDURE — 43235 EGD DIAGNOSTIC BRUSH WASH: CPT | Performed by: SURGERY

## 2023-12-07 RX ORDER — SODIUM CHLORIDE, SODIUM LACTATE, POTASSIUM CHLORIDE, CALCIUM CHLORIDE 600; 310; 30; 20 MG/100ML; MG/100ML; MG/100ML; MG/100ML
INJECTION, SOLUTION INTRAVENOUS CONTINUOUS PRN
Status: DISCONTINUED | OUTPATIENT
Start: 2023-12-07 | End: 2023-12-07

## 2023-12-07 RX ORDER — LIDOCAINE HYDROCHLORIDE 10 MG/ML
INJECTION, SOLUTION INFILTRATION; PERINEURAL PRN
Status: DISCONTINUED | OUTPATIENT
Start: 2023-12-07 | End: 2023-12-07

## 2023-12-07 RX ORDER — PROCHLORPERAZINE MALEATE 5 MG
10 TABLET ORAL EVERY 6 HOURS PRN
Status: DISCONTINUED | OUTPATIENT
Start: 2023-12-07 | End: 2023-12-07 | Stop reason: HOSPADM

## 2023-12-07 RX ORDER — NALOXONE HYDROCHLORIDE 0.4 MG/ML
0.4 INJECTION, SOLUTION INTRAMUSCULAR; INTRAVENOUS; SUBCUTANEOUS
Status: DISCONTINUED | OUTPATIENT
Start: 2023-12-07 | End: 2023-12-07 | Stop reason: HOSPADM

## 2023-12-07 RX ORDER — FLUMAZENIL 0.1 MG/ML
0.2 INJECTION, SOLUTION INTRAVENOUS
Status: DISCONTINUED | OUTPATIENT
Start: 2023-12-07 | End: 2023-12-07 | Stop reason: HOSPADM

## 2023-12-07 RX ORDER — PROPOFOL 10 MG/ML
INJECTION, EMULSION INTRAVENOUS PRN
Status: DISCONTINUED | OUTPATIENT
Start: 2023-12-07 | End: 2023-12-07

## 2023-12-07 RX ORDER — NALOXONE HYDROCHLORIDE 0.4 MG/ML
0.2 INJECTION, SOLUTION INTRAMUSCULAR; INTRAVENOUS; SUBCUTANEOUS
Status: DISCONTINUED | OUTPATIENT
Start: 2023-12-07 | End: 2023-12-07 | Stop reason: HOSPADM

## 2023-12-07 RX ORDER — ONDANSETRON 2 MG/ML
4 INJECTION INTRAMUSCULAR; INTRAVENOUS EVERY 6 HOURS PRN
Status: DISCONTINUED | OUTPATIENT
Start: 2023-12-07 | End: 2023-12-07 | Stop reason: HOSPADM

## 2023-12-07 RX ORDER — PROPOFOL 10 MG/ML
INJECTION, EMULSION INTRAVENOUS CONTINUOUS PRN
Status: DISCONTINUED | OUTPATIENT
Start: 2023-12-07 | End: 2023-12-07

## 2023-12-07 RX ORDER — ONDANSETRON 4 MG/1
4 TABLET, ORALLY DISINTEGRATING ORAL EVERY 6 HOURS PRN
Status: DISCONTINUED | OUTPATIENT
Start: 2023-12-07 | End: 2023-12-07 | Stop reason: HOSPADM

## 2023-12-07 RX ADMIN — PROPOFOL 50 MG: 10 INJECTION, EMULSION INTRAVENOUS at 11:47

## 2023-12-07 RX ADMIN — SODIUM CHLORIDE, POTASSIUM CHLORIDE, SODIUM LACTATE AND CALCIUM CHLORIDE: 600; 310; 30; 20 INJECTION, SOLUTION INTRAVENOUS at 11:41

## 2023-12-07 RX ADMIN — LIDOCAINE HYDROCHLORIDE 50 MG: 10 INJECTION, SOLUTION INFILTRATION; PERINEURAL at 11:46

## 2023-12-07 RX ADMIN — PROPOFOL 200 MCG/KG/MIN: 10 INJECTION, EMULSION INTRAVENOUS at 11:46

## 2023-12-07 RX ADMIN — PROPOFOL 50 MG: 10 INJECTION, EMULSION INTRAVENOUS at 11:46

## 2023-12-07 ASSESSMENT — LIFESTYLE VARIABLES: TOBACCO_USE: 1

## 2023-12-07 NOTE — ANESTHESIA CARE TRANSFER NOTE
Patient: Ashli Gamboa    Procedure: Procedure(s):  Esophagoscopy, gastroscopy, duodenoscopy (EGD), combined       Diagnosis: Gastroesophageal reflux disease, unspecified whether esophagitis present [K21.9]  Diagnosis Additional Information: No value filed.    Anesthesia Type:   MAC     Note:    Oropharynx: oropharynx clear of all foreign objects and spontaneously breathing  Level of Consciousness: drowsy  Oxygen Supplementation: face mask    Independent Airway: airway patency satisfactory and stable  Dentition: dentition unchanged  Vital Signs Stable: post-procedure vital signs reviewed and stable  Report to RN Given: handoff report given  Patient transferred to: Phase II    Handoff Report: Identifed the Patient, Identified the Reponsible Provider, Reviewed the pertinent medical history, Discussed the surgical course, Reviewed Intra-OP anesthesia mangement and issues during anesthesia, Set expectations for post-procedure period and Allowed opportunity for questions and acknowledgement of understanding  Vitals:  Vitals Value Taken Time   /72 12/07/23 1213   Temp     Pulse 63 12/07/23 1213   Resp 16 12/07/23 1200   SpO2 100 % 12/07/23 1216   Vitals shown include unfiled device data.    Electronically Signed By: DANIELE Cole CRNA  December 7, 2023  12:23 PM

## 2023-12-07 NOTE — H&P
Patient seen for Endoscopy    HPI:  Patient is a 43 year old female w GERD here for endoscopy. Not taking blood thinning medications. No MI or CVA history. No issues with previous sedation. No recent acute illness.    Review Of Systems    Skin: negative  Ears/Nose/Throat: negative  Respiratory: No shortness of breath, dyspnea on exertion, cough, or hemoptysis  Cardiovascular: negative  Gastrointestinal: negative  Genitourinary: negative  Musculoskeletal: negative  Neurologic: negative  Hematologic/Lymphatic/Immunologic: negative  Endocrine: negative      Past Medical History:   Diagnosis Date    Arthritis     BIG TOE    Cervical high risk HPV (human papillomavirus) test positive 07/05/2017    Cervical high risk HPV (human papillomavirus) test positive 06/10/2015    Depressive disorder 2007    I have been on and off medication for the last several years    Endometriosis     Hearing problem     Obese     Sensorineural hearing loss        Past Surgical History:   Procedure Laterality Date    ABDOMEN SURGERY      laparoscopy X2    APPENDECTOMY  7/17    DILATE CERVIX, HYSTEROSCOPY, ABLATE ENDOMETRIUM NOVASURE, COMBINED N/A 11/17/2016    Procedure: COMBINED DILATE CERVIX, HYSTEROSCOPY, ABLATE ENDOMETRIUM NOVASURE;  Surgeon: Sabas Patel MD;  Location: [facility]    EP STUDY TILT TABLE N/A 12/29/2020    Procedure: EP TILT TABLE;  Surgeon: Gilberto Song MD;  Location:  [facility]    GYN SURGERY  dates above    myomectomy x2, laparoscopy x2    GYN SURGERY      Endometriosis surgery 5/1/19    HYSTERECTOMY, PAP NO LONGER INDICATED      INSERT PICC LINE Left 11/15/2019    Procedure: INSERTION, PICC, single lumen PICC;  Surgeon: Emmett Rasmussen PA-C;  [facility]    IR PICC PLACEMENT > 5 YRS OF AGE  11/15/2019    LAPAROSCOPIC ASSISTED HYSTERECTOMY VAGINAL N/A 01/04/2018    Procedure: LAPAROSCOPIC ASSISTED HYSTERECTOMY VAGINAL;;  Surgeon: Sabas Patel MD;  Location: [facility]    LAPAROSCOPIC  GASTRIC SLEEVE N/A 07/25/2017    Procedure: LAPAROSCOPIC GASTRIC SLEEVE;  Laparoscopic Sleeve Gastrectomy;  Surgeon: Sam Schmidt MD;  Location: [facility]    LAPAROSCOPIC LYSIS ADHESIONS  07/10/2014    Procedure: LAPAROSCOPIC LYSIS ADHESIONS;  Surgeon: Sabas Patel MD;  Location: [facility]    LAPAROSCOPIC SALPINGECTOMY Bilateral 01/04/2018    Procedure: LAPAROSCOPIC SALPINGECTOMY;;  Surgeon: Sabas Patel MD;  [facility]    LASER CO2 LAPAROSCOPIC VAPORIZATION ENDOMETRIUM  07/10/2014    Procedure: LASER CO2 LAPAROSCOPIC VAPORIZATION ENDOMETRIUM;  Surgeon: Sabas Patel MD;  [facility]    LASER CO2 LAPAROSCOPIC VAPORIZATION ENDOMETRIUM N/A 06/30/2015    Procedure: LASER CO2 LAPAROSCOPIC VAPORIZATION ENDOMETRIUM;  Surgeon: Sabas Patel MD;  [facility]    LASER CO2 LAPAROSCOPIC VAPORIZATION ENDOMETRIUM, LYSIS ADHESIONS N/A 01/04/2018    Procedure: LASER CO2 LAPAROSCOPIC VAPORIZATION ENDOMETRIUM, LYSIS ADHESIONS;  LAPAROSCOPY WITH CO2 LASER LYSIS OF ADHESIONS AND VAPORIZATION OF ENDOMETRIOSIS, CYSTOTOMY LEFT OVARY, LAPAROSCOPIC VAGINAL HYSTERECTOMY WITH BILATERAL SALPINGECTOMY ;  Surgeon: Sabas Patel MD;  Location: [facility]    LASER CO2 LAPAROSCOPY DIAGNOSTIC, LYSIS ADHESIONS, COMBINED N/A 06/30/2015    Procedure: COMBINED LASER CO2 LAPAROSCOPY DIAGNOSTIC, LYSIS ADHESIONS;  Surgeon: Sabas Patel MD;  Location: [facility]    ZZC EXCIS UTERINE FIBROID,ABD APPRCH      Description: Uterine Myomectomy;  Recorded: 11/11/2013;       Family History   Problem Relation Age of Onset    Depression Mother     Cancer Mother         Cervical     Other - See Comments Mother         Fibromyalgia    Crohn's Disease Mother     Obesity Mother     Ovarian Cancer Mother         Cervical Cancer    Substance Abuse Mother     Stomach Problem Mother     Skin Cancer Mother     Arthritis Father     Rheumatoid Arthritis Father     Depression Father     Substance Abuse Father     Breast Cancer Maternal Grandmother      Cancer Maternal Grandmother     Hypertension Maternal Grandmother     Cerebrovascular Disease Paternal Grandmother     Prostate Cancer Paternal Grandfather     Depression Brother     Mental Illness Brother     Substance Abuse Brother     Mental Illness Brother         schizophrenia and bipolar    Substance Abuse Brother     Stomach Problem Brother     Depression Sister     Stomach Problem Sister     Colon Cancer Other        Social History     Socioeconomic History    Marital status:      Spouse name: Sid    Number of children: 0    Years of education: Not on file    Highest education level: Not on file   Occupational History     Comment: [employer]   Tobacco Use    Smoking status: Former     Packs/day: 0.50     Years: 5.00     Additional pack years: 0.00     Total pack years: 2.50     Types: Cigarettes     Start date: 1/2/2007     Quit date: 11/1/2013     Years since quitting: 10.1    Smokeless tobacco: Never   Vaping Use    Vaping Use: Never used   Substance and Sexual Activity    Alcohol use: Yes     Comment: 2 drinks per month    Drug use: Not Currently     Types: Marijuana     Comment: 2 weeks ago for pain    Sexual activity: Yes     Partners: Male     Birth control/protection: Inserts/Ring     Comment: nuvaring   Other Topics Concern    Parent/sibling w/ CABG, MI or angioplasty before 65F 55M? No   Social History Narrative    Not on file     Social Determinants of Health     Financial Resource Strain: Low Risk  (11/27/2023)    Financial Resource Strain     Within the past 12 months, have you or your family members you live with been unable to get utilities (heat, electricity) when it was really needed?: No   Food Insecurity: Low Risk  (11/27/2023)    Food Insecurity     Within the past 12 months, did you worry that your food would run out before you got money to buy more?: No     Within the past 12 months, did the food you bought just not last and you didn t have money to get  more?: No   Transportation Needs: Low Risk  (11/27/2023)    Transportation Needs     Within the past 12 months, has lack of transportation kept you from medical appointments, getting your medicines, non-medical meetings or appointments, work, or from getting things that you need?: No   Physical Activity: Not on file   Stress: Not on file   Social Connections: Not on file   Interpersonal Safety: Not on file   Housing Stability: Low Risk  (11/27/2023)    Housing Stability     Do you have housing? : Yes     Are you worried about losing your housing?: No       No current outpatient medications on file.       Medications and history reviewed    Physical exam:  Vitals: /76   Pulse 66   Temp 98  F (36.7  C) (Oral)   Resp 16   LMP  (LMP Unknown)   SpO2 100%   BMI= There is no height or weight on file to calculate BMI.    Constitutional: Healthy, alert, non-distressed   Head: Normo-cephalic, atraumatic, no lesions, masses or tenderness   Cardiovascular: RRR, no new murmurs, +S1, +S2 heart sounds, no clicks, rubs or gallops   Respiratory: CTAB, no rales, rhonchi or wheezing, equal chest rise, good respiratory effort   Gastrointestinal: Soft, non-tender, non distended, no rebound rigidity or guarding, no masses or hernias palpated   : Deferred  Musculoskeletal: Moves all extremities, normal  strength, no deformities noted   Skin: No suspicious lesions or rashes   Psychiatric: Mentation appears normal, affect appropriate   Hematologic/Lymphatic/Immunologic: Normal cervical and supraclavicular lymph nodes   Patient able to get up on table without difficulty.    Labs show:  No results found for this or any previous visit (from the past 24 hour(s)).    Assessment: Endoscopy  Plan: Pt cleared for anesthesia for proposed procedure.    Wiley Dias DO

## 2023-12-07 NOTE — ANESTHESIA POSTPROCEDURE EVALUATION
Patient: Ashli Gamboa    Procedure: Procedure(s):  Esophagoscopy, gastroscopy, duodenoscopy (EGD), combined       Anesthesia Type:  MAC    Note:  Disposition: Outpatient   Postop Pain Control: Uneventful            Sign Out: Well controlled pain   PONV: No   Neuro/Psych: Uneventful            Sign Out: Acceptable/Baseline neuro status   Airway/Respiratory: Uneventful            Sign Out: Acceptable/Baseline resp. status   CV/Hemodynamics: Uneventful            Sign Out: Acceptable CV status   Other NRE: NONE   DID A NON-ROUTINE EVENT OCCUR? No    Event details/Postop Comments:  Pt was happy with anesthesia care.  No complications.  I will follow up with the pt if needed.       Last vitals:  Vitals Value Taken Time   /72 12/07/23 1213   Temp     Pulse 63 12/07/23 1213   Resp 16 12/07/23 1200   SpO2 100 % 12/07/23 1216   Vitals shown include unfiled device data.    Electronically Signed By: DANIELE Cole CRNA  December 7, 2023  12:23 PM

## 2023-12-07 NOTE — ANESTHESIA PREPROCEDURE EVALUATION
Anesthesia Pre-Procedure Evaluation    Patient: Ashli Gamboa   MRN: [id number] : 1980        Procedure : Procedure(s):  Esophagoscopy, gastroscopy, duodenoscopy (EGD), combined          Past Medical History:   Diagnosis Date    Arthritis     BIG TOE    Cervical high risk HPV (human papillomavirus) test positive 2017    Cervical high risk HPV (human papillomavirus) test positive 06/10/2015    Depressive disorder     I have been on and off medication for the last several years    Endometriosis     Hearing problem     Obese     Sensorineural hearing loss       Past Surgical History:   Procedure Laterality Date    ABDOMEN SURGERY      laparoscopy X2    APPENDECTOMY      DILATE CERVIX, HYSTEROSCOPY, ABLATE ENDOMETRIUM NOVASURE, COMBINED N/A 2016    Procedure: COMBINED DILATE CERVIX, HYSTEROSCOPY, ABLATE ENDOMETRIUM NOVASURE;  Surgeon: Sabas Patel MD;  Location: [facility]    EP STUDY TILT TABLE N/A 2020    Procedure: EP TILT TABLE;  Surgeon: Gilberto Song MD;  Location:  [facility]    GYN SURGERY  dates above    myomectomy x2, laparoscopy x2    GYN SURGERY      Endometriosis surgery 19    HYSTERECTOMY, PAP NO LONGER INDICATED      INSERT PICC LINE Left 11/15/2019    Procedure: INSERTION, PICC, single lumen PICC;  Surgeon: Emmett Rasmussen PA-C;  Location: [facility]    IR PICC PLACEMENT > 5 YRS OF AGE  11/15/2019    LAPAROSCOPIC ASSISTED HYSTERECTOMY VAGINAL N/A 2018    Procedure: LAPAROSCOPIC ASSISTED HYSTERECTOMY VAGINAL;;  Surgeon: Sabas Patel MD;  Location: [facility]    LAPAROSCOPIC GASTRIC SLEEVE N/A 2017    Procedure: LAPAROSCOPIC GASTRIC SLEEVE;  Laparoscopic Sleeve Gastrectomy;  Surgeon: Sam Schmidt MD;  Location: [facility] OR    LAPAROSCOPIC LYSIS ADHESIONS  07/10/2014    Procedure: LAPAROSCOPIC LYSIS ADHESIONS;  Surgeon: Sabas Patel MD;  Location: [facility]    LAPAROSCOPIC SALPINGECTOMY Bilateral 2018     Procedure: LAPAROSCOPIC SALPINGECTOMY;;  Surgeon: Sabas Patel MD;  Location: [facility]    LASER CO2 LAPAROSCOPIC VAPORIZATION ENDOMETRIUM  07/10/2014    Procedure: LASER CO2 LAPAROSCOPIC VAPORIZATION ENDOMETRIUM;  Surgeon: Sabas Patel MD;  Location: [facility]    LASER CO2 LAPAROSCOPIC VAPORIZATION ENDOMETRIUM N/A 06/30/2015    Procedure: LASER CO2 LAPAROSCOPIC VAPORIZATION ENDOMETRIUM;  Surgeon: Sabas Patel MD;  Location:  [facility]    LASER CO2 LAPAROSCOPIC VAPORIZATION ENDOMETRIUM, LYSIS ADHESIONS N/A 01/04/2018    Procedure: LASER CO2 LAPAROSCOPIC VAPORIZATION ENDOMETRIUM, LYSIS ADHESIONS;  LAPAROSCOPY WITH CO2 LASER LYSIS OF ADHESIONS AND VAPORIZATION OF ENDOMETRIOSIS, CYSTOTOMY LEFT OVARY, LAPAROSCOPIC VAGINAL HYSTERECTOMY WITH BILATERAL SALPINGECTOMY ;  Surgeon: Sabas Patel MD;  Location: [facility]    LASER CO2 LAPAROSCOPY DIAGNOSTIC, LYSIS ADHESIONS, COMBINED N/A 06/30/2015    Procedure: COMBINED LASER CO2 LAPAROSCOPY DIAGNOSTIC, LYSIS ADHESIONS;  Surgeon: Sabas Patel MD;  Location: [facility]    ZZC EXCIS UTERINE FIBROID,ABD APPRCH      Description: Uterine Myomectomy;  Recorded: 11/11/2013;      No Known Allergies   Social History     Tobacco Use    Smoking status: Former     Packs/day: 0.50     Years: 5.00     Additional pack years: 0.00     Total pack years: 2.50     Types: Cigarettes     Start date: 1/2/2007     Quit date: 11/1/2013     Years since quitting: 10.1    Smokeless tobacco: Never   Substance Use Topics    Alcohol use: Yes     Comment: 2 drinks per month      Wt Readings from Last 1 Encounters:   09/11/23 63 kg (139 lb)        Anesthesia Evaluation   Pt has had prior anesthetic. Type: MAC and General.    No history of anesthetic complications       ROS/MED HX  ENT/Pulmonary:     (+)           allergic rhinitis,     tobacco use, Past use,                      Neurologic:  - neg neurologic ROS     Cardiovascular:  - neg cardiovascular ROS     METS/Exercise Tolerance: >4 METS     Hematologic:  - neg hematologic  ROS     Musculoskeletal:  - neg musculoskeletal ROS (+)  arthritis,             GI/Hepatic:     (+) GERD,                   Renal/Genitourinary:  - neg Renal ROS     Endo:  - neg endo ROS     Psychiatric/Substance Use: Comment: fibromyalgia    (+) psychiatric history depression       Infectious Disease:  - neg infectious disease ROS     Malignancy:   (+) Malignancy, History of Other.Other CA leiomyoma of uterus Remission status post Surgery.    Other:  - neg other ROS          Physical Exam    Airway  airway exam normal      Mallampati: II   TM distance: > 3 FB   Neck ROM: full   Mouth opening: > 3 cm    Respiratory Devices and Support         Dental  no notable dental history         Cardiovascular   cardiovascular exam normal       Rhythm and rate: regular and normal     Pulmonary   pulmonary exam normal        breath sounds clear to auscultation           OUTSIDE LABS:  CBC:   Lab Results   Component Value Date    WBC 5.2 04/05/2023    WBC 7.6 10/17/2022    HGB 14.1 04/05/2023    HGB 13.5 10/17/2022    HCT 44.6 04/05/2023    HCT 42.7 10/17/2022     04/05/2023     10/17/2022     BMP:   Lab Results   Component Value Date     11/22/2023     08/16/2023    POTASSIUM 3.9 11/22/2023    POTASSIUM 4.2 08/16/2023    CHLORIDE 107 11/22/2023    CHLORIDE 109 (H) 08/16/2023    CO2 23 11/22/2023    CO2 21 (L) 08/16/2023    BUN 9.8 11/22/2023    BUN 12.3 08/16/2023    CR 1.08 (H) 11/22/2023    CR 1.16 (H) 08/16/2023    GLC 85 11/22/2023    GLC 86 08/16/2023     COAGS:   Lab Results   Component Value Date    INR 1.2 (A) 10/21/2020     POC:   Lab Results   Component Value Date    BGM 82 01/06/2018    HCG Negative 07/25/2017     HEPATIC:   Lab Results   Component Value Date    ALBUMIN 3.8 11/22/2023    PROTTOTAL 6.3 (L) 11/22/2023    ALT 9 11/22/2023    AST 13 11/22/2023    ALKPHOS 59 11/22/2023    BILITOTAL 0.3 11/22/2023     OTHER:   Lab Results   Component Value Date     A1C 4.9 11/22/2023    KWABENA 9.1 11/22/2023    MAG 2.1 11/22/2023    TSH 2.01 08/16/2023    CRP 8.5 (H) 10/17/2022    SED 6 10/17/2022       Anesthesia Plan    ASA Status:  2    NPO Status:  NPO Appropriate    Anesthesia Type: MAC.     - Reason for MAC: Deep or markedly invasive procedure (G8)   Induction: Intravenous.   Maintenance: TIVA.        Consents    Anesthesia Plan(s) and associated risks, benefits, and realistic alternatives discussed. Questions answered and patient/representative(s) expressed understanding.     - Discussed:     - Discussed with:  Patient      - Extended Intubation/Ventilatory Support Discussed: No.      - Patient is DNR/DNI Status: No     Use of blood products discussed: No .     Postoperative Care    Pain management: Multi-modal analgesia.   PONV prophylaxis: Background Propofol Infusion     Comments:    Other Comments: The risks and benefits of anesthesia, and the alternatives where applicable, have been discussed with the patient, and they wish to proceed.             DANIELE Meléndez CRNA    I have reviewed the pertinent notes and labs in the chart from the past 30 days and (re)examined the patient.  Any updates or changes from those notes are reflected in this note.

## 2023-12-07 NOTE — DISCHARGE INSTRUCTIONS
[facility]    Home Care Following Endoscopy          Activity:  You have just undergone an endoscopic procedure usually performed with conscious sedation.  Do not work or operate machinery (including a car) for at least 12 hours.    I encourage you to walk and attempt to pass this air as soon as possible.    Diet:  Return to the diet you were on before your procedure but eat lightly for the first 12-24 hours.  Drink plenty of water.  Resume any regular medications unless otherwise advised by your physician.  Please begin any new medication prescribed as a result of your procedure as directed by your physician.   If you had any biopsy or polyp removed please refrain from aspirin or aspirin products for 2 days.  If on Coumadin please restart as instructed by your physician.   Pain:  You may take Tylenol as needed for pain.  Expected Recovery:  You can expect some mild abdominal fullness and/or discomfort due to the air used to inflate your intestinal tract. It is also normal to have a mild sore throat after upper endoscopy.    Call Your Physician if You Have:  After Upper Endoscopy:  Shoulder, back or chest pain.  Difficulty breathing or swallowing.  Vomiting blood.  After Colonoscopy:  Worsening persisting abdominal pain which is worse with activity.  Fevers (>101 degrees F), chills or shakes.  Passage of continued blood with bowel movements.     Any questions or concerns about your recovery, please call [phone] or after hours [phone] ([phone]) Nurse Advice Line.    Follow-up Care:  You did not have polyps/biopsy tissue sample(s) removed.      If asked to return to clinic please make an appointment 1 week after your procedure.  Call [phone].

## 2023-12-27 ENCOUNTER — OFFICE VISIT (OUTPATIENT)
Dept: FAMILY MEDICINE | Facility: OTHER | Age: 43
End: 2023-12-27
Payer: COMMERCIAL

## 2023-12-27 VITALS
DIASTOLIC BLOOD PRESSURE: 64 MMHG | HEART RATE: 63 BPM | OXYGEN SATURATION: 97 % | WEIGHT: 145 LBS | TEMPERATURE: 97.8 F | BODY MASS INDEX: 24.76 KG/M2 | SYSTOLIC BLOOD PRESSURE: 102 MMHG | RESPIRATION RATE: 16 BRPM

## 2023-12-27 DIAGNOSIS — R59.1 LYMPHADENOPATHY: ICD-10-CM

## 2023-12-27 DIAGNOSIS — E04.1 THYROID NODULE: ICD-10-CM

## 2023-12-27 DIAGNOSIS — K21.9 GASTROESOPHAGEAL REFLUX DISEASE, UNSPECIFIED WHETHER ESOPHAGITIS PRESENT: Primary | ICD-10-CM

## 2023-12-27 DIAGNOSIS — J35.1 HYPERTROPHY OF TONSILS: ICD-10-CM

## 2023-12-27 LAB
BASOPHILS # BLD AUTO: 0.1 10E3/UL (ref 0–0.2)
BASOPHILS NFR BLD AUTO: 1 %
DEPRECATED S PYO AG THROAT QL EIA: NEGATIVE
EOSINOPHIL # BLD AUTO: 0.1 10E3/UL (ref 0–0.7)
EOSINOPHIL NFR BLD AUTO: 3 %
ERYTHROCYTE [DISTWIDTH] IN BLOOD BY AUTOMATED COUNT: 12.9 % (ref 10–15)
GROUP A STREP BY PCR: NOT DETECTED
HCT VFR BLD AUTO: 43.5 % (ref 35–47)
HGB BLD-MCNC: 13.7 G/DL (ref 11.7–15.7)
IMM GRANULOCYTES # BLD: 0 10E3/UL
IMM GRANULOCYTES NFR BLD: 0 %
LYMPHOCYTES # BLD AUTO: 2.3 10E3/UL (ref 0.8–5.3)
LYMPHOCYTES NFR BLD AUTO: 41 %
MCH RBC QN AUTO: 29.6 PG (ref 26.5–33)
MCHC RBC AUTO-ENTMCNC: 31.5 G/DL (ref 31.5–36.5)
MCV RBC AUTO: 94 FL (ref 78–100)
MONOCYTES # BLD AUTO: 0.4 10E3/UL (ref 0–1.3)
MONOCYTES NFR BLD AUTO: 8 %
NEUTROPHILS # BLD AUTO: 2.7 10E3/UL (ref 1.6–8.3)
NEUTROPHILS NFR BLD AUTO: 48 %
PLATELET # BLD AUTO: 208 10E3/UL (ref 150–450)
RBC # BLD AUTO: 4.63 10E6/UL (ref 3.8–5.2)
WBC # BLD AUTO: 5.7 10E3/UL (ref 4–11)

## 2023-12-27 PROCEDURE — 99214 OFFICE O/P EST MOD 30 MIN: CPT | Performed by: NURSE PRACTITIONER

## 2023-12-27 PROCEDURE — 87651 STREP A DNA AMP PROBE: CPT | Performed by: NURSE PRACTITIONER

## 2023-12-27 PROCEDURE — 85025 COMPLETE CBC W/AUTO DIFF WBC: CPT | Performed by: NURSE PRACTITIONER

## 2023-12-27 PROCEDURE — 36415 COLL VENOUS BLD VENIPUNCTURE: CPT | Performed by: NURSE PRACTITIONER

## 2023-12-27 RX ORDER — OMEPRAZOLE 40 MG/1
40 CAPSULE, DELAYED RELEASE ORAL DAILY
Qty: 90 CAPSULE | Refills: 1 | Status: SHIPPED | OUTPATIENT
Start: 2023-12-27 | End: 2024-06-24

## 2023-12-27 ASSESSMENT — PAIN SCALES - GENERAL: PAINLEVEL: NO PAIN (0)

## 2023-12-27 NOTE — COMMUNITY RESOURCES LIST (ENGLISH)
12/27/2023   [facility] DoYouRemember  N/A  For questions about this resource list or additional care needs, please contact your primary care clinic or care manager.  Phone: [phone]   Email: N/A   Address: [address] [place] [id number]   Hours: N/A        Financial Stability       Utility payment assistance  1  [facility] Services Office - [facility] utility assistance Distance: 2.34 miles      In-Person   [address] [address]  Language: English  Hours: Mon - Fri 8:00 AM - 12:00 PM , Mon - Fri 1:00 PM - 4:00 PM  Fees: Free   Phone: (704) 167-4127 Email: [email or web address] Website: [email or web address]/[email or web address]     2  [facility] - Emergency Assistance - Utility payment assistance Distance: 7.97 miles      In-Person   [address] [facility] [address] [place] [address]  Language: English  Hours: Mon - Fri 8:00 AM - 4:30 PM  Fees: Free   Phone: (425) 643-2773 Email: [facility]. Website: [email or web address]./[facility]/index.php          Important Numbers & Websites       Emergency Services   911  City Services   311  Poison Control   (154) 447-7995  Suicide Prevention Lifeline   (521) 124-6573 (TALK)  Child Abuse Hotline   (858) 676-4951 (4-A-Child)  Sexual Assault Hotline   (699) 741-9046 (HOPE)  National Runaway Safeline   (756) 931-3439 (RUNAWAY)  All-Options Talkline   (122) 919-9008  Substance Abuse Referral   (223) 717-6201 (HELP)

## 2023-12-27 NOTE — PROGRESS NOTES
Assessment & Plan     Gastroesophageal reflux disease, unspecified whether esophagitis present  Recommend continuing  Discussed EGD results.   - omeprazole (PRILOSEC) 40 MG DR capsule; Take 1 capsule (40 mg) by mouth daily    Hypertrophy of tonsils  Has what appears to be some tonsil stones  Recommend strep testing just to be certain  Follow up with ENT.   - Streptococcus A Rapid Screen w/Reflex to PCR - Clinic Collect  - Adult ENT  Referral; Future  - Group A Streptococcus PCR Throat Swab    Lymphadenopathy  Thyroid nodule  Follow up US ordered from last year. If this is normal then we can go 3 years out.   - US Head Neck Soft Tissue; Future  - CBC with platelets and differential; Future  - CBC with platelets and differential    The patient indicates understanding of these issues and agrees with the plan.      See Patient Instructions    DANIELE Zamora CNP  M [facility] MAGALY Cornelius is a 43 year old, presenting for the following health issues:  Recheck Medication      History of Present Illness       Reason for visit:  Followup new med    She eats 2-3 servings of fruits and vegetables daily.She consumes 0 sweetened beverage(s) daily.She exercises with enough effort to increase her heart rate 9 or less minutes per day.  She exercises with enough effort to increase her heart rate 3 or less days per week.   She is taking medications regularly.         Doing on a lot better on the omeprazole. But she is noticing still having a hoarse voice and she hasn't had much issues with the heartburn.     Had a endoscopy with in this last month just wanted to touch base.     Review of Systems         Objective    /64   Pulse 63   Temp 97.8  F (36.6  C) (Temporal)   Resp 16   Wt 65.8 kg (145 lb)   LMP  (LMP Unknown)   SpO2 97%   BMI 24.76 kg/m    Body mass index is 24.76 kg/m .  Physical Exam   GENERAL: healthy, alert and no distress  HENT: normal cephalic/atraumatic,  ear canals and TM's normal, nose and mouth without ulcers or lesions, oropharynx clear, oral mucous membranes moist, tonsillar hypertrophy, and tonsil stones present.   NECK: no adenopathy, no asymmetry, masses, or scars and thyroid normal to palpation  RESP: lungs clear to auscultation - no rales, rhonchi or wheezes  SKIN: no suspicious lesions or rashes  NEURO: Normal strength and tone, mentation intact and speech normal  PSYCH: mentation appears normal, affect normal/bright    Results for orders placed or performed in visit on 12/27/23   CBC with platelets and differential     Status: None   Result Value Ref Range    WBC Count 5.7 4.0 - 11.0 10e3/uL    RBC Count 4.63 3.80 - 5.20 10e6/uL    Hemoglobin 13.7 11.7 - 15.7 g/dL    Hematocrit 43.5 35.0 - 47.0 %    MCV 94 78 - 100 fL    MCH 29.6 26.5 - 33.0 pg    MCHC 31.5 31.5 - 36.5 g/dL    RDW 12.9 10.0 - 15.0 %    Platelet Count 208 150 - 450 10e3/uL    % Neutrophils 48 %    % Lymphocytes 41 %    % Monocytes 8 %    % Eosinophils 3 %    % Basophils 1 %    % Immature Granulocytes 0 %    Absolute Neutrophils 2.7 1.6 - 8.3 10e3/uL    Absolute Lymphocytes 2.3 0.8 - 5.3 10e3/uL    Absolute Monocytes 0.4 0.0 - 1.3 10e3/uL    Absolute Eosinophils 0.1 0.0 - 0.7 10e3/uL    Absolute Basophils 0.1 0.0 - 0.2 10e3/uL    Absolute Immature Granulocytes 0.0 <=0.4 10e3/uL   Streptococcus A Rapid Screen w/Reflex to PCR - Clinic Collect     Status: Normal    Specimen: Throat; Swab   Result Value Ref Range    Group A Strep antigen Negative Negative   CBC with platelets and differential     Status: None    Narrative    The following orders were created for panel order CBC with platelets and differential.  Procedure                               Abnormality         Status                     ---------                               -----------         ------                     CBC with platelets and d...[[id number]]                      Final result                 Please view results for  these tests on the individual orders.       The patient indicates understanding of these issues and agrees with the plan.

## 2023-12-28 ENCOUNTER — ANCILLARY PROCEDURE (OUTPATIENT)
Dept: ULTRASOUND IMAGING | Facility: CLINIC | Age: 43
End: 2023-12-28
Attending: NURSE PRACTITIONER
Payer: COMMERCIAL

## 2023-12-28 DIAGNOSIS — R59.1 LYMPHADENOPATHY: ICD-10-CM

## 2023-12-28 DIAGNOSIS — E04.1 THYROID NODULE: ICD-10-CM

## 2023-12-28 PROCEDURE — 76536 US EXAM OF HEAD AND NECK: CPT | Mod: GC | Performed by: RADIOLOGY

## 2024-02-21 ENCOUNTER — OFFICE VISIT (OUTPATIENT)
Dept: OTOLARYNGOLOGY | Facility: OTHER | Age: 44
End: 2024-02-21
Payer: COMMERCIAL

## 2024-02-21 VITALS
SYSTOLIC BLOOD PRESSURE: 114 MMHG | BODY MASS INDEX: 23.67 KG/M2 | HEIGHT: 64 IN | TEMPERATURE: 98.3 F | DIASTOLIC BLOOD PRESSURE: 72 MMHG | WEIGHT: 138.67 LBS

## 2024-02-21 DIAGNOSIS — J35.01 CHRONIC TONSILLITIS: ICD-10-CM

## 2024-02-21 DIAGNOSIS — R49.0 DYSPHONIA: ICD-10-CM

## 2024-02-21 DIAGNOSIS — J35.8 TONSIL STONE: Primary | ICD-10-CM

## 2024-02-21 DIAGNOSIS — K21.9 LPRD (LARYNGOPHARYNGEAL REFLUX DISEASE): ICD-10-CM

## 2024-02-21 PROCEDURE — 99204 OFFICE O/P NEW MOD 45 MIN: CPT | Mod: 25 | Performed by: OTOLARYNGOLOGY

## 2024-02-21 PROCEDURE — 31575 DIAGNOSTIC LARYNGOSCOPY: CPT | Performed by: OTOLARYNGOLOGY

## 2024-02-21 NOTE — LETTER
2/21/2024         RE: Ashli Gamboa  [address] [facility] [address]        Dear Colleague,    Thank you for referring your patient, Ashli Gamboa, to the [facility]. Please see a copy of my visit note below.    ENT Consultation    Ashli Gamboa who is a 44 year old female seen in consultation at the request of Juana Fox CNP.      History of Present Illness - Ashli Gamboa is a 44 year old female patient presents with several issues.  First tonsil stones that have been persistent off-and-on since youth but in the last couple years have gotten more frequent.  She experiences halitosis bad taste phlegm in the back of her throat but also gets some dysphonia with a little raspiness week of voice towards the base uses it and has been diagnosed with laryngopharyngeal reflux disease was found to have hiatal hernia on upper endoscopy recently.  She takes omeprazole 40 mg but only in the morning.  She has not been extensively counseled on lifestyle changes with diet changes.  Also experiences some ear pressure occasionally.          BP Readings from Last 1 Encounters:   02/21/24 114/72       BP noted to be well controlled today in office.     Ashli IS NOT a smoker/uses chewing tobacco.  Ashli already quit      Past Medical History -   Past Medical History:   Diagnosis Date     Arthritis     BIG TOE     Cervical high risk HPV (human papillomavirus) test positive 07/05/2017     Cervical high risk HPV (human papillomavirus) test positive 06/10/2015     Depressive disorder 2007    I have been on and off medication for the last several years     Endometriosis      Hearing problem      Obese      Sensorineural hearing loss        Current Medications -   Current Outpatient Medications:      buPROPion (WELLBUTRIN XL) 300 MG 24 hr tablet, TAKE 1 TABLET BY MOUTH EVERY MORNING, Disp: 90 tablet, Rfl: 1     MULTIPLE VITAMIN PO, Take 1 patch by mouth daily PatchMD  - Multivitamin Patch, Disp: , Rfl:      NALTREXONE HCL PO, Take 4.5 mg by mouth, Disp: , Rfl:      NUVARING 0.12-0.015 MG/24HR vaginal ring, INSERT 1 RING VAGINALLY EVERY 21 DAYS, Disp: 1 each, Rfl: 0     omeprazole (PRILOSEC) 40 MG DR capsule, Take 1 capsule (40 mg) by mouth daily, Disp: 90 capsule, Rfl: 1     topiramate (TOPAMAX) 100 MG tablet, Take 3 tablets by mouth daily, Disp: , Rfl:      venlafaxine (EFFEXOR XR) 37.5 MG 24 hr capsule, TAKE 1 CAPSULE BY MOUTH EVERY DAY, Disp: 90 capsule, Rfl: 1     vitamin B complex with vitamin C (STRESS TAB) tablet, Take 1 tablet by mouth daily, Disp: , Rfl:      vitamin D3 (CHOLECALCIFEROL) 125 MCG (5000 UT) tablet, Take 1 tablet by mouth every 24 hours, Disp: , Rfl:   No current facility-administered medications for this visit.    Facility-Administered Medications Ordered in Other Visits:      BUPivacaine 0.25%, 10 mL, INTRA-ARTICULAR, Once, Kian Clement DPM     iopamidol (ISOVUE-M 200) solution 20 mL, 20 mL, INTRA-ARTICULAR, Once, Kian Clement DPM     lidocaine (PF) (XYLOCAINE) 1 % injection 30 mL, 30 mL, Subcutaneous, Once, Kian Clement DPM     triamcinolone acetonide (KENALOG-40) injection 40 mg, 40 mg, INTRA-ARTICULAR, Once, Kian Clement DPM    Allergies - No Known Allergies    Social History -   Social History     Socioeconomic History     Marital status:      Spouse name: Sid     Number of children: 0   Occupational History     Comment: [employer]   Tobacco Use     Smoking status: Former     Packs/day: 0.50     Years: 5.00     Additional pack years: 0.00     Total pack years: 2.50     Types: Cigarettes     Start date: 1/2/2007     Quit date: 11/1/2013     Years since quitting: 10.2     Smokeless tobacco: Never   Vaping Use     Vaping Use: Never used   Substance and Sexual Activity     Alcohol use: Yes     Comment: 2 drinks per month     Drug use: Not Currently     Types: Marijuana     Comment: 2 weeks ago for  pain     Sexual activity: Yes     Partners: Male     Birth control/protection: Inserts/Ring     Comment: nuvaring   Other Topics Concern     Parent/sibling w/ CABG, MI or angioplasty before 65F 55M? No     Social Determinants of Health     Financial Resource Strain: High Risk (12/27/2023)    Financial Resource Strain      Within the past 12 months, have you or your family members you live with been unable to get utilities (heat, electricity) when it was really needed?: Yes   Food Insecurity: Low Risk  (12/27/2023)    Food Insecurity      Within the past 12 months, did you worry that your food would run out before you got money to buy more?: No      Within the past 12 months, did the food you bought just not last and you didn t have money to get more?: No   Transportation Needs: Low Risk  (12/27/2023)    Transportation Needs      Within the past 12 months, has lack of transportation kept you from medical appointments, getting your medicines, non-medical meetings or appointments, work, or from getting things that you need?: No   Interpersonal Safety: Low Risk  (12/27/2023)    Interpersonal Safety      Do you feel physically and emotionally safe where you currently live?: Yes      Within the past 12 months, have you been hit, slapped, kicked or otherwise physically hurt by someone?: No      Within the past 12 months, have you been humiliated or emotionally abused in other ways by your partner or ex-partner?: No   Housing Stability: Low Risk  (12/27/2023)    Housing Stability      Do you have housing? : Yes      Are you worried about losing your housing?: No       Family History -   Family History   Problem Relation Age of Onset     Depression Mother      Cancer Mother         Cervical      Other - See Comments Mother         Fibromyalgia     Crohn's Disease Mother      Obesity Mother      Ovarian Cancer Mother         Cervical Cancer     Substance Abuse Mother      Stomach Problem Mother      Skin Cancer Mother       "Arthritis Father      Rheumatoid Arthritis Father      Depression Father      Substance Abuse Father      Breast Cancer Maternal Grandmother      Cancer Maternal Grandmother      Hypertension Maternal Grandmother      Cerebrovascular Disease Paternal Grandmother      Prostate Cancer Paternal Grandfather      Depression Brother      Mental Illness Brother      Substance Abuse Brother      Mental Illness Brother         schizophrenia and bipolar     Substance Abuse Brother      Stomach Problem Brother      Depression Sister      Stomach Problem Sister      Colon Cancer Other        Review of Systems - As per HPI and PMHx, otherwise review of system review of the head and neck negative. Otherwise 10+ review of system is negative    Physical Exam  /72   Temp 98.3  F (36.8  C) (Temporal)   Ht 1.626 m (5' 4\")   Wt 62.9 kg (138 lb 10.7 oz)   LMP  (LMP Unknown)   BMI 23.80 kg/m    BMI: Body mass index is 23.8 kg/m .    General - The patient is well nourished and well developed, and appears to have good nutritional status.  Alert and oriented to person and place, answers questions and cooperates with examination appropriately.    SKIN - No suspicious lesions or rashes.  Respiration - No respiratory distress.  Head and Face - Normocephalic and atraumatic, with no gross asymmetry noted of the contour of the facial features.  The facial nerve is intact, with strong symmetric movements.    Voice and Breathing - The patient was breathing comfortably without the use of accessory muscles. The patients voice was clear and strong, and had appropriate pitch and quality.    Ears - Bilateral pinna and EACs with normal appearing overlying skin. Tympanic membrane intact with good mobility on pneumatic otoscopy bilaterally. Bony landmarks of the ossicular chain are normal. The tympanic membranes are normal in appearance. No retraction, perforation, or masses.  No fluid or purulence was seen in the external canal or the middle " ear.     Eyes - Extraocular movements intact.  Sclera were not icteric or injected, conjunctiva were pink and moist.    Mouth - Examination of the oral cavity showed pink, healthy oral mucosa. No lesions or ulcerations noted.  The tongue was mobile and midline, and the dentition were in good condition.      Throat - The walls of the oropharynx were smooth, pink, moist, symmetric, and had no lesions or ulcerations.  The tonsillar pillars and soft palate were symmetric.  Tonsils appear to be 1+ in size with multiple crypts no tonsil stones noted the uvula was midline on elevation.    Neck - Normal midline excursion of the laryngotracheal complex during swallowing.  Full range of motion on passive movement.  Palpation of the occipital, submental, submandibular, internal jugular chain, and supraclavicular nodes did not demonstrate any abnormal lymph nodes or masses.  The carotid pulse was palpable bilaterally.  Palpation of the thyroid was soft and smooth, with no nodules or goiter appreciated.  The trachea was mobile and midline.    Nose - External contour is symmetric, no gross deflection or scars.  Nasal mucosa is pink and moist with no abnormal mucus.  The septum was midline and non-obstructive, turbinates of normal size and position.  No polyps, masses, or purulence noted on examination.    Neuro - Nonfocal neuro exam is normal, CN 2 through 12 intact, normal gait and muscle tone.      Performed in clinic today:  Attempts at mirror laryngoscopy were not possible due to gag reflex.  Therefore I proceeded with a fiberoptic examination.  First I sprayed both sides of the nose with a mixture of lidocaine and neosynephrine.  I then passed the scope through the nasal cavity.  The nasal cavity was unremarkable.  The nasopharynx was mucosally covered and symmetric.  The Eustachian tube openings were unobstructed.  Going further down I had a clear view of the base of tongue which had normal appearing lingual tonsillar  tissue.  The base of tongue was free of lesions, and the vallecula was open.  The epiglottis was smooth and mucosally covered.  The supraglottic larynx was then clearly visualized.  The vocal cords moved smoothly and symmetrically, they were pearly white and no lesions were seen.  The pyriform sinuses were open, and the limited view of the postcricoid region did not show any lesions.  There is definitely finding of thickening and erythema of the posterior commissure and arytenoid cartilages.  Ambu disposable scope.      A/P - Crystal Donna Gamboa is a 44 year old female with chronic tonsillitis tonsil stones but also LPRD related symptomatology.  At this point we discussed options.  Patient is not very keen on surgery of the tonsils at this point.  Therefore we will optimize treatment for LPRD with switching her omeprazole dosing to 40 mg before supper on empty stomach and supplementing with the Gaviscon after meals as needed to avoid clearing of her phlegm sensation.  Dietary changes with avoidance of late-night meals especially 3 hours before bedtime and avoid greasy fatty meals and caffeinated products in the evening.  Mid torso elevation in bed is advocated.  Patient will also gargle with salt water twice daily in the area of the tonsils.  She will follow-up in 2 to 3 months.      Ravi Witt MD       Again, thank you for allowing me to participate in the care of your patient.        Sincerely,        Ravi Witt MD, MD

## 2024-02-26 ENCOUNTER — TELEPHONE (OUTPATIENT)
Dept: SURGERY | Age: 44
End: 2024-02-26

## 2024-03-22 DIAGNOSIS — F33.41 RECURRENT MAJOR DEPRESSIVE DISORDER, IN PARTIAL REMISSION (H): ICD-10-CM

## 2024-03-22 RX ORDER — BUPROPION HYDROCHLORIDE 300 MG/1
300 TABLET ORAL EVERY MORNING
Qty: 90 TABLET | Refills: 0 | Status: SHIPPED | OUTPATIENT
Start: 2024-03-22 | End: 2024-06-21

## 2024-03-22 NOTE — TELEPHONE ENCOUNTER
[employer] message sent to patient to schedule an appointment prior to additional refills postponing to see if patient views message.

## 2024-04-03 ENCOUNTER — VIRTUAL VISIT (OUTPATIENT)
Dept: FAMILY MEDICINE | Facility: CLINIC | Age: 44
End: 2024-04-03
Payer: COMMERCIAL

## 2024-04-03 DIAGNOSIS — F41.1 GAD (GENERALIZED ANXIETY DISORDER): ICD-10-CM

## 2024-04-03 DIAGNOSIS — F33.41 RECURRENT MAJOR DEPRESSIVE DISORDER, IN PARTIAL REMISSION (H): Primary | ICD-10-CM

## 2024-04-03 PROCEDURE — 99214 OFFICE O/P EST MOD 30 MIN: CPT | Mod: 95 | Performed by: NURSE PRACTITIONER

## 2024-04-03 RX ORDER — HYDROXYZINE HYDROCHLORIDE 25 MG/1
12.5-5 TABLET, FILM COATED ORAL 3 TIMES DAILY PRN
Qty: 90 TABLET | Refills: 0 | Status: SHIPPED | OUTPATIENT
Start: 2024-04-03 | End: 2024-09-20

## 2024-04-03 ASSESSMENT — PATIENT HEALTH QUESTIONNAIRE - PHQ9
SUM OF ALL RESPONSES TO PHQ QUESTIONS 1-9: 9
10. IF YOU CHECKED OFF ANY PROBLEMS, HOW DIFFICULT HAVE THESE PROBLEMS MADE IT FOR YOU TO DO YOUR WORK, TAKE CARE OF THINGS AT HOME, OR GET ALONG WITH OTHER PEOPLE: SOMEWHAT DIFFICULT
SUM OF ALL RESPONSES TO PHQ QUESTIONS 1-9: 9

## 2024-04-03 ASSESSMENT — ANXIETY QUESTIONNAIRES
7. FEELING AFRAID AS IF SOMETHING AWFUL MIGHT HAPPEN: NOT AT ALL
3. WORRYING TOO MUCH ABOUT DIFFERENT THINGS: MORE THAN HALF THE DAYS
IF YOU CHECKED OFF ANY PROBLEMS ON THIS QUESTIONNAIRE, HOW DIFFICULT HAVE THESE PROBLEMS MADE IT FOR YOU TO DO YOUR WORK, TAKE CARE OF THINGS AT HOME, OR GET ALONG WITH OTHER PEOPLE: SOMEWHAT DIFFICULT
1. FEELING NERVOUS, ANXIOUS, OR ON EDGE: NEARLY EVERY DAY
7. FEELING AFRAID AS IF SOMETHING AWFUL MIGHT HAPPEN: NOT AT ALL
GAD7 TOTAL SCORE: 13
5. BEING SO RESTLESS THAT IT IS HARD TO SIT STILL: MORE THAN HALF THE DAYS
GAD7 TOTAL SCORE: 13
2. NOT BEING ABLE TO STOP OR CONTROL WORRYING: MORE THAN HALF THE DAYS
GAD7 TOTAL SCORE: 13
6. BECOMING EASILY ANNOYED OR IRRITABLE: MORE THAN HALF THE DAYS
4. TROUBLE RELAXING: MORE THAN HALF THE DAYS
8. IF YOU CHECKED OFF ANY PROBLEMS, HOW DIFFICULT HAVE THESE MADE IT FOR YOU TO DO YOUR WORK, TAKE CARE OF THINGS AT HOME, OR GET ALONG WITH OTHER PEOPLE?: SOMEWHAT DIFFICULT

## 2024-04-03 NOTE — PROGRESS NOTES
"    Instructions Relayed to Patient by Virtual Roomer:     Patient is active on [employer]:   Relayed following to patient: \"It looks like you are active on [employer], are you able to join the visit this way? If not, do you need us to send you a link now or would you like your provider to send a link via text or email when they are ready to initiate the visit?\"    Reminded patient to ensure they were logged on to virtual visit by arrival time listed. Documented in appointment notes if patient had flexibility to initiate visit sooner than arrival time. If pediatric virtual visit, ensured pediatric patient along with parent/guardian will be present for video visit.     Patient offered the website [email or web address] and/or phone number to [employer] Help line: [phone]      Ashli is a 44 year old who is being evaluated via a billable video visit.    How would you like to obtain your AVS? [employer]  If the video visit is dropped, the invitation should be resent by: Text to cell phone: [phone]  Will anyone else be joining your video visit? No    Assessment & Plan     ALIDA (generalized anxiety disorder)  Recurrent major depressive disorder, in partial remission (H24)  Uncontrolled  Recommend increasing her Effexor  In the meantime ok to use atarax as needed for anxiety and insomnia. Advised this medication causes drowsiness  Stop edibles as this can interact with her medications and impact mental health. She also had palpitations with this as well.   Follow up with me in 4 weeks as discussed  Discussed possibly doing Trazodone in the future for sleep if needed.   - hydrOXYzine HCl (ATARAX) 25 MG tablet; Take 0.5-2 tablets (12.5-50 mg) by mouth 3 times daily as needed for anxiety or other (insomnia)    The patient indicates understanding of these issues and agrees with the plan.        See Patient Instructions    Subjective   Ashli is a 44 year old, presenting for the following health issues:  Recheck " Medication (Depression/Anxiety Medication Check )      4/3/2024    11:10 AM   Additional Questions   Roomed by Beverly RAMIREZ   Accompanied by Self     History of Present Illness       Mental Health Follow-up:  Patient presents to follow-up on Depression & Anxiety.Patient's depression since last visit has been:  Worse  The patient is not having other symptoms associated with depression.  Patient's anxiety since last visit has been:  Worse  The patient is not having other symptoms associated with anxiety.  Any significant life events: relationship concerns and housing concerns  Patient is not feeling anxious or having panic attacks.  Patient has no concerns about alcohol or drug use.    She eats 2-3 servings of fruits and vegetables daily.She consumes 0 sweetened beverage(s) daily.She exercises with enough effort to increase her heart rate 30 to 60 minutes per day.  She exercises with enough effort to increase her heart rate 3 or less days per week.   She is taking medications regularly.       Life changes, her  and her are getting . She notes it is what she wanted. Sold the house. She is moving next week. A lot going on for her. 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 Hard time sleeping. For months she was waking up between 2-2:15pm. The last few weeks have been a little better. 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 That has been very helpful for her and relaxing. Helping her sleep a little more. Waking up in between sleep cycle.         Review of Systems  Constitutional, HEENT, cardiovascular, pulmonary, gi and gu systems are negative, except as otherwise noted.      Objective           Vitals:  No vitals were obtained today due to virtual visit.    Physical Exam   GENERAL: alert and no distress  NEURO: Cranial nerves grossly intact.  Mentation and speech appropriate for age.  PSYCH: Appropriate affect, tone, and pace of words    No results found for any visits on 04/03/24.       Telephone visit:     Type of service:  Telephone   Originating Location (pt. Location): Home  Distant Location (provider location):  On-site  Platform used for Video Visit: Norris  Signed Electronically by: DANIELE Zamora CNP

## 2024-04-04 ENCOUNTER — TRANSFERRED RECORDS (OUTPATIENT)
Dept: HEALTH INFORMATION MANAGEMENT | Facility: CLINIC | Age: 44
End: 2024-04-04
Payer: COMMERCIAL

## 2024-04-07 ENCOUNTER — VIRTUAL VISIT (OUTPATIENT)
Dept: URGENT CARE | Facility: CLINIC | Age: 44
End: 2024-04-07
Payer: COMMERCIAL

## 2024-04-07 DIAGNOSIS — H92.02 EAR PAIN, LEFT: Primary | ICD-10-CM

## 2024-04-07 DIAGNOSIS — R50.9 FEVER, UNSPECIFIED FEVER CAUSE: ICD-10-CM

## 2024-04-07 PROCEDURE — 99207 PR NON-BILLABLE SERV PER CHARTING: CPT

## 2024-04-07 NOTE — PROGRESS NOTES
"Ashli is a 44 year old who is being evaluated via a billable telephone visit.    What phone number would you like to be contacted at? [phone]  How would you like to obtain your AVS? MyChart      Assessment & Plan     (H92.02) Ear pain, left  (primary encounter diagnosis)    (R50.9) Fever, unspecified fever cause    Plan: Concern about new worsening symptoms, still running fever after 3 days of antibiotic and needs ear evaluated in person  Will go to [place] now    DANIELE Veliz CNP          Subjective   Ashli is a 44 year old, presenting for the following health issues:  WORSENING ILLNESS    HPI   Dx strep wed on penicillin  Cough is not improving (dry)   No sob wheezing aches  Fever to 101 yesterday   Left ear pain fullness felt \"pop\" feels better now (slight throbbing) small drainage  Slight hearing loss feels same wears hearing aid       Objective           Vitals:  No vitals were obtained today due to virtual visit.    Physical Exam   GENERAL: alert and no distress  RESP: No audible wheeze, cough,  PSYCH: Appropriate affect, tone, and pace of words    Telephone time spent 6 minutes  DANIELE Veliz CNP  Signed Electronically by: [facility]    "

## 2024-04-10 ENCOUNTER — APPOINTMENT (OUTPATIENT)
Dept: SURGERY | Age: 44
End: 2024-04-10

## 2024-04-17 ENCOUNTER — APPOINTMENT (OUTPATIENT)
Dept: SURGERY | Age: 44
End: 2024-04-17

## 2024-04-17 VITALS — BODY MASS INDEX: 23.05 KG/M2 | WEIGHT: 135 LBS | HEIGHT: 64 IN

## 2024-04-17 DIAGNOSIS — Z41.1 ENCOUNTER FOR COSMETIC SURGERY: Primary | ICD-10-CM

## 2024-04-18 ENCOUNTER — TELEPHONE (OUTPATIENT)
Dept: FAMILY MEDICINE | Facility: OTHER | Age: 44
End: 2024-04-18
Payer: COMMERCIAL

## 2024-04-18 ENCOUNTER — E-ADVICE (OUTPATIENT)
Dept: SURGERY | Age: 44
End: 2024-04-18

## 2024-04-18 DIAGNOSIS — F33.41 RECURRENT MAJOR DEPRESSIVE DISORDER, IN PARTIAL REMISSION (H): ICD-10-CM

## 2024-04-18 DIAGNOSIS — F41.1 GAD (GENERALIZED ANXIETY DISORDER): ICD-10-CM

## 2024-04-18 RX ORDER — HYDROXYZINE HYDROCHLORIDE 25 MG/1
12.5-5 TABLET, FILM COATED ORAL 3 TIMES DAILY PRN
Qty: 90 TABLET | Refills: 0 | Status: CANCELLED | OUTPATIENT
Start: 2024-04-18

## 2024-04-19 RX ORDER — HYDROXYZINE HYDROCHLORIDE 25 MG/1
12.5-5 TABLET, FILM COATED ORAL 3 TIMES DAILY PRN
Qty: 90 TABLET | Refills: 0 | OUTPATIENT
Start: 2024-04-19

## 2024-04-19 NOTE — TELEPHONE ENCOUNTER
Patient called back. She states she does not need a refill of the medication and is unsure how/why it was requested.   She states she has not been taking any during the daytime hours. She takes one pill at night a few times a week for sleep. This has been helpful      Mary Rivera RN on 4/19/2024 at 11:48 AM

## 2024-04-19 NOTE — TELEPHONE ENCOUNTER
Please call patient how much is she taking and how often. I sent #90 tablets in April so want to be sure she needs more than  #90 at a time or what I should write it as to get her 90 day supply.       DANIELE Zamora CNP  Questions or concerns please feel free to send me a [employer] message or call me  Phone : [phone]

## 2024-04-19 NOTE — TELEPHONE ENCOUNTER
"RN Triage    Patient Contact    Attempt # 1    Was call answered?  No.  Left message on voicemail with information to call me back.    Upon patient callback please advise of the below from provider:     \"Please call patient how much is she taking and how often. I sent #90 tablets in April so want to be sure she needs more than  #90 at a time or what I should write it as to get her 90 day supply.      DANIELE Zamora CNP  Questions or concerns please feel free to send me a [employer] message or call me  Phone : [phone]\"    CANDIDO Song, RN  [facility] ~ Registered Nurse  Clinic Triage  April 19, 2024          "

## 2024-04-27 DIAGNOSIS — F41.1 GAD (GENERALIZED ANXIETY DISORDER): ICD-10-CM

## 2024-04-27 DIAGNOSIS — F33.41 RECURRENT MAJOR DEPRESSIVE DISORDER, IN PARTIAL REMISSION (H): ICD-10-CM

## 2024-04-29 ENCOUNTER — MYC REFILL (OUTPATIENT)
Dept: FAMILY MEDICINE | Facility: OTHER | Age: 44
End: 2024-04-29
Payer: COMMERCIAL

## 2024-04-29 DIAGNOSIS — F41.1 GAD (GENERALIZED ANXIETY DISORDER): Primary | ICD-10-CM

## 2024-04-29 DIAGNOSIS — F33.41 RECURRENT MAJOR DEPRESSIVE DISORDER, IN PARTIAL REMISSION (H): ICD-10-CM

## 2024-04-29 RX ORDER — VENLAFAXINE HYDROCHLORIDE 37.5 MG/1
37.5 CAPSULE, EXTENDED RELEASE ORAL DAILY
Qty: 90 CAPSULE | Refills: 1 | Status: SHIPPED | OUTPATIENT
Start: 2024-04-29 | End: 2024-04-29

## 2024-04-30 DIAGNOSIS — F33.41 RECURRENT MAJOR DEPRESSIVE DISORDER, IN PARTIAL REMISSION (H): ICD-10-CM

## 2024-04-30 DIAGNOSIS — F41.1 GAD (GENERALIZED ANXIETY DISORDER): ICD-10-CM

## 2024-04-30 RX ORDER — VENLAFAXINE HYDROCHLORIDE 37.5 MG/1
75 CAPSULE, EXTENDED RELEASE ORAL DAILY
Qty: 180 CAPSULE | Refills: 1 | Status: SHIPPED | OUTPATIENT
Start: 2024-04-30 | End: 2024-04-30

## 2024-04-30 RX ORDER — VENLAFAXINE HYDROCHLORIDE 75 MG/1
75 CAPSULE, EXTENDED RELEASE ORAL DAILY
Qty: 90 CAPSULE | Refills: 1 | Status: SHIPPED | OUTPATIENT
Start: 2024-04-30 | End: 2024-05-01

## 2024-04-30 NOTE — PROGRESS NOTES
History of Present Illness - Ashli Gamboa is a 44 year old female presenting in clinic today for a recheck on Patient presents with:  Follow Up: Tonsil stones, left ear    Patient presents for evaluation of her throat.  She had some symptoms of chronic tonsillitis and tonsil stones but also had some reflux related activity.  She was actually treated with Gaviscon after meals and was taking omeprazole 40 mg before supper.  Her reflux symptomatology is under control.  Still has halitosis still gets a couple strep throats a year even though has not had discomfort on daily basis as much.  Last time she was seen was about 2 and half months ago.    Body mass index is 24.22 kg/m .        BP Readings from Last 1 Encounters:   05/08/24 104/60       BP noted to be well controlled today in office.     Ashli IS NOT a smoker/uses chewing tobacco.  Ashli already quit      Past Medical History -   Past Medical History:   Diagnosis Date    Arthritis     BIG TOE    Cervical high risk HPV (human papillomavirus) test positive 07/05/2017    Cervical high risk HPV (human papillomavirus) test positive 06/10/2015    Depressive disorder 2007    I have been on and off medication for the last several years    Endometriosis     Hearing problem     Obese     Sensorineural hearing loss        Current Medications -   Current Outpatient Medications:     buPROPion (WELLBUTRIN XL) 300 MG 24 hr tablet, TAKE 1 TABLET BY MOUTH EVERY DAY IN THE MORNING, Disp: 90 tablet, Rfl: 0    hydrOXYzine HCl (ATARAX) 25 MG tablet, Take 0.5-2 tablets (12.5-50 mg) by mouth 3 times daily as needed for anxiety or other (insomnia), Disp: 90 tablet, Rfl: 0    MULTIPLE VITAMIN PO, Take 1 patch by mouth daily PatchMD - Multivitamin Patch, Disp: , Rfl:     NALTREXONE HCL PO, Take 4.5 mg by mouth, Disp: , Rfl:     NUVARING 0.12-0.015 MG/24HR vaginal ring, INSERT 1 RING VAGINALLY EVERY 21 DAYS, Disp: 1 each, Rfl: 0    omeprazole (PRILOSEC) 40 MG DR capsule, Take  1 capsule (40 mg) by mouth daily, Disp: 90 capsule, Rfl: 1    topiramate (TOPAMAX) 100 MG tablet, Take 3 tablets by mouth daily, Disp: , Rfl:     venlafaxine (EFFEXOR XR) 75 MG 24 hr capsule, Take 1 capsule (75 mg) by mouth daily, Disp: 90 capsule, Rfl: 1    vitamin B complex with vitamin C (STRESS TAB) tablet, Take 1 tablet by mouth daily, Disp: , Rfl:     vitamin D3 (CHOLECALCIFEROL) 125 MCG (5000 UT) tablet, Take 1 tablet by mouth every 24 hours, Disp: , Rfl:     diclofenac (VOLTAREN) 1 % topical gel, Apply 2 g topically 4 times daily (Patient not taking: Reported on 5/8/2024), Disp: 350 g, Rfl: 0  No current facility-administered medications for this visit.    Facility-Administered Medications Ordered in Other Visits:     BUPivacaine 0.25%, 10 mL, INTRA-ARTICULAR, Once, Kian Clement DPM    iopamidol (ISOVUE-M 200) solution 20 mL, 20 mL, INTRA-ARTICULAR, Once, Kian Clement DPM    lidocaine (PF) (XYLOCAINE) 1 % injection 30 mL, 30 mL, Subcutaneous, Once, Kian Clement DPM    triamcinolone acetonide (KENALOG-40) injection 40 mg, 40 mg, INTRA-ARTICULAR, Once, Kian Clement DPM    Allergies - No Known Allergies    Social History -   Social History     Socioeconomic History    Marital status:      Spouse name: Sid    Number of children: 0   Occupational History     Comment: [employer]   Tobacco Use    Smoking status: Former     Current packs/day: 0.00     Average packs/day: 0.5 packs/day for 6.8 years (3.4 ttl pk-yrs)     Types: Cigarettes     Start date: 1/2/2007     Quit date: 11/1/2013     Years since quitting: 10.5    Smokeless tobacco: Never   Vaping Use    Vaping status: Never Used   Substance and Sexual Activity    Alcohol use: Yes     Comment: 2 drinks per month    Drug use: Not Currently     Types: Marijuana     Comment: 2 weeks ago for pain    Sexual activity: Yes     Partners: Male     Birth control/protection: Inserts/Ring     Comment: nuvaring    Other Topics Concern    Parent/sibling w/ CABG, MI or angioplasty before 65F 55M? No     Social Determinants of Health     Financial Resource Strain: High Risk (12/27/2023)    Financial Resource Strain     Within the past 12 months, have you or your family members you live with been unable to get utilities (heat, electricity) when it was really needed?: Yes   Food Insecurity: Low Risk  (12/27/2023)    Food Insecurity     Within the past 12 months, did you worry that your food would run out before you got money to buy more?: No     Within the past 12 months, did the food you bought just not last and you didn t have money to get more?: No   Transportation Needs: Low Risk  (12/27/2023)    Transportation Needs     Within the past 12 months, has lack of transportation kept you from medical appointments, getting your medicines, non-medical meetings or appointments, work, or from getting things that you need?: No   Interpersonal Safety: Low Risk  (12/27/2023)    Interpersonal Safety     Do you feel physically and emotionally safe where you currently live?: Yes     Within the past 12 months, have you been hit, slapped, kicked or otherwise physically hurt by someone?: No     Within the past 12 months, have you been humiliated or emotionally abused in other ways by your partner or ex-partner?: No   Housing Stability: Low Risk  (12/27/2023)    Housing Stability     Do you have housing? : Yes     Are you worried about losing your housing?: No       Family History -   Family History   Problem Relation Age of Onset    Depression Mother     Cancer Mother         Cervical     Other - See Comments Mother         Fibromyalgia    Crohn's Disease Mother     Obesity Mother     Ovarian Cancer Mother         Cervical Cancer    Substance Abuse Mother     Stomach Problem Mother     Skin Cancer Mother     Arthritis Father     Rheumatoid Arthritis Father     Depression Father     Substance Abuse Father     Breast Cancer Maternal Grandmother  "    Cancer Maternal Grandmother     Hypertension Maternal Grandmother     Cerebrovascular Disease Paternal Grandmother     Prostate Cancer Paternal Grandfather     Depression Brother     Mental Illness Brother     Substance Abuse Brother     Mental Illness Brother         schizophrenia and bipolar    Substance Abuse Brother     Stomach Problem Brother     Depression Sister     Stomach Problem Sister     Colon Cancer Other        Review of Systems - As per HPI and PMHx, otherwise review of system review of the head and neck negative. Otherwise 10+ review of system is negative    Physical Exam  /60   Temp 98.5  F (36.9  C) (Temporal)   Ht 1.626 m (5' 4\")   Wt 64 kg (141 lb 1.6 oz)   LMP  (LMP Unknown)   BMI 24.22 kg/m    BMI: Body mass index is 24.22 kg/m .    General - The patient is well nourished and well developed, and appears to have good nutritional status.  Alert and oriented to person and place, answers questions and cooperates with examination appropriately.    SKIN - No suspicious lesions or rashes.  Respiration - No respiratory distress.  Head and Face - Normocephalic and atraumatic, with no gross asymmetry noted of the contour of the facial features.  The facial nerve is intact, with strong symmetric movements.    Voice and Breathing - The patient was breathing comfortably without the use of accessory muscles. The patients voice was clear and strong, and had appropriate pitch and quality.    Ears - Bilateral pinna and EACs with normal appearing overlying skin. Tympanic membrane intact with good mobility on pneumatic otoscopy bilaterally. Bony landmarks of the ossicular chain are normal. The tympanic membranes are normal in appearance. No retraction, perforation, or masses.  No fluid or purulence was seen in the external canal or the middle ear.     Eyes - Extraocular movements intact.  Sclera were not icteric or injected, conjunctiva were pink and moist.    Mouth - Examination of the oral cavity " showed pink, healthy oral mucosa. No lesions or ulcerations noted.  The tongue was mobile and midline, and the dentition were in good condition.      Throat - The walls of the oropharynx were smooth, pink, moist, symmetric, and had no lesions or ulcerations.  The tonsillar pillars and soft palate were symmetric. Tonsils are 1+ with some mild erythema without exudates. The uvula was midline on elevation.    Neck - Normal midline excursion of the laryngotracheal complex during swallowing.  Full range of motion on passive movement.  Palpation of the occipital, submental, submandibular, internal jugular chain, and supraclavicular nodes did not demonstrate any abnormal lymph nodes or masses.  The carotid pulse was palpable bilaterally.  Palpation of the thyroid was soft and smooth, with no nodules or goiter appreciated.  The trachea was mobile and midline.    Nose - External contour is symmetric, no gross deflection or scars.  Nasal mucosa is pink and moist with no abnormal mucus.  The septum was midline and non-obstructive, turbinates of normal size and position.  No polyps, masses, or purulence noted on examination.    Neuro - Nonfocal neuro exam is normal, CN 2 through 12 intact, normal gait and muscle tone.      Performed in clinic today:  No procedures preformed in clinic today      A/P - Ashli Gamboa is a 44 year old female Patient presents with:  Follow Up: Tonsil stones, left ear    Patient at this point presents with some residual chronic tonsillitis symptoms and halitosis.  We again discussed surgical versus nonsurgical options.  She wants to try Magic mouthwash consisting of Maalox Benadryl and Decadron swish and spit for the next month twice daily before making that commitment to potentially a surgical option.    Ashli should follow up in 3 months.            Ravi Witt MD

## 2024-05-01 ENCOUNTER — VIRTUAL VISIT (OUTPATIENT)
Dept: FAMILY MEDICINE | Facility: OTHER | Age: 44
End: 2024-05-01
Payer: COMMERCIAL

## 2024-05-01 DIAGNOSIS — Z98.84 S/P LAPAROSCOPIC SLEEVE GASTRECTOMY: ICD-10-CM

## 2024-05-01 DIAGNOSIS — M79.642 BILATERAL HAND PAIN: ICD-10-CM

## 2024-05-01 DIAGNOSIS — F41.1 GAD (GENERALIZED ANXIETY DISORDER): ICD-10-CM

## 2024-05-01 DIAGNOSIS — M79.641 BILATERAL HAND PAIN: ICD-10-CM

## 2024-05-01 DIAGNOSIS — L98.7 EXCESS SKIN: ICD-10-CM

## 2024-05-01 DIAGNOSIS — Z98.890 H/O ABDOMINAL SURGERY: ICD-10-CM

## 2024-05-01 DIAGNOSIS — F33.41 RECURRENT MAJOR DEPRESSIVE DISORDER, IN PARTIAL REMISSION (H): Primary | ICD-10-CM

## 2024-05-01 DIAGNOSIS — R76.8 ANA POSITIVE: ICD-10-CM

## 2024-05-01 DIAGNOSIS — K44.9 HIATAL HERNIA: ICD-10-CM

## 2024-05-01 PROCEDURE — 99214 OFFICE O/P EST MOD 30 MIN: CPT | Mod: 95 | Performed by: NURSE PRACTITIONER

## 2024-05-01 RX ORDER — VENLAFAXINE HYDROCHLORIDE 75 MG/1
75 CAPSULE, EXTENDED RELEASE ORAL DAILY
Qty: 90 CAPSULE | Refills: 1 | Status: SHIPPED | OUTPATIENT
Start: 2024-05-01 | End: 2024-09-20

## 2024-05-01 NOTE — PROGRESS NOTES
"    Instructions Relayed to Patient by Virtual Roomer:     Patient is active on [employer]:   Relayed following to patient: \"It looks like you are active on [employer], are you able to join the visit this way? If not, do you need us to send you a link now or would you like your provider to send a link via text or email when they are ready to initiate the visit?\"      Patient Confirmed they will join visit via: [employer]  Reminded patient to ensure they were logged on to virtual visit by arrival time listed.   Asked if patient has flexibility to initiate visit sooner than arrival time: patient stated yes, documented in appointment notes availability to initiate visit earlier than arrival time     If pediatric virtual visit, ensured pediatric patient along with parent/guardian will be present for video visit.     Patient offered the website [email or web address] and/or phone number to [employer] Help line: [phone]    Ashli is a 44 year old who is being evaluated via a billable video visit.    How would you like to obtain your AVS? [employer]  If the video visit is dropped, the invitation should be resent by: Text to cell phone: [phone]  Will anyone else be joining your video visit? No    Assessment & Plan     Recurrent major depressive disorder, in partial remission (H24)  Doing well   Feeling the new dose is helpful.   Follow up in 6 months  - venlafaxine (EFFEXOR XR) 75 MG 24 hr capsule; Take 1 capsule (75 mg) by mouth daily    ALIDA (generalized anxiety disorder)  Stable and improved   - venlafaxine (EFFEXOR XR) 75 MG 24 hr capsule; Take 1 capsule (75 mg) by mouth daily    H/O abdominal surgery  Has had good results with losing weight in the past and now has excessive skin  Given her risk for scar tissue from her multiple abdominal surgeries her plastic surgeon is recommending imaging before any skin reduction surgery   Orders placed today   - CT Abdomen Pelvis w Contrast; Future    Excess skin    - CT " Abdomen Pelvis w Contrast; Future    S/P laparoscopic sleeve gastrectomy    - CT Abdomen Pelvis w Contrast; Future    Hiatal hernia    - CT Abdomen Pelvis w Contrast; Future    SABA positive  Follow up with rheumatology  Start voltaren for bilateral hand arthritis   - Adult Rheumatology  Referral; Future  - diclofenac (VOLTAREN) 1 % topical gel; Apply 2 g topically 4 times daily    Bilateral hand pain    - Adult Rheumatology  Referral; Future  - diclofenac (VOLTAREN) 1 % topical gel; Apply 2 g topically 4 times daily    The patient indicates understanding of these issues and agrees with the plan.        See Patient Instructions    Subjective   Ashli is a 44 year old, presenting for the following health issues:  Recheck Medication and  Follow Up        5/1/2024     9:38 AM   Additional Questions   Roomed by David WHITLOCK     History of Present Illness       Reason for visit:  Med Check        Medication Followup of Venlafaxine  Taking Medication as prescribed: yes - Dose change  Side Effects:  None  Medication Helping Symptoms:  yes  Anxiety   How are you doing with your anxiety since your last visit? No change  Are you having other symptoms that might be associated with anxiety? No  Have you had a significant life event? OTHER: Just moved, Separation from     Are you feeling depressed? No  Do you have any concerns with your use of alcohol or other drugs? No    Living with a friend in [place]   from  and they sold the house  She feels the new dose is helping  A lot less anxiety as well because she is not with her , he relied on her a ton.   Done with school and graduating Saturday.     Patient had bariatric surgery in 2017 and lost 187lbs   Has excessive skin around her arms and stomach.   She did a consultation for this.   She is thinking about doing in August.   She has and so many abdominal surgeries       Social History     Tobacco Use    Smoking status: Former      Current packs/day: 0.00     Average packs/day: 0.5 packs/day for 6.8 years (3.4 ttl pk-yrs)     Types: Cigarettes     Start date: 1/2/2007     Quit date: 11/1/2013     Years since quitting: 10.5    Smokeless tobacco: Never   Vaping Use    Vaping status: Never Used   Substance Use Topics    Alcohol use: Yes     Comment: 2 drinks per month    Drug use: Not Currently     Types: Marijuana     Comment: 2 weeks ago for pain         8/16/2023     5:21 AM 9/11/2023     2:48 PM 4/3/2024    11:09 AM   ALIDA-7 SCORE   Total Score 5 (mild anxiety) 4 (minimal anxiety) 13 (moderate anxiety)   Total Score 5 4 13         9/11/2023     2:48 PM 10/2/2023     8:00 AM 4/3/2024    11:07 AM   PHQ   PHQ-9 Total Score 6 4 9   Q9: Thoughts of better off dead/self-harm past 2 weeks Not at all Not at all Not at all         4/3/2024    11:07 AM   Last PHQ-9   1.  Little interest or pleasure in doing things 2   2.  Feeling down, depressed, or hopeless 1   3.  Trouble falling or staying asleep, or sleeping too much 2   4.  Feeling tired or having little energy 2   5.  Poor appetite or overeating 0   6.  Feeling bad about yourself 1   7.  Trouble concentrating 1   8.  Moving slowly or restless 0   Q9: Thoughts of better off dead/self-harm past 2 weeks 0   PHQ-9 Total Score 9         4/3/2024    11:09 AM   ALIDA-7    1. Feeling nervous, anxious, or on edge 3   2. Not being able to stop or control worrying 2   3. Worrying too much about different things 2   4. Trouble relaxing 2   5. Being so restless that it is hard to sit still 2   6. Becoming easily annoyed or irritable 2   7. Feeling afraid, as if something awful might happen 0   ALIDA-7 Total Score 13   If you checked any problems, how difficult have they made it for you to do your work, take care of things at home, or get along with other people? Somewhat difficult       Review of Systems  Constitutional, HEENT, cardiovascular, pulmonary, gi and gu systems are negative, except as otherwise noted.       Objective           Vitals:  No vitals were obtained today due to virtual visit.    Physical Exam   GENERAL: alert and no distress  PSYCH: Appropriate affect, tone, and pace of words    Diagnostic: None       Video-Visit Details    Type of service:  Video Visit   Originating Location (pt. Location): Home  Distant Location (provider location):  On-site  Platform used for Video Visit: [employer]  Signed Electronically by: DANIELE Zamora CNP

## 2024-05-08 ENCOUNTER — OFFICE VISIT (OUTPATIENT)
Dept: OTOLARYNGOLOGY | Facility: OTHER | Age: 44
End: 2024-05-08
Payer: COMMERCIAL

## 2024-05-08 VITALS
TEMPERATURE: 98.5 F | HEIGHT: 64 IN | BODY MASS INDEX: 24.09 KG/M2 | SYSTOLIC BLOOD PRESSURE: 104 MMHG | WEIGHT: 141.1 LBS | DIASTOLIC BLOOD PRESSURE: 60 MMHG

## 2024-05-08 DIAGNOSIS — J31.2 CHRONIC PHARYNGITIS: Primary | ICD-10-CM

## 2024-05-08 DIAGNOSIS — R19.6 HALITOSIS: ICD-10-CM

## 2024-05-08 DIAGNOSIS — J35.01 CHRONIC TONSILLITIS: ICD-10-CM

## 2024-05-08 PROCEDURE — 99213 OFFICE O/P EST LOW 20 MIN: CPT | Performed by: OTOLARYNGOLOGY

## 2024-05-08 NOTE — LETTER
5/8/2024         RE: Ashli Gamboa  [address] [facility] [place] [address]        Dear Colleague,    Thank you for referring your patient, Ashli Gamboa, to the [facility]. Please see a copy of my visit note below.    History of Present Illness - Ashli Gamboa is a 44 year old female presenting in clinic today for a recheck on Patient presents with:  Follow Up: Tonsil stones, left ear    Patient presents for evaluation of her throat.  She had some symptoms of chronic tonsillitis and tonsil stones but also had some reflux related activity.  She was actually treated with Gaviscon after meals and was taking omeprazole 40 mg before supper.  Her reflux symptomatology is under control.  Still has halitosis still gets a couple strep throats a year even though has not had discomfort on daily basis as much.  Last time she was seen was about 2 and half months ago.    Body mass index is 24.22 kg/m .        BP Readings from Last 1 Encounters:   05/08/24 104/60       BP noted to be well controlled today in office.     Ashli IS NOT a smoker/uses chewing tobacco.  Ashli already quit      Past Medical History -   Past Medical History:   Diagnosis Date     Arthritis     BIG TOE     Cervical high risk HPV (human papillomavirus) test positive 07/05/2017     Cervical high risk HPV (human papillomavirus) test positive 06/10/2015     Depressive disorder 2007    I have been on and off medication for the last several years     Endometriosis      Hearing problem      Obese      Sensorineural hearing loss        Current Medications -   Current Outpatient Medications:      buPROPion (WELLBUTRIN XL) 300 MG 24 hr tablet, TAKE 1 TABLET BY MOUTH EVERY DAY IN THE MORNING, Disp: 90 tablet, Rfl: 0     hydrOXYzine HCl (ATARAX) 25 MG tablet, Take 0.5-2 tablets (12.5-50 mg) by mouth 3 times daily as needed for anxiety or other (insomnia), Disp: 90 tablet, Rfl: 0     MULTIPLE VITAMIN PO, Take 1  patch by mouth daily PatchMD - Multivitamin Patch, Disp: , Rfl:      NALTREXONE HCL PO, Take 4.5 mg by mouth, Disp: , Rfl:      NUVARING 0.12-0.015 MG/24HR vaginal ring, INSERT 1 RING VAGINALLY EVERY 21 DAYS, Disp: 1 each, Rfl: 0     omeprazole (PRILOSEC) 40 MG DR capsule, Take 1 capsule (40 mg) by mouth daily, Disp: 90 capsule, Rfl: 1     topiramate (TOPAMAX) 100 MG tablet, Take 3 tablets by mouth daily, Disp: , Rfl:      venlafaxine (EFFEXOR XR) 75 MG 24 hr capsule, Take 1 capsule (75 mg) by mouth daily, Disp: 90 capsule, Rfl: 1     vitamin B complex with vitamin C (STRESS TAB) tablet, Take 1 tablet by mouth daily, Disp: , Rfl:      vitamin D3 (CHOLECALCIFEROL) 125 MCG (5000 UT) tablet, Take 1 tablet by mouth every 24 hours, Disp: , Rfl:      diclofenac (VOLTAREN) 1 % topical gel, Apply 2 g topically 4 times daily (Patient not taking: Reported on 5/8/2024), Disp: 350 g, Rfl: 0  No current facility-administered medications for this visit.    Facility-Administered Medications Ordered in Other Visits:      BUPivacaine 0.25%, 10 mL, INTRA-ARTICULAR, Once, Kian Clement DPM     iopamidol (ISOVUE-M 200) solution 20 mL, 20 mL, INTRA-ARTICULAR, Once, Kian Clement DPM     lidocaine (PF) (XYLOCAINE) 1 % injection 30 mL, 30 mL, Subcutaneous, Once, Kian Clement DPM     triamcinolone acetonide (KENALOG-40) injection 40 mg, 40 mg, INTRA-ARTICULAR, Once, Kian Clement DPM    Allergies - No Known Allergies    Social History -   Social History     Socioeconomic History     Marital status:      Spouse name: Sid     Number of children: 0   Occupational History     Comment: [employer]   Tobacco Use     Smoking status: Former     Current packs/day: 0.00     Average packs/day: 0.5 packs/day for 6.8 years (3.4 ttl pk-yrs)     Types: Cigarettes     Start date: 1/2/2007     Quit date: 11/1/2013     Years since quitting: 10.5     Smokeless tobacco: Never   Vaping Use     Vaping  status: Never Used   Substance and Sexual Activity     Alcohol use: Yes     Comment: 2 drinks per month     Drug use: Not Currently     Types: Marijuana     Comment: 2 weeks ago for pain     Sexual activity: Yes     Partners: Male     Birth control/protection: Inserts/Ring     Comment: nuvaring   Other Topics Concern     Parent/sibling w/ CABG, MI or angioplasty before 65F 55M? No     Social Determinants of Health     Financial Resource Strain: High Risk (12/27/2023)    Financial Resource Strain      Within the past 12 months, have you or your family members you live with been unable to get utilities (heat, electricity) when it was really needed?: Yes   Food Insecurity: Low Risk  (12/27/2023)    Food Insecurity      Within the past 12 months, did you worry that your food would run out before you got money to buy more?: No      Within the past 12 months, did the food you bought just not last and you didn t have money to get more?: No   Transportation Needs: Low Risk  (12/27/2023)    Transportation Needs      Within the past 12 months, has lack of transportation kept you from medical appointments, getting your medicines, non-medical meetings or appointments, work, or from getting things that you need?: No   Interpersonal Safety: Low Risk  (12/27/2023)    Interpersonal Safety      Do you feel physically and emotionally safe where you currently live?: Yes      Within the past 12 months, have you been hit, slapped, kicked or otherwise physically hurt by someone?: No      Within the past 12 months, have you been humiliated or emotionally abused in other ways by your partner or ex-partner?: No   Housing Stability: Low Risk  (12/27/2023)    Housing Stability      Do you have housing? : Yes      Are you worried about losing your housing?: No       Family History -   Family History   Problem Relation Age of Onset     Depression Mother      Cancer Mother         Cervical      Other - See Comments Mother         Fibromyalgia  "    Crohn's Disease Mother      Obesity Mother      Ovarian Cancer Mother         Cervical Cancer     Substance Abuse Mother      Stomach Problem Mother      Skin Cancer Mother      Arthritis Father      Rheumatoid Arthritis Father      Depression Father      Substance Abuse Father      Breast Cancer Maternal Grandmother      Cancer Maternal Grandmother      Hypertension Maternal Grandmother      Cerebrovascular Disease Paternal Grandmother      Prostate Cancer Paternal Grandfather      Depression Brother      Mental Illness Brother      Substance Abuse Brother      Mental Illness Brother         schizophrenia and bipolar     Substance Abuse Brother      Stomach Problem Brother      Depression Sister      Stomach Problem Sister      Colon Cancer Other        Review of Systems - As per HPI and PMHx, otherwise review of system review of the head and neck negative. Otherwise 10+ review of system is negative    Physical Exam  /60   Temp 98.5  F (36.9  C) (Temporal)   Ht 1.626 m (5' 4\")   Wt 64 kg (141 lb 1.6 oz)   LMP  (LMP Unknown)   BMI 24.22 kg/m    BMI: Body mass index is 24.22 kg/m .    General - The patient is well nourished and well developed, and appears to have good nutritional status.  Alert and oriented to person and place, answers questions and cooperates with examination appropriately.    SKIN - No suspicious lesions or rashes.  Respiration - No respiratory distress.  Head and Face - Normocephalic and atraumatic, with no gross asymmetry noted of the contour of the facial features.  The facial nerve is intact, with strong symmetric movements.    Voice and Breathing - The patient was breathing comfortably without the use of accessory muscles. The patients voice was clear and strong, and had appropriate pitch and quality.    Ears - Bilateral pinna and EACs with normal appearing overlying skin. Tympanic membrane intact with good mobility on pneumatic otoscopy bilaterally. Bony landmarks of the " ossicular chain are normal. The tympanic membranes are normal in appearance. No retraction, perforation, or masses.  No fluid or purulence was seen in the external canal or the middle ear.     Eyes - Extraocular movements intact.  Sclera were not icteric or injected, conjunctiva were pink and moist.    Mouth - Examination of the oral cavity showed pink, healthy oral mucosa. No lesions or ulcerations noted.  The tongue was mobile and midline, and the dentition were in good condition.      Throat - The walls of the oropharynx were smooth, pink, moist, symmetric, and had no lesions or ulcerations.  The tonsillar pillars and soft palate were symmetric. Tonsils are 1+ with some mild erythema without exudates. The uvula was midline on elevation.    Neck - Normal midline excursion of the laryngotracheal complex during swallowing.  Full range of motion on passive movement.  Palpation of the occipital, submental, submandibular, internal jugular chain, and supraclavicular nodes did not demonstrate any abnormal lymph nodes or masses.  The carotid pulse was palpable bilaterally.  Palpation of the thyroid was soft and smooth, with no nodules or goiter appreciated.  The trachea was mobile and midline.    Nose - External contour is symmetric, no gross deflection or scars.  Nasal mucosa is pink and moist with no abnormal mucus.  The septum was midline and non-obstructive, turbinates of normal size and position.  No polyps, masses, or purulence noted on examination.    Neuro - Nonfocal neuro exam is normal, CN 2 through 12 intact, normal gait and muscle tone.      Performed in clinic today:  No procedures preformed in clinic today      A/P - Ashli Gamboa is a 44 year old female Patient presents with:  Follow Up: Tonsil stones, left ear    Patient at this point presents with some residual chronic tonsillitis symptoms and halitosis.  We again discussed surgical versus nonsurgical options.  She wants to try Magic mouthwash  consisting of Maalox Benadryl and Decadron swish and spit for the next month twice daily before making that commitment to potentially a surgical option.    Crystal should follow up in 3 months.            Ravi Witt MD           Again, thank you for allowing me to participate in the care of your patient.        Sincerely,        Ravi Witt MD, MD

## 2024-05-14 ENCOUNTER — HOSPITAL ENCOUNTER (OUTPATIENT)
Dept: CT IMAGING | Facility: CLINIC | Age: 44
Discharge: HOME | End: 2024-05-14
Attending: NURSE PRACTITIONER | Admitting: NURSE PRACTITIONER
Payer: COMMERCIAL

## 2024-05-14 DIAGNOSIS — L98.7 EXCESS SKIN: ICD-10-CM

## 2024-05-14 DIAGNOSIS — Z98.890 H/O ABDOMINAL SURGERY: ICD-10-CM

## 2024-05-14 DIAGNOSIS — K44.9 HIATAL HERNIA: ICD-10-CM

## 2024-05-14 DIAGNOSIS — Z98.84 S/P LAPAROSCOPIC SLEEVE GASTRECTOMY: ICD-10-CM

## 2024-05-14 PROCEDURE — 74177 CT ABD & PELVIS W/CONTRAST: CPT

## 2024-05-14 PROCEDURE — 250N000011 HC RX IP 250 OP 636: Performed by: NURSE PRACTITIONER

## 2024-05-14 RX ORDER — IOPAMIDOL 755 MG/ML
90 INJECTION, SOLUTION INTRAVASCULAR ONCE
Status: COMPLETED | OUTPATIENT
Start: 2024-05-14 | End: 2024-05-14

## 2024-05-14 RX ADMIN — IOPAMIDOL 90 ML: 755 INJECTION, SOLUTION INTRAVENOUS at 20:00

## 2024-05-21 ENCOUNTER — NURSE TRIAGE (OUTPATIENT)
Dept: FAMILY MEDICINE | Facility: OTHER | Age: 44
End: 2024-05-21

## 2024-05-21 ENCOUNTER — LAB (OUTPATIENT)
Dept: LAB | Facility: CLINIC | Age: 44
End: 2024-05-21
Payer: COMMERCIAL

## 2024-05-21 ENCOUNTER — VIRTUAL VISIT (OUTPATIENT)
Dept: FAMILY MEDICINE | Facility: OTHER | Age: 44
End: 2024-05-21
Payer: COMMERCIAL

## 2024-05-21 DIAGNOSIS — R10.9 FLANK PAIN: Primary | ICD-10-CM

## 2024-05-21 DIAGNOSIS — R10.9 FLANK PAIN: ICD-10-CM

## 2024-05-21 LAB
ALBUMIN UR-MCNC: NEGATIVE MG/DL
APPEARANCE UR: CLEAR
BILIRUB UR QL STRIP: NEGATIVE
COLOR UR AUTO: YELLOW
ERYTHROCYTE [DISTWIDTH] IN BLOOD BY AUTOMATED COUNT: 13.3 % (ref 10–15)
GLUCOSE UR STRIP-MCNC: NEGATIVE MG/DL
HCT VFR BLD AUTO: 41.5 % (ref 35–47)
HGB BLD-MCNC: 13.2 G/DL (ref 11.7–15.7)
HGB UR QL STRIP: NEGATIVE
KETONES UR STRIP-MCNC: NEGATIVE MG/DL
LEUKOCYTE ESTERASE UR QL STRIP: NEGATIVE
MCH RBC QN AUTO: 29.5 PG (ref 26.5–33)
MCHC RBC AUTO-ENTMCNC: 31.8 G/DL (ref 31.5–36.5)
MCV RBC AUTO: 93 FL (ref 78–100)
NITRATE UR QL: NEGATIVE
PH UR STRIP: 7 [PH] (ref 5–8)
PLATELET # BLD AUTO: 249 10E3/UL (ref 150–450)
RBC # BLD AUTO: 4.47 10E6/UL (ref 3.8–5.2)
SP GR UR STRIP: 1.01 (ref 1–1.03)
UROBILINOGEN UR STRIP-ACNC: 1 E.U./DL
WBC # BLD AUTO: 8.1 10E3/UL (ref 4–11)

## 2024-05-21 PROCEDURE — 36415 COLL VENOUS BLD VENIPUNCTURE: CPT

## 2024-05-21 PROCEDURE — 81003 URINALYSIS AUTO W/O SCOPE: CPT

## 2024-05-21 PROCEDURE — 99213 OFFICE O/P EST LOW 20 MIN: CPT | Mod: 95 | Performed by: STUDENT IN AN ORGANIZED HEALTH CARE EDUCATION/TRAINING PROGRAM

## 2024-05-21 PROCEDURE — 85027 COMPLETE CBC AUTOMATED: CPT

## 2024-05-21 ASSESSMENT — PATIENT HEALTH QUESTIONNAIRE - PHQ9
SUM OF ALL RESPONSES TO PHQ QUESTIONS 1-9: 7
10. IF YOU CHECKED OFF ANY PROBLEMS, HOW DIFFICULT HAVE THESE PROBLEMS MADE IT FOR YOU TO DO YOUR WORK, TAKE CARE OF THINGS AT HOME, OR GET ALONG WITH OTHER PEOPLE: NOT DIFFICULT AT ALL
SUM OF ALL RESPONSES TO PHQ QUESTIONS 1-9: 7

## 2024-05-21 NOTE — TELEPHONE ENCOUNTER
Virtual appointment performed today'      Thank you,    Enzo Holman MD    [address] [address]   Ph: [phone]  Fax:[phone]

## 2024-05-21 NOTE — TELEPHONE ENCOUNTER
Nurse Triage SBAR    Is this a 2nd Level Triage? YES, LICENSED PRACTITIONER REVIEW IS REQUIRED    Situation: Patient reports she has had lower back pain the last few days and it was incidentally found that she has a kidney stone on her scan. She has been increasingly more uncomfortable the past few days and has been getting hot and cold at night with a headache. She denies nausea or vomiting. No urinary symptoms    Background: Hx of kidney infections    Assessment: Patient wants to avoid ED or UC. She does need to be evaluated today.    Protocol Recommended Disposition:   Go To Office Now    Recommendation: Patient was scheduled a virtual appointment with covering provider today. Patient is hoping she can at least get labs ordered if needed. Please advise if you do not want to see patient virtually and she will go to UC.     Routed to provider    Does the patient meet one of the following criteria for ADS visit consideration? 16+ years old, with an [facility] PCP     TIP  Providers, please consider if this condition is appropriate for management at one of our Acute and Diagnostic Services sites.     If patient is a good candidate, please use dotphrase <dot>triageresponse and select Refer to ADS to document.    Reason for Disposition   Fever > 100.4 F (38.0 C) and flank pain    Additional Information   Negative: Passed out (i.e., fainted, collapsed and was not responding)   Negative: Shock suspected (e.g., cold/pale/clammy skin, too weak to stand, low BP, rapid pulse)   Negative: Sounds like a life-threatening emergency to the triager   Negative: SEVERE back pain of sudden onset and age > 60 years   Negative: SEVERE abdominal pain (e.g., excruciating)   Negative: Abdominal pain and age > 60 years   Negative: Unable to urinate (or only a few drops) and bladder feels very full   Negative: Loss of bladder or bowel control (urine or bowel incontinence; wetting self, leaking stool) of new-onset   Negative: Numbness (loss of  sensation) in groin or rectal area   Negative: Pain radiates into groin, scrotum   Negative: Blood in urine (red, pink, or tea-colored)   Negative: Vomiting and pain over lower ribs of back (i.e., flank - kidney area)   Negative: Weakness of a leg or foot (e.g., unable to bear weight, dragging foot)   Negative: Patient sounds very sick or weak to the triager   Negative: Major injury to the back (e.g., MVA, fall > 10 feet or 3 meters, penetrating injury, etc.)   Negative: Pain in the upper back over the ribs (rib cage) that radiates (travels) into the chest   Negative: Pain in the upper back over the ribs (rib cage) and worsened by coughing (or clearly increases with breathing)   Negative: Back pain during pregnancy    Protocols used: Back Pain-A-[place]

## 2024-05-22 NOTE — RESULT ENCOUNTER NOTE
Crystal,    Your results are within normal limits/negative for kidney stone flare or urine/kidney infection. I would question if this was a muscle pain flare up or if the kidney stone already passed. Keep an eye on your signs/symptoms and if things change an in person clinic visit might be best to evaluate this more accurately. Urgent care would be an option as well especially with your upcoming trip this weekend       If you have any questions feel free to call the clinic at [phone].      Thank you,    Enzo Holman MD

## 2024-06-17 ENCOUNTER — ORDERS ONLY (AUTO-RELEASED) (OUTPATIENT)
Dept: FAMILY MEDICINE | Facility: CLINIC | Age: 44
End: 2024-06-17
Payer: COMMERCIAL

## 2024-06-17 ENCOUNTER — OFFICE VISIT (OUTPATIENT)
Dept: FAMILY MEDICINE | Facility: CLINIC | Age: 44
End: 2024-06-17
Payer: COMMERCIAL

## 2024-06-17 VITALS
DIASTOLIC BLOOD PRESSURE: 75 MMHG | HEART RATE: 66 BPM | RESPIRATION RATE: 12 BRPM | WEIGHT: 143 LBS | HEIGHT: 64 IN | BODY MASS INDEX: 24.41 KG/M2 | OXYGEN SATURATION: 97 % | TEMPERATURE: 98 F | SYSTOLIC BLOOD PRESSURE: 111 MMHG

## 2024-06-17 DIAGNOSIS — R00.2 PALPITATIONS: ICD-10-CM

## 2024-06-17 DIAGNOSIS — R53.83 OTHER FATIGUE: Primary | ICD-10-CM

## 2024-06-17 PROBLEM — G90.9 AUTONOMIC DYSFUNCTION: Status: RESOLVED | Noted: 2020-12-03 | Resolved: 2024-06-17

## 2024-06-17 PROBLEM — R10.2 ACUTE PELVIC PAIN: Status: RESOLVED | Noted: 2019-02-14 | Resolved: 2024-06-17

## 2024-06-17 PROBLEM — R55 SYNCOPE: Status: RESOLVED | Noted: 2020-12-03 | Resolved: 2024-06-17

## 2024-06-17 PROBLEM — R29.898 MUSCULAR DECONDITIONING: Status: RESOLVED | Noted: 2017-02-06 | Resolved: 2024-06-17

## 2024-06-17 LAB
BASOPHILS # BLD AUTO: 0 10E3/UL (ref 0–0.2)
BASOPHILS NFR BLD AUTO: 1 %
EOSINOPHIL # BLD AUTO: 0.1 10E3/UL (ref 0–0.7)
EOSINOPHIL NFR BLD AUTO: 2 %
ERYTHROCYTE [DISTWIDTH] IN BLOOD BY AUTOMATED COUNT: 13.5 % (ref 10–15)
HCT VFR BLD AUTO: 39.3 % (ref 35–47)
HGB BLD-MCNC: 12.6 G/DL (ref 11.7–15.7)
IMM GRANULOCYTES # BLD: 0 10E3/UL
IMM GRANULOCYTES NFR BLD: 0 %
LYMPHOCYTES # BLD AUTO: 2.3 10E3/UL (ref 0.8–5.3)
LYMPHOCYTES NFR BLD AUTO: 42 %
MCH RBC QN AUTO: 29.5 PG (ref 26.5–33)
MCHC RBC AUTO-ENTMCNC: 32.1 G/DL (ref 31.5–36.5)
MCV RBC AUTO: 92 FL (ref 78–100)
MONOCYTES # BLD AUTO: 0.3 10E3/UL (ref 0–1.3)
MONOCYTES NFR BLD AUTO: 5 %
MONOCYTES NFR BLD AUTO: NEGATIVE %
NEUTROPHILS # BLD AUTO: 2.8 10E3/UL (ref 1.6–8.3)
NEUTROPHILS NFR BLD AUTO: 50 %
PLATELET # BLD AUTO: 230 10E3/UL (ref 150–450)
RBC # BLD AUTO: 4.27 10E6/UL (ref 3.8–5.2)
TSH SERPL DL<=0.005 MIU/L-ACNC: 1.78 UIU/ML (ref 0.3–4.2)
WBC # BLD AUTO: 5.6 10E3/UL (ref 4–11)

## 2024-06-17 PROCEDURE — 86617 LYME DISEASE ANTIBODY: CPT | Performed by: FAMILY MEDICINE

## 2024-06-17 PROCEDURE — 84443 ASSAY THYROID STIM HORMONE: CPT | Performed by: FAMILY MEDICINE

## 2024-06-17 PROCEDURE — 86308 HETEROPHILE ANTIBODY SCREEN: CPT | Performed by: FAMILY MEDICINE

## 2024-06-17 PROCEDURE — 86618 LYME DISEASE ANTIBODY: CPT | Performed by: FAMILY MEDICINE

## 2024-06-17 PROCEDURE — 36415 COLL VENOUS BLD VENIPUNCTURE: CPT | Performed by: FAMILY MEDICINE

## 2024-06-17 PROCEDURE — 85025 COMPLETE CBC W/AUTO DIFF WBC: CPT | Performed by: FAMILY MEDICINE

## 2024-06-17 PROCEDURE — 87798 DETECT AGENT NOS DNA AMP: CPT | Performed by: FAMILY MEDICINE

## 2024-06-17 PROCEDURE — 99214 OFFICE O/P EST MOD 30 MIN: CPT | Performed by: FAMILY MEDICINE

## 2024-06-17 PROCEDURE — 87468 ANAPLSMA PHGCYTOPHLM AMP PRB: CPT | Performed by: FAMILY MEDICINE

## 2024-06-17 RX ORDER — DEXAMETHASONE 0.5 MG/5ML
SOLUTION ORAL
COMMUNITY
Start: 2024-05-15

## 2024-06-17 ASSESSMENT — PATIENT HEALTH QUESTIONNAIRE - PHQ9
SUM OF ALL RESPONSES TO PHQ QUESTIONS 1-9: 16
SUM OF ALL RESPONSES TO PHQ QUESTIONS 1-9: 16
10. IF YOU CHECKED OFF ANY PROBLEMS, HOW DIFFICULT HAVE THESE PROBLEMS MADE IT FOR YOU TO DO YOUR WORK, TAKE CARE OF THINGS AT HOME, OR GET ALONG WITH OTHER PEOPLE: EXTREMELY DIFFICULT

## 2024-06-17 ASSESSMENT — ENCOUNTER SYMPTOMS
HEADACHES: 1
FEVER: 1
SWOLLEN GLANDS: 1
FATIGUE: 1
SORE THROAT: 1
WEAKNESS: 1

## 2024-06-17 NOTE — ASSESSMENT & PLAN NOTE
Primary worry for hypothyroidism.  However lives in an endemic area for Lyme's and has had travel and areas of [place] spotted fever.  CBC is normal and mononucleosis screen is negative.  Fatigue and not consistent with worsening depression or POTS.  Continue to monitor caffeine intake, hydration.  If these labs are negative then would recheck TSH in at least 3 weeks, and further investigation to other possible causes.

## 2024-06-17 NOTE — PROGRESS NOTES
Assessment & Plan   Problem List Items Addressed This Visit       Palpitations    Relevant Orders    ZIO PATCH MAIL OUT    Other fatigue - Primary     Primary worry for hypothyroidism.  However lives in an endemic area for Lyme's and has had travel and areas of [place] spotted fever.  CBC is normal and mononucleosis screen is negative.  Fatigue and not consistent with worsening depression or POTS.  Continue to monitor caffeine intake, hydration.  If these labs are negative then would recheck TSH in at least 3 weeks, and further investigation to other possible causes.         Relevant Orders    CBC with platelets and differential (Completed)    Lyme Disease Total Abs Bld with Reflex to Confirm CLIA    TSH with free T4 reflex    Mononucleosis screen (Completed)    Tick-Borne Disease Panel by PCR      See Patient Instructions    Heriberto Cornelius is a 44 year old, presenting for the following health issues:  Fatigue (x2.5 weeks w/Fever/Sore Throat/Headache on/off x same)      6/17/2024     7:08 AM   Additional Questions   Roomed by JAMES Castillo   Accompanied by Self         6/17/2024     7:08 AM   Patient Reported Additional Medications   Patient reports taking the following new medications N/A     Extreme fatigue for the past 2.5 weeks.  Started with mild sore throat and fever and a little headache on and off that still continuing.  Not the same fatigue she has had previously with depression or anxiety.  She is sleeping 10 to 12 hours and yet still feels like she needs more.  Started after returning from a trip from [place].  However there is no shortness of breath or cough.  No worsening with exercise, movement But no improvement with rest.  Some thinning of the hair.  Also has been having some palpitation feelings on and off for the past few years and they are little more significant past few weeks.  She does not know if is because of the fatigue that she is noticing a more or if they might have correlation.   "No swelling of the extremities.  Patient has a hysterectomy and thus no abnormal periods.  No change in vision or change in hearing.  No vertigo or dizziness just fatigue.  No change in appetite.  No change in caffeine intake, foods, work environment, hydration.  No to recent change in medications or supplements.    History of Present Illness       Reason for visit:  Extreme fatigue, fever off and on for the last 3 weeks  Symptom onset:  1-2 weeks ago  Symptom intensity:  Moderate  Symptom progression:  Staying the same  Had these symptoms before:  No    She eats 2-3 servings of fruits and vegetables daily.She consumes 0 sweetened beverage(s) daily.She exercises with enough effort to increase her heart rate 10 to 19 minutes per day.  She exercises with enough effort to increase her heart rate 3 or less days per week.   She is taking medications regularly.         4/3/2024    11:07 AM 5/21/2024     3:09 PM 6/17/2024     7:03 AM   PHQ   PHQ-9 Total Score 9 7 16   Q9: Thoughts of better off dead/self-harm past 2 weeks Not at all Not at all Not at all          Objective    /75 (BP Location: Left arm, Patient Position: Sitting, Cuff Size: Adult Large)   Pulse 66   Temp 98  F (36.7  C) (Oral)   Resp 12   Ht 1.626 m (5' 4\")   Wt 64.9 kg (143 lb)   LMP  (LMP Unknown)   SpO2 97%   Breastfeeding No   BMI 24.55 kg/m    Body mass index is 24.55 kg/m .  Physical Exam  Vitals and nursing note reviewed.   Constitutional:       General: She is not in acute distress.     Appearance: Normal appearance.   HENT:      Head: Normocephalic and atraumatic.      Right Ear: Tympanic membrane, ear canal and external ear normal.      Left Ear: Tympanic membrane, ear canal and external ear normal.      Nose: Nose normal.      Mouth/Throat:      Mouth: Mucous membranes are moist.      Pharynx: Oropharynx is clear. No oropharyngeal exudate.   Eyes:      General: No scleral icterus.     Extraocular Movements: Extraocular movements " intact.      Conjunctiva/sclera: Conjunctivae normal.   Neck:      Vascular: No carotid bruit.   Cardiovascular:      Rate and Rhythm: Normal rate and regular rhythm.      Pulses: Normal pulses.      Heart sounds: Normal heart sounds. No murmur heard.     No friction rub. No gallop.   Pulmonary:      Effort: Pulmonary effort is normal.      Breath sounds: Normal breath sounds. No wheezing, rhonchi or rales.   Musculoskeletal:         General: No swelling. Normal range of motion.      Cervical back: Normal range of motion.      Right lower leg: No edema.      Left lower leg: No edema.   Skin:     General: Skin is warm and dry.      Capillary Refill: Capillary refill takes less than 2 seconds.      Findings: No rash.   Neurological:      General: No focal deficit present.      Mental Status: She is alert and oriented to person, place, and time.      Cranial Nerves: No cranial nerve deficit.      Gait: Gait is intact. Gait normal.      Deep Tendon Reflexes: Reflexes normal.      Reflex Scores:       Bicep reflexes are 2+ on the right side and 2+ on the left side.       Patellar reflexes are 2+ on the right side and 2+ on the left side.  Psychiatric:         Mood and Affect: Mood normal.         Thought Content: Thought content normal.        Results for orders placed or performed in visit on 06/17/24 (from the past 24 hour(s))   Mononucleosis screen   Result Value Ref Range    Mononucleosis Screen Negative Negative   CBC with platelets and differential    Narrative    The following orders were created for panel order CBC with platelets and differential.  Procedure                               Abnormality         Status                     ---------                               -----------         ------                     CBC with platelets and d...[[id number]]                      Final result                 Please view results for these tests on the individual orders.   CBC with platelets and differential   Result  Value Ref Range    WBC Count 5.6 4.0 - 11.0 10e3/uL    RBC Count 4.27 3.80 - 5.20 10e6/uL    Hemoglobin 12.6 11.7 - 15.7 g/dL    Hematocrit 39.3 35.0 - 47.0 %    MCV 92 78 - 100 fL    MCH 29.5 26.5 - 33.0 pg    MCHC 32.1 31.5 - 36.5 g/dL    RDW 13.5 10.0 - 15.0 %    Platelet Count 230 150 - 450 10e3/uL    % Neutrophils 50 %    % Lymphocytes 42 %    % Monocytes 5 %    % Eosinophils 2 %    % Basophils 1 %    % Immature Granulocytes 0 %    Absolute Neutrophils 2.8 1.6 - 8.3 10e3/uL    Absolute Lymphocytes 2.3 0.8 - 5.3 10e3/uL    Absolute Monocytes 0.3 0.0 - 1.3 10e3/uL    Absolute Eosinophils 0.1 0.0 - 0.7 10e3/uL    Absolute Basophils 0.0 0.0 - 0.2 10e3/uL    Absolute Immature Granulocytes 0.0 <=0.4 10e3/uL           Signed Electronically by: Emmett Gutiérrez DO

## 2024-06-18 LAB
A PHAGOCYTOPH DNA BLD QL NAA+PROBE: NOT DETECTED
BABESIA DNA BLD QL NAA+PROBE: NOT DETECTED
EHRLICHIA DNA SPEC QL NAA+PROBE: NOT DETECTED

## 2024-06-19 LAB
B BURGDOR IGG SERPL QL IA: 10.5 INDEX
B BURGDOR IGG SERPL QL IA: REACTIVE
B BURGDOR IGG+IGM SER QL: 3.42
B BURGDOR IGM SERPL QL IA: 0.09 INDEX
B BURGDOR IGM SERPL QL IA: NONREACTIVE

## 2024-06-21 DIAGNOSIS — K21.9 GASTROESOPHAGEAL REFLUX DISEASE, UNSPECIFIED WHETHER ESOPHAGITIS PRESENT: ICD-10-CM

## 2024-06-21 DIAGNOSIS — F33.41 RECURRENT MAJOR DEPRESSIVE DISORDER, IN PARTIAL REMISSION (H): ICD-10-CM

## 2024-06-21 RX ORDER — BUPROPION HYDROCHLORIDE 300 MG/1
300 TABLET ORAL EVERY MORNING
Qty: 90 TABLET | Refills: 0 | Status: SHIPPED | OUTPATIENT
Start: 2024-06-21 | End: 2024-09-20

## 2024-06-24 RX ORDER — OMEPRAZOLE 40 MG/1
40 CAPSULE, DELAYED RELEASE ORAL DAILY
Qty: 90 CAPSULE | Refills: 1 | Status: SHIPPED | OUTPATIENT
Start: 2024-06-24

## 2024-07-05 ENCOUNTER — PATIENT OUTREACH (OUTPATIENT)
Dept: CARE COORDINATION | Facility: CLINIC | Age: 44
End: 2024-07-05
Payer: COMMERCIAL

## 2024-07-08 PROCEDURE — 93244 EXT ECG>48HR<7D REV&INTERPJ: CPT | Performed by: INTERNAL MEDICINE

## 2024-07-11 ENCOUNTER — MYC MEDICAL ADVICE (OUTPATIENT)
Dept: FAMILY MEDICINE | Facility: OTHER | Age: 44
End: 2024-07-11

## 2024-07-11 NOTE — TELEPHONE ENCOUNTER
RN called pt     Pt is on day 5 of symptoms of covid and wants to continue to manage with over the counter medication     RN reviewed red flag symptoms and when to be seen asap     Patient verbalized understanding and in agreement with plan of care.     Pt is going to schedule appointment virtually to discuss UTI/ yeast infection symptoms     Myranda Denson RN

## 2024-07-12 ENCOUNTER — LAB (OUTPATIENT)
Dept: LAB | Facility: CLINIC | Age: 44
End: 2024-07-12
Payer: COMMERCIAL

## 2024-07-12 DIAGNOSIS — N89.8 VAGINAL ITCHING: ICD-10-CM

## 2024-07-12 DIAGNOSIS — Z11.3 SCREENING EXAMINATION FOR STI: ICD-10-CM

## 2024-07-12 DIAGNOSIS — R74.8 ELEVATED CREATINE KINASE: ICD-10-CM

## 2024-07-12 LAB
ANION GAP SERPL CALCULATED.3IONS-SCNC: 9 MMOL/L (ref 7–15)
BUN SERPL-MCNC: 8.4 MG/DL (ref 6–20)
CALCIUM SERPL-MCNC: 9.1 MG/DL (ref 8.6–10)
CHLORIDE SERPL-SCNC: 106 MMOL/L (ref 98–107)
CREAT SERPL-MCNC: 0.99 MG/DL (ref 0.51–0.95)
DEPRECATED HCO3 PLAS-SCNC: 25 MMOL/L (ref 22–29)
EGFRCR SERPLBLD CKD-EPI 2021: 72 ML/MIN/1.73M2
GLUCOSE SERPL-MCNC: 85 MG/DL (ref 70–99)
HCV AB SERPL QL IA: NONREACTIVE
HIV 1+2 AB+HIV1 P24 AG SERPL QL IA: NONREACTIVE
POTASSIUM SERPL-SCNC: 4.3 MMOL/L (ref 3.4–5.3)
SODIUM SERPL-SCNC: 140 MMOL/L (ref 135–145)
T PALLIDUM AB SER QL: NONREACTIVE

## 2024-07-12 PROCEDURE — 86803 HEPATITIS C AB TEST: CPT

## 2024-07-12 PROCEDURE — 87389 HIV-1 AG W/HIV-1&-2 AB AG IA: CPT

## 2024-07-12 PROCEDURE — 86780 TREPONEMA PALLIDUM: CPT

## 2024-07-12 PROCEDURE — 36415 COLL VENOUS BLD VENIPUNCTURE: CPT

## 2024-07-12 PROCEDURE — 80048 BASIC METABOLIC PNL TOTAL CA: CPT

## 2024-07-15 NOTE — RESULT ENCOUNTER NOTE
All your results are essentially monica. Please contact me if you have any questions.  Take care,  Gayatri Ashraf D.O.

## 2024-07-17 ENCOUNTER — PATIENT OUTREACH (OUTPATIENT)
Dept: CARE COORDINATION | Facility: CLINIC | Age: 44
End: 2024-07-17
Payer: COMMERCIAL

## 2024-07-24 ENCOUNTER — TELEPHONE (OUTPATIENT)
Dept: SURGERY | Age: 44
End: 2024-07-24

## 2024-07-24 ENCOUNTER — MEDICAL CORRESPONDENCE (OUTPATIENT)
Dept: HEALTH INFORMATION MANAGEMENT | Facility: CLINIC | Age: 44
End: 2024-07-24
Payer: COMMERCIAL

## 2024-07-24 ENCOUNTER — PREP FOR CASE (OUTPATIENT)
Dept: SURGERY | Age: 44
End: 2024-07-24

## 2024-07-24 DIAGNOSIS — Z41.1 ENCOUNTER FOR COSMETIC SURGERY: Primary | ICD-10-CM

## 2024-07-25 ENCOUNTER — TRANSFERRED RECORDS (OUTPATIENT)
Dept: HEALTH INFORMATION MANAGEMENT | Facility: CLINIC | Age: 44
End: 2024-07-25
Payer: COMMERCIAL

## 2024-07-25 LAB
ALT SERPL-CCNC: 23 IU/L (ref 6–32)
AST SERPL-CCNC: 26 U/L (ref 10–35)
CREATININE (EXTERNAL): 0.8 MG/DL (ref 0.5–1.05)

## 2024-07-31 ENCOUNTER — PATIENT OUTREACH (OUTPATIENT)
Dept: CARE COORDINATION | Facility: CLINIC | Age: 44
End: 2024-07-31
Payer: COMMERCIAL

## 2024-08-02 ENCOUNTER — PATIENT OUTREACH (OUTPATIENT)
Dept: CARE COORDINATION | Facility: CLINIC | Age: 44
End: 2024-08-02
Payer: COMMERCIAL

## 2024-08-12 ENCOUNTER — ANCILLARY PROCEDURE (OUTPATIENT)
Dept: MAMMOGRAPHY | Facility: CLINIC | Age: 44
End: 2024-08-12
Attending: NURSE PRACTITIONER
Payer: COMMERCIAL

## 2024-08-12 DIAGNOSIS — Z12.31 VISIT FOR SCREENING MAMMOGRAM: ICD-10-CM

## 2024-08-12 PROCEDURE — 77067 SCR MAMMO BI INCL CAD: CPT | Mod: GC

## 2024-08-12 PROCEDURE — 77063 BREAST TOMOSYNTHESIS BI: CPT | Mod: GC

## 2024-09-19 ENCOUNTER — TELEPHONE (OUTPATIENT)
Dept: SURGERY | Age: 44
End: 2024-09-19

## 2024-09-20 ENCOUNTER — OFFICE VISIT (OUTPATIENT)
Dept: FAMILY MEDICINE | Facility: OTHER | Age: 44
End: 2024-09-20
Payer: COMMERCIAL

## 2024-09-20 ENCOUNTER — EXTERNAL RECORD (OUTPATIENT)
Dept: OTHER | Age: 44
End: 2024-09-20

## 2024-09-20 VITALS
BODY MASS INDEX: 26.03 KG/M2 | HEIGHT: 64 IN | HEART RATE: 86 BPM | RESPIRATION RATE: 18 BRPM | WEIGHT: 152.5 LBS | SYSTOLIC BLOOD PRESSURE: 110 MMHG | DIASTOLIC BLOOD PRESSURE: 60 MMHG | OXYGEN SATURATION: 96 % | TEMPERATURE: 98.5 F

## 2024-09-20 DIAGNOSIS — M79.7 FIBROMYALGIA: ICD-10-CM

## 2024-09-20 DIAGNOSIS — A69.20 NEUROLOGICAL LYME DISEASE: ICD-10-CM

## 2024-09-20 DIAGNOSIS — Z98.890 H/O ABDOMINAL SURGERY: ICD-10-CM

## 2024-09-20 DIAGNOSIS — N89.8 VAGINAL DISCHARGE: ICD-10-CM

## 2024-09-20 DIAGNOSIS — Z01.818 PREOP GENERAL PHYSICAL EXAM: Primary | ICD-10-CM

## 2024-09-20 DIAGNOSIS — A69.20 LYME DISEASE: ICD-10-CM

## 2024-09-20 DIAGNOSIS — Z98.84 S/P LAPAROSCOPIC SLEEVE GASTRECTOMY: ICD-10-CM

## 2024-09-20 DIAGNOSIS — E78.5 HYPERLIPIDEMIA LDL GOAL <130: ICD-10-CM

## 2024-09-20 DIAGNOSIS — F41.1 GAD (GENERALIZED ANXIETY DISORDER): ICD-10-CM

## 2024-09-20 LAB
ALBUMIN SERPL BCG-MCNC: 3.6 G/DL (ref 3.5–5.2)
ALP SERPL-CCNC: 80 U/L (ref 40–150)
ALT SERPL W P-5'-P-CCNC: 11 U/L (ref 0–50)
ANION GAP SERPL CALCULATED.3IONS-SCNC: 12 MMOL/L (ref 7–15)
AST SERPL W P-5'-P-CCNC: 18 U/L (ref 0–45)
BASOPHILS # BLD AUTO: 0.1 10E3/UL (ref 0–0.2)
BASOPHILS NFR BLD AUTO: 1 %
BILIRUB SERPL-MCNC: 0.3 MG/DL
BUN SERPL-MCNC: 12.2 MG/DL (ref 6–20)
CALCIUM SERPL-MCNC: 8.7 MG/DL (ref 8.8–10.4)
CHLORIDE SERPL-SCNC: 105 MMOL/L (ref 98–107)
CLUE CELLS: ABNORMAL
CREAT SERPL-MCNC: 0.75 MG/DL (ref 0.51–0.95)
EGFRCR SERPLBLD CKD-EPI 2021: >90 ML/MIN/1.73M2
EOSINOPHIL # BLD AUTO: 0.3 10E3/UL (ref 0–0.7)
EOSINOPHIL NFR BLD AUTO: 3 %
ERYTHROCYTE [DISTWIDTH] IN BLOOD BY AUTOMATED COUNT: 14.6 % (ref 10–15)
GLUCOSE SERPL-MCNC: 75 MG/DL (ref 70–99)
HCO3 SERPL-SCNC: 23 MMOL/L (ref 22–29)
HCT VFR BLD AUTO: 40.7 % (ref 35–47)
HGB BLD-MCNC: 12.6 G/DL (ref 11.7–15.7)
IMM GRANULOCYTES # BLD: 0 10E3/UL
IMM GRANULOCYTES NFR BLD: 0 %
LYMPHOCYTES # BLD AUTO: 1.5 10E3/UL (ref 0.8–5.3)
LYMPHOCYTES NFR BLD AUTO: 15 %
MCH RBC QN AUTO: 28 PG (ref 26.5–33)
MCHC RBC AUTO-ENTMCNC: 31 G/DL (ref 31.5–36.5)
MCV RBC AUTO: 90 FL (ref 78–100)
MONOCYTES # BLD AUTO: 0.9 10E3/UL (ref 0–1.3)
MONOCYTES NFR BLD AUTO: 9 %
NEUTROPHILS # BLD AUTO: 7.2 10E3/UL (ref 1.6–8.3)
NEUTROPHILS NFR BLD AUTO: 71 %
PLATELET # BLD AUTO: 224 10E3/UL (ref 150–450)
POTASSIUM SERPL-SCNC: 4.6 MMOL/L (ref 3.4–5.3)
PROT SERPL-MCNC: 6.1 G/DL (ref 6.4–8.3)
RBC # BLD AUTO: 4.5 10E6/UL (ref 3.8–5.2)
SODIUM SERPL-SCNC: 140 MMOL/L (ref 135–145)
TRICHOMONAS, WET PREP: ABNORMAL
WBC # BLD AUTO: 10 10E3/UL (ref 4–11)
WBC'S/HIGH POWER FIELD, WET PREP: ABNORMAL
YEAST, WET PREP: ABNORMAL

## 2024-09-20 PROCEDURE — 85025 COMPLETE CBC W/AUTO DIFF WBC: CPT | Performed by: NURSE PRACTITIONER

## 2024-09-20 PROCEDURE — 80053 COMPREHEN METABOLIC PANEL: CPT | Performed by: NURSE PRACTITIONER

## 2024-09-20 PROCEDURE — 36415 COLL VENOUS BLD VENIPUNCTURE: CPT | Performed by: NURSE PRACTITIONER

## 2024-09-20 PROCEDURE — 99214 OFFICE O/P EST MOD 30 MIN: CPT | Performed by: NURSE PRACTITIONER

## 2024-09-20 PROCEDURE — 87210 SMEAR WET MOUNT SALINE/INK: CPT | Performed by: NURSE PRACTITIONER

## 2024-09-20 RX ORDER — BUPROPION HYDROCHLORIDE 300 MG/1
300 TABLET ORAL EVERY MORNING
COMMUNITY

## 2024-09-20 RX ORDER — CEFDINIR 300 MG/1
300 CAPSULE ORAL
COMMUNITY
End: 2024-10-01

## 2024-09-20 RX ORDER — BUPROPION HYDROCHLORIDE 150 MG/1
150 TABLET ORAL EVERY MORNING
Qty: 90 TABLET | Refills: 0 | Status: SHIPPED | OUTPATIENT
Start: 2024-09-20

## 2024-09-20 RX ORDER — ETONOGESTREL/ETHINYL ESTRADIOL .12-.015MG
RING, VAGINAL VAGINAL
COMMUNITY

## 2024-09-20 RX ORDER — NITROFURANTOIN 25; 75 MG/1; MG/1
CAPSULE ORAL
COMMUNITY
Start: 2024-07-07 | End: 2024-10-01

## 2024-09-20 RX ORDER — OXYCODONE AND ACETAMINOPHEN 5; 325 MG/1; MG/1
1 TABLET ORAL EVERY 6 HOURS PRN
Status: ON HOLD | COMMUNITY
End: 2024-09-30 | Stop reason: HOSPADM

## 2024-09-20 RX ORDER — DEXAMETHASONE 0.5 MG/5ML
SOLUTION ORAL
COMMUNITY
Start: 2024-05-15 | End: 2024-10-01

## 2024-09-20 RX ORDER — OFLOXACIN 3 MG/ML
SOLUTION AURICULAR (OTIC)
COMMUNITY
Start: 2024-04-07 | End: 2024-10-01

## 2024-09-20 RX ORDER — FLUCONAZOLE 150 MG/1
TABLET ORAL
COMMUNITY
Start: 2024-07-11 | End: 2024-10-01

## 2024-09-20 RX ORDER — ESTRADIOL 0.1 MG/G
CREAM VAGINAL
COMMUNITY
End: 2024-10-01

## 2024-09-20 RX ORDER — HYDROXYZINE HYDROCHLORIDE 25 MG/1
12.5-5 TABLET, FILM COATED ORAL
COMMUNITY
Start: 2024-04-03 | End: 2024-10-01

## 2024-09-20 RX ORDER — MINOCYCLINE HYDROCHLORIDE 100 MG/1
100 CAPSULE ORAL
COMMUNITY
End: 2024-10-01

## 2024-09-20 RX ORDER — OMEPRAZOLE 40 MG/1
40 CAPSULE, DELAYED RELEASE ORAL DAILY
COMMUNITY
Start: 2024-06-24

## 2024-09-20 ASSESSMENT — PAIN SCALES - GENERAL: PAINLEVEL: MODERATE PAIN (4)

## 2024-09-20 ASSESSMENT — PATIENT HEALTH QUESTIONNAIRE - PHQ9
10. IF YOU CHECKED OFF ANY PROBLEMS, HOW DIFFICULT HAVE THESE PROBLEMS MADE IT FOR YOU TO DO YOUR WORK, TAKE CARE OF THINGS AT HOME, OR GET ALONG WITH OTHER PEOPLE: SOMEWHAT DIFFICULT
SUM OF ALL RESPONSES TO PHQ QUESTIONS 1-9: 5
SUM OF ALL RESPONSES TO PHQ QUESTIONS 1-9: 5

## 2024-09-20 NOTE — PROGRESS NOTES
Preoperative Evaluation  [facility]  [address]  [place] [address]  Phone: [phone]  Primary Provider: DANIELE Zamora CNP  Pre-op Performing Provider: DANIELE Zamora CNP  Sep 20, 2024       9/20/2024   Surgical Information   What procedure is being done? abdominoplasy, brachioplasty   [facility]   Who is doing the procedure / surgery? Abbie Rojas   Date of surgery / procedure: 09/30/24   Time of surgery / procedure: tbd   Where do you plan to recover after surgery? Other - Staying in Air Bnb with mom in [facility]        Fax number for surgical facility: [phone]    Assessment & Plan     The proposed surgical procedure is considered INTERMEDIATE risk.    Preop general physical exam    - CBC with platelets and differential; Future  - Comprehensive metabolic panel (BMP + Alb, Alk Phos, ALT, AST, Total. Bili, TP); Future  - CBC with platelets and differential  - Comprehensive metabolic panel (BMP + Alb, Alk Phos, ALT, AST, Total. Bili, TP)    H/O abdominal surgery  Has had issues with hanging and fatty skin tissue with her weight loss which has caused chronic irritation. She had a great deal of weight loss from her gastric sleeve surgery in 2017.   Met with plastic surgeon and plans to have abdominoplasty domenica varela and brachioplasty.     Neurological Lyme disease  Followed by a lyme doctor   On Naltrexone. Aware she needs to stop this 3 days before and resume once off opiods for 7-10 days.     Fibromyalgia  Followed by an outside provider for her lymes     Vaginal discharge  Will check for yeast.   - Wet prep - lab collect; Future  - Wet prep - lab collect    S/P laparoscopic sleeve gastrectomy  Monitoring her vitamins and labs.     Hyperlipidemia LDL goal <130  The 10-year ASCVD risk score (Christiano GUERRERO, et al., 2019) is: 0.2%    Values used to  calculate the score:      Age: 44 years      Sex: Female      Is Non- : No      Diabetic: No      Tobacco smoker: No      Systolic Blood Pressure: 110 mmHg      Is BP treated: No      HDL Cholesterol: 83 mg/dL      Total Cholesterol: 179 mg/dL      ALIDA (generalized anxiety disorder)  Stopped her wellbutrin and effexor.   Will restart her wellbutrin after surgery and let me know if she needs to go up to the 300mg. Since her divorce she is not sure she needs both of these medications   - buPROPion (WELLBUTRIN XL) 150 MG 24 hr tablet; Take 1 tablet (150 mg) by mouth every morning.    Lyme disease  Monitored by her lyme provider    The patient indicates understanding of these issues and agrees with the plan.             Risks and Recommendations  The patient has the following additional risks and recommendations for perioperative complications:   - No identified additional risk factors other than previously addressed    Antiplatelet or Anticoagulation Medication Instructions   - Patient is on no antiplatelet or anticoagulation medications.    Additional Medication Instructions   - Herbal medications and vitamins: DO NOT TAKE 14 days prior to surgery.     - ibuprofen (Advil, Motrin): DO NOT TAKE 5 day before surgery.    - naltrexone PO: DO NOT TAKE for 3 days (72 hours) prior to surgery.   - Topicals: DO NOT TAKE day of surgery.    Recommendation  Approval given to proceed with proposed procedure, without further diagnostic evaluation.    Heriberto Cornelius is a 44 year old, presenting for the following:  Pre-Op Exam          9/20/2024    10:30 AM   Additional Questions   Roomed by Noreen SIDHU related to upcoming procedure:         9/20/2024   Pre-Op Questionnaire   Have you ever had a heart attack or stroke? No   Have you ever had surgery on your heart or blood vessels, such as a stent placement, a coronary artery bypass, or surgery on an artery in your head, neck, heart, or legs? No   Do  you have chest pain with activity? No   Do you have a history of heart failure? No   Do you currently have a cold, bronchitis or symptoms of other infection? No   Do you have a cough, shortness of breath, or wheezing? No   Do you or anyone in your family have previous history of blood clots? No   Do you or does anyone in your family have a serious bleeding problem such as prolonged bleeding following surgeries or cuts? No   Have you ever had problems with anemia or been told to take iron pills? (!) YES intermittently will do iron.    Have you had any abnormal blood loss such as black, tarry or bloody stools, or abnormal vaginal bleeding? No   Have you ever had a blood transfusion? No   Are you willing to have a blood transfusion if it is medically needed before, during, or after your surgery? Yes   Have you or any of your relatives ever had problems with anesthesia? No   Do you have sleep apnea, excessive snoring or daytime drowsiness? No   Do you have any artifical heart valves or other implanted medical devices like a pacemaker, defibrillator, or continuous glucose monitor? No   Do you have artificial joints? No   Are you allergic to latex? No        Health Care Directive  Patient does not have a Health Care Directive or Living Will: Discussed advance care planning with patient; however, patient declined at this time.    Preoperative Review of    reviewed - no record of controlled substances prescribed.]    Status of Chronic Conditions:  See problem list for active medical problems.  Problems all longstanding and stable, except as noted/documented.  See ROS for pertinent symptoms related to these conditions.    Patient Active Problem List    Diagnosis Date Noted    Palpitations 06/17/2024     Priority: Medium    Other fatigue 06/17/2024     Priority: Medium    Acute pain of left shoulder 05/02/2023     Priority: Medium    Vaginal vault prolapse 12/28/2022     Priority: Medium    Leiomyoma of uterus,  unspecified 09/13/2021     Priority: Medium     Formatting of this note might be different from the original.  Created by Conversion      Allergic rhinitis due to other allergic trigger, unspecified seasonality 03/03/2021     Priority: Medium    Pruritic disorder 03/03/2021     Priority: Medium    Elevated serum tryptase 03/03/2021     Priority: Medium    Lyme disease 11/15/2019     Priority: Medium    Neurological Lyme disease 11/15/2019     Priority: Medium    Chronic pelvic pain in female 05/01/2019     Priority: Medium    S/P laparoscopic sleeve gastrectomy 07/25/2017     Priority: Medium    Cervical high risk HPV (human papillomavirus) test positive 07/05/2017     Priority: Medium     2012, 2013, 2014 all NIL paps.  6/10/15 NIL pap, + HR HPV (not 16 or 18). Plan: cotest in 1 year.  7/5/17 NIL pap, + HR HPV (not 16 or 18). Plan colp  10/18/17 Colpo.  Bx-normal. NIL pap.  Plan: cotest in 1 year  1/4/18 LAVH-benign.  - pap smear no longer indicated per ASCCP guidelines.     Source: ASCCP Practice FAQs.  Patient with supracervical hysterectomy (she still has cervix). Continue screening until 65 or longer if hx of abnormal that requires longer screening.  Patient with total hysterectomy - (cervix removed) If no hx of abnormal pap and normal pathology on hyst - discontinue screening.   9 ACOG Practice Bulletin # 131, Nov 2012.            Fibromyalgia 12/07/2016     Priority: Medium    Endometriosis 06/10/2015     Priority: Medium     Formatting of this note might be different from the original.  Created by Conversion    Replacement Utility updated for latest IMO load      Major depression, recurrent (H24) 11/26/2014     Priority: Medium    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     Priority: Medium      Past Medical History:   Diagnosis Date    Arthritis     BIG TOE    Cervical high risk HPV (human papillomavirus) test positive 07/05/2017    Cervical high risk HPV (human papillomavirus) test positive  06/10/2015    Depressive disorder 2007    I have been on and off medication for the last several years    Endometriosis     Hearing problem     Obese     Sensorineural hearing loss      Past Surgical History:   Procedure Laterality Date    ABDOMEN SURGERY      laparoscopy X2    APPENDECTOMY  7/17    DILATE CERVIX, HYSTEROSCOPY, ABLATE ENDOMETRIUM NOVASURE, COMBINED N/A 11/17/2016    Procedure: COMBINED DILATE CERVIX, HYSTEROSCOPY, ABLATE ENDOMETRIUM NOVASURE;  Surgeon: Sabas Patel MD;  Location: [facility]    EP STUDY TILT TABLE N/A 12/29/2020    Procedure: EP TILT TABLE;  Surgeon: Gilberto Song MD;  Location:  [facility]    ESOPHAGOSCOPY, GASTROSCOPY, DUODENOSCOPY (EGD), COMBINED N/A 12/7/2023    Procedure: Esophagoscopy, gastroscopy, duodenoscopy, combined;  Surgeon: Wiley Dias DO;  Location:  GI    GYN SURGERY  dates above    myomectomy x2, laparoscopy x2    GYN SURGERY      Endometriosis surgery 5/1/19    HYSTERECTOMY, PAP NO LONGER INDICATED      INSERT PICC LINE Left 11/15/2019    Procedure: INSERTION, PICC, single lumen PICC;  Surgeon: Emmett Rasmussen PA-C;  Location: [facility]    IR PICC PLACEMENT > 5 YRS OF AGE  11/15/2019    LAPAROSCOPIC ASSISTED HYSTERECTOMY VAGINAL N/A 01/04/2018    Procedure: LAPAROSCOPIC ASSISTED HYSTERECTOMY VAGINAL;;  Surgeon: Sabas Patel MD;  Location: [facility]    LAPAROSCOPIC GASTRIC SLEEVE N/A 07/25/2017    Procedure: LAPAROSCOPIC GASTRIC SLEEVE;  Laparoscopic Sleeve Gastrectomy;  Surgeon: Sam Schmidt MD;  Location: [facility] OR    LAPAROSCOPIC LYSIS ADHESIONS  07/10/2014    Procedure: LAPAROSCOPIC LYSIS ADHESIONS;  Surgeon: Sabas Patel MD;  Location: [facility]    LAPAROSCOPIC SALPINGECTOMY Bilateral 01/04/2018    Procedure: LAPAROSCOPIC SALPINGECTOMY;;  Surgeon: Sabas Patel MD;  Location: [facility]    LASER CO2 LAPAROSCOPIC VAPORIZATION ENDOMETRIUM  07/10/2014    Procedure: LASER CO2 LAPAROSCOPIC VAPORIZATION ENDOMETRIUM;   Surgeon: Sabas Patel MD;  Location: [facility]    LASER CO2 LAPAROSCOPIC VAPORIZATION ENDOMETRIUM N/A 06/30/2015    Procedure: LASER CO2 LAPAROSCOPIC VAPORIZATION ENDOMETRIUM;  Surgeon: Sabas Patel MD;  Location: [facility]    LASER CO2 LAPAROSCOPIC VAPORIZATION ENDOMETRIUM, LYSIS ADHESIONS N/A 01/04/2018    Procedure: LASER CO2 LAPAROSCOPIC VAPORIZATION ENDOMETRIUM, LYSIS ADHESIONS;  LAPAROSCOPY WITH CO2 LASER LYSIS OF ADHESIONS AND VAPORIZATION OF ENDOMETRIOSIS, CYSTOTOMY LEFT OVARY, LAPAROSCOPIC VAGINAL HYSTERECTOMY WITH BILATERAL SALPINGECTOMY ;  Surgeon: Sabas Patel MD;  Location: [facility]    LASER CO2 LAPAROSCOPY DIAGNOSTIC, LYSIS ADHESIONS, COMBINED N/A 06/30/2015    Procedure: COMBINED LASER CO2 LAPAROSCOPY DIAGNOSTIC, LYSIS ADHESIONS;  Surgeon: Sabas Patel MD;  Location: [facility]    ZZC EXCIS UTERINE FIBROID,ABD APPRCH      Description: Uterine Myomectomy;  Recorded: 11/11/2013;     Current Outpatient Medications   Medication Sig Dispense Refill    buPROPion (WELLBUTRIN XL) 150 MG 24 hr tablet Take 1 tablet (150 mg) by mouth every morning. 90 tablet 0    dexAMETHasone alcohol-free (DECADRON) 0.1 MG/ML solution       MULTIPLE VITAMIN PO Take 1 patch by mouth daily PatchMD - Multivitamin Patch      NALTREXONE HCL PO Take 4.5 mg by mouth      NUVARING 0.12-0.015 MG/24HR vaginal ring INSERT 1 RING VAGINALLY EVERY 21 DAYS 1 each 0    omeprazole (PRILOSEC) 40 MG DR capsule TAKE 1 CAPSULE BY MOUTH EVERY DAY 90 capsule 1    vitamin B complex with vitamin C (STRESS TAB) tablet Take 1 tablet by mouth daily      vitamin D3 (CHOLECALCIFEROL) 125 MCG (5000 UT) tablet Take 1 tablet by mouth every 24 hours         No Known Allergies     Social History     Tobacco Use    Smoking status: Former     Current packs/day: 0.00     Average packs/day: 0.5 packs/day for 6.8 years (3.4 ttl pk-yrs)     Types: Cigarettes     Start date: 1/2/2007     Quit date: 11/1/2013     Years since quitting: 10.9     Passive exposure: Past  "   Smokeless tobacco: Never   Substance Use Topics    Alcohol use: Yes     Comment: 2 drinks per month     Family History   Problem Relation Age of Onset    Depression Mother     Cancer Mother         Cervical     Other - See Comments Mother         Fibromyalgia    Crohn's Disease Mother     Obesity Mother     Ovarian Cancer Mother         Cervical Cancer    Substance Abuse Mother     Stomach Problem Mother     Skin Cancer Mother     Arthritis Father     Rheumatoid Arthritis Father     Depression Father     Substance Abuse Father     Breast Cancer Maternal Grandmother     Cancer Maternal Grandmother     Hypertension Maternal Grandmother     Cerebrovascular Disease Paternal Grandmother     Prostate Cancer Paternal Grandfather     Depression Brother     Mental Illness Brother     Substance Abuse Brother     Mental Illness Brother         schizophrenia and bipolar    Substance Abuse Brother     Stomach Problem Brother     Depression Sister     Stomach Problem Sister     Colon Cancer Other      History   Drug Use Unknown     Comment: 2 weeks ago for pain             Review of Systems  Constitutional, HEENT, cardiovascular, pulmonary, gi and gu systems are negative, except as otherwise noted.    Objective    /60   Pulse 86   Temp 98.5  F (36.9  C) (Temporal)   Resp 18   Ht 1.626 m (5' 4.02\")   Wt 69.2 kg (152 lb 8 oz)   LMP  (LMP Unknown)   SpO2 96%   BMI 26.16 kg/m     Estimated body mass index is 26.16 kg/m  as calculated from the following:    Height as of this encounter: 1.626 m (5' 4.02\").    Weight as of this encounter: 69.2 kg (152 lb 8 oz).  Physical Exam  GENERAL: alert and no distress  EYES: Eyes grossly normal to inspection, PERRL and conjunctivae and sclerae normal  HENT: ear canals and TM's normal, nose and mouth without ulcers or lesions  NECK: no adenopathy, no asymmetry, masses, or scars  RESP: lungs clear to auscultation - no rales, rhonchi or wheezes  CV: regular rate and rhythm, normal S1 " S2, no S3 or S4, no murmur, click or rub, no peripheral edema  ABDOMEN: soft, nontender, no hepatosplenomegaly, no masses and bowel sounds normal  SKIN: no suspicious lesions or rashes  NEURO: Normal strength and tone, mentation intact and speech normal  PSYCH: mentation appears normal, affect normal/bright    Recent Labs   Lab Test 07/12/24  0938 06/17/24  0742 05/21/24  1625 12/27/23  0900 11/22/23  0804   HGB  --  12.6 13.2   < >  --    PLT  --  230 249   < >  --      --   --   --  139   POTASSIUM 4.3  --   --   --  3.9   CR 0.99*  --   --   --  1.08*   A1C  --   --   --   --  4.9    < > = values in this interval not displayed.        Diagnostics  Recent Results (from the past 168 hour(s))   Comprehensive metabolic panel (BMP + Alb, Alk Phos, ALT, AST, Total. Bili, TP)    Collection Time: 09/20/24 11:02 AM   Result Value Ref Range    Sodium 140 135 - 145 mmol/L    Potassium 4.6 3.4 - 5.3 mmol/L    Carbon Dioxide (CO2) 23 22 - 29 mmol/L    Anion Gap 12 7 - 15 mmol/L    Urea Nitrogen 12.2 6.0 - 20.0 mg/dL    Creatinine 0.75 0.51 - 0.95 mg/dL    GFR Estimate >90 >60 mL/min/1.73m2    Calcium 8.7 (L) 8.8 - 10.4 mg/dL    Chloride 105 98 - 107 mmol/L    Glucose 75 70 - 99 mg/dL    Alkaline Phosphatase 80 40 - 150 U/L    AST 18 0 - 45 U/L    ALT 11 0 - 50 U/L    Protein Total 6.1 (L) 6.4 - 8.3 g/dL    Albumin 3.6 3.5 - 5.2 g/dL    Bilirubin Total 0.3 <=1.2 mg/dL   CBC with platelets and differential    Collection Time: 09/20/24 11:02 AM   Result Value Ref Range    WBC Count 10.0 4.0 - 11.0 10e3/uL    RBC Count 4.50 3.80 - 5.20 10e6/uL    Hemoglobin 12.6 11.7 - 15.7 g/dL    Hematocrit 40.7 35.0 - 47.0 %    MCV 90 78 - 100 fL    MCH 28.0 26.5 - 33.0 pg    MCHC 31.0 (L) 31.5 - 36.5 g/dL    RDW 14.6 10.0 - 15.0 %    Platelet Count 224 150 - 450 10e3/uL    % Neutrophils 71 %    % Lymphocytes 15 %    % Monocytes 9 %    % Eosinophils 3 %    % Basophils 1 %    % Immature Granulocytes 0 %    Absolute Neutrophils 7.2 1.6 -  8.3 10e3/uL    Absolute Lymphocytes 1.5 0.8 - 5.3 10e3/uL    Absolute Monocytes 0.9 0.0 - 1.3 10e3/uL    Absolute Eosinophils 0.3 0.0 - 0.7 10e3/uL    Absolute Basophils 0.1 0.0 - 0.2 10e3/uL    Absolute Immature Granulocytes 0.0 <=0.4 10e3/uL   Wet prep - lab collect    Collection Time: 09/20/24 11:05 AM    Specimen: Vagina; Swab   Result Value Ref Range    Trichomonas Absent Absent    Yeast Absent Absent    Clue Cells Absent Absent    WBCs/high power field 3+ (A) None      No EKG required, no history of coronary heart disease, significant arrhythmia, peripheral arterial disease or other structural heart disease.    Revised Cardiac Risk Index (RCRI)  The patient has the following serious cardiovascular risks for perioperative complications:   - No serious cardiac risks = 0 points     RCRI Interpretation: 0 points: Class I (very low risk - 0.4% complication rate)         Signed Electronically by: DANIELE Zamora CNP  A copy of this evaluation report is provided to the requesting physician.         Answers submitted by the patient for this visit:  Patient Health Questionnaire (Submitted on 9/20/2024)  If you checked off any problems, how difficult have these problems made it for you to do your work, take care of things at home, or get along with other people?: Somewhat difficult  PHQ9 TOTAL SCORE: 5

## 2024-09-20 NOTE — PATIENT INSTRUCTIONS

## 2024-09-23 ENCOUNTER — TELEPHONE (OUTPATIENT)
Dept: FAMILY MEDICINE | Facility: OTHER | Age: 44
End: 2024-09-23
Payer: COMMERCIAL

## 2024-09-23 NOTE — TELEPHONE ENCOUNTER
Please fax pre-op    DANIELE Zamora CNP  Questions or concerns please feel free to send me a [employer] message or call me  Phone : [phone]

## 2024-09-25 SDOH — SOCIAL STABILITY: SOCIAL INSECURITY: HOW OFTEN DOES ANYONE, INCLUDING FAMILY AND FRIENDS, PHYSICALLY HURT YOU?: NEVER

## 2024-09-25 ASSESSMENT — ACTIVITIES OF DAILY LIVING (ADL)
NEEDS_ASSIST: NO
ADL_SCORE: 12
ADL_BEFORE_ADMISSION: INDEPENDENT
ADL_SHORT_OF_BREATH: NO
RECENT_DECLINE_ADL: NO
HISTORY OF FALLING IN THE LAST YEAR (PRIOR TO ADMISSION): NO

## 2024-09-27 ENCOUNTER — APPOINTMENT (OUTPATIENT)
Dept: SURGERY | Age: 44
End: 2024-09-27

## 2024-09-27 ENCOUNTER — ANESTHESIA EVENT (OUTPATIENT)
Dept: SURGERY | Age: 44
End: 2024-09-27

## 2024-09-27 ENCOUNTER — CLINICAL ABSTRACT (OUTPATIENT)
Dept: HEALTH INFORMATION MANAGEMENT | Facility: OTHER | Age: 44
End: 2024-09-27

## 2024-09-27 VITALS
SYSTOLIC BLOOD PRESSURE: 104 MMHG | BODY MASS INDEX: 25.41 KG/M2 | DIASTOLIC BLOOD PRESSURE: 60 MMHG | OXYGEN SATURATION: 98 % | WEIGHT: 152.5 LBS | TEMPERATURE: 99.3 F | HEART RATE: 67 BPM | HEIGHT: 65 IN | RESPIRATION RATE: 16 BRPM

## 2024-09-27 DIAGNOSIS — Z41.1 ENCOUNTER FOR COSMETIC SURGERY: Primary | ICD-10-CM

## 2024-09-30 ENCOUNTER — HOSPITAL ENCOUNTER (OUTPATIENT)
Age: 44
Discharge: HOME | End: 2024-09-30
Attending: PLASTIC SURGERY | Admitting: PLASTIC SURGERY

## 2024-09-30 ENCOUNTER — ANESTHESIA (OUTPATIENT)
Dept: SURGERY | Age: 44
End: 2024-09-30

## 2024-09-30 VITALS
HEIGHT: 64 IN | WEIGHT: 154.21 LBS | TEMPERATURE: 97.2 F | HEART RATE: 54 BPM | RESPIRATION RATE: 18 BRPM | OXYGEN SATURATION: 100 % | SYSTOLIC BLOOD PRESSURE: 118 MMHG | DIASTOLIC BLOOD PRESSURE: 62 MMHG | BODY MASS INDEX: 26.33 KG/M2

## 2024-09-30 DIAGNOSIS — G89.18 POST-OPERATIVE PAIN: Primary | ICD-10-CM

## 2024-09-30 PROCEDURE — 10002358 HB ANESTH GENERAL 1ST 1/2 HR: Performed by: PLASTIC SURGERY

## 2024-09-30 PROCEDURE — 10004451 HB PACU RECOVERY 1ST 30 MINUTES: Performed by: PLASTIC SURGERY

## 2024-09-30 PROCEDURE — 10002117 HB ADDITIONAL SURGERY TIME/30 MIN: Performed by: PLASTIC SURGERY

## 2024-09-30 PROCEDURE — 10004348 HB COUNTER-EVAL PRE-OP

## 2024-09-30 PROCEDURE — A6212 FOAM DRG <=16 SQ IN W/BORDER: HCPCS | Performed by: PLASTIC SURGERY

## 2024-09-30 PROCEDURE — 10002801 HB RX 250 W/O HCPCS: Performed by: PLASTIC SURGERY

## 2024-09-30 PROCEDURE — X99199: Performed by: PLASTIC SURGERY

## 2024-09-30 PROCEDURE — 10002807 HB RX 258: Performed by: NURSE ANESTHETIST, CERTIFIED REGISTERED

## 2024-09-30 PROCEDURE — 10002767 HB EXTENDED RECOVERY PER HOUR: Performed by: PLASTIC SURGERY

## 2024-09-30 PROCEDURE — 10002803 HB RX 637: Performed by: PHYSICIAN ASSISTANT

## 2024-09-30 PROCEDURE — 10004452 HB PACU ADDL 30 MINUTES: Performed by: PLASTIC SURGERY

## 2024-09-30 PROCEDURE — 10004316 HB COUNTER-PREP

## 2024-09-30 PROCEDURE — 10002803 HB RX 637: Performed by: PLASTIC SURGERY

## 2024-09-30 PROCEDURE — 10002800 HB RX 250 W HCPCS: Performed by: NURSE ANESTHETIST, CERTIFIED REGISTERED

## 2024-09-30 PROCEDURE — C1758 CATHETER, URETERAL: HCPCS | Performed by: PLASTIC SURGERY

## 2024-09-30 PROCEDURE — 10002807 HB RX 258: Performed by: PLASTIC SURGERY

## 2024-09-30 PROCEDURE — 10002800 HB RX 250 W HCPCS: Performed by: PLASTIC SURGERY

## 2024-09-30 PROCEDURE — 10006023 HB SUPPLY 272: Performed by: PLASTIC SURGERY

## 2024-09-30 PROCEDURE — 10006391 HB COMPLEX PROCEDURE: Performed by: PLASTIC SURGERY

## 2024-09-30 PROCEDURE — A6222 GAUZE <=16 IN NO W/SAL W/O B: HCPCS | Performed by: PLASTIC SURGERY

## 2024-09-30 PROCEDURE — 10002359 HB ANESTH GENERAL ADD'L 1/2 HR: Performed by: PLASTIC SURGERY

## 2024-09-30 PROCEDURE — 10002801 HB RX 250 W/O HCPCS: Performed by: NURSE ANESTHETIST, CERTIFIED REGISTERED

## 2024-09-30 PROCEDURE — 10004560 HB COUNTER-EXTENDED RECOVERY PER HOUR

## 2024-09-30 PROCEDURE — 10004651 HB RX, NO CHARGE ITEM: Performed by: PLASTIC SURGERY

## 2024-09-30 DEVICE — LIGACLIP MCA MULTIPLE CLIP APPLIERS, 20 MEDIUM CLIPS
Type: IMPLANTABLE DEVICE | Site: ABDOMEN | Status: FUNCTIONAL
Brand: LIGACLIP

## 2024-09-30 RX ORDER — ONDANSETRON 4 MG/1
4 TABLET, ORALLY DISINTEGRATING ORAL EVERY 12 HOURS PRN
Status: DISCONTINUED | OUTPATIENT
Start: 2024-09-30 | End: 2024-09-30 | Stop reason: HOSPADM

## 2024-09-30 RX ORDER — DIPHENHYDRAMINE HYDROCHLORIDE 50 MG/ML
12.5 INJECTION INTRAMUSCULAR; INTRAVENOUS
Status: ACTIVE | OUTPATIENT
Start: 2024-09-30 | End: 2024-09-30

## 2024-09-30 RX ORDER — HYDRALAZINE HYDROCHLORIDE 20 MG/ML
5 INJECTION INTRAMUSCULAR; INTRAVENOUS EVERY 10 MIN PRN
Status: DISCONTINUED | OUTPATIENT
Start: 2024-09-30 | End: 2024-09-30 | Stop reason: HOSPADM

## 2024-09-30 RX ORDER — ACETAMINOPHEN 650 MG/1
650 SUPPOSITORY RECTAL EVERY 4 HOURS PRN
Status: DISCONTINUED | OUTPATIENT
Start: 2024-09-30 | End: 2024-09-30 | Stop reason: HOSPADM

## 2024-09-30 RX ORDER — MIDAZOLAM HYDROCHLORIDE 1 MG/ML
INJECTION, SOLUTION INTRAMUSCULAR; INTRAVENOUS PRN
Status: DISCONTINUED | OUTPATIENT
Start: 2024-09-30 | End: 2024-09-30

## 2024-09-30 RX ORDER — ONDANSETRON 2 MG/ML
4 INJECTION INTRAMUSCULAR; INTRAVENOUS EVERY 12 HOURS PRN
Status: DISCONTINUED | OUTPATIENT
Start: 2024-09-30 | End: 2024-09-30 | Stop reason: HOSPADM

## 2024-09-30 RX ORDER — DROPERIDOL 2.5 MG/ML
INJECTION, SOLUTION INTRAMUSCULAR; INTRAVENOUS PRN
Status: DISCONTINUED | OUTPATIENT
Start: 2024-09-30 | End: 2024-09-30

## 2024-09-30 RX ORDER — ALBUTEROL SULFATE 0.83 MG/ML
2.5 SOLUTION RESPIRATORY (INHALATION)
Status: DISCONTINUED | OUTPATIENT
Start: 2024-09-30 | End: 2024-09-30 | Stop reason: HOSPADM

## 2024-09-30 RX ORDER — 0.9 % SODIUM CHLORIDE 0.9 %
2 VIAL (ML) INJECTION EVERY 12 HOURS SCHEDULED
Status: DISCONTINUED | OUTPATIENT
Start: 2024-09-30 | End: 2024-09-30 | Stop reason: HOSPADM

## 2024-09-30 RX ORDER — ACETAMINOPHEN 500 MG
1000 TABLET ORAL EVERY 6 HOURS
Qty: 56 TABLET | Refills: 0 | Status: SHIPPED | OUTPATIENT
Start: 2024-09-30 | End: 2024-10-07

## 2024-09-30 RX ORDER — EPHEDRINE SULFATE/0.9% NACL/PF 50 MG/10ML
SYRINGE (ML) INTRAVENOUS PRN
Status: DISCONTINUED | OUTPATIENT
Start: 2024-09-30 | End: 2024-09-30

## 2024-09-30 RX ORDER — CELECOXIB 200 MG/1
400 CAPSULE ORAL
Status: COMPLETED | OUTPATIENT
Start: 2024-09-30 | End: 2024-09-30

## 2024-09-30 RX ORDER — MAGNESIUM HYDROXIDE 1200 MG/15ML
LIQUID ORAL PRN
Status: DISCONTINUED | OUTPATIENT
Start: 2024-09-30 | End: 2024-09-30 | Stop reason: HOSPADM

## 2024-09-30 RX ORDER — CELECOXIB 200 MG/1
200 CAPSULE ORAL 2 TIMES DAILY
Qty: 14 CAPSULE | Refills: 0 | Status: SHIPPED | OUTPATIENT
Start: 2024-09-30 | End: 2024-10-07

## 2024-09-30 RX ORDER — ONDANSETRON 8 MG/1
4 TABLET, FILM COATED ORAL EVERY 8 HOURS PRN
Qty: 10 TABLET | Refills: 0 | Status: SHIPPED | OUTPATIENT
Start: 2024-09-30

## 2024-09-30 RX ORDER — ONDANSETRON 2 MG/ML
INJECTION INTRAMUSCULAR; INTRAVENOUS PRN
Status: DISCONTINUED | OUTPATIENT
Start: 2024-09-30 | End: 2024-09-30

## 2024-09-30 RX ORDER — NALOXONE HCL 0.4 MG/ML
0.2 VIAL (ML) INJECTION EVERY 5 MIN PRN
Status: DISCONTINUED | OUTPATIENT
Start: 2024-09-30 | End: 2024-09-30 | Stop reason: HOSPADM

## 2024-09-30 RX ORDER — ROCURONIUM BROMIDE 10 MG/ML
INJECTION, SOLUTION INTRAVENOUS PRN
Status: DISCONTINUED | OUTPATIENT
Start: 2024-09-30 | End: 2024-09-30

## 2024-09-30 RX ORDER — PROPOFOL 10 MG/ML
INJECTION, EMULSION INTRAVENOUS PRN
Status: DISCONTINUED | OUTPATIENT
Start: 2024-09-30 | End: 2024-09-30

## 2024-09-30 RX ORDER — FAMOTIDINE 10 MG/ML
20 INJECTION, SOLUTION INTRAVENOUS
Status: DISCONTINUED | OUTPATIENT
Start: 2024-09-30 | End: 2024-09-30 | Stop reason: HOSPADM

## 2024-09-30 RX ORDER — METOPROLOL TARTRATE 25 MG/1
25 TABLET, FILM COATED ORAL
Status: DISCONTINUED | OUTPATIENT
Start: 2024-09-30 | End: 2024-09-30 | Stop reason: HOSPADM

## 2024-09-30 RX ORDER — DEXAMETHASONE SODIUM PHOSPHATE 4 MG/ML
INJECTION, SOLUTION INTRA-ARTICULAR; INTRALESIONAL; INTRAMUSCULAR; INTRAVENOUS; SOFT TISSUE PRN
Status: DISCONTINUED | OUTPATIENT
Start: 2024-09-30 | End: 2024-09-30

## 2024-09-30 RX ORDER — ACETAMINOPHEN 500 MG
1000 TABLET ORAL
Status: COMPLETED | OUTPATIENT
Start: 2024-09-30 | End: 2024-09-30

## 2024-09-30 RX ORDER — EPHEDRINE SULFATE/0.9% NACL/PF 50 MG/10ML
5 SYRINGE (ML) INTRAVENOUS EVERY 5 MIN PRN
Status: DISCONTINUED | OUTPATIENT
Start: 2024-09-30 | End: 2024-09-30 | Stop reason: HOSPADM

## 2024-09-30 RX ORDER — NICOTINE POLACRILEX 4 MG
30 LOZENGE BUCCAL
Status: DISCONTINUED | OUTPATIENT
Start: 2024-09-30 | End: 2024-09-30 | Stop reason: HOSPADM

## 2024-09-30 RX ORDER — GABAPENTIN 300 MG/1
300 CAPSULE ORAL
Status: COMPLETED | OUTPATIENT
Start: 2024-09-30 | End: 2024-09-30

## 2024-09-30 RX ORDER — CEFAZOLIN SODIUM 1 G/3ML
INJECTION, POWDER, FOR SOLUTION INTRAMUSCULAR; INTRAVENOUS PRN
Status: DISCONTINUED | OUTPATIENT
Start: 2024-09-30 | End: 2024-09-30

## 2024-09-30 RX ORDER — DEXTROSE MONOHYDRATE 25 G/50ML
25 INJECTION, SOLUTION INTRAVENOUS PRN
Status: DISCONTINUED | OUTPATIENT
Start: 2024-09-30 | End: 2024-09-30 | Stop reason: HOSPADM

## 2024-09-30 RX ORDER — BUPIVACAINE HYDROCHLORIDE AND EPINEPHRINE 2.5; 5 MG/ML; UG/ML
INJECTION, SOLUTION EPIDURAL; INFILTRATION; INTRACAUDAL; PERINEURAL PRN
Status: DISCONTINUED | OUTPATIENT
Start: 2024-09-30 | End: 2024-09-30 | Stop reason: HOSPADM

## 2024-09-30 RX ORDER — MIDAZOLAM HYDROCHLORIDE 1 MG/ML
2 INJECTION, SOLUTION INTRAMUSCULAR; INTRAVENOUS PRN
Status: DISCONTINUED | OUTPATIENT
Start: 2024-09-30 | End: 2024-09-30 | Stop reason: HOSPADM

## 2024-09-30 RX ORDER — CITRIC ACID/SODIUM CITRATE 334-500MG
30 SOLUTION, ORAL ORAL
Status: DISCONTINUED | OUTPATIENT
Start: 2024-09-30 | End: 2024-09-30 | Stop reason: HOSPADM

## 2024-09-30 RX ORDER — APREPITANT 40 MG/1
40 CAPSULE ORAL ONCE
Status: COMPLETED | OUTPATIENT
Start: 2024-09-30 | End: 2024-09-30

## 2024-09-30 RX ORDER — SODIUM CHLORIDE, SODIUM LACTATE, POTASSIUM CHLORIDE, CALCIUM CHLORIDE 600; 310; 30; 20 MG/100ML; MG/100ML; MG/100ML; MG/100ML
INJECTION, SOLUTION INTRAVENOUS CONTINUOUS
Status: DISCONTINUED | OUTPATIENT
Start: 2024-09-30 | End: 2024-09-30 | Stop reason: HOSPADM

## 2024-09-30 RX ORDER — ACETAMINOPHEN 325 MG/1
650 TABLET ORAL EVERY 4 HOURS PRN
Status: DISCONTINUED | OUTPATIENT
Start: 2024-09-30 | End: 2024-09-30 | Stop reason: HOSPADM

## 2024-09-30 RX ORDER — TRAMADOL HYDROCHLORIDE 50 MG/1
50 TABLET ORAL EVERY 6 HOURS PRN
Qty: 10 TABLET | Refills: 0 | Status: SHIPPED | OUTPATIENT
Start: 2024-09-30 | End: 2024-10-02 | Stop reason: SDUPTHER

## 2024-09-30 RX ORDER — PROCHLORPERAZINE EDISYLATE 5 MG/ML
5 INJECTION INTRAMUSCULAR; INTRAVENOUS
Status: ACTIVE | OUTPATIENT
Start: 2024-09-30 | End: 2024-09-30

## 2024-09-30 RX ORDER — GABAPENTIN 300 MG/1
300 CAPSULE ORAL 3 TIMES DAILY
Qty: 21 CAPSULE | Refills: 0 | Status: SHIPPED | OUTPATIENT
Start: 2024-09-30 | End: 2024-10-07

## 2024-09-30 RX ORDER — DROPERIDOL 2.5 MG/ML
0.62 INJECTION, SOLUTION INTRAMUSCULAR; INTRAVENOUS
Status: ACTIVE | OUTPATIENT
Start: 2024-09-30 | End: 2024-09-30

## 2024-09-30 RX ORDER — LIDOCAINE HYDROCHLORIDE 20 MG/ML
INJECTION, SOLUTION INFILTRATION; PERINEURAL PRN
Status: DISCONTINUED | OUTPATIENT
Start: 2024-09-30 | End: 2024-09-30

## 2024-09-30 RX ORDER — TRAMADOL HYDROCHLORIDE 50 MG/1
50 TABLET ORAL EVERY 6 HOURS PRN
Status: DISCONTINUED | OUTPATIENT
Start: 2024-09-30 | End: 2024-09-30 | Stop reason: HOSPADM

## 2024-09-30 RX ORDER — SODIUM CHLORIDE, SODIUM LACTATE, POTASSIUM CHLORIDE, CALCIUM CHLORIDE 600; 310; 30; 20 MG/100ML; MG/100ML; MG/100ML; MG/100ML
INJECTION, SOLUTION INTRAVENOUS CONTINUOUS PRN
Status: DISCONTINUED | OUTPATIENT
Start: 2024-09-30 | End: 2024-09-30

## 2024-09-30 RX ADMIN — SODIUM CHLORIDE, POTASSIUM CHLORIDE, SODIUM LACTATE AND CALCIUM CHLORIDE: 600; 310; 30; 20 INJECTION, SOLUTION INTRAVENOUS at 12:08

## 2024-09-30 RX ADMIN — ROCURONIUM BROMIDE 50 MG: 10 INJECTION INTRAVENOUS at 10:55

## 2024-09-30 RX ADMIN — CEFAZOLIN 2 G: 1 INJECTION, POWDER, FOR SOLUTION INTRAMUSCULAR; INTRAVENOUS at 12:19

## 2024-09-30 RX ADMIN — ROCURONIUM BROMIDE 50 MG: 10 INJECTION INTRAVENOUS at 09:17

## 2024-09-30 RX ADMIN — ONDANSETRON 4 MG: 2 INJECTION INTRAMUSCULAR; INTRAVENOUS at 09:24

## 2024-09-30 RX ADMIN — LIDOCAINE HYDROCHLORIDE 100 MG: 20 INJECTION, SOLUTION INFILTRATION; PERINEURAL at 09:17

## 2024-09-30 RX ADMIN — MIDAZOLAM HYDROCHLORIDE 2 MG: 1 INJECTION, SOLUTION INTRAMUSCULAR; INTRAVENOUS at 09:19

## 2024-09-30 RX ADMIN — SODIUM CHLORIDE, POTASSIUM CHLORIDE, SODIUM LACTATE AND CALCIUM CHLORIDE: 600; 310; 30; 20 INJECTION, SOLUTION INTRAVENOUS at 10:21

## 2024-09-30 RX ADMIN — TRAMADOL HYDROCHLORIDE 50 MG: 50 TABLET, COATED ORAL at 17:38

## 2024-09-30 RX ADMIN — DEXAMETHASONE SODIUM PHOSPHATE 4 MG: 4 INJECTION INTRA-ARTICULAR; INTRALESIONAL; INTRAMUSCULAR; INTRAVENOUS; SOFT TISSUE at 14:03

## 2024-09-30 RX ADMIN — CEFAZOLIN 2 G: 1 INJECTION, POWDER, FOR SOLUTION INTRAMUSCULAR; INTRAVENOUS at 09:19

## 2024-09-30 RX ADMIN — SODIUM CHLORIDE, POTASSIUM CHLORIDE, SODIUM LACTATE AND CALCIUM CHLORIDE: 600; 310; 30; 20 INJECTION, SOLUTION INTRAVENOUS at 14:17

## 2024-09-30 RX ADMIN — CELECOXIB 400 MG: 200 CAPSULE ORAL at 08:09

## 2024-09-30 RX ADMIN — MEPERIDINE HYDROCHLORIDE 12.5 MG: 25 INJECTION INTRAMUSCULAR; INTRAVENOUS; SUBCUTANEOUS at 15:46

## 2024-09-30 RX ADMIN — SUGAMMADEX 100 MG: 100 INJECTION, SOLUTION INTRAVENOUS at 14:56

## 2024-09-30 RX ADMIN — DROPERIDOL 0.62 MG: 2.5 INJECTION, SOLUTION INTRAMUSCULAR; INTRAVENOUS at 14:05

## 2024-09-30 RX ADMIN — FENTANYL CITRATE 100 MCG: 50 INJECTION INTRAMUSCULAR; INTRAVENOUS at 10:16

## 2024-09-30 RX ADMIN — ROCURONIUM BROMIDE 50 MG: 10 INJECTION INTRAVENOUS at 09:55

## 2024-09-30 RX ADMIN — ROCURONIUM BROMIDE 50 MG: 10 INJECTION INTRAVENOUS at 11:56

## 2024-09-30 RX ADMIN — PROPOFOL 150 MG: 10 INJECTION, EMULSION INTRAVENOUS at 09:17

## 2024-09-30 RX ADMIN — GABAPENTIN 300 MG: 300 CAPSULE ORAL at 08:09

## 2024-09-30 RX ADMIN — ACETAMINOPHEN 1000 MG: 500 TABLET ORAL at 08:09

## 2024-09-30 RX ADMIN — HYDROMORPHONE HYDROCHLORIDE 1 MG: 1 INJECTION, SOLUTION INTRAMUSCULAR; INTRAVENOUS; SUBCUTANEOUS at 09:55

## 2024-09-30 RX ADMIN — FENTANYL CITRATE 100 MCG: 50 INJECTION INTRAMUSCULAR; INTRAVENOUS at 09:19

## 2024-09-30 RX ADMIN — APREPITANT 40 MG: 40 CAPSULE ORAL at 09:04

## 2024-09-30 RX ADMIN — SODIUM CHLORIDE, POTASSIUM CHLORIDE, SODIUM LACTATE AND CALCIUM CHLORIDE: 600; 310; 30; 20 INJECTION, SOLUTION INTRAVENOUS at 09:12

## 2024-09-30 RX ADMIN — ROCURONIUM BROMIDE 50 MG: 10 INJECTION INTRAVENOUS at 13:07

## 2024-09-30 RX ADMIN — EPHEDRINE SULFATE 15 MG: 5 INJECTION INTRAVENOUS at 13:07

## 2024-09-30 RX ADMIN — SUGAMMADEX 200 MG: 100 INJECTION, SOLUTION INTRAVENOUS at 14:40

## 2024-09-30 RX ADMIN — DEXAMETHASONE SODIUM PHOSPHATE 4 MG: 4 INJECTION INTRA-ARTICULAR; INTRALESIONAL; INTRAMUSCULAR; INTRAVENOUS; SOFT TISSUE at 09:24

## 2024-09-30 RX ADMIN — ONDANSETRON 4 MG: 2 INJECTION INTRAMUSCULAR; INTRAVENOUS at 14:03

## 2024-09-30 ASSESSMENT — PAIN SCALES - GENERAL
PAINLEVEL_OUTOF10: 5
PAINLEVEL_OUTOF10: 4
PAINLEVEL_OUTOF10: 3
PAINLEVEL_OUTOF10: 3
PAINLEVEL_OUTOF10: 2
PAINLEVEL_OUTOF10: 3
PAINLEVEL_OUTOF10: 2
PAINLEVEL_OUTOF10: 5
PAINLEVEL_OUTOF10: 2
PAINLEVEL_OUTOF10: 2
PAINLEVEL_OUTOF10: 3

## 2024-09-30 ASSESSMENT — ENCOUNTER SYMPTOMS: EXERCISE TOLERANCE: GOOD (>4 METS)

## 2024-09-30 ASSESSMENT — ACTIVITIES OF DAILY LIVING (ADL)
RECENT_DECLINE_ADL: NO
ADL_SCORE: 12
NEEDS_ASSIST: NO
ADL_BEFORE_ADMISSION: INDEPENDENT
HISTORY OF FALLING IN THE LAST YEAR (PRIOR TO ADMISSION): NO
ADL_SHORT_OF_BREATH: NO

## 2024-09-30 ASSESSMENT — LIFESTYLE VARIABLES: SMOKING_STATUS: FORMER

## 2024-10-01 ENCOUNTER — NURSE ONLY (OUTPATIENT)
Dept: SURGERY | Age: 44
End: 2024-10-01

## 2024-10-01 ENCOUNTER — NURSE TRIAGE (OUTPATIENT)
Dept: TELEHEALTH | Age: 44
End: 2024-10-01

## 2024-10-01 VITALS — OXYGEN SATURATION: 99 % | TEMPERATURE: 97.6 F | RESPIRATION RATE: 16 BRPM | HEART RATE: 63 BPM

## 2024-10-01 DIAGNOSIS — Z41.1 ENCOUNTER FOR COSMETIC SURGERY: Primary | ICD-10-CM

## 2024-10-02 ENCOUNTER — APPOINTMENT (OUTPATIENT)
Dept: SURGERY | Age: 44
End: 2024-10-02

## 2024-10-02 VITALS
SYSTOLIC BLOOD PRESSURE: 118 MMHG | HEART RATE: 61 BPM | OXYGEN SATURATION: 98 % | DIASTOLIC BLOOD PRESSURE: 82 MMHG | RESPIRATION RATE: 18 BRPM | TEMPERATURE: 96.6 F

## 2024-10-02 DIAGNOSIS — G89.18 POST-OPERATIVE PAIN: ICD-10-CM

## 2024-10-02 RX ORDER — TRAMADOL HYDROCHLORIDE 50 MG/1
50 TABLET ORAL EVERY 6 HOURS PRN
Qty: 5 TABLET | Refills: 0 | Status: SHIPPED | OUTPATIENT
Start: 2024-10-02

## 2024-10-07 ENCOUNTER — APPOINTMENT (OUTPATIENT)
Dept: SURGERY | Age: 44
End: 2024-10-07

## 2024-10-07 DIAGNOSIS — Z41.1 ENCOUNTER FOR COSMETIC SURGERY: Primary | ICD-10-CM

## 2024-10-09 ENCOUNTER — NURSE ONLY (OUTPATIENT)
Dept: SURGERY | Age: 44
End: 2024-10-09

## 2024-10-09 ENCOUNTER — TELEPHONE (OUTPATIENT)
Dept: SURGERY | Age: 44
End: 2024-10-09

## 2024-10-09 VITALS
SYSTOLIC BLOOD PRESSURE: 126 MMHG | OXYGEN SATURATION: 99 % | TEMPERATURE: 97 F | DIASTOLIC BLOOD PRESSURE: 78 MMHG | HEART RATE: 70 BPM | RESPIRATION RATE: 16 BRPM

## 2024-10-09 DIAGNOSIS — Z41.1 ENCOUNTER FOR COSMETIC SURGERY: Primary | ICD-10-CM

## 2024-10-10 ENCOUNTER — E-ADVICE (OUTPATIENT)
Dept: SURGERY | Age: 44
End: 2024-10-10

## 2024-10-10 ENCOUNTER — NURSE ONLY (OUTPATIENT)
Dept: SURGERY | Age: 44
End: 2024-10-10

## 2024-10-10 VITALS
DIASTOLIC BLOOD PRESSURE: 76 MMHG | HEART RATE: 94 BPM | TEMPERATURE: 97.3 F | SYSTOLIC BLOOD PRESSURE: 108 MMHG | BODY MASS INDEX: 25.4 KG/M2 | OXYGEN SATURATION: 95 % | WEIGHT: 148 LBS | RESPIRATION RATE: 16 BRPM

## 2024-10-10 DIAGNOSIS — Z41.1 ENCOUNTER FOR COSMETIC SURGERY: Primary | ICD-10-CM

## 2024-10-11 ENCOUNTER — OFFICE VISIT (OUTPATIENT)
Dept: SURGERY | Age: 44
End: 2024-10-11

## 2024-10-11 ENCOUNTER — MED INFO FORMS (OUTPATIENT)
Dept: SURGERY | Age: 44
End: 2024-10-11

## 2024-10-11 VITALS
SYSTOLIC BLOOD PRESSURE: 112 MMHG | TEMPERATURE: 97.1 F | OXYGEN SATURATION: 94 % | RESPIRATION RATE: 16 BRPM | DIASTOLIC BLOOD PRESSURE: 70 MMHG | HEART RATE: 76 BPM

## 2024-10-11 DIAGNOSIS — Z41.1 ENCOUNTER FOR COSMETIC SURGERY: Primary | ICD-10-CM

## 2024-10-14 ENCOUNTER — APPOINTMENT (OUTPATIENT)
Dept: SURGERY | Age: 44
End: 2024-10-14

## 2024-10-14 VITALS
OXYGEN SATURATION: 99 % | DIASTOLIC BLOOD PRESSURE: 62 MMHG | TEMPERATURE: 97.2 F | SYSTOLIC BLOOD PRESSURE: 112 MMHG | RESPIRATION RATE: 16 BRPM | HEART RATE: 94 BPM

## 2024-10-14 DIAGNOSIS — Z41.1 ENCOUNTER FOR COSMETIC SURGERY: Primary | ICD-10-CM

## 2024-10-17 ENCOUNTER — TELEPHONE (OUTPATIENT)
Dept: PHYSICAL THERAPY | Facility: CLINIC | Age: 44
End: 2024-10-17
Payer: COMMERCIAL

## 2024-10-18 ENCOUNTER — E-ADVICE (OUTPATIENT)
Dept: SURGERY | Age: 44
End: 2024-10-18

## 2024-10-18 DIAGNOSIS — Z41.1 ENCOUNTER FOR COSMETIC SURGERY: Primary | ICD-10-CM

## 2024-10-21 ENCOUNTER — TRANSCRIBE ORDERS (OUTPATIENT)
Dept: OTHER | Age: 44
End: 2024-10-21

## 2024-10-21 DIAGNOSIS — Z98.890 S/P ABDOMINOPLASTY: ICD-10-CM

## 2024-10-21 DIAGNOSIS — Z98.890 S/P BRACHIOPLASTY: ICD-10-CM

## 2024-10-21 DIAGNOSIS — Z41.1 ENCOUNTER FOR COSMETIC SURGERY: Primary | ICD-10-CM

## 2024-10-21 DIAGNOSIS — I89.0 LYMPHEDEMA: ICD-10-CM

## 2024-10-22 ENCOUNTER — E-ADVICE (OUTPATIENT)
Dept: SURGERY | Age: 44
End: 2024-10-22

## 2024-10-23 ENCOUNTER — APPOINTMENT (OUTPATIENT)
Dept: SURGERY | Age: 44
End: 2024-10-23

## 2024-10-23 DIAGNOSIS — Z41.1 ENCOUNTER FOR COSMETIC SURGERY: Primary | ICD-10-CM

## 2024-10-23 RX ORDER — COMPR.STOCKING,KNEE,LONG,LARGE
EACH MISCELLANEOUS
Qty: 25 EACH | Refills: 1 | Status: SHIPPED | OUTPATIENT
Start: 2024-10-23

## 2024-10-23 RX ORDER — SILVER SULFADIAZINE 10 MG/G
CREAM TOPICAL DAILY
Qty: 50 G | Refills: 0 | Status: SHIPPED | OUTPATIENT
Start: 2024-10-23

## 2024-10-29 ENCOUNTER — E-ADVICE (OUTPATIENT)
Dept: SURGERY | Age: 44
End: 2024-10-29

## 2024-11-01 ENCOUNTER — E-ADVICE (OUTPATIENT)
Dept: SURGERY | Age: 44
End: 2024-11-01

## 2024-11-04 ENCOUNTER — E-ADVICE (OUTPATIENT)
Dept: SURGERY | Age: 44
End: 2024-11-04

## 2024-11-08 ENCOUNTER — APPOINTMENT (OUTPATIENT)
Dept: SURGERY | Age: 44
End: 2024-11-08

## 2024-11-08 VITALS
RESPIRATION RATE: 16 BRPM | SYSTOLIC BLOOD PRESSURE: 94 MMHG | DIASTOLIC BLOOD PRESSURE: 60 MMHG | HEART RATE: 84 BPM | TEMPERATURE: 97.7 F

## 2024-11-08 DIAGNOSIS — Z41.1 ENCOUNTER FOR COSMETIC SURGERY: Primary | ICD-10-CM

## 2024-11-09 ENCOUNTER — HEALTH MAINTENANCE LETTER (OUTPATIENT)
Age: 44
End: 2024-11-09

## 2024-11-11 ENCOUNTER — THERAPY VISIT (OUTPATIENT)
Dept: PHYSICAL THERAPY | Facility: CLINIC | Age: 44
End: 2024-11-11
Attending: PLASTIC SURGERY
Payer: COMMERCIAL

## 2024-11-11 DIAGNOSIS — Z98.890 S/P ABDOMINOPLASTY: ICD-10-CM

## 2024-11-11 DIAGNOSIS — Z98.890 S/P BRACHIOPLASTY: ICD-10-CM

## 2024-11-11 DIAGNOSIS — I89.0 LYMPHEDEMA: ICD-10-CM

## 2024-11-11 DIAGNOSIS — Z41.1 ENCOUNTER FOR COSMETIC SURGERY: Primary | ICD-10-CM

## 2024-11-11 PROCEDURE — 97535 SELF CARE MNGMENT TRAINING: CPT | Mod: GP | Performed by: PHYSICAL THERAPIST

## 2024-11-11 PROCEDURE — 97161 PT EVAL LOW COMPLEX 20 MIN: CPT | Mod: GP | Performed by: PHYSICAL THERAPIST

## 2024-11-11 PROCEDURE — 97140 MANUAL THERAPY 1/> REGIONS: CPT | Mod: GP | Performed by: PHYSICAL THERAPIST

## 2024-11-11 NOTE — PATIENT INSTRUCTIONS
Comrpession sleeves from [email or web address], daytime use only, remove at night    You may call your insurance and ask, with a doctor's order would compression be covered.  If so, we would need to get a prescription and I would have to make sure that a prescription from a out of state physician could be covered here.  I can check on that or we could request from your PCP here.    [email or web address], patient ID [id number]

## 2024-11-11 NOTE — PROGRESS NOTES
PHYSICAL THERAPY EVALUATION  Type of Visit: Evaluation              Subjective         Presenting condition or subjective complaint: (Patient-Rptd) lymphoma and scar therapy post abdominoplasty and brachioplasty, correction lymphedema.  Pt presents about 6 weeks post skin removal having lost a significant amount of weight since bariatric surgery in 2017.  She has been able to increase her activity and removal of excess skin will allow for even greater activity as well as improved body image and fit of clothing in a patient who has consistently worked toward wellness.   Date of onset: 09/30/24    Relevant medical history: (Patient-Rptd) Depression; Fibromyalgia; Overweight   Dates & types of surgery: (Patient-Rptd) laparoscopy (several) from 3701-2725, VSG 2017, hysterectomy 2019, brachioplasty and abdominoplasty September 2024, gastric sleeve 2017    Prior diagnostic imaging/testing results:       Prior therapy history for the same diagnosis, illness or injury: (Patient-Rptd) No  , Pt does present in compression garment/shirt and abdominal binder    Prior Level of Function  Transfers: Independent  Ambulation: Independent  ADL: Independent  IADL: Driving, Finances, Housekeeping, Laundry, Meal preparation, Medication management, Work    Living Environment  Social support: (Patient-Rptd) Alone   Type of home: (Patient-Rptd) Apartment/condo   Stairs to enter the home: (Patient-Rptd) Yes (Patient-Rptd) 3 Is there a railing: (Patient-Rptd) Yes     Ramp: (Patient-Rptd) No   Stairs inside the home: (Patient-Rptd) Yes (Patient-Rptd) 25 Is there a railing: (Patient-Rptd) Yes     Help at home: (Patient-Rptd) None  Equipment owned:       Employment: (Patient-Rptd) Yes (Patient-Rptd) [employer] [facility]  Hobbies/Interests: (Patient-Rptd) hiking, camping    Patient goals for therapy: (Patient-Rptd) continue healing, work on the scars, reduce swelling and fibrosis    Pain assessment:  Pt does not  complain of pain though at time in the healing process she has been in more pain than now. She reports the whole process being more difficult than she had expected     Objective       EDEMA EVALUATION  Additional history:  Body part affected by edema: (Patient-Rptd) Arms  If cancer related, treatment:    If not cancer related, problems with veins or cause of swelling: (Patient-Rptd) No  Distance able to walk: (Patient-Rptd) no restrictions  Time able to stand: (Patient-Rptd) no restrictions  Sensation problems in hands/feet: (Patient-Rptd) No    Edema etiology: Surgery    FUNCTIONAL SCALES   Lymphedema Life Impact Scale (LLIS): (Patient-Rptd) 21    Cognitive Status Examination  Orientation: Oriented to person, place and time   Level of Consciousness: Alert  Follows Commands and Answers Questions: 100% of the time  Personal Safety and Judgement: Intact  Memory: Intact    EDEMA  Skin Condition: Pitting  Scar: Yes  Location: B medial uppar arm long scars nearly axilla to elbow, B axilla vertical scar, and invert T scar at abdomen . Mobility of the scars is fair but scar massage will need to be delayed due still open wounds.  Mild stage 1 lymphpedema in the L upper arm, moderate with stage 1 in the R upper arm with pitting and edema fibrosis vs scar tissue present deep.  Capillary Refill: Symmetrical  Radial Pulse: Symmetrical  Ulceration:  Pt with areas of non-healing wound dehiscence but reports improving.  The R upper has about 3 spots of shan size open sores, the L with small areas 2-3 and the abdomen very small not yet closed area at the T junction    GIRTH MEASUREMENTS: Refer to separate girth measurement flowsheet for specific measurements.    Circumference from Ulnar Styloid (cm) R L  25cm     24.6 24.8  30cm     26.4 26.5  35cm     28.5 28.4  40cm     28.5 28.0    RANGE OF MOTION:  Not fully assessed today to focus on the swelling. Pt still limiting end ROM overhead due open sores still healing on the  arms  STRENGTH: UE Strength WFL, weight restriction of 10# currently however  POSTURE:  Mild forward shoulders that pt is able to correct  PALPATION: well tolerated, fibrosis on the R upper arm is the pat of most concern  ACTIVITIES OF DAILY LIVING: independent, slow with dressing but this is due to the complexity of the garments  BED MOBILITY: Independent, sleep is a challenge as pt is not normally a back sleeper  TRANSFERS: Independent  GAIT/LOCOMOTION: Independent, walking for exercises as this is the only exercise she is really able to safely perform  BALANCE: WFL  SENSATION:  areas of numbness, deep pressure intact, no unexpected concerns  VASCULAR: Vascular concerns Mottled skin upper arm more than R.      Assessment & Plan   CLINICAL IMPRESSIONS  Medical Diagnosis: s/p brachioplasty and abdominoplasty, lymphedema    Treatment Diagnosis: lymphedema, impaired wound healing, decreased activity tolerance   Impression/Assessment: Patient is a 44 year old female with edema and slow wound healing complaints, as well as fatigue and decreased activity.  The following significant findings have been identified: Pain, Decreased ROM/flexibility, Decreased strength, Impaired sensation, Inflammation, Edema, and Decreased activity tolerance. These impairments interfere with their ability to perform self care tasks, work tasks, recreational activities, and household chores as compared to previous level of function.     Clinical Decision Making (Complexity):  Clinical Presentation: Stable/Uncomplicated  Clinical Presentation Rationale: based on medical and personal factors listed in PT evaluation  Clinical Decision Making (Complexity): Low complexity    PLAN OF CARE  Treatment Interventions:  Interventions: Manual Therapy, Therapeutic Exercise, Self-Care/Home Management    Long Term Goals     PT Goal 1  Goal Identifier: HEP  Goal Description: Pt will be independent with proper skin care, self massage, exercise and use of  compression to decrease symptoms of lymphedema and promote wound closure.  Target Date: 12/10/24  PT Goal 2  Goal Identifier: Wound healing  Goal Description: Pt will have complete closure all incisions to prevent risk of infection, allow for increased activity, and more efficiency with daily tasks.  Target Date: 01/09/25  PT Goal 3  Goal Identifier: Upper Body Functional Mobility  Goal Description: Pt will be able to reach forward for flexion and latrally in abduction to 150degrees bilaterally to increase independence in overhead activities needed for home management as she lives independently.  Target Date: 02/08/25      Frequency of Treatment: 2x/week for 3 weeks, 2x/mo for 3 months  Duration of Treatment: 3 months    Education Assessment:   Learner/Method: Patient  Education Comments: MLD, use of compression    Risks and benefits of evaluation/treatment have been explained.   Patient/Family/caregiver agrees with Plan of Care.     Evaluation Time:     PT Eval, Low Complexity Minutes (03312): 14       Signing Clinician: Heather Pires, PT

## 2024-12-17 DIAGNOSIS — F41.1 GAD (GENERALIZED ANXIETY DISORDER): ICD-10-CM

## 2024-12-17 RX ORDER — BUPROPION HYDROCHLORIDE 150 MG/1
150 TABLET ORAL EVERY MORNING
Qty: 90 TABLET | Refills: 0 | Status: SHIPPED | OUTPATIENT
Start: 2024-12-17

## 2025-01-02 ENCOUNTER — TELEPHONE (OUTPATIENT)
Dept: OTOLARYNGOLOGY | Facility: CLINIC | Age: 45
End: 2025-01-02
Payer: COMMERCIAL

## 2025-01-02 NOTE — TELEPHONE ENCOUNTER
Spoke with patient and  scheduled audiogram.    Carolina Coleman RN Care Coordinator, [facility] 01/02/25 10:34 AM

## 2025-01-02 NOTE — TELEPHONE ENCOUNTER
Left message with callback number to schedule audiogram prior to ENT visit on 01/09/25.    Carolina Coleman RN Care Coordinator, [facility] 01/02/25 9:12 AM

## 2025-01-08 ENCOUNTER — APPOINTMENT (OUTPATIENT)
Dept: SURGERY | Age: 45
End: 2025-01-08

## 2025-01-08 VITALS
HEART RATE: 73 BPM | OXYGEN SATURATION: 99 % | TEMPERATURE: 99.3 F | DIASTOLIC BLOOD PRESSURE: 74 MMHG | SYSTOLIC BLOOD PRESSURE: 124 MMHG

## 2025-01-08 DIAGNOSIS — Z41.1 ENCOUNTER FOR COSMETIC SURGERY: Primary | ICD-10-CM

## 2025-02-04 NOTE — PROGRESS NOTES
"RHEUMATOLOGY INITIAL VIDEO CONSULT NOTE    Chief Complaint: polyarthralgia    Reason for consult: Dr. Keenan from  has requested consultation for this patient for polyarthralgia and SABA 1:40    Video visit may not be as thorough as an in-person visit and physical exam limitations may pose a challenge in formulating an accurate diagnosis.       History of Present Illness:    Ashli Gamboa is a 41 year old female with pmhx depression, lyme disease (7/2019), fibromyalgia, is referred to rheumatology clinic for polyarthralgia and SABA 1:40.     Pt reports she was diagnosed with fibromyalgia several years ago so body pain is not new to her. She was diagnosed with lyme disease a couple years ago for which she received treatment. She has been on abx therapy off and on for ~1 month since she got tick bite. She is being treated by a number of people for lyme disease. She saw rheumatology few years ago and was not found to have an underlying rheumatologic process.     She has struggled with vision issues, dizziness, and a lot of neurologic symptoms. She feels that ongoing abx therapy has been helping her neuro symptoms and chronic fatigue. She reports pain over her shoulders, neck, hips and hand joints. She feels that her pain is mostly present at the end of the day, making it challenging for her to get sleep. Denies AM stiffness. Reports occasional swelling over her knuckles which has started over the last few months. She notices the swelling at the end of the day or with overuse of her hands. Denies any knee or ankle swelling. She describes her pain as \"bone are in a vice.\" She uses topiramate and recently started low dose naltrexone which is helping. On really bad days, she uses medical cannabis. She sleeps pretty good for the most part. Has had sleep studies done. Has never woken up feeling super refreshed. She tries to walk for at least a few minutes a day.     +Fatigue, denies fevers, chills, weight loss, " My chart message sent.    night sweats, vision changes, eye pain/redness, +dry eyes for which she takes allergy medications, denies dry mouth, oral/nasal ulcers, hair loss/thinning without alopecia, denies sinusitis, hearing loss, rash, photosensitivity, ?raynaud's -cold sensitivity in fingers and toes for a few years, denies cough, SOB, pleurisy, chest pain, edema, heartburn, difficulty swallowing, abdominal pain, diarrhea, hematochezia, melena, dysuria, recurrent genital ulcers, back pain, enthesitis, dactylitis. No hx of IBD. No hx of blood clots. No hx of GI/ infections or tick bites. Denies hx of miscarriages.    Pertinent labs and imaging: (per chart review in [facility] and care everywhere)    Labs:   2/9/21: SABA 1:40, negative ESR, CRP  12/29/2020: SABA 1:40, negative ESR, CRP    Past Medical History:    Past Medical History:   Diagnosis Date     Arthritis     BIG TOE     Cervical high risk HPV (human papillomavirus) test positive 07/05/2017     Cervical high risk HPV (human papillomavirus) test positive 06/10/2015     Depressive disorder 2007    I have been on and off medication for the last several years     Endometriosis      Hearing problem      Obese      Sensorineural hearing loss      Past Surgical History:   Past Surgical History:   Procedure Laterality Date     ABDOMEN SURGERY      laparoscopy X2     DILATE CERVIX, HYSTEROSCOPY, ABLATE ENDOMETRIUM NOVASURE, COMBINED N/A 11/17/2016    Procedure: COMBINED DILATE CERVIX, HYSTEROSCOPY, ABLATE ENDOMETRIUM NOVASURE;  Surgeon: Sabas Patel MD;  Location: [facility]     EP STUDY TILT TABLE N/A 12/29/2020    Procedure: EP TILT TABLE;  Surgeon: Gilberto Song MD;  Location:  [facility]     GYN SURGERY  dates above    myomectomy x2, laparoscopy x2     GYN SURGERY      Endometriosis surgery 5/1/19     INSERT PICC LINE Left 11/15/2019    Procedure: INSERTION, PICC, single lumen PICC;  Surgeon: Emmett Rasmussen PA-C;  Location: [facility]     IR PICC PLACEMENT > 5 YRS  OF AGE  11/15/2019     LAPAROSCOPIC ASSISTED HYSTERECTOMY VAGINAL N/A 1/4/2018    Procedure: LAPAROSCOPIC ASSISTED HYSTERECTOMY VAGINAL;;  Surgeon: Sabas Patel MD;  Location: [facility]     LAPAROSCOPIC GASTRIC SLEEVE N/A 7/25/2017    Procedure: LAPAROSCOPIC GASTRIC SLEEVE;  Laparoscopic Sleeve Gastrectomy;  Surgeon: Sam Schmidt MD;  Location: [facility] OR     LAPAROSCOPIC LYSIS ADHESIONS  7/10/2014    Procedure: LAPAROSCOPIC LYSIS ADHESIONS;  Surgeon: Sabas Patel MD;  Location: [facility]     LAPAROSCOPIC SALPINGECTOMY Bilateral 1/4/2018    Procedure: LAPAROSCOPIC SALPINGECTOMY;;  Surgeon: Sabas Patel MD;  Location: [facility]     LASER CO2 LAPAROSCOPIC VAPORIZATION ENDOMETRIUM  7/10/2014    Procedure: LASER CO2 LAPAROSCOPIC VAPORIZATION ENDOMETRIUM;  Surgeon: Sabas Patel MD;  Location: [facility]     LASER CO2 LAPAROSCOPIC VAPORIZATION ENDOMETRIUM N/A 6/30/2015    Procedure: LASER CO2 LAPAROSCOPIC VAPORIZATION ENDOMETRIUM;  Surgeon: Sabas Patel MD;  Location: [facility]     LASER CO2 LAPAROSCOPIC VAPORIZATION ENDOMETRIUM, LYSIS ADHESIONS N/A 1/4/2018    Procedure: LASER CO2 LAPAROSCOPIC VAPORIZATION ENDOMETRIUM, LYSIS ADHESIONS;  LAPAROSCOPY WITH CO2 LASER LYSIS OF ADHESIONS AND VAPORIZATION OF ENDOMETRIOSIS, CYSTOTOMY LEFT OVARY, LAPAROSCOPIC VAGINAL HYSTERECTOMY WITH BILATERAL SALPINGECTOMY ;  Surgeon: Sabas Patel MD;  Location: [facility]     LASER CO2 LAPAROSCOPY DIAGNOSTIC, LYSIS ADHESIONS, COMBINED N/A 6/30/2015    Procedure: COMBINED LASER CO2 LAPAROSCOPY DIAGNOSTIC, LYSIS ADHESIONS;  Surgeon: Sabas Patel MD;  Location: [facility]     Family History:   Denies family hx of Sjogren's syndrome, scleroderma, PsA, ankylosing spondylitis, psoriasis, vasculitis, gout, pseudogout.    Father: RA  Aunt: lupus    Social History:   Alcohol use: on average, 1-2 drinks/month   Tobacco use: ex-smoker, quit 10 years ago. Used to smoke 0.5 PPD at most  Occupational hx: works at the [employer]   Contraception use: hx of  hysterectomy, uses Nuva ring for hormonal therapy    No Known Allergies   Immunization History   Administered Date(s) Administered     TD (ADULT, 7+) 12/12/2003     Tdap (Adacel,Boostrix) 06/14/2011       Medications:  Current Outpatient Medications   Medication Sig Dispense Refill     buPROPion (WELLBUTRIN XL) 300 MG 24 hr tablet TAKE 1 TABLET BY MOUTH EVERY DAY IN THE MORNING 90 tablet 3     medical cannabis (Patient's own supply) See Admin Instructions (The purpose of this order is to document that the patient reports taking medical cannabis.  This is not a prescription, and is not used to certify that the patient has a qualifying medical condition.)       MULTIPLE VITAMIN PO Take 1 patch by mouth daily PatchMD - Multivitamin Patch       NALTREXONE HCL PO Take 4.5 mg by mouth       NUVARING 0.12-0.015 MG/24HR vaginal ring INSERT 1 RING VAGINALLY EVERY 21 DAYS 1 each 0     SUMAtriptan (IMITREX) 50 MG tablet Take 1-2 tablets at the onset of a headache. May repeat in 2 hours. Max 4 tablets/24 hours. 30 tablet 1     Topiramate (TOPAMAX PO) Take 300 mg by mouth daily        vitamin B complex with vitamin C (VITAMIN  B COMPLEX) TABS tablet Take 1 tablet by mouth daily       VITAMIN D PO Take 5,000 Units by mouth daily       PHYSICAL EXAMINATION: (limited as pt was evaluated via video visit)  Vital signs: (not taken due to evaluation via video visit)    Skin: no facial rashes  Pulm: non-labored respirations, no conversational dyspnea  MSK: no hand joint swelling, able to make a fist b/l, wrist ROM intact b/l, ROM in shoulder intact b/l, ROM intact in neck      Labs:      I have reviewed all pertinent investigations including labs, including outside records if relevant    SABA  Recent Labs   Lab Test 02/09/21  0735 12/29/20  0845   ELYSIA Borderline Positive* Borderline Positive*   ANAP1 HOMOGENEOUS HOMOGENEOUS   ANAT1 1:40 1:40     CBC  Recent Labs   Lab Test 02/26/21  0757 10/19/20  1101 12/11/19  1045 12/05/19  1120    WBC 4.4 7.0 4.7 6.0   RBC 4.89 4.93 4.38 4.41   HGB 14.3 14.5 12.8 12.9   HCT 44.2 46.0 39.8 40.5   MCV 90 93 91 92   RDW 12.5 12.4 13.2 13.4    204 246 234   MCH 29.2 29.4 29.2 29.3   MCHC 32.4 31.5 32.2 31.9   NEUTROPHIL 30.0  --  41.5 57.9   LYMPH 58.0  --  47.7 31.1   MONOCYTE 8.0  --  6.3 8.0   EOSINOPHIL 3.0  --  3.0 1.8   BASOPHIL 1.0  --  1.3 1.0   ANEU 1.3*  --  2.0 3.5   ALYM 2.6  --  2.3 1.9   KEERTHI 0.4  --  0.3 0.5   AEOS 0.1  --  0.1 0.1   ABAS 0.0  --  0.1 0.1     CMP  Recent Labs   Lab Test 12/16/20  0726 12/11/19  1045 12/05/19  1120 11/25/19  0830 11/20/19  1110 07/18/19  1059 02/13/19  2023 08/09/18  1346 04/12/18  1509    142 142 141 143 143 142 144 140   POTASSIUM 4.2 3.6 3.5 3.9 3.7 3.7 4.1 4.1 3.8   CHLORIDE 109 113* 114* 113* 115* 113* 110* 112* 111*   CO2 30 24 23 22 24 23 24 22 23   ANIONGAP 2* 5 5 6 4 7 8 9 7   GLC 81 110* 99 84 70 68* 81 88 84   BUN 15 17 14 11 14 15 13 14 13   CR 0.88 0.89 0.86 0.88 0.88 0.88 0.99 1.09* 0.98   GFRESTIMATED 82 81 85 82 83 83 71 56* 64   GFRESTBLACK >90 >90 >90 >90 >90 >90 83 68 77   KWABENA 9.0 8.0* 8.2* 8.6 8.5 8.4* 8.5 8.8 9.0   BILITOTAL 0.4  --   --   --   --   --  0.3 0.2 0.3   ALBUMIN 2.9*  --   --   --   --   --  3.1* 3.2* 3.4   PROTTOTAL 6.0*  --   --   --   --   --  6.4* 6.7* 6.4*   ALKPHOS 61  --   --   --   --   --  46 57 60   AST 12 10 6 9 8  --  11 9 9   ALT 18  --   --   --   --   --  15 19 28     HgA1c  Recent Labs   Lab Test 11/30/17  1412   A1C 4.6     Iron Studies  Recent Labs   Lab Test 08/09/18  1346 11/30/17  1412   HOMER 56 90     Calcium/VitaminD  Recent Labs   Lab Test 12/16/20  0726 12/11/19  1045 12/05/19  1120 08/09/18  1346 08/09/18  1346 11/30/17  1412 11/30/17  1412 02/22/17  1051 02/22/17  1051   KWABENA 9.0 8.0* 8.2*   < > 8.8   < > 9.0   < > 8.5   VITDT  --   --   --   --  63  --  38  --  35    < > = values in this interval not displayed.     ESR/CRP  Recent Labs   Lab Test 02/09/21  0735 12/29/20  0845 12/11/19  1045    SED 5 4 6   CRP <2.9 <2.9 4.2     TSH/T4  Recent Labs   Lab Test 12/29/20  0845 11/30/17  1412 12/07/16  1514   TSH 1.86 1.58 2.07     Lipid Panel  Recent Labs   Lab Test 11/30/17  1412 11/26/14  1133   CHOL 167 202*   TRIG 118 148   HDL 47* 51   LDL 97 121   VLDL  --  30   CHOLHDLRATIO  --  4.0   NHDL 120  --      Lyme ab screening  Recent Labs   Lab Test 11/12/19  1452 10/29/19  0804 06/06/16  0809   LYMEGM 9.27* 9.11* 4.85*     Lyme confirmation testing by Western Blot  Recent Labs   Lab Test 11/12/19  1452 10/29/19  0804 06/06/16  0809   LYWG Negative Negative Negative  Reference range: Negative  (Note)  Band(s) present: 58 kDa  (Insufficient number of bands for positive result)  INTERPRETIVE INFORMATION: Borrelia Burgdorferi Ab, IgG  Western Blot  For this assay, a positive result is reported when any 5 or  more of the following 10 bands are present: 18, 23, 28, 30,  39, 41, 45, 58, 66, or 93 kDa.  All other banding patterns  are reported as negative.     LYWM Positive* Negative Negative  Reference range: Negative  (Note)  Band(s) present: NONE  (Insufficient number of bands for positive result)  INTERPRETIVE INFORMATION: Borrelia Burgdorferi Antibody,  IgM Western Blot  For this assay, a positive result is reported when any 2 or  more of the following bands are present: 23, 39, or 41 kDa.  All other banding patterns are reported as negative.  Performed by [facility],  [address] [place] [id number] [phone]  [email or web address], Antonino Cannon MD, Lab. Director       UA  Recent Labs   Lab Test 10/29/19  0747 02/14/19  0830 01/18/18  1615 07/19/17  0900   COLOR Yellow Yellow Yellow Yellow   APPEARANCE Clear Clear Clear Slightly Cloudy   URINEGLC Negative Negative Negative Negative   URINEBILI Negative Negative 2+* Negative   SG 1.020 1.029 >1.030 1.017   URINEPH 6.5 5.5 6.0 6.0   PROTEIN 30* Negative 2+* Negative   UROBILINOGEN 1.0  --  1.0  --    NITRITE Negative Negative Negative Negative   UBLD  Negative Negative 3+* Negative   LEUKEST Negative Negative 1+* Trace*   WBCU 0 - 5  --   --  2   RBCU O - 2  --   --  2   SQUAMOUSEPI Few  --   --   --    BACTERIA Few*  --   --   --    MUCOUS  --   --   --  Present*     Urine Microscopic  Recent Labs   Lab Test 10/29/19  0747 07/19/17  0900   WBCU 0 - 5 2   RBCU O - 2 2   SQUAMOUSEPI Few  --    BACTERIA Few*  --    MUCOUS  --  Present*       Imaging:      I have reviewed all pertinent investigations including imaging, including outside records if relevant    Assessment / Plan:    Ashli Gamboa is a 41 year old female with pmhx depression, lyme disease (7/2019), fibromyalgia, is referred to rheumatology clinic for polyarthralgia and SABA 1:40. Any prior notes, outside records, laboratory results, and imaging studies were reviewed if relevant. Pertinent work-up thus far includes SABA 1:40 and negative ESR, CRP.  Has longstanding history of pain complicated by diagnosis of Lyme disease. She has been treated for Lyme disease but continues to have post treatment Lyme disease syndrome with symptoms of chronic fatigue, pain, sleep disturbance, non-restorative sleep, subjective memory difficulty.  Her joint pain does not sound inflammatory in nature with pain worsening with activity and at the end of the day. Symptoms are not consistent with lyme arthritis.  She does report possible symptoms of Raynaud's that started few years ago, does not seem to be too bothersome. Symptom of dry eyes is non-specific.     We discussed that a positive SABA can be seen in a variety of different diseases and syndrome.  The anti-nuclear antibody is a screening test for auto-immune disease.  Depending on the level or titer, it may be seen in autoimmune diseases such as SLE, sjogren's, scleroderma but could also be seen in auto-immune hepatitis, hypothyroidism and various other disease.  A positive SABA does not give one a conclusive diagnosis of SLE as it may be a false positive and may  not be significant as well.  Further w/u for auto-immune disease can be considered based on clinical symptoms and other laboratory findings.  An SABA of 1:40 is seen in ~20-30% of healthy population.    Overall, my clinical suspicion for an underlying CTD is very low. We discussed obtaining further serologies to complete the work-up but clinically, I do not suspect an underlying autoimmune CTD.    1) +SABA 1:40 and polyarthralgia  -check RF, CCP, Sm, RNP, SSA, SSB, DsDNA, C3, C4  -obtain plain films of b/l hands and hips  -emphasized importance of good sleep and exercise routine      Ms. Gamboa verbalized agreement with and understanding of the rationale for the diagnosis and treatment plan.  All questions were answered to best of my ability and the patient's satisfaction. Ms. Gamboa was advised to contact the clinic with any questions that may arise after the clinic visit.      Chart documentation done in part with Dragon Voice recognition Software. Although reviewed after completion, some word and grammatical error may remain.      RTC pending above test results    Earline Watters MD

## 2025-02-12 ENCOUNTER — PATIENT OUTREACH (OUTPATIENT)
Dept: CARE COORDINATION | Facility: CLINIC | Age: 45
End: 2025-02-12
Payer: COMMERCIAL

## 2025-02-22 ENCOUNTER — TRANSFERRED RECORDS (OUTPATIENT)
Dept: HEALTH INFORMATION MANAGEMENT | Facility: CLINIC | Age: 45
End: 2025-02-22
Payer: COMMERCIAL

## 2025-04-08 ENCOUNTER — TELEPHONE (OUTPATIENT)
Dept: OTOLARYNGOLOGY | Facility: CLINIC | Age: 45
End: 2025-04-08
Payer: COMMERCIAL

## 2025-04-08 NOTE — TELEPHONE ENCOUNTER
Left message to schedule audiogram prior to visit with Dr. Witt on 4/24/25.    Carolina Coleman RN Care Coordinator, [facility] 04/08/25 11:18 AM

## 2025-04-09 NOTE — TELEPHONE ENCOUNTER
Spoke with patient and scheduled audiogram.    Carolina Coleman RN Care Coordinator, [facility] 04/09/25 11:49 AM

## 2025-04-09 NOTE — TELEPHONE ENCOUNTER
2nd attempt.  Left message with callback number.    Carolina Coleman RN Care Coordinator, [facility] 04/09/25 9:06 AM

## 2025-04-24 ENCOUNTER — OFFICE VISIT (OUTPATIENT)
Dept: OTOLARYNGOLOGY | Facility: CLINIC | Age: 45
End: 2025-04-24
Payer: COMMERCIAL

## 2025-04-24 ENCOUNTER — OFFICE VISIT (OUTPATIENT)
Dept: AUDIOLOGY | Facility: CLINIC | Age: 45
End: 2025-04-24
Payer: COMMERCIAL

## 2025-04-24 VITALS
HEART RATE: 76 BPM | DIASTOLIC BLOOD PRESSURE: 80 MMHG | SYSTOLIC BLOOD PRESSURE: 112 MMHG | BODY MASS INDEX: 26.18 KG/M2 | RESPIRATION RATE: 16 BRPM | OXYGEN SATURATION: 100 % | HEIGHT: 64 IN

## 2025-04-24 DIAGNOSIS — H90.3 SENSORINEURAL HEARING LOSS, BILATERAL: Primary | ICD-10-CM

## 2025-04-24 DIAGNOSIS — H90.3 ASYMMETRICAL SENSORINEURAL HEARING LOSS: Primary | ICD-10-CM

## 2025-04-24 NOTE — LETTER
4/24/2025      Ashli Gamboa  [address]  [place] [address]      Dear Colleague,    Thank you for referring your patient, Ashli Gamboa, to the [facility]. Please see a copy of my visit note below.    History of Present Illness - Ashli Gamboa is a 45 year old female presents for evaluation of her hearing loss.  Previously she was seen over a year ago for some tonsil stones mild chronic tonsillitis.  Most of those symptoms have resolved with conservative therapy.  She has not had a hearing testing since 2018.  Does not feel that her hearing has declined significantly.  No new tinnitus no vertigo no dizziness.    Past Medical History -   Past Medical History:   Diagnosis Date     Arthritis     BIG TOE     Cervical high risk HPV (human papillomavirus) test positive 07/05/2017     Cervical high risk HPV (human papillomavirus) test positive 06/10/2015     Depressive disorder 2007    I have been on and off medication for the last several years     Endometriosis      Hearing problem      Obese      Sensorineural hearing loss        Current Medications -   Current Outpatient Medications:      MULTIPLE VITAMIN PO, Take 1 patch by mouth daily PatchMD - Multivitamin Patch, Disp: , Rfl:      vitamin B complex with vitamin C (STRESS TAB) tablet, Take 1 tablet by mouth daily, Disp: , Rfl:      vitamin D3 (CHOLECALCIFEROL) 125 MCG (5000 UT) tablet, Take 1 tablet by mouth every 24 hours, Disp: , Rfl:      buPROPion (WELLBUTRIN XL) 150 MG 24 hr tablet, TAKE 1 TABLET BY MOUTH EVERY MORNING, Disp: 90 tablet, Rfl: 0     dexAMETHasone alcohol-free (DECADRON) 0.1 MG/ML solution, , Disp: , Rfl:      NALTREXONE HCL PO, Take 4.5 mg by mouth, Disp: , Rfl:      NUVARING 0.12-0.015 MG/24HR vaginal ring, INSERT 1 RING VAGINALLY EVERY 21 DAYS, Disp: 1 each, Rfl: 0     omeprazole (PRILOSEC) 40 MG DR capsule, TAKE 1 CAPSULE BY MOUTH EVERY DAY, Disp: 90 capsule, Rfl: 1  No current  facility-administered medications for this visit.    Facility-Administered Medications Ordered in Other Visits:      BUPivacaine 0.25%, 10 mL, INTRA-ARTICULAR, Once, Kian Clement DPM     iopamidol (ISOVUE-M 200) solution 20 mL, 20 mL, INTRA-ARTICULAR, Once, Kian Clement DPM     lidocaine (PF) (XYLOCAINE) 1 % injection 30 mL, 30 mL, Subcutaneous, Once, Kian Clement DPM     triamcinolone acetonide (KENALOG-40) injection 40 mg, 40 mg, INTRA-ARTICULAR, Once, Kian Clement DPM    Allergies - No Known Allergies    Social History -   Social History     Socioeconomic History     Marital status:      Spouse name: Sid     Number of children: 0   Occupational History     Comment: [employer]   Tobacco Use     Smoking status: Former     Current packs/day: 0.00     Average packs/day: 0.5 packs/day for 6.8 years (3.4 ttl pk-yrs)     Types: Cigarettes     Start date: 2007     Quit date: 2013     Years since quittin.4     Passive exposure: Past     Smokeless tobacco: Never   Vaping Use     Vaping status: Never Used   Substance and Sexual Activity     Alcohol use: Yes     Comment: 2 drinks per month     Drug use: Not Currently     Types: Marijuana     Comment: 2 weeks ago for pain     Sexual activity: Yes     Partners: Male     Birth control/protection: Inserts/Ring     Comment: nuvaring   Other Topics Concern     Parent/sibling w/ CABG, MI or angioplasty before 65F 55M? No     Social Drivers of Health     Financial Resource Strain: High Risk (2023)    Financial Resource Strain      Within the past 12 months, have you or your family members you live with been unable to get utilities (heat, electricity) when it was really needed?: Yes   Food Insecurity: Low Risk  (2023)    Food Insecurity      Within the past 12 months, did you worry that your food would run out before you got money to buy more?: No      Within the past 12 months, did the food you  "bought just not last and you didn t have money to get more?: No   Transportation Needs: Low Risk  (12/27/2023)    Transportation Needs      Within the past 12 months, has lack of transportation kept you from medical appointments, getting your medicines, non-medical meetings or appointments, work, or from getting things that you need?: No   Interpersonal Safety: Low Risk  (9/25/2024)    Received from [facility]    Interpersonal Safety      How often does anyone, including family and friends, physically hurt you? : Never   Housing Stability: Low Risk  (12/27/2023)    Housing Stability      Do you have housing? : Yes      Are you worried about losing your housing?: No       Family History -   Family History   Problem Relation Age of Onset     Depression Mother      Cancer Mother         Cervical      Other - See Comments Mother         Fibromyalgia     Crohn's Disease Mother      Obesity Mother      Ovarian Cancer Mother         Cervical Cancer     Substance Abuse Mother      Stomach Problem Mother      Skin Cancer Mother      Arthritis Father      Rheumatoid Arthritis Father      Depression Father      Substance Abuse Father      Breast Cancer Maternal Grandmother      Cancer Maternal Grandmother      Hypertension Maternal Grandmother      Cerebrovascular Disease Paternal Grandmother      Prostate Cancer Paternal Grandfather      Depression Brother      Mental Illness Brother      Substance Abuse Brother      Mental Illness Brother         schizophrenia and bipolar     Substance Abuse Brother      Stomach Problem Brother      Depression Sister      Stomach Problem Sister      Colon Cancer Other        Review of Systems - As per HPI and PMHx, otherwise review of system review of the head and neck negative. Otherwise 10+ review systems negative.    Physical Exam  /80   Pulse 76   Resp 16   Ht 1.626 m (5' 4\")   LMP  (LMP Unknown)   SpO2 100%   BMI 26.18 kg/m    BMI: Body mass index is 26.18 " kg/m .    General - The patient is well nourished and well developed, and appears to have good nutritional status.  Alert and oriented to person and place, answers questions and cooperates with examination appropriately.    SKIN - No suspicious lesions or rashes.  Respiration - No respiratory distress.  Head and Face - Normocephalic and atraumatic, with no gross asymmetry noted of the contour of the facial features.  The facial nerve is intact, with strong symmetric movements.    Voice and Breathing - The patient was breathing comfortably without the use of accessory muscles. The patients voice was clear and strong, and had appropriate pitch and quality.    Ears - Bilateral pinna and EACs with normal appearing overlying skin. Tympanic membrane intact with good mobility on pneumatic otoscopy bilaterally. Bony landmarks of the ossicular chain are normal. The tympanic membranes are normal in appearance. No retraction, perforation, or masses.  No fluid or purulence was seen in the external canal or the middle ear.     Eyes - Extraocular movements intact.  Sclera were not icteric or injected, conjunctiva were pink and moist.    Mouth - Examination of the oral cavity showed pink, healthy oral mucosa. No lesions or ulcerations noted.  The tongue was mobile and midline, and the dentition were in good condition.  Tonsils are cryptic 1+ without tonsil stones or erythema.    Throat - The walls of the oropharynx were smooth, pink, moist, symmetric, and had no lesions or ulcerations.  The tonsillar pillars and soft palate were symmetric. The uvula was midline on elevation.    Neck - Normal midline excursion of the laryngotracheal complex during swallowing.  Full range of motion on passive movement.  Palpation of the occipital, submental, submandibular, internal jugular chain, and supraclavicular nodes did not demonstrate any abnormal lymph nodes or masses.  The carotid pulse was palpable bilaterally.  Palpation of the thyroid  was soft and smooth, with no nodules or goiter appreciated.  The trachea was mobile and midline.    Nose - External contour is symmetric, no gross deflection or scars.  Nasal mucosa is pink and moist with no abnormal mucus.  The septum was midline and non-obstructive, turbinates of normal size and position.  No polyps, masses, or purulence noted on examination.    Neuro - Nonfocal neuro exam is normal, CN 2 through 12 intact, normal gait and muscle tone.      Performed in clinic today:  Audiologic Studies - An audiogram and tympanogram were performed today as part of the evaluation and personally reviewed. The tympanogram shows Type A curves on the right and Type A curves on the left, with normal canal volumes and middle ear pressures.  The audiogram showed mild SNHL on the right and mild SNHL on the left.      Word recognition score 100% bilaterally.      A/P - Brain MRI without and with contrast 1/17/2020.     Patient was initially found to have asymmetric sensorineural hearing loss in 2018 he had an MRI that did not show any specific pathology related to that process.    Comparison: Brain/IAC MRI 7/13/2018.     Technique: Axial FLAIR,  T1-weighted, and susceptibility images were  obtained without intravenous contrast. Following intravenous  gadolinium-based contrast administration, axial T2-weighted,  diffusion, FLAIR, and axial and coronal T1-weighted images were  obtained.      Dose: 6.0mL Gadavist     Findings:   No restricted diffusion. No mass effect, midline shift, or evidence of  intracranial hemorrhage.  The ventricles are proportionate to the  cerebral sulci. Normal major vascular intracranial flow-voids.     Postcontrast images demonstrate no abnormal intracranial enhancement.     Orbits are grossly normal. The visualized portions of the paranasal  sinuses and mastoid air cells are relatively clear.                                                                      Impression: Normal brain.Ashli ESPARZA  Bee is a 45 year old female Patient presents with:  Hearing Problem  Her audiogram actually showed minimal improvement in pure-tone thresholds.  There is still little bit of asymmetry at higher frequencies between left and right ear but not very significant.  Patient may be interested in pursuing hearing aids at this point.  Will work with audiology towards the goal.  Hearing protection also is encouraged.      Ashli should follow up in in 18 months .      At Ashli next appointment they will need a hearing test.      Ravi Witt MD         Again, thank you for allowing me to participate in the care of your patient.        Sincerely,        Ravi Witt MD, MD    Electronically signed

## 2025-04-24 NOTE — PROGRESS NOTES
AUDIOLOGY REPORT:    Patient was referred to [facility] Audiology from ENT by Dr. Witt for a hearing examination.  They were accompanied today by their friend.  Previous testing on 7/9/2018 showed  mild sensorineural hearing loss bilaterally with slight asymmetry at 6-8 kHz L>R  .  Today they report that hearing seems to be unchanged.     Testing:    Otoscopy:   Otoscopic exam indicates ears are clear of cerumen bilaterally     Tympanograms:    RIGHT: normal eardrum mobility     LEFT:   normal eardrum mobility    Reflexes (reported by stimulus ear): 1000 Hz  RIGHT: Ipsilateral is present at normal levels  RIGHT: Contralateral is present at normal levels  LEFT:   Ipsilateral is present at normal levels  LEFT:   Contralateral is present at normal levels    Thresholds:   Pure Tone Thresholds assessed using conventional audiometry with good  reliability from 250-8000 Hz bilaterally using insert earphones and circumaural headphones     RIGHT:  mild  sensorineural hearing loss    LEFT:    mild sloping to  moderate sensorineural hearing loss    Speech Reception Threshold:    RIGHT: 25 dB HL    LEFT:   30 dB HL  Speech Reception Thresholds are in good agreement with pure tone thresholds    Word Recognition Score:     RIGHT: 100% at 65 dB HL using NU-6 recorded word list.    LEFT:   100% at 65 dB HL using NU-6 recorded word list.    Discussed results with the patient. Hearing is stable compared to audiogram on 7/9/2018.  The patient is a candidate for amplification and was invited to schedule a hearing aid consultation at their convenience when they are ready.  Today she does not report problems with communication due to hearing loss except in crowded rooms.        Patient was returned to ENT for follow up.     Trevor Dickens.   Doctor of Audiology  License #[id number]

## 2025-04-24 NOTE — PROGRESS NOTES
History of Present Illness - Ashli Gamboa is a 45 year old female presents for evaluation of her hearing loss.  Previously she was seen over a year ago for some tonsil stones mild chronic tonsillitis.  Most of those symptoms have resolved with conservative therapy.  She has not had a hearing testing since 2018.  Does not feel that her hearing has declined significantly.  No new tinnitus no vertigo no dizziness.    Past Medical History -   Past Medical History:   Diagnosis Date    Arthritis     BIG TOE    Cervical high risk HPV (human papillomavirus) test positive 07/05/2017    Cervical high risk HPV (human papillomavirus) test positive 06/10/2015    Depressive disorder 2007    I have been on and off medication for the last several years    Endometriosis     Hearing problem     Obese     Sensorineural hearing loss        Current Medications -   Current Outpatient Medications:     MULTIPLE VITAMIN PO, Take 1 patch by mouth daily PatchMD - Multivitamin Patch, Disp: , Rfl:     vitamin B complex with vitamin C (STRESS TAB) tablet, Take 1 tablet by mouth daily, Disp: , Rfl:     vitamin D3 (CHOLECALCIFEROL) 125 MCG (5000 UT) tablet, Take 1 tablet by mouth every 24 hours, Disp: , Rfl:     buPROPion (WELLBUTRIN XL) 150 MG 24 hr tablet, TAKE 1 TABLET BY MOUTH EVERY MORNING, Disp: 90 tablet, Rfl: 0    dexAMETHasone alcohol-free (DECADRON) 0.1 MG/ML solution, , Disp: , Rfl:     NALTREXONE HCL PO, Take 4.5 mg by mouth, Disp: , Rfl:     NUVARING 0.12-0.015 MG/24HR vaginal ring, INSERT 1 RING VAGINALLY EVERY 21 DAYS, Disp: 1 each, Rfl: 0    omeprazole (PRILOSEC) 40 MG DR capsule, TAKE 1 CAPSULE BY MOUTH EVERY DAY, Disp: 90 capsule, Rfl: 1  No current facility-administered medications for this visit.    Facility-Administered Medications Ordered in Other Visits:     BUPivacaine 0.25%, 10 mL, INTRA-ARTICULAR, Once, Kian Clement DPM    iopamidol (ISOVUE-M 200) solution 20 mL, 20 mL, INTRA-ARTICULAR, Once, Kian Clement  KRYSTEN Solis    lidocaine (PF) (XYLOCAINE) 1 % injection 30 mL, 30 mL, Subcutaneous, Once, Kian Clement DPM    triamcinolone acetonide (KENALOG-40) injection 40 mg, 40 mg, INTRA-ARTICULAR, Once, Kian Clement DPM    Allergies - No Known Allergies    Social History -   Social History     Socioeconomic History    Marital status:      Spouse name: Sid    Number of children: 0   Occupational History     Comment: [employer]   Tobacco Use    Smoking status: Former     Current packs/day: 0.00     Average packs/day: 0.5 packs/day for 6.8 years (3.4 ttl pk-yrs)     Types: Cigarettes     Start date: 2007     Quit date: 2013     Years since quittin.4     Passive exposure: Past    Smokeless tobacco: Never   Vaping Use    Vaping status: Never Used   Substance and Sexual Activity    Alcohol use: Yes     Comment: 2 drinks per month    Drug use: Not Currently     Types: Marijuana     Comment: 2 weeks ago for pain    Sexual activity: Yes     Partners: Male     Birth control/protection: Inserts/Ring     Comment: nuvaring   Other Topics Concern    Parent/sibling w/ CABG, MI or angioplasty before 65F 55M? No     Social Drivers of Health     Financial Resource Strain: High Risk (2023)    Financial Resource Strain     Within the past 12 months, have you or your family members you live with been unable to get utilities (heat, electricity) when it was really needed?: Yes   Food Insecurity: Low Risk  (2023)    Food Insecurity     Within the past 12 months, did you worry that your food would run out before you got money to buy more?: No     Within the past 12 months, did the food you bought just not last and you didn t have money to get more?: No   Transportation Needs: Low Risk  (2023)    Transportation Needs     Within the past 12 months, has lack of transportation kept you from medical appointments, getting your medicines, non-medical meetings or appointments, work,  "or from getting things that you need?: No   Interpersonal Safety: Low Risk  (9/25/2024)    [facility] from [facility]    Interpersonal Safety     How often does anyone, including family and friends, physically hurt you? : Never   Housing Stability: Low Risk  (12/27/2023)    Housing Stability     Do you have housing? : Yes     Are you worried about losing your housing?: No       Family History -   Family History   Problem Relation Age of Onset    Depression Mother     Cancer Mother         Cervical     Other - See Comments Mother         Fibromyalgia    Crohn's Disease Mother     Obesity Mother     Ovarian Cancer Mother         Cervical Cancer    Substance Abuse Mother     Stomach Problem Mother     Skin Cancer Mother     Arthritis Father     Rheumatoid Arthritis Father     Depression Father     Substance Abuse Father     Breast Cancer Maternal Grandmother     Cancer Maternal Grandmother     Hypertension Maternal Grandmother     Cerebrovascular Disease Paternal Grandmother     Prostate Cancer Paternal Grandfather     Depression Brother     Mental Illness Brother     Substance Abuse Brother     Mental Illness Brother         schizophrenia and bipolar    Substance Abuse Brother     Stomach Problem Brother     Depression Sister     Stomach Problem Sister     Colon Cancer Other        Review of Systems - As per HPI and PMHx, otherwise review of system review of the head and neck negative. Otherwise 10+ review systems negative.    Physical Exam  /80   Pulse 76   Resp 16   Ht 1.626 m (5' 4\")   LMP  (LMP Unknown)   SpO2 100%   BMI 26.18 kg/m    BMI: Body mass index is 26.18 kg/m .    General - The patient is well nourished and well developed, and appears to have good nutritional status.  Alert and oriented to person and place, answers questions and cooperates with examination appropriately.    SKIN - No suspicious lesions or rashes.  Respiration - No respiratory distress.  Head and Face - Normocephalic " and atraumatic, with no gross asymmetry noted of the contour of the facial features.  The facial nerve is intact, with strong symmetric movements.    Voice and Breathing - The patient was breathing comfortably without the use of accessory muscles. The patients voice was clear and strong, and had appropriate pitch and quality.    Ears - Bilateral pinna and EACs with normal appearing overlying skin. Tympanic membrane intact with good mobility on pneumatic otoscopy bilaterally. Bony landmarks of the ossicular chain are normal. The tympanic membranes are normal in appearance. No retraction, perforation, or masses.  No fluid or purulence was seen in the external canal or the middle ear.     Eyes - Extraocular movements intact.  Sclera were not icteric or injected, conjunctiva were pink and moist.    Mouth - Examination of the oral cavity showed pink, healthy oral mucosa. No lesions or ulcerations noted.  The tongue was mobile and midline, and the dentition were in good condition.  Tonsils are cryptic 1+ without tonsil stones or erythema.    Throat - The walls of the oropharynx were smooth, pink, moist, symmetric, and had no lesions or ulcerations.  The tonsillar pillars and soft palate were symmetric. The uvula was midline on elevation.    Neck - Normal midline excursion of the laryngotracheal complex during swallowing.  Full range of motion on passive movement.  Palpation of the occipital, submental, submandibular, internal jugular chain, and supraclavicular nodes did not demonstrate any abnormal lymph nodes or masses.  The carotid pulse was palpable bilaterally.  Palpation of the thyroid was soft and smooth, with no nodules or goiter appreciated.  The trachea was mobile and midline.    Nose - External contour is symmetric, no gross deflection or scars.  Nasal mucosa is pink and moist with no abnormal mucus.  The septum was midline and non-obstructive, turbinates of normal size and position.  No polyps, masses, or  purulence noted on examination.    Neuro - Nonfocal neuro exam is normal, CN 2 through 12 intact, normal gait and muscle tone.      Performed in clinic today:  Audiologic Studies - An audiogram and tympanogram were performed today as part of the evaluation and personally reviewed. The tympanogram shows Type A curves on the right and Type A curves on the left, with normal canal volumes and middle ear pressures.  The audiogram showed mild SNHL on the right and mild SNHL on the left.      Word recognition score 100% bilaterally.      A/P - Brain MRI without and with contrast 1/17/2020.     Patient was initially found to have asymmetric sensorineural hearing loss in 2018 he had an MRI that did not show any specific pathology related to that process.    Comparison: Brain/IAC MRI 7/13/2018.     Technique: Axial FLAIR,  T1-weighted, and susceptibility images were  obtained without intravenous contrast. Following intravenous  gadolinium-based contrast administration, axial T2-weighted,  diffusion, FLAIR, and axial and coronal T1-weighted images were  obtained.      Dose: 6.0mL Gadavist     Findings:   No restricted diffusion. No mass effect, midline shift, or evidence of  intracranial hemorrhage.  The ventricles are proportionate to the  cerebral sulci. Normal major vascular intracranial flow-voids.     Postcontrast images demonstrate no abnormal intracranial enhancement.     Orbits are grossly normal. The visualized portions of the paranasal  sinuses and mastoid air cells are relatively clear.                                                                      Impression: Normal brain.Ashli Gamboa is a 45 year old female Patient presents with:  Hearing Problem  Her audiogram actually showed minimal improvement in pure-tone thresholds.  There is still little bit of asymmetry at higher frequencies between left and right ear but not very significant.  Patient may be interested in pursuing hearing aids at this point.   Will work with audiology towards the goal.  Hearing protection also is encouraged.      Ashli should follow up in in 18 months .      At Ashli next appointment they will need a hearing test.      Ravi Witt MD

## 2025-04-28 ENCOUNTER — PATIENT OUTREACH (OUTPATIENT)
Dept: CARE COORDINATION | Facility: CLINIC | Age: 45
End: 2025-04-28
Payer: COMMERCIAL

## 2025-05-06 ENCOUNTER — OFFICE VISIT (OUTPATIENT)
Dept: AUDIOLOGY | Facility: CLINIC | Age: 45
End: 2025-05-06
Payer: COMMERCIAL

## 2025-05-06 DIAGNOSIS — H90.3 ASYMMETRICAL SENSORINEURAL HEARING LOSS: ICD-10-CM

## 2025-05-06 DIAGNOSIS — H90.3 BILATERAL SENSORINEURAL HEARING LOSS: Primary | ICD-10-CM

## 2025-05-06 PROCEDURE — V5014 HEARING AID REPAIR/MODIFYING: HCPCS | Performed by: AUDIOLOGIST

## 2025-05-07 NOTE — PROGRESS NOTES
AUDIOLOGY REPORT: HEARING AID RECHECK    SUBJECTIVE: Ashli Gamboa is a 45 year old female, :  1980, was seen in the Audiology Clinic at [facility] on 25 for a return check of their hearing aids. The patient was unaccompanied.  She was initially scheduled for hearing aid evaluation, however she has a hearing aid for her left ear that she wanted to have use clean and check. She has been using it more frequently, but has not noticed much benefit      Procedures:       SIDE: left    : [employer]    TYPE: OPN 2 mini RITE    S/N: [id number]     WARRANTY:     The hearing aid was connected to the software to check for firmware updates.  An update was available but was unable to be loaded due to lack of cables.  Instead the first fit was repeated based on recent audiogram.  Tip was changed to small closed for comfort, the retention tail was removed per her request, and the microphone ports were vacuumed.  Wax filter also changed.  After cleaning, she indicated that the aid sounds good and she is hearing well from it    Plan:   Patient will try to the left hearing aid on a more regular wearing schedule, then return for hearing aid evaluation if she decides to proceed with binaural updated hearing aids.  All questions were fully answered.    Patient will return as needed for hearing aid concerns.       Trevor Dickens.   Doctor of Audiology  License #[id number]

## 2025-05-08 ENCOUNTER — E-ADVICE (OUTPATIENT)
Dept: SURGERY | Age: 45
End: 2025-05-08

## 2025-07-15 ENCOUNTER — TELEPHONE (OUTPATIENT)
Dept: GASTROENTEROLOGY | Facility: CLINIC | Age: 45
End: 2025-07-15
Payer: COMMERCIAL

## 2025-07-15 NOTE — TELEPHONE ENCOUNTER
Attempted to contact patient in order to complete pre assessment questions.     No answer. Left message to return call to [phone] option 3    Callback communication sent via [employer].      Dorothy Orlando RN  Endoscopy Procedure Pre Assessment

## 2025-07-15 NOTE — TELEPHONE ENCOUNTER
Pre visit planning completed.      Procedure details:    Patient scheduled for Colonoscopy on 7/29/25.     Arrival time: 1415. Procedure time 1515    Facility location: [facility]; [address] [place] [address], 5[address] Floor, [place] [address]. Check in location: 5th Floor.    Sedation type: Conscious sedation     Pre op exam needed? No.    Indication for procedure: Screening       Chart review:     Electronic implanted devices? No    Recent diagnosis of diverticulitis within the last 6 weeks? No      Medication review:    Diabetic? No    Anticoagulants? No    Weight loss medication/injectable? No GLP-1 medication per patient's medication list. Nursing to verify with pre-assessment call.    Other medication HOLDING recommendations:  N/A      Prep for procedure:     Bowel prep recommendation: Standard Miralax.   Due to: standard bowel prep    Procedure information and instructions sent via [employer]         Dortohy Orlando RN  Endoscopy Procedure Pre Assessment   [phone] option 3

## 2025-07-15 NOTE — TELEPHONE ENCOUNTER
"Endoscopy Scheduling Screen    Caller: patient    Have you had any respiratory illness or flu-like symptoms in the last 10 days?  No    Patient is ACTIVE on [employer].  Inform patient that all appointment instructions will be sent via [employer].    Review patient's insurance for any non participating payor.    Ordering/Referring Provider: MIKY MANCINI    (If ordering provider performs procedure, schedule with ordering provider unless otherwise instructed. )    BMI: Estimated body mass index is 25.51 kg/m  as calculated from the following:    Height as of 7/10/25: 1.626 m (5' 4\").    Weight as of 7/10/25: 67.4 kg (148 lb 9.6 oz).     Sedation Ordered  moderate sedation.   If patient BMI > 50 do not schedule in [facility].    If patient BMI > 45 do not schedule at [facility].    Are you taking methadone or Suboxone?  NO, No RN review required.    Have you been diagnosed and are being treated for severe PTSD or severe anxiety?  NO, No RN review required.    Are you taking any prescription medications for pain 3 or more times per week?   NO, No RN review required.    Do you have a history of malignant hyperthermia?  No    (Females) Are you currently pregnant?   No     Have you been diagnosed or told you have pulmonary hypertension?   No    Do you have an LVAD?  No    Have you been told you have moderate to severe sleep apnea?  No.    Have you been told you have COPD, asthma, or any other lung disease?  No    Has your doctor ordered any cardiac tests like echo, angiogram, stress test, ablation, or EKG, that you have not completed yet?  No    Do you  have a history of any heart conditions?  No     Have you ever had or are you waiting for an organ transplant?  No. Continue scheduling, no site restrictions.    Have you had a stroke or transient ischemic attack (TIA aka \"mini stroke\") in the last 2 years?   No.    Have you been diagnosed with or been told you have cirrhosis of the liver?   No.    Are you currently on dialysis?   No    Do " "you need assistance transferring?   No    BMI: Estimated body mass index is 25.51 kg/m  as calculated from the following:    Height as of 7/10/25: 1.626 m (5' 4\").    Weight as of 7/10/25: 67.4 kg (148 lb 9.6 oz).     Is patients BMI > 40 and scheduling location [facility]?  No    Do you take an injectable or oral medication for weight loss or diabetes (excluding insulin)?  No    Do you take the medication Naltrexone?  No    Do you take blood thinners?  No       Prep   Are you currently on dialysis or do you have chronic kidney disease?  No    Do you have a diagnosis of diabetes?  No    Do you have a diagnosis of cystic fibrosis (CF)?  No    On a regular basis do you go 3 -5 days between bowel movements?  No    BMI > 40?  No    Preferred Pharmacy:      [facility]/pharmacy #[id number] - [place], [place] - [address] [facility]  [address] [place] [address]  Phone: [phone] Fax: [phone]      Final Scheduling Details     Procedure scheduled  Colonoscopy    Surgeon:       Date of procedure:  7/29/25     Pre-OP / PAC:   No - Not required for this site.    Location  [facility] - [facility] - Patient preference.    Sedation   Moderate Sedation - Per order.      Patient Reminders:   You will receive a call from a Nurse to review instructions and health history.  This assessment must be completed prior to your procedure.  Failure to complete the Nurse assessment may result in the procedure being cancelled.      On the day of your procedure, please designate an adult(s) who can drive you home stay with you for the next 24 hours. The medicines used in the exam will make you sleepy. You will not be able to drive.      You cannot take public transportation, ride share services, or non-medical taxi service without a responsible caregiver.  Medical transport services are allowed with the requirement that a responsible caregiver will receive you at your destination.  We require that drivers and caregivers are confirmed prior to your procedure.  "

## 2025-07-16 NOTE — TELEPHONE ENCOUNTER
Pre assessment completed for upcoming procedure.   (Please see previous telephone encounter notes for complete details)    Patient returned call.       Procedure details:    Procedure date 7/29/25, arrival time 1415 and facility location reviewed.    Pre op exam needed? No.    Designated  policy reviewed. Instructed to have someone stay 6  hours post procedure.       Medication review:    Medications reviewed. Please see supporting documentation below. Holding recommendations discussed (if applicable).       Prep for procedure:     Patient declined to review procedure prep instructions on the phone. Instructed patient to review the procedure prep instructions at least 7 days prior to procedure due to necessary dietary modifications. NPO instructions reviewed.    Patient was instructed to call if any questions or concerns arise.         Any additional information needed:  N/A      Patient verbalized understanding and had no questions or concerns at this time.      Cady Michelle RN  Endoscopy Procedure Pre Assessment   [phone] option 3

## 2025-07-26 RX ORDER — ONDANSETRON 2 MG/ML
4 INJECTION INTRAMUSCULAR; INTRAVENOUS EVERY 6 HOURS PRN
Status: CANCELLED | OUTPATIENT
Start: 2025-07-26

## 2025-07-26 RX ORDER — NALOXONE HYDROCHLORIDE 0.4 MG/ML
0.2 INJECTION, SOLUTION INTRAMUSCULAR; INTRAVENOUS; SUBCUTANEOUS
Status: CANCELLED | OUTPATIENT
Start: 2025-07-26

## 2025-07-26 RX ORDER — NALOXONE HYDROCHLORIDE 0.4 MG/ML
0.4 INJECTION, SOLUTION INTRAMUSCULAR; INTRAVENOUS; SUBCUTANEOUS
Status: CANCELLED | OUTPATIENT
Start: 2025-07-26

## 2025-07-26 RX ORDER — ONDANSETRON 4 MG/1
4 TABLET, ORALLY DISINTEGRATING ORAL EVERY 6 HOURS PRN
Status: CANCELLED | OUTPATIENT
Start: 2025-07-26

## 2025-07-26 RX ORDER — PROCHLORPERAZINE MALEATE 10 MG
10 TABLET ORAL EVERY 6 HOURS PRN
Status: CANCELLED | OUTPATIENT
Start: 2025-07-26

## 2025-07-26 RX ORDER — FLUMAZENIL 0.1 MG/ML
0.2 INJECTION, SOLUTION INTRAVENOUS
Status: CANCELLED | OUTPATIENT
Start: 2025-07-26 | End: 2025-07-27

## 2025-07-29 ENCOUNTER — HOSPITAL ENCOUNTER (OUTPATIENT)
Facility: AMBULATORY SURGERY CENTER | Age: 45
Discharge: HOME | End: 2025-07-29
Attending: INTERNAL MEDICINE
Payer: COMMERCIAL

## 2025-07-29 VITALS
HEART RATE: 70 BPM | OXYGEN SATURATION: 100 % | SYSTOLIC BLOOD PRESSURE: 100 MMHG | TEMPERATURE: 97.3 F | RESPIRATION RATE: 18 BRPM | DIASTOLIC BLOOD PRESSURE: 52 MMHG

## 2025-07-29 LAB — COLONOSCOPY: NORMAL

## 2025-07-29 RX ORDER — LIDOCAINE 40 MG/G
CREAM TOPICAL
Status: DISCONTINUED | OUTPATIENT
Start: 2025-07-29 | End: 2025-07-30 | Stop reason: HOSPADM

## 2025-07-29 RX ORDER — SODIUM CHLORIDE, SODIUM LACTATE, POTASSIUM CHLORIDE, CALCIUM CHLORIDE 600; 310; 30; 20 MG/100ML; MG/100ML; MG/100ML; MG/100ML
INJECTION, SOLUTION INTRAVENOUS CONTINUOUS
Status: DISCONTINUED | OUTPATIENT
Start: 2025-07-29 | End: 2025-07-30 | Stop reason: HOSPADM

## 2025-07-29 RX ORDER — ONDANSETRON 2 MG/ML
4 INJECTION INTRAMUSCULAR; INTRAVENOUS
Status: DISCONTINUED | OUTPATIENT
Start: 2025-07-29 | End: 2025-07-30 | Stop reason: HOSPADM

## 2025-07-29 RX ORDER — FENTANYL CITRATE 50 UG/ML
INJECTION, SOLUTION INTRAMUSCULAR; INTRAVENOUS DAILY PRN
Status: DISCONTINUED | OUTPATIENT
Start: 2025-07-29 | End: 2025-07-29 | Stop reason: HOSPADM

## 2025-07-29 RX ADMIN — SODIUM CHLORIDE, SODIUM LACTATE, POTASSIUM CHLORIDE, CALCIUM CHLORIDE: 600; 310; 30; 20 INJECTION, SOLUTION INTRAVENOUS at 14:36

## 2025-07-29 NOTE — H&P
Ashli ESPARZA Scepania  [id number]  female  45 year old      Reason for procedure/surgery: screening    Patient Active Problem List   Diagnosis    post traumatic Arthritis of big toe    Major depression, recurrent    Endometriosis    Fibromyalgia    Cervical high risk HPV (human papillomavirus) test positive    S/P laparoscopic sleeve gastrectomy    Lyme disease    Neurological Lyme disease    Allergic rhinitis due to other allergic trigger, unspecified seasonality    Pruritic disorder    Elevated serum tryptase    Leiomyoma of uterus, unspecified    Chronic pelvic pain in female    Vaginal vault prolapse    Acute pain of left shoulder    Palpitations    Other fatigue    S/P brachioplasty    S/P abdominoplasty    Lymphedema       Past Surgical History:    Past Surgical History:   Procedure Laterality Date    ABDOMEN SURGERY      laparoscopy X2    APPENDECTOMY  7/17    BRACHIOPLASTY Right 2024    DILATE CERVIX, HYSTEROSCOPY, ABLATE ENDOMETRIUM NOVASURE, COMBINED N/A 11/17/2016    Procedure: COMBINED DILATE CERVIX, HYSTEROSCOPY, ABLATE ENDOMETRIUM NOVASURE;  Surgeon: Sabas Patel MD;  Location: [facility]    EP STUDY TILT TABLE N/A 12/29/2020    Procedure: EP TILT TABLE;  Surgeon: Gilberto Song MD;  Location: [facility] CARDIAC CATH LAB    ESOPHAGOSCOPY, GASTROSCOPY, DUODENOSCOPY (EGD), COMBINED N/A 12/07/2023    Procedure: Esophagoscopy, gastroscopy, duodenoscopy, combined;  Surgeon: Wiley Dias DO;  Location:  GI    GYN SURGERY  dates above    myomectomy x2, laparoscopy x2    GYN SURGERY      Endometriosis surgery 5/1/19    HYSTERECTOMY, PAP NO LONGER INDICATED      INSERT PICC LINE Left 11/15/2019    Procedure: INSERTION, PICC, single lumen PICC;  Surgeon: Emmett Rasmussen PA-C;  [facility]: [facility]    IR PICC PLACEMENT > 5 YRS OF AGE  11/15/2019    LAPAROSCOPIC ASSISTED HYSTERECTOMY VAGINAL N/A 01/04/2018    Procedure: LAPAROSCOPIC ASSISTED HYSTERECTOMY VAGINAL;;  Surgeon: Sabas Patel,  MD;  Location: [facility]    LAPAROSCOPIC GASTRIC SLEEVE N/A 07/25/2017    Procedure: LAPAROSCOPIC GASTRIC SLEEVE;  Laparoscopic Sleeve Gastrectomy;  Surgeon: Sam Schmidt MD;  Location: [facility] OR    LAPAROSCOPIC LYSIS ADHESIONS  07/10/2014    Procedure: LAPAROSCOPIC LYSIS ADHESIONS;  Surgeon: Sabas Patel MD;  Location: [facility]    LAPAROSCOPIC SALPINGECTOMY Bilateral 01/04/2018    Procedure: LAPAROSCOPIC SALPINGECTOMY;;  Surgeon: Sabas Patel MD;  Location: [facility]    LASER CO2 LAPAROSCOPIC VAPORIZATION ENDOMETRIUM  07/10/2014    Procedure: LASER CO2 LAPAROSCOPIC VAPORIZATION ENDOMETRIUM;  Surgeon: Sabas Patel MD;  Location: [facility]    LASER CO2 LAPAROSCOPIC VAPORIZATION ENDOMETRIUM N/A 06/30/2015    Procedure: LASER CO2 LAPAROSCOPIC VAPORIZATION ENDOMETRIUM;  Surgeon: Sabas Patel MD;  Location: [facility]    LASER CO2 LAPAROSCOPIC VAPORIZATION ENDOMETRIUM, LYSIS ADHESIONS N/A 01/04/2018    Procedure: LASER CO2 LAPAROSCOPIC VAPORIZATION ENDOMETRIUM, LYSIS ADHESIONS;  LAPAROSCOPY WITH CO2 LASER LYSIS OF ADHESIONS AND VAPORIZATION OF ENDOMETRIOSIS, CYSTOTOMY LEFT OVARY, LAPAROSCOPIC VAGINAL HYSTERECTOMY WITH BILATERAL SALPINGECTOMY ;  Surgeon: Sabas Patel MD;  Location: [facility]    LASER CO2 LAPAROSCOPY DIAGNOSTIC, LYSIS ADHESIONS, COMBINED N/A 06/30/2015    Procedure: COMBINED LASER CO2 LAPAROSCOPY DIAGNOSTIC, LYSIS ADHESIONS;  Surgeon: Sabas Patel MD;  Location: [facility]    ZZC EXCIS UTERINE FIBROID,ABD APPRCH      Description: Uterine Myomectomy;  Recorded: 11/11/2013;       Past Medical History:   Past Medical History:   Diagnosis Date    Arthritis     BIG TOE    Cervical high risk HPV (human papillomavirus) test positive 07/05/2017    Cervical high risk HPV (human papillomavirus) test positive 06/10/2015    Depressive disorder 2007    I have been on and off medication for the last several years    Endometriosis     Hearing problem     Obese     Sensorineural hearing loss        Social History:    Social History     Tobacco Use    Smoking status: Former     Current packs/day: 0.00     Average packs/day: 0.5 packs/day for 6.8 years (3.4 ttl pk-yrs)     Types: Cigarettes     Start date: 2007     Quit date: 2013     Years since quittin.7     Passive exposure: Past    Smokeless tobacco: Never   Substance Use Topics    Alcohol use: Yes     Comment: 2 drinks per month       Family History:   Family History   Problem Relation Age of Onset    Depression Mother     Cancer Mother         Cervical     Other - See Comments Mother         Fibromyalgia    Crohn's Disease Mother     Obesity Mother     Ovarian Cancer Mother         Cervical Cancer    Substance Abuse Mother     Stomach Problem Mother     Skin Cancer Mother     Arthritis Father     Rheumatoid Arthritis Father     Depression Father     Substance Abuse Father     Breast Cancer Maternal Grandmother     Cancer Maternal Grandmother     Hypertension Maternal Grandmother     Cerebrovascular Disease Paternal Grandmother     Prostate Cancer Paternal Grandfather     Depression Brother     Mental Illness Brother     Substance Abuse Brother     Mental Illness Brother         schizophrenia and bipolar    Substance Abuse Brother     Stomach Problem Brother     Depression Sister     Stomach Problem Sister     Colon Cancer Other        Allergies: No Known Allergies    Active Medications:   No current outpatient medications on file.       Systemic Review:   CONSTITUTIONAL: NEGATIVE for fever, chills, change in weight  ENT/MOUTH: NEGATIVE for ear, mouth and throat problems  RESP: NEGATIVE for significant cough or SOB  CV: NEGATIVE for chest pain, palpitations or peripheral edema    Physical Examination:   Vital Signs: /65 (Cuff Size: Adult Regular)   Pulse 75   Temp 97.5  F (36.4  C) (Temporal)   Resp 16   LMP  (LMP Unknown)   SpO2 100%   GENERAL: healthy, alert and no distress  NECK: no adenopathy, no asymmetry, masses, or scars  RESP: lungs clear to  auscultation - no rales, rhonchi or wheezes  CV: regular rate and rhythm, normal S1 S2, no S3 or S4, no murmur, click or rub, no peripheral edema and peripheral pulses strong  ABDOMEN: soft, nontender, no hepatosplenomegaly, no masses and bowel sounds normal  MS: no gross musculoskeletal defects noted, no edema    Plan: Appropriate to proceed as scheduled.      Polina Stapleton MD  7/29/2025    PCP:  Juana Fox

## 2025-07-30 ENCOUNTER — ANCILLARY PROCEDURE (OUTPATIENT)
Dept: GENERAL RADIOLOGY | Facility: CLINIC | Age: 45
End: 2025-07-30
Attending: FAMILY MEDICINE
Payer: COMMERCIAL

## 2025-07-30 ENCOUNTER — OFFICE VISIT (OUTPATIENT)
Dept: FAMILY MEDICINE | Facility: CLINIC | Age: 45
End: 2025-07-30
Payer: COMMERCIAL

## 2025-07-30 VITALS
HEART RATE: 59 BPM | HEIGHT: 64 IN | OXYGEN SATURATION: 100 % | DIASTOLIC BLOOD PRESSURE: 76 MMHG | BODY MASS INDEX: 25.54 KG/M2 | RESPIRATION RATE: 16 BRPM | SYSTOLIC BLOOD PRESSURE: 112 MMHG | TEMPERATURE: 97.3 F | WEIGHT: 149.6 LBS

## 2025-07-30 DIAGNOSIS — M25.50 POLYARTHRALGIA: ICD-10-CM

## 2025-07-30 DIAGNOSIS — D50.9 MICROCYTIC ANEMIA: ICD-10-CM

## 2025-07-30 DIAGNOSIS — M25.50 POLYARTHRALGIA: Primary | ICD-10-CM

## 2025-07-30 DIAGNOSIS — Z86.2 HISTORY OF IRON DEFICIENCY ANEMIA: ICD-10-CM

## 2025-07-30 LAB
BASOPHILS # BLD AUTO: 0 10E3/UL (ref 0–0.2)
BASOPHILS NFR BLD AUTO: 1 %
EOSINOPHIL # BLD AUTO: 0.1 10E3/UL (ref 0–0.7)
EOSINOPHIL NFR BLD AUTO: 1 %
ERYTHROCYTE [DISTWIDTH] IN BLOOD BY AUTOMATED COUNT: 17.7 % (ref 10–15)
ERYTHROCYTE [SEDIMENTATION RATE] IN BLOOD BY WESTERGREN METHOD: 11 MM/HR (ref 0–20)
HCT VFR BLD AUTO: 33.5 % (ref 35–47)
HGB BLD-MCNC: 9.7 G/DL (ref 11.7–15.7)
IMM GRANULOCYTES # BLD: 0 10E3/UL
IMM GRANULOCYTES NFR BLD: 0 %
LYMPHOCYTES # BLD AUTO: 2.1 10E3/UL (ref 0.8–5.3)
LYMPHOCYTES NFR BLD AUTO: 40 %
MCH RBC QN AUTO: 20.7 PG (ref 26.5–33)
MCHC RBC AUTO-ENTMCNC: 29 G/DL (ref 31.5–36.5)
MCV RBC AUTO: 72 FL (ref 78–100)
MONOCYTES # BLD AUTO: 0.3 10E3/UL (ref 0–1.3)
MONOCYTES NFR BLD AUTO: 5 %
NEUTROPHILS # BLD AUTO: 2.9 10E3/UL (ref 1.6–8.3)
NEUTROPHILS NFR BLD AUTO: 54 %
PLATELET # BLD AUTO: 280 10E3/UL (ref 150–450)
RBC # BLD AUTO: 4.68 10E6/UL (ref 3.8–5.2)
WBC # BLD AUTO: 5.4 10E3/UL (ref 4–11)

## 2025-07-30 PROCEDURE — 86431 RHEUMATOID FACTOR QUANT: CPT | Performed by: FAMILY MEDICINE

## 2025-07-30 PROCEDURE — 99214 OFFICE O/P EST MOD 30 MIN: CPT | Performed by: FAMILY MEDICINE

## 2025-07-30 PROCEDURE — 86200 CCP ANTIBODY: CPT | Performed by: FAMILY MEDICINE

## 2025-07-30 PROCEDURE — 85652 RBC SED RATE AUTOMATED: CPT | Performed by: FAMILY MEDICINE

## 2025-07-30 PROCEDURE — 3078F DIAST BP <80 MM HG: CPT | Performed by: FAMILY MEDICINE

## 2025-07-30 PROCEDURE — 73130 X-RAY EXAM OF HAND: CPT | Mod: TC | Performed by: RADIOLOGY

## 2025-07-30 PROCEDURE — 3074F SYST BP LT 130 MM HG: CPT | Performed by: FAMILY MEDICINE

## 2025-07-30 PROCEDURE — 84550 ASSAY OF BLOOD/URIC ACID: CPT | Performed by: FAMILY MEDICINE

## 2025-07-30 PROCEDURE — 85025 COMPLETE CBC W/AUTO DIFF WBC: CPT | Performed by: FAMILY MEDICINE

## 2025-07-30 PROCEDURE — 1125F AMNT PAIN NOTED PAIN PRSNT: CPT | Performed by: FAMILY MEDICINE

## 2025-07-30 PROCEDURE — 36415 COLL VENOUS BLD VENIPUNCTURE: CPT | Performed by: FAMILY MEDICINE

## 2025-07-30 PROCEDURE — 86140 C-REACTIVE PROTEIN: CPT | Performed by: FAMILY MEDICINE

## 2025-07-30 ASSESSMENT — PAIN SCALES - GENERAL: PAINLEVEL_OUTOF10: MILD PAIN (2)

## 2025-07-30 NOTE — PROGRESS NOTES
"  Assessment & Plan     Polyarthralgia  Labs and Xrays as noted below  Discussed trial of topical diclofenac and reviewed risks and benefits of long term oral NSAIDs  Consider referral to rheumatology in the future if needed  - Uric acid; Future  - Rheumatoid factor  - Cyclic Citrullinated Peptide Antibody IgG  - ESR: Erythrocyte sedimentation rate  - CRP, inflammation  - Uric acid    Microcytic anemia  History of iron deficiency anemia  CBC inadvertently drawn early, was planning to repeat around Sept 2025  Shows microcytic anemia worsening  Will confirm with patient whether or not she has started iron supplements  Will plan to repeat 1-2 months after starting iron supplement  - CBC with platelets and differential    BMI  Estimated body mass index is 25.88 kg/m  as calculated from the following:    Height as of this encounter: 1.619 m (5' 3.75\").    Weight as of this encounter: 67.9 kg (149 lb 9.6 oz).           Heriberto Cornelius is a 45 year old, presenting for the following health issues:  OFFICE VISIT (Ring finger knuckle on left hand has been red and painful since March.)        7/30/2025     9:57 AM   Additional Questions   Roomed by Ledy FULTON CMA   Accompanied by Self         7/30/2025     9:57 AM   Patient Reported Additional Medications   Patient reports taking the following new medications No     History of Present Illness       Reason for visit:  Pain and swelling in fingers. Arthritus?    She eats 2-3 servings of fruits and vegetables daily.She consumes 0 sweetened beverage(s) daily.She exercises with enough effort to increase her heart rate 20 to 29 minutes per day.  She exercises with enough effort to increase her heart rate 4 days per week.   She is taking medications regularly.      45-year-old female presents for above CC    Patient has noticed dorsal knuckle on left ring finger has been intermittently swollen and red for the last few months  Also noticed some swelling in DIP of right middle " "finger  No known injury  Swelling usually worse in the morning  Feels painful after using hand/fingers occasionally  Relieved by rest and ibuprofen which she takes couple times per week  Denies any systemic signs of illness  Father has history of rheumatoid arthritis  Has seen rheumatology in the past-last in 2021  Does have history of chronic pain and unsure if this is related to that, osteoarthritis versus rheumatoid arthritis   Would like testing and x-rays today to rule out RA                  Objective    /76   Pulse 59   Temp 97.3  F (36.3  C) (Temporal)   Resp 16   Ht 1.619 m (5' 3.75\")   Wt 67.9 kg (149 lb 9.6 oz)   LMP  (LMP Unknown)   SpO2 100%   BMI 25.88 kg/m    Body mass index is 25.88 kg/m .  Physical Exam   GENERAL: alert and no distress  MS: no gross musculoskeletal defects noted, no edema  Left hand: No gross deformities noted, mild TTP at PIP of left ring finger, ROM of all fingers intact, strength & sensation intact  Right hand: Mild swelling of DIP at middle finger, non tender to palpation, ROM of all fingers intact, strength & sensation intact  NEURO: Normal strength and tone, mentation intact and speech normal  PSYCH: mentation appears normal, affect normal/bright            Signed Electronically by: Mervin Wu MD    "

## 2025-07-31 LAB
CCP AB SER IA-ACNC: 0.9 U/ML
CRP SERPL-MCNC: <3 MG/L
RHEUMATOID FACT SERPL-ACNC: <10 IU/ML
URATE SERPL-MCNC: 3.6 MG/DL (ref 2.4–5.7)

## 2025-08-01 ENCOUNTER — TRANSFERRED RECORDS (OUTPATIENT)
Dept: HEALTH INFORMATION MANAGEMENT | Facility: CLINIC | Age: 45
End: 2025-08-01
Payer: COMMERCIAL

## 2025-08-29 ENCOUNTER — ANCILLARY PROCEDURE (OUTPATIENT)
Dept: MAMMOGRAPHY | Facility: CLINIC | Age: 45
End: 2025-08-29
Attending: FAMILY MEDICINE
Payer: COMMERCIAL

## 2025-08-29 DIAGNOSIS — Z12.31 ENCOUNTER FOR SCREENING MAMMOGRAM FOR BREAST CANCER: ICD-10-CM

## 2025-08-29 PROCEDURE — 77067 SCR MAMMO BI INCL CAD: CPT | Mod: GC | Performed by: RADIOLOGY

## 2025-08-29 PROCEDURE — 77063 BREAST TOMOSYNTHESIS BI: CPT | Mod: GC | Performed by: RADIOLOGY

## 2025-08-31 ENCOUNTER — OFFICE VISIT (OUTPATIENT)
Dept: URGENT CARE | Facility: URGENT CARE | Age: 45
End: 2025-08-31
Payer: COMMERCIAL

## 2025-08-31 VITALS
BODY MASS INDEX: 26.64 KG/M2 | RESPIRATION RATE: 16 BRPM | TEMPERATURE: 98.1 F | WEIGHT: 154 LBS | HEART RATE: 71 BPM | OXYGEN SATURATION: 100 % | DIASTOLIC BLOOD PRESSURE: 74 MMHG | SYSTOLIC BLOOD PRESSURE: 116 MMHG

## 2025-08-31 DIAGNOSIS — N89.8 VAGINAL DISCHARGE: Primary | ICD-10-CM

## 2025-08-31 LAB
CLUE CELLS: PRESENT
TRICHOMONAS, WET PREP: ABNORMAL
WBC'S/HIGH POWER FIELD, WET PREP: ABNORMAL
YEAST, WET PREP: ABNORMAL

## 2025-08-31 PROCEDURE — 87210 SMEAR WET MOUNT SALINE/INK: CPT | Performed by: FAMILY MEDICINE

## 2025-08-31 RX ORDER — METRONIDAZOLE 500 MG/1
500 TABLET ORAL 2 TIMES DAILY
Qty: 14 TABLET | Refills: 0 | Status: SHIPPED | OUTPATIENT
Start: 2025-08-31 | End: 2025-09-07

## (undated) DEVICE — TIP SCT ARG YNKR BLBS 34FR 25CM VINYL SMTH EYE RGD STRL LF

## (undated) DEVICE — PREP CHLORAPREP 26ML TINTED ORANGE  260815

## (undated) DEVICE — KIT ENDO TURNOVER/PROCEDURE CARRY-ON 101822

## (undated) DEVICE — DRSG PRIMAPORE 02X3" 7133

## (undated) DEVICE — CAST STOCKINETTE 3"

## (undated) DEVICE — GOWN ISOLATION YELLOW UNIV NOT STERILE 3110PG

## (undated) DEVICE — PENCIL SMKEVC COAT PSHBTN LF

## (undated) DEVICE — SOL WATER IRRIG 500ML BOTTLE 2F7113

## (undated) DEVICE — SU VICRYL 0 CT-2 27" J334H

## (undated) DEVICE — ADHESIVE LIQUID LF WTPRF VIAL PREP NONSTAIN MASTISOL STYRAX

## (undated) DEVICE — SUTURE PROLENE 2-0 SH 30IN MONO NABSB BLUE 8833H

## (undated) DEVICE — SUTURE ETHILON MTPS 3-0 PS1 18IN MONO NABSB BLK 1663H

## (undated) DEVICE — ANTIFOG SOLUTION W/FOAM PAD 31142527

## (undated) DEVICE — CATHETER BARDEX IC 16FR 5CC FOLEY 2 WAY BLN ATRM INS

## (undated) DEVICE — STPL ENDO HANDLE GIA ULTRA UNIVERSAL XLONG EGIAUXL

## (undated) DEVICE — GOWN IMPERVIOUS 2XL BLUE

## (undated) DEVICE — DRESSING FOAM 12X4IN POSTOP AL-1 MPLX BR AG STRL DISP

## (undated) DEVICE — BLADE CLIPPER 4406

## (undated) DEVICE — STAPLER SKIN 4.1X6.5MM 35 WIDE STPL CRTDG LF APPOSE ULC DISP

## (undated) DEVICE — SUTURE NUROLON 0 MO-6 TAPERPOINT 18IN NYL BRAID CNTRL RELS C545D

## (undated) DEVICE — HEMOSTAT ABS BIONERT ARISTA AH MPH PLANT STRH 3GM STRL

## (undated) DEVICE — WET SKIN PREP TRAY 1 EA THREE COMPARTMENT TRAY 2

## (undated) DEVICE — EVAC SYSTEM CLEAR FLOW SC082500

## (undated) DEVICE — Device

## (undated) DEVICE — ENDO CANNULA 05MM VERSAONE UNIVERSAL UNVCA5STF

## (undated) DEVICE — NDL 19GA 1.5"

## (undated) DEVICE — BLADE SURG 11 STRL LF DISP BARD-PRKR RIB-BACK CBNSTL TISS

## (undated) DEVICE — TUBING HYDRO-SURG PLUS LAP IRRIGATOR SUCTION 0026870

## (undated) DEVICE — BANDAGE CMPR HOOK-LOCK 10YDX4IN KNTD ELASTIC UNQ LOOP LOCK

## (undated) DEVICE — DRAIN INCS 15FR RND BLAKE SIL 4 CHNL HBLS BNDBL TROCAR STRL

## (undated) DEVICE — GOWN XLG DISP 9545

## (undated) DEVICE — GLOVE SURG 6.5 PROTEXIS LF CRM PF BEAD CUFF STRL PLISPRN

## (undated) DEVICE — BULB 100CC SIL DRN LTWT LOW LVL SCT WND DRN STRL LF DISP

## (undated) DEVICE — LINEN TOWEL PACK X30 5481

## (undated) DEVICE — KIT INTRODUCER FLUENT MICRO 5FRX10CM ECHO TIP KIT-038-04

## (undated) DEVICE — ESU LIGASURE MARYLAND VESSEL LAP 44CM X-LONG LF1744

## (undated) DEVICE — STPL SKIN 35W 059037

## (undated) DEVICE — STPL ENDO RELOAD 60MM MEDIUM THICK PURPLE EGIA60AMT

## (undated) DEVICE — PIN SFTY MED 1.5IN IDEAL FOR GRP HOLD RTN SM ITEM

## (undated) DEVICE — PAD CHUX UNDERPAD 30X36" P3036C

## (undated) DEVICE — MARKER MULTIINK LBL SURG STRL

## (undated) DEVICE — BARRIER SKIN HPOAL TRANS ALC FREE WAND CVLN 3ML TERPOLYMER

## (undated) DEVICE — GLOVE SURG 6 PROTEXIS LF CRM PF BEAD CUFF STRL PLISPRN 11.5

## (undated) DEVICE — SOL NACL 0.9% IRRIG 1000ML BOTTLE 07138-09

## (undated) DEVICE — ELECTRODE PT RTN C30- LB 9FT CORD NONIRRITATE NONSENSITIZE

## (undated) DEVICE — ESU PENCIL W/HOLSTER E2350H

## (undated) DEVICE — SU VICRYL 0 CT-1 27" J340H

## (undated) DEVICE — SYR 10ML FINGER CONTROL W/O NDL 309695

## (undated) DEVICE — TUBING SCT CLR 12FT .25IN MDVC MAXI-GRIP NCDTV MALE TO MALE

## (undated) DEVICE — LINEN TOWEL PACK X5 5464

## (undated) DEVICE — DONUT PSTN HEAD FOAM MLT RING

## (undated) DEVICE — SU VICRYL 4-0 PS-2 18" UND J496H

## (undated) DEVICE — COUNTER 40 CNT ADH DEVON BOXLOCKS STRL LF SHRP 1842 PLASTIC

## (undated) DEVICE — DRESSING PETRO 8X1IN NADH OCL BCTRST LOWPRFL XEROFORM 3% BI

## (undated) DEVICE — DRAPE 2 INCS FILM ANTIMICROBIAL 33X23IN SURG IOBAN STRL

## (undated) DEVICE — COVER FLXB STRL CNVRT LIGHT HNDL PLASTIC GRN

## (undated) DEVICE — SPONGE LAPAROTOMY 18X18IN COTTON X-RAY DETECTABLE HIGHLY ABSORBENT PREWASH TUB SOFT PACK STERILE LATEX FREE DISPOSABLE

## (undated) DEVICE — DRSG TEGADERM IV ADVANCED 3.5X4.5" 1685

## (undated) DEVICE — SUTURE MONOCRYL + MTPS 4-0 PS2 27IN MONO ABS

## (undated) DEVICE — NDL INSUFFLATION 120MM VERRES 172015

## (undated) DEVICE — DRSG BIOPATCH GERMICIDAL SPLIT SPONGE 4MM MED 4150

## (undated) DEVICE — CUFF FINGER CLEARSIGHT MED CSCM

## (undated) DEVICE — ELECTRODE ESURG BLADE PNCL 10FT BTN SWH HEX LOCK CORD HLSTR

## (undated) DEVICE — COVER ULTRASOUND PROBE W/GEL FLEXI-FEEL 6"X58" LF  25-FF658

## (undated) DEVICE — TUBING SUCTION 12"X1/4" N612

## (undated) DEVICE — BLADE SURG 10 STRL LF DISP BARD-PRKR RIB-BACK CBNSTL TISS

## (undated) DEVICE — LINEN TOWEL PACK X6 WHITE 5487

## (undated) DEVICE — GLOVE SURG 6.5 PROTEXIS LF LIGHT BRN PF BEAD CUFF STRL

## (undated) DEVICE — SUTURE PDS + 2-0 CT2 27IN ANBCTRL ABS VIOL PDP333H

## (undated) DEVICE — GLOVE SURG 7 PROTEXIS LF CRM PF BEAD CUFF STRL PLISPRN 12IN

## (undated) DEVICE — PEN SURGMRK STD TIP FLXB RULER LBL PREP RST GENTIAN VIOL INK

## (undated) DEVICE — NDL INSUFFLATION 150MM VERRES 172016

## (undated) DEVICE — TUBING SCT 12FT SET STRL DISP

## (undated) DEVICE — ESU GROUND PAD ADULT W/CORD E7507

## (undated) DEVICE — SOL RINGERS LACTATED 1000ML BAG 2B2324X

## (undated) DEVICE — DEVICE TRCLSN PDS STRATAFIX SYMMETRIC PDS + 0 CT1 45CM ABS

## (undated) DEVICE — NEEDLE HPO 22GA 1.5IN BVL STRL LF DISP JELCO NDL-PRO EDGE

## (undated) DEVICE — CATH TRAY FOLEY 16FR BARDEX W/DRAIN BAG STATLOCK 300316A

## (undated) DEVICE — ENDO POUCH 5X9" 15MM ENDOCATCH II 173049

## (undated) DEVICE — STRIP 4X.5IN SUTSTRP + POLYMIDE MACROPOROUS PRSS SNS ADH WTR

## (undated) DEVICE — DECANTER BAG 2002S

## (undated) DEVICE — 2% CHLORHEXIDINE SKIN PREP ORANGE 26ML

## (undated) DEVICE — DRESSING TRANS 4.75X4IN ADH HPOAL WTPRF TEGADERM PU STD STRL

## (undated) DEVICE — COVER EASY EQUIP BAG W/BAND LATEX FREE EZ-28

## (undated) DEVICE — POUCH INST 11X7IN 2 ADH STRIP 2 CMPRT STRL STRDRP PLASTIC

## (undated) DEVICE — SU VICRYL 2-0 CT-2 27" J333H

## (undated) DEVICE — SUTURE STRATAFIX MONOCRYL + 3 PS2 SPRL 5IN PLGCPRN ABS

## (undated) DEVICE — GLOVE PROTEXIS W/NEU-THERA 8.0  2D73TE80

## (undated) DEVICE — SYSTEM CLEARIFY VISUALIZATION 21-345

## (undated) DEVICE — SUTURE MONOCRYL + MTPS 3-0 PS2 27IN MONO ABS

## (undated) DEVICE — SUCTION MANIFOLD DORNOCH ULTRA CART UL-CL500

## (undated) DEVICE — ENDO TROCAR 15MM VERSAONE BLADELESS W/STD FIX CAN NONB15STF

## (undated) DEVICE — SYRINGE 20ML GRAD STRL MED DISP LL

## (undated) DEVICE — TUBING SUCTION 10'X3/16" N510

## (undated) DEVICE — CAP LUER LOCK MALE/FEMALE DUAL 2C6250

## (undated) DEVICE — PREP SKIN SCRUB TRAY 4461A

## (undated) DEVICE — STRAP PSTN 60X3IN DEVON KN VELCRO 2023 LF DISP

## (undated) DEVICE — SYSTEM WND IRR IRRISEPT DBRD CLNSG 0.05% CHG STRL LF

## (undated) DEVICE — DRAPE REINFORCE SPLIT ABS TUBE HLDR UNV 120X77IN SURG CNVRT

## (undated) DEVICE — ESU ELEC BLADE 6" COATED E1450-6

## (undated) DEVICE — SPONGE GAUZE 2X2IN RAYON POLY 4 PLY HI ABS NONWOVEN STRL LF

## (undated) DEVICE — CLIP APPLIER ENDO 05MM MED/LG 176630

## (undated) DEVICE — ENDO TROCAR 05MM VERSAONE BLADELESS W/STD FIX CAN NONB5STF

## (undated) DEVICE — TRAY CATH CMP CR LUBRI-SIL IC STLK SURESTEP 16FR FOLEY URMTR

## (undated) DEVICE — SYRINGE 10ML GRAD N-PYRG DEHP-FR PVC FREE STRL MED LF DISP

## (undated) DEVICE — PAD ABD 10X8IN DERMACEA ABS NONWOVEN 4 SEAL EDGE

## (undated) DEVICE — HEMOSTAT ABSORBABLE ARISTA 5GM SM0007-USA

## (undated) DEVICE — SUTURE VICRYL 2-0 CT1 18IN CNTRL RELS BRAID 8 STRN COAT ABS J839D

## (undated) DEVICE — GLOVE SURG 6 PROTEXIS LF BLUE PF SMTH BEAD CUFF INTLK STRL

## (undated) DEVICE — LUBRICATING JELLY 4.25OZ

## (undated) DEVICE — GOWN IMPERVIOUS BREATHABLE SMART LG 89015

## (undated) DEVICE — ELECTRODE ESURG BLADE 2.75IN .2IN 332IN INSULATE STD SHAFT

## (undated) DEVICE — SUTURE PRMHND 2-0 SH 30IN SILK BRAID NABSB BLK K833H

## (undated) DEVICE — TUBING INFLTR 15.5FT .16IN .11IN KLEIN SPK SIL PUMP DRIP

## (undated) DEVICE — GLOVE PROTEXIS POWDER FREE SMT 7.5  2D72PT75X

## (undated) DEVICE — SUTURE STRATAFIX MONOCRYL + 3-0 PS2 2 SPRL 12IN ABS UNDYED SXMP2B408

## (undated) DEVICE — TOWEL SURG 26X15IN BLUE TIBURON NABSB DRAPE ADH STRIP STRL

## (undated) DEVICE — BLADE SURG 15 STRL LF DISP BARD-PRKR RIB-BACK CBNSTL TISS

## (undated) DEVICE — ESU HOLDER LAP INST DISP PURPLE LONG 330MM H-PRO-330

## (undated) DEVICE — GLOVE SURG 6.5 PROTEXIS LF BLUE PF SMTH BEAD CUFF INTLK STRL

## (undated) DEVICE — PACK SET-UP STD 9102

## (undated) DEVICE — ESU FCP OLYMPUS PK CUTTING 5MMX33CM PK-CF0533

## (undated) DEVICE — SUCTION MANIFOLD NEPTUNE 2 SYS 1 PORT 702-025-000

## (undated) DEVICE — GLOVE PROTEXIS MICRO 7.5  2D73PM75

## (undated) DEVICE — GLOVE PROTEXIS POWDER FREE SMT 7.0  2D72PT70X

## (undated) DEVICE — SYR 05ML SLIP TIP W/O NDL 309647

## (undated) DEVICE — BINDER ABD 42-55IN 4 PNL CNTCT CLSR ELASTIC

## (undated) RX ORDER — HEPARIN SODIUM (PORCINE) LOCK FLUSH IV SOLN 100 UNIT/ML 100 UNIT/ML
SOLUTION INTRAVENOUS
Status: DISPENSED
Start: 2019-11-15

## (undated) RX ORDER — BUPIVACAINE HYDROCHLORIDE 2.5 MG/ML
INJECTION, SOLUTION EPIDURAL; INFILTRATION; INTRACAUDAL
Status: DISPENSED
Start: 2018-03-09

## (undated) RX ORDER — CEFAZOLIN SODIUM 1 G/3ML
INJECTION, POWDER, FOR SOLUTION INTRAMUSCULAR; INTRAVENOUS
Status: DISPENSED
Start: 2017-07-25

## (undated) RX ORDER — LIDOCAINE HYDROCHLORIDE 20 MG/ML
INJECTION, SOLUTION EPIDURAL; INFILTRATION; INTRACAUDAL; PERINEURAL
Status: DISPENSED
Start: 2018-01-04

## (undated) RX ORDER — FENTANYL CITRATE 50 UG/ML
INJECTION, SOLUTION INTRAMUSCULAR; INTRAVENOUS
Status: DISPENSED
Start: 2017-07-25

## (undated) RX ORDER — DEXAMETHASONE SODIUM PHOSPHATE 4 MG/ML
INJECTION, SOLUTION INTRA-ARTICULAR; INTRALESIONAL; INTRAMUSCULAR; INTRAVENOUS; SOFT TISSUE
Status: DISPENSED
Start: 2018-01-04

## (undated) RX ORDER — HYDRALAZINE HYDROCHLORIDE 20 MG/ML
INJECTION INTRAMUSCULAR; INTRAVENOUS
Status: DISPENSED
Start: 2018-01-04

## (undated) RX ORDER — GLYCOPYRROLATE 0.2 MG/ML
INJECTION, SOLUTION INTRAMUSCULAR; INTRAVENOUS
Status: DISPENSED
Start: 2018-01-04

## (undated) RX ORDER — ONDANSETRON 2 MG/ML
INJECTION INTRAMUSCULAR; INTRAVENOUS
Status: DISPENSED
Start: 2017-07-25

## (undated) RX ORDER — SODIUM CHLORIDE, SODIUM LACTATE, POTASSIUM CHLORIDE, CALCIUM CHLORIDE 600; 310; 30; 20 MG/100ML; MG/100ML; MG/100ML; MG/100ML
INJECTION, SOLUTION INTRAVENOUS
Status: DISPENSED
Start: 2017-07-25

## (undated) RX ORDER — BUPIVACAINE HYDROCHLORIDE 2.5 MG/ML
INJECTION, SOLUTION EPIDURAL; INFILTRATION; INTRACAUDAL
Status: DISPENSED
Start: 2017-07-25

## (undated) RX ORDER — DEXAMETHASONE SODIUM PHOSPHATE 4 MG/ML
INJECTION, SOLUTION INTRA-ARTICULAR; INTRALESIONAL; INTRAMUSCULAR; INTRAVENOUS; SOFT TISSUE
Status: DISPENSED
Start: 2017-07-25

## (undated) RX ORDER — LABETALOL HYDROCHLORIDE 5 MG/ML
INJECTION, SOLUTION INTRAVENOUS
Status: DISPENSED
Start: 2018-01-04

## (undated) RX ORDER — PROPOFOL 10 MG/ML
INJECTION, EMULSION INTRAVENOUS
Status: DISPENSED
Start: 2018-01-04

## (undated) RX ORDER — LIDOCAINE HYDROCHLORIDE 20 MG/ML
INJECTION, SOLUTION EPIDURAL; INFILTRATION; INTRACAUDAL; PERINEURAL
Status: DISPENSED
Start: 2017-07-25

## (undated) RX ORDER — HEPARIN SODIUM,PORCINE 10 UNIT/ML
VIAL (ML) INTRAVENOUS
Status: DISPENSED
Start: 2019-11-15

## (undated) RX ORDER — LIDOCAINE HYDROCHLORIDE 10 MG/ML
INJECTION, SOLUTION EPIDURAL; INFILTRATION; INTRACAUDAL; PERINEURAL
Status: DISPENSED
Start: 2020-10-19

## (undated) RX ORDER — CEFAZOLIN SODIUM 2 G/100ML
INJECTION, SOLUTION INTRAVENOUS
Status: DISPENSED
Start: 2018-01-04

## (undated) RX ORDER — SCOLOPAMINE TRANSDERMAL SYSTEM 1 MG/1
PATCH, EXTENDED RELEASE TRANSDERMAL
Status: DISPENSED
Start: 2017-07-25

## (undated) RX ORDER — FENTANYL CITRATE 50 UG/ML
INJECTION, SOLUTION INTRAMUSCULAR; INTRAVENOUS
Status: DISPENSED
Start: 2018-01-04

## (undated) RX ORDER — HYDROMORPHONE HYDROCHLORIDE 1 MG/ML
INJECTION, SOLUTION INTRAMUSCULAR; INTRAVENOUS; SUBCUTANEOUS
Status: DISPENSED
Start: 2018-01-04

## (undated) RX ORDER — SODIUM CHLORIDE 9 MG/ML
INJECTION, SOLUTION INTRAVENOUS
Status: DISPENSED
Start: 2020-12-29

## (undated) RX ORDER — TRIAMCINOLONE ACETONIDE 40 MG/ML
INJECTION, SUSPENSION INTRA-ARTICULAR; INTRAMUSCULAR
Status: DISPENSED
Start: 2018-03-09

## (undated) RX ORDER — FENTANYL CITRATE 50 UG/ML
INJECTION, SOLUTION INTRAMUSCULAR; INTRAVENOUS
Status: DISPENSED
Start: 2025-07-29

## (undated) RX ORDER — ONDANSETRON 2 MG/ML
INJECTION INTRAMUSCULAR; INTRAVENOUS
Status: DISPENSED
Start: 2018-01-04

## (undated) RX ORDER — LIDOCAINE HYDROCHLORIDE 10 MG/ML
INJECTION, SOLUTION EPIDURAL; INFILTRATION; INTRACAUDAL; PERINEURAL
Status: DISPENSED
Start: 2021-04-20

## (undated) RX ORDER — LIDOCAINE HYDROCHLORIDE 10 MG/ML
INJECTION, SOLUTION EPIDURAL; INFILTRATION; INTRACAUDAL; PERINEURAL
Status: DISPENSED
Start: 2018-03-09

## (undated) RX ORDER — PROPOFOL 10 MG/ML
INJECTION, EMULSION INTRAVENOUS
Status: DISPENSED
Start: 2017-07-25

## (undated) RX ORDER — DIPHENHYDRAMINE HYDROCHLORIDE 50 MG/ML
INJECTION INTRAMUSCULAR; INTRAVENOUS
Status: DISPENSED
Start: 2017-07-25